# Patient Record
Sex: FEMALE | Race: BLACK OR AFRICAN AMERICAN | Employment: UNEMPLOYED | URBAN - METROPOLITAN AREA
[De-identification: names, ages, dates, MRNs, and addresses within clinical notes are randomized per-mention and may not be internally consistent; named-entity substitution may affect disease eponyms.]

---

## 2017-01-01 ENCOUNTER — DOCUMENTATION ONLY (OUTPATIENT)
Dept: PALLATIVE CARE | Age: 37
End: 2017-01-01

## 2017-01-01 ENCOUNTER — HOME CARE VISIT (OUTPATIENT)
Dept: HOSPICE | Facility: HOSPICE | Age: 37
End: 2017-01-01
Payer: MEDICARE

## 2017-01-01 ENCOUNTER — TELEPHONE (OUTPATIENT)
Dept: PALLATIVE CARE | Age: 37
End: 2017-01-01

## 2017-01-01 ENCOUNTER — ANESTHESIA EVENT (OUTPATIENT)
Dept: INTERVENTIONAL RADIOLOGY/VASCULAR | Age: 37
DRG: 542 | End: 2017-01-01
Payer: MEDICARE

## 2017-01-01 ENCOUNTER — NURSE NAVIGATOR (OUTPATIENT)
Dept: PALLATIVE CARE | Age: 37
End: 2017-01-01

## 2017-01-01 ENCOUNTER — ANESTHESIA (OUTPATIENT)
Dept: CT IMAGING | Age: 37
DRG: 940 | End: 2017-01-01
Payer: OTHER MISCELLANEOUS

## 2017-01-01 ENCOUNTER — APPOINTMENT (OUTPATIENT)
Dept: CT IMAGING | Age: 37
DRG: 542 | End: 2017-01-01
Attending: STUDENT IN AN ORGANIZED HEALTH CARE EDUCATION/TRAINING PROGRAM
Payer: MEDICARE

## 2017-01-01 ENCOUNTER — APPOINTMENT (OUTPATIENT)
Dept: GENERAL RADIOLOGY | Age: 37
DRG: 542 | End: 2017-01-01
Attending: ANESTHESIOLOGY
Payer: MEDICARE

## 2017-01-01 ENCOUNTER — ANESTHESIA (OUTPATIENT)
Dept: SURGERY | Age: 37
DRG: 542 | End: 2017-01-01
Payer: MEDICARE

## 2017-01-01 ENCOUNTER — ANESTHESIA EVENT (OUTPATIENT)
Dept: SURGERY | Age: 37
DRG: 542 | End: 2017-01-01
Payer: MEDICARE

## 2017-01-01 ENCOUNTER — HOSPITAL ENCOUNTER (OUTPATIENT)
Dept: CT IMAGING | Age: 37
Discharge: HOME | DRG: 940 | End: 2017-08-02
Attending: INTERNAL MEDICINE
Payer: OTHER MISCELLANEOUS

## 2017-01-01 ENCOUNTER — HOSPITAL ENCOUNTER (INPATIENT)
Age: 37
LOS: 16 days | End: 2017-08-27
Attending: INTERNAL MEDICINE | Admitting: INTERNAL MEDICINE

## 2017-01-01 ENCOUNTER — APPOINTMENT (OUTPATIENT)
Dept: GENERAL RADIOLOGY | Age: 37
DRG: 542 | End: 2017-01-01
Attending: STUDENT IN AN ORGANIZED HEALTH CARE EDUCATION/TRAINING PROGRAM
Payer: MEDICARE

## 2017-01-01 ENCOUNTER — DOCUMENTATION ONLY (OUTPATIENT)
Dept: OTHER | Age: 37
End: 2017-01-01

## 2017-01-01 ENCOUNTER — HOME CARE VISIT (OUTPATIENT)
Dept: SCHEDULING | Facility: HOME HEALTH | Age: 37
End: 2017-01-01
Payer: MEDICARE

## 2017-01-01 ENCOUNTER — HOSPICE ADMISSION (OUTPATIENT)
Dept: HOSPICE | Facility: HOSPICE | Age: 37
End: 2017-01-01
Payer: MEDICARE

## 2017-01-01 ENCOUNTER — HOSPITAL ENCOUNTER (INPATIENT)
Age: 37
LOS: 8 days | Discharge: HOSPICE | DRG: 542 | End: 2017-05-16
Attending: STUDENT IN AN ORGANIZED HEALTH CARE EDUCATION/TRAINING PROGRAM | Admitting: FAMILY MEDICINE
Payer: MEDICARE

## 2017-01-01 ENCOUNTER — HOSPITAL ENCOUNTER (INPATIENT)
Age: 37
LOS: 14 days | Discharge: HOSPICE | End: 2017-05-30
Attending: INTERNAL MEDICINE | Admitting: INTERNAL MEDICINE
Payer: MEDICARE

## 2017-01-01 ENCOUNTER — APPOINTMENT (OUTPATIENT)
Dept: INTERVENTIONAL RADIOLOGY/VASCULAR | Age: 37
DRG: 542 | End: 2017-01-01
Attending: INTERNAL MEDICINE
Payer: MEDICARE

## 2017-01-01 ENCOUNTER — ANESTHESIA EVENT (OUTPATIENT)
Dept: CT IMAGING | Age: 37
DRG: 940 | End: 2017-01-01
Payer: OTHER MISCELLANEOUS

## 2017-01-01 ENCOUNTER — HOME VISIT (OUTPATIENT)
Dept: PALLATIVE CARE | Age: 37
End: 2017-01-01

## 2017-01-01 ENCOUNTER — OFFICE VISIT (OUTPATIENT)
Dept: GERIATRIC MEDICINE | Age: 37
End: 2017-01-01

## 2017-01-01 ENCOUNTER — HOSPITAL ENCOUNTER (INPATIENT)
Age: 37
LOS: 11 days | Discharge: HOSPICE | DRG: 940 | End: 2017-08-11
Attending: INTERNAL MEDICINE | Admitting: INTERNAL MEDICINE
Payer: OTHER MISCELLANEOUS

## 2017-01-01 ENCOUNTER — APPOINTMENT (OUTPATIENT)
Dept: ULTRASOUND IMAGING | Age: 37
DRG: 542 | End: 2017-01-01
Attending: INTERNAL MEDICINE
Payer: MEDICARE

## 2017-01-01 ENCOUNTER — APPOINTMENT (OUTPATIENT)
Dept: GENERAL RADIOLOGY | Age: 37
DRG: 940 | End: 2017-01-01
Attending: INTERNAL MEDICINE
Payer: OTHER MISCELLANEOUS

## 2017-01-01 ENCOUNTER — APPOINTMENT (OUTPATIENT)
Dept: GENERAL RADIOLOGY | Age: 37
DRG: 542 | End: 2017-01-01
Attending: FAMILY MEDICINE
Payer: MEDICARE

## 2017-01-01 ENCOUNTER — ANESTHESIA (OUTPATIENT)
Dept: INTERVENTIONAL RADIOLOGY/VASCULAR | Age: 37
DRG: 542 | End: 2017-01-01
Payer: MEDICARE

## 2017-01-01 ENCOUNTER — DOCUMENTATION ONLY (OUTPATIENT)
Dept: FAMILY MEDICINE CLINIC | Age: 37
End: 2017-01-01

## 2017-01-01 ENCOUNTER — HOME HEALTH ADMISSION (OUTPATIENT)
Dept: HOME HEALTH SERVICES | Facility: HOME HEALTH | Age: 37
End: 2017-01-01
Payer: MEDICARE

## 2017-01-01 ENCOUNTER — HOSPITAL ENCOUNTER (INPATIENT)
Age: 37
LOS: 56 days | Discharge: HOSPICE | End: 2017-07-31
Attending: INTERNAL MEDICINE | Admitting: INTERNAL MEDICINE
Payer: MEDICARE

## 2017-01-01 VITALS
DIASTOLIC BLOOD PRESSURE: 68 MMHG | HEART RATE: 90 BPM | OXYGEN SATURATION: 98 % | RESPIRATION RATE: 16 BRPM | SYSTOLIC BLOOD PRESSURE: 120 MMHG | TEMPERATURE: 97.2 F

## 2017-01-01 VITALS — OXYGEN SATURATION: 97 % | HEART RATE: 92 BPM | SYSTOLIC BLOOD PRESSURE: 130 MMHG | DIASTOLIC BLOOD PRESSURE: 68 MMHG

## 2017-01-01 VITALS
TEMPERATURE: 97.4 F | RESPIRATION RATE: 16 BRPM | HEART RATE: 100 BPM | DIASTOLIC BLOOD PRESSURE: 64 MMHG | SYSTOLIC BLOOD PRESSURE: 110 MMHG | OXYGEN SATURATION: 96 %

## 2017-01-01 VITALS
RESPIRATION RATE: 16 BRPM | DIASTOLIC BLOOD PRESSURE: 60 MMHG | SYSTOLIC BLOOD PRESSURE: 105 MMHG | HEART RATE: 97 BPM | OXYGEN SATURATION: 98 %

## 2017-01-01 VITALS
TEMPERATURE: 97 F | SYSTOLIC BLOOD PRESSURE: 104 MMHG | OXYGEN SATURATION: 97 % | HEART RATE: 87 BPM | DIASTOLIC BLOOD PRESSURE: 78 MMHG | RESPIRATION RATE: 20 BRPM

## 2017-01-01 VITALS — DIASTOLIC BLOOD PRESSURE: 60 MMHG | TEMPERATURE: 97.3 F | OXYGEN SATURATION: 97 % | SYSTOLIC BLOOD PRESSURE: 110 MMHG

## 2017-01-01 VITALS
HEART RATE: 98 BPM | SYSTOLIC BLOOD PRESSURE: 102 MMHG | RESPIRATION RATE: 18 BRPM | OXYGEN SATURATION: 92 % | DIASTOLIC BLOOD PRESSURE: 60 MMHG

## 2017-01-01 VITALS
OXYGEN SATURATION: 98 % | HEART RATE: 100 BPM | SYSTOLIC BLOOD PRESSURE: 112 MMHG | WEIGHT: 267 LBS | BODY MASS INDEX: 43.09 KG/M2 | RESPIRATION RATE: 16 BRPM | DIASTOLIC BLOOD PRESSURE: 68 MMHG

## 2017-01-01 VITALS
HEART RATE: 95 BPM | SYSTOLIC BLOOD PRESSURE: 114 MMHG | DIASTOLIC BLOOD PRESSURE: 60 MMHG | RESPIRATION RATE: 18 BRPM | OXYGEN SATURATION: 97 %

## 2017-01-01 VITALS
TEMPERATURE: 97.8 F | HEART RATE: 113 BPM | RESPIRATION RATE: 18 BRPM | OXYGEN SATURATION: 94 % | DIASTOLIC BLOOD PRESSURE: 66 MMHG | SYSTOLIC BLOOD PRESSURE: 102 MMHG

## 2017-01-01 VITALS
RESPIRATION RATE: 16 BRPM | BODY MASS INDEX: 43.01 KG/M2 | SYSTOLIC BLOOD PRESSURE: 133 MMHG | HEART RATE: 100 BPM | TEMPERATURE: 98.5 F | HEIGHT: 66 IN | DIASTOLIC BLOOD PRESSURE: 76 MMHG | OXYGEN SATURATION: 100 % | WEIGHT: 267.64 LBS

## 2017-01-01 VITALS
RESPIRATION RATE: 20 BRPM | TEMPERATURE: 98.3 F | DIASTOLIC BLOOD PRESSURE: 60 MMHG | DIASTOLIC BLOOD PRESSURE: 72 MMHG | OXYGEN SATURATION: 97 % | SYSTOLIC BLOOD PRESSURE: 126 MMHG | SYSTOLIC BLOOD PRESSURE: 110 MMHG | HEIGHT: 67 IN | HEART RATE: 88 BPM | OXYGEN SATURATION: 97 % | TEMPERATURE: 98.4 F | HEART RATE: 107 BPM

## 2017-01-01 VITALS
HEART RATE: 121 BPM | RESPIRATION RATE: 10 BRPM | OXYGEN SATURATION: 99 % | TEMPERATURE: 98.6 F | SYSTOLIC BLOOD PRESSURE: 116 MMHG | DIASTOLIC BLOOD PRESSURE: 76 MMHG

## 2017-01-01 VITALS
DIASTOLIC BLOOD PRESSURE: 80 MMHG | RESPIRATION RATE: 18 BRPM | SYSTOLIC BLOOD PRESSURE: 120 MMHG | WEIGHT: 293 LBS | HEART RATE: 78 BPM | BODY MASS INDEX: 46.99 KG/M2

## 2017-01-01 VITALS
TEMPERATURE: 98.8 F | SYSTOLIC BLOOD PRESSURE: 127 MMHG | DIASTOLIC BLOOD PRESSURE: 66 MMHG | OXYGEN SATURATION: 100 % | HEART RATE: 123 BPM | RESPIRATION RATE: 18 BRPM

## 2017-01-01 VITALS — SYSTOLIC BLOOD PRESSURE: 112 MMHG | OXYGEN SATURATION: 95 % | DIASTOLIC BLOOD PRESSURE: 66 MMHG | HEART RATE: 92 BPM

## 2017-01-01 VITALS
SYSTOLIC BLOOD PRESSURE: 102 MMHG | RESPIRATION RATE: 16 BRPM | OXYGEN SATURATION: 96 % | TEMPERATURE: 97 F | DIASTOLIC BLOOD PRESSURE: 60 MMHG | HEART RATE: 98 BPM

## 2017-01-01 VITALS — HEART RATE: 135 BPM | SYSTOLIC BLOOD PRESSURE: 120 MMHG | OXYGEN SATURATION: 96 % | DIASTOLIC BLOOD PRESSURE: 70 MMHG

## 2017-01-01 VITALS
OXYGEN SATURATION: 97 % | SYSTOLIC BLOOD PRESSURE: 120 MMHG | DIASTOLIC BLOOD PRESSURE: 68 MMHG | TEMPERATURE: 97.2 F | HEART RATE: 96 BPM

## 2017-01-01 VITALS
OXYGEN SATURATION: 99 % | HEART RATE: 89 BPM | DIASTOLIC BLOOD PRESSURE: 72 MMHG | RESPIRATION RATE: 18 BRPM | SYSTOLIC BLOOD PRESSURE: 122 MMHG

## 2017-01-01 VITALS
HEART RATE: 102 BPM | RESPIRATION RATE: 20 BRPM | OXYGEN SATURATION: 98 % | SYSTOLIC BLOOD PRESSURE: 117 MMHG | DIASTOLIC BLOOD PRESSURE: 61 MMHG

## 2017-01-01 VITALS — HEART RATE: 95 BPM | SYSTOLIC BLOOD PRESSURE: 130 MMHG | OXYGEN SATURATION: 96 % | DIASTOLIC BLOOD PRESSURE: 88 MMHG

## 2017-01-01 VITALS
HEART RATE: 128 BPM | RESPIRATION RATE: 18 BRPM | TEMPERATURE: 98.2 F | OXYGEN SATURATION: 94 % | DIASTOLIC BLOOD PRESSURE: 40 MMHG | SYSTOLIC BLOOD PRESSURE: 75 MMHG

## 2017-01-01 VITALS
RESPIRATION RATE: 18 BRPM | DIASTOLIC BLOOD PRESSURE: 60 MMHG | HEART RATE: 98 BPM | OXYGEN SATURATION: 91 % | SYSTOLIC BLOOD PRESSURE: 100 MMHG

## 2017-01-01 VITALS
HEART RATE: 109 BPM | RESPIRATION RATE: 18 BRPM | DIASTOLIC BLOOD PRESSURE: 60 MMHG | OXYGEN SATURATION: 96 % | SYSTOLIC BLOOD PRESSURE: 92 MMHG

## 2017-01-01 VITALS — SYSTOLIC BLOOD PRESSURE: 110 MMHG | OXYGEN SATURATION: 97 % | DIASTOLIC BLOOD PRESSURE: 60 MMHG | HEART RATE: 87 BPM

## 2017-01-01 DIAGNOSIS — C79.51 BONE METASTASES (HCC): ICD-10-CM

## 2017-01-01 DIAGNOSIS — C54.1 ENDOMETRIAL CANCER (HCC): Primary | ICD-10-CM

## 2017-01-01 DIAGNOSIS — C54.1 ENDOMETRIAL CANCER (HCC): ICD-10-CM

## 2017-01-01 DIAGNOSIS — G89.3 CHRONIC NEOPLASM-RELATED PAIN: ICD-10-CM

## 2017-01-01 DIAGNOSIS — R60.1 GENERALIZED EDEMA: ICD-10-CM

## 2017-01-01 DIAGNOSIS — C79.51 BONE METASTASES (HCC): Primary | ICD-10-CM

## 2017-01-01 DIAGNOSIS — K59.03 CONSTIPATION DUE TO PAIN MEDICATION: ICD-10-CM

## 2017-01-01 DIAGNOSIS — R53.81 DEBILITY: ICD-10-CM

## 2017-01-01 DIAGNOSIS — M79.604 RIGHT LEG PAIN: Primary | ICD-10-CM

## 2017-01-01 DIAGNOSIS — R53.81 DEBILITY: Primary | ICD-10-CM

## 2017-01-01 DIAGNOSIS — E66.01 MORBID OBESITY DUE TO EXCESS CALORIES (HCC): ICD-10-CM

## 2017-01-01 DIAGNOSIS — R00.0 TACHYCARDIA: ICD-10-CM

## 2017-01-01 DIAGNOSIS — R79.89 ELEVATED LIVER FUNCTION TESTS: ICD-10-CM

## 2017-01-01 DIAGNOSIS — R06.02 SHORTNESS OF BREATH: ICD-10-CM

## 2017-01-01 DIAGNOSIS — G62.9 NEUROPATHY: ICD-10-CM

## 2017-01-01 DIAGNOSIS — M79.604 PAIN OF RIGHT LOWER EXTREMITY: ICD-10-CM

## 2017-01-01 DIAGNOSIS — Z71.89 ADVANCE CARE PLANNING: ICD-10-CM

## 2017-01-01 DIAGNOSIS — F41.9 ANXIETY: ICD-10-CM

## 2017-01-01 DIAGNOSIS — R60.0 LOCALIZED EDEMA: ICD-10-CM

## 2017-01-01 DIAGNOSIS — C54.1 ENDOMETRIAL CARCINOMA (HCC): ICD-10-CM

## 2017-01-01 DIAGNOSIS — F05 DELIRIUM DUE TO ANOTHER MEDICAL CONDITION: ICD-10-CM

## 2017-01-01 DIAGNOSIS — K11.7 INCREASED OROPHARYNGEAL SECRETIONS: ICD-10-CM

## 2017-01-01 DIAGNOSIS — R40.0 SOMNOLENCE: ICD-10-CM

## 2017-01-01 DIAGNOSIS — C54.1 ENDOMETRIAL CA (HCC): ICD-10-CM

## 2017-01-01 DIAGNOSIS — K21.00 GASTROESOPHAGEAL REFLUX DISEASE WITH ESOPHAGITIS: ICD-10-CM

## 2017-01-01 DIAGNOSIS — F32.89 OTHER DEPRESSION: ICD-10-CM

## 2017-01-01 DIAGNOSIS — R73.03 PRE-DIABETES: ICD-10-CM

## 2017-01-01 DIAGNOSIS — R53.1 WEAKNESS: ICD-10-CM

## 2017-01-01 DIAGNOSIS — R06.02 SHORTNESS OF BREATH: Primary | ICD-10-CM

## 2017-01-01 DIAGNOSIS — G47.01 INSOMNIA DUE TO MEDICAL CONDITION: ICD-10-CM

## 2017-01-01 DIAGNOSIS — E66.01 MORBID OBESITY, UNSPECIFIED OBESITY TYPE (HCC): ICD-10-CM

## 2017-01-01 DIAGNOSIS — E88.81 METABOLIC SYNDROME: ICD-10-CM

## 2017-01-01 DIAGNOSIS — B35.3 TINEA PEDIS OF RIGHT FOOT: ICD-10-CM

## 2017-01-01 DIAGNOSIS — R10.2 VAGINAL PAIN: ICD-10-CM

## 2017-01-01 DIAGNOSIS — R11.0 NAUSEA WITHOUT VOMITING: ICD-10-CM

## 2017-01-01 DIAGNOSIS — B37.9 CANDIDA INFECTION: ICD-10-CM

## 2017-01-01 DIAGNOSIS — C79.9 METASTATIC CANCER (HCC): ICD-10-CM

## 2017-01-01 DIAGNOSIS — E55.9 VITAMIN D DEFICIENCY: ICD-10-CM

## 2017-01-01 LAB
ABO + RH BLD: NORMAL
ABO + RH BLD: NORMAL
ALBUMIN SERPL BCP-MCNC: 2 G/DL (ref 3.5–5)
ALBUMIN SERPL BCP-MCNC: 2.9 G/DL (ref 3.5–5)
ALBUMIN SERPL BCP-MCNC: 3 G/DL (ref 3.5–5)
ALBUMIN/GLOB SERPL: 0.5 {RATIO} (ref 1.1–2.2)
ALBUMIN/GLOB SERPL: 0.7 {RATIO} (ref 1.1–2.2)
ALBUMIN/GLOB SERPL: 0.7 {RATIO} (ref 1.1–2.2)
ALP SERPL-CCNC: 104 U/L (ref 45–117)
ALP SERPL-CCNC: 93 U/L (ref 45–117)
ALP SERPL-CCNC: 99 U/L (ref 45–117)
ALT SERPL-CCNC: 11 U/L (ref 12–78)
ALT SERPL-CCNC: 24 U/L (ref 12–78)
ALT SERPL-CCNC: 9 U/L (ref 12–78)
ANION GAP BLD CALC-SCNC: 7 MMOL/L (ref 5–15)
ANION GAP BLD CALC-SCNC: 7 MMOL/L (ref 5–15)
ANION GAP BLD CALC-SCNC: 8 MMOL/L (ref 5–15)
ANION GAP BLD CALC-SCNC: 8 MMOL/L (ref 5–15)
APTT PPP: 35.5 SEC (ref 22.1–32.5)
ARTERIAL PATENCY WRIST A: YES
AST SERPL W P-5'-P-CCNC: 10 U/L (ref 15–37)
AST SERPL W P-5'-P-CCNC: 33 U/L (ref 15–37)
AST SERPL W P-5'-P-CCNC: 9 U/L (ref 15–37)
ATRIAL RATE: 140 BPM
BACTERIA SPEC CULT: NORMAL
BASE EXCESS BLD CALC-SCNC: 0 MMOL/L
BASOPHILS # BLD AUTO: 0 K/UL (ref 0–0.1)
BASOPHILS # BLD AUTO: 0 K/UL (ref 0–0.1)
BASOPHILS # BLD: 0 % (ref 0–1)
BASOPHILS # BLD: 0 % (ref 0–1)
BDY SITE: ABNORMAL
BILIRUB DIRECT SERPL-MCNC: 5.9 MG/DL (ref 0–0.2)
BILIRUB SERPL-MCNC: 0.4 MG/DL (ref 0.2–1)
BILIRUB SERPL-MCNC: 0.6 MG/DL (ref 0.2–1)
BILIRUB SERPL-MCNC: 6.9 MG/DL (ref 0.2–1)
BLD PROD TYP BPU: NORMAL
BLOOD GROUP ANTIBODIES SERPL: NORMAL
BLOOD GROUP ANTIBODIES SERPL: NORMAL
BPU ID: NORMAL
BUN SERPL-MCNC: 14 MG/DL (ref 6–20)
BUN SERPL-MCNC: 16 MG/DL (ref 6–20)
BUN SERPL-MCNC: 19 MG/DL (ref 6–20)
BUN SERPL-MCNC: 23 MG/DL (ref 6–20)
BUN/CREAT SERPL: 20 (ref 12–20)
BUN/CREAT SERPL: 20 (ref 12–20)
BUN/CREAT SERPL: 24 (ref 12–20)
BUN/CREAT SERPL: 28 (ref 12–20)
CALCIUM SERPL-MCNC: 8.3 MG/DL (ref 8.5–10.1)
CALCIUM SERPL-MCNC: 8.3 MG/DL (ref 8.5–10.1)
CALCIUM SERPL-MCNC: 8.6 MG/DL (ref 8.5–10.1)
CALCIUM SERPL-MCNC: 8.6 MG/DL (ref 8.5–10.1)
CALCULATED P AXIS, ECG09: 67 DEGREES
CALCULATED R AXIS, ECG10: 52 DEGREES
CALCULATED T AXIS, ECG11: 28 DEGREES
CHLORIDE SERPL-SCNC: 104 MMOL/L (ref 97–108)
CHLORIDE SERPL-SCNC: 105 MMOL/L (ref 97–108)
CHLORIDE SERPL-SCNC: 106 MMOL/L (ref 97–108)
CHLORIDE SERPL-SCNC: 110 MMOL/L (ref 97–108)
CK MB CFR SERPL CALC: NORMAL % (ref 0–2.5)
CK MB SERPL-MCNC: <1 NG/ML (ref 5–25)
CK SERPL-CCNC: 60 U/L (ref 26–192)
CO2 SERPL-SCNC: 24 MMOL/L (ref 21–32)
CO2 SERPL-SCNC: 24 MMOL/L (ref 21–32)
CO2 SERPL-SCNC: 26 MMOL/L (ref 21–32)
CO2 SERPL-SCNC: 26 MMOL/L (ref 21–32)
CREAT SERPL-MCNC: 0.68 MG/DL (ref 0.55–1.02)
CREAT SERPL-MCNC: 0.69 MG/DL (ref 0.55–1.02)
CREAT SERPL-MCNC: 0.83 MG/DL (ref 0.55–1.02)
CREAT SERPL-MCNC: 0.95 MG/DL (ref 0.55–1.02)
CROSSMATCH RESULT,%XM: NORMAL
D DIMER PPP FEU-MCNC: 6.41 MG/L FEU (ref 0–0.65)
DIAGNOSIS, 93000: NORMAL
DIFFERENTIAL METHOD BLD: ABNORMAL
EOSINOPHIL # BLD: 0 K/UL (ref 0–0.4)
EOSINOPHIL # BLD: 0.2 K/UL (ref 0–0.4)
EOSINOPHIL NFR BLD: 0 % (ref 0–7)
EOSINOPHIL NFR BLD: 2 % (ref 0–7)
ERYTHROCYTE [DISTWIDTH] IN BLOOD BY AUTOMATED COUNT: 17.8 % (ref 11.5–14.5)
ERYTHROCYTE [DISTWIDTH] IN BLOOD BY AUTOMATED COUNT: 18.2 % (ref 11.5–14.5)
ERYTHROCYTE [DISTWIDTH] IN BLOOD BY AUTOMATED COUNT: 18.3 % (ref 11.5–14.5)
ERYTHROCYTE [DISTWIDTH] IN BLOOD BY AUTOMATED COUNT: 20.4 % (ref 11.5–14.5)
FOLATE SERPL-MCNC: 18.4 NG/ML (ref 5–21)
GAS FLOW.O2 O2 DELIVERY SYS: ABNORMAL L/MIN
GAS FLOW.O2 SETTING OXYMISER: 2 L/M
GLOBULIN SER CALC-MCNC: 3.7 G/DL (ref 2–4)
GLOBULIN SER CALC-MCNC: 3.9 G/DL (ref 2–4)
GLOBULIN SER CALC-MCNC: 4.4 G/DL (ref 2–4)
GLUCOSE BLD STRIP.AUTO-MCNC: 100 MG/DL (ref 65–100)
GLUCOSE BLD STRIP.AUTO-MCNC: 102 MG/DL (ref 65–100)
GLUCOSE BLD STRIP.AUTO-MCNC: 102 MG/DL (ref 65–100)
GLUCOSE BLD STRIP.AUTO-MCNC: 107 MG/DL (ref 65–100)
GLUCOSE BLD STRIP.AUTO-MCNC: 108 MG/DL (ref 65–100)
GLUCOSE BLD STRIP.AUTO-MCNC: 117 MG/DL (ref 65–100)
GLUCOSE BLD STRIP.AUTO-MCNC: 118 MG/DL (ref 65–100)
GLUCOSE BLD STRIP.AUTO-MCNC: 123 MG/DL (ref 65–100)
GLUCOSE BLD STRIP.AUTO-MCNC: 124 MG/DL (ref 65–100)
GLUCOSE BLD STRIP.AUTO-MCNC: 130 MG/DL (ref 65–100)
GLUCOSE BLD STRIP.AUTO-MCNC: 134 MG/DL (ref 65–100)
GLUCOSE BLD STRIP.AUTO-MCNC: 136 MG/DL (ref 65–100)
GLUCOSE BLD STRIP.AUTO-MCNC: 137 MG/DL (ref 65–100)
GLUCOSE BLD STRIP.AUTO-MCNC: 138 MG/DL (ref 65–100)
GLUCOSE BLD STRIP.AUTO-MCNC: 152 MG/DL (ref 65–100)
GLUCOSE BLD STRIP.AUTO-MCNC: 154 MG/DL (ref 65–100)
GLUCOSE BLD STRIP.AUTO-MCNC: 159 MG/DL (ref 65–100)
GLUCOSE BLD STRIP.AUTO-MCNC: 168 MG/DL (ref 65–100)
GLUCOSE BLD STRIP.AUTO-MCNC: 172 MG/DL (ref 65–100)
GLUCOSE BLD STRIP.AUTO-MCNC: 185 MG/DL (ref 65–100)
GLUCOSE BLD STRIP.AUTO-MCNC: 190 MG/DL (ref 65–100)
GLUCOSE BLD STRIP.AUTO-MCNC: 195 MG/DL (ref 65–100)
GLUCOSE BLD STRIP.AUTO-MCNC: 200 MG/DL (ref 65–100)
GLUCOSE BLD STRIP.AUTO-MCNC: 54 MG/DL (ref 65–100)
GLUCOSE BLD STRIP.AUTO-MCNC: 70 MG/DL (ref 65–100)
GLUCOSE BLD STRIP.AUTO-MCNC: 82 MG/DL (ref 65–100)
GLUCOSE BLD STRIP.AUTO-MCNC: 83 MG/DL (ref 65–100)
GLUCOSE BLD STRIP.AUTO-MCNC: 91 MG/DL (ref 65–100)
GLUCOSE BLD STRIP.AUTO-MCNC: 92 MG/DL (ref 65–100)
GLUCOSE BLD STRIP.AUTO-MCNC: 95 MG/DL (ref 65–100)
GLUCOSE SERPL-MCNC: 114 MG/DL (ref 65–100)
GLUCOSE SERPL-MCNC: 161 MG/DL (ref 65–100)
GLUCOSE SERPL-MCNC: 176 MG/DL (ref 65–100)
GLUCOSE SERPL-MCNC: 82 MG/DL (ref 65–100)
HCO3 BLD-SCNC: 22.1 MMOL/L (ref 22–26)
HCT VFR BLD AUTO: 18.4 % (ref 35–47)
HCT VFR BLD AUTO: 23.9 % (ref 35–47)
HCT VFR BLD AUTO: 24.6 % (ref 35–47)
HCT VFR BLD AUTO: 27 % (ref 35–47)
HGB BLD-MCNC: 5 G/DL (ref 11.5–16)
HGB BLD-MCNC: 6.9 G/DL (ref 11.5–16)
HGB BLD-MCNC: 7.3 G/DL (ref 11.5–16)
HGB BLD-MCNC: 7.3 G/DL (ref 11.5–16)
HGB BLD-MCNC: 7.7 G/DL (ref 11.5–16)
INR BLD: 1.1 (ref 0.9–1.2)
INR PPP: 1.1 (ref 0.9–1.1)
IRON SATN MFR SERPL: 15 % (ref 20–50)
IRON SERPL-MCNC: 30 UG/DL (ref 35–150)
LACTATE SERPL-SCNC: 0.7 MMOL/L (ref 0.4–2)
LYMPHOCYTES # BLD AUTO: 8 % (ref 12–49)
LYMPHOCYTES # BLD AUTO: 8 % (ref 12–49)
LYMPHOCYTES # BLD: 1 K/UL (ref 0.8–3.5)
LYMPHOCYTES # BLD: 1.3 K/UL (ref 0.8–3.5)
MCH RBC QN AUTO: 20 PG (ref 26–34)
MCH RBC QN AUTO: 21.8 PG (ref 26–34)
MCH RBC QN AUTO: 22.5 PG (ref 26–34)
MCH RBC QN AUTO: 23.2 PG (ref 26–34)
MCHC RBC AUTO-ENTMCNC: 27.2 G/DL (ref 30–36.5)
MCHC RBC AUTO-ENTMCNC: 28.5 G/DL (ref 30–36.5)
MCHC RBC AUTO-ENTMCNC: 28.9 G/DL (ref 30–36.5)
MCHC RBC AUTO-ENTMCNC: 29.7 G/DL (ref 30–36.5)
MCV RBC AUTO: 73.6 FL (ref 80–99)
MCV RBC AUTO: 76.5 FL (ref 80–99)
MCV RBC AUTO: 77.9 FL (ref 80–99)
MCV RBC AUTO: 78.3 FL (ref 80–99)
MONOCYTES # BLD: 0.4 K/UL (ref 0–1)
MONOCYTES # BLD: 0.8 K/UL (ref 0–1)
MONOCYTES NFR BLD AUTO: 3 % (ref 5–13)
MONOCYTES NFR BLD AUTO: 5 % (ref 5–13)
NEUTS SEG # BLD: 10.7 K/UL (ref 1.8–8)
NEUTS SEG # BLD: 13.2 K/UL (ref 1.8–8)
NEUTS SEG NFR BLD AUTO: 87 % (ref 32–75)
NEUTS SEG NFR BLD AUTO: 87 % (ref 32–75)
NRBC # BLD: 0.06 K/UL (ref 0–0.01)
NRBC BLD-RTO: 0.5 PER 100 WBC
P-R INTERVAL, ECG05: 144 MS
PCO2 BLD: 22.7 MMHG (ref 35–45)
PH BLD: 7.59 [PH] (ref 7.35–7.45)
PLATELET # BLD AUTO: 307 K/UL (ref 150–400)
PLATELET # BLD AUTO: 331 K/UL (ref 150–400)
PLATELET # BLD AUTO: 340 K/UL (ref 150–400)
PLATELET # BLD AUTO: 365 K/UL (ref 150–400)
PO2 BLD: 175 MMHG (ref 80–100)
POTASSIUM SERPL-SCNC: 3.8 MMOL/L (ref 3.5–5.1)
POTASSIUM SERPL-SCNC: 4.4 MMOL/L (ref 3.5–5.1)
POTASSIUM SERPL-SCNC: 4.4 MMOL/L (ref 3.5–5.1)
POTASSIUM SERPL-SCNC: 4.5 MMOL/L (ref 3.5–5.1)
PROT SERPL-MCNC: 5.7 G/DL (ref 6.4–8.2)
PROT SERPL-MCNC: 6.8 G/DL (ref 6.4–8.2)
PROT SERPL-MCNC: 7.4 G/DL (ref 6.4–8.2)
PROTHROMBIN TIME: 11.4 SEC (ref 9–11.1)
Q-T INTERVAL, ECG07: 264 MS
QRS DURATION, ECG06: 64 MS
QTC CALCULATION (BEZET), ECG08: 403 MS
RBC # BLD AUTO: 2.5 M/UL (ref 3.8–5.2)
RBC # BLD AUTO: 3.07 M/UL (ref 3.8–5.2)
RBC # BLD AUTO: 3.14 M/UL (ref 3.8–5.2)
RBC # BLD AUTO: 3.53 M/UL (ref 3.8–5.2)
RBC MORPH BLD: ABNORMAL
SAO2 % BLD: 100 % (ref 92–97)
SERVICE CMNT-IMP: ABNORMAL
SERVICE CMNT-IMP: NORMAL
SODIUM SERPL-SCNC: 137 MMOL/L (ref 136–145)
SODIUM SERPL-SCNC: 138 MMOL/L (ref 136–145)
SODIUM SERPL-SCNC: 139 MMOL/L (ref 136–145)
SODIUM SERPL-SCNC: 141 MMOL/L (ref 136–145)
SPECIMEN EXP DATE BLD: NORMAL
SPECIMEN EXP DATE BLD: NORMAL
SPECIMEN TYPE: ABNORMAL
STATUS OF UNIT,%ST: NORMAL
THERAPEUTIC RANGE,PTTT: ABNORMAL SECS (ref 58–77)
TIBC SERPL-MCNC: 194 UG/DL (ref 250–450)
TOTAL RESP. RATE, ITRR: 16
TROPONIN I SERPL-MCNC: 0.06 NG/ML
UNIT DIVISION, %UDIV: 0
VENTRICULAR RATE, ECG03: 140 BPM
VIT B12 SERPL-MCNC: 1207 PG/ML (ref 211–911)
WBC # BLD AUTO: 12.3 K/UL (ref 3.6–11)
WBC # BLD AUTO: 12.7 K/UL (ref 3.6–11)
WBC # BLD AUTO: 13.3 K/UL (ref 3.6–11)
WBC # BLD AUTO: 15.2 K/UL (ref 3.6–11)
WBC NRBC COR # BLD: ABNORMAL 10*3/UL

## 2017-01-01 PROCEDURE — 74011250637 HC RX REV CODE- 250/637: Performed by: INTERNAL MEDICINE

## 2017-01-01 PROCEDURE — 77010033678 HC OXYGEN DAILY

## 2017-01-01 PROCEDURE — 3336590001 HSPC ROOM AND BOARD

## 2017-01-01 PROCEDURE — 74011250637 HC RX REV CODE- 250/637: Performed by: NURSE PRACTITIONER

## 2017-01-01 PROCEDURE — 74177 CT ABD & PELVIS W/CONTRAST: CPT

## 2017-01-01 PROCEDURE — G0155 HHCP-SVS OF CSW,EA 15 MIN: HCPCS

## 2017-01-01 PROCEDURE — 99233 SBSQ HOSP IP/OBS HIGH 50: CPT | Performed by: INTERNAL MEDICINE

## 2017-01-01 PROCEDURE — 99357 PR PROLONGED SVC I/P REQ UNIT/FLOOR TIME EA 30 MIN: CPT | Performed by: INTERNAL MEDICINE

## 2017-01-01 PROCEDURE — T4541 LARGE DISPOSABLE UNDERPAD: HCPCS

## 2017-01-01 PROCEDURE — G0156 HHCP-SVS OF AIDE,EA 15 MIN: HCPCS

## 2017-01-01 PROCEDURE — 36415 COLL VENOUS BLD VENIPUNCTURE: CPT | Performed by: INTERNAL MEDICINE

## 2017-01-01 PROCEDURE — 3331090002 HH PPS REVENUE DEBIT

## 2017-01-01 PROCEDURE — 74011000250 HC RX REV CODE- 250: Performed by: NURSE PRACTITIONER

## 2017-01-01 PROCEDURE — 74011250636 HC RX REV CODE- 250/636: Performed by: NURSE PRACTITIONER

## 2017-01-01 PROCEDURE — 74011250636 HC RX REV CODE- 250/636: Performed by: FAMILY MEDICINE

## 2017-01-01 PROCEDURE — 74011250636 HC RX REV CODE- 250/636: Performed by: INTERNAL MEDICINE

## 2017-01-01 PROCEDURE — 80053 COMPREHEN METABOLIC PANEL: CPT | Performed by: HOSPITALIST

## 2017-01-01 PROCEDURE — HOSPICE MEDICATION HC HH HOSPICE MEDICATION

## 2017-01-01 PROCEDURE — 3331090001 HH PPS REVENUE CREDIT

## 2017-01-01 PROCEDURE — 0651 HSPC ROUTINE HOME CARE

## 2017-01-01 PROCEDURE — 74011000250 HC RX REV CODE- 250: Performed by: INTERNAL MEDICINE

## 2017-01-01 PROCEDURE — 82746 ASSAY OF FOLIC ACID SERUM: CPT | Performed by: FAMILY MEDICINE

## 2017-01-01 PROCEDURE — G0299 HHS/HOSPICE OF RN EA 15 MIN: HCPCS

## 2017-01-01 PROCEDURE — 74011250637 HC RX REV CODE- 250/637: Performed by: FAMILY MEDICINE

## 2017-01-01 PROCEDURE — 77030026438 HC STYL ET INTUB CARD -A: Performed by: ANESTHESIOLOGY

## 2017-01-01 PROCEDURE — 51798 US URINE CAPACITY MEASURE: CPT

## 2017-01-01 PROCEDURE — 65270000015 HC RM PRIVATE ONCOLOGY

## 2017-01-01 PROCEDURE — 36430 TRANSFUSION BLD/BLD COMPNT: CPT

## 2017-01-01 PROCEDURE — 74011636637 HC RX REV CODE- 636/637: Performed by: HOSPITALIST

## 2017-01-01 PROCEDURE — 74011250636 HC RX REV CODE- 250/636: Performed by: HOSPITALIST

## 2017-01-01 PROCEDURE — 65660000000 HC RM CCU STEPDOWN

## 2017-01-01 PROCEDURE — 73551 X-RAY EXAM OF FEMUR 1: CPT

## 2017-01-01 PROCEDURE — 99285 EMERGENCY DEPT VISIT HI MDM: CPT

## 2017-01-01 PROCEDURE — P9016 RBC LEUKOCYTES REDUCED: HCPCS | Performed by: INTERNAL MEDICINE

## 2017-01-01 PROCEDURE — P9016 RBC LEUKOCYTES REDUCED: HCPCS | Performed by: HOSPITALIST

## 2017-01-01 PROCEDURE — 93971 EXTREMITY STUDY: CPT

## 2017-01-01 PROCEDURE — 76450000000

## 2017-01-01 PROCEDURE — 74011250637 HC RX REV CODE- 250/637: Performed by: PHYSICAL MEDICINE & REHABILITATION

## 2017-01-01 PROCEDURE — 0656 HSPC GENERAL INPATIENT

## 2017-01-01 PROCEDURE — A4927 NON-STERILE GLOVES: HCPCS

## 2017-01-01 PROCEDURE — 99232 SBSQ HOSP IP/OBS MODERATE 35: CPT | Performed by: INTERNAL MEDICINE

## 2017-01-01 PROCEDURE — 99233 SBSQ HOSP IP/OBS HIGH 50: CPT | Performed by: FAMILY MEDICINE

## 2017-01-01 PROCEDURE — 77030032490 HC SLV COMPR SCD KNE COVD -B

## 2017-01-01 PROCEDURE — 02HV33Z INSERTION OF INFUSION DEVICE INTO SUPERIOR VENA CAVA, PERCUTANEOUS APPROACH: ICD-10-PCS | Performed by: INTERNAL MEDICINE

## 2017-01-01 PROCEDURE — 77030011256 HC DRSG MEPILEX <16IN NO BORD MOLN -A

## 2017-01-01 PROCEDURE — 76210000063 HC OR PH I REC FIRST 0.5 HR

## 2017-01-01 PROCEDURE — A6250 SKIN SEAL PROTECT MOISTURIZR: HCPCS

## 2017-01-01 PROCEDURE — 77030019908 HC STETH ESOPH SIMS -A: Performed by: ANESTHESIOLOGY

## 2017-01-01 PROCEDURE — 74011000250 HC RX REV CODE- 250

## 2017-01-01 PROCEDURE — 20982 ABLATE BONE TUMOR(S) PERQ: CPT

## 2017-01-01 PROCEDURE — 76882 US LMTD JT/FCL EVL NVASC XTR: CPT

## 2017-01-01 PROCEDURE — 80053 COMPREHEN METABOLIC PANEL: CPT | Performed by: STUDENT IN AN ORGANIZED HEALTH CARE EDUCATION/TRAINING PROGRAM

## 2017-01-01 PROCEDURE — 85025 COMPLETE CBC W/AUTO DIFF WBC: CPT | Performed by: INTERNAL MEDICINE

## 2017-01-01 PROCEDURE — G0157 HHC PT ASSISTANT EA 15: HCPCS

## 2017-01-01 PROCEDURE — 74011250636 HC RX REV CODE- 250/636: Performed by: STUDENT IN AN ORGANIZED HEALTH CARE EDUCATION/TRAINING PROGRAM

## 2017-01-01 PROCEDURE — 77030034850

## 2017-01-01 PROCEDURE — 3331090003 HH PPS REVENUE ADJ

## 2017-01-01 PROCEDURE — A4314 CATH W/DRAINAGE 2-WAY LATEX: HCPCS

## 2017-01-01 PROCEDURE — 77030008684 HC TU ET CUF COVD -B: Performed by: ANESTHESIOLOGY

## 2017-01-01 PROCEDURE — 77030020847 HC STATLOK BARD -A

## 2017-01-01 PROCEDURE — 82248 BILIRUBIN DIRECT: CPT | Performed by: INTERNAL MEDICINE

## 2017-01-01 PROCEDURE — 65270000029 HC RM PRIVATE

## 2017-01-01 PROCEDURE — 76060000033 HC ANESTHESIA 1 TO 1.5 HR: Performed by: ANESTHESIOLOGY

## 2017-01-01 PROCEDURE — 82962 GLUCOSE BLOOD TEST: CPT

## 2017-01-01 PROCEDURE — 80048 BASIC METABOLIC PNL TOTAL CA: CPT | Performed by: INTERNAL MEDICINE

## 2017-01-01 PROCEDURE — 3336500001 HSPC ELECTION

## 2017-01-01 PROCEDURE — 73701 CT LOWER EXTREMITY W/DYE: CPT

## 2017-01-01 PROCEDURE — G0151 HHCP-SERV OF PT,EA 15 MIN: HCPCS

## 2017-01-01 PROCEDURE — 80053 COMPREHEN METABOLIC PANEL: CPT | Performed by: INTERNAL MEDICINE

## 2017-01-01 PROCEDURE — 74011636320 HC RX REV CODE- 636/320: Performed by: HOSPITALIST

## 2017-01-01 PROCEDURE — 84484 ASSAY OF TROPONIN QUANT: CPT | Performed by: HOSPITALIST

## 2017-01-01 PROCEDURE — 76210000016 HC OR PH I REC 1 TO 1.5 HR

## 2017-01-01 PROCEDURE — HHS10554 SHAMPOO/BODY WASH 8 OZ ALOE VESTA

## 2017-01-01 PROCEDURE — 99231 SBSQ HOSP IP/OBS SF/LOW 25: CPT | Performed by: INTERNAL MEDICINE

## 2017-01-01 PROCEDURE — 85025 COMPLETE CBC W/AUTO DIFF WBC: CPT | Performed by: HOSPITALIST

## 2017-01-01 PROCEDURE — 65610000006 HC RM INTENSIVE CARE

## 2017-01-01 PROCEDURE — 77030008771 HC TU NG SALEM SUMP -A: Performed by: ANESTHESIOLOGY

## 2017-01-01 PROCEDURE — 82607 VITAMIN B-12: CPT | Performed by: FAMILY MEDICINE

## 2017-01-01 PROCEDURE — 71275 CT ANGIOGRAPHY CHEST: CPT

## 2017-01-01 PROCEDURE — C1751 CATH, INF, PER/CENT/MIDLINE: HCPCS

## 2017-01-01 PROCEDURE — A4223 INFUSION SUPPLIES W/O PUMP: HCPCS

## 2017-01-01 PROCEDURE — 76210000006 HC OR PH I REC 0.5 TO 1 HR: Performed by: ANESTHESIOLOGY

## 2017-01-01 PROCEDURE — 86900 BLOOD TYPING SEROLOGIC ABO: CPT | Performed by: INTERNAL MEDICINE

## 2017-01-01 PROCEDURE — 76060000037 HC ANESTHESIA 3 TO 3.5 HR

## 2017-01-01 PROCEDURE — 30233N1 TRANSFUSION OF NONAUTOLOGOUS RED BLOOD CELLS INTO PERIPHERAL VEIN, PERCUTANEOUS APPROACH: ICD-10-PCS | Performed by: FAMILY MEDICINE

## 2017-01-01 PROCEDURE — 77030018719 HC DRSG PTCH ANTIMIC J&J -A

## 2017-01-01 PROCEDURE — 36569 INSJ PICC 5 YR+ W/O IMAGING: CPT

## 2017-01-01 PROCEDURE — 83540 ASSAY OF IRON: CPT | Performed by: FAMILY MEDICINE

## 2017-01-01 PROCEDURE — 93041 RHYTHM ECG TRACING: CPT

## 2017-01-01 PROCEDURE — A9270 NON-COVERED ITEM OR SERVICE: HCPCS

## 2017-01-01 PROCEDURE — 74011250636 HC RX REV CODE- 250/636: Performed by: PHYSICAL MEDICINE & REHABILITATION

## 2017-01-01 PROCEDURE — 36593 DECLOT VASCULAR DEVICE: CPT

## 2017-01-01 PROCEDURE — 36592 COLLECT BLOOD FROM PICC: CPT

## 2017-01-01 PROCEDURE — 74011250636 HC RX REV CODE- 250/636

## 2017-01-01 PROCEDURE — A4221 SUPP NON-INSULIN INF CATH/WK: HCPCS

## 2017-01-01 PROCEDURE — 85027 COMPLETE CBC AUTOMATED: CPT | Performed by: STUDENT IN AN ORGANIZED HEALTH CARE EDUCATION/TRAINING PROGRAM

## 2017-01-01 PROCEDURE — 83605 ASSAY OF LACTIC ACID: CPT | Performed by: HOSPITALIST

## 2017-01-01 PROCEDURE — 74011000258 HC RX REV CODE- 258: Performed by: INTERNAL MEDICINE

## 2017-01-01 PROCEDURE — 71010 XR CHEST PORT: CPT

## 2017-01-01 PROCEDURE — 86923 COMPATIBILITY TEST ELECTRIC: CPT | Performed by: INTERNAL MEDICINE

## 2017-01-01 PROCEDURE — 400013 HH SOC

## 2017-01-01 PROCEDURE — 02HV33Z INSERTION OF INFUSION DEVICE INTO SUPERIOR VENA CAVA, PERCUTANEOUS APPROACH: ICD-10-PCS | Performed by: ANESTHESIOLOGY

## 2017-01-01 PROCEDURE — A4245 ALCOHOL WIPES PER BOX: HCPCS

## 2017-01-01 PROCEDURE — 85610 PROTHROMBIN TIME: CPT | Performed by: HOSPITALIST

## 2017-01-01 PROCEDURE — 85027 COMPLETE CBC AUTOMATED: CPT | Performed by: INTERNAL MEDICINE

## 2017-01-01 PROCEDURE — 85379 FIBRIN DEGRADATION QUANT: CPT | Performed by: INTERNAL MEDICINE

## 2017-01-01 PROCEDURE — 76060000035 HC ANESTHESIA 2 TO 2.5 HR

## 2017-01-01 PROCEDURE — 82550 ASSAY OF CK (CPK): CPT | Performed by: HOSPITALIST

## 2017-01-01 PROCEDURE — 85610 PROTHROMBIN TIME: CPT

## 2017-01-01 PROCEDURE — 74011250637 HC RX REV CODE- 250/637: Performed by: HOSPITALIST

## 2017-01-01 PROCEDURE — 96375 TX/PRO/DX INJ NEW DRUG ADDON: CPT

## 2017-01-01 PROCEDURE — 36600 WITHDRAWAL OF ARTERIAL BLOOD: CPT

## 2017-01-01 PROCEDURE — 74011000250 HC RX REV CODE- 250: Performed by: RADIOLOGY

## 2017-01-01 PROCEDURE — 82803 BLOOD GASES ANY COMBINATION: CPT

## 2017-01-01 PROCEDURE — 86920 COMPATIBILITY TEST SPIN: CPT | Performed by: HOSPITALIST

## 2017-01-01 PROCEDURE — 74011000258 HC RX REV CODE- 258: Performed by: HOSPITALIST

## 2017-01-01 PROCEDURE — 36415 COLL VENOUS BLD VENIPUNCTURE: CPT | Performed by: STUDENT IN AN ORGANIZED HEALTH CARE EDUCATION/TRAINING PROGRAM

## 2017-01-01 PROCEDURE — 73590 X-RAY EXAM OF LOWER LEG: CPT

## 2017-01-01 PROCEDURE — 85730 THROMBOPLASTIN TIME PARTIAL: CPT | Performed by: HOSPITALIST

## 2017-01-01 PROCEDURE — 86900 BLOOD TYPING SEROLOGIC ABO: CPT | Performed by: HOSPITALIST

## 2017-01-01 PROCEDURE — 74011000250 HC RX REV CODE- 250: Performed by: ANESTHESIOLOGY

## 2017-01-01 PROCEDURE — 85018 HEMOGLOBIN: CPT | Performed by: INTERNAL MEDICINE

## 2017-01-01 PROCEDURE — 96374 THER/PROPH/DIAG INJ IV PUSH: CPT

## 2017-01-01 PROCEDURE — 74011000258 HC RX REV CODE- 258: Performed by: NURSE PRACTITIONER

## 2017-01-01 PROCEDURE — 87040 BLOOD CULTURE FOR BACTERIA: CPT | Performed by: HOSPITALIST

## 2017-01-01 PROCEDURE — 0Q543ZZ DESTRUCTION OF RIGHT ACETABULUM, PERCUTANEOUS APPROACH: ICD-10-PCS | Performed by: RADIOLOGY

## 2017-01-01 PROCEDURE — 74011250637 HC RX REV CODE- 250/637: Performed by: STUDENT IN AN ORGANIZED HEALTH CARE EDUCATION/TRAINING PROGRAM

## 2017-01-01 PROCEDURE — 74011000250 HC RX REV CODE- 250: Performed by: FAMILY MEDICINE

## 2017-01-01 RX ORDER — FACIAL-BODY WIPES
10 EACH TOPICAL DAILY PRN
Status: DISCONTINUED | OUTPATIENT
Start: 2017-01-01 | End: 2017-01-01 | Stop reason: HOSPADM

## 2017-01-01 RX ORDER — LORAZEPAM 2 MG/ML
1 INJECTION INTRAMUSCULAR
Status: DISCONTINUED | OUTPATIENT
Start: 2017-01-01 | End: 2017-01-01

## 2017-01-01 RX ORDER — PHENAZOPYRIDINE HYDROCHLORIDE 100 MG/1
100 TABLET, FILM COATED ORAL
Status: COMPLETED | OUTPATIENT
Start: 2017-01-01 | End: 2017-01-01

## 2017-01-01 RX ORDER — HYDROMORPHONE HCL IN 0.9% NACL 15 MG/30ML
PATIENT CONTROLLED ANALGESIA VIAL INTRAVENOUS CONTINUOUS
Status: DISCONTINUED | OUTPATIENT
Start: 2017-01-01 | End: 2017-01-01

## 2017-01-01 RX ORDER — DULOXETIN HYDROCHLORIDE 20 MG/1
40 CAPSULE, DELAYED RELEASE ORAL DAILY
COMMUNITY

## 2017-01-01 RX ORDER — ACETAMINOPHEN 325 MG/1
650 TABLET ORAL
Status: DISCONTINUED | OUTPATIENT
Start: 2017-01-01 | End: 2017-08-28 | Stop reason: HOSPADM

## 2017-01-01 RX ORDER — KETAMINE HYDROCHLORIDE 10 MG/ML
INJECTION, SOLUTION INTRAMUSCULAR; INTRAVENOUS AS NEEDED
Status: DISCONTINUED | OUTPATIENT
Start: 2017-01-01 | End: 2017-01-01 | Stop reason: HOSPADM

## 2017-01-01 RX ORDER — DULOXETIN HYDROCHLORIDE 30 MG/1
30 CAPSULE, DELAYED RELEASE ORAL DAILY
Status: DISCONTINUED | OUTPATIENT
Start: 2017-01-01 | End: 2017-01-01

## 2017-01-01 RX ORDER — LIDOCAINE HYDROCHLORIDE 10 MG/ML
0.1 INJECTION, SOLUTION EPIDURAL; INFILTRATION; INTRACAUDAL; PERINEURAL AS NEEDED
Status: CANCELLED | OUTPATIENT
Start: 2017-01-01

## 2017-01-01 RX ORDER — METRONIDAZOLE 250 MG/1
1000 TABLET ORAL AS NEEDED
Status: DISCONTINUED | OUTPATIENT
Start: 2017-01-01 | End: 2017-08-28 | Stop reason: HOSPADM

## 2017-01-01 RX ORDER — METHADONE HYDROCHLORIDE 5 MG/1
10 TABLET ORAL EVERY 8 HOURS
Status: DISCONTINUED | OUTPATIENT
Start: 2017-01-01 | End: 2017-01-01 | Stop reason: HOSPADM

## 2017-01-01 RX ORDER — SODIUM CHLORIDE 0.9 % (FLUSH) 0.9 %
5-10 SYRINGE (ML) INJECTION AS NEEDED
Status: DISCONTINUED | OUTPATIENT
Start: 2017-01-01 | End: 2017-01-01 | Stop reason: HOSPADM

## 2017-01-01 RX ORDER — LORAZEPAM 0.5 MG/1
0.5 TABLET ORAL
Status: DISCONTINUED | OUTPATIENT
Start: 2017-01-01 | End: 2017-01-01

## 2017-01-01 RX ORDER — LIDOCAINE HYDROCHLORIDE 20 MG/ML
INJECTION, SOLUTION EPIDURAL; INFILTRATION; INTRACAUDAL; PERINEURAL AS NEEDED
Status: DISCONTINUED | OUTPATIENT
Start: 2017-01-01 | End: 2017-01-01 | Stop reason: HOSPADM

## 2017-01-01 RX ORDER — METOPROLOL TARTRATE 25 MG/1
12.5 TABLET, FILM COATED ORAL 2 TIMES DAILY
Status: DISCONTINUED | OUTPATIENT
Start: 2017-01-01 | End: 2017-01-01

## 2017-01-01 RX ORDER — HYDROMORPHONE HYDROCHLORIDE 2 MG/ML
4 INJECTION, SOLUTION INTRAMUSCULAR; INTRAVENOUS; SUBCUTANEOUS
Status: DISCONTINUED | OUTPATIENT
Start: 2017-01-01 | End: 2017-01-01 | Stop reason: RX

## 2017-01-01 RX ORDER — SUCCINYLCHOLINE CHLORIDE 20 MG/ML
INJECTION INTRAMUSCULAR; INTRAVENOUS AS NEEDED
Status: DISCONTINUED | OUTPATIENT
Start: 2017-01-01 | End: 2017-01-01 | Stop reason: HOSPADM

## 2017-01-01 RX ORDER — ACETAMINOPHEN 325 MG/1
650 TABLET ORAL
Status: DISCONTINUED | OUTPATIENT
Start: 2017-01-01 | End: 2017-01-01 | Stop reason: HOSPADM

## 2017-01-01 RX ORDER — HALOPERIDOL 2 MG/1
2 TABLET ORAL
Status: DISCONTINUED | OUTPATIENT
Start: 2017-01-01 | End: 2017-01-01

## 2017-01-01 RX ORDER — DULOXETIN HYDROCHLORIDE 20 MG/1
40 CAPSULE, DELAYED RELEASE ORAL DAILY
COMMUNITY
End: 2017-01-01 | Stop reason: SDUPTHER

## 2017-01-01 RX ORDER — DIPHENHYDRAMINE HYDROCHLORIDE 50 MG/ML
12.5 INJECTION, SOLUTION INTRAMUSCULAR; INTRAVENOUS AS NEEDED
Status: DISCONTINUED | OUTPATIENT
Start: 2017-01-01 | End: 2017-01-01 | Stop reason: HOSPADM

## 2017-01-01 RX ORDER — GLYCOPYRROLATE 0.2 MG/ML
0.2 INJECTION INTRAMUSCULAR; INTRAVENOUS
Status: DISCONTINUED | OUTPATIENT
Start: 2017-01-01 | End: 2017-08-28 | Stop reason: HOSPADM

## 2017-01-01 RX ORDER — MORPHINE SULFATE 10 MG/ML
15 INJECTION, SOLUTION INTRAMUSCULAR; INTRAVENOUS
Status: DISCONTINUED | OUTPATIENT
Start: 2017-01-01 | End: 2017-01-01

## 2017-01-01 RX ORDER — SODIUM CHLORIDE 0.9 % (FLUSH) 0.9 %
10-40 SYRINGE (ML) INJECTION EVERY 8 HOURS
Status: DISCONTINUED | OUTPATIENT
Start: 2017-01-01 | End: 2017-01-01

## 2017-01-01 RX ORDER — POLYETHYLENE GLYCOL 3350 17 G/17G
17 POWDER, FOR SOLUTION ORAL DAILY
Status: DISCONTINUED | OUTPATIENT
Start: 2017-01-01 | End: 2017-01-01

## 2017-01-01 RX ORDER — POLYETHYLENE GLYCOL 3350 17 G/17G
17 POWDER, FOR SOLUTION ORAL DAILY
Status: DISCONTINUED | OUTPATIENT
Start: 2017-01-01 | End: 2017-01-01 | Stop reason: HOSPADM

## 2017-01-01 RX ORDER — FENTANYL CITRATE 50 UG/ML
25 INJECTION, SOLUTION INTRAMUSCULAR; INTRAVENOUS
Status: DISCONTINUED | OUTPATIENT
Start: 2017-01-01 | End: 2017-01-01 | Stop reason: HOSPADM

## 2017-01-01 RX ORDER — FENTANYL CITRATE 50 UG/ML
INJECTION, SOLUTION INTRAMUSCULAR; INTRAVENOUS
Status: DISPENSED
Start: 2017-01-01 | End: 2017-01-01

## 2017-01-01 RX ORDER — SODIUM CHLORIDE 0.9 % (FLUSH) 0.9 %
10 SYRINGE (ML) INJECTION AS NEEDED
Status: DISCONTINUED | OUTPATIENT
Start: 2017-01-01 | End: 2017-01-01 | Stop reason: HOSPADM

## 2017-01-01 RX ORDER — HYDROMORPHONE HCL IN 0.9% NACL 15 MG/30ML
PATIENT CONTROLLED ANALGESIA VIAL INTRAVENOUS CONTINUOUS
Status: DISCONTINUED | OUTPATIENT
Start: 2017-01-01 | End: 2017-01-01 | Stop reason: SDUPTHER

## 2017-01-01 RX ORDER — SODIUM CHLORIDE, SODIUM LACTATE, POTASSIUM CHLORIDE, CALCIUM CHLORIDE 600; 310; 30; 20 MG/100ML; MG/100ML; MG/100ML; MG/100ML
INJECTION, SOLUTION INTRAVENOUS
Status: DISCONTINUED | OUTPATIENT
Start: 2017-01-01 | End: 2017-01-01 | Stop reason: HOSPADM

## 2017-01-01 RX ORDER — OXYCODONE HYDROCHLORIDE 5 MG/1
60 TABLET ORAL
Status: DISCONTINUED | OUTPATIENT
Start: 2017-01-01 | End: 2017-01-01 | Stop reason: HOSPADM

## 2017-01-01 RX ORDER — MIDAZOLAM HYDROCHLORIDE 1 MG/ML
1 INJECTION, SOLUTION INTRAMUSCULAR; INTRAVENOUS AS NEEDED
Status: DISCONTINUED | OUTPATIENT
Start: 2017-01-01 | End: 2017-01-01 | Stop reason: HOSPADM

## 2017-01-01 RX ORDER — DIPHENHYDRAMINE HCL 25 MG
25 CAPSULE ORAL
Status: DISCONTINUED | OUTPATIENT
Start: 2017-01-01 | End: 2017-01-01 | Stop reason: HOSPADM

## 2017-01-01 RX ORDER — MORPHINE SULFATE 20 MG/ML
40 SOLUTION ORAL
Status: DISCONTINUED | OUTPATIENT
Start: 2017-01-01 | End: 2017-01-01

## 2017-01-01 RX ORDER — ONDANSETRON 2 MG/ML
INJECTION INTRAMUSCULAR; INTRAVENOUS AS NEEDED
Status: DISCONTINUED | OUTPATIENT
Start: 2017-01-01 | End: 2017-01-01 | Stop reason: HOSPADM

## 2017-01-01 RX ORDER — SULINDAC 200 MG/1
200 TABLET ORAL 2 TIMES DAILY WITH MEALS
Status: DISCONTINUED | OUTPATIENT
Start: 2017-01-01 | End: 2017-01-01

## 2017-01-01 RX ORDER — AMOXICILLIN 250 MG
2 CAPSULE ORAL 2 TIMES DAILY
Status: DISCONTINUED | OUTPATIENT
Start: 2017-01-01 | End: 2017-01-01

## 2017-01-01 RX ORDER — HYDROMORPHONE HCL IN 0.9% NACL 15 MG/30ML
PATIENT CONTROLLED ANALGESIA VIAL INTRAVENOUS CONTINUOUS
Status: DISCONTINUED | OUTPATIENT
Start: 2017-01-01 | End: 2017-01-01 | Stop reason: HOSPADM

## 2017-01-01 RX ORDER — MAGNESIUM SULFATE 100 %
4 CRYSTALS MISCELLANEOUS AS NEEDED
Status: DISCONTINUED | OUTPATIENT
Start: 2017-01-01 | End: 2017-01-01 | Stop reason: HOSPADM

## 2017-01-01 RX ORDER — DEXTROSE MONOHYDRATE AND SODIUM CHLORIDE 5; .45 G/100ML; G/100ML
125 INJECTION, SOLUTION INTRAVENOUS CONTINUOUS
Status: DISCONTINUED | OUTPATIENT
Start: 2017-01-01 | End: 2017-01-01

## 2017-01-01 RX ORDER — HYDROMORPHONE HYDROCHLORIDE 1 MG/ML
3 INJECTION, SOLUTION INTRAMUSCULAR; INTRAVENOUS; SUBCUTANEOUS
Status: DISCONTINUED | OUTPATIENT
Start: 2017-01-01 | End: 2017-01-01

## 2017-01-01 RX ORDER — SODIUM CHLORIDE 9 MG/ML
250 INJECTION, SOLUTION INTRAVENOUS AS NEEDED
Status: DISCONTINUED | OUTPATIENT
Start: 2017-01-01 | End: 2017-01-01 | Stop reason: HOSPADM

## 2017-01-01 RX ORDER — ACETAMINOPHEN 500 MG
1000 TABLET ORAL 3 TIMES DAILY
Status: DISCONTINUED | OUTPATIENT
Start: 2017-01-01 | End: 2017-01-01 | Stop reason: HOSPADM

## 2017-01-01 RX ORDER — SODIUM CHLORIDE, SODIUM LACTATE, POTASSIUM CHLORIDE, CALCIUM CHLORIDE 600; 310; 30; 20 MG/100ML; MG/100ML; MG/100ML; MG/100ML
1000 INJECTION, SOLUTION INTRAVENOUS CONTINUOUS
Status: DISCONTINUED | OUTPATIENT
Start: 2017-01-01 | End: 2017-01-01 | Stop reason: HOSPADM

## 2017-01-01 RX ORDER — HYDROMORPHONE HYDROCHLORIDE 2 MG/ML
2 INJECTION, SOLUTION INTRAMUSCULAR; INTRAVENOUS; SUBCUTANEOUS ONCE
Status: COMPLETED | OUTPATIENT
Start: 2017-01-01 | End: 2017-01-01

## 2017-01-01 RX ORDER — BISACODYL 5 MG
5 TABLET, DELAYED RELEASE (ENTERIC COATED) ORAL DAILY PRN
Status: DISCONTINUED | OUTPATIENT
Start: 2017-01-01 | End: 2017-01-01 | Stop reason: HOSPADM

## 2017-01-01 RX ORDER — HEPARIN 100 UNIT/ML
300 SYRINGE INTRAVENOUS AS NEEDED
Status: DISCONTINUED | OUTPATIENT
Start: 2017-01-01 | End: 2017-01-01 | Stop reason: HOSPADM

## 2017-01-01 RX ORDER — FACIAL-BODY WIPES
10 EACH TOPICAL DAILY PRN
Status: DISCONTINUED | OUTPATIENT
Start: 2017-01-01 | End: 2017-08-28 | Stop reason: HOSPADM

## 2017-01-01 RX ORDER — DOCUSATE SODIUM 100 MG/1
100 CAPSULE, LIQUID FILLED ORAL DAILY
Status: DISCONTINUED | OUTPATIENT
Start: 2017-01-01 | End: 2017-01-01

## 2017-01-01 RX ORDER — DIPHENHYDRAMINE HYDROCHLORIDE 50 MG/ML
12.5 INJECTION, SOLUTION INTRAMUSCULAR; INTRAVENOUS AS NEEDED
Status: CANCELLED | OUTPATIENT
Start: 2017-01-01 | End: 2017-01-01

## 2017-01-01 RX ORDER — MORPHINE SULFATE 5 MG/ML
4 INJECTION, SOLUTION INTRAVENOUS CONTINUOUS
Qty: 1 VIAL | Refills: 0 | Status: SHIPPED | OUTPATIENT
Start: 2017-01-01

## 2017-01-01 RX ORDER — OXYCODONE HYDROCHLORIDE 5 MG/1
30 TABLET ORAL
Status: DISCONTINUED | OUTPATIENT
Start: 2017-01-01 | End: 2017-01-01

## 2017-01-01 RX ORDER — PHENOBARBITAL SODIUM 130 MG/ML
30 INJECTION INTRAMUSCULAR
Status: DISCONTINUED | OUTPATIENT
Start: 2017-01-01 | End: 2017-08-28 | Stop reason: HOSPADM

## 2017-01-01 RX ORDER — IPRATROPIUM BROMIDE AND ALBUTEROL SULFATE 2.5; .5 MG/3ML; MG/3ML
3 SOLUTION RESPIRATORY (INHALATION)
Status: DISCONTINUED | OUTPATIENT
Start: 2017-01-01 | End: 2017-01-01

## 2017-01-01 RX ORDER — SODIUM CHLORIDE 0.9 % (FLUSH) 0.9 %
10 SYRINGE (ML) INJECTION EVERY 24 HOURS
Status: DISCONTINUED | OUTPATIENT
Start: 2017-01-01 | End: 2017-01-01 | Stop reason: HOSPADM

## 2017-01-01 RX ORDER — OXYCODONE HYDROCHLORIDE 5 MG/1
60 TABLET ORAL
Status: DISCONTINUED | OUTPATIENT
Start: 2017-01-01 | End: 2017-01-01

## 2017-01-01 RX ORDER — ENOXAPARIN SODIUM 150 MG/ML
111 INJECTION SUBCUTANEOUS EVERY 12 HOURS
Status: DISCONTINUED | OUTPATIENT
Start: 2017-01-01 | End: 2017-01-01

## 2017-01-01 RX ORDER — GABAPENTIN 300 MG/1
300 CAPSULE ORAL 3 TIMES DAILY
Status: DISCONTINUED | OUTPATIENT
Start: 2017-01-01 | End: 2017-01-01

## 2017-01-01 RX ORDER — MORPHINE SULFATE 20 MG/ML
20 SOLUTION ORAL
Status: DISCONTINUED | OUTPATIENT
Start: 2017-01-01 | End: 2017-01-01

## 2017-01-01 RX ORDER — LORAZEPAM 2 MG/ML
2 INJECTION INTRAMUSCULAR
Status: DISCONTINUED | OUTPATIENT
Start: 2017-01-01 | End: 2017-08-28 | Stop reason: HOSPADM

## 2017-01-01 RX ORDER — DEXAMETHASONE SODIUM PHOSPHATE 4 MG/ML
4 INJECTION, SOLUTION INTRA-ARTICULAR; INTRALESIONAL; INTRAMUSCULAR; INTRAVENOUS; SOFT TISSUE EVERY 12 HOURS
Status: DISCONTINUED | OUTPATIENT
Start: 2017-01-01 | End: 2017-01-01

## 2017-01-01 RX ORDER — NYSTATIN 100000 [USP'U]/ML
500000 SUSPENSION ORAL 4 TIMES DAILY
Status: DISCONTINUED | OUTPATIENT
Start: 2017-01-01 | End: 2017-01-01

## 2017-01-01 RX ORDER — FENTANYL 100 UG/H
2 PATCH TRANSDERMAL
Qty: 22 PATCH | Refills: 0 | Status: SHIPPED | OUTPATIENT
Start: 2017-01-01 | End: 2017-01-01 | Stop reason: SDUPTHER

## 2017-01-01 RX ORDER — LORAZEPAM 0.5 MG/1
0.5 TABLET ORAL 2 TIMES DAILY
Status: DISCONTINUED | OUTPATIENT
Start: 2017-01-01 | End: 2017-01-01

## 2017-01-01 RX ORDER — BACITRACIN 500 UNIT/G
1 PACKET (EA) TOPICAL AS NEEDED
Status: DISCONTINUED | OUTPATIENT
Start: 2017-01-01 | End: 2017-01-01 | Stop reason: HOSPADM

## 2017-01-01 RX ORDER — ACETAMINOPHEN 325 MG/1
650 TABLET ORAL ONCE
Status: COMPLETED | OUTPATIENT
Start: 2017-01-01 | End: 2017-01-01

## 2017-01-01 RX ORDER — MORPHINE SULFATE 4 MG/ML
4 INJECTION, SOLUTION INTRAMUSCULAR; INTRAVENOUS
Status: DISCONTINUED | OUTPATIENT
Start: 2017-01-01 | End: 2017-01-01

## 2017-01-01 RX ORDER — HYDROMORPHONE HYDROCHLORIDE 2 MG/ML
4 INJECTION, SOLUTION INTRAMUSCULAR; INTRAVENOUS; SUBCUTANEOUS
Status: DISCONTINUED | OUTPATIENT
Start: 2017-01-01 | End: 2017-08-28 | Stop reason: HOSPADM

## 2017-01-01 RX ORDER — ONDANSETRON 2 MG/ML
4 INJECTION INTRAMUSCULAR; INTRAVENOUS
Status: COMPLETED | OUTPATIENT
Start: 2017-01-01 | End: 2017-01-01

## 2017-01-01 RX ORDER — NYSTATIN 100000 [USP'U]/G
POWDER TOPICAL
Qty: 1 BOTTLE | Refills: 1 | Status: SHIPPED | OUTPATIENT
Start: 2017-01-01

## 2017-01-01 RX ORDER — NYSTATIN 100000 [USP'U]/G
POWDER TOPICAL 3 TIMES DAILY
Status: DISCONTINUED | OUTPATIENT
Start: 2017-01-01 | End: 2017-01-01

## 2017-01-01 RX ORDER — LORAZEPAM 2 MG/ML
1 INJECTION INTRAMUSCULAR
Status: DISCONTINUED | OUTPATIENT
Start: 2017-01-01 | End: 2017-01-01 | Stop reason: HOSPADM

## 2017-01-01 RX ORDER — GLYCOPYRROLATE 0.2 MG/ML
0.2 INJECTION INTRAMUSCULAR; INTRAVENOUS
Status: DISCONTINUED | OUTPATIENT
Start: 2017-01-01 | End: 2017-01-01

## 2017-01-01 RX ORDER — LORAZEPAM 1 MG/1
1 TABLET ORAL
Status: DISCONTINUED | OUTPATIENT
Start: 2017-01-01 | End: 2017-01-01

## 2017-01-01 RX ORDER — LIDOCAINE HYDROCHLORIDE 20 MG/ML
20 INJECTION, SOLUTION INFILTRATION; PERINEURAL
Status: COMPLETED | OUTPATIENT
Start: 2017-01-01 | End: 2017-01-01

## 2017-01-01 RX ORDER — MIDAZOLAM HYDROCHLORIDE 1 MG/ML
1 INJECTION, SOLUTION INTRAMUSCULAR; INTRAVENOUS AS NEEDED
Status: CANCELLED | OUTPATIENT
Start: 2017-01-01

## 2017-01-01 RX ORDER — SODIUM CHLORIDE 0.9 % (FLUSH) 0.9 %
10-30 SYRINGE (ML) INJECTION AS NEEDED
Status: DISCONTINUED | OUTPATIENT
Start: 2017-01-01 | End: 2017-01-01 | Stop reason: HOSPADM

## 2017-01-01 RX ORDER — MORPHINE SULFATE 4 MG/ML
6 INJECTION, SOLUTION INTRAMUSCULAR; INTRAVENOUS
Status: DISCONTINUED | OUTPATIENT
Start: 2017-01-01 | End: 2017-01-01 | Stop reason: HOSPADM

## 2017-01-01 RX ORDER — HYDROMORPHONE HCL IN 0.9% NACL 15 MG/30ML
PATIENT CONTROLLED ANALGESIA VIAL INTRAVENOUS
Status: DISCONTINUED | OUTPATIENT
Start: 2017-01-01 | End: 2017-01-01

## 2017-01-01 RX ORDER — SODIUM CHLORIDE 0.9 % (FLUSH) 0.9 %
20 SYRINGE (ML) INJECTION EVERY 24 HOURS
Status: DISCONTINUED | OUTPATIENT
Start: 2017-01-01 | End: 2017-01-01

## 2017-01-01 RX ORDER — HYDROMORPHONE HYDROCHLORIDE 1 MG/ML
4 INJECTION, SOLUTION INTRAMUSCULAR; INTRAVENOUS; SUBCUTANEOUS
Status: DISCONTINUED | OUTPATIENT
Start: 2017-01-01 | End: 2017-01-01 | Stop reason: RX

## 2017-01-01 RX ORDER — METOPROLOL TARTRATE 25 MG/1
12.5 TABLET, FILM COATED ORAL 2 TIMES DAILY
Status: DISCONTINUED | OUTPATIENT
Start: 2017-01-01 | End: 2017-01-01 | Stop reason: HOSPADM

## 2017-01-01 RX ORDER — HYDROMORPHONE HYDROCHLORIDE 1 MG/ML
2 INJECTION, SOLUTION INTRAMUSCULAR; INTRAVENOUS; SUBCUTANEOUS
Status: DISCONTINUED | OUTPATIENT
Start: 2017-01-01 | End: 2017-01-01

## 2017-01-01 RX ORDER — LORAZEPAM 2 MG/ML
1 INJECTION INTRAMUSCULAR EVERY 6 HOURS
Status: DISCONTINUED | OUTPATIENT
Start: 2017-01-01 | End: 2017-01-01 | Stop reason: HOSPADM

## 2017-01-01 RX ORDER — SODIUM CHLORIDE 9 MG/ML
25 INJECTION, SOLUTION INTRAVENOUS CONTINUOUS
Status: DISCONTINUED | OUTPATIENT
Start: 2017-01-01 | End: 2017-01-01 | Stop reason: HOSPADM

## 2017-01-01 RX ORDER — CEPHALEXIN 250 MG/1
500 CAPSULE ORAL 3 TIMES DAILY
Status: DISCONTINUED | OUTPATIENT
Start: 2017-01-01 | End: 2017-01-01

## 2017-01-01 RX ORDER — KETOROLAC TROMETHAMINE 30 MG/ML
30 INJECTION, SOLUTION INTRAMUSCULAR; INTRAVENOUS
Status: DISPENSED | OUTPATIENT
Start: 2017-01-01 | End: 2017-01-01

## 2017-01-01 RX ORDER — GABAPENTIN 300 MG/1
300 CAPSULE ORAL 2 TIMES DAILY
Status: DISCONTINUED | OUTPATIENT
Start: 2017-01-01 | End: 2017-01-01 | Stop reason: HOSPADM

## 2017-01-01 RX ORDER — GABAPENTIN 300 MG/1
600 CAPSULE ORAL 2 TIMES DAILY
Status: DISCONTINUED | OUTPATIENT
Start: 2017-01-01 | End: 2017-01-01

## 2017-01-01 RX ORDER — DULOXETINE 40 MG/1
40 CAPSULE, DELAYED RELEASE ORAL DAILY
Qty: 30 CAP | Refills: 11 | Status: SHIPPED | OUTPATIENT
Start: 2017-01-01 | End: 2017-01-01

## 2017-01-01 RX ORDER — MORPHINE SULFATE 4 MG/ML
4 INJECTION, SOLUTION INTRAMUSCULAR; INTRAVENOUS
Status: DISCONTINUED | OUTPATIENT
Start: 2017-01-01 | End: 2017-01-01 | Stop reason: HOSPADM

## 2017-01-01 RX ORDER — NALOXONE HYDROCHLORIDE 0.4 MG/ML
INJECTION, SOLUTION INTRAMUSCULAR; INTRAVENOUS; SUBCUTANEOUS AS NEEDED
Status: DISCONTINUED | OUTPATIENT
Start: 2017-01-01 | End: 2017-01-01 | Stop reason: HOSPADM

## 2017-01-01 RX ORDER — HYDROMORPHONE HYDROCHLORIDE 2 MG/ML
2 INJECTION, SOLUTION INTRAMUSCULAR; INTRAVENOUS; SUBCUTANEOUS
Status: DISCONTINUED | OUTPATIENT
Start: 2017-01-01 | End: 2017-01-01

## 2017-01-01 RX ORDER — FENTANYL CITRATE 50 UG/ML
50 INJECTION, SOLUTION INTRAMUSCULAR; INTRAVENOUS AS NEEDED
Status: DISCONTINUED | OUTPATIENT
Start: 2017-01-01 | End: 2017-01-01 | Stop reason: HOSPADM

## 2017-01-01 RX ORDER — LORAZEPAM 1 MG/1
1 TABLET ORAL
Status: DISCONTINUED | OUTPATIENT
Start: 2017-01-01 | End: 2017-01-01 | Stop reason: HOSPADM

## 2017-01-01 RX ORDER — DEXAMETHASONE SODIUM PHOSPHATE 4 MG/ML
2 INJECTION, SOLUTION INTRA-ARTICULAR; INTRALESIONAL; INTRAMUSCULAR; INTRAVENOUS; SOFT TISSUE EVERY 12 HOURS
Status: COMPLETED | OUTPATIENT
Start: 2017-01-01 | End: 2017-01-01

## 2017-01-01 RX ORDER — ACETAMINOPHEN 325 MG/1
650 TABLET ORAL
Status: DISCONTINUED | OUTPATIENT
Start: 2017-01-01 | End: 2017-01-01

## 2017-01-01 RX ORDER — HALOPERIDOL 5 MG/ML
2 INJECTION INTRAMUSCULAR
Status: DISCONTINUED | OUTPATIENT
Start: 2017-01-01 | End: 2017-08-28 | Stop reason: HOSPADM

## 2017-01-01 RX ORDER — GABAPENTIN 300 MG/1
300 CAPSULE ORAL
Status: DISCONTINUED | OUTPATIENT
Start: 2017-01-01 | End: 2017-01-01

## 2017-01-01 RX ORDER — DULOXETIN HYDROCHLORIDE 60 MG/1
60 CAPSULE, DELAYED RELEASE ORAL DAILY
Status: DISCONTINUED | OUTPATIENT
Start: 2017-01-01 | End: 2017-01-01 | Stop reason: HOSPADM

## 2017-01-01 RX ORDER — FENTANYL 100 UG/H
2 PATCH TRANSDERMAL
Status: DISCONTINUED | OUTPATIENT
Start: 2017-01-01 | End: 2017-01-01

## 2017-01-01 RX ORDER — LORAZEPAM 2 MG/ML
1 INJECTION INTRAMUSCULAR
Status: DISCONTINUED | OUTPATIENT
Start: 2017-01-01 | End: 2017-01-01 | Stop reason: SDUPTHER

## 2017-01-01 RX ORDER — SODIUM CHLORIDE, SODIUM LACTATE, POTASSIUM CHLORIDE, CALCIUM CHLORIDE 600; 310; 30; 20 MG/100ML; MG/100ML; MG/100ML; MG/100ML
100 INJECTION, SOLUTION INTRAVENOUS CONTINUOUS
Status: DISCONTINUED | OUTPATIENT
Start: 2017-01-01 | End: 2017-01-01 | Stop reason: HOSPADM

## 2017-01-01 RX ORDER — FACIAL-BODY WIPES
10 EACH TOPICAL
Status: COMPLETED | OUTPATIENT
Start: 2017-01-01 | End: 2017-01-01

## 2017-01-01 RX ORDER — PROPOFOL 10 MG/ML
INJECTION, EMULSION INTRAVENOUS AS NEEDED
Status: DISCONTINUED | OUTPATIENT
Start: 2017-01-01 | End: 2017-01-01 | Stop reason: HOSPADM

## 2017-01-01 RX ORDER — FENTANYL 100 UG/H
2 PATCH TRANSDERMAL
Qty: 22 PATCH | Refills: 0 | Status: CANCELLED | OUTPATIENT
Start: 2017-01-01 | End: 2017-01-01

## 2017-01-01 RX ORDER — DOCUSATE SODIUM 100 MG/1
100 CAPSULE, LIQUID FILLED ORAL DAILY
Status: DISCONTINUED | OUTPATIENT
Start: 2017-01-01 | End: 2017-01-01 | Stop reason: HOSPADM

## 2017-01-01 RX ORDER — LISINOPRIL 10 MG/1
TABLET ORAL
Qty: 30 TAB | Refills: 6 | Status: SHIPPED | OUTPATIENT
Start: 2017-01-01 | End: 2017-01-01

## 2017-01-01 RX ORDER — LORAZEPAM 1 MG/1
1 TABLET ORAL 2 TIMES DAILY
Status: DISCONTINUED | OUTPATIENT
Start: 2017-01-01 | End: 2017-01-01

## 2017-01-01 RX ORDER — HYDROMORPHONE HYDROCHLORIDE 1 MG/ML
1 INJECTION, SOLUTION INTRAMUSCULAR; INTRAVENOUS; SUBCUTANEOUS
Status: DISCONTINUED | OUTPATIENT
Start: 2017-01-01 | End: 2017-01-01

## 2017-01-01 RX ORDER — HYDROMORPHONE HYDROCHLORIDE 1 MG/ML
.2-.5 INJECTION, SOLUTION INTRAMUSCULAR; INTRAVENOUS; SUBCUTANEOUS
Status: CANCELLED | OUTPATIENT
Start: 2017-01-01

## 2017-01-01 RX ORDER — ONDANSETRON 2 MG/ML
4 INJECTION INTRAMUSCULAR; INTRAVENOUS
Status: DISCONTINUED | OUTPATIENT
Start: 2017-01-01 | End: 2017-08-28 | Stop reason: HOSPADM

## 2017-01-01 RX ORDER — CHOLECALCIFEROL (VITAMIN D3) 125 MCG
CAPSULE ORAL DAILY
COMMUNITY

## 2017-01-01 RX ORDER — OXYCODONE HYDROCHLORIDE 5 MG/1
20 TABLET ORAL
Status: DISCONTINUED | OUTPATIENT
Start: 2017-01-01 | End: 2017-01-01

## 2017-01-01 RX ORDER — DEXTROMETHORPHAN HYDROBROMIDE, GUAIFENESIN 5; 100 MG/5ML; MG/5ML
650 LIQUID ORAL
COMMUNITY
Start: 2017-01-01

## 2017-01-01 RX ORDER — MORPHINE SULFATE 5 MG/ML
INJECTION, SOLUTION INTRAVENOUS CONTINUOUS
Status: DISCONTINUED | OUTPATIENT
Start: 2017-01-01 | End: 2017-01-01 | Stop reason: HOSPADM

## 2017-01-01 RX ORDER — SODIUM CHLORIDE 0.9 % (FLUSH) 0.9 %
5-10 SYRINGE (ML) INJECTION EVERY 8 HOURS
Status: DISCONTINUED | OUTPATIENT
Start: 2017-01-01 | End: 2017-01-01 | Stop reason: HOSPADM

## 2017-01-01 RX ORDER — FENTANYL 100 UG/H
2 PATCH TRANSDERMAL
Qty: 20 PATCH | Refills: 0 | Status: CANCELLED | OUTPATIENT
Start: 2017-01-01 | End: 2017-01-01

## 2017-01-01 RX ORDER — FENTANYL CITRATE 50 UG/ML
INJECTION, SOLUTION INTRAMUSCULAR; INTRAVENOUS AS NEEDED
Status: DISCONTINUED | OUTPATIENT
Start: 2017-01-01 | End: 2017-01-01 | Stop reason: HOSPADM

## 2017-01-01 RX ORDER — ACETAMINOPHEN 650 MG/1
650 SUPPOSITORY RECTAL
Status: DISCONTINUED | OUTPATIENT
Start: 2017-01-01 | End: 2017-08-28 | Stop reason: HOSPADM

## 2017-01-01 RX ORDER — FENTANYL CITRATE 50 UG/ML
25 INJECTION, SOLUTION INTRAMUSCULAR; INTRAVENOUS
Status: CANCELLED | OUTPATIENT
Start: 2017-01-01

## 2017-01-01 RX ORDER — METHADONE HYDROCHLORIDE 5 MG/1
7.5 TABLET ORAL EVERY 8 HOURS
Status: DISCONTINUED | OUTPATIENT
Start: 2017-01-01 | End: 2017-01-01

## 2017-01-01 RX ORDER — MORPHINE SULFATE 4 MG/ML
6 INJECTION, SOLUTION INTRAMUSCULAR; INTRAVENOUS ONCE
Status: COMPLETED | OUTPATIENT
Start: 2017-01-01 | End: 2017-01-01

## 2017-01-01 RX ORDER — IPRATROPIUM BROMIDE AND ALBUTEROL SULFATE 2.5; .5 MG/3ML; MG/3ML
3 SOLUTION RESPIRATORY (INHALATION)
Status: DISCONTINUED | OUTPATIENT
Start: 2017-01-01 | End: 2017-01-01 | Stop reason: HOSPADM

## 2017-01-01 RX ORDER — DIPHENHYDRAMINE HCL 25 MG
25 CAPSULE ORAL
Status: DISCONTINUED | OUTPATIENT
Start: 2017-01-01 | End: 2017-01-01

## 2017-01-01 RX ORDER — LIDOCAINE HYDROCHLORIDE 20 MG/ML
INJECTION, SOLUTION INFILTRATION; PERINEURAL
Status: DISPENSED
Start: 2017-01-01 | End: 2017-01-01

## 2017-01-01 RX ORDER — POLYETHYLENE GLYCOL 3350 17 G/17G
17 POWDER, FOR SOLUTION ORAL
Status: DISCONTINUED | OUTPATIENT
Start: 2017-01-01 | End: 2017-01-01 | Stop reason: HOSPADM

## 2017-01-01 RX ORDER — MORPHINE SULFATE 20 MG/ML
20 SOLUTION ORAL
Status: DISCONTINUED | OUTPATIENT
Start: 2017-01-01 | End: 2017-01-01 | Stop reason: HOSPADM

## 2017-01-01 RX ORDER — MORPHINE SULFATE 10 MG/ML
INJECTION, SOLUTION INTRAMUSCULAR; INTRAVENOUS
Status: DISPENSED
Start: 2017-01-01 | End: 2017-01-01

## 2017-01-01 RX ORDER — HALOPERIDOL 5 MG/ML
2 INJECTION INTRAMUSCULAR
Status: DISCONTINUED | OUTPATIENT
Start: 2017-01-01 | End: 2017-01-01

## 2017-01-01 RX ORDER — METOPROLOL SUCCINATE 50 MG/1
50 TABLET, EXTENDED RELEASE ORAL DAILY
Status: DISCONTINUED | OUTPATIENT
Start: 2017-01-01 | End: 2017-01-01 | Stop reason: HOSPADM

## 2017-01-01 RX ORDER — PEPPERMINT OIL
SPIRIT ORAL ONCE
Status: COMPLETED | OUTPATIENT
Start: 2017-01-01 | End: 2017-01-01

## 2017-01-01 RX ORDER — METOPROLOL TARTRATE 25 MG/1
50 TABLET, FILM COATED ORAL DAILY
Status: DISCONTINUED | OUTPATIENT
Start: 2017-01-01 | End: 2017-01-01

## 2017-01-01 RX ORDER — NYSTATIN 100000 [USP'U]/ML
500000 SUSPENSION ORAL
Status: DISCONTINUED | OUTPATIENT
Start: 2017-01-01 | End: 2017-01-01 | Stop reason: HOSPADM

## 2017-01-01 RX ORDER — DIPHENHYDRAMINE HCL 25 MG
50 CAPSULE ORAL
Status: DISCONTINUED | OUTPATIENT
Start: 2017-01-01 | End: 2017-01-01

## 2017-01-01 RX ORDER — MORPHINE SULFATE 20 MG/ML
30 SOLUTION ORAL
Status: DISCONTINUED | OUTPATIENT
Start: 2017-01-01 | End: 2017-01-01

## 2017-01-01 RX ORDER — LORAZEPAM 2 MG/ML
1 INJECTION INTRAMUSCULAR EVERY 4 HOURS
Status: DISCONTINUED | OUTPATIENT
Start: 2017-01-01 | End: 2017-01-01

## 2017-01-01 RX ORDER — FENTANYL CITRATE 50 UG/ML
50 INJECTION, SOLUTION INTRAMUSCULAR; INTRAVENOUS AS NEEDED
Status: CANCELLED | OUTPATIENT
Start: 2017-01-01

## 2017-01-01 RX ORDER — SODIUM CHLORIDE 0.9 % (FLUSH) 0.9 %
20 SYRINGE (ML) INJECTION AS NEEDED
Status: DISCONTINUED | OUTPATIENT
Start: 2017-01-01 | End: 2017-01-01 | Stop reason: HOSPADM

## 2017-01-01 RX ORDER — ACETAMINOPHEN 325 MG/1
650 TABLET ORAL 3 TIMES DAILY
Status: DISCONTINUED | OUTPATIENT
Start: 2017-01-01 | End: 2017-01-01

## 2017-01-01 RX ORDER — MORPHINE SULFATE 20 MG/ML
10 SOLUTION ORAL
Status: DISCONTINUED | OUTPATIENT
Start: 2017-01-01 | End: 2017-01-01

## 2017-01-01 RX ORDER — DEXAMETHASONE SODIUM PHOSPHATE 4 MG/ML
INJECTION, SOLUTION INTRA-ARTICULAR; INTRALESIONAL; INTRAMUSCULAR; INTRAVENOUS; SOFT TISSUE AS NEEDED
Status: DISCONTINUED | OUTPATIENT
Start: 2017-01-01 | End: 2017-01-01 | Stop reason: HOSPADM

## 2017-01-01 RX ORDER — HYDROMORPHONE HYDROCHLORIDE 2 MG/ML
4 INJECTION, SOLUTION INTRAMUSCULAR; INTRAVENOUS; SUBCUTANEOUS
Status: DISCONTINUED | OUTPATIENT
Start: 2017-01-01 | End: 2017-01-01

## 2017-01-01 RX ORDER — SODIUM CHLORIDE, SODIUM LACTATE, POTASSIUM CHLORIDE, CALCIUM CHLORIDE 600; 310; 30; 20 MG/100ML; MG/100ML; MG/100ML; MG/100ML
25 INJECTION, SOLUTION INTRAVENOUS CONTINUOUS
Status: CANCELLED | OUTPATIENT
Start: 2017-01-01

## 2017-01-01 RX ORDER — WATER FOR INJECTION,STERILE
VIAL (ML) INJECTION
Status: COMPLETED
Start: 2017-01-01 | End: 2017-01-01

## 2017-01-01 RX ORDER — GABAPENTIN 300 MG/1
300 CAPSULE ORAL
Status: DISCONTINUED | OUTPATIENT
Start: 2017-01-01 | End: 2017-01-01 | Stop reason: HOSPADM

## 2017-01-01 RX ORDER — MORPHINE SULFATE 5 MG/ML
INJECTION, SOLUTION INTRAVENOUS CONTINUOUS
Status: DISCONTINUED | OUTPATIENT
Start: 2017-01-01 | End: 2017-01-01

## 2017-01-01 RX ORDER — GABAPENTIN 300 MG/1
300 CAPSULE ORAL 2 TIMES DAILY
Qty: 60 CAP | Refills: 0 | Status: SHIPPED | OUTPATIENT
Start: 2017-01-01

## 2017-01-01 RX ORDER — OXYCODONE HYDROCHLORIDE 30 MG/1
60 TABLET ORAL
Qty: 60 TAB | Refills: 0 | Status: SHIPPED | OUTPATIENT
Start: 2017-01-01

## 2017-01-01 RX ORDER — SULINDAC 200 MG/1
200 TABLET ORAL 2 TIMES DAILY WITH MEALS
Status: DISCONTINUED | OUTPATIENT
Start: 2017-01-01 | End: 2017-01-01 | Stop reason: HOSPADM

## 2017-01-01 RX ORDER — HYDROMORPHONE HYDROCHLORIDE 2 MG/ML
1 INJECTION, SOLUTION INTRAMUSCULAR; INTRAVENOUS; SUBCUTANEOUS
Status: DISCONTINUED | OUTPATIENT
Start: 2017-01-01 | End: 2017-01-01

## 2017-01-01 RX ORDER — KETOROLAC TROMETHAMINE 30 MG/ML
15 INJECTION, SOLUTION INTRAMUSCULAR; INTRAVENOUS
Status: COMPLETED | OUTPATIENT
Start: 2017-01-01 | End: 2017-01-01

## 2017-01-01 RX ORDER — HYDROMORPHONE HYDROCHLORIDE 5 MG/5ML
8 SOLUTION ORAL
Status: DISCONTINUED | OUTPATIENT
Start: 2017-01-01 | End: 2017-01-01

## 2017-01-01 RX ORDER — LORAZEPAM 1 MG/1
1 TABLET ORAL 4 TIMES DAILY
Status: DISCONTINUED | OUTPATIENT
Start: 2017-01-01 | End: 2017-01-01

## 2017-01-01 RX ORDER — ALBUTEROL SULFATE 90 UG/1
1 AEROSOL, METERED RESPIRATORY (INHALATION)
Qty: 1 INHALER | Refills: 1 | OUTPATIENT
Start: 2017-01-01

## 2017-01-01 RX ORDER — METHADONE HYDROCHLORIDE 5 MG/1
5 TABLET ORAL EVERY 8 HOURS
Status: DISCONTINUED | OUTPATIENT
Start: 2017-01-01 | End: 2017-01-01

## 2017-01-01 RX ORDER — MORPHINE SULFATE 4 MG/ML
4 INJECTION, SOLUTION INTRAMUSCULAR; INTRAVENOUS
Status: DISCONTINUED | OUTPATIENT
Start: 2017-01-01 | End: 2017-01-01 | Stop reason: DRUGHIGH

## 2017-01-01 RX ORDER — MORPHINE SULFATE 10 MG/ML
2 INJECTION, SOLUTION INTRAMUSCULAR; INTRAVENOUS
Status: DISCONTINUED | OUTPATIENT
Start: 2017-01-01 | End: 2017-01-01 | Stop reason: HOSPADM

## 2017-01-01 RX ORDER — AMOXICILLIN 250 MG
2 CAPSULE ORAL 2 TIMES DAILY
Status: DISCONTINUED | OUTPATIENT
Start: 2017-01-01 | End: 2017-01-01 | Stop reason: HOSPADM

## 2017-01-01 RX ORDER — PHENYLEPHRINE HCL IN 0.9% NACL 0.4MG/10ML
SYRINGE (ML) INTRAVENOUS AS NEEDED
Status: DISCONTINUED | OUTPATIENT
Start: 2017-01-01 | End: 2017-01-01 | Stop reason: HOSPADM

## 2017-01-01 RX ORDER — SENNOSIDES 8.6 MG/1
2 TABLET ORAL 2 TIMES DAILY
Status: DISCONTINUED | OUTPATIENT
Start: 2017-01-01 | End: 2017-01-01 | Stop reason: HOSPADM

## 2017-01-01 RX ORDER — ATROPINE SULFATE 10 MG/ML
1 SOLUTION/ DROPS OPHTHALMIC
Status: DISCONTINUED | OUTPATIENT
Start: 2017-01-01 | End: 2017-08-28 | Stop reason: HOSPADM

## 2017-01-01 RX ORDER — HALOPERIDOL 5 MG/ML
2 INJECTION INTRAMUSCULAR
Status: DISCONTINUED | OUTPATIENT
Start: 2017-01-01 | End: 2017-01-01 | Stop reason: HOSPADM

## 2017-01-01 RX ORDER — HALOPERIDOL 5 MG/1
5 TABLET ORAL 3 TIMES DAILY
Status: DISCONTINUED | OUTPATIENT
Start: 2017-01-01 | End: 2017-01-01 | Stop reason: HOSPADM

## 2017-01-01 RX ORDER — ROCURONIUM BROMIDE 10 MG/ML
INJECTION, SOLUTION INTRAVENOUS AS NEEDED
Status: DISCONTINUED | OUTPATIENT
Start: 2017-01-01 | End: 2017-01-01 | Stop reason: HOSPADM

## 2017-01-01 RX ORDER — DULOXETIN HYDROCHLORIDE 20 MG/1
40 CAPSULE, DELAYED RELEASE ORAL DAILY
Qty: 60 CAP | Refills: 1 | Status: SHIPPED | OUTPATIENT
Start: 2017-01-01 | End: 2017-01-01 | Stop reason: SDUPTHER

## 2017-01-01 RX ORDER — MORPHINE SULFATE 20 MG/ML
30 SOLUTION ORAL
Status: DISCONTINUED | OUTPATIENT
Start: 2017-01-01 | End: 2017-01-01 | Stop reason: HOSPADM

## 2017-01-01 RX ORDER — HALOPERIDOL 2 MG/ML
5 SOLUTION ORAL 3 TIMES DAILY
Status: DISCONTINUED | OUTPATIENT
Start: 2017-01-01 | End: 2017-01-01

## 2017-01-01 RX ORDER — POLYETHYLENE GLYCOL 3350 17 G/17G
17 POWDER, FOR SOLUTION ORAL
Status: DISCONTINUED | OUTPATIENT
Start: 2017-01-01 | End: 2017-01-01

## 2017-01-01 RX ORDER — METOPROLOL SUCCINATE 25 MG/1
25 TABLET, EXTENDED RELEASE ORAL
Status: COMPLETED | OUTPATIENT
Start: 2017-01-01 | End: 2017-01-01

## 2017-01-01 RX ORDER — METOPROLOL TARTRATE 25 MG/1
25 TABLET, FILM COATED ORAL 2 TIMES DAILY
Status: DISCONTINUED | OUTPATIENT
Start: 2017-01-01 | End: 2017-01-01 | Stop reason: HOSPADM

## 2017-01-01 RX ORDER — SODIUM CHLORIDE, SODIUM LACTATE, POTASSIUM CHLORIDE, CALCIUM CHLORIDE 600; 310; 30; 20 MG/100ML; MG/100ML; MG/100ML; MG/100ML
25 INJECTION, SOLUTION INTRAVENOUS CONTINUOUS
Status: CANCELLED | OUTPATIENT
Start: 2017-01-01 | End: 2017-01-01

## 2017-01-01 RX ORDER — POLYETHYLENE GLYCOL 3350 17 G/17G
17 POWDER, FOR SOLUTION ORAL 2 TIMES DAILY
Status: DISCONTINUED | OUTPATIENT
Start: 2017-01-01 | End: 2017-01-01 | Stop reason: HOSPADM

## 2017-01-01 RX ORDER — AMOXICILLIN 250 MG
2 CAPSULE ORAL DAILY
Status: DISCONTINUED | OUTPATIENT
Start: 2017-01-01 | End: 2017-01-01

## 2017-01-01 RX ORDER — FENTANYL 100 UG/H
2 PATCH TRANSDERMAL
Qty: 22 PATCH | Refills: 0 | Status: SHIPPED | OUTPATIENT
Start: 2017-01-01 | End: 2017-01-01

## 2017-01-01 RX ORDER — DULOXETIN HYDROCHLORIDE 60 MG/1
60 CAPSULE, DELAYED RELEASE ORAL
Status: DISCONTINUED | OUTPATIENT
Start: 2017-01-01 | End: 2017-01-01 | Stop reason: HOSPADM

## 2017-01-01 RX ORDER — LIDOCAINE HYDROCHLORIDE 10 MG/ML
0.1 INJECTION, SOLUTION EPIDURAL; INFILTRATION; INTRACAUDAL; PERINEURAL AS NEEDED
Status: DISCONTINUED | OUTPATIENT
Start: 2017-01-01 | End: 2017-01-01 | Stop reason: HOSPADM

## 2017-01-01 RX ORDER — OXYCODONE HYDROCHLORIDE 10 MG/1
30 TABLET ORAL
COMMUNITY
End: 2017-01-01

## 2017-01-01 RX ORDER — DEXTROSE 50 % IN WATER (D50W) INTRAVENOUS SYRINGE
12.5-25 AS NEEDED
Status: DISCONTINUED | OUTPATIENT
Start: 2017-01-01 | End: 2017-01-01 | Stop reason: ALTCHOICE

## 2017-01-01 RX ORDER — CHLORPHENIRAMINE MALEATE 4 MG
1 TABLET ORAL 2 TIMES DAILY
Qty: 35.4 G | Refills: 0 | Status: SHIPPED | OUTPATIENT
Start: 2017-01-01

## 2017-01-01 RX ORDER — DEXTROSE MONOHYDRATE AND SODIUM CHLORIDE 5; .9 G/100ML; G/100ML
75 INJECTION, SOLUTION INTRAVENOUS CONTINUOUS
Status: DISCONTINUED | OUTPATIENT
Start: 2017-01-01 | End: 2017-01-01

## 2017-01-01 RX ORDER — HYDROMORPHONE HYDROCHLORIDE 2 MG/ML
1 INJECTION, SOLUTION INTRAMUSCULAR; INTRAVENOUS; SUBCUTANEOUS
Status: DISCONTINUED | OUTPATIENT
Start: 2017-01-01 | End: 2017-01-01 | Stop reason: SDUPTHER

## 2017-01-01 RX ORDER — DULOXETIN HYDROCHLORIDE 30 MG/1
30 CAPSULE, DELAYED RELEASE ORAL DAILY
Status: DISCONTINUED | OUTPATIENT
Start: 2017-01-01 | End: 2017-01-01 | Stop reason: HOSPADM

## 2017-01-01 RX ORDER — DIPHENHYDRAMINE HCL 25 MG
25 CAPSULE ORAL ONCE
Status: COMPLETED | OUTPATIENT
Start: 2017-01-01 | End: 2017-01-01

## 2017-01-01 RX ORDER — SODIUM CHLORIDE 0.9 % (FLUSH) 0.9 %
10 SYRINGE (ML) INJECTION EVERY 8 HOURS
Status: DISCONTINUED | OUTPATIENT
Start: 2017-01-01 | End: 2017-01-01 | Stop reason: HOSPADM

## 2017-01-01 RX ORDER — HYDROMORPHONE HYDROCHLORIDE 1 MG/ML
0.2 INJECTION, SOLUTION INTRAMUSCULAR; INTRAVENOUS; SUBCUTANEOUS
Status: DISCONTINUED | OUTPATIENT
Start: 2017-01-01 | End: 2017-01-01 | Stop reason: HOSPADM

## 2017-01-01 RX ORDER — LORAZEPAM 2 MG/ML
1 INJECTION INTRAMUSCULAR 4 TIMES DAILY
Status: DISCONTINUED | OUTPATIENT
Start: 2017-01-01 | End: 2017-01-01

## 2017-01-01 RX ORDER — METHADONE HYDROCHLORIDE 5 MG/1
7.5 TABLET ORAL EVERY 8 HOURS
Status: DISCONTINUED | OUTPATIENT
Start: 2017-01-01 | End: 2017-01-01 | Stop reason: HOSPADM

## 2017-01-01 RX ORDER — METOPROLOL TARTRATE 25 MG/1
25 TABLET, FILM COATED ORAL 2 TIMES DAILY
Status: DISCONTINUED | OUTPATIENT
Start: 2017-01-01 | End: 2017-01-01

## 2017-01-01 RX ORDER — MORPHINE SULFATE 20 MG/ML
50 SOLUTION ORAL
Status: DISCONTINUED | OUTPATIENT
Start: 2017-01-01 | End: 2017-01-01

## 2017-01-01 RX ORDER — HYDROMORPHONE HYDROCHLORIDE 2 MG/ML
1.5 INJECTION, SOLUTION INTRAMUSCULAR; INTRAVENOUS; SUBCUTANEOUS
Status: DISCONTINUED | OUTPATIENT
Start: 2017-01-01 | End: 2017-01-01

## 2017-01-01 RX ORDER — HYDROMORPHONE HYDROCHLORIDE 1 MG/ML
1 INJECTION, SOLUTION INTRAMUSCULAR; INTRAVENOUS; SUBCUTANEOUS
Status: COMPLETED | OUTPATIENT
Start: 2017-01-01 | End: 2017-01-01

## 2017-01-01 RX ORDER — MIDAZOLAM HYDROCHLORIDE 1 MG/ML
0.5 INJECTION, SOLUTION INTRAMUSCULAR; INTRAVENOUS
Status: DISCONTINUED | OUTPATIENT
Start: 2017-01-01 | End: 2017-01-01 | Stop reason: HOSPADM

## 2017-01-01 RX ORDER — ROPIVACAINE HYDROCHLORIDE 5 MG/ML
150 INJECTION, SOLUTION EPIDURAL; INFILTRATION; PERINEURAL AS NEEDED
Status: DISCONTINUED | OUTPATIENT
Start: 2017-01-01 | End: 2017-01-01 | Stop reason: HOSPADM

## 2017-01-01 RX ORDER — GABAPENTIN 300 MG/1
600 CAPSULE ORAL 4 TIMES DAILY
Status: DISCONTINUED | OUTPATIENT
Start: 2017-01-01 | End: 2017-01-01

## 2017-01-01 RX ORDER — SODIUM CHLORIDE 9 MG/ML
250 INJECTION, SOLUTION INTRAVENOUS AS NEEDED
Status: DISCONTINUED | OUTPATIENT
Start: 2017-01-01 | End: 2017-01-01 | Stop reason: ALTCHOICE

## 2017-01-01 RX ORDER — HALOPERIDOL 5 MG/1
5 TABLET ORAL 3 TIMES DAILY
Status: DISCONTINUED | OUTPATIENT
Start: 2017-01-01 | End: 2017-01-01

## 2017-01-01 RX ORDER — LORAZEPAM 1 MG/1
1 TABLET ORAL 3 TIMES DAILY
Status: DISCONTINUED | OUTPATIENT
Start: 2017-01-01 | End: 2017-01-01 | Stop reason: HOSPADM

## 2017-01-01 RX ORDER — ONDANSETRON 2 MG/ML
4 INJECTION INTRAMUSCULAR; INTRAVENOUS AS NEEDED
Status: DISCONTINUED | OUTPATIENT
Start: 2017-01-01 | End: 2017-01-01 | Stop reason: HOSPADM

## 2017-01-01 RX ORDER — LORAZEPAM 1 MG/1
1 TABLET ORAL
Qty: 30 TAB | Refills: 0 | Status: SHIPPED | OUTPATIENT
Start: 2017-01-01

## 2017-01-01 RX ORDER — METOPROLOL TARTRATE 25 MG/1
25 TABLET, FILM COATED ORAL 2 TIMES DAILY
Qty: 60 TAB | Refills: 0 | Status: SHIPPED | OUTPATIENT
Start: 2017-01-01

## 2017-01-01 RX ORDER — CEFAZOLIN SODIUM IN 0.9 % NACL 2 G/100 ML
PLASTIC BAG, INJECTION (ML) INTRAVENOUS AS NEEDED
Status: DISCONTINUED | OUTPATIENT
Start: 2017-01-01 | End: 2017-01-01 | Stop reason: HOSPADM

## 2017-01-01 RX ORDER — LORAZEPAM 1 MG/1
2 TABLET ORAL
Status: DISCONTINUED | OUTPATIENT
Start: 2017-01-01 | End: 2017-01-01

## 2017-01-01 RX ORDER — SCOLOPAMINE TRANSDERMAL SYSTEM 1 MG/1
1.5 PATCH, EXTENDED RELEASE TRANSDERMAL
Status: DISCONTINUED | OUTPATIENT
Start: 2017-01-01 | End: 2017-08-28 | Stop reason: HOSPADM

## 2017-01-01 RX ORDER — MORPHINE SULFATE 10 MG/ML
15 INJECTION, SOLUTION INTRAMUSCULAR; INTRAVENOUS
Status: DISCONTINUED | OUTPATIENT
Start: 2017-01-01 | End: 2017-01-01 | Stop reason: HOSPADM

## 2017-01-01 RX ORDER — OXYCODONE HYDROCHLORIDE 5 MG/1
5 TABLET ORAL AS NEEDED
Status: DISCONTINUED | OUTPATIENT
Start: 2017-01-01 | End: 2017-01-01 | Stop reason: HOSPADM

## 2017-01-01 RX ORDER — HYDROMORPHONE HYDROCHLORIDE 2 MG/ML
4 INJECTION, SOLUTION INTRAMUSCULAR; INTRAVENOUS; SUBCUTANEOUS
Status: DISCONTINUED | OUTPATIENT
Start: 2017-01-01 | End: 2017-01-01 | Stop reason: HOSPADM

## 2017-01-01 RX ORDER — HYDROMORPHONE HYDROCHLORIDE 1 MG/ML
3 INJECTION, SOLUTION INTRAMUSCULAR; INTRAVENOUS; SUBCUTANEOUS ONCE
Status: COMPLETED | OUTPATIENT
Start: 2017-01-01 | End: 2017-01-01

## 2017-01-01 RX ORDER — OXYCODONE HYDROCHLORIDE 5 MG/1
20 TABLET ORAL
Status: COMPLETED | OUTPATIENT
Start: 2017-01-01 | End: 2017-01-01

## 2017-01-01 RX ORDER — SODIUM CHLORIDE 0.9 % (FLUSH) 0.9 %
5-10 SYRINGE (ML) INJECTION AS NEEDED
Status: DISCONTINUED | OUTPATIENT
Start: 2017-01-01 | End: 2017-08-28 | Stop reason: HOSPADM

## 2017-01-01 RX ORDER — LORAZEPAM 1 MG/1
1 TABLET ORAL 3 TIMES DAILY
Status: DISCONTINUED | OUTPATIENT
Start: 2017-01-01 | End: 2017-01-01

## 2017-01-01 RX ORDER — MIDAZOLAM HYDROCHLORIDE 1 MG/ML
INJECTION, SOLUTION INTRAMUSCULAR; INTRAVENOUS AS NEEDED
Status: DISCONTINUED | OUTPATIENT
Start: 2017-01-01 | End: 2017-01-01 | Stop reason: HOSPADM

## 2017-01-01 RX ORDER — METHADONE HYDROCHLORIDE 10 MG/1
10 TABLET ORAL EVERY 8 HOURS
Status: DISCONTINUED | OUTPATIENT
Start: 2017-01-01 | End: 2017-01-01

## 2017-01-01 RX ORDER — HYDROMORPHONE HCL IN 0.9% NACL 15 MG/30ML
PATIENT CONTROLLED ANALGESIA VIAL INTRAVENOUS CONTINUOUS
Status: DISCONTINUED | OUTPATIENT
Start: 2017-01-01 | End: 2017-08-28 | Stop reason: HOSPADM

## 2017-01-01 RX ORDER — INSULIN LISPRO 100 [IU]/ML
INJECTION, SOLUTION INTRAVENOUS; SUBCUTANEOUS
Status: DISCONTINUED | OUTPATIENT
Start: 2017-01-01 | End: 2017-01-01 | Stop reason: HOSPADM

## 2017-01-01 RX ORDER — SODIUM CHLORIDE 9 MG/ML
INJECTION, SOLUTION INTRAVENOUS
Status: DISCONTINUED | OUTPATIENT
Start: 2017-01-01 | End: 2017-01-01 | Stop reason: HOSPADM

## 2017-01-01 RX ORDER — BISACODYL 5 MG
5 TABLET, DELAYED RELEASE (ENTERIC COATED) ORAL DAILY PRN
Status: DISCONTINUED | OUTPATIENT
Start: 2017-01-01 | End: 2017-01-01

## 2017-01-01 RX ORDER — SODIUM CHLORIDE 9 MG/ML
INJECTION INTRAMUSCULAR; INTRAVENOUS; SUBCUTANEOUS
Status: DISPENSED
Start: 2017-01-01 | End: 2017-01-01

## 2017-01-01 RX ORDER — CEFAZOLIN SODIUM IN 0.9 % NACL 2 G/100 ML
2 PLASTIC BAG, INJECTION (ML) INTRAVENOUS ONCE
Status: ACTIVE | OUTPATIENT
Start: 2017-01-01 | End: 2017-01-01

## 2017-01-01 RX ORDER — OXYCODONE HYDROCHLORIDE 5 MG/1
60 TABLET ORAL
Status: DISCONTINUED | OUTPATIENT
Start: 2017-01-01 | End: 2017-01-01 | Stop reason: SDUPTHER

## 2017-01-01 RX ORDER — SENNOSIDES 8.6 MG/1
2 TABLET ORAL DAILY
Status: DISCONTINUED | OUTPATIENT
Start: 2017-01-01 | End: 2017-01-01

## 2017-01-01 RX ORDER — HALOPERIDOL 2 MG/1
2 TABLET ORAL 2 TIMES DAILY
Status: DISCONTINUED | OUTPATIENT
Start: 2017-01-01 | End: 2017-01-01

## 2017-01-01 RX ORDER — OXYCODONE HYDROCHLORIDE 30 MG/1
TABLET ORAL
Qty: 300 TAB | Refills: 0 | Status: SHIPPED | OUTPATIENT
Start: 2017-01-01 | End: 2017-01-01 | Stop reason: SDUPTHER

## 2017-01-01 RX ORDER — DULOXETIN HYDROCHLORIDE 30 MG/1
30 CAPSULE, DELAYED RELEASE ORAL DAILY
Qty: 30 CAP | Refills: 1 | Status: SHIPPED | OUTPATIENT
Start: 2017-01-01

## 2017-01-01 RX ORDER — OXYCODONE HYDROCHLORIDE 30 MG/1
60 TABLET ORAL
Qty: 300 TAB | Refills: 0 | Status: SHIPPED | OUTPATIENT
Start: 2017-01-01 | End: 2017-01-01

## 2017-01-01 RX ORDER — METHADONE HYDROCHLORIDE 5 MG/1
10 TABLET ORAL EVERY 8 HOURS
Status: DISCONTINUED | OUTPATIENT
Start: 2017-01-01 | End: 2017-01-01

## 2017-01-01 RX ORDER — ONDANSETRON 2 MG/ML
4 INJECTION INTRAMUSCULAR; INTRAVENOUS AS NEEDED
Status: CANCELLED | OUTPATIENT
Start: 2017-01-01

## 2017-01-01 RX ORDER — SODIUM CHLORIDE 0.9 % (FLUSH) 0.9 %
10 SYRINGE (ML) INJECTION
Status: DISCONTINUED | OUTPATIENT
Start: 2017-01-01 | End: 2017-01-01 | Stop reason: SDUPTHER

## 2017-01-01 RX ORDER — LORAZEPAM 1 MG/1
2 TABLET ORAL
Status: DISCONTINUED | OUTPATIENT
Start: 2017-01-01 | End: 2017-01-01 | Stop reason: HOSPADM

## 2017-01-01 RX ORDER — HYDROMORPHONE HYDROCHLORIDE 1 MG/ML
1 INJECTION, SOLUTION INTRAMUSCULAR; INTRAVENOUS; SUBCUTANEOUS ONCE
Status: COMPLETED | OUTPATIENT
Start: 2017-01-01 | End: 2017-01-01

## 2017-01-01 RX ORDER — HYDROMORPHONE HYDROCHLORIDE 2 MG/ML
3 INJECTION, SOLUTION INTRAMUSCULAR; INTRAVENOUS; SUBCUTANEOUS
Status: DISCONTINUED | OUTPATIENT
Start: 2017-01-01 | End: 2017-01-01

## 2017-01-01 RX ORDER — DULOXETIN HYDROCHLORIDE 30 MG/1
60 CAPSULE, DELAYED RELEASE ORAL
Status: DISCONTINUED | OUTPATIENT
Start: 2017-01-01 | End: 2017-01-01

## 2017-01-01 RX ORDER — LORAZEPAM 1 MG/1
1 TABLET ORAL
Qty: 180 TAB | Refills: 0 | Status: SHIPPED | OUTPATIENT
Start: 2017-01-01 | End: 2017-01-01

## 2017-01-01 RX ORDER — KETOROLAC TROMETHAMINE 30 MG/ML
INJECTION, SOLUTION INTRAMUSCULAR; INTRAVENOUS
Status: DISPENSED
Start: 2017-01-01 | End: 2017-01-01

## 2017-01-01 RX ORDER — METOPROLOL SUCCINATE 25 MG/1
25 TABLET, EXTENDED RELEASE ORAL DAILY
Status: DISCONTINUED | OUTPATIENT
Start: 2017-01-01 | End: 2017-01-01

## 2017-01-01 RX ORDER — METOPROLOL SUCCINATE 25 MG/1
TABLET, EXTENDED RELEASE ORAL
Qty: 30 TAB | Refills: 11 | Status: SHIPPED | OUTPATIENT
Start: 2017-01-01 | End: 2017-01-01

## 2017-01-01 RX ORDER — SODIUM CHLORIDE 0.9 % (FLUSH) 0.9 %
10 SYRINGE (ML) INJECTION
Status: COMPLETED | OUTPATIENT
Start: 2017-01-01 | End: 2017-01-01

## 2017-01-01 RX ORDER — GABAPENTIN 300 MG/1
600 CAPSULE ORAL 3 TIMES DAILY
Status: DISCONTINUED | OUTPATIENT
Start: 2017-01-01 | End: 2017-01-01 | Stop reason: HOSPADM

## 2017-01-01 RX ADMIN — DIPHENHYDRAMINE HYDROCHLORIDE 25 MG: 25 CAPSULE ORAL at 12:08

## 2017-01-01 RX ADMIN — Medication: at 00:17

## 2017-01-01 RX ADMIN — METHADONE HYDROCHLORIDE 10 MG: 5 TABLET ORAL at 20:21

## 2017-01-01 RX ADMIN — STANDARDIZED SENNA CONCENTRATE AND DOCUSATE SODIUM 2 TABLET: 8.6; 5 TABLET, FILM COATED ORAL at 08:45

## 2017-01-01 RX ADMIN — HYDROMORPHONE HYDROCHLORIDE 4 MG: 2 INJECTION, SOLUTION INTRAMUSCULAR; INTRAVENOUS; SUBCUTANEOUS at 11:22

## 2017-01-01 RX ADMIN — METHADONE HYDROCHLORIDE 10 MG: 5 TABLET ORAL at 05:22

## 2017-01-01 RX ADMIN — METOPROLOL TARTRATE 25 MG: 25 TABLET ORAL at 22:05

## 2017-01-01 RX ADMIN — HYDROMORPHONE HYDROCHLORIDE 1.5 MG: 2 INJECTION, SOLUTION INTRAMUSCULAR; INTRAVENOUS; SUBCUTANEOUS at 17:21

## 2017-01-01 RX ADMIN — LIDOCAINE HYDROCHLORIDE 100 MG: 20 INJECTION, SOLUTION EPIDURAL; INFILTRATION; INTRACAUDAL; PERINEURAL at 09:14

## 2017-01-01 RX ADMIN — STANDARDIZED SENNA CONCENTRATE AND DOCUSATE SODIUM 2 TABLET: 8.6; 5 TABLET, FILM COATED ORAL at 08:52

## 2017-01-01 RX ADMIN — SULINDAC 200 MG: 200 TABLET ORAL at 09:52

## 2017-01-01 RX ADMIN — Medication 1 MG: at 14:16

## 2017-01-01 RX ADMIN — METOPROLOL SUCCINATE 50 MG: 50 TABLET, EXTENDED RELEASE ORAL at 09:43

## 2017-01-01 RX ADMIN — HYDROMORPHONE HYDROCHLORIDE 4 MG: 2 INJECTION, SOLUTION INTRAMUSCULAR; INTRAVENOUS; SUBCUTANEOUS at 20:38

## 2017-01-01 RX ADMIN — METHADONE HYDROCHLORIDE 10 MG: 5 TABLET ORAL at 16:58

## 2017-01-01 RX ADMIN — SULINDAC 200 MG: 200 TABLET ORAL at 16:20

## 2017-01-01 RX ADMIN — GABAPENTIN 600 MG: 300 CAPSULE ORAL at 22:03

## 2017-01-01 RX ADMIN — OXYCODONE HYDROCHLORIDE 20 MG: 5 TABLET ORAL at 00:47

## 2017-01-01 RX ADMIN — SULINDAC 200 MG: 200 TABLET ORAL at 09:10

## 2017-01-01 RX ADMIN — LORAZEPAM 1 MG: 1 TABLET ORAL at 20:47

## 2017-01-01 RX ADMIN — SULINDAC 200 MG: 200 TABLET ORAL at 07:49

## 2017-01-01 RX ADMIN — DULOXETINE HYDROCHLORIDE 30 MG: 30 CAPSULE, DELAYED RELEASE ORAL at 09:15

## 2017-01-01 RX ADMIN — METHADONE HYDROCHLORIDE 10 MG: 5 TABLET ORAL at 20:59

## 2017-01-01 RX ADMIN — GLYCOPYRROLATE 0.2 MG: 0.2 INJECTION, SOLUTION INTRAMUSCULAR; INTRAVENOUS at 15:44

## 2017-01-01 RX ADMIN — STANDARDIZED SENNA CONCENTRATE AND DOCUSATE SODIUM 2 TABLET: 8.6; 5 TABLET, FILM COATED ORAL at 08:29

## 2017-01-01 RX ADMIN — METHADONE HYDROCHLORIDE 10 MG: 5 TABLET ORAL at 06:00

## 2017-01-01 RX ADMIN — METHADONE HYDROCHLORIDE 10 MG: 5 TABLET ORAL at 06:25

## 2017-01-01 RX ADMIN — LORAZEPAM 1 MG: 1 TABLET ORAL at 09:16

## 2017-01-01 RX ADMIN — LORAZEPAM 1 MG: 1 TABLET ORAL at 13:02

## 2017-01-01 RX ADMIN — MORPHINE SULFATE 40 MG: 20 SOLUTION ORAL at 15:17

## 2017-01-01 RX ADMIN — SULINDAC 200 MG: 200 TABLET ORAL at 08:00

## 2017-01-01 RX ADMIN — LORAZEPAM 1 MG: 1 TABLET ORAL at 21:07

## 2017-01-01 RX ADMIN — METHADONE HYDROCHLORIDE 10 MG: 5 TABLET ORAL at 21:02

## 2017-01-01 RX ADMIN — KETOROLAC TROMETHAMINE 30 MG: 30 INJECTION, SOLUTION INTRAMUSCULAR at 19:47

## 2017-01-01 RX ADMIN — METHADONE HYDROCHLORIDE 7.5 MG: 5 TABLET ORAL at 06:01

## 2017-01-01 RX ADMIN — ACETAMINOPHEN 650 MG: 325 TABLET, FILM COATED ORAL at 00:07

## 2017-01-01 RX ADMIN — POLYETHYLENE GLYCOL 3350 17 G: 17 POWDER, FOR SOLUTION ORAL at 21:09

## 2017-01-01 RX ADMIN — MORPHINE SULFATE 4 MG: 4 INJECTION, SOLUTION INTRAMUSCULAR; INTRAVENOUS at 10:55

## 2017-01-01 RX ADMIN — DULOXETINE HYDROCHLORIDE 60 MG: 60 CAPSULE, DELAYED RELEASE ORAL at 21:00

## 2017-01-01 RX ADMIN — KETOROLAC TROMETHAMINE 30 MG: 30 INJECTION, SOLUTION INTRAMUSCULAR at 09:45

## 2017-01-01 RX ADMIN — POLYETHYLENE GLYCOL 3350 17 G: 17 POWDER, FOR SOLUTION ORAL at 09:07

## 2017-01-01 RX ADMIN — METOPROLOL TARTRATE 25 MG: 25 TABLET ORAL at 11:02

## 2017-01-01 RX ADMIN — MORPHINE SULFATE 15 MG: 10 INJECTION INTRAVENOUS at 05:58

## 2017-01-01 RX ADMIN — Medication 2 G: at 09:47

## 2017-01-01 RX ADMIN — LORAZEPAM 1 MG: 1 TABLET ORAL at 18:00

## 2017-01-01 RX ADMIN — Medication: at 16:41

## 2017-01-01 RX ADMIN — DOCUSATE SODIUM 100 MG: 100 CAPSULE, LIQUID FILLED ORAL at 09:42

## 2017-01-01 RX ADMIN — DULOXETINE HYDROCHLORIDE 60 MG: 60 CAPSULE, DELAYED RELEASE ORAL at 21:38

## 2017-01-01 RX ADMIN — Medication 1 MG: at 06:08

## 2017-01-01 RX ADMIN — MORPHINE SULFATE 4 MG: 4 INJECTION, SOLUTION INTRAMUSCULAR; INTRAVENOUS at 12:49

## 2017-01-01 RX ADMIN — SULINDAC 200 MG: 200 TABLET ORAL at 16:37

## 2017-01-01 RX ADMIN — METHADONE HYDROCHLORIDE 5 MG: 5 TABLET ORAL at 13:45

## 2017-01-01 RX ADMIN — SULINDAC 200 MG: 200 TABLET ORAL at 17:26

## 2017-01-01 RX ADMIN — METHADONE HYDROCHLORIDE 10 MG: 5 TABLET ORAL at 21:24

## 2017-01-01 RX ADMIN — OXYCODONE HYDROCHLORIDE 60 MG: 5 TABLET ORAL at 11:48

## 2017-01-01 RX ADMIN — METHADONE HYDROCHLORIDE 10 MG: 5 TABLET ORAL at 05:25

## 2017-01-01 RX ADMIN — KETOROLAC TROMETHAMINE 30 MG: 30 INJECTION, SOLUTION INTRAMUSCULAR at 06:52

## 2017-01-01 RX ADMIN — ONDANSETRON 4 MG: 2 INJECTION INTRAMUSCULAR; INTRAVENOUS at 10:32

## 2017-01-01 RX ADMIN — METHADONE HYDROCHLORIDE 10 MG: 10 TABLET ORAL at 04:09

## 2017-01-01 RX ADMIN — HYDROMORPHONE HYDROCHLORIDE 1 MG: 2 INJECTION, SOLUTION INTRAMUSCULAR; INTRAVENOUS; SUBCUTANEOUS at 09:18

## 2017-01-01 RX ADMIN — METHADONE HYDROCHLORIDE 10 MG: 5 TABLET ORAL at 21:36

## 2017-01-01 RX ADMIN — LORAZEPAM 1 MG: 1 TABLET ORAL at 14:16

## 2017-01-01 RX ADMIN — MORPHINE SULFATE 40 MG: 20 SOLUTION ORAL at 19:51

## 2017-01-01 RX ADMIN — KETOROLAC TROMETHAMINE 30 MG: 30 INJECTION, SOLUTION INTRAMUSCULAR at 04:49

## 2017-01-01 RX ADMIN — SODIUM CHLORIDE, SODIUM LACTATE, POTASSIUM CHLORIDE, CALCIUM CHLORIDE: 600; 310; 30; 20 INJECTION, SOLUTION INTRAVENOUS at 21:36

## 2017-01-01 RX ADMIN — SULINDAC 200 MG: 200 TABLET ORAL at 09:47

## 2017-01-01 RX ADMIN — METOPROLOL TARTRATE 25 MG: 25 TABLET ORAL at 10:01

## 2017-01-01 RX ADMIN — METHADONE HYDROCHLORIDE 10 MG: 5 TABLET ORAL at 14:53

## 2017-01-01 RX ADMIN — METHADONE HYDROCHLORIDE 10 MG: 5 TABLET ORAL at 21:22

## 2017-01-01 RX ADMIN — PHENOBARBITAL SODIUM 30 MG: 130 INJECTION INTRAMUSCULAR; INTRAVENOUS at 18:03

## 2017-01-01 RX ADMIN — SENNOSIDES 17.2 MG: 8.6 TABLET, FILM COATED ORAL at 09:07

## 2017-01-01 RX ADMIN — SULINDAC 200 MG: 200 TABLET ORAL at 18:28

## 2017-01-01 RX ADMIN — DOCUSATE SODIUM 100 MG: 100 CAPSULE, LIQUID FILLED ORAL at 10:01

## 2017-01-01 RX ADMIN — Medication 1 MG: at 18:16

## 2017-01-01 RX ADMIN — MORPHINE SULFATE 30 MG: 20 SOLUTION ORAL at 09:41

## 2017-01-01 RX ADMIN — METOPROLOL SUCCINATE 25 MG: 25 TABLET, FILM COATED, EXTENDED RELEASE ORAL at 11:00

## 2017-01-01 RX ADMIN — Medication 2 MG: at 08:24

## 2017-01-01 RX ADMIN — METOPROLOL TARTRATE 12.5 MG: 25 TABLET ORAL at 09:21

## 2017-01-01 RX ADMIN — Medication 1 MG: at 22:34

## 2017-01-01 RX ADMIN — GABAPENTIN 600 MG: 300 CAPSULE ORAL at 09:45

## 2017-01-01 RX ADMIN — ACETAMINOPHEN 650 MG: 325 TABLET, FILM COATED ORAL at 08:37

## 2017-01-01 RX ADMIN — HYDROMORPHONE HYDROCHLORIDE 4 MG: 2 INJECTION, SOLUTION INTRAMUSCULAR; INTRAVENOUS; SUBCUTANEOUS at 04:00

## 2017-01-01 RX ADMIN — LORAZEPAM 1 MG: 1 TABLET ORAL at 16:37

## 2017-01-01 RX ADMIN — Medication 1 MG: at 23:24

## 2017-01-01 RX ADMIN — METOPROLOL TARTRATE 25 MG: 25 TABLET ORAL at 10:05

## 2017-01-01 RX ADMIN — Medication: at 06:35

## 2017-01-01 RX ADMIN — METOPROLOL TARTRATE 12.5 MG: 25 TABLET ORAL at 22:08

## 2017-01-01 RX ADMIN — SULINDAC 200 MG: 200 TABLET ORAL at 17:00

## 2017-01-01 RX ADMIN — METHADONE HYDROCHLORIDE 10 MG: 5 TABLET ORAL at 04:53

## 2017-01-01 RX ADMIN — METHADONE HYDROCHLORIDE 10 MG: 5 TABLET ORAL at 05:00

## 2017-01-01 RX ADMIN — GLYCOPYRROLATE 0.2 MG: 0.2 INJECTION INTRAMUSCULAR; INTRAVENOUS at 12:10

## 2017-01-01 RX ADMIN — LORAZEPAM 0.5 MG: 0.5 TABLET ORAL at 13:13

## 2017-01-01 RX ADMIN — HYDROMORPHONE HYDROCHLORIDE 4 MG: 2 INJECTION, SOLUTION INTRAMUSCULAR; INTRAVENOUS; SUBCUTANEOUS at 13:54

## 2017-01-01 RX ADMIN — SULINDAC 200 MG: 200 TABLET ORAL at 08:36

## 2017-01-01 RX ADMIN — SULINDAC 200 MG: 200 TABLET ORAL at 16:48

## 2017-01-01 RX ADMIN — MORPHINE SULFATE 20 MG: 20 SOLUTION ORAL at 13:08

## 2017-01-01 RX ADMIN — GABAPENTIN 300 MG: 300 CAPSULE ORAL at 20:59

## 2017-01-01 RX ADMIN — DULOXETINE HYDROCHLORIDE 60 MG: 60 CAPSULE, DELAYED RELEASE ORAL at 22:00

## 2017-01-01 RX ADMIN — Medication: at 03:45

## 2017-01-01 RX ADMIN — LORAZEPAM 1 MG: 1 TABLET ORAL at 22:52

## 2017-01-01 RX ADMIN — METHADONE HYDROCHLORIDE 10 MG: 5 TABLET ORAL at 22:06

## 2017-01-01 RX ADMIN — KETOROLAC TROMETHAMINE 30 MG: 30 INJECTION, SOLUTION INTRAMUSCULAR at 16:37

## 2017-01-01 RX ADMIN — LORAZEPAM 2 MG: 1 TABLET ORAL at 15:16

## 2017-01-01 RX ADMIN — Medication 1 MG: at 22:49

## 2017-01-01 RX ADMIN — MORPHINE SULFATE 50 MG: 20 SOLUTION ORAL at 17:45

## 2017-01-01 RX ADMIN — LORAZEPAM 0.5 MG: 0.5 TABLET ORAL at 13:56

## 2017-01-01 RX ADMIN — KETOROLAC TROMETHAMINE 30 MG: 30 INJECTION, SOLUTION INTRAMUSCULAR at 01:22

## 2017-01-01 RX ADMIN — METOPROLOL TARTRATE 25 MG: 25 TABLET ORAL at 10:18

## 2017-01-01 RX ADMIN — LORAZEPAM 1 MG: 1 TABLET ORAL at 09:30

## 2017-01-01 RX ADMIN — HYDROMORPHONE HYDROCHLORIDE 4 MG: 2 INJECTION INTRAMUSCULAR; INTRAVENOUS; SUBCUTANEOUS at 17:28

## 2017-01-01 RX ADMIN — KETOROLAC TROMETHAMINE 30 MG: 30 INJECTION INTRAMUSCULAR; INTRAVENOUS at 02:09

## 2017-01-01 RX ADMIN — GABAPENTIN 300 MG: 300 CAPSULE ORAL at 17:00

## 2017-01-01 RX ADMIN — HYDROMORPHONE HYDROCHLORIDE 4 MG: 2 INJECTION, SOLUTION INTRAMUSCULAR; INTRAVENOUS; SUBCUTANEOUS at 23:40

## 2017-01-01 RX ADMIN — LORAZEPAM 0.5 MG: 0.5 TABLET ORAL at 17:13

## 2017-01-01 RX ADMIN — STANDARDIZED SENNA CONCENTRATE AND DOCUSATE SODIUM 2 TABLET: 8.6; 5 TABLET, FILM COATED ORAL at 09:04

## 2017-01-01 RX ADMIN — MORPHINE SULFATE 30 MG: 20 SOLUTION ORAL at 18:32

## 2017-01-01 RX ADMIN — METOPROLOL SUCCINATE 50 MG: 50 TABLET, EXTENDED RELEASE ORAL at 08:37

## 2017-01-01 RX ADMIN — METHADONE HYDROCHLORIDE 10 MG: 5 TABLET ORAL at 14:07

## 2017-01-01 RX ADMIN — MORPHINE SULFATE 4 MG: 4 INJECTION, SOLUTION INTRAMUSCULAR; INTRAVENOUS at 09:20

## 2017-01-01 RX ADMIN — POLYETHYLENE GLYCOL (3350) 17 G: 17 POWDER, FOR SOLUTION ORAL at 09:19

## 2017-01-01 RX ADMIN — LORAZEPAM 0.5 MG: 0.5 TABLET ORAL at 13:00

## 2017-01-01 RX ADMIN — Medication: at 07:36

## 2017-01-01 RX ADMIN — Medication 10 ML: at 06:43

## 2017-01-01 RX ADMIN — DOCUSATE SODIUM AND SENNOSIDES 2 TABLET: 8.6; 5 TABLET, FILM COATED ORAL at 18:42

## 2017-01-01 RX ADMIN — STANDARDIZED SENNA CONCENTRATE AND DOCUSATE SODIUM 2 TABLET: 8.6; 5 TABLET, FILM COATED ORAL at 17:55

## 2017-01-01 RX ADMIN — Medication: at 10:43

## 2017-01-01 RX ADMIN — HYDROMORPHONE HYDROCHLORIDE 4 MG: 2 INJECTION, SOLUTION INTRAMUSCULAR; INTRAVENOUS; SUBCUTANEOUS at 10:23

## 2017-01-01 RX ADMIN — Medication 300 UNITS: at 04:04

## 2017-01-01 RX ADMIN — MORPHINE SULFATE: 5 INJECTION, SOLUTION INTRAVENOUS at 17:00

## 2017-01-01 RX ADMIN — METHADONE HYDROCHLORIDE 10 MG: 10 TABLET ORAL at 04:37

## 2017-01-01 RX ADMIN — IPRATROPIUM BROMIDE AND ALBUTEROL SULFATE 3 ML: 2.5; .5 SOLUTION RESPIRATORY (INHALATION) at 20:16

## 2017-01-01 RX ADMIN — Medication 10 ML: at 09:02

## 2017-01-01 RX ADMIN — HYDROMORPHONE HYDROCHLORIDE 4 MG: 2 INJECTION, SOLUTION INTRAMUSCULAR; INTRAVENOUS; SUBCUTANEOUS at 09:02

## 2017-01-01 RX ADMIN — LORAZEPAM 0.5 MG: 0.5 TABLET ORAL at 16:49

## 2017-01-01 RX ADMIN — MORPHINE SULFATE 50 MG: 20 SOLUTION ORAL at 04:00

## 2017-01-01 RX ADMIN — METHADONE HYDROCHLORIDE 10 MG: 10 TABLET ORAL at 10:42

## 2017-01-01 RX ADMIN — HALOPERIDOL 5 MG: 5 TABLET ORAL at 21:28

## 2017-01-01 RX ADMIN — LORAZEPAM 0.5 MG: 0.5 TABLET ORAL at 09:15

## 2017-01-01 RX ADMIN — ACETAMINOPHEN 1000 MG: 500 TABLET, FILM COATED ORAL at 21:51

## 2017-01-01 RX ADMIN — HALOPERIDOL LACTATE 5 MG: 2 SOLUTION, CONCENTRATE ORAL at 12:25

## 2017-01-01 RX ADMIN — Medication 10 ML: at 09:15

## 2017-01-01 RX ADMIN — METHADONE HYDROCHLORIDE 10 MG: 5 TABLET ORAL at 22:04

## 2017-01-01 RX ADMIN — POLYETHYLENE GLYCOL (3350) 17 G: 17 POWDER, FOR SOLUTION ORAL at 09:00

## 2017-01-01 RX ADMIN — SULINDAC 200 MG: 200 TABLET ORAL at 17:07

## 2017-01-01 RX ADMIN — SULINDAC 200 MG: 200 TABLET ORAL at 17:04

## 2017-01-01 RX ADMIN — POLYETHYLENE GLYCOL 3350 17 G: 17 POWDER, FOR SOLUTION ORAL at 00:00

## 2017-01-01 RX ADMIN — METHADONE HYDROCHLORIDE 10 MG: 5 TABLET ORAL at 21:23

## 2017-01-01 RX ADMIN — KETOROLAC TROMETHAMINE 30 MG: 30 INJECTION, SOLUTION INTRAMUSCULAR at 04:46

## 2017-01-01 RX ADMIN — HYDROMORPHONE HYDROCHLORIDE 4 MG: 1 INJECTION, SOLUTION INTRAMUSCULAR; INTRAVENOUS; SUBCUTANEOUS at 19:31

## 2017-01-01 RX ADMIN — DOCUSATE SODIUM 100 MG: 100 CAPSULE, LIQUID FILLED ORAL at 11:02

## 2017-01-01 RX ADMIN — SULINDAC 200 MG: 200 TABLET ORAL at 18:42

## 2017-01-01 RX ADMIN — LORAZEPAM 1 MG: 1 TABLET ORAL at 17:48

## 2017-01-01 RX ADMIN — SULINDAC 200 MG: 200 TABLET ORAL at 11:37

## 2017-01-01 RX ADMIN — Medication: at 20:01

## 2017-01-01 RX ADMIN — GABAPENTIN 300 MG: 300 CAPSULE ORAL at 21:09

## 2017-01-01 RX ADMIN — POLYETHYLENE GLYCOL 3350 17 G: 17 POWDER, FOR SOLUTION ORAL at 11:08

## 2017-01-01 RX ADMIN — DEXTROSE MONOHYDRATE AND SODIUM CHLORIDE 125 ML/HR: 5; .45 INJECTION, SOLUTION INTRAVENOUS at 14:52

## 2017-01-01 RX ADMIN — HYDROMORPHONE HYDROCHLORIDE 4 MG: 2 INJECTION, SOLUTION INTRAMUSCULAR; INTRAVENOUS; SUBCUTANEOUS at 18:47

## 2017-01-01 RX ADMIN — HYDROMORPHONE HYDROCHLORIDE 3 MG: 2 INJECTION, SOLUTION INTRAMUSCULAR; INTRAVENOUS; SUBCUTANEOUS at 09:11

## 2017-01-01 RX ADMIN — MORPHINE SULFATE 20 MG: 20 SOLUTION ORAL at 15:19

## 2017-01-01 RX ADMIN — SULINDAC 200 MG: 200 TABLET ORAL at 09:09

## 2017-01-01 RX ADMIN — LORAZEPAM 1 MG: 1 TABLET ORAL at 13:55

## 2017-01-01 RX ADMIN — KETOROLAC TROMETHAMINE 30 MG: 30 INJECTION, SOLUTION INTRAMUSCULAR at 17:05

## 2017-01-01 RX ADMIN — KETOROLAC TROMETHAMINE 30 MG: 30 INJECTION, SOLUTION INTRAMUSCULAR at 00:34

## 2017-01-01 RX ADMIN — DULOXETINE HYDROCHLORIDE 60 MG: 60 CAPSULE, DELAYED RELEASE ORAL at 21:19

## 2017-01-01 RX ADMIN — GABAPENTIN 300 MG: 300 CAPSULE ORAL at 22:08

## 2017-01-01 RX ADMIN — Medication: at 05:47

## 2017-01-01 RX ADMIN — KETOROLAC TROMETHAMINE 30 MG: 30 INJECTION INTRAMUSCULAR; INTRAVENOUS at 05:06

## 2017-01-01 RX ADMIN — HYDROMORPHONE HYDROCHLORIDE 4 MG: 2 INJECTION, SOLUTION INTRAMUSCULAR; INTRAVENOUS; SUBCUTANEOUS at 05:31

## 2017-01-01 RX ADMIN — HYDROMORPHONE HYDROCHLORIDE 2 MG: 2 INJECTION, SOLUTION INTRAMUSCULAR; INTRAVENOUS; SUBCUTANEOUS at 14:50

## 2017-01-01 RX ADMIN — HYDROMORPHONE HYDROCHLORIDE 4 MG: 2 INJECTION, SOLUTION INTRAMUSCULAR; INTRAVENOUS; SUBCUTANEOUS at 08:03

## 2017-01-01 RX ADMIN — LORAZEPAM 1 MG: 1 TABLET ORAL at 11:42

## 2017-01-01 RX ADMIN — DULOXETINE HYDROCHLORIDE 60 MG: 60 CAPSULE, DELAYED RELEASE ORAL at 21:08

## 2017-01-01 RX ADMIN — HYDROMORPHONE HYDROCHLORIDE 4 MG: 2 INJECTION, SOLUTION INTRAMUSCULAR; INTRAVENOUS; SUBCUTANEOUS at 16:48

## 2017-01-01 RX ADMIN — HYDROMORPHONE HYDROCHLORIDE 3 MG: 2 INJECTION, SOLUTION INTRAMUSCULAR; INTRAVENOUS; SUBCUTANEOUS at 06:25

## 2017-01-01 RX ADMIN — KETOROLAC TROMETHAMINE 30 MG: 30 INJECTION, SOLUTION INTRAMUSCULAR at 09:00

## 2017-01-01 RX ADMIN — HYDROMORPHONE HYDROCHLORIDE 4 MG: 2 INJECTION, SOLUTION INTRAMUSCULAR; INTRAVENOUS; SUBCUTANEOUS at 12:58

## 2017-01-01 RX ADMIN — BISACODYL 10 MG: 10 SUPPOSITORY RECTAL at 10:27

## 2017-01-01 RX ADMIN — MORPHINE SULFATE 4 MG: 4 INJECTION, SOLUTION INTRAMUSCULAR; INTRAVENOUS at 14:00

## 2017-01-01 RX ADMIN — Medication: at 12:00

## 2017-01-01 RX ADMIN — METHADONE HYDROCHLORIDE 10 MG: 5 TABLET ORAL at 13:34

## 2017-01-01 RX ADMIN — GABAPENTIN 300 MG: 300 CAPSULE ORAL at 17:33

## 2017-01-01 RX ADMIN — Medication: at 13:04

## 2017-01-01 RX ADMIN — STANDARDIZED SENNA CONCENTRATE AND DOCUSATE SODIUM 2 TABLET: 8.6; 5 TABLET, FILM COATED ORAL at 16:20

## 2017-01-01 RX ADMIN — METHADONE HYDROCHLORIDE 10 MG: 5 TABLET ORAL at 14:33

## 2017-01-01 RX ADMIN — Medication 80 MCG: at 10:12

## 2017-01-01 RX ADMIN — LORAZEPAM 1 MG: 1 TABLET ORAL at 17:54

## 2017-01-01 RX ADMIN — SODIUM CHLORIDE 100 ML: 900 INJECTION, SOLUTION INTRAVENOUS at 21:45

## 2017-01-01 RX ADMIN — DULOXETINE HYDROCHLORIDE 30 MG: 30 CAPSULE, DELAYED RELEASE ORAL at 08:58

## 2017-01-01 RX ADMIN — PHENAZOPYRIDINE HYDROCHLORIDE 100 MG: 100 TABLET ORAL at 18:00

## 2017-01-01 RX ADMIN — MORPHINE SULFATE 4 MG: 4 INJECTION, SOLUTION INTRAMUSCULAR; INTRAVENOUS at 12:15

## 2017-01-01 RX ADMIN — DOCUSATE SODIUM 100 MG: 100 CAPSULE, LIQUID FILLED ORAL at 09:00

## 2017-01-01 RX ADMIN — Medication: at 04:43

## 2017-01-01 RX ADMIN — ACETAMINOPHEN 1000 MG: 500 TABLET, FILM COATED ORAL at 09:27

## 2017-01-01 RX ADMIN — Medication: at 18:00

## 2017-01-01 RX ADMIN — HYDROMORPHONE HYDROCHLORIDE 4 MG: 2 INJECTION INTRAMUSCULAR; INTRAVENOUS; SUBCUTANEOUS at 19:34

## 2017-01-01 RX ADMIN — METHADONE HYDROCHLORIDE 10 MG: 5 TABLET ORAL at 13:20

## 2017-01-01 RX ADMIN — MORPHINE SULFATE 30 MG: 20 SOLUTION ORAL at 16:37

## 2017-01-01 RX ADMIN — LORAZEPAM 1 MG: 2 INJECTION INTRAMUSCULAR; INTRAVENOUS at 13:02

## 2017-01-01 RX ADMIN — METHADONE HYDROCHLORIDE 10 MG: 5 TABLET ORAL at 21:45

## 2017-01-01 RX ADMIN — LORAZEPAM 1 MG: 1 TABLET ORAL at 06:48

## 2017-01-01 RX ADMIN — METOPROLOL TARTRATE 25 MG: 25 TABLET ORAL at 21:22

## 2017-01-01 RX ADMIN — METHADONE HYDROCHLORIDE 10 MG: 5 TABLET ORAL at 21:58

## 2017-01-01 RX ADMIN — HYDROMORPHONE HYDROCHLORIDE 2 MG: 2 INJECTION, SOLUTION INTRAMUSCULAR; INTRAVENOUS; SUBCUTANEOUS at 08:45

## 2017-01-01 RX ADMIN — METRONIDAZOLE 1000 MG: 250 TABLET ORAL at 04:00

## 2017-01-01 RX ADMIN — LORAZEPAM 1 MG: 1 TABLET ORAL at 21:54

## 2017-01-01 RX ADMIN — METHADONE HYDROCHLORIDE 10 MG: 5 TABLET ORAL at 21:21

## 2017-01-01 RX ADMIN — PROPOFOL 150 MG: 10 INJECTION, EMULSION INTRAVENOUS at 10:00

## 2017-01-01 RX ADMIN — LORAZEPAM 1 MG: 1 TABLET ORAL at 12:57

## 2017-01-01 RX ADMIN — POLYETHYLENE GLYCOL (3350) 17 G: 17 POWDER, FOR SOLUTION ORAL at 08:02

## 2017-01-01 RX ADMIN — SULINDAC 200 MG: 200 TABLET ORAL at 17:39

## 2017-01-01 RX ADMIN — Medication 10 ML: at 15:07

## 2017-01-01 RX ADMIN — METHADONE HYDROCHLORIDE 10 MG: 5 TABLET ORAL at 15:26

## 2017-01-01 RX ADMIN — OXYCODONE HYDROCHLORIDE 60 MG: 5 TABLET ORAL at 21:34

## 2017-01-01 RX ADMIN — HYDROMORPHONE HYDROCHLORIDE 4 MG: 2 INJECTION INTRAMUSCULAR; INTRAVENOUS; SUBCUTANEOUS at 10:51

## 2017-01-01 RX ADMIN — HYDROMORPHONE HYDROCHLORIDE 4 MG: 2 INJECTION, SOLUTION INTRAMUSCULAR; INTRAVENOUS; SUBCUTANEOUS at 22:39

## 2017-01-01 RX ADMIN — KETOROLAC TROMETHAMINE 30 MG: 30 INJECTION, SOLUTION INTRAMUSCULAR at 03:20

## 2017-01-01 RX ADMIN — NYSTATIN 500000 UNITS: 500000 SUSPENSION ORAL at 17:07

## 2017-01-01 RX ADMIN — LORAZEPAM 1 MG: 1 TABLET ORAL at 17:32

## 2017-01-01 RX ADMIN — Medication: at 15:00

## 2017-01-01 RX ADMIN — HYDROMORPHONE HYDROCHLORIDE 4 MG: 2 INJECTION, SOLUTION INTRAMUSCULAR; INTRAVENOUS; SUBCUTANEOUS at 20:12

## 2017-01-01 RX ADMIN — METHADONE HYDROCHLORIDE 10 MG: 5 TABLET ORAL at 16:06

## 2017-01-01 RX ADMIN — Medication 1 MG: at 21:49

## 2017-01-01 RX ADMIN — KETOROLAC TROMETHAMINE 30 MG: 30 INJECTION, SOLUTION INTRAMUSCULAR at 06:40

## 2017-01-01 RX ADMIN — METOPROLOL TARTRATE 25 MG: 25 TABLET ORAL at 21:32

## 2017-01-01 RX ADMIN — DULOXETINE HYDROCHLORIDE 60 MG: 30 CAPSULE, DELAYED RELEASE ORAL at 21:40

## 2017-01-01 RX ADMIN — DULOXETINE HYDROCHLORIDE 60 MG: 60 CAPSULE, DELAYED RELEASE ORAL at 21:23

## 2017-01-01 RX ADMIN — METOPROLOL TARTRATE 25 MG: 25 TABLET ORAL at 09:40

## 2017-01-01 RX ADMIN — METOPROLOL TARTRATE 25 MG: 25 TABLET ORAL at 09:17

## 2017-01-01 RX ADMIN — METOPROLOL TARTRATE 25 MG: 25 TABLET ORAL at 21:02

## 2017-01-01 RX ADMIN — ACETAMINOPHEN 650 MG: 325 TABLET, FILM COATED ORAL at 02:29

## 2017-01-01 RX ADMIN — MORPHINE SULFATE 50 MG: 20 SOLUTION ORAL at 01:18

## 2017-01-01 RX ADMIN — Medication 10 ML: at 22:48

## 2017-01-01 RX ADMIN — KETOROLAC TROMETHAMINE 30 MG: 30 INJECTION, SOLUTION INTRAMUSCULAR at 14:24

## 2017-01-01 RX ADMIN — GABAPENTIN 600 MG: 300 CAPSULE ORAL at 08:48

## 2017-01-01 RX ADMIN — KETAMINE HYDROCHLORIDE 20 MG: 10 INJECTION, SOLUTION INTRAMUSCULAR; INTRAVENOUS at 09:49

## 2017-01-01 RX ADMIN — STANDARDIZED SENNA CONCENTRATE AND DOCUSATE SODIUM 2 TABLET: 8.6; 5 TABLET, FILM COATED ORAL at 07:49

## 2017-01-01 RX ADMIN — METHADONE HYDROCHLORIDE 10 MG: 5 TABLET ORAL at 21:07

## 2017-01-01 RX ADMIN — SULINDAC 200 MG: 200 TABLET ORAL at 11:22

## 2017-01-01 RX ADMIN — LORAZEPAM 0.5 MG: 0.5 TABLET ORAL at 17:40

## 2017-01-01 RX ADMIN — SULINDAC 200 MG: 200 TABLET ORAL at 17:47

## 2017-01-01 RX ADMIN — DIPHENHYDRAMINE HYDROCHLORIDE 50 MG: 25 CAPSULE ORAL at 20:35

## 2017-01-01 RX ADMIN — Medication 80 MCG: at 10:08

## 2017-01-01 RX ADMIN — DOCUSATE SODIUM 100 MG: 100 CAPSULE, LIQUID FILLED ORAL at 08:41

## 2017-01-01 RX ADMIN — METHADONE HYDROCHLORIDE 7.5 MG: 5 TABLET ORAL at 13:10

## 2017-01-01 RX ADMIN — LORAZEPAM 1 MG: 2 INJECTION INTRAMUSCULAR; INTRAVENOUS at 20:07

## 2017-01-01 RX ADMIN — LORAZEPAM 0.5 MG: 0.5 TABLET ORAL at 17:41

## 2017-01-01 RX ADMIN — KETOROLAC TROMETHAMINE 30 MG: 30 INJECTION, SOLUTION INTRAMUSCULAR at 19:59

## 2017-01-01 RX ADMIN — GABAPENTIN 600 MG: 300 CAPSULE ORAL at 16:36

## 2017-01-01 RX ADMIN — SULINDAC 200 MG: 200 TABLET ORAL at 16:35

## 2017-01-01 RX ADMIN — POLYETHYLENE GLYCOL 3350 17 G: 17 POWDER, FOR SOLUTION ORAL at 09:02

## 2017-01-01 RX ADMIN — METHADONE HYDROCHLORIDE 10 MG: 5 TABLET ORAL at 14:00

## 2017-01-01 RX ADMIN — OXYCODONE HYDROCHLORIDE 60 MG: 5 TABLET ORAL at 15:45

## 2017-01-01 RX ADMIN — METOPROLOL TARTRATE 25 MG: 25 TABLET ORAL at 21:16

## 2017-01-01 RX ADMIN — STANDARDIZED SENNA CONCENTRATE AND DOCUSATE SODIUM 2 TABLET: 8.6; 5 TABLET, FILM COATED ORAL at 17:17

## 2017-01-01 RX ADMIN — HYDROMORPHONE HYDROCHLORIDE 3 MG: 2 INJECTION, SOLUTION INTRAMUSCULAR; INTRAVENOUS; SUBCUTANEOUS at 20:23

## 2017-01-01 RX ADMIN — METHADONE HYDROCHLORIDE 10 MG: 5 TABLET ORAL at 14:16

## 2017-01-01 RX ADMIN — SULINDAC 200 MG: 200 TABLET ORAL at 09:37

## 2017-01-01 RX ADMIN — Medication 1 MG: at 01:32

## 2017-01-01 RX ADMIN — HYDROMORPHONE HYDROCHLORIDE 3 MG: 2 INJECTION, SOLUTION INTRAMUSCULAR; INTRAVENOUS; SUBCUTANEOUS at 21:23

## 2017-01-01 RX ADMIN — Medication 10 ML: at 14:42

## 2017-01-01 RX ADMIN — HALOPERIDOL LACTATE 2 MG: 5 INJECTION, SOLUTION INTRAMUSCULAR at 04:22

## 2017-01-01 RX ADMIN — KETOROLAC TROMETHAMINE 30 MG: 30 INJECTION, SOLUTION INTRAMUSCULAR at 06:31

## 2017-01-01 RX ADMIN — METHADONE HYDROCHLORIDE 10 MG: 5 TABLET ORAL at 05:50

## 2017-01-01 RX ADMIN — METHADONE HYDROCHLORIDE 10 MG: 5 TABLET ORAL at 05:32

## 2017-01-01 RX ADMIN — METOPROLOL TARTRATE 25 MG: 25 TABLET ORAL at 21:19

## 2017-01-01 RX ADMIN — KETOROLAC TROMETHAMINE 30 MG: 30 INJECTION, SOLUTION INTRAMUSCULAR at 08:37

## 2017-01-01 RX ADMIN — METHADONE HYDROCHLORIDE 10 MG: 5 TABLET ORAL at 15:42

## 2017-01-01 RX ADMIN — Medication 1 MG: at 07:38

## 2017-01-01 RX ADMIN — Medication 1 MG: at 15:40

## 2017-01-01 RX ADMIN — Medication 1 MG: at 04:47

## 2017-01-01 RX ADMIN — KETOROLAC TROMETHAMINE 30 MG: 30 INJECTION INTRAMUSCULAR; INTRAVENOUS at 22:20

## 2017-01-01 RX ADMIN — MORPHINE SULFATE 20 MG: 20 SOLUTION ORAL at 09:07

## 2017-01-01 RX ADMIN — STANDARDIZED SENNA CONCENTRATE AND DOCUSATE SODIUM 2 TABLET: 8.6; 5 TABLET, FILM COATED ORAL at 09:12

## 2017-01-01 RX ADMIN — OXYCODONE HYDROCHLORIDE 60 MG: 5 TABLET ORAL at 05:47

## 2017-01-01 RX ADMIN — SODIUM CHLORIDE 1000 ML: 900 INJECTION, SOLUTION INTRAVENOUS at 23:00

## 2017-01-01 RX ADMIN — LORAZEPAM 1 MG: 1 TABLET ORAL at 09:01

## 2017-01-01 RX ADMIN — HYDROMORPHONE HYDROCHLORIDE 4 MG: 2 INJECTION, SOLUTION INTRAMUSCULAR; INTRAVENOUS; SUBCUTANEOUS at 13:11

## 2017-01-01 RX ADMIN — MORPHINE SULFATE 40 MG: 20 SOLUTION ORAL at 22:00

## 2017-01-01 RX ADMIN — HYDROMORPHONE HYDROCHLORIDE 2 MG: 2 INJECTION, SOLUTION INTRAMUSCULAR; INTRAVENOUS; SUBCUTANEOUS at 02:50

## 2017-01-01 RX ADMIN — LORAZEPAM 1 MG: 1 TABLET ORAL at 21:27

## 2017-01-01 RX ADMIN — LORAZEPAM 1 MG: 2 INJECTION INTRAMUSCULAR; INTRAVENOUS at 11:14

## 2017-01-01 RX ADMIN — HYDROMORPHONE HYDROCHLORIDE 1.5 MG: 2 INJECTION, SOLUTION INTRAMUSCULAR; INTRAVENOUS; SUBCUTANEOUS at 19:45

## 2017-01-01 RX ADMIN — METOPROLOL TARTRATE 25 MG: 25 TABLET ORAL at 09:00

## 2017-01-01 RX ADMIN — Medication: at 07:35

## 2017-01-01 RX ADMIN — METHADONE HYDROCHLORIDE 10 MG: 5 TABLET ORAL at 21:18

## 2017-01-01 RX ADMIN — HYDROMORPHONE HYDROCHLORIDE 2 MG: 2 INJECTION, SOLUTION INTRAMUSCULAR; INTRAVENOUS; SUBCUTANEOUS at 05:11

## 2017-01-01 RX ADMIN — METHADONE HYDROCHLORIDE 10 MG: 5 TABLET ORAL at 14:42

## 2017-01-01 RX ADMIN — MORPHINE SULFATE 30 MG: 20 SOLUTION ORAL at 04:56

## 2017-01-01 RX ADMIN — HYDROMORPHONE HYDROCHLORIDE 4 MG: 2 INJECTION, SOLUTION INTRAMUSCULAR; INTRAVENOUS; SUBCUTANEOUS at 04:34

## 2017-01-01 RX ADMIN — PROPOFOL 100 MG: 10 INJECTION, EMULSION INTRAVENOUS at 09:15

## 2017-01-01 RX ADMIN — DIPHENHYDRAMINE HYDROCHLORIDE 25 MG: 25 CAPSULE ORAL at 14:24

## 2017-01-01 RX ADMIN — DOCUSATE SODIUM 100 MG: 100 CAPSULE, LIQUID FILLED ORAL at 09:48

## 2017-01-01 RX ADMIN — LORAZEPAM 0.5 MG: 0.5 TABLET ORAL at 10:28

## 2017-01-01 RX ADMIN — Medication 10 ML: at 13:29

## 2017-01-01 RX ADMIN — MORPHINE SULFATE 50 MG: 20 SOLUTION ORAL at 13:09

## 2017-01-01 RX ADMIN — HYDROMORPHONE HYDROCHLORIDE 8 MG: 1 SOLUTION ORAL at 16:18

## 2017-01-01 RX ADMIN — Medication 10 ML: at 06:23

## 2017-01-01 RX ADMIN — POLYETHYLENE GLYCOL 3350 17 G: 17 POWDER, FOR SOLUTION ORAL at 09:52

## 2017-01-01 RX ADMIN — ACETAMINOPHEN 650 MG: 325 TABLET, FILM COATED ORAL at 19:23

## 2017-01-01 RX ADMIN — MORPHINE SULFATE 40 MG: 20 SOLUTION ORAL at 01:00

## 2017-01-01 RX ADMIN — Medication 1 MG: at 13:25

## 2017-01-01 RX ADMIN — MORPHINE SULFATE 4 MG: 4 INJECTION, SOLUTION INTRAMUSCULAR; INTRAVENOUS at 14:34

## 2017-01-01 RX ADMIN — LORAZEPAM 2 MG: 1 TABLET ORAL at 21:45

## 2017-01-01 RX ADMIN — Medication 10 ML: at 21:48

## 2017-01-01 RX ADMIN — KETOROLAC TROMETHAMINE 30 MG: 30 INJECTION, SOLUTION INTRAMUSCULAR at 05:33

## 2017-01-01 RX ADMIN — HALOPERIDOL LACTATE 2 MG: 5 INJECTION, SOLUTION INTRAMUSCULAR at 13:26

## 2017-01-01 RX ADMIN — KETOROLAC TROMETHAMINE 30 MG: 30 INJECTION, SOLUTION INTRAMUSCULAR at 17:47

## 2017-01-01 RX ADMIN — LORAZEPAM 1 MG: 1 TABLET ORAL at 22:00

## 2017-01-01 RX ADMIN — ACETAMINOPHEN 1000 MG: 500 TABLET, FILM COATED ORAL at 22:02

## 2017-01-01 RX ADMIN — DULOXETINE HYDROCHLORIDE 60 MG: 60 CAPSULE, DELAYED RELEASE ORAL at 21:34

## 2017-01-01 RX ADMIN — Medication 1 MG: at 16:02

## 2017-01-01 RX ADMIN — POLYETHYLENE GLYCOL (3350) 17 G: 17 POWDER, FOR SOLUTION ORAL at 09:29

## 2017-01-01 RX ADMIN — METHADONE HYDROCHLORIDE 10 MG: 5 TABLET ORAL at 21:06

## 2017-01-01 RX ADMIN — METHADONE HYDROCHLORIDE 5 MG: 5 TABLET ORAL at 05:19

## 2017-01-01 RX ADMIN — Medication 1 MG: at 07:49

## 2017-01-01 RX ADMIN — Medication: at 02:50

## 2017-01-01 RX ADMIN — GABAPENTIN 600 MG: 300 CAPSULE ORAL at 09:33

## 2017-01-01 RX ADMIN — LORAZEPAM 1 MG: 1 TABLET ORAL at 16:20

## 2017-01-01 RX ADMIN — SULINDAC 200 MG: 200 TABLET ORAL at 08:14

## 2017-01-01 RX ADMIN — Medication 80 MCG: at 09:15

## 2017-01-01 RX ADMIN — METHADONE HYDROCHLORIDE 5 MG: 5 TABLET ORAL at 05:11

## 2017-01-01 RX ADMIN — POLYETHYLENE GLYCOL (3350) 17 G: 17 POWDER, FOR SOLUTION ORAL at 10:59

## 2017-01-01 RX ADMIN — DIPHENHYDRAMINE HYDROCHLORIDE 25 MG: 25 CAPSULE ORAL at 14:33

## 2017-01-01 RX ADMIN — METOPROLOL TARTRATE 12.5 MG: 25 TABLET ORAL at 21:10

## 2017-01-01 RX ADMIN — METOPROLOL TARTRATE 25 MG: 25 TABLET ORAL at 10:28

## 2017-01-01 RX ADMIN — GABAPENTIN 300 MG: 300 CAPSULE ORAL at 21:23

## 2017-01-01 RX ADMIN — GABAPENTIN 300 MG: 300 CAPSULE ORAL at 21:04

## 2017-01-01 RX ADMIN — METHADONE HYDROCHLORIDE 10 MG: 5 TABLET ORAL at 06:33

## 2017-01-01 RX ADMIN — Medication: at 09:12

## 2017-01-01 RX ADMIN — DIPHENHYDRAMINE HYDROCHLORIDE 25 MG: 25 CAPSULE ORAL at 09:49

## 2017-01-01 RX ADMIN — METHADONE HYDROCHLORIDE 10 MG: 5 TABLET ORAL at 14:39

## 2017-01-01 RX ADMIN — PHENAZOPYRIDINE HYDROCHLORIDE 100 MG: 100 TABLET ORAL at 18:13

## 2017-01-01 RX ADMIN — DIPHENHYDRAMINE HYDROCHLORIDE 25 MG: 25 CAPSULE ORAL at 05:46

## 2017-01-01 RX ADMIN — GABAPENTIN 600 MG: 300 CAPSULE ORAL at 22:06

## 2017-01-01 RX ADMIN — SULINDAC 200 MG: 200 TABLET ORAL at 17:57

## 2017-01-01 RX ADMIN — IPRATROPIUM BROMIDE AND ALBUTEROL SULFATE 3 ML: 2.5; .5 SOLUTION RESPIRATORY (INHALATION) at 17:09

## 2017-01-01 RX ADMIN — MORPHINE SULFATE 15 MG: 10 INJECTION INTRAVENOUS at 10:25

## 2017-01-01 RX ADMIN — METOPROLOL TARTRATE 25 MG: 25 TABLET ORAL at 22:09

## 2017-01-01 RX ADMIN — SULINDAC 200 MG: 200 TABLET ORAL at 17:18

## 2017-01-01 RX ADMIN — DULOXETINE HYDROCHLORIDE 60 MG: 60 CAPSULE, DELAYED RELEASE ORAL at 21:46

## 2017-01-01 RX ADMIN — GABAPENTIN 600 MG: 300 CAPSULE ORAL at 08:14

## 2017-01-01 RX ADMIN — Medication 10 ML: at 05:41

## 2017-01-01 RX ADMIN — ACETAMINOPHEN 650 MG: 325 TABLET, FILM COATED ORAL at 18:38

## 2017-01-01 RX ADMIN — HYDROMORPHONE HYDROCHLORIDE 3 MG: 2 INJECTION, SOLUTION INTRAMUSCULAR; INTRAVENOUS; SUBCUTANEOUS at 18:06

## 2017-01-01 RX ADMIN — MORPHINE SULFATE 4 MG: 4 INJECTION, SOLUTION INTRAMUSCULAR; INTRAVENOUS at 18:09

## 2017-01-01 RX ADMIN — DEXAMETHASONE SODIUM PHOSPHATE 2 MG: 4 INJECTION, SOLUTION INTRAMUSCULAR; INTRAVENOUS at 09:15

## 2017-01-01 RX ADMIN — HYDROMORPHONE HYDROCHLORIDE 4 MG: 2 INJECTION, SOLUTION INTRAMUSCULAR; INTRAVENOUS; SUBCUTANEOUS at 19:55

## 2017-01-01 RX ADMIN — MORPHINE SULFATE 40 MG: 20 SOLUTION ORAL at 04:05

## 2017-01-01 RX ADMIN — HALOPERIDOL 5 MG: 5 TABLET ORAL at 21:40

## 2017-01-01 RX ADMIN — Medication: at 21:50

## 2017-01-01 RX ADMIN — LORAZEPAM 0.5 MG: 0.5 TABLET ORAL at 18:12

## 2017-01-01 RX ADMIN — DULOXETINE HYDROCHLORIDE 60 MG: 60 CAPSULE, DELAYED RELEASE ORAL at 21:09

## 2017-01-01 RX ADMIN — MORPHINE SULFATE 20 MG: 20 SOLUTION ORAL at 21:40

## 2017-01-01 RX ADMIN — LORAZEPAM 0.5 MG: 0.5 TABLET ORAL at 09:07

## 2017-01-01 RX ADMIN — METHADONE HYDROCHLORIDE 10 MG: 5 TABLET ORAL at 05:37

## 2017-01-01 RX ADMIN — SULINDAC 200 MG: 200 TABLET ORAL at 17:49

## 2017-01-01 RX ADMIN — DULOXETINE HYDROCHLORIDE 30 MG: 30 CAPSULE, DELAYED RELEASE ORAL at 10:17

## 2017-01-01 RX ADMIN — SULINDAC 200 MG: 200 TABLET ORAL at 18:55

## 2017-01-01 RX ADMIN — METOPROLOL TARTRATE 25 MG: 25 TABLET ORAL at 21:07

## 2017-01-01 RX ADMIN — Medication 1 MG: at 13:10

## 2017-01-01 RX ADMIN — METOPROLOL TARTRATE 25 MG: 25 TABLET ORAL at 21:34

## 2017-01-01 RX ADMIN — HYDROMORPHONE HYDROCHLORIDE 4 MG: 2 INJECTION, SOLUTION INTRAMUSCULAR; INTRAVENOUS; SUBCUTANEOUS at 08:37

## 2017-01-01 RX ADMIN — NYSTATIN 500000 UNITS: 500000 SUSPENSION ORAL at 02:29

## 2017-01-01 RX ADMIN — HYDROMORPHONE HYDROCHLORIDE 4 MG: 2 INJECTION INTRAMUSCULAR; INTRAVENOUS; SUBCUTANEOUS at 20:56

## 2017-01-01 RX ADMIN — HYDROMORPHONE HYDROCHLORIDE 4 MG: 2 INJECTION, SOLUTION INTRAMUSCULAR; INTRAVENOUS; SUBCUTANEOUS at 09:39

## 2017-01-01 RX ADMIN — GABAPENTIN 600 MG: 300 CAPSULE ORAL at 21:34

## 2017-01-01 RX ADMIN — Medication 1 MG: at 12:03

## 2017-01-01 RX ADMIN — KETOROLAC TROMETHAMINE 30 MG: 30 INJECTION, SOLUTION INTRAMUSCULAR at 21:29

## 2017-01-01 RX ADMIN — Medication 1 MG: at 08:21

## 2017-01-01 RX ADMIN — NYSTATIN 500000 UNITS: 500000 SUSPENSION ORAL at 13:42

## 2017-01-01 RX ADMIN — KETOROLAC TROMETHAMINE 30 MG: 30 INJECTION, SOLUTION INTRAMUSCULAR at 15:48

## 2017-01-01 RX ADMIN — Medication 1 MG: at 20:40

## 2017-01-01 RX ADMIN — Medication 2 MG: at 10:10

## 2017-01-01 RX ADMIN — ACETAMINOPHEN 650 MG: 325 TABLET, FILM COATED ORAL at 23:33

## 2017-01-01 RX ADMIN — LORAZEPAM 1 MG: 1 TABLET ORAL at 21:21

## 2017-01-01 RX ADMIN — MORPHINE SULFATE 10 MG: 20 SOLUTION ORAL at 14:36

## 2017-01-01 RX ADMIN — LORAZEPAM 1 MG: 1 TABLET ORAL at 21:24

## 2017-01-01 RX ADMIN — METHADONE HYDROCHLORIDE 10 MG: 5 TABLET ORAL at 13:25

## 2017-01-01 RX ADMIN — LORAZEPAM 0.5 MG: 0.5 TABLET ORAL at 09:46

## 2017-01-01 RX ADMIN — Medication 10 ML: at 10:42

## 2017-01-01 RX ADMIN — HYDROMORPHONE HYDROCHLORIDE 4 MG: 2 INJECTION INTRAMUSCULAR; INTRAVENOUS; SUBCUTANEOUS at 16:18

## 2017-01-01 RX ADMIN — DOCUSATE SODIUM 100 MG: 100 CAPSULE, LIQUID FILLED ORAL at 09:39

## 2017-01-01 RX ADMIN — HALOPERIDOL LACTATE 2 MG: 5 INJECTION, SOLUTION INTRAMUSCULAR at 06:28

## 2017-01-01 RX ADMIN — METHADONE HYDROCHLORIDE 10 MG: 5 TABLET ORAL at 05:14

## 2017-01-01 RX ADMIN — Medication 10 ML: at 13:12

## 2017-01-01 RX ADMIN — ACETAMINOPHEN 650 MG: 325 TABLET, FILM COATED ORAL at 13:28

## 2017-01-01 RX ADMIN — BISACODYL 5 MG: 5 TABLET, COATED ORAL at 09:35

## 2017-01-01 RX ADMIN — MORPHINE SULFATE 4 MG: 4 INJECTION, SOLUTION INTRAMUSCULAR; INTRAVENOUS at 03:16

## 2017-01-01 RX ADMIN — MORPHINE SULFATE 4 MG: 4 INJECTION, SOLUTION INTRAMUSCULAR; INTRAVENOUS at 10:02

## 2017-01-01 RX ADMIN — METHADONE HYDROCHLORIDE 10 MG: 5 TABLET ORAL at 06:29

## 2017-01-01 RX ADMIN — SUCCINYLCHOLINE CHLORIDE 160 MG: 20 INJECTION INTRAMUSCULAR; INTRAVENOUS at 10:00

## 2017-01-01 RX ADMIN — HYDROMORPHONE HYDROCHLORIDE 1 MG: 2 INJECTION, SOLUTION INTRAMUSCULAR; INTRAVENOUS; SUBCUTANEOUS at 23:58

## 2017-01-01 RX ADMIN — Medication 80 MCG: at 10:11

## 2017-01-01 RX ADMIN — GABAPENTIN 300 MG: 300 CAPSULE ORAL at 22:23

## 2017-01-01 RX ADMIN — METHADONE HYDROCHLORIDE 10 MG: 5 TABLET ORAL at 05:57

## 2017-01-01 RX ADMIN — LORAZEPAM 1 MG: 1 TABLET ORAL at 21:05

## 2017-01-01 RX ADMIN — LORAZEPAM 1 MG: 1 TABLET ORAL at 21:57

## 2017-01-01 RX ADMIN — POLYETHYLENE GLYCOL 3350 17 G: 17 POWDER, FOR SOLUTION ORAL at 10:04

## 2017-01-01 RX ADMIN — LORAZEPAM 0.5 MG: 0.5 TABLET ORAL at 12:25

## 2017-01-01 RX ADMIN — METOPROLOL TARTRATE 25 MG: 25 TABLET ORAL at 09:36

## 2017-01-01 RX ADMIN — Medication: at 14:36

## 2017-01-01 RX ADMIN — HYDROMORPHONE HYDROCHLORIDE 4 MG: 2 INJECTION, SOLUTION INTRAMUSCULAR; INTRAVENOUS; SUBCUTANEOUS at 00:43

## 2017-01-01 RX ADMIN — POLYETHYLENE GLYCOL 3350 17 G: 17 POWDER, FOR SOLUTION ORAL at 08:00

## 2017-01-01 RX ADMIN — METOPROLOL SUCCINATE 50 MG: 50 TABLET, EXTENDED RELEASE ORAL at 09:08

## 2017-01-01 RX ADMIN — LORAZEPAM 1 MG: 1 TABLET ORAL at 22:06

## 2017-01-01 RX ADMIN — METHADONE HYDROCHLORIDE 10 MG: 5 TABLET ORAL at 15:08

## 2017-01-01 RX ADMIN — MORPHINE SULFATE 4 MG: 4 INJECTION, SOLUTION INTRAMUSCULAR; INTRAVENOUS at 05:10

## 2017-01-01 RX ADMIN — NYSTATIN 500000 UNITS: 500000 SUSPENSION ORAL at 22:07

## 2017-01-01 RX ADMIN — Medication 1 MG: at 12:51

## 2017-01-01 RX ADMIN — METOPROLOL TARTRATE 12.5 MG: 25 TABLET ORAL at 21:54

## 2017-01-01 RX ADMIN — LORAZEPAM 1 MG: 1 TABLET ORAL at 08:46

## 2017-01-01 RX ADMIN — SULINDAC 200 MG: 200 TABLET ORAL at 18:10

## 2017-01-01 RX ADMIN — KETOROLAC TROMETHAMINE 30 MG: 30 INJECTION, SOLUTION INTRAMUSCULAR at 18:47

## 2017-01-01 RX ADMIN — Medication 1 MG: at 01:38

## 2017-01-01 RX ADMIN — Medication 2 MG: at 14:57

## 2017-01-01 RX ADMIN — METHADONE HYDROCHLORIDE 10 MG: 5 TABLET ORAL at 05:17

## 2017-01-01 RX ADMIN — Medication 1 MG: at 10:28

## 2017-01-01 RX ADMIN — LORAZEPAM 1 MG: 1 TABLET ORAL at 14:37

## 2017-01-01 RX ADMIN — METOPROLOL TARTRATE 25 MG: 25 TABLET ORAL at 10:03

## 2017-01-01 RX ADMIN — GABAPENTIN 300 MG: 300 CAPSULE ORAL at 21:44

## 2017-01-01 RX ADMIN — GABAPENTIN 300 MG: 300 CAPSULE ORAL at 09:07

## 2017-01-01 RX ADMIN — LORAZEPAM 1 MG: 2 INJECTION INTRAMUSCULAR; INTRAVENOUS at 14:35

## 2017-01-01 RX ADMIN — METHADONE HYDROCHLORIDE 10 MG: 5 TABLET ORAL at 05:03

## 2017-01-01 RX ADMIN — LORAZEPAM 1 MG: 2 INJECTION INTRAMUSCULAR; INTRAVENOUS at 08:38

## 2017-01-01 RX ADMIN — KETOROLAC TROMETHAMINE 30 MG: 30 INJECTION INTRAMUSCULAR; INTRAVENOUS at 14:33

## 2017-01-01 RX ADMIN — HYDROMORPHONE HYDROCHLORIDE 4 MG: 2 INJECTION, SOLUTION INTRAMUSCULAR; INTRAVENOUS; SUBCUTANEOUS at 23:55

## 2017-01-01 RX ADMIN — MORPHINE SULFATE 50 MG: 20 SOLUTION ORAL at 16:00

## 2017-01-01 RX ADMIN — METHADONE HYDROCHLORIDE 5 MG: 5 TABLET ORAL at 21:58

## 2017-01-01 RX ADMIN — GABAPENTIN 600 MG: 300 CAPSULE ORAL at 08:29

## 2017-01-01 RX ADMIN — METHADONE HYDROCHLORIDE 10 MG: 5 TABLET ORAL at 13:41

## 2017-01-01 RX ADMIN — DIPHENHYDRAMINE HYDROCHLORIDE 25 MG: 25 CAPSULE ORAL at 19:35

## 2017-01-01 RX ADMIN — METHADONE HYDROCHLORIDE 10 MG: 5 TABLET ORAL at 22:08

## 2017-01-01 RX ADMIN — LORAZEPAM 1 MG: 1 TABLET ORAL at 02:09

## 2017-01-01 RX ADMIN — LORAZEPAM 1 MG: 1 TABLET ORAL at 21:08

## 2017-01-01 RX ADMIN — METOPROLOL TARTRATE 25 MG: 25 TABLET ORAL at 08:29

## 2017-01-01 RX ADMIN — DULOXETINE HYDROCHLORIDE 60 MG: 60 CAPSULE, DELAYED RELEASE ORAL at 21:06

## 2017-01-01 RX ADMIN — OXYCODONE HYDROCHLORIDE 60 MG: 5 TABLET ORAL at 09:09

## 2017-01-01 RX ADMIN — HYDROMORPHONE HYDROCHLORIDE 3 MG: 2 INJECTION, SOLUTION INTRAMUSCULAR; INTRAVENOUS; SUBCUTANEOUS at 00:49

## 2017-01-01 RX ADMIN — LORAZEPAM 0.5 MG: 0.5 TABLET ORAL at 15:30

## 2017-01-01 RX ADMIN — Medication 1 MG: at 01:00

## 2017-01-01 RX ADMIN — Medication 40 ML: at 01:51

## 2017-01-01 RX ADMIN — Medication 80 MCG: at 10:09

## 2017-01-01 RX ADMIN — LORAZEPAM 1 MG: 1 TABLET ORAL at 21:46

## 2017-01-01 RX ADMIN — METOPROLOL TARTRATE 12.5 MG: 25 TABLET ORAL at 08:20

## 2017-01-01 RX ADMIN — Medication 1 MG: at 07:53

## 2017-01-01 RX ADMIN — HYDROMORPHONE HYDROCHLORIDE 4 MG: 2 INJECTION, SOLUTION INTRAMUSCULAR; INTRAVENOUS; SUBCUTANEOUS at 19:58

## 2017-01-01 RX ADMIN — KETOROLAC TROMETHAMINE 30 MG: 30 INJECTION, SOLUTION INTRAMUSCULAR at 20:23

## 2017-01-01 RX ADMIN — MORPHINE SULFATE 30 MG: 20 SOLUTION ORAL at 15:43

## 2017-01-01 RX ADMIN — LORAZEPAM 1 MG: 1 TABLET ORAL at 22:02

## 2017-01-01 RX ADMIN — SULINDAC 200 MG: 200 TABLET ORAL at 09:05

## 2017-01-01 RX ADMIN — KETOROLAC TROMETHAMINE 30 MG: 30 INJECTION, SOLUTION INTRAMUSCULAR at 11:40

## 2017-01-01 RX ADMIN — GABAPENTIN 600 MG: 300 CAPSULE ORAL at 22:13

## 2017-01-01 RX ADMIN — GABAPENTIN 300 MG: 300 CAPSULE ORAL at 22:17

## 2017-01-01 RX ADMIN — HYDROMORPHONE HYDROCHLORIDE 4 MG: 2 INJECTION, SOLUTION INTRAMUSCULAR; INTRAVENOUS; SUBCUTANEOUS at 02:50

## 2017-01-01 RX ADMIN — HYDROMORPHONE HYDROCHLORIDE 4 MG: 2 INJECTION, SOLUTION INTRAMUSCULAR; INTRAVENOUS; SUBCUTANEOUS at 23:25

## 2017-01-01 RX ADMIN — SENNOSIDES 17.2 MG: 8.6 TABLET, FILM COATED ORAL at 17:44

## 2017-01-01 RX ADMIN — Medication 20 ML: at 21:14

## 2017-01-01 RX ADMIN — METOPROLOL TARTRATE 25 MG: 25 TABLET ORAL at 08:43

## 2017-01-01 RX ADMIN — LORAZEPAM 1 MG: 1 TABLET ORAL at 03:01

## 2017-01-01 RX ADMIN — DULOXETINE HYDROCHLORIDE 60 MG: 60 CAPSULE, DELAYED RELEASE ORAL at 21:57

## 2017-01-01 RX ADMIN — DULOXETINE HYDROCHLORIDE 60 MG: 60 CAPSULE, DELAYED RELEASE ORAL at 21:20

## 2017-01-01 RX ADMIN — Medication: at 04:33

## 2017-01-01 RX ADMIN — GABAPENTIN 300 MG: 300 CAPSULE ORAL at 18:13

## 2017-01-01 RX ADMIN — ACETAMINOPHEN 1000 MG: 500 TABLET, FILM COATED ORAL at 17:40

## 2017-01-01 RX ADMIN — Medication 1 MG: at 00:47

## 2017-01-01 RX ADMIN — METHADONE HYDROCHLORIDE 10 MG: 5 TABLET ORAL at 06:12

## 2017-01-01 RX ADMIN — SULINDAC 200 MG: 200 TABLET ORAL at 16:47

## 2017-01-01 RX ADMIN — LORAZEPAM 0.5 MG: 0.5 TABLET ORAL at 17:37

## 2017-01-01 RX ADMIN — LORAZEPAM 0.5 MG: 0.5 TABLET ORAL at 16:50

## 2017-01-01 RX ADMIN — PROPOFOL 100 MG: 10 INJECTION, EMULSION INTRAVENOUS at 21:52

## 2017-01-01 RX ADMIN — SULINDAC 200 MG: 200 TABLET ORAL at 11:08

## 2017-01-01 RX ADMIN — METHADONE HYDROCHLORIDE 5 MG: 5 TABLET ORAL at 06:00

## 2017-01-01 RX ADMIN — HYDROMORPHONE HYDROCHLORIDE 4 MG: 2 INJECTION, SOLUTION INTRAMUSCULAR; INTRAVENOUS; SUBCUTANEOUS at 17:47

## 2017-01-01 RX ADMIN — Medication 1 MG: at 20:11

## 2017-01-01 RX ADMIN — METHADONE HYDROCHLORIDE 10 MG: 5 TABLET ORAL at 15:12

## 2017-01-01 RX ADMIN — LORAZEPAM 1 MG: 1 TABLET ORAL at 08:13

## 2017-01-01 RX ADMIN — HYDROMORPHONE HYDROCHLORIDE 3 MG: 2 INJECTION, SOLUTION INTRAMUSCULAR; INTRAVENOUS; SUBCUTANEOUS at 12:25

## 2017-01-01 RX ADMIN — LORAZEPAM 0.5 MG: 0.5 TABLET ORAL at 14:13

## 2017-01-01 RX ADMIN — Medication 10 ML: at 18:39

## 2017-01-01 RX ADMIN — Medication 1 MG: at 12:00

## 2017-01-01 RX ADMIN — Medication 10 ML: at 05:12

## 2017-01-01 RX ADMIN — DOCUSATE SODIUM 100 MG: 100 CAPSULE, LIQUID FILLED ORAL at 09:15

## 2017-01-01 RX ADMIN — NYSTATIN 500000 UNITS: 500000 SUSPENSION ORAL at 13:00

## 2017-01-01 RX ADMIN — SULINDAC 200 MG: 200 TABLET ORAL at 09:14

## 2017-01-01 RX ADMIN — LORAZEPAM 0.5 MG: 0.5 TABLET ORAL at 18:30

## 2017-01-01 RX ADMIN — Medication 6 MG: at 10:07

## 2017-01-01 RX ADMIN — GABAPENTIN 300 MG: 300 CAPSULE ORAL at 09:15

## 2017-01-01 RX ADMIN — METOPROLOL TARTRATE 25 MG: 25 TABLET ORAL at 09:15

## 2017-01-01 RX ADMIN — METHADONE HYDROCHLORIDE 10 MG: 5 TABLET ORAL at 13:23

## 2017-01-01 RX ADMIN — HYDROMORPHONE HYDROCHLORIDE 4 MG: 1 INJECTION, SOLUTION INTRAMUSCULAR; INTRAVENOUS; SUBCUTANEOUS at 21:25

## 2017-01-01 RX ADMIN — LORAZEPAM 1 MG: 2 INJECTION INTRAMUSCULAR; INTRAVENOUS at 08:01

## 2017-01-01 RX ADMIN — Medication 1 MG: at 23:47

## 2017-01-01 RX ADMIN — SULINDAC 200 MG: 200 TABLET ORAL at 11:01

## 2017-01-01 RX ADMIN — DOCUSATE SODIUM 100 MG: 100 CAPSULE, LIQUID FILLED ORAL at 08:30

## 2017-01-01 RX ADMIN — Medication 10 ML: at 16:13

## 2017-01-01 RX ADMIN — METHADONE HYDROCHLORIDE 7.5 MG: 5 TABLET ORAL at 06:00

## 2017-01-01 RX ADMIN — LORAZEPAM 1 MG: 1 TABLET ORAL at 11:02

## 2017-01-01 RX ADMIN — SULINDAC 200 MG: 200 TABLET ORAL at 10:25

## 2017-01-01 RX ADMIN — Medication: at 01:39

## 2017-01-01 RX ADMIN — SODIUM CHLORIDE 125 ML/HR: 900 INJECTION, SOLUTION INTRAVENOUS at 20:15

## 2017-01-01 RX ADMIN — METOPROLOL TARTRATE 25 MG: 25 TABLET ORAL at 09:03

## 2017-01-01 RX ADMIN — METHADONE HYDROCHLORIDE 5 MG: 5 TABLET ORAL at 14:21

## 2017-01-01 RX ADMIN — Medication 10 ML: at 21:22

## 2017-01-01 RX ADMIN — DULOXETINE HYDROCHLORIDE 60 MG: 60 CAPSULE, DELAYED RELEASE ORAL at 20:20

## 2017-01-01 RX ADMIN — PROPOFOL 50 MG: 10 INJECTION, EMULSION INTRAVENOUS at 10:03

## 2017-01-01 RX ADMIN — KETOROLAC TROMETHAMINE 30 MG: 30 INJECTION, SOLUTION INTRAMUSCULAR at 06:07

## 2017-01-01 RX ADMIN — LORAZEPAM 1 MG: 2 INJECTION INTRAMUSCULAR; INTRAVENOUS at 04:56

## 2017-01-01 RX ADMIN — HYDROMORPHONE HYDROCHLORIDE 1 MG: 2 INJECTION, SOLUTION INTRAMUSCULAR; INTRAVENOUS; SUBCUTANEOUS at 05:58

## 2017-01-01 RX ADMIN — MORPHINE SULFATE 4 MG: 4 INJECTION, SOLUTION INTRAMUSCULAR; INTRAVENOUS at 22:00

## 2017-01-01 RX ADMIN — HYDROMORPHONE HYDROCHLORIDE 4 MG: 2 INJECTION, SOLUTION INTRAMUSCULAR; INTRAVENOUS; SUBCUTANEOUS at 08:39

## 2017-01-01 RX ADMIN — DIPHENHYDRAMINE HYDROCHLORIDE 50 MG: 25 CAPSULE ORAL at 01:40

## 2017-01-01 RX ADMIN — HYDROMORPHONE HYDROCHLORIDE 4 MG: 2 INJECTION, SOLUTION INTRAMUSCULAR; INTRAVENOUS; SUBCUTANEOUS at 01:00

## 2017-01-01 RX ADMIN — NYSTATIN 500000 UNITS: 500000 SUSPENSION ORAL at 08:42

## 2017-01-01 RX ADMIN — METOPROLOL TARTRATE 25 MG: 25 TABLET ORAL at 20:58

## 2017-01-01 RX ADMIN — MORPHINE SULFATE 30 MG: 20 SOLUTION ORAL at 20:54

## 2017-01-01 RX ADMIN — LORAZEPAM 0.5 MG: 0.5 TABLET ORAL at 09:10

## 2017-01-01 RX ADMIN — GABAPENTIN 300 MG: 300 CAPSULE ORAL at 22:09

## 2017-01-01 RX ADMIN — ACETAMINOPHEN 650 MG: 325 TABLET, FILM COATED ORAL at 04:03

## 2017-01-01 RX ADMIN — LORAZEPAM 1 MG: 1 TABLET ORAL at 14:08

## 2017-01-01 RX ADMIN — DULOXETINE HYDROCHLORIDE 60 MG: 60 CAPSULE, DELAYED RELEASE ORAL at 22:22

## 2017-01-01 RX ADMIN — INSULIN LISPRO 2 UNITS: 100 INJECTION, SOLUTION INTRAVENOUS; SUBCUTANEOUS at 07:48

## 2017-01-01 RX ADMIN — METOPROLOL SUCCINATE 25 MG: 25 TABLET, FILM COATED, EXTENDED RELEASE ORAL at 09:15

## 2017-01-01 RX ADMIN — Medication 10 ML: at 23:30

## 2017-01-01 RX ADMIN — GABAPENTIN 300 MG: 300 CAPSULE ORAL at 22:25

## 2017-01-01 RX ADMIN — LORAZEPAM 0.5 MG: 0.5 TABLET ORAL at 13:43

## 2017-01-01 RX ADMIN — ONDANSETRON 4 MG: 2 INJECTION INTRAMUSCULAR; INTRAVENOUS at 11:30

## 2017-01-01 RX ADMIN — Medication 2 MG: at 14:01

## 2017-01-01 RX ADMIN — KETOROLAC TROMETHAMINE 30 MG: 30 INJECTION, SOLUTION INTRAMUSCULAR at 12:51

## 2017-01-01 RX ADMIN — HYDROMORPHONE HYDROCHLORIDE 4 MG: 2 INJECTION, SOLUTION INTRAMUSCULAR; INTRAVENOUS; SUBCUTANEOUS at 01:02

## 2017-01-01 RX ADMIN — HYDROMORPHONE HYDROCHLORIDE 3 MG: 2 INJECTION, SOLUTION INTRAMUSCULAR; INTRAVENOUS; SUBCUTANEOUS at 12:59

## 2017-01-01 RX ADMIN — STANDARDIZED SENNA CONCENTRATE AND DOCUSATE SODIUM 2 TABLET: 8.6; 5 TABLET, FILM COATED ORAL at 17:26

## 2017-01-01 RX ADMIN — GABAPENTIN 300 MG: 300 CAPSULE ORAL at 21:40

## 2017-01-01 RX ADMIN — HYDROMORPHONE HYDROCHLORIDE 4 MG: 2 INJECTION, SOLUTION INTRAMUSCULAR; INTRAVENOUS; SUBCUTANEOUS at 13:51

## 2017-01-01 RX ADMIN — LORAZEPAM 1 MG: 1 TABLET ORAL at 13:42

## 2017-01-01 RX ADMIN — MENTHOL, METHYL SALICYLATE: 10; 15 CREAM TOPICAL at 22:00

## 2017-01-01 RX ADMIN — NYSTATIN 500000 UNITS: 500000 SUSPENSION ORAL at 08:30

## 2017-01-01 RX ADMIN — Medication 2 MG: at 03:45

## 2017-01-01 RX ADMIN — ACETAMINOPHEN 1000 MG: 500 TABLET, FILM COATED ORAL at 17:06

## 2017-01-01 RX ADMIN — LORAZEPAM 1 MG: 1 TABLET ORAL at 05:32

## 2017-01-01 RX ADMIN — KETOROLAC TROMETHAMINE 30 MG: 30 INJECTION, SOLUTION INTRAMUSCULAR at 06:37

## 2017-01-01 RX ADMIN — ACETAMINOPHEN 1000 MG: 500 TABLET, FILM COATED ORAL at 17:01

## 2017-01-01 RX ADMIN — METOPROLOL TARTRATE 25 MG: 25 TABLET ORAL at 11:47

## 2017-01-01 RX ADMIN — Medication 1 MG: at 07:00

## 2017-01-01 RX ADMIN — GABAPENTIN 600 MG: 300 CAPSULE ORAL at 21:08

## 2017-01-01 RX ADMIN — Medication 10 ML: at 22:36

## 2017-01-01 RX ADMIN — DOCUSATE SODIUM AND SENNOSIDES 2 TABLET: 8.6; 5 TABLET, FILM COATED ORAL at 09:59

## 2017-01-01 RX ADMIN — MORPHINE SULFATE 10 MG: 20 SOLUTION ORAL at 07:00

## 2017-01-01 RX ADMIN — Medication: at 02:14

## 2017-01-01 RX ADMIN — Medication: at 05:35

## 2017-01-01 RX ADMIN — HYDROMORPHONE HYDROCHLORIDE 4 MG: 2 INJECTION, SOLUTION INTRAMUSCULAR; INTRAVENOUS; SUBCUTANEOUS at 20:24

## 2017-01-01 RX ADMIN — GABAPENTIN 600 MG: 300 CAPSULE ORAL at 08:41

## 2017-01-01 RX ADMIN — MORPHINE SULFATE 30 MG: 20 SOLUTION ORAL at 18:14

## 2017-01-01 RX ADMIN — LORAZEPAM 0.5 MG: 0.5 TABLET ORAL at 08:54

## 2017-01-01 RX ADMIN — LORAZEPAM 1 MG: 2 INJECTION INTRAMUSCULAR; INTRAVENOUS at 16:29

## 2017-01-01 RX ADMIN — Medication: at 06:46

## 2017-01-01 RX ADMIN — HYDROMORPHONE HYDROCHLORIDE 1 MG: 2 INJECTION, SOLUTION INTRAMUSCULAR; INTRAVENOUS; SUBCUTANEOUS at 05:47

## 2017-01-01 RX ADMIN — DULOXETINE HYDROCHLORIDE 30 MG: 30 CAPSULE, DELAYED RELEASE ORAL at 12:34

## 2017-01-01 RX ADMIN — Medication 1 MG: at 22:37

## 2017-01-01 RX ADMIN — METHADONE HYDROCHLORIDE 10 MG: 5 TABLET ORAL at 05:18

## 2017-01-01 RX ADMIN — MORPHINE SULFATE 15 MG: 10 INJECTION INTRAVENOUS at 14:13

## 2017-01-01 RX ADMIN — IPRATROPIUM BROMIDE AND ALBUTEROL SULFATE 3 ML: 2.5; .5 SOLUTION RESPIRATORY (INHALATION) at 20:30

## 2017-01-01 RX ADMIN — METOPROLOL SUCCINATE 25 MG: 25 TABLET, FILM COATED, EXTENDED RELEASE ORAL at 08:30

## 2017-01-01 RX ADMIN — MORPHINE SULFATE 15 MG: 10 INJECTION INTRAVENOUS at 00:05

## 2017-01-01 RX ADMIN — METHADONE HYDROCHLORIDE 10 MG: 5 TABLET ORAL at 13:13

## 2017-01-01 RX ADMIN — MORPHINE SULFATE 4 MG: 4 INJECTION, SOLUTION INTRAMUSCULAR; INTRAVENOUS at 02:37

## 2017-01-01 RX ADMIN — SENNOSIDES 17.2 MG: 8.6 TABLET, FILM COATED ORAL at 09:27

## 2017-01-01 RX ADMIN — LORAZEPAM 1 MG: 1 TABLET ORAL at 23:48

## 2017-01-01 RX ADMIN — KETOROLAC TROMETHAMINE 30 MG: 30 INJECTION, SOLUTION INTRAMUSCULAR at 02:50

## 2017-01-01 RX ADMIN — Medication 10 ML: at 21:32

## 2017-01-01 RX ADMIN — Medication: at 01:35

## 2017-01-01 RX ADMIN — Medication 2 MG: at 10:50

## 2017-01-01 RX ADMIN — Medication 1 MG: at 00:53

## 2017-01-01 RX ADMIN — MORPHINE SULFATE 4 MG: 4 INJECTION, SOLUTION INTRAMUSCULAR; INTRAVENOUS at 14:55

## 2017-01-01 RX ADMIN — STANDARDIZED SENNA CONCENTRATE AND DOCUSATE SODIUM 2 TABLET: 8.6; 5 TABLET, FILM COATED ORAL at 09:35

## 2017-01-01 RX ADMIN — Medication 1 MG: at 19:44

## 2017-01-01 RX ADMIN — GABAPENTIN 300 MG: 300 CAPSULE ORAL at 12:33

## 2017-01-01 RX ADMIN — POLYETHYLENE GLYCOL 3350 17 G: 17 POWDER, FOR SOLUTION ORAL at 09:10

## 2017-01-01 RX ADMIN — MORPHINE SULFATE 10 MG: 20 SOLUTION ORAL at 01:00

## 2017-01-01 RX ADMIN — Medication: at 23:40

## 2017-01-01 RX ADMIN — LORAZEPAM 1 MG: 1 TABLET ORAL at 05:42

## 2017-01-01 RX ADMIN — HYDROMORPHONE HYDROCHLORIDE 3 MG: 2 INJECTION, SOLUTION INTRAMUSCULAR; INTRAVENOUS; SUBCUTANEOUS at 21:18

## 2017-01-01 RX ADMIN — KETOROLAC TROMETHAMINE 30 MG: 30 INJECTION, SOLUTION INTRAMUSCULAR at 04:21

## 2017-01-01 RX ADMIN — HYDROMORPHONE HYDROCHLORIDE 3 MG: 2 INJECTION, SOLUTION INTRAMUSCULAR; INTRAVENOUS; SUBCUTANEOUS at 00:34

## 2017-01-01 RX ADMIN — MORPHINE SULFATE 50 MG: 20 SOLUTION ORAL at 22:02

## 2017-01-01 RX ADMIN — HYDROMORPHONE HYDROCHLORIDE 1.5 MG: 2 INJECTION, SOLUTION INTRAMUSCULAR; INTRAVENOUS; SUBCUTANEOUS at 09:52

## 2017-01-01 RX ADMIN — DIPHENHYDRAMINE HYDROCHLORIDE 25 MG: 25 CAPSULE ORAL at 01:00

## 2017-01-01 RX ADMIN — DIPHENHYDRAMINE HYDROCHLORIDE 25 MG: 25 CAPSULE ORAL at 15:48

## 2017-01-01 RX ADMIN — HYDROMORPHONE HYDROCHLORIDE 4 MG: 2 INJECTION, SOLUTION INTRAMUSCULAR; INTRAVENOUS; SUBCUTANEOUS at 20:40

## 2017-01-01 RX ADMIN — METOPROLOL SUCCINATE 50 MG: 50 TABLET, EXTENDED RELEASE ORAL at 08:12

## 2017-01-01 RX ADMIN — SULINDAC 200 MG: 200 TABLET ORAL at 17:29

## 2017-01-01 RX ADMIN — GABAPENTIN 300 MG: 300 CAPSULE ORAL at 21:51

## 2017-01-01 RX ADMIN — Medication 10 ML: at 05:22

## 2017-01-01 RX ADMIN — Medication: at 03:20

## 2017-01-01 RX ADMIN — MORPHINE SULFATE 10 MG: 20 SOLUTION ORAL at 05:11

## 2017-01-01 RX ADMIN — METHADONE HYDROCHLORIDE 10 MG: 5 TABLET ORAL at 21:08

## 2017-01-01 RX ADMIN — MORPHINE SULFATE 50 MG: 20 SOLUTION ORAL at 19:59

## 2017-01-01 RX ADMIN — HYDROMORPHONE HYDROCHLORIDE 2 MG: 2 INJECTION, SOLUTION INTRAMUSCULAR; INTRAVENOUS; SUBCUTANEOUS at 16:41

## 2017-01-01 RX ADMIN — DULOXETINE HYDROCHLORIDE 60 MG: 30 CAPSULE, DELAYED RELEASE ORAL at 22:47

## 2017-01-01 RX ADMIN — Medication 10 ML: at 22:02

## 2017-01-01 RX ADMIN — SENNOSIDES 17.2 MG: 8.6 TABLET, FILM COATED ORAL at 08:58

## 2017-01-01 RX ADMIN — HYDROMORPHONE HYDROCHLORIDE 4 MG: 2 INJECTION, SOLUTION INTRAMUSCULAR; INTRAVENOUS; SUBCUTANEOUS at 18:08

## 2017-01-01 RX ADMIN — Medication 10 ML: at 05:08

## 2017-01-01 RX ADMIN — LORAZEPAM 1 MG: 1 TABLET ORAL at 19:58

## 2017-01-01 RX ADMIN — SULINDAC 200 MG: 200 TABLET ORAL at 08:29

## 2017-01-01 RX ADMIN — STANDARDIZED SENNA CONCENTRATE AND DOCUSATE SODIUM 2 TABLET: 8.6; 5 TABLET, FILM COATED ORAL at 17:29

## 2017-01-01 RX ADMIN — SULINDAC 200 MG: 200 TABLET ORAL at 17:45

## 2017-01-01 RX ADMIN — ACETAMINOPHEN 650 MG: 325 TABLET, FILM COATED ORAL at 02:43

## 2017-01-01 RX ADMIN — METHADONE HYDROCHLORIDE 10 MG: 5 TABLET ORAL at 13:19

## 2017-01-01 RX ADMIN — GABAPENTIN 600 MG: 300 CAPSULE ORAL at 09:32

## 2017-01-01 RX ADMIN — METHADONE HYDROCHLORIDE 10 MG: 5 TABLET ORAL at 22:23

## 2017-01-01 RX ADMIN — Medication 10 ML: at 15:59

## 2017-01-01 RX ADMIN — METHADONE HYDROCHLORIDE 10 MG: 5 TABLET ORAL at 13:10

## 2017-01-01 RX ADMIN — METHADONE HYDROCHLORIDE 10 MG: 5 TABLET ORAL at 21:33

## 2017-01-01 RX ADMIN — NYSTATIN 500000 UNITS: 500000 SUSPENSION ORAL at 08:40

## 2017-01-01 RX ADMIN — SULINDAC 200 MG: 200 TABLET ORAL at 18:13

## 2017-01-01 RX ADMIN — OXYCODONE HYDROCHLORIDE 60 MG: 5 TABLET ORAL at 18:32

## 2017-01-01 RX ADMIN — Medication 1 MG: at 13:43

## 2017-01-01 RX ADMIN — GABAPENTIN 600 MG: 300 CAPSULE ORAL at 21:57

## 2017-01-01 RX ADMIN — METHADONE HYDROCHLORIDE 10 MG: 5 TABLET ORAL at 05:15

## 2017-01-01 RX ADMIN — DULOXETINE HYDROCHLORIDE 60 MG: 60 CAPSULE, DELAYED RELEASE ORAL at 21:28

## 2017-01-01 RX ADMIN — LORAZEPAM 2 MG: 1 TABLET ORAL at 10:25

## 2017-01-01 RX ADMIN — METHADONE HYDROCHLORIDE 10 MG: 10 TABLET ORAL at 22:47

## 2017-01-01 RX ADMIN — METHADONE HYDROCHLORIDE 10 MG: 5 TABLET ORAL at 21:20

## 2017-01-01 RX ADMIN — SODIUM CHLORIDE: 9 INJECTION, SOLUTION INTRAVENOUS at 09:48

## 2017-01-01 RX ADMIN — Medication: at 19:00

## 2017-01-01 RX ADMIN — LORAZEPAM 0.5 MG: 0.5 TABLET ORAL at 17:45

## 2017-01-01 RX ADMIN — Medication 1 MG: at 01:09

## 2017-01-01 RX ADMIN — Medication: at 05:46

## 2017-01-01 RX ADMIN — METOPROLOL TARTRATE 12.5 MG: 25 TABLET ORAL at 21:04

## 2017-01-01 RX ADMIN — METOPROLOL TARTRATE 25 MG: 25 TABLET ORAL at 10:07

## 2017-01-01 RX ADMIN — DIPHENHYDRAMINE HYDROCHLORIDE 25 MG: 25 CAPSULE ORAL at 23:49

## 2017-01-01 RX ADMIN — Medication: at 15:45

## 2017-01-01 RX ADMIN — SULINDAC 200 MG: 200 TABLET ORAL at 09:42

## 2017-01-01 RX ADMIN — Medication 1 MG: at 04:17

## 2017-01-01 RX ADMIN — METOPROLOL TARTRATE 25 MG: 25 TABLET ORAL at 20:43

## 2017-01-01 RX ADMIN — KETOROLAC TROMETHAMINE 30 MG: 30 INJECTION, SOLUTION INTRAMUSCULAR at 00:13

## 2017-01-01 RX ADMIN — Medication 10 ML: at 21:00

## 2017-01-01 RX ADMIN — DULOXETINE HYDROCHLORIDE 60 MG: 60 CAPSULE, DELAYED RELEASE ORAL at 22:06

## 2017-01-01 RX ADMIN — KETOROLAC TROMETHAMINE 30 MG: 30 INJECTION, SOLUTION INTRAMUSCULAR at 01:23

## 2017-01-01 RX ADMIN — METHADONE HYDROCHLORIDE 10 MG: 5 TABLET ORAL at 05:31

## 2017-01-01 RX ADMIN — SULINDAC 200 MG: 200 TABLET ORAL at 08:45

## 2017-01-01 RX ADMIN — LORAZEPAM 1 MG: 1 TABLET ORAL at 02:43

## 2017-01-01 RX ADMIN — Medication 10 ML: at 14:46

## 2017-01-01 RX ADMIN — OXYCODONE HYDROCHLORIDE 60 MG: 5 TABLET ORAL at 16:48

## 2017-01-01 RX ADMIN — LORAZEPAM 0.5 MG: 0.5 TABLET ORAL at 17:49

## 2017-01-01 RX ADMIN — HYDROMORPHONE HYDROCHLORIDE 8 MG: 1 SOLUTION ORAL at 14:16

## 2017-01-01 RX ADMIN — GABAPENTIN 300 MG: 300 CAPSULE ORAL at 08:51

## 2017-01-01 RX ADMIN — LORAZEPAM 1 MG: 1 TABLET ORAL at 16:13

## 2017-01-01 RX ADMIN — METOPROLOL SUCCINATE 50 MG: 50 TABLET, EXTENDED RELEASE ORAL at 10:09

## 2017-01-01 RX ADMIN — GABAPENTIN 600 MG: 300 CAPSULE ORAL at 16:05

## 2017-01-01 RX ADMIN — Medication: at 13:43

## 2017-01-01 RX ADMIN — HYDROMORPHONE HYDROCHLORIDE 2 MG: 2 INJECTION, SOLUTION INTRAMUSCULAR; INTRAVENOUS; SUBCUTANEOUS at 00:21

## 2017-01-01 RX ADMIN — ATROPINE SULFATE 1 DROP: 10 SOLUTION/ DROPS OPHTHALMIC at 17:13

## 2017-01-01 RX ADMIN — LORAZEPAM 1 MG: 1 TABLET ORAL at 21:41

## 2017-01-01 RX ADMIN — STANDARDIZED SENNA CONCENTRATE AND DOCUSATE SODIUM 2 TABLET: 8.6; 5 TABLET, FILM COATED ORAL at 17:09

## 2017-01-01 RX ADMIN — OXYCODONE HYDROCHLORIDE 60 MG: 5 TABLET ORAL at 11:03

## 2017-01-01 RX ADMIN — Medication: at 23:49

## 2017-01-01 RX ADMIN — METHADONE HYDROCHLORIDE 10 MG: 5 TABLET ORAL at 21:09

## 2017-01-01 RX ADMIN — MORPHINE SULFATE 50 MG: 20 SOLUTION ORAL at 07:00

## 2017-01-01 RX ADMIN — METHADONE HYDROCHLORIDE 10 MG: 5 TABLET ORAL at 21:16

## 2017-01-01 RX ADMIN — METOPROLOL TARTRATE 12.5 MG: 25 TABLET ORAL at 08:04

## 2017-01-01 RX ADMIN — METOPROLOL TARTRATE 12.5 MG: 25 TABLET ORAL at 09:30

## 2017-01-01 RX ADMIN — LORAZEPAM 0.5 MG: 0.5 TABLET ORAL at 17:18

## 2017-01-01 RX ADMIN — LORAZEPAM 1 MG: 2 INJECTION INTRAMUSCULAR; INTRAVENOUS at 04:22

## 2017-01-01 RX ADMIN — POLYETHYLENE GLYCOL (3350) 17 G: 17 POWDER, FOR SOLUTION ORAL at 09:34

## 2017-01-01 RX ADMIN — OXYCODONE HYDROCHLORIDE 60 MG: 5 TABLET ORAL at 12:40

## 2017-01-01 RX ADMIN — METHADONE HYDROCHLORIDE 10 MG: 5 TABLET ORAL at 13:02

## 2017-01-01 RX ADMIN — METHADONE HYDROCHLORIDE 10 MG: 5 TABLET ORAL at 07:26

## 2017-01-01 RX ADMIN — METHADONE HYDROCHLORIDE 10 MG: 5 TABLET ORAL at 21:35

## 2017-01-01 RX ADMIN — LORAZEPAM 1 MG: 1 TABLET ORAL at 17:17

## 2017-01-01 RX ADMIN — HYDROMORPHONE HYDROCHLORIDE 2 MG: 2 INJECTION, SOLUTION INTRAMUSCULAR; INTRAVENOUS; SUBCUTANEOUS at 10:57

## 2017-01-01 RX ADMIN — GABAPENTIN 300 MG: 300 CAPSULE ORAL at 21:45

## 2017-01-01 RX ADMIN — Medication 1 MG: at 15:35

## 2017-01-01 RX ADMIN — LORAZEPAM 1 MG: 1 TABLET ORAL at 20:23

## 2017-01-01 RX ADMIN — SULINDAC 200 MG: 200 TABLET ORAL at 08:38

## 2017-01-01 RX ADMIN — KETOROLAC TROMETHAMINE 30 MG: 30 INJECTION, SOLUTION INTRAMUSCULAR at 21:44

## 2017-01-01 RX ADMIN — ACETAMINOPHEN 650 MG: 325 TABLET, FILM COATED ORAL at 13:55

## 2017-01-01 RX ADMIN — Medication: at 06:00

## 2017-01-01 RX ADMIN — SULINDAC 200 MG: 200 TABLET ORAL at 18:22

## 2017-01-01 RX ADMIN — Medication 2 MG: at 17:28

## 2017-01-01 RX ADMIN — HYDROMORPHONE HYDROCHLORIDE 4 MG: 2 INJECTION, SOLUTION INTRAMUSCULAR; INTRAVENOUS; SUBCUTANEOUS at 14:36

## 2017-01-01 RX ADMIN — HYDROMORPHONE HYDROCHLORIDE 3 MG: 2 INJECTION, SOLUTION INTRAMUSCULAR; INTRAVENOUS; SUBCUTANEOUS at 03:20

## 2017-01-01 RX ADMIN — METOPROLOL SUCCINATE 25 MG: 25 TABLET, FILM COATED, EXTENDED RELEASE ORAL at 10:50

## 2017-01-01 RX ADMIN — Medication: at 16:26

## 2017-01-01 RX ADMIN — METOPROLOL TARTRATE 25 MG: 25 TABLET ORAL at 21:18

## 2017-01-01 RX ADMIN — METHADONE HYDROCHLORIDE 10 MG: 5 TABLET ORAL at 13:11

## 2017-01-01 RX ADMIN — LORAZEPAM 1 MG: 1 TABLET ORAL at 10:10

## 2017-01-01 RX ADMIN — HYDROMORPHONE HYDROCHLORIDE 4 MG: 2 INJECTION, SOLUTION INTRAMUSCULAR; INTRAVENOUS; SUBCUTANEOUS at 07:43

## 2017-01-01 RX ADMIN — HYDROMORPHONE HYDROCHLORIDE 4 MG: 2 INJECTION INTRAMUSCULAR; INTRAVENOUS; SUBCUTANEOUS at 04:00

## 2017-01-01 RX ADMIN — HYDROMORPHONE HYDROCHLORIDE 4 MG: 2 INJECTION, SOLUTION INTRAMUSCULAR; INTRAVENOUS; SUBCUTANEOUS at 19:09

## 2017-01-01 RX ADMIN — HYDROMORPHONE HYDROCHLORIDE 4 MG: 2 INJECTION, SOLUTION INTRAMUSCULAR; INTRAVENOUS; SUBCUTANEOUS at 17:10

## 2017-01-01 RX ADMIN — DOCUSATE SODIUM 100 MG: 100 CAPSULE, LIQUID FILLED ORAL at 08:51

## 2017-01-01 RX ADMIN — DOCUSATE SODIUM 100 MG: 100 CAPSULE, LIQUID FILLED ORAL at 09:09

## 2017-01-01 RX ADMIN — Medication 1 MG: at 04:15

## 2017-01-01 RX ADMIN — STANDARDIZED SENNA CONCENTRATE AND DOCUSATE SODIUM 2 TABLET: 8.6; 5 TABLET, FILM COATED ORAL at 18:28

## 2017-01-01 RX ADMIN — METOPROLOL TARTRATE 25 MG: 25 TABLET ORAL at 11:12

## 2017-01-01 RX ADMIN — Medication 10 ML: at 15:12

## 2017-01-01 RX ADMIN — Medication: at 19:13

## 2017-01-01 RX ADMIN — MORPHINE SULFATE 4 MG: 4 INJECTION, SOLUTION INTRAMUSCULAR; INTRAVENOUS at 23:51

## 2017-01-01 RX ADMIN — DIPHENHYDRAMINE HYDROCHLORIDE 25 MG: 25 CAPSULE ORAL at 06:01

## 2017-01-01 RX ADMIN — Medication 2 MG: at 20:00

## 2017-01-01 RX ADMIN — METOPROLOL TARTRATE 25 MG: 25 TABLET ORAL at 21:08

## 2017-01-01 RX ADMIN — MORPHINE SULFATE 4 MG: 4 INJECTION, SOLUTION INTRAMUSCULAR; INTRAVENOUS at 10:37

## 2017-01-01 RX ADMIN — MORPHINE SULFATE 4 MG: 4 INJECTION, SOLUTION INTRAMUSCULAR; INTRAVENOUS at 09:50

## 2017-01-01 RX ADMIN — GABAPENTIN 300 MG: 300 CAPSULE ORAL at 21:21

## 2017-01-01 RX ADMIN — LORAZEPAM 1 MG: 2 INJECTION INTRAMUSCULAR; INTRAVENOUS at 17:59

## 2017-01-01 RX ADMIN — HYDROMORPHONE HYDROCHLORIDE 4 MG: 2 INJECTION, SOLUTION INTRAMUSCULAR; INTRAVENOUS; SUBCUTANEOUS at 03:27

## 2017-01-01 RX ADMIN — METHADONE HYDROCHLORIDE 10 MG: 5 TABLET ORAL at 14:26

## 2017-01-01 RX ADMIN — LORAZEPAM 0.5 MG: 0.5 TABLET ORAL at 17:26

## 2017-01-01 RX ADMIN — KETOROLAC TROMETHAMINE 30 MG: 30 INJECTION, SOLUTION INTRAMUSCULAR at 06:10

## 2017-01-01 RX ADMIN — STANDARDIZED SENNA CONCENTRATE AND DOCUSATE SODIUM 2 TABLET: 8.6; 5 TABLET, FILM COATED ORAL at 08:22

## 2017-01-01 RX ADMIN — Medication: at 02:31

## 2017-01-01 RX ADMIN — LORAZEPAM 1 MG: 1 TABLET ORAL at 21:40

## 2017-01-01 RX ADMIN — SULINDAC 200 MG: 200 TABLET ORAL at 08:41

## 2017-01-01 RX ADMIN — Medication 1 MG: at 10:08

## 2017-01-01 RX ADMIN — LORAZEPAM 0.5 MG: 0.5 TABLET ORAL at 08:29

## 2017-01-01 RX ADMIN — Medication 1 MG: at 21:17

## 2017-01-01 RX ADMIN — HYDROMORPHONE HYDROCHLORIDE 4 MG: 2 INJECTION, SOLUTION INTRAMUSCULAR; INTRAVENOUS; SUBCUTANEOUS at 19:43

## 2017-01-01 RX ADMIN — LORAZEPAM 1 MG: 1 TABLET ORAL at 06:23

## 2017-01-01 RX ADMIN — METHADONE HYDROCHLORIDE 10 MG: 5 TABLET ORAL at 05:47

## 2017-01-01 RX ADMIN — Medication 2 MG: at 18:16

## 2017-01-01 RX ADMIN — METOPROLOL TARTRATE 25 MG: 25 TABLET ORAL at 21:13

## 2017-01-01 RX ADMIN — KETOROLAC TROMETHAMINE 30 MG: 30 INJECTION, SOLUTION INTRAMUSCULAR at 15:30

## 2017-01-01 RX ADMIN — Medication 10 ML: at 22:12

## 2017-01-01 RX ADMIN — METHADONE HYDROCHLORIDE 10 MG: 10 TABLET ORAL at 19:38

## 2017-01-01 RX ADMIN — LORAZEPAM 1 MG: 2 INJECTION INTRAMUSCULAR; INTRAVENOUS at 09:30

## 2017-01-01 RX ADMIN — ACETAMINOPHEN 650 MG: 325 TABLET, FILM COATED ORAL at 05:57

## 2017-01-01 RX ADMIN — LORAZEPAM 0.5 MG: 0.5 TABLET ORAL at 09:03

## 2017-01-01 RX ADMIN — METHADONE HYDROCHLORIDE 10 MG: 5 TABLET ORAL at 13:42

## 2017-01-01 RX ADMIN — METHADONE HYDROCHLORIDE 10 MG: 5 TABLET ORAL at 21:44

## 2017-01-01 RX ADMIN — Medication 1 MG: at 06:55

## 2017-01-01 RX ADMIN — METHADONE HYDROCHLORIDE 10 MG: 5 TABLET ORAL at 20:39

## 2017-01-01 RX ADMIN — LORAZEPAM 0.5 MG: 0.5 TABLET ORAL at 11:02

## 2017-01-01 RX ADMIN — METHADONE HYDROCHLORIDE 10 MG: 10 TABLET ORAL at 11:02

## 2017-01-01 RX ADMIN — GABAPENTIN 600 MG: 300 CAPSULE ORAL at 18:00

## 2017-01-01 RX ADMIN — METHADONE HYDROCHLORIDE 10 MG: 5 TABLET ORAL at 14:13

## 2017-01-01 RX ADMIN — Medication 1 MG: at 04:09

## 2017-01-01 RX ADMIN — GABAPENTIN 300 MG: 300 CAPSULE ORAL at 21:06

## 2017-01-01 RX ADMIN — Medication 1 MG: at 13:18

## 2017-01-01 RX ADMIN — GABAPENTIN 300 MG: 300 CAPSULE ORAL at 22:18

## 2017-01-01 RX ADMIN — LORAZEPAM 0.5 MG: 0.5 TABLET ORAL at 19:04

## 2017-01-01 RX ADMIN — Medication: at 16:12

## 2017-01-01 RX ADMIN — SENNOSIDES 17.2 MG: 8.6 TABLET, FILM COATED ORAL at 18:13

## 2017-01-01 RX ADMIN — METHADONE HYDROCHLORIDE 10 MG: 5 TABLET ORAL at 22:42

## 2017-01-01 RX ADMIN — Medication 10 ML: at 06:00

## 2017-01-01 RX ADMIN — Medication 1 MG: at 04:00

## 2017-01-01 RX ADMIN — FENTANYL CITRATE 100 MCG: 50 INJECTION, SOLUTION INTRAMUSCULAR; INTRAVENOUS at 10:09

## 2017-01-01 RX ADMIN — MORPHINE SULFATE 30 MG: 20 SOLUTION ORAL at 14:09

## 2017-01-01 RX ADMIN — METOPROLOL TARTRATE 12.5 MG: 25 TABLET ORAL at 21:02

## 2017-01-01 RX ADMIN — METHADONE HYDROCHLORIDE 10 MG: 10 TABLET ORAL at 11:00

## 2017-01-01 RX ADMIN — MORPHINE SULFATE 4 MG: 4 INJECTION, SOLUTION INTRAMUSCULAR; INTRAVENOUS at 04:54

## 2017-01-01 RX ADMIN — LORAZEPAM 0.5 MG: 0.5 TABLET ORAL at 08:06

## 2017-01-01 RX ADMIN — Medication 1 MG: at 16:29

## 2017-01-01 RX ADMIN — LORAZEPAM 1 MG: 1 TABLET ORAL at 00:59

## 2017-01-01 RX ADMIN — HYDROMORPHONE HYDROCHLORIDE 4 MG: 2 INJECTION, SOLUTION INTRAMUSCULAR; INTRAVENOUS; SUBCUTANEOUS at 08:12

## 2017-01-01 RX ADMIN — MORPHINE SULFATE 30 MG: 20 SOLUTION ORAL at 12:10

## 2017-01-01 RX ADMIN — GABAPENTIN 600 MG: 300 CAPSULE ORAL at 15:43

## 2017-01-01 RX ADMIN — Medication: at 17:25

## 2017-01-01 RX ADMIN — Medication 10 ML: at 14:51

## 2017-01-01 RX ADMIN — ACETAMINOPHEN 650 MG: 325 TABLET, FILM COATED ORAL at 19:06

## 2017-01-01 RX ADMIN — HYDROMORPHONE HYDROCHLORIDE 4 MG: 2 INJECTION, SOLUTION INTRAMUSCULAR; INTRAVENOUS; SUBCUTANEOUS at 05:51

## 2017-01-01 RX ADMIN — MORPHINE SULFATE 4 MG: 4 INJECTION, SOLUTION INTRAMUSCULAR; INTRAVENOUS at 06:16

## 2017-01-01 RX ADMIN — HYDROMORPHONE HYDROCHLORIDE 3 MG: 2 INJECTION, SOLUTION INTRAMUSCULAR; INTRAVENOUS; SUBCUTANEOUS at 18:45

## 2017-01-01 RX ADMIN — HALOPERIDOL 5 MG: 5 TABLET ORAL at 10:10

## 2017-01-01 RX ADMIN — DOCUSATE SODIUM 100 MG: 100 CAPSULE, LIQUID FILLED ORAL at 10:28

## 2017-01-01 RX ADMIN — DOCUSATE SODIUM AND SENNOSIDES 2 TABLET: 8.6; 5 TABLET, FILM COATED ORAL at 09:20

## 2017-01-01 RX ADMIN — HYDROMORPHONE HYDROCHLORIDE 3 MG: 2 INJECTION, SOLUTION INTRAMUSCULAR; INTRAVENOUS; SUBCUTANEOUS at 22:26

## 2017-01-01 RX ADMIN — NYSTATIN 500000 UNITS: 500000 SUSPENSION ORAL at 17:57

## 2017-01-01 RX ADMIN — LORAZEPAM 1 MG: 2 INJECTION INTRAMUSCULAR; INTRAVENOUS at 12:28

## 2017-01-01 RX ADMIN — Medication: at 23:17

## 2017-01-01 RX ADMIN — SULINDAC 200 MG: 200 TABLET ORAL at 09:39

## 2017-01-01 RX ADMIN — LORAZEPAM 0.5 MG: 0.5 TABLET ORAL at 13:37

## 2017-01-01 RX ADMIN — GABAPENTIN 600 MG: 300 CAPSULE ORAL at 09:51

## 2017-01-01 RX ADMIN — MORPHINE SULFATE 4 MG: 4 INJECTION, SOLUTION INTRAMUSCULAR; INTRAVENOUS at 04:02

## 2017-01-01 RX ADMIN — HYDROMORPHONE HYDROCHLORIDE 4 MG: 2 INJECTION, SOLUTION INTRAMUSCULAR; INTRAVENOUS; SUBCUTANEOUS at 20:07

## 2017-01-01 RX ADMIN — MORPHINE SULFATE 40 MG: 20 SOLUTION ORAL at 06:29

## 2017-01-01 RX ADMIN — Medication 10 ML: at 16:16

## 2017-01-01 RX ADMIN — METHADONE HYDROCHLORIDE 10 MG: 5 TABLET ORAL at 22:00

## 2017-01-01 RX ADMIN — METOPROLOL TARTRATE 12.5 MG: 25 TABLET ORAL at 09:03

## 2017-01-01 RX ADMIN — LORAZEPAM 0.5 MG: 0.5 TABLET ORAL at 09:39

## 2017-01-01 RX ADMIN — METOPROLOL TARTRATE 25 MG: 25 TABLET ORAL at 17:50

## 2017-01-01 RX ADMIN — HALOPERIDOL 5 MG: 5 TABLET ORAL at 09:59

## 2017-01-01 RX ADMIN — MORPHINE SULFATE 30 MG: 20 SOLUTION ORAL at 17:06

## 2017-01-01 RX ADMIN — Medication 1 MG: at 06:37

## 2017-01-01 RX ADMIN — Medication 1 MG: at 11:45

## 2017-01-01 RX ADMIN — STANDARDIZED SENNA CONCENTRATE AND DOCUSATE SODIUM 2 TABLET: 8.6; 5 TABLET, FILM COATED ORAL at 08:58

## 2017-01-01 RX ADMIN — HALOPERIDOL 5 MG: 5 TABLET ORAL at 22:46

## 2017-01-01 RX ADMIN — KETOROLAC TROMETHAMINE 30 MG: 30 INJECTION, SOLUTION INTRAMUSCULAR at 20:38

## 2017-01-01 RX ADMIN — DIPHENHYDRAMINE HYDROCHLORIDE 50 MG: 25 CAPSULE ORAL at 08:52

## 2017-01-01 RX ADMIN — DIPHENHYDRAMINE HYDROCHLORIDE 25 MG: 25 CAPSULE ORAL at 03:01

## 2017-01-01 RX ADMIN — Medication: at 14:05

## 2017-01-01 RX ADMIN — METHADONE HYDROCHLORIDE 10 MG: 5 TABLET ORAL at 14:40

## 2017-01-01 RX ADMIN — DOCUSATE SODIUM 100 MG: 100 CAPSULE, LIQUID FILLED ORAL at 10:03

## 2017-01-01 RX ADMIN — LORAZEPAM 1 MG: 1 TABLET ORAL at 22:10

## 2017-01-01 RX ADMIN — HYDROMORPHONE HYDROCHLORIDE 1 MG: 2 INJECTION, SOLUTION INTRAMUSCULAR; INTRAVENOUS; SUBCUTANEOUS at 10:08

## 2017-01-01 RX ADMIN — HYDROMORPHONE HYDROCHLORIDE 4 MG: 2 INJECTION, SOLUTION INTRAMUSCULAR; INTRAVENOUS; SUBCUTANEOUS at 14:54

## 2017-01-01 RX ADMIN — GABAPENTIN 300 MG: 300 CAPSULE ORAL at 21:22

## 2017-01-01 RX ADMIN — METHADONE HYDROCHLORIDE 10 MG: 5 TABLET ORAL at 14:14

## 2017-01-01 RX ADMIN — Medication 1 MG: at 20:19

## 2017-01-01 RX ADMIN — LORAZEPAM 1 MG: 1 TABLET ORAL at 18:28

## 2017-01-01 RX ADMIN — STANDARDIZED SENNA CONCENTRATE AND DOCUSATE SODIUM 2 TABLET: 8.6; 5 TABLET, FILM COATED ORAL at 17:02

## 2017-01-01 RX ADMIN — Medication: at 15:26

## 2017-01-01 RX ADMIN — Medication: at 04:22

## 2017-01-01 RX ADMIN — STANDARDIZED SENNA CONCENTRATE AND DOCUSATE SODIUM 2 TABLET: 8.6; 5 TABLET, FILM COATED ORAL at 17:18

## 2017-01-01 RX ADMIN — Medication: at 12:21

## 2017-01-01 RX ADMIN — GABAPENTIN 600 MG: 300 CAPSULE ORAL at 22:52

## 2017-01-01 RX ADMIN — LORAZEPAM 1 MG: 1 TABLET ORAL at 07:30

## 2017-01-01 RX ADMIN — SULINDAC 200 MG: 200 TABLET ORAL at 17:40

## 2017-01-01 RX ADMIN — OXYCODONE HYDROCHLORIDE 60 MG: 5 TABLET ORAL at 06:27

## 2017-01-01 RX ADMIN — GABAPENTIN 300 MG: 300 CAPSULE ORAL at 21:24

## 2017-01-01 RX ADMIN — METHADONE HYDROCHLORIDE 5 MG: 5 TABLET ORAL at 22:00

## 2017-01-01 RX ADMIN — Medication 10 ML: at 21:07

## 2017-01-01 RX ADMIN — HYDROMORPHONE HYDROCHLORIDE 3 MG: 2 INJECTION, SOLUTION INTRAMUSCULAR; INTRAVENOUS; SUBCUTANEOUS at 02:16

## 2017-01-01 RX ADMIN — METHADONE HYDROCHLORIDE 10 MG: 5 TABLET ORAL at 04:47

## 2017-01-01 RX ADMIN — LORAZEPAM 1 MG: 1 TABLET ORAL at 16:15

## 2017-01-01 RX ADMIN — METOPROLOL TARTRATE 25 MG: 25 TABLET ORAL at 09:52

## 2017-01-01 RX ADMIN — SULINDAC 200 MG: 200 TABLET ORAL at 09:15

## 2017-01-01 RX ADMIN — DULOXETINE HYDROCHLORIDE 60 MG: 30 CAPSULE, DELAYED RELEASE ORAL at 21:51

## 2017-01-01 RX ADMIN — Medication: at 08:47

## 2017-01-01 RX ADMIN — METOPROLOL TARTRATE 25 MG: 25 TABLET ORAL at 08:14

## 2017-01-01 RX ADMIN — Medication 10 ML: at 17:55

## 2017-01-01 RX ADMIN — MORPHINE SULFATE 4 MG: 4 INJECTION, SOLUTION INTRAMUSCULAR; INTRAVENOUS at 19:19

## 2017-01-01 RX ADMIN — Medication: at 18:15

## 2017-01-01 RX ADMIN — Medication: at 18:18

## 2017-01-01 RX ADMIN — SULINDAC 200 MG: 200 TABLET ORAL at 16:10

## 2017-01-01 RX ADMIN — LORAZEPAM 1 MG: 1 TABLET ORAL at 06:30

## 2017-01-01 RX ADMIN — SULINDAC 200 MG: 200 TABLET ORAL at 10:04

## 2017-01-01 RX ADMIN — METHADONE HYDROCHLORIDE 5 MG: 5 TABLET ORAL at 21:46

## 2017-01-01 RX ADMIN — LORAZEPAM 1 MG: 2 INJECTION INTRAMUSCULAR; INTRAVENOUS at 07:44

## 2017-01-01 RX ADMIN — Medication 20 ML: at 21:41

## 2017-01-01 RX ADMIN — NALOXONE HYDROCHLORIDE 0.08 MG: 0.4 INJECTION, SOLUTION INTRAMUSCULAR; INTRAVENOUS; SUBCUTANEOUS at 11:57

## 2017-01-01 RX ADMIN — LORAZEPAM 1 MG: 1 TABLET ORAL at 17:10

## 2017-01-01 RX ADMIN — HYDROMORPHONE HYDROCHLORIDE 4 MG: 2 INJECTION, SOLUTION INTRAMUSCULAR; INTRAVENOUS; SUBCUTANEOUS at 18:54

## 2017-01-01 RX ADMIN — GABAPENTIN 600 MG: 300 CAPSULE ORAL at 22:00

## 2017-01-01 RX ADMIN — OXYCODONE HYDROCHLORIDE 60 MG: 5 TABLET ORAL at 09:28

## 2017-01-01 RX ADMIN — ACETAMINOPHEN 650 MG: 325 TABLET, FILM COATED ORAL at 22:26

## 2017-01-01 RX ADMIN — STANDARDIZED SENNA CONCENTRATE AND DOCUSATE SODIUM 2 TABLET: 8.6; 5 TABLET, FILM COATED ORAL at 09:03

## 2017-01-01 RX ADMIN — Medication: at 18:45

## 2017-01-01 RX ADMIN — PHENAZOPYRIDINE HYDROCHLORIDE 100 MG: 100 TABLET ORAL at 14:28

## 2017-01-01 RX ADMIN — MORPHINE SULFATE 50 MG: 20 SOLUTION ORAL at 10:00

## 2017-01-01 RX ADMIN — Medication: at 13:19

## 2017-01-01 RX ADMIN — MORPHINE SULFATE 15 MG: 10 INJECTION INTRAVENOUS at 15:48

## 2017-01-01 RX ADMIN — Medication 1 MG: at 09:50

## 2017-01-01 RX ADMIN — MORPHINE SULFATE 50 MG: 20 SOLUTION ORAL at 13:00

## 2017-01-01 RX ADMIN — Medication 1 MG: at 05:42

## 2017-01-01 RX ADMIN — LORAZEPAM 1 MG: 1 TABLET ORAL at 08:00

## 2017-01-01 RX ADMIN — LORAZEPAM 1 MG: 1 TABLET ORAL at 11:40

## 2017-01-01 RX ADMIN — HYDROMORPHONE HYDROCHLORIDE 1 MG: 2 INJECTION, SOLUTION INTRAMUSCULAR; INTRAVENOUS; SUBCUTANEOUS at 01:12

## 2017-01-01 RX ADMIN — MIDAZOLAM HYDROCHLORIDE 1 MG: 1 INJECTION, SOLUTION INTRAMUSCULAR; INTRAVENOUS at 09:49

## 2017-01-01 RX ADMIN — OXYCODONE HYDROCHLORIDE 60 MG: 5 TABLET ORAL at 19:25

## 2017-01-01 RX ADMIN — DOCUSATE SODIUM 100 MG: 100 CAPSULE, LIQUID FILLED ORAL at 08:14

## 2017-01-01 RX ADMIN — HYDROMORPHONE HYDROCHLORIDE 3 MG: 2 INJECTION, SOLUTION INTRAMUSCULAR; INTRAVENOUS; SUBCUTANEOUS at 19:51

## 2017-01-01 RX ADMIN — METHADONE HYDROCHLORIDE 10 MG: 5 TABLET ORAL at 15:19

## 2017-01-01 RX ADMIN — NYSTATIN 500000 UNITS: 500000 SUSPENSION ORAL at 09:32

## 2017-01-01 RX ADMIN — HYDROMORPHONE HYDROCHLORIDE 4 MG: 2 INJECTION, SOLUTION INTRAMUSCULAR; INTRAVENOUS; SUBCUTANEOUS at 21:28

## 2017-01-01 RX ADMIN — MORPHINE SULFATE 50 MG: 20 SOLUTION ORAL at 19:00

## 2017-01-01 RX ADMIN — MORPHINE SULFATE 15 MG: 10 INJECTION INTRAVENOUS at 19:25

## 2017-01-01 RX ADMIN — LORAZEPAM 1 MG: 1 TABLET ORAL at 20:40

## 2017-01-01 RX ADMIN — KETOROLAC TROMETHAMINE 30 MG: 30 INJECTION, SOLUTION INTRAMUSCULAR at 17:12

## 2017-01-01 RX ADMIN — KETOROLAC TROMETHAMINE 30 MG: 30 INJECTION, SOLUTION INTRAMUSCULAR at 09:02

## 2017-01-01 RX ADMIN — METOPROLOL TARTRATE 25 MG: 25 TABLET ORAL at 18:32

## 2017-01-01 RX ADMIN — MORPHINE SULFATE 30 MG: 20 SOLUTION ORAL at 07:00

## 2017-01-01 RX ADMIN — MORPHINE SULFATE 30 MG: 20 SOLUTION ORAL at 21:05

## 2017-01-01 RX ADMIN — LORAZEPAM 1 MG: 1 TABLET ORAL at 19:43

## 2017-01-01 RX ADMIN — LORAZEPAM 0.5 MG: 0.5 TABLET ORAL at 09:35

## 2017-01-01 RX ADMIN — METOPROLOL TARTRATE 25 MG: 25 TABLET ORAL at 21:46

## 2017-01-01 RX ADMIN — HYDROMORPHONE HYDROCHLORIDE 3 MG: 2 INJECTION, SOLUTION INTRAMUSCULAR; INTRAVENOUS; SUBCUTANEOUS at 09:39

## 2017-01-01 RX ADMIN — METOPROLOL TARTRATE 12.5 MG: 25 TABLET ORAL at 09:12

## 2017-01-01 RX ADMIN — Medication 6 MG: at 00:28

## 2017-01-01 RX ADMIN — STANDARDIZED SENNA CONCENTRATE AND DOCUSATE SODIUM 2 TABLET: 8.6; 5 TABLET, FILM COATED ORAL at 17:47

## 2017-01-01 RX ADMIN — METHADONE HYDROCHLORIDE 10 MG: 10 TABLET ORAL at 03:26

## 2017-01-01 RX ADMIN — HYDROMORPHONE HYDROCHLORIDE 4 MG: 2 INJECTION, SOLUTION INTRAMUSCULAR; INTRAVENOUS; SUBCUTANEOUS at 04:44

## 2017-01-01 RX ADMIN — SODIUM CHLORIDE 125 ML/HR: 900 INJECTION, SOLUTION INTRAVENOUS at 01:51

## 2017-01-01 RX ADMIN — HYDROMORPHONE HYDROCHLORIDE 4 MG: 2 INJECTION INTRAMUSCULAR; INTRAVENOUS; SUBCUTANEOUS at 21:16

## 2017-01-01 RX ADMIN — LORAZEPAM 1 MG: 1 TABLET ORAL at 02:49

## 2017-01-01 RX ADMIN — DULOXETINE HYDROCHLORIDE 60 MG: 60 CAPSULE, DELAYED RELEASE ORAL at 21:45

## 2017-01-01 RX ADMIN — KETOROLAC TROMETHAMINE 30 MG: 30 INJECTION, SOLUTION INTRAMUSCULAR at 05:26

## 2017-01-01 RX ADMIN — METHADONE HYDROCHLORIDE 10 MG: 5 TABLET ORAL at 21:40

## 2017-01-01 RX ADMIN — INSULIN LISPRO 3 UNITS: 100 INJECTION, SOLUTION INTRAVENOUS; SUBCUTANEOUS at 18:13

## 2017-01-01 RX ADMIN — NYSTATIN 500000 UNITS: 500000 SUSPENSION ORAL at 22:43

## 2017-01-01 RX ADMIN — POLYETHYLENE GLYCOL 3350 17 G: 17 POWDER, FOR SOLUTION ORAL at 12:34

## 2017-01-01 RX ADMIN — POLYETHYLENE GLYCOL (3350) 17 G: 17 POWDER, FOR SOLUTION ORAL at 09:45

## 2017-01-01 RX ADMIN — LORAZEPAM 1 MG: 2 INJECTION INTRAMUSCULAR; INTRAVENOUS at 17:07

## 2017-01-01 RX ADMIN — LORAZEPAM 1 MG: 1 TABLET ORAL at 02:40

## 2017-01-01 RX ADMIN — DIPHENHYDRAMINE HYDROCHLORIDE 25 MG: 25 CAPSULE ORAL at 10:32

## 2017-01-01 RX ADMIN — Medication 1 MG: at 22:19

## 2017-01-01 RX ADMIN — Medication: at 07:00

## 2017-01-01 RX ADMIN — METHADONE HYDROCHLORIDE 7.5 MG: 5 TABLET ORAL at 13:08

## 2017-01-01 RX ADMIN — ACETAMINOPHEN 650 MG: 325 TABLET, FILM COATED ORAL at 23:28

## 2017-01-01 RX ADMIN — METHADONE HYDROCHLORIDE 10 MG: 5 TABLET ORAL at 14:08

## 2017-01-01 RX ADMIN — HYDROMORPHONE HYDROCHLORIDE 3 MG: 2 INJECTION, SOLUTION INTRAMUSCULAR; INTRAVENOUS; SUBCUTANEOUS at 03:45

## 2017-01-01 RX ADMIN — LORAZEPAM 1 MG: 1 TABLET ORAL at 13:08

## 2017-01-01 RX ADMIN — LORAZEPAM 1 MG: 2 INJECTION INTRAMUSCULAR; INTRAVENOUS at 23:53

## 2017-01-01 RX ADMIN — Medication 1 MG: at 09:39

## 2017-01-01 RX ADMIN — GABAPENTIN 300 MG: 300 CAPSULE ORAL at 21:08

## 2017-01-01 RX ADMIN — Medication: at 20:00

## 2017-01-01 RX ADMIN — OXYCODONE HYDROCHLORIDE 60 MG: 5 TABLET ORAL at 15:15

## 2017-01-01 RX ADMIN — KETOROLAC TROMETHAMINE 30 MG: 30 INJECTION, SOLUTION INTRAMUSCULAR at 14:56

## 2017-01-01 RX ADMIN — METOPROLOL TARTRATE 12.5 MG: 25 TABLET ORAL at 08:45

## 2017-01-01 RX ADMIN — POLYETHYLENE GLYCOL 3350 17 G: 17 POWDER, FOR SOLUTION ORAL at 08:54

## 2017-01-01 RX ADMIN — SULINDAC 200 MG: 200 TABLET ORAL at 07:55

## 2017-01-01 RX ADMIN — HYDROMORPHONE HYDROCHLORIDE 4 MG: 2 INJECTION, SOLUTION INTRAMUSCULAR; INTRAVENOUS; SUBCUTANEOUS at 00:55

## 2017-01-01 RX ADMIN — HYDROMORPHONE HYDROCHLORIDE 1 MG: 1 INJECTION, SOLUTION INTRAMUSCULAR; INTRAVENOUS; SUBCUTANEOUS at 05:44

## 2017-01-01 RX ADMIN — LORAZEPAM 1 MG: 1 TABLET ORAL at 16:21

## 2017-01-01 RX ADMIN — SULINDAC 200 MG: 200 TABLET ORAL at 17:51

## 2017-01-01 RX ADMIN — GABAPENTIN 300 MG: 300 CAPSULE ORAL at 09:27

## 2017-01-01 RX ADMIN — LORAZEPAM 1 MG: 1 TABLET ORAL at 09:45

## 2017-01-01 RX ADMIN — HYDROMORPHONE HYDROCHLORIDE 3 MG: 2 INJECTION, SOLUTION INTRAMUSCULAR; INTRAVENOUS; SUBCUTANEOUS at 04:13

## 2017-01-01 RX ADMIN — LORAZEPAM 1 MG: 1 TABLET ORAL at 07:26

## 2017-01-01 RX ADMIN — POLYETHYLENE GLYCOL 3350 17 G: 17 POWDER, FOR SOLUTION ORAL at 09:06

## 2017-01-01 RX ADMIN — STANDARDIZED SENNA CONCENTRATE AND DOCUSATE SODIUM 2 TABLET: 8.6; 5 TABLET, FILM COATED ORAL at 16:15

## 2017-01-01 RX ADMIN — Medication: at 15:30

## 2017-01-01 RX ADMIN — GABAPENTIN 300 MG: 300 CAPSULE ORAL at 22:46

## 2017-01-01 RX ADMIN — ACETAMINOPHEN 650 MG: 325 TABLET, FILM COATED ORAL at 04:32

## 2017-01-01 RX ADMIN — Medication 10 ML: at 22:00

## 2017-01-01 RX ADMIN — MORPHINE SULFATE 4 MG: 4 INJECTION, SOLUTION INTRAMUSCULAR; INTRAVENOUS at 12:39

## 2017-01-01 RX ADMIN — METHADONE HYDROCHLORIDE 10 MG: 5 TABLET ORAL at 13:37

## 2017-01-01 RX ADMIN — HYDROMORPHONE HYDROCHLORIDE 1 MG: 2 INJECTION, SOLUTION INTRAMUSCULAR; INTRAVENOUS; SUBCUTANEOUS at 19:19

## 2017-01-01 RX ADMIN — SULINDAC 200 MG: 200 TABLET ORAL at 08:28

## 2017-01-01 RX ADMIN — ACETAMINOPHEN 650 MG: 325 TABLET, FILM COATED ORAL at 10:02

## 2017-01-01 RX ADMIN — GABAPENTIN 300 MG: 300 CAPSULE ORAL at 17:53

## 2017-01-01 RX ADMIN — MENTHOL, METHYL SALICYLATE: 10; 15 CREAM TOPICAL at 08:41

## 2017-01-01 RX ADMIN — Medication 10 ML: at 09:29

## 2017-01-01 RX ADMIN — Medication 1 MG: at 15:25

## 2017-01-01 RX ADMIN — Medication 10 ML: at 17:02

## 2017-01-01 RX ADMIN — Medication 6 MG: at 03:24

## 2017-01-01 RX ADMIN — METHADONE HYDROCHLORIDE 10 MG: 5 TABLET ORAL at 15:25

## 2017-01-01 RX ADMIN — Medication 1 MG: at 11:22

## 2017-01-01 RX ADMIN — HYDROMORPHONE HYDROCHLORIDE 4 MG: 2 INJECTION, SOLUTION INTRAMUSCULAR; INTRAVENOUS; SUBCUTANEOUS at 03:22

## 2017-01-01 RX ADMIN — ACETAMINOPHEN 650 MG: 325 TABLET, FILM COATED ORAL at 08:58

## 2017-01-01 RX ADMIN — HYDROMORPHONE HYDROCHLORIDE 4 MG: 2 INJECTION INTRAMUSCULAR; INTRAVENOUS; SUBCUTANEOUS at 18:16

## 2017-01-01 RX ADMIN — IPRATROPIUM BROMIDE AND ALBUTEROL SULFATE 3 ML: 2.5; .5 SOLUTION RESPIRATORY (INHALATION) at 04:57

## 2017-01-01 RX ADMIN — METHADONE HYDROCHLORIDE 7.5 MG: 5 TABLET ORAL at 14:30

## 2017-01-01 RX ADMIN — GABAPENTIN 300 MG: 300 CAPSULE ORAL at 21:01

## 2017-01-01 RX ADMIN — GABAPENTIN 300 MG: 300 CAPSULE ORAL at 21:33

## 2017-01-01 RX ADMIN — STANDARDIZED SENNA CONCENTRATE AND DOCUSATE SODIUM 2 TABLET: 8.6; 5 TABLET, FILM COATED ORAL at 18:00

## 2017-01-01 RX ADMIN — HYDROMORPHONE HYDROCHLORIDE 4 MG: 2 INJECTION, SOLUTION INTRAMUSCULAR; INTRAVENOUS; SUBCUTANEOUS at 00:40

## 2017-01-01 RX ADMIN — METHADONE HYDROCHLORIDE 10 MG: 5 TABLET ORAL at 21:04

## 2017-01-01 RX ADMIN — METHADONE HYDROCHLORIDE 10 MG: 5 TABLET ORAL at 14:50

## 2017-01-01 RX ADMIN — Medication 1 MG: at 01:45

## 2017-01-01 RX ADMIN — LORAZEPAM 1 MG: 2 INJECTION INTRAMUSCULAR; INTRAVENOUS at 00:36

## 2017-01-01 RX ADMIN — GABAPENTIN 300 MG: 300 CAPSULE ORAL at 22:05

## 2017-01-01 RX ADMIN — Medication 10 ML: at 14:18

## 2017-01-01 RX ADMIN — LORAZEPAM 1 MG: 2 INJECTION INTRAMUSCULAR; INTRAVENOUS at 18:50

## 2017-01-01 RX ADMIN — DIPHENHYDRAMINE HYDROCHLORIDE 25 MG: 25 CAPSULE ORAL at 10:15

## 2017-01-01 RX ADMIN — KETOROLAC TROMETHAMINE 30 MG: 30 INJECTION, SOLUTION INTRAMUSCULAR at 10:25

## 2017-01-01 RX ADMIN — MORPHINE SULFATE 30 MG: 20 SOLUTION ORAL at 03:33

## 2017-01-01 RX ADMIN — HYDROMORPHONE HYDROCHLORIDE 1.5 MG: 2 INJECTION, SOLUTION INTRAMUSCULAR; INTRAVENOUS; SUBCUTANEOUS at 05:50

## 2017-01-01 RX ADMIN — Medication 20 ML: at 21:48

## 2017-01-01 RX ADMIN — SENNOSIDES 17.2 MG: 8.6 TABLET, FILM COATED ORAL at 18:32

## 2017-01-01 RX ADMIN — METHADONE HYDROCHLORIDE 10 MG: 5 TABLET ORAL at 20:46

## 2017-01-01 RX ADMIN — MORPHINE SULFATE 40 MG: 20 SOLUTION ORAL at 10:10

## 2017-01-01 RX ADMIN — MORPHINE SULFATE 30 MG: 20 SOLUTION ORAL at 22:14

## 2017-01-01 RX ADMIN — METOPROLOL TARTRATE 25 MG: 25 TABLET ORAL at 09:34

## 2017-01-01 RX ADMIN — SENNOSIDES 17.2 MG: 8.6 TABLET, FILM COATED ORAL at 09:09

## 2017-01-01 RX ADMIN — MORPHINE SULFATE 15 MG: 10 INJECTION INTRAVENOUS at 04:35

## 2017-01-01 RX ADMIN — HYDROMORPHONE HYDROCHLORIDE 4 MG: 1 INJECTION, SOLUTION INTRAMUSCULAR; INTRAVENOUS; SUBCUTANEOUS at 06:02

## 2017-01-01 RX ADMIN — SULINDAC 200 MG: 200 TABLET ORAL at 17:41

## 2017-01-01 RX ADMIN — METOPROLOL TARTRATE 12.5 MG: 25 TABLET ORAL at 08:42

## 2017-01-01 RX ADMIN — GABAPENTIN 300 MG: 300 CAPSULE ORAL at 21:54

## 2017-01-01 RX ADMIN — GABAPENTIN 300 MG: 300 CAPSULE ORAL at 09:08

## 2017-01-01 RX ADMIN — Medication 2 MG: at 01:58

## 2017-01-01 RX ADMIN — METHADONE HYDROCHLORIDE 10 MG: 5 TABLET ORAL at 05:51

## 2017-01-01 RX ADMIN — LORAZEPAM 0.5 MG: 0.5 TABLET ORAL at 18:52

## 2017-01-01 RX ADMIN — ACETAMINOPHEN 1000 MG: 500 TABLET, FILM COATED ORAL at 23:11

## 2017-01-01 RX ADMIN — LORAZEPAM 0.5 MG: 0.5 TABLET ORAL at 09:16

## 2017-01-01 RX ADMIN — METHADONE HYDROCHLORIDE 10 MG: 5 TABLET ORAL at 21:05

## 2017-01-01 RX ADMIN — STANDARDIZED SENNA CONCENTRATE AND DOCUSATE SODIUM 2 TABLET: 8.6; 5 TABLET, FILM COATED ORAL at 08:12

## 2017-01-01 RX ADMIN — DULOXETINE HYDROCHLORIDE 30 MG: 30 CAPSULE, DELAYED RELEASE ORAL at 09:28

## 2017-01-01 RX ADMIN — MORPHINE SULFATE 40 MG: 20 SOLUTION ORAL at 10:00

## 2017-01-01 RX ADMIN — LORAZEPAM 0.5 MG: 0.5 TABLET ORAL at 08:40

## 2017-01-01 RX ADMIN — POLYETHYLENE GLYCOL 3350 17 G: 17 POWDER, FOR SOLUTION ORAL at 08:05

## 2017-01-01 RX ADMIN — LORAZEPAM 1 MG: 1 TABLET ORAL at 06:28

## 2017-01-01 RX ADMIN — LORAZEPAM 1 MG: 1 TABLET ORAL at 22:08

## 2017-01-01 RX ADMIN — DOCUSATE SODIUM AND SENNOSIDES 2 TABLET: 8.6; 5 TABLET, FILM COATED ORAL at 11:05

## 2017-01-01 RX ADMIN — LORAZEPAM 1 MG: 1 TABLET ORAL at 03:46

## 2017-01-01 RX ADMIN — MORPHINE SULFATE 30 MG: 20 SOLUTION ORAL at 09:29

## 2017-01-01 RX ADMIN — GABAPENTIN 600 MG: 300 CAPSULE ORAL at 08:30

## 2017-01-01 RX ADMIN — HYDROMORPHONE HYDROCHLORIDE 3 MG: 1 INJECTION, SOLUTION INTRAMUSCULAR; INTRAVENOUS; SUBCUTANEOUS at 05:15

## 2017-01-01 RX ADMIN — LORAZEPAM 0.5 MG: 0.5 TABLET ORAL at 18:55

## 2017-01-01 RX ADMIN — HYDROMORPHONE HYDROCHLORIDE 4 MG: 2 INJECTION, SOLUTION INTRAMUSCULAR; INTRAVENOUS; SUBCUTANEOUS at 06:04

## 2017-01-01 RX ADMIN — MORPHINE SULFATE 4 MG: 4 INJECTION, SOLUTION INTRAMUSCULAR; INTRAVENOUS at 17:02

## 2017-01-01 RX ADMIN — HYDROMORPHONE HYDROCHLORIDE 4 MG: 2 INJECTION, SOLUTION INTRAMUSCULAR; INTRAVENOUS; SUBCUTANEOUS at 16:00

## 2017-01-01 RX ADMIN — HYDROMORPHONE HYDROCHLORIDE 4 MG: 2 INJECTION INTRAMUSCULAR; INTRAVENOUS; SUBCUTANEOUS at 20:27

## 2017-01-01 RX ADMIN — HYDROMORPHONE HYDROCHLORIDE 4 MG: 2 INJECTION, SOLUTION INTRAMUSCULAR; INTRAVENOUS; SUBCUTANEOUS at 19:29

## 2017-01-01 RX ADMIN — METHADONE HYDROCHLORIDE 10 MG: 5 TABLET ORAL at 13:32

## 2017-01-01 RX ADMIN — METOPROLOL TARTRATE 12.5 MG: 25 TABLET ORAL at 21:00

## 2017-01-01 RX ADMIN — SULINDAC 200 MG: 200 TABLET ORAL at 18:16

## 2017-01-01 RX ADMIN — METOPROLOL TARTRATE 25 MG: 25 TABLET ORAL at 08:53

## 2017-01-01 RX ADMIN — HYDROMORPHONE HYDROCHLORIDE 4 MG: 2 INJECTION, SOLUTION INTRAMUSCULAR; INTRAVENOUS; SUBCUTANEOUS at 17:06

## 2017-01-01 RX ADMIN — GABAPENTIN 600 MG: 300 CAPSULE ORAL at 15:54

## 2017-01-01 RX ADMIN — HYDROMORPHONE HYDROCHLORIDE 1 MG: 2 INJECTION, SOLUTION INTRAMUSCULAR; INTRAVENOUS; SUBCUTANEOUS at 21:05

## 2017-01-01 RX ADMIN — HYDROMORPHONE HYDROCHLORIDE 4 MG: 2 INJECTION, SOLUTION INTRAMUSCULAR; INTRAVENOUS; SUBCUTANEOUS at 16:28

## 2017-01-01 RX ADMIN — DIPHENHYDRAMINE HYDROCHLORIDE 25 MG: 25 CAPSULE ORAL at 06:48

## 2017-01-01 RX ADMIN — DEXAMETHASONE SODIUM PHOSPHATE 4 MG: 4 INJECTION, SOLUTION INTRAMUSCULAR; INTRAVENOUS at 14:16

## 2017-01-01 RX ADMIN — POLYETHYLENE GLYCOL 3350 17 G: 17 POWDER, FOR SOLUTION ORAL at 08:44

## 2017-01-01 RX ADMIN — Medication 1 MG: at 08:38

## 2017-01-01 RX ADMIN — LORAZEPAM 1 MG: 2 INJECTION INTRAMUSCULAR; INTRAVENOUS at 16:12

## 2017-01-01 RX ADMIN — ACETAMINOPHEN 650 MG: 325 TABLET, FILM COATED ORAL at 10:03

## 2017-01-01 RX ADMIN — DOCUSATE SODIUM 100 MG: 100 CAPSULE, LIQUID FILLED ORAL at 09:33

## 2017-01-01 RX ADMIN — LORAZEPAM 1 MG: 1 TABLET ORAL at 00:40

## 2017-01-01 RX ADMIN — LORAZEPAM 1 MG: 1 TABLET ORAL at 22:28

## 2017-01-01 RX ADMIN — HYDROMORPHONE HYDROCHLORIDE 4 MG: 2 INJECTION, SOLUTION INTRAMUSCULAR; INTRAVENOUS; SUBCUTANEOUS at 04:21

## 2017-01-01 RX ADMIN — METHADONE HYDROCHLORIDE 5 MG: 5 TABLET ORAL at 14:32

## 2017-01-01 RX ADMIN — LORAZEPAM 1 MG: 2 INJECTION INTRAMUSCULAR; INTRAVENOUS at 10:09

## 2017-01-01 RX ADMIN — Medication 1 MG: at 16:46

## 2017-01-01 RX ADMIN — METHADONE HYDROCHLORIDE 5 MG: 5 TABLET ORAL at 13:22

## 2017-01-01 RX ADMIN — METHADONE HYDROCHLORIDE 10 MG: 5 TABLET ORAL at 05:48

## 2017-01-01 RX ADMIN — DULOXETINE HYDROCHLORIDE 60 MG: 60 CAPSULE, DELAYED RELEASE ORAL at 21:03

## 2017-01-01 RX ADMIN — MORPHINE SULFATE 50 MG: 20 SOLUTION ORAL at 22:00

## 2017-01-01 RX ADMIN — Medication: at 06:47

## 2017-01-01 RX ADMIN — LORAZEPAM 1 MG: 1 TABLET ORAL at 01:27

## 2017-01-01 RX ADMIN — LORAZEPAM 0.5 MG: 0.5 TABLET ORAL at 09:17

## 2017-01-01 RX ADMIN — Medication: at 10:52

## 2017-01-01 RX ADMIN — KETOROLAC TROMETHAMINE 30 MG: 30 INJECTION, SOLUTION INTRAMUSCULAR at 19:21

## 2017-01-01 RX ADMIN — Medication 1 MG: at 13:12

## 2017-01-01 RX ADMIN — LORAZEPAM 0.5 MG: 0.5 TABLET ORAL at 17:48

## 2017-01-01 RX ADMIN — MORPHINE SULFATE 4 MG: 4 INJECTION, SOLUTION INTRAMUSCULAR; INTRAVENOUS at 00:06

## 2017-01-01 RX ADMIN — LORAZEPAM 1 MG: 1 TABLET ORAL at 00:33

## 2017-01-01 RX ADMIN — MORPHINE SULFATE 20 MG: 20 SOLUTION ORAL at 18:00

## 2017-01-01 RX ADMIN — LORAZEPAM 0.5 MG: 0.5 TABLET ORAL at 12:48

## 2017-01-01 RX ADMIN — Medication 10 ML: at 15:43

## 2017-01-01 RX ADMIN — STANDARDIZED SENNA CONCENTRATE AND DOCUSATE SODIUM 2 TABLET: 8.6; 5 TABLET, FILM COATED ORAL at 18:21

## 2017-01-01 RX ADMIN — Medication: at 03:35

## 2017-01-01 RX ADMIN — METHADONE HYDROCHLORIDE 10 MG: 5 TABLET ORAL at 13:08

## 2017-01-01 RX ADMIN — MORPHINE SULFATE 10 MG: 20 SOLUTION ORAL at 21:35

## 2017-01-01 RX ADMIN — HYDROMORPHONE HYDROCHLORIDE 3 MG: 2 INJECTION, SOLUTION INTRAMUSCULAR; INTRAVENOUS; SUBCUTANEOUS at 01:57

## 2017-01-01 RX ADMIN — LORAZEPAM 1 MG: 2 INJECTION INTRAMUSCULAR; INTRAVENOUS at 10:42

## 2017-01-01 RX ADMIN — DULOXETINE HYDROCHLORIDE 30 MG: 30 CAPSULE, DELAYED RELEASE ORAL at 09:09

## 2017-01-01 RX ADMIN — MIDAZOLAM HYDROCHLORIDE 2 MG: 1 INJECTION, SOLUTION INTRAMUSCULAR; INTRAVENOUS at 09:56

## 2017-01-01 RX ADMIN — HYDROMORPHONE HYDROCHLORIDE 4 MG: 2 INJECTION, SOLUTION INTRAMUSCULAR; INTRAVENOUS; SUBCUTANEOUS at 22:25

## 2017-01-01 RX ADMIN — Medication: at 12:20

## 2017-01-01 RX ADMIN — LORAZEPAM 0.5 MG: 0.5 TABLET ORAL at 10:05

## 2017-01-01 RX ADMIN — OXYCODONE HYDROCHLORIDE 60 MG: 5 TABLET ORAL at 13:11

## 2017-01-01 RX ADMIN — HYDROMORPHONE HYDROCHLORIDE 1 MG: 2 INJECTION, SOLUTION INTRAMUSCULAR; INTRAVENOUS; SUBCUTANEOUS at 11:33

## 2017-01-01 RX ADMIN — LORAZEPAM 1 MG: 1 TABLET ORAL at 05:08

## 2017-01-01 RX ADMIN — LORAZEPAM 0.5 MG: 0.5 TABLET ORAL at 10:07

## 2017-01-01 RX ADMIN — KETOROLAC TROMETHAMINE 30 MG: 30 INJECTION, SOLUTION INTRAMUSCULAR at 23:40

## 2017-01-01 RX ADMIN — SULINDAC 200 MG: 200 TABLET ORAL at 16:44

## 2017-01-01 RX ADMIN — Medication: at 00:29

## 2017-01-01 RX ADMIN — SULINDAC 200 MG: 200 TABLET ORAL at 09:38

## 2017-01-01 RX ADMIN — LORAZEPAM 2 MG: 1 TABLET ORAL at 16:27

## 2017-01-01 RX ADMIN — Medication: at 03:57

## 2017-01-01 RX ADMIN — LORAZEPAM 0.5 MG: 0.5 TABLET ORAL at 18:13

## 2017-01-01 RX ADMIN — LORAZEPAM 0.5 MG: 0.5 TABLET ORAL at 13:19

## 2017-01-01 RX ADMIN — LORAZEPAM 0.5 MG: 0.5 TABLET ORAL at 09:06

## 2017-01-01 RX ADMIN — NYSTATIN 500000 UNITS: 500000 SUSPENSION ORAL at 18:00

## 2017-01-01 RX ADMIN — LORAZEPAM 1 MG: 1 TABLET ORAL at 08:41

## 2017-01-01 RX ADMIN — HYDROMORPHONE HYDROCHLORIDE 3 MG: 2 INJECTION, SOLUTION INTRAMUSCULAR; INTRAVENOUS; SUBCUTANEOUS at 01:43

## 2017-01-01 RX ADMIN — METOPROLOL TARTRATE 25 MG: 25 TABLET ORAL at 21:33

## 2017-01-01 RX ADMIN — SULINDAC 200 MG: 200 TABLET ORAL at 09:13

## 2017-01-01 RX ADMIN — METOPROLOL TARTRATE 12.5 MG: 25 TABLET ORAL at 21:08

## 2017-01-01 RX ADMIN — Medication 10 ML: at 00:47

## 2017-01-01 RX ADMIN — KETOROLAC TROMETHAMINE 30 MG: 30 INJECTION, SOLUTION INTRAMUSCULAR at 09:47

## 2017-01-01 RX ADMIN — KETOROLAC TROMETHAMINE 30 MG: 30 INJECTION INTRAMUSCULAR; INTRAVENOUS at 08:40

## 2017-01-01 RX ADMIN — GABAPENTIN 600 MG: 300 CAPSULE ORAL at 09:07

## 2017-01-01 RX ADMIN — HYDROMORPHONE HYDROCHLORIDE 4 MG: 2 INJECTION, SOLUTION INTRAMUSCULAR; INTRAVENOUS; SUBCUTANEOUS at 19:24

## 2017-01-01 RX ADMIN — ACETAMINOPHEN 650 MG: 325 TABLET, FILM COATED ORAL at 04:54

## 2017-01-01 RX ADMIN — DOCUSATE SODIUM 100 MG: 100 CAPSULE, LIQUID FILLED ORAL at 09:52

## 2017-01-01 RX ADMIN — LORAZEPAM 1 MG: 1 TABLET ORAL at 01:21

## 2017-01-01 RX ADMIN — Medication 10 ML: at 09:38

## 2017-01-01 RX ADMIN — METHADONE HYDROCHLORIDE 10 MG: 5 TABLET ORAL at 05:46

## 2017-01-01 RX ADMIN — GABAPENTIN 600 MG: 300 CAPSULE ORAL at 17:08

## 2017-01-01 RX ADMIN — STANDARDIZED SENNA CONCENTRATE AND DOCUSATE SODIUM 2 TABLET: 8.6; 5 TABLET, FILM COATED ORAL at 17:14

## 2017-01-01 RX ADMIN — DOCUSATE SODIUM 100 MG: 100 CAPSULE, LIQUID FILLED ORAL at 09:16

## 2017-01-01 RX ADMIN — MORPHINE SULFATE 20 MG: 20 SOLUTION ORAL at 12:29

## 2017-01-01 RX ADMIN — OXYCODONE HYDROCHLORIDE 60 MG: 5 TABLET ORAL at 21:20

## 2017-01-01 RX ADMIN — Medication 1 MG: at 19:31

## 2017-01-01 RX ADMIN — HYDROMORPHONE HYDROCHLORIDE 4 MG: 2 INJECTION, SOLUTION INTRAMUSCULAR; INTRAVENOUS; SUBCUTANEOUS at 14:24

## 2017-01-01 RX ADMIN — DEXAMETHASONE SODIUM PHOSPHATE 8 MG: 4 INJECTION, SOLUTION INTRA-ARTICULAR; INTRALESIONAL; INTRAMUSCULAR; INTRAVENOUS; SOFT TISSUE at 22:45

## 2017-01-01 RX ADMIN — HYDROMORPHONE HYDROCHLORIDE 4 MG: 2 INJECTION INTRAMUSCULAR; INTRAVENOUS; SUBCUTANEOUS at 10:05

## 2017-01-01 RX ADMIN — Medication 1 MG: at 19:54

## 2017-01-01 RX ADMIN — METHADONE HYDROCHLORIDE 7.5 MG: 5 TABLET ORAL at 14:34

## 2017-01-01 RX ADMIN — DULOXETINE HYDROCHLORIDE 60 MG: 60 CAPSULE, DELAYED RELEASE ORAL at 21:44

## 2017-01-01 RX ADMIN — SULINDAC 200 MG: 200 TABLET ORAL at 08:42

## 2017-01-01 RX ADMIN — LORAZEPAM 0.5 MG: 0.5 TABLET ORAL at 18:16

## 2017-01-01 RX ADMIN — MORPHINE SULFATE 30 MG: 20 SOLUTION ORAL at 05:56

## 2017-01-01 RX ADMIN — KETAMINE HYDROCHLORIDE 20 MG: 10 INJECTION, SOLUTION INTRAMUSCULAR; INTRAVENOUS at 09:56

## 2017-01-01 RX ADMIN — SENNOSIDES 17.2 MG: 8.6 TABLET, FILM COATED ORAL at 17:53

## 2017-01-01 RX ADMIN — KETOROLAC TROMETHAMINE 30 MG: 30 INJECTION, SOLUTION INTRAMUSCULAR at 16:47

## 2017-01-01 RX ADMIN — METOPROLOL TARTRATE 25 MG: 25 TABLET ORAL at 09:07

## 2017-01-01 RX ADMIN — LORAZEPAM 2 MG: 2 INJECTION INTRAMUSCULAR; INTRAVENOUS at 17:12

## 2017-01-01 RX ADMIN — Medication 30 ML: at 06:46

## 2017-01-01 RX ADMIN — Medication 1 MG: at 12:15

## 2017-01-01 RX ADMIN — HYDROMORPHONE HYDROCHLORIDE 4 MG: 2 INJECTION, SOLUTION INTRAMUSCULAR; INTRAVENOUS; SUBCUTANEOUS at 02:52

## 2017-01-01 RX ADMIN — HYDROMORPHONE HYDROCHLORIDE 1 MG: 2 INJECTION, SOLUTION INTRAMUSCULAR; INTRAVENOUS; SUBCUTANEOUS at 05:35

## 2017-01-01 RX ADMIN — SULINDAC 200 MG: 200 TABLET ORAL at 17:25

## 2017-01-01 RX ADMIN — Medication 1 MG: at 04:06

## 2017-01-01 RX ADMIN — MORPHINE SULFATE 4 MG: 4 INJECTION, SOLUTION INTRAMUSCULAR; INTRAVENOUS at 03:43

## 2017-01-01 RX ADMIN — Medication 2 MG: at 17:34

## 2017-01-01 RX ADMIN — HYDROMORPHONE HYDROCHLORIDE 3 MG: 2 INJECTION, SOLUTION INTRAMUSCULAR; INTRAVENOUS; SUBCUTANEOUS at 21:09

## 2017-01-01 RX ADMIN — DULOXETINE HYDROCHLORIDE 60 MG: 60 CAPSULE, DELAYED RELEASE ORAL at 20:42

## 2017-01-01 RX ADMIN — DOCUSATE SODIUM 100 MG: 100 CAPSULE, LIQUID FILLED ORAL at 09:32

## 2017-01-01 RX ADMIN — SULINDAC 200 MG: 200 TABLET ORAL at 17:32

## 2017-01-01 RX ADMIN — Medication: at 17:15

## 2017-01-01 RX ADMIN — DOCUSATE SODIUM AND SENNOSIDES 2 TABLET: 8.6; 5 TABLET, FILM COATED ORAL at 10:25

## 2017-01-01 RX ADMIN — LORAZEPAM 0.5 MG: 0.5 TABLET ORAL at 17:08

## 2017-01-01 RX ADMIN — METOPROLOL TARTRATE 25 MG: 25 TABLET ORAL at 21:21

## 2017-01-01 RX ADMIN — HYDROMORPHONE HYDROCHLORIDE 3 MG: 2 INJECTION, SOLUTION INTRAMUSCULAR; INTRAVENOUS; SUBCUTANEOUS at 14:48

## 2017-01-01 RX ADMIN — Medication 1 MG: at 08:02

## 2017-01-01 RX ADMIN — GABAPENTIN 300 MG: 300 CAPSULE ORAL at 21:19

## 2017-01-01 RX ADMIN — SUCCINYLCHOLINE CHLORIDE 160 MG: 20 INJECTION INTRAMUSCULAR; INTRAVENOUS at 21:49

## 2017-01-01 RX ADMIN — Medication: at 01:20

## 2017-01-01 RX ADMIN — LORAZEPAM 1 MG: 1 TABLET ORAL at 14:24

## 2017-01-01 RX ADMIN — HYDROMORPHONE HYDROCHLORIDE 1 MG: 2 INJECTION, SOLUTION INTRAMUSCULAR; INTRAVENOUS; SUBCUTANEOUS at 19:49

## 2017-01-01 RX ADMIN — METOPROLOL TARTRATE 12.5 MG: 25 TABLET ORAL at 21:39

## 2017-01-01 RX ADMIN — METHADONE HYDROCHLORIDE 10 MG: 5 TABLET ORAL at 05:55

## 2017-01-01 RX ADMIN — LORAZEPAM 1 MG: 2 INJECTION INTRAMUSCULAR; INTRAVENOUS at 22:00

## 2017-01-01 RX ADMIN — INSULIN LISPRO 2 UNITS: 100 INJECTION, SOLUTION INTRAVENOUS; SUBCUTANEOUS at 11:47

## 2017-01-01 RX ADMIN — LORAZEPAM 1 MG: 1 TABLET ORAL at 04:10

## 2017-01-01 RX ADMIN — Medication 1 MG: at 01:10

## 2017-01-01 RX ADMIN — Medication: at 17:55

## 2017-01-01 RX ADMIN — ROCURONIUM BROMIDE 10 MG: 10 INJECTION, SOLUTION INTRAVENOUS at 10:00

## 2017-01-01 RX ADMIN — KETOROLAC TROMETHAMINE 30 MG: 30 INJECTION, SOLUTION INTRAMUSCULAR at 21:59

## 2017-01-01 RX ADMIN — HYDROMORPHONE HYDROCHLORIDE 4 MG: 2 INJECTION, SOLUTION INTRAMUSCULAR; INTRAVENOUS; SUBCUTANEOUS at 06:09

## 2017-01-01 RX ADMIN — GABAPENTIN 600 MG: 300 CAPSULE ORAL at 17:09

## 2017-01-01 RX ADMIN — LORAZEPAM 1 MG: 1 TABLET ORAL at 15:55

## 2017-01-01 RX ADMIN — LORAZEPAM 1 MG: 2 INJECTION INTRAMUSCULAR; INTRAVENOUS at 13:55

## 2017-01-01 RX ADMIN — METHADONE HYDROCHLORIDE 10 MG: 10 TABLET ORAL at 21:40

## 2017-01-01 RX ADMIN — METHADONE HYDROCHLORIDE 5 MG: 5 TABLET ORAL at 22:08

## 2017-01-01 RX ADMIN — Medication 10 ML: at 08:07

## 2017-01-01 RX ADMIN — METHADONE HYDROCHLORIDE 10 MG: 5 TABLET ORAL at 14:34

## 2017-01-01 RX ADMIN — Medication 1 MG: at 23:50

## 2017-01-01 RX ADMIN — POLYETHYLENE GLYCOL 3350 17 G: 17 POWDER, FOR SOLUTION ORAL at 17:50

## 2017-01-01 RX ADMIN — HYDROMORPHONE HYDROCHLORIDE 4 MG: 2 INJECTION, SOLUTION INTRAMUSCULAR; INTRAVENOUS; SUBCUTANEOUS at 00:45

## 2017-01-01 RX ADMIN — Medication 1 MG: at 09:11

## 2017-01-01 RX ADMIN — GABAPENTIN 600 MG: 300 CAPSULE ORAL at 17:02

## 2017-01-01 RX ADMIN — LORAZEPAM 1 MG: 1 TABLET ORAL at 20:39

## 2017-01-01 RX ADMIN — METHADONE HYDROCHLORIDE 10 MG: 5 TABLET ORAL at 05:27

## 2017-01-01 RX ADMIN — METHADONE HYDROCHLORIDE 7.5 MG: 5 TABLET ORAL at 22:07

## 2017-01-01 RX ADMIN — METHADONE HYDROCHLORIDE 5 MG: 5 TABLET ORAL at 05:27

## 2017-01-01 RX ADMIN — DIPHENHYDRAMINE HYDROCHLORIDE 50 MG: 25 CAPSULE ORAL at 05:50

## 2017-01-01 RX ADMIN — Medication 2 MG: at 17:13

## 2017-01-01 RX ADMIN — METHADONE HYDROCHLORIDE 10 MG: 5 TABLET ORAL at 21:54

## 2017-01-01 RX ADMIN — METHADONE HYDROCHLORIDE 10 MG: 5 TABLET ORAL at 06:31

## 2017-01-01 RX ADMIN — Medication 1 MG: at 12:16

## 2017-01-01 RX ADMIN — HYDROMORPHONE HYDROCHLORIDE 1.5 MG: 2 INJECTION, SOLUTION INTRAMUSCULAR; INTRAVENOUS; SUBCUTANEOUS at 20:21

## 2017-01-01 RX ADMIN — MORPHINE SULFATE 15 MG: 10 INJECTION INTRAVENOUS at 06:52

## 2017-01-01 RX ADMIN — LORAZEPAM 1 MG: 1 TABLET ORAL at 08:58

## 2017-01-01 RX ADMIN — LIDOCAINE HYDROCHLORIDE 100 MG: 20 INJECTION, SOLUTION EPIDURAL; INFILTRATION; INTRACAUDAL; PERINEURAL at 21:49

## 2017-01-01 RX ADMIN — METHADONE HYDROCHLORIDE 10 MG: 5 TABLET ORAL at 22:09

## 2017-01-01 RX ADMIN — INSULIN LISPRO 2 UNITS: 100 INJECTION, SOLUTION INTRAVENOUS; SUBCUTANEOUS at 17:44

## 2017-01-01 RX ADMIN — MORPHINE SULFATE 40 MG: 20 SOLUTION ORAL at 14:11

## 2017-01-01 RX ADMIN — HYDROMORPHONE HYDROCHLORIDE 4 MG: 2 INJECTION, SOLUTION INTRAMUSCULAR; INTRAVENOUS; SUBCUTANEOUS at 04:48

## 2017-01-01 RX ADMIN — Medication: at 17:06

## 2017-01-01 RX ADMIN — LORAZEPAM 1 MG: 1 TABLET ORAL at 14:50

## 2017-01-01 RX ADMIN — HYDROMORPHONE HYDROCHLORIDE 4 MG: 2 INJECTION, SOLUTION INTRAMUSCULAR; INTRAVENOUS; SUBCUTANEOUS at 21:55

## 2017-01-01 RX ADMIN — DOCUSATE SODIUM AND SENNOSIDES 2 TABLET: 8.6; 5 TABLET, FILM COATED ORAL at 16:37

## 2017-01-01 RX ADMIN — HYDROMORPHONE HYDROCHLORIDE 1 MG: 2 INJECTION, SOLUTION INTRAMUSCULAR; INTRAVENOUS; SUBCUTANEOUS at 21:11

## 2017-01-01 RX ADMIN — Medication: at 00:30

## 2017-01-01 RX ADMIN — LORAZEPAM 1 MG: 2 INJECTION INTRAMUSCULAR; INTRAVENOUS at 21:26

## 2017-01-01 RX ADMIN — HALOPERIDOL 5 MG: 5 TABLET ORAL at 21:05

## 2017-01-01 RX ADMIN — POLYETHYLENE GLYCOL 3350 17 G: 17 POWDER, FOR SOLUTION ORAL at 17:44

## 2017-01-01 RX ADMIN — HALOPERIDOL 5 MG: 5 TABLET ORAL at 09:52

## 2017-01-01 RX ADMIN — IPRATROPIUM BROMIDE AND ALBUTEROL SULFATE 3 ML: 2.5; .5 SOLUTION RESPIRATORY (INHALATION) at 23:49

## 2017-01-01 RX ADMIN — SULINDAC 200 MG: 200 TABLET ORAL at 22:45

## 2017-01-01 RX ADMIN — HYDROMORPHONE HYDROCHLORIDE 3 MG: 1 INJECTION, SOLUTION INTRAMUSCULAR; INTRAVENOUS; SUBCUTANEOUS at 02:27

## 2017-01-01 RX ADMIN — IPRATROPIUM BROMIDE AND ALBUTEROL SULFATE 3 ML: 2.5; .5 SOLUTION RESPIRATORY (INHALATION) at 05:32

## 2017-01-01 RX ADMIN — HYDROMORPHONE HYDROCHLORIDE 3 MG: 2 INJECTION, SOLUTION INTRAMUSCULAR; INTRAVENOUS; SUBCUTANEOUS at 22:10

## 2017-01-01 RX ADMIN — Medication 10 ML: at 14:19

## 2017-01-01 RX ADMIN — MORPHINE SULFATE 10 MG: 20 SOLUTION ORAL at 18:42

## 2017-01-01 RX ADMIN — POLYETHYLENE GLYCOL 3350 17 G: 17 POWDER, FOR SOLUTION ORAL at 09:08

## 2017-01-01 RX ADMIN — KETOROLAC TROMETHAMINE 30 MG: 30 INJECTION, SOLUTION INTRAMUSCULAR at 22:25

## 2017-01-01 RX ADMIN — METOPROLOL SUCCINATE 50 MG: 50 TABLET, EXTENDED RELEASE ORAL at 08:29

## 2017-01-01 RX ADMIN — Medication: at 15:39

## 2017-01-01 RX ADMIN — LORAZEPAM 1 MG: 2 INJECTION INTRAMUSCULAR; INTRAVENOUS at 18:05

## 2017-01-01 RX ADMIN — HYDROMORPHONE HYDROCHLORIDE 4 MG: 2 INJECTION, SOLUTION INTRAMUSCULAR; INTRAVENOUS; SUBCUTANEOUS at 17:09

## 2017-01-01 RX ADMIN — HYDROMORPHONE HYDROCHLORIDE 4 MG: 2 INJECTION, SOLUTION INTRAMUSCULAR; INTRAVENOUS; SUBCUTANEOUS at 11:13

## 2017-01-01 RX ADMIN — GABAPENTIN 600 MG: 300 CAPSULE ORAL at 09:42

## 2017-01-01 RX ADMIN — LORAZEPAM 1 MG: 1 TABLET ORAL at 15:13

## 2017-01-01 RX ADMIN — METHADONE HYDROCHLORIDE 10 MG: 10 TABLET ORAL at 18:42

## 2017-01-01 RX ADMIN — METHADONE HYDROCHLORIDE 10 MG: 5 TABLET ORAL at 21:01

## 2017-01-01 RX ADMIN — LORAZEPAM 0.5 MG: 0.5 TABLET ORAL at 11:10

## 2017-01-01 RX ADMIN — OXYCODONE HYDROCHLORIDE 60 MG: 5 TABLET ORAL at 13:41

## 2017-01-01 RX ADMIN — KETOROLAC TROMETHAMINE 30 MG: 30 INJECTION, SOLUTION INTRAMUSCULAR at 04:36

## 2017-01-01 RX ADMIN — DOCUSATE SODIUM 100 MG: 100 CAPSULE, LIQUID FILLED ORAL at 09:45

## 2017-01-01 RX ADMIN — HALOPERIDOL LACTATE 2 MG: 5 INJECTION, SOLUTION INTRAMUSCULAR at 12:21

## 2017-01-01 RX ADMIN — HALOPERIDOL LACTATE 2 MG: 5 INJECTION, SOLUTION INTRAMUSCULAR at 08:02

## 2017-01-01 RX ADMIN — Medication 2 MG: at 11:34

## 2017-01-01 RX ADMIN — GABAPENTIN 300 MG: 300 CAPSULE ORAL at 21:02

## 2017-01-01 RX ADMIN — LORAZEPAM 1 MG: 1 TABLET ORAL at 05:09

## 2017-01-01 RX ADMIN — MORPHINE SULFATE 30 MG: 20 SOLUTION ORAL at 13:35

## 2017-01-01 RX ADMIN — METOPROLOL TARTRATE 25 MG: 25 TABLET ORAL at 21:38

## 2017-01-01 RX ADMIN — LORAZEPAM 1 MG: 2 INJECTION INTRAMUSCULAR; INTRAVENOUS at 19:31

## 2017-01-01 RX ADMIN — POLYETHYLENE GLYCOL 3350 17 G: 17 POWDER, FOR SOLUTION ORAL at 09:36

## 2017-01-01 RX ADMIN — MORPHINE SULFATE 50 MG: 20 SOLUTION ORAL at 01:00

## 2017-01-01 RX ADMIN — MORPHINE SULFATE 40 MG: 20 SOLUTION ORAL at 12:49

## 2017-01-01 RX ADMIN — MORPHINE SULFATE 50 MG: 20 SOLUTION ORAL at 00:59

## 2017-01-01 RX ADMIN — METHADONE HYDROCHLORIDE 10 MG: 5 TABLET ORAL at 05:38

## 2017-01-01 RX ADMIN — MORPHINE SULFATE 4 MG: 4 INJECTION, SOLUTION INTRAMUSCULAR; INTRAVENOUS at 15:17

## 2017-01-01 RX ADMIN — LORAZEPAM 0.5 MG: 0.5 TABLET ORAL at 09:52

## 2017-01-01 RX ADMIN — LORAZEPAM 1 MG: 1 TABLET ORAL at 21:59

## 2017-01-01 RX ADMIN — HYDROMORPHONE HYDROCHLORIDE 4 MG: 2 INJECTION, SOLUTION INTRAMUSCULAR; INTRAVENOUS; SUBCUTANEOUS at 12:00

## 2017-01-01 RX ADMIN — Medication: at 03:25

## 2017-01-01 RX ADMIN — LORAZEPAM 0.5 MG: 0.5 TABLET ORAL at 18:49

## 2017-01-01 RX ADMIN — METOPROLOL TARTRATE 25 MG: 25 TABLET ORAL at 09:58

## 2017-01-01 RX ADMIN — DULOXETINE HYDROCHLORIDE 60 MG: 60 CAPSULE, DELAYED RELEASE ORAL at 21:01

## 2017-01-01 RX ADMIN — LORAZEPAM 0.5 MG: 0.5 TABLET ORAL at 09:11

## 2017-01-01 RX ADMIN — METOPROLOL TARTRATE 12.5 MG: 25 TABLET ORAL at 09:53

## 2017-01-01 RX ADMIN — Medication 1 MG: at 04:25

## 2017-01-01 RX ADMIN — METHADONE HYDROCHLORIDE 10 MG: 5 TABLET ORAL at 21:10

## 2017-01-01 RX ADMIN — DOCUSATE SODIUM 100 MG: 100 CAPSULE, LIQUID FILLED ORAL at 11:08

## 2017-01-01 RX ADMIN — OXYCODONE HYDROCHLORIDE 60 MG: 5 TABLET ORAL at 18:33

## 2017-01-01 RX ADMIN — PHENAZOPYRIDINE HYDROCHLORIDE 100 MG: 100 TABLET ORAL at 13:00

## 2017-01-01 RX ADMIN — Medication 1 MG: at 03:44

## 2017-01-01 RX ADMIN — LORAZEPAM 1 MG: 1 TABLET ORAL at 17:14

## 2017-01-01 RX ADMIN — HYDROMORPHONE HYDROCHLORIDE 4 MG: 2 INJECTION, SOLUTION INTRAMUSCULAR; INTRAVENOUS; SUBCUTANEOUS at 04:50

## 2017-01-01 RX ADMIN — Medication: at 23:27

## 2017-01-01 RX ADMIN — LORAZEPAM 1 MG: 2 INJECTION INTRAMUSCULAR; INTRAVENOUS at 09:06

## 2017-01-01 RX ADMIN — LORAZEPAM 0.5 MG: 0.5 TABLET ORAL at 13:11

## 2017-01-01 RX ADMIN — MORPHINE SULFATE 30 MG: 20 SOLUTION ORAL at 21:44

## 2017-01-01 RX ADMIN — METHADONE HYDROCHLORIDE 10 MG: 5 TABLET ORAL at 05:06

## 2017-01-01 RX ADMIN — GLYCOPYRROLATE 0.2 MG: 0.2 INJECTION INTRAMUSCULAR; INTRAVENOUS at 03:45

## 2017-01-01 RX ADMIN — METOPROLOL TARTRATE 12.5 MG: 25 TABLET ORAL at 09:02

## 2017-01-01 RX ADMIN — METOPROLOL TARTRATE 25 MG: 25 TABLET ORAL at 08:36

## 2017-01-01 RX ADMIN — KETOROLAC TROMETHAMINE 30 MG: 30 INJECTION, SOLUTION INTRAMUSCULAR at 18:21

## 2017-01-01 RX ADMIN — HALOPERIDOL 5 MG: 5 TABLET ORAL at 17:18

## 2017-01-01 RX ADMIN — HYDROMORPHONE HYDROCHLORIDE 4 MG: 2 INJECTION, SOLUTION INTRAMUSCULAR; INTRAVENOUS; SUBCUTANEOUS at 21:02

## 2017-01-01 RX ADMIN — Medication 10 ML: at 06:45

## 2017-01-01 RX ADMIN — MORPHINE SULFATE 4 MG: 4 INJECTION, SOLUTION INTRAMUSCULAR; INTRAVENOUS at 07:35

## 2017-01-01 RX ADMIN — KETOROLAC TROMETHAMINE 30 MG: 30 INJECTION, SOLUTION INTRAMUSCULAR at 08:03

## 2017-01-01 RX ADMIN — Medication 10 ML: at 05:46

## 2017-01-01 RX ADMIN — HALOPERIDOL LACTATE 2 MG: 5 INJECTION, SOLUTION INTRAMUSCULAR at 17:07

## 2017-01-01 RX ADMIN — METOPROLOL SUCCINATE 25 MG: 25 TABLET, FILM COATED, EXTENDED RELEASE ORAL at 08:50

## 2017-01-01 RX ADMIN — SULINDAC 200 MG: 200 TABLET ORAL at 18:49

## 2017-01-01 RX ADMIN — SULINDAC 200 MG: 200 TABLET ORAL at 08:13

## 2017-01-01 RX ADMIN — ACETAMINOPHEN 650 MG: 325 TABLET, FILM COATED ORAL at 16:48

## 2017-01-01 RX ADMIN — HYDROMORPHONE HYDROCHLORIDE 1 MG: 1 INJECTION, SOLUTION INTRAMUSCULAR; INTRAVENOUS; SUBCUTANEOUS at 17:00

## 2017-01-01 RX ADMIN — METHADONE HYDROCHLORIDE 10 MG: 5 TABLET ORAL at 21:34

## 2017-01-01 RX ADMIN — LORAZEPAM 1 MG: 1 TABLET ORAL at 00:21

## 2017-01-01 RX ADMIN — Medication 10 ML: at 17:54

## 2017-01-01 RX ADMIN — METHADONE HYDROCHLORIDE 10 MG: 5 TABLET ORAL at 18:20

## 2017-01-01 RX ADMIN — LORAZEPAM 1 MG: 1 TABLET ORAL at 07:50

## 2017-01-01 RX ADMIN — LORAZEPAM 1 MG: 1 TABLET ORAL at 06:43

## 2017-01-01 RX ADMIN — Medication 10 ML: at 21:41

## 2017-01-01 RX ADMIN — DULOXETINE HYDROCHLORIDE 30 MG: 30 CAPSULE, DELAYED RELEASE ORAL at 09:08

## 2017-01-01 RX ADMIN — HYDROMORPHONE HYDROCHLORIDE 4 MG: 2 INJECTION, SOLUTION INTRAMUSCULAR; INTRAVENOUS; SUBCUTANEOUS at 12:24

## 2017-01-01 RX ADMIN — OXYCODONE HYDROCHLORIDE 60 MG: 5 TABLET ORAL at 06:08

## 2017-01-01 RX ADMIN — HYDROMORPHONE HYDROCHLORIDE 1 MG: 2 INJECTION, SOLUTION INTRAMUSCULAR; INTRAVENOUS; SUBCUTANEOUS at 19:53

## 2017-01-01 RX ADMIN — LORAZEPAM 1 MG: 1 TABLET ORAL at 17:18

## 2017-01-01 RX ADMIN — SULINDAC 200 MG: 200 TABLET ORAL at 09:33

## 2017-01-01 RX ADMIN — DOCUSATE SODIUM 100 MG: 100 CAPSULE, LIQUID FILLED ORAL at 09:02

## 2017-01-01 RX ADMIN — FENTANYL CITRATE 50 MCG: 50 INJECTION, SOLUTION INTRAMUSCULAR; INTRAVENOUS at 10:29

## 2017-01-01 RX ADMIN — HYDROMORPHONE HYDROCHLORIDE 4 MG: 2 INJECTION, SOLUTION INTRAMUSCULAR; INTRAVENOUS; SUBCUTANEOUS at 13:10

## 2017-01-01 RX ADMIN — SULINDAC 200 MG: 200 TABLET ORAL at 11:04

## 2017-01-01 RX ADMIN — Medication 10 ML: at 14:21

## 2017-01-01 RX ADMIN — Medication: at 14:45

## 2017-01-01 RX ADMIN — Medication 10 ML: at 23:29

## 2017-01-01 RX ADMIN — ACETAMINOPHEN 1000 MG: 500 TABLET, FILM COATED ORAL at 09:15

## 2017-01-01 RX ADMIN — LIDOCAINE HYDROCHLORIDE 200 MG: 20 INJECTION, SOLUTION INFILTRATION; PERINEURAL at 11:35

## 2017-01-01 RX ADMIN — LORAZEPAM 1 MG: 1 TABLET ORAL at 18:22

## 2017-01-01 RX ADMIN — POLYETHYLENE GLYCOL 3350 17 G: 17 POWDER, FOR SOLUTION ORAL at 09:09

## 2017-01-01 RX ADMIN — HYDROMORPHONE HYDROCHLORIDE 2 MG: 1 INJECTION, SOLUTION INTRAMUSCULAR; INTRAVENOUS; SUBCUTANEOUS at 03:34

## 2017-01-01 RX ADMIN — GABAPENTIN 300 MG: 300 CAPSULE ORAL at 21:13

## 2017-01-01 RX ADMIN — SULINDAC 200 MG: 200 TABLET ORAL at 09:44

## 2017-01-01 RX ADMIN — METHADONE HYDROCHLORIDE 10 MG: 5 TABLET ORAL at 05:40

## 2017-01-01 RX ADMIN — LORAZEPAM 0.5 MG: 0.5 TABLET ORAL at 18:02

## 2017-01-01 RX ADMIN — HYDROMORPHONE HYDROCHLORIDE 3 MG: 2 INJECTION, SOLUTION INTRAMUSCULAR; INTRAVENOUS; SUBCUTANEOUS at 21:22

## 2017-01-01 RX ADMIN — MORPHINE SULFATE 20 MG: 20 SOLUTION ORAL at 06:26

## 2017-01-01 RX ADMIN — SULINDAC 200 MG: 200 TABLET ORAL at 09:20

## 2017-01-01 RX ADMIN — MORPHINE SULFATE 4 MG: 4 INJECTION, SOLUTION INTRAMUSCULAR; INTRAVENOUS at 12:28

## 2017-01-01 RX ADMIN — POLYETHYLENE GLYCOL (3350) 17 G: 17 POWDER, FOR SOLUTION ORAL at 08:30

## 2017-01-01 RX ADMIN — METOPROLOL TARTRATE 25 MG: 25 TABLET ORAL at 09:06

## 2017-01-01 RX ADMIN — HYDROMORPHONE HYDROCHLORIDE 4 MG: 2 INJECTION, SOLUTION INTRAMUSCULAR; INTRAVENOUS; SUBCUTANEOUS at 12:14

## 2017-01-01 RX ADMIN — LORAZEPAM 1 MG: 1 TABLET ORAL at 17:08

## 2017-01-01 RX ADMIN — Medication 1 MG: at 04:02

## 2017-01-01 RX ADMIN — METHADONE HYDROCHLORIDE 5 MG: 5 TABLET ORAL at 06:14

## 2017-01-01 RX ADMIN — METOPROLOL TARTRATE 12.5 MG: 25 TABLET ORAL at 08:59

## 2017-01-01 RX ADMIN — METHADONE HYDROCHLORIDE 10 MG: 5 TABLET ORAL at 14:54

## 2017-01-01 RX ADMIN — METOPROLOL TARTRATE 25 MG: 25 TABLET ORAL at 21:36

## 2017-01-01 RX ADMIN — LORAZEPAM 0.5 MG: 0.5 TABLET ORAL at 10:04

## 2017-01-01 RX ADMIN — OXYCODONE HYDROCHLORIDE 30 MG: 5 TABLET ORAL at 12:33

## 2017-01-01 RX ADMIN — METOPROLOL TARTRATE 25 MG: 25 TABLET ORAL at 20:24

## 2017-01-01 RX ADMIN — LORAZEPAM 1 MG: 1 TABLET ORAL at 22:56

## 2017-01-01 RX ADMIN — GABAPENTIN 300 MG: 300 CAPSULE ORAL at 21:07

## 2017-01-01 RX ADMIN — METHADONE HYDROCHLORIDE 10 MG: 5 TABLET ORAL at 16:08

## 2017-01-01 RX ADMIN — MORPHINE SULFATE 30 MG: 20 SOLUTION ORAL at 00:51

## 2017-01-01 RX ADMIN — ACETAMINOPHEN 650 MG: 325 TABLET, FILM COATED ORAL at 05:41

## 2017-01-01 RX ADMIN — HALOPERIDOL LACTATE 2 MG: 5 INJECTION, SOLUTION INTRAMUSCULAR at 17:11

## 2017-01-01 RX ADMIN — GABAPENTIN 300 MG: 300 CAPSULE ORAL at 21:34

## 2017-01-01 RX ADMIN — METOPROLOL TARTRATE 25 MG: 25 TABLET ORAL at 08:30

## 2017-01-01 RX ADMIN — Medication 10 ML: at 21:52

## 2017-01-01 RX ADMIN — STANDARDIZED SENNA CONCENTRATE AND DOCUSATE SODIUM 2 TABLET: 8.6; 5 TABLET, FILM COATED ORAL at 16:13

## 2017-01-01 RX ADMIN — METHADONE HYDROCHLORIDE 10 MG: 5 TABLET ORAL at 22:26

## 2017-01-01 RX ADMIN — METHADONE HYDROCHLORIDE 10 MG: 5 TABLET ORAL at 05:44

## 2017-01-01 RX ADMIN — GABAPENTIN 300 MG: 300 CAPSULE ORAL at 21:37

## 2017-01-01 RX ADMIN — DULOXETINE HYDROCHLORIDE 30 MG: 30 CAPSULE, DELAYED RELEASE ORAL at 22:00

## 2017-01-01 RX ADMIN — MORPHINE SULFATE 4 MG: 4 INJECTION, SOLUTION INTRAMUSCULAR; INTRAVENOUS at 23:58

## 2017-01-01 RX ADMIN — LORAZEPAM 1 MG: 2 INJECTION INTRAMUSCULAR; INTRAVENOUS at 12:49

## 2017-01-01 RX ADMIN — Medication 1 MG: at 16:49

## 2017-01-01 RX ADMIN — Medication: at 13:08

## 2017-01-01 RX ADMIN — NYSTATIN 500000 UNITS: 500000 SUSPENSION ORAL at 13:18

## 2017-01-01 RX ADMIN — METHADONE HYDROCHLORIDE 10 MG: 5 TABLET ORAL at 15:03

## 2017-01-01 RX ADMIN — MORPHINE SULFATE 30 MG: 20 SOLUTION ORAL at 12:45

## 2017-01-01 RX ADMIN — HYDROMORPHONE HYDROCHLORIDE 4 MG: 2 INJECTION INTRAMUSCULAR; INTRAVENOUS; SUBCUTANEOUS at 08:24

## 2017-01-01 RX ADMIN — ACETAMINOPHEN 1000 MG: 500 TABLET, FILM COATED ORAL at 16:12

## 2017-01-01 RX ADMIN — METHADONE HYDROCHLORIDE 10 MG: 5 TABLET ORAL at 05:35

## 2017-01-01 RX ADMIN — KETOROLAC TROMETHAMINE 30 MG: 30 INJECTION, SOLUTION INTRAMUSCULAR at 14:15

## 2017-01-01 RX ADMIN — SULINDAC 200 MG: 200 TABLET ORAL at 10:28

## 2017-01-01 RX ADMIN — ACETAMINOPHEN 1000 MG: 500 TABLET, FILM COATED ORAL at 22:34

## 2017-01-01 RX ADMIN — SULINDAC 200 MG: 200 TABLET ORAL at 16:42

## 2017-01-01 RX ADMIN — KETOROLAC TROMETHAMINE 30 MG: 30 INJECTION, SOLUTION INTRAMUSCULAR at 09:13

## 2017-01-01 RX ADMIN — MORPHINE SULFATE 30 MG: 20 SOLUTION ORAL at 06:14

## 2017-01-01 RX ADMIN — Medication 20 ML: at 22:48

## 2017-01-01 RX ADMIN — LORAZEPAM 1 MG: 1 TABLET ORAL at 23:41

## 2017-01-01 RX ADMIN — METOPROLOL TARTRATE 12.5 MG: 25 TABLET ORAL at 07:50

## 2017-01-01 RX ADMIN — POLYETHYLENE GLYCOL 3350 17 G: 17 POWDER, FOR SOLUTION ORAL at 19:49

## 2017-01-01 RX ADMIN — METOPROLOL TARTRATE 25 MG: 25 TABLET ORAL at 21:23

## 2017-01-01 RX ADMIN — METOPROLOL TARTRATE 25 MG: 25 TABLET ORAL at 09:47

## 2017-01-01 RX ADMIN — SULINDAC 200 MG: 200 TABLET ORAL at 08:12

## 2017-01-01 RX ADMIN — SULINDAC 200 MG: 200 TABLET ORAL at 08:04

## 2017-01-01 RX ADMIN — MORPHINE SULFATE 30 MG: 20 SOLUTION ORAL at 03:01

## 2017-01-01 RX ADMIN — LORAZEPAM 0.5 MG: 0.5 TABLET ORAL at 14:08

## 2017-01-01 RX ADMIN — METHADONE HYDROCHLORIDE 10 MG: 5 TABLET ORAL at 05:42

## 2017-01-01 RX ADMIN — HYDROMORPHONE HYDROCHLORIDE 4 MG: 2 INJECTION, SOLUTION INTRAMUSCULAR; INTRAVENOUS; SUBCUTANEOUS at 14:51

## 2017-01-01 RX ADMIN — SULINDAC 200 MG: 200 TABLET ORAL at 09:06

## 2017-01-01 RX ADMIN — Medication 1 MG: at 11:11

## 2017-01-01 RX ADMIN — SULINDAC 200 MG: 200 TABLET ORAL at 10:10

## 2017-01-01 RX ADMIN — OXYCODONE HYDROCHLORIDE 60 MG: 5 TABLET ORAL at 12:18

## 2017-01-01 RX ADMIN — METHADONE HYDROCHLORIDE 10 MG: 5 TABLET ORAL at 06:04

## 2017-01-01 RX ADMIN — DIPHENHYDRAMINE HYDROCHLORIDE 25 MG: 25 CAPSULE ORAL at 13:29

## 2017-01-01 RX ADMIN — HALOPERIDOL LACTATE 2 MG: 5 INJECTION, SOLUTION INTRAMUSCULAR at 23:55

## 2017-01-01 RX ADMIN — GABAPENTIN 300 MG: 300 CAPSULE ORAL at 17:50

## 2017-01-01 RX ADMIN — ACETAMINOPHEN 650 MG: 325 TABLET, FILM COATED ORAL at 04:56

## 2017-01-01 RX ADMIN — SULINDAC 200 MG: 200 TABLET ORAL at 08:06

## 2017-01-01 RX ADMIN — LORAZEPAM 1 MG: 1 TABLET ORAL at 20:21

## 2017-01-01 RX ADMIN — MORPHINE SULFATE 40 MG: 20 SOLUTION ORAL at 07:00

## 2017-01-01 RX ADMIN — GABAPENTIN 300 MG: 300 CAPSULE ORAL at 21:36

## 2017-01-01 RX ADMIN — LORAZEPAM 1 MG: 1 TABLET ORAL at 21:58

## 2017-01-01 RX ADMIN — STANDARDIZED SENNA CONCENTRATE AND DOCUSATE SODIUM 2 TABLET: 8.6; 5 TABLET, FILM COATED ORAL at 09:45

## 2017-01-01 RX ADMIN — HYDROMORPHONE HYDROCHLORIDE 4 MG: 2 INJECTION, SOLUTION INTRAMUSCULAR; INTRAVENOUS; SUBCUTANEOUS at 00:13

## 2017-01-01 RX ADMIN — OXYCODONE HYDROCHLORIDE 60 MG: 5 TABLET ORAL at 21:16

## 2017-01-01 RX ADMIN — DOCUSATE SODIUM 100 MG: 100 CAPSULE, LIQUID FILLED ORAL at 08:06

## 2017-01-01 RX ADMIN — LORAZEPAM 1 MG: 2 INJECTION INTRAMUSCULAR; INTRAVENOUS at 06:02

## 2017-01-01 RX ADMIN — METHADONE HYDROCHLORIDE 10 MG: 5 TABLET ORAL at 06:16

## 2017-01-01 RX ADMIN — STANDARDIZED SENNA CONCENTRATE AND DOCUSATE SODIUM 2 TABLET: 8.6; 5 TABLET, FILM COATED ORAL at 11:21

## 2017-01-01 RX ADMIN — GLYCOPYRROLATE 0.2 MG: 0.2 INJECTION, SOLUTION INTRAMUSCULAR; INTRAVENOUS at 18:12

## 2017-01-01 RX ADMIN — MORPHINE SULFATE 30 MG: 20 SOLUTION ORAL at 05:32

## 2017-01-01 RX ADMIN — HYDROMORPHONE HYDROCHLORIDE 4 MG: 2 INJECTION, SOLUTION INTRAMUSCULAR; INTRAVENOUS; SUBCUTANEOUS at 03:59

## 2017-01-01 RX ADMIN — Medication 10 ML: at 22:35

## 2017-01-01 RX ADMIN — Medication 10 ML: at 18:56

## 2017-01-01 RX ADMIN — DIPHENHYDRAMINE HYDROCHLORIDE 25 MG: 25 CAPSULE ORAL at 23:28

## 2017-01-01 RX ADMIN — MORPHINE SULFATE 4 MG: 4 INJECTION, SOLUTION INTRAMUSCULAR; INTRAVENOUS at 16:09

## 2017-01-01 RX ADMIN — DOCUSATE SODIUM 100 MG: 100 CAPSULE, LIQUID FILLED ORAL at 08:43

## 2017-01-01 RX ADMIN — POLYETHYLENE GLYCOL 3350 17 G: 17 POWDER, FOR SOLUTION ORAL at 21:53

## 2017-01-01 RX ADMIN — GABAPENTIN 600 MG: 300 CAPSULE ORAL at 08:01

## 2017-01-01 RX ADMIN — HYDROMORPHONE HYDROCHLORIDE 3 MG: 2 INJECTION, SOLUTION INTRAMUSCULAR; INTRAVENOUS; SUBCUTANEOUS at 01:48

## 2017-01-01 RX ADMIN — GABAPENTIN 300 MG: 300 CAPSULE ORAL at 21:00

## 2017-01-01 RX ADMIN — METHADONE HYDROCHLORIDE 10 MG: 5 TABLET ORAL at 13:12

## 2017-01-01 RX ADMIN — METHADONE HYDROCHLORIDE 7.5 MG: 5 TABLET ORAL at 05:42

## 2017-01-01 RX ADMIN — PHENAZOPYRIDINE HYDROCHLORIDE 100 MG: 100 TABLET ORAL at 11:08

## 2017-01-01 RX ADMIN — Medication 1 MG: at 16:43

## 2017-01-01 RX ADMIN — Medication: at 13:45

## 2017-01-01 RX ADMIN — HYDROMORPHONE HYDROCHLORIDE 2 MG: 2 INJECTION, SOLUTION INTRAMUSCULAR; INTRAVENOUS; SUBCUTANEOUS at 12:44

## 2017-01-01 RX ADMIN — SODIUM CHLORIDE, SODIUM LACTATE, POTASSIUM CHLORIDE, CALCIUM CHLORIDE: 600; 310; 30; 20 INJECTION, SOLUTION INTRAVENOUS at 09:04

## 2017-01-01 RX ADMIN — INSULIN LISPRO 2 UNITS: 100 INJECTION, SOLUTION INTRAVENOUS; SUBCUTANEOUS at 17:49

## 2017-01-01 RX ADMIN — SULINDAC 200 MG: 200 TABLET ORAL at 10:27

## 2017-01-01 RX ADMIN — Medication: at 11:33

## 2017-01-01 RX ADMIN — KETOROLAC TROMETHAMINE 30 MG: 30 INJECTION, SOLUTION INTRAMUSCULAR at 08:05

## 2017-01-01 RX ADMIN — KETOROLAC TROMETHAMINE 30 MG: 30 INJECTION, SOLUTION INTRAMUSCULAR at 05:15

## 2017-01-01 RX ADMIN — DULOXETINE HYDROCHLORIDE 30 MG: 30 CAPSULE, DELAYED RELEASE ORAL at 08:51

## 2017-01-01 RX ADMIN — HYDROMORPHONE HYDROCHLORIDE 4 MG: 2 INJECTION, SOLUTION INTRAMUSCULAR; INTRAVENOUS; SUBCUTANEOUS at 09:43

## 2017-01-01 RX ADMIN — Medication: at 21:41

## 2017-01-01 RX ADMIN — ROCURONIUM BROMIDE 20 MG: 10 INJECTION, SOLUTION INTRAVENOUS at 09:15

## 2017-01-01 RX ADMIN — Medication: at 16:47

## 2017-01-01 RX ADMIN — HYDROMORPHONE HYDROCHLORIDE 4 MG: 2 INJECTION INTRAMUSCULAR; INTRAVENOUS; SUBCUTANEOUS at 05:16

## 2017-01-01 RX ADMIN — OXYCODONE HYDROCHLORIDE 60 MG: 5 TABLET ORAL at 18:07

## 2017-01-01 RX ADMIN — MORPHINE SULFATE 30 MG: 20 SOLUTION ORAL at 18:09

## 2017-01-01 RX ADMIN — Medication: at 21:06

## 2017-01-01 RX ADMIN — SULINDAC 200 MG: 200 TABLET ORAL at 17:08

## 2017-01-01 RX ADMIN — HYDROMORPHONE HYDROCHLORIDE 4 MG: 2 INJECTION INTRAMUSCULAR; INTRAVENOUS; SUBCUTANEOUS at 08:00

## 2017-01-01 RX ADMIN — Medication 2 MG: at 12:24

## 2017-01-01 RX ADMIN — PHENAZOPYRIDINE HYDROCHLORIDE 100 MG: 100 TABLET ORAL at 10:01

## 2017-01-01 RX ADMIN — Medication: at 05:17

## 2017-01-01 RX ADMIN — HYDROMORPHONE HYDROCHLORIDE 4 MG: 2 INJECTION, SOLUTION INTRAMUSCULAR; INTRAVENOUS; SUBCUTANEOUS at 12:21

## 2017-01-01 RX ADMIN — METOPROLOL TARTRATE 25 MG: 25 TABLET ORAL at 08:06

## 2017-01-01 RX ADMIN — STANDARDIZED SENNA CONCENTRATE AND DOCUSATE SODIUM 2 TABLET: 8.6; 5 TABLET, FILM COATED ORAL at 08:04

## 2017-01-01 RX ADMIN — PHENAZOPYRIDINE HYDROCHLORIDE 100 MG: 100 TABLET ORAL at 09:16

## 2017-01-01 RX ADMIN — HYDROMORPHONE HYDROCHLORIDE 4 MG: 1 INJECTION, SOLUTION INTRAMUSCULAR; INTRAVENOUS; SUBCUTANEOUS at 22:15

## 2017-01-01 RX ADMIN — HYDROMORPHONE HYDROCHLORIDE 3 MG: 2 INJECTION, SOLUTION INTRAMUSCULAR; INTRAVENOUS; SUBCUTANEOUS at 22:25

## 2017-01-01 RX ADMIN — Medication 1 MG: at 23:54

## 2017-01-01 RX ADMIN — DEXAMETHASONE SODIUM PHOSPHATE 2 MG: 4 INJECTION, SOLUTION INTRAMUSCULAR; INTRAVENOUS at 21:32

## 2017-01-01 RX ADMIN — KETOROLAC TROMETHAMINE 30 MG: 30 INJECTION, SOLUTION INTRAMUSCULAR at 09:39

## 2017-01-01 RX ADMIN — METHADONE HYDROCHLORIDE 5 MG: 5 TABLET ORAL at 14:09

## 2017-01-01 RX ADMIN — METHADONE HYDROCHLORIDE 10 MG: 5 TABLET ORAL at 06:11

## 2017-01-01 RX ADMIN — KETOROLAC TROMETHAMINE 30 MG: 30 INJECTION, SOLUTION INTRAMUSCULAR at 18:29

## 2017-01-01 RX ADMIN — DOCUSATE SODIUM 100 MG: 100 CAPSULE, LIQUID FILLED ORAL at 09:14

## 2017-01-01 RX ADMIN — LORAZEPAM 1 MG: 1 TABLET ORAL at 21:11

## 2017-01-01 RX ADMIN — Medication: at 21:57

## 2017-01-01 RX ADMIN — DIPHENHYDRAMINE HYDROCHLORIDE 25 MG: 25 CAPSULE ORAL at 06:04

## 2017-01-01 RX ADMIN — SULINDAC 200 MG: 200 TABLET ORAL at 17:13

## 2017-01-01 RX ADMIN — INSULIN LISPRO 2 UNITS: 100 INJECTION, SOLUTION INTRAVENOUS; SUBCUTANEOUS at 17:56

## 2017-01-01 RX ADMIN — GABAPENTIN 300 MG: 300 CAPSULE ORAL at 21:15

## 2017-01-01 RX ADMIN — KETOROLAC TROMETHAMINE 15 MG: 30 INJECTION, SOLUTION INTRAMUSCULAR at 12:16

## 2017-01-01 RX ADMIN — HYDROMORPHONE HYDROCHLORIDE 4 MG: 2 INJECTION, SOLUTION INTRAMUSCULAR; INTRAVENOUS; SUBCUTANEOUS at 00:20

## 2017-01-01 RX ADMIN — Medication 1 MG: at 04:01

## 2017-01-01 RX ADMIN — Medication 1 MG: at 21:55

## 2017-01-01 RX ADMIN — METHADONE HYDROCHLORIDE 10 MG: 5 TABLET ORAL at 05:59

## 2017-01-01 RX ADMIN — STANDARDIZED SENNA CONCENTRATE AND DOCUSATE SODIUM 2 TABLET: 8.6; 5 TABLET, FILM COATED ORAL at 15:13

## 2017-01-01 RX ADMIN — METHADONE HYDROCHLORIDE 10 MG: 5 TABLET ORAL at 06:43

## 2017-01-01 RX ADMIN — MORPHINE SULFATE 40 MG: 20 SOLUTION ORAL at 04:03

## 2017-01-01 RX ADMIN — METOPROLOL TARTRATE 25 MG: 25 TABLET ORAL at 08:58

## 2017-01-01 RX ADMIN — DOCUSATE SODIUM 100 MG: 100 CAPSULE, LIQUID FILLED ORAL at 08:29

## 2017-01-01 RX ADMIN — HYDROMORPHONE HYDROCHLORIDE 4 MG: 2 INJECTION INTRAMUSCULAR; INTRAVENOUS; SUBCUTANEOUS at 19:30

## 2017-01-01 RX ADMIN — METOPROLOL SUCCINATE 50 MG: 50 TABLET, EXTENDED RELEASE ORAL at 09:32

## 2017-01-01 RX ADMIN — POLYETHYLENE GLYCOL 3350 17 G: 17 POWDER, FOR SOLUTION ORAL at 21:01

## 2017-01-01 RX ADMIN — ACETAMINOPHEN 650 MG: 325 TABLET, FILM COATED ORAL at 10:37

## 2017-01-01 RX ADMIN — Medication 1 MG: at 16:10

## 2017-01-01 RX ADMIN — LORAZEPAM 0.5 MG: 0.5 TABLET ORAL at 08:45

## 2017-01-01 RX ADMIN — MORPHINE SULFATE 4 MG: 4 INJECTION, SOLUTION INTRAMUSCULAR; INTRAVENOUS at 05:05

## 2017-01-01 RX ADMIN — HYDROMORPHONE HYDROCHLORIDE 1 MG: 2 INJECTION, SOLUTION INTRAMUSCULAR; INTRAVENOUS; SUBCUTANEOUS at 12:56

## 2017-01-01 RX ADMIN — Medication 1 MG: at 04:22

## 2017-01-01 RX ADMIN — GABAPENTIN 300 MG: 300 CAPSULE ORAL at 08:58

## 2017-01-01 RX ADMIN — SULINDAC 200 MG: 200 TABLET ORAL at 17:50

## 2017-01-01 RX ADMIN — Medication: at 00:24

## 2017-01-01 RX ADMIN — METOPROLOL TARTRATE 25 MG: 25 TABLET ORAL at 09:09

## 2017-01-01 RX ADMIN — ACETAMINOPHEN 650 MG: 325 TABLET, FILM COATED ORAL at 09:46

## 2017-01-01 RX ADMIN — DIPHENHYDRAMINE HYDROCHLORIDE 50 MG: 25 CAPSULE ORAL at 08:10

## 2017-01-01 RX ADMIN — LORAZEPAM 1 MG: 2 INJECTION INTRAMUSCULAR; INTRAVENOUS at 21:52

## 2017-01-01 RX ADMIN — Medication 10 ML: at 21:10

## 2017-01-01 RX ADMIN — DIPHENHYDRAMINE HYDROCHLORIDE 50 MG: 25 CAPSULE ORAL at 02:42

## 2017-01-01 RX ADMIN — SULINDAC 200 MG: 200 TABLET ORAL at 17:14

## 2017-01-01 RX ADMIN — Medication 1 MG: at 08:24

## 2017-01-01 RX ADMIN — ACETAMINOPHEN 650 MG: 325 TABLET, FILM COATED ORAL at 06:28

## 2017-01-01 RX ADMIN — GABAPENTIN 300 MG: 300 CAPSULE ORAL at 21:27

## 2017-01-01 RX ADMIN — METOPROLOL TARTRATE 25 MG: 25 TABLET ORAL at 09:16

## 2017-01-01 RX ADMIN — DOCUSATE SODIUM 100 MG: 100 CAPSULE, LIQUID FILLED ORAL at 09:07

## 2017-01-01 RX ADMIN — Medication 20 ML: at 06:53

## 2017-01-01 RX ADMIN — STANDARDIZED SENNA CONCENTRATE AND DOCUSATE SODIUM 2 TABLET: 8.6; 5 TABLET, FILM COATED ORAL at 11:31

## 2017-01-01 RX ADMIN — IOPAMIDOL 100 ML: 755 INJECTION, SOLUTION INTRAVENOUS at 21:46

## 2017-01-01 RX ADMIN — LORAZEPAM 1 MG: 1 TABLET ORAL at 09:41

## 2017-01-01 RX ADMIN — GLYCOPYRROLATE 0.2 MG: 0.2 INJECTION INTRAMUSCULAR; INTRAVENOUS at 03:42

## 2017-01-01 RX ADMIN — GABAPENTIN 300 MG: 300 CAPSULE ORAL at 18:32

## 2017-01-01 RX ADMIN — Medication: at 04:20

## 2017-01-01 RX ADMIN — LORAZEPAM 1 MG: 1 TABLET ORAL at 21:20

## 2017-01-01 RX ADMIN — DIPHENHYDRAMINE HYDROCHLORIDE 25 MG: 25 CAPSULE ORAL at 12:33

## 2017-01-01 RX ADMIN — MORPHINE SULFATE 30 MG: 20 SOLUTION ORAL at 03:31

## 2017-01-01 RX ADMIN — KETOROLAC TROMETHAMINE 30 MG: 30 INJECTION, SOLUTION INTRAMUSCULAR at 09:54

## 2017-01-01 RX ADMIN — DOCUSATE SODIUM 100 MG: 100 CAPSULE, LIQUID FILLED ORAL at 09:36

## 2017-01-01 RX ADMIN — Medication: at 17:12

## 2017-01-01 RX ADMIN — DOCUSATE SODIUM 100 MG: 100 CAPSULE, LIQUID FILLED ORAL at 09:06

## 2017-01-01 RX ADMIN — Medication: at 00:11

## 2017-01-01 RX ADMIN — METHADONE HYDROCHLORIDE 5 MG: 5 TABLET ORAL at 14:30

## 2017-01-01 RX ADMIN — LORAZEPAM 1 MG: 1 TABLET ORAL at 22:18

## 2017-01-01 RX ADMIN — METHADONE HYDROCHLORIDE 10 MG: 5 TABLET ORAL at 16:41

## 2017-01-01 RX ADMIN — HYDROMORPHONE HYDROCHLORIDE 2 MG: 2 INJECTION, SOLUTION INTRAMUSCULAR; INTRAVENOUS; SUBCUTANEOUS at 05:08

## 2017-01-01 RX ADMIN — METHADONE HYDROCHLORIDE 10 MG: 5 TABLET ORAL at 05:20

## 2017-01-01 RX ADMIN — KETOROLAC TROMETHAMINE 30 MG: 30 INJECTION, SOLUTION INTRAMUSCULAR at 15:16

## 2017-01-01 RX ADMIN — GABAPENTIN 300 MG: 300 CAPSULE ORAL at 21:05

## 2017-01-01 RX ADMIN — OXYCODONE HYDROCHLORIDE 60 MG: 5 TABLET ORAL at 09:08

## 2017-01-01 RX ADMIN — METHADONE HYDROCHLORIDE 10 MG: 5 TABLET ORAL at 06:01

## 2017-01-01 RX ADMIN — MIDAZOLAM HYDROCHLORIDE 2 MG: 1 INJECTION, SOLUTION INTRAMUSCULAR; INTRAVENOUS at 09:48

## 2017-01-01 RX ADMIN — HYDROMORPHONE HYDROCHLORIDE 4 MG: 2 INJECTION, SOLUTION INTRAMUSCULAR; INTRAVENOUS; SUBCUTANEOUS at 18:20

## 2017-01-01 RX ADMIN — LORAZEPAM 1 MG: 1 TABLET ORAL at 06:35

## 2017-01-01 RX ADMIN — POLYETHYLENE GLYCOL (3350) 17 G: 17 POWDER, FOR SOLUTION ORAL at 08:39

## 2017-01-01 RX ADMIN — Medication 10 ML: at 18:01

## 2017-01-01 RX ADMIN — METOPROLOL TARTRATE 12.5 MG: 25 TABLET ORAL at 10:11

## 2017-01-01 RX ADMIN — KETOROLAC TROMETHAMINE 30 MG: 30 INJECTION, SOLUTION INTRAMUSCULAR at 17:39

## 2017-01-01 RX ADMIN — METHADONE HYDROCHLORIDE 10 MG: 5 TABLET ORAL at 21:37

## 2017-01-01 RX ADMIN — MORPHINE SULFATE 10 MG: 20 SOLUTION ORAL at 10:51

## 2017-01-01 RX ADMIN — HYDROMORPHONE HYDROCHLORIDE 4 MG: 2 INJECTION, SOLUTION INTRAMUSCULAR; INTRAVENOUS; SUBCUTANEOUS at 10:09

## 2017-01-01 RX ADMIN — KETOROLAC TROMETHAMINE 30 MG: 30 INJECTION, SOLUTION INTRAMUSCULAR at 19:52

## 2017-01-01 RX ADMIN — POLYETHYLENE GLYCOL 3350 17 G: 17 POWDER, FOR SOLUTION ORAL at 09:27

## 2017-01-01 RX ADMIN — METHADONE HYDROCHLORIDE 10 MG: 5 TABLET ORAL at 22:18

## 2017-01-01 RX ADMIN — LORAZEPAM 1 MG: 1 TABLET ORAL at 12:28

## 2017-01-01 RX ADMIN — Medication 1 MG: at 19:34

## 2017-01-01 RX ADMIN — Medication 1 MG: at 16:20

## 2017-01-01 RX ADMIN — GABAPENTIN 300 MG: 300 CAPSULE ORAL at 20:40

## 2017-01-01 RX ADMIN — Medication: at 22:50

## 2017-01-01 RX ADMIN — Medication: at 00:08

## 2017-01-01 RX ADMIN — METHADONE HYDROCHLORIDE 5 MG: 5 TABLET ORAL at 13:35

## 2017-01-01 RX ADMIN — Medication 1 MG: at 14:12

## 2017-01-01 RX ADMIN — Medication 2 MG: at 18:48

## 2017-01-01 RX ADMIN — KETOROLAC TROMETHAMINE 30 MG: 30 INJECTION, SOLUTION INTRAMUSCULAR at 04:48

## 2017-01-01 RX ADMIN — MORPHINE SULFATE 20 MG: 20 SOLUTION ORAL at 01:02

## 2017-01-01 RX ADMIN — DOCUSATE SODIUM 100 MG: 100 CAPSULE, LIQUID FILLED ORAL at 09:58

## 2017-01-01 RX ADMIN — LORAZEPAM 1 MG: 2 INJECTION INTRAMUSCULAR; INTRAVENOUS at 13:25

## 2017-01-01 RX ADMIN — LORAZEPAM 1 MG: 1 TABLET ORAL at 23:29

## 2017-01-01 RX ADMIN — LORAZEPAM 1 MG: 2 INJECTION INTRAMUSCULAR; INTRAVENOUS at 18:39

## 2017-01-01 RX ADMIN — STANDARDIZED SENNA CONCENTRATE AND DOCUSATE SODIUM 2 TABLET: 8.6; 5 TABLET, FILM COATED ORAL at 10:25

## 2017-01-01 RX ADMIN — STANDARDIZED SENNA CONCENTRATE AND DOCUSATE SODIUM 2 TABLET: 8.6; 5 TABLET, FILM COATED ORAL at 17:32

## 2017-01-01 RX ADMIN — OXYCODONE HYDROCHLORIDE 60 MG: 5 TABLET ORAL at 21:51

## 2017-01-01 RX ADMIN — HYDROMORPHONE HYDROCHLORIDE 4 MG: 2 INJECTION, SOLUTION INTRAMUSCULAR; INTRAVENOUS; SUBCUTANEOUS at 00:34

## 2017-01-01 RX ADMIN — LORAZEPAM 1 MG: 1 TABLET ORAL at 13:12

## 2017-01-01 RX ADMIN — GABAPENTIN 600 MG: 300 CAPSULE ORAL at 16:09

## 2017-01-01 RX ADMIN — LORAZEPAM 1 MG: 1 TABLET ORAL at 05:15

## 2017-01-01 RX ADMIN — METHADONE HYDROCHLORIDE 10 MG: 5 TABLET ORAL at 06:34

## 2017-01-01 RX ADMIN — HYDROMORPHONE HYDROCHLORIDE 4 MG: 2 INJECTION, SOLUTION INTRAMUSCULAR; INTRAVENOUS; SUBCUTANEOUS at 21:21

## 2017-01-01 RX ADMIN — SODIUM CHLORIDE 125 ML/HR: 900 INJECTION, SOLUTION INTRAVENOUS at 16:44

## 2017-01-01 RX ADMIN — HALOPERIDOL LACTATE 2 MG: 5 INJECTION, SOLUTION INTRAMUSCULAR at 17:34

## 2017-01-01 RX ADMIN — Medication 1 MG: at 12:42

## 2017-01-01 RX ADMIN — Medication: at 14:08

## 2017-01-01 RX ADMIN — Medication 1 MG: at 12:01

## 2017-01-01 RX ADMIN — DOCUSATE SODIUM 100 MG: 100 CAPSULE, LIQUID FILLED ORAL at 08:40

## 2017-01-01 RX ADMIN — Medication 2 MG: at 19:54

## 2017-01-01 RX ADMIN — MORPHINE SULFATE 40 MG: 20 SOLUTION ORAL at 19:00

## 2017-01-01 RX ADMIN — STANDARDIZED SENNA CONCENTRATE AND DOCUSATE SODIUM 2 TABLET: 8.6; 5 TABLET, FILM COATED ORAL at 09:20

## 2017-01-01 RX ADMIN — ONDANSETRON 4 MG: 2 INJECTION INTRAMUSCULAR; INTRAVENOUS at 22:45

## 2017-01-01 RX ADMIN — Medication 10 ML: at 21:51

## 2017-01-01 RX ADMIN — LORAZEPAM 0.5 MG: 0.5 TABLET ORAL at 18:00

## 2017-01-01 RX ADMIN — Medication 1 MG: at 16:39

## 2017-01-01 RX ADMIN — HYDROMORPHONE HYDROCHLORIDE 1.5 MG: 2 INJECTION, SOLUTION INTRAMUSCULAR; INTRAVENOUS; SUBCUTANEOUS at 04:10

## 2017-01-01 RX ADMIN — METHADONE HYDROCHLORIDE 5 MG: 5 TABLET ORAL at 22:52

## 2017-01-01 RX ADMIN — GABAPENTIN 300 MG: 300 CAPSULE ORAL at 17:03

## 2017-01-01 RX ADMIN — LORAZEPAM 0.5 MG: 0.5 TABLET ORAL at 08:00

## 2017-01-01 RX ADMIN — LORAZEPAM 1 MG: 1 TABLET ORAL at 22:23

## 2017-01-01 RX ADMIN — METOPROLOL TARTRATE 25 MG: 25 TABLET ORAL at 17:03

## 2017-01-01 RX ADMIN — LORAZEPAM 1 MG: 2 INJECTION INTRAMUSCULAR; INTRAVENOUS at 03:26

## 2017-01-01 RX ADMIN — Medication: at 13:14

## 2017-01-01 RX ADMIN — LORAZEPAM 1 MG: 1 TABLET ORAL at 21:02

## 2017-01-01 RX ADMIN — Medication 1 MG: at 16:44

## 2017-01-01 RX ADMIN — HYDROMORPHONE HYDROCHLORIDE 4 MG: 2 INJECTION, SOLUTION INTRAMUSCULAR; INTRAVENOUS; SUBCUTANEOUS at 18:33

## 2017-01-01 RX ADMIN — HALOPERIDOL 5 MG: 5 TABLET ORAL at 15:13

## 2017-01-01 RX ADMIN — ACETAMINOPHEN 650 MG: 325 TABLET, FILM COATED ORAL at 06:26

## 2017-01-01 RX ADMIN — HYDROMORPHONE HYDROCHLORIDE 1 MG: 2 INJECTION, SOLUTION INTRAMUSCULAR; INTRAVENOUS; SUBCUTANEOUS at 04:57

## 2017-01-01 RX ADMIN — LORAZEPAM 1 MG: 2 INJECTION INTRAMUSCULAR; INTRAVENOUS at 10:43

## 2017-01-01 RX ADMIN — Medication 20 ML: at 21:07

## 2017-01-01 RX ADMIN — Medication 1 MG: at 06:30

## 2017-01-01 RX ADMIN — MORPHINE SULFATE 4 MG: 4 INJECTION, SOLUTION INTRAMUSCULAR; INTRAVENOUS at 06:30

## 2017-01-01 RX ADMIN — STANDARDIZED SENNA CONCENTRATE AND DOCUSATE SODIUM 2 TABLET: 8.6; 5 TABLET, FILM COATED ORAL at 19:30

## 2017-01-01 RX ADMIN — HYDROMORPHONE HYDROCHLORIDE 4 MG: 2 INJECTION, SOLUTION INTRAMUSCULAR; INTRAVENOUS; SUBCUTANEOUS at 09:48

## 2017-01-01 RX ADMIN — HYDROMORPHONE HYDROCHLORIDE 3 MG: 1 INJECTION, SOLUTION INTRAMUSCULAR; INTRAVENOUS; SUBCUTANEOUS at 12:11

## 2017-01-01 RX ADMIN — MORPHINE SULFATE 50 MG: 20 SOLUTION ORAL at 19:21

## 2017-01-01 RX ADMIN — LORAZEPAM 0.5 MG: 0.5 TABLET ORAL at 10:03

## 2017-01-01 RX ADMIN — KETOROLAC TROMETHAMINE 30 MG: 30 INJECTION, SOLUTION INTRAMUSCULAR at 20:16

## 2017-01-01 RX ADMIN — Medication 10 ML: at 13:56

## 2017-01-01 RX ADMIN — STANDARDIZED SENNA CONCENTRATE AND DOCUSATE SODIUM 2 TABLET: 8.6; 5 TABLET, FILM COATED ORAL at 10:10

## 2017-01-01 RX ADMIN — LORAZEPAM 1 MG: 1 TABLET ORAL at 13:24

## 2017-01-01 RX ADMIN — ENOXAPARIN SODIUM 111 MG: 120 INJECTION SUBCUTANEOUS at 00:47

## 2017-01-01 RX ADMIN — HYDROMORPHONE HYDROCHLORIDE 3 MG: 2 INJECTION, SOLUTION INTRAMUSCULAR; INTRAVENOUS; SUBCUTANEOUS at 00:09

## 2017-01-01 RX ADMIN — Medication: at 14:35

## 2017-01-01 RX ADMIN — HYDROMORPHONE HYDROCHLORIDE 1 MG: 2 INJECTION, SOLUTION INTRAMUSCULAR; INTRAVENOUS; SUBCUTANEOUS at 00:55

## 2017-01-01 RX ADMIN — ACETAMINOPHEN 1000 MG: 500 TABLET, FILM COATED ORAL at 21:16

## 2017-01-01 RX ADMIN — METOPROLOL TARTRATE 25 MG: 25 TABLET ORAL at 20:36

## 2017-01-01 RX ADMIN — SULINDAC 200 MG: 200 TABLET ORAL at 09:01

## 2017-01-01 RX ADMIN — MORPHINE SULFATE 4 MG: 4 INJECTION, SOLUTION INTRAMUSCULAR; INTRAVENOUS at 08:37

## 2017-01-01 RX ADMIN — WATER 2 MG: 1 INJECTION INTRAMUSCULAR; INTRAVENOUS; SUBCUTANEOUS at 09:08

## 2017-01-01 RX ADMIN — MORPHINE SULFATE 30 MG: 20 SOLUTION ORAL at 18:13

## 2017-01-01 RX ADMIN — HYDROMORPHONE HYDROCHLORIDE 1 MG: 2 INJECTION, SOLUTION INTRAMUSCULAR; INTRAVENOUS; SUBCUTANEOUS at 22:40

## 2017-01-01 RX ADMIN — SULINDAC 200 MG: 200 TABLET ORAL at 17:48

## 2017-01-01 RX ADMIN — Medication 1 MG: at 21:53

## 2017-01-01 RX ADMIN — HALOPERIDOL 5 MG: 5 TABLET ORAL at 11:21

## 2017-01-01 RX ADMIN — HYDROMORPHONE HYDROCHLORIDE 4 MG: 2 INJECTION, SOLUTION INTRAMUSCULAR; INTRAVENOUS; SUBCUTANEOUS at 14:04

## 2017-01-01 RX ADMIN — MORPHINE SULFATE 30 MG: 20 SOLUTION ORAL at 00:08

## 2017-01-01 RX ADMIN — HYDROMORPHONE HYDROCHLORIDE 2 MG: 2 INJECTION, SOLUTION INTRAMUSCULAR; INTRAVENOUS; SUBCUTANEOUS at 22:06

## 2017-01-01 RX ADMIN — METHADONE HYDROCHLORIDE 10 MG: 10 TABLET ORAL at 10:25

## 2017-01-01 RX ADMIN — GABAPENTIN 300 MG: 300 CAPSULE ORAL at 22:00

## 2017-01-01 RX ADMIN — LORAZEPAM 0.5 MG: 0.5 TABLET ORAL at 13:20

## 2017-01-01 RX ADMIN — METOPROLOL TARTRATE 25 MG: 25 TABLET ORAL at 20:41

## 2017-01-01 RX ADMIN — HYDROMORPHONE HYDROCHLORIDE 4 MG: 2 INJECTION INTRAMUSCULAR; INTRAVENOUS; SUBCUTANEOUS at 15:26

## 2017-01-01 RX ADMIN — LORAZEPAM 1 MG: 2 INJECTION INTRAMUSCULAR; INTRAVENOUS at 18:56

## 2017-01-01 RX ADMIN — Medication 10 ML: at 13:40

## 2017-01-01 RX ADMIN — Medication: at 01:15

## 2017-01-01 RX ADMIN — LORAZEPAM 0.5 MG: 0.5 TABLET ORAL at 16:08

## 2017-01-01 RX ADMIN — KETOROLAC TROMETHAMINE 30 MG: 30 INJECTION, SOLUTION INTRAMUSCULAR at 15:00

## 2017-01-01 RX ADMIN — HYDROMORPHONE HYDROCHLORIDE 4 MG: 2 INJECTION, SOLUTION INTRAMUSCULAR; INTRAVENOUS; SUBCUTANEOUS at 12:48

## 2017-01-01 RX ADMIN — KETOROLAC TROMETHAMINE 30 MG: 30 INJECTION, SOLUTION INTRAMUSCULAR at 13:24

## 2017-01-01 RX ADMIN — KETOROLAC TROMETHAMINE 30 MG: 30 INJECTION, SOLUTION INTRAMUSCULAR at 14:16

## 2017-01-01 RX ADMIN — LORAZEPAM 1 MG: 1 TABLET ORAL at 02:47

## 2017-01-01 RX ADMIN — HALOPERIDOL LACTATE 2 MG: 5 INJECTION, SOLUTION INTRAMUSCULAR at 03:43

## 2017-01-01 RX ADMIN — Medication: at 19:40

## 2017-01-01 RX ADMIN — HYDROMORPHONE HYDROCHLORIDE 4 MG: 2 INJECTION, SOLUTION INTRAMUSCULAR; INTRAVENOUS; SUBCUTANEOUS at 16:47

## 2017-01-01 RX ADMIN — STANDARDIZED SENNA CONCENTRATE AND DOCUSATE SODIUM 2 TABLET: 8.6; 5 TABLET, FILM COATED ORAL at 09:39

## 2017-01-01 RX ADMIN — OXYCODONE HYDROCHLORIDE 60 MG: 5 TABLET ORAL at 06:00

## 2017-01-01 RX ADMIN — LORAZEPAM 1 MG: 1 TABLET ORAL at 08:22

## 2017-01-01 RX ADMIN — ACETAMINOPHEN 1000 MG: 500 TABLET, FILM COATED ORAL at 10:16

## 2017-01-01 RX ADMIN — Medication 2 MG: at 21:03

## 2017-01-01 RX ADMIN — METOPROLOL TARTRATE 25 MG: 25 TABLET ORAL at 09:10

## 2017-01-01 RX ADMIN — Medication: at 22:45

## 2017-01-01 RX ADMIN — POLYETHYLENE GLYCOL (3350) 17 G: 17 POWDER, FOR SOLUTION ORAL at 09:55

## 2017-01-01 RX ADMIN — Medication: at 03:27

## 2017-01-01 RX ADMIN — SULINDAC 200 MG: 200 TABLET ORAL at 10:05

## 2017-01-01 RX ADMIN — METOPROLOL TARTRATE 25 MG: 25 TABLET ORAL at 09:46

## 2017-01-01 RX ADMIN — MORPHINE SULFATE 20 MG: 20 SOLUTION ORAL at 03:42

## 2017-01-01 RX ADMIN — STANDARDIZED SENNA CONCENTRATE AND DOCUSATE SODIUM 2 TABLET: 8.6; 5 TABLET, FILM COATED ORAL at 06:35

## 2017-01-01 RX ADMIN — CEPHALEXIN 500 MG: 250 CAPSULE ORAL at 09:34

## 2017-01-01 RX ADMIN — METOPROLOL SUCCINATE 50 MG: 50 TABLET, EXTENDED RELEASE ORAL at 09:33

## 2017-01-01 RX ADMIN — Medication: at 07:14

## 2017-01-01 RX ADMIN — ACETAMINOPHEN 650 MG: 325 TABLET, FILM COATED ORAL at 09:57

## 2017-01-01 RX ADMIN — MENTHOL, METHYL SALICYLATE: 10; 15 CREAM TOPICAL at 16:45

## 2017-01-01 RX ADMIN — MORPHINE SULFATE 30 MG: 20 SOLUTION ORAL at 00:01

## 2017-01-01 RX ADMIN — SULINDAC 200 MG: 200 TABLET ORAL at 10:07

## 2017-01-01 RX ADMIN — CEPHALEXIN 500 MG: 250 CAPSULE ORAL at 22:00

## 2017-01-01 RX ADMIN — Medication: at 12:16

## 2017-01-01 RX ADMIN — GABAPENTIN 300 MG: 300 CAPSULE ORAL at 20:47

## 2017-01-01 RX ADMIN — STANDARDIZED SENNA CONCENTRATE AND DOCUSATE SODIUM 2 TABLET: 8.6; 5 TABLET, FILM COATED ORAL at 09:11

## 2017-01-01 RX ADMIN — HYDROMORPHONE HYDROCHLORIDE 4 MG: 2 INJECTION, SOLUTION INTRAMUSCULAR; INTRAVENOUS; SUBCUTANEOUS at 18:56

## 2017-01-01 RX ADMIN — HYDROMORPHONE HYDROCHLORIDE 4 MG: 2 INJECTION, SOLUTION INTRAMUSCULAR; INTRAVENOUS; SUBCUTANEOUS at 19:54

## 2017-01-01 RX ADMIN — MORPHINE SULFATE 4 MG: 4 INJECTION, SOLUTION INTRAMUSCULAR; INTRAVENOUS at 03:13

## 2017-01-01 RX ADMIN — METHADONE HYDROCHLORIDE 10 MG: 5 TABLET ORAL at 15:16

## 2017-01-01 RX ADMIN — GABAPENTIN 300 MG: 300 CAPSULE ORAL at 21:11

## 2017-01-01 RX ADMIN — LORAZEPAM 1 MG: 1 TABLET ORAL at 09:48

## 2017-01-01 RX ADMIN — HYDROMORPHONE HYDROCHLORIDE 4 MG: 2 INJECTION, SOLUTION INTRAMUSCULAR; INTRAVENOUS; SUBCUTANEOUS at 16:02

## 2017-01-01 RX ADMIN — MORPHINE SULFATE 50 MG: 20 SOLUTION ORAL at 22:12

## 2017-01-01 RX ADMIN — HYDROMORPHONE HYDROCHLORIDE 1 MG: 2 INJECTION, SOLUTION INTRAMUSCULAR; INTRAVENOUS; SUBCUTANEOUS at 22:29

## 2017-01-01 RX ADMIN — LORAZEPAM 1 MG: 1 TABLET ORAL at 18:29

## 2017-01-01 RX ADMIN — ACETAMINOPHEN 650 MG: 325 TABLET, FILM COATED ORAL at 00:05

## 2017-01-01 RX ADMIN — METHADONE HYDROCHLORIDE 10 MG: 5 TABLET ORAL at 15:30

## 2017-01-01 RX ADMIN — Medication: at 08:15

## 2017-01-01 RX ADMIN — METOPROLOL TARTRATE 25 MG: 25 TABLET ORAL at 09:28

## 2017-01-01 RX ADMIN — Medication 10 ML: at 13:55

## 2017-01-01 RX ADMIN — LORAZEPAM 1 MG: 1 TABLET ORAL at 16:32

## 2017-01-01 RX ADMIN — Medication 1 MG: at 00:43

## 2017-01-01 RX ADMIN — DOCUSATE SODIUM AND SENNOSIDES 2 TABLET: 8.6; 5 TABLET, FILM COATED ORAL at 22:47

## 2017-01-01 RX ADMIN — LORAZEPAM 1 MG: 1 TABLET ORAL at 13:41

## 2017-01-01 RX ADMIN — DEXTROSE MONOHYDRATE 250 ML: 10 INJECTION, SOLUTION INTRAVENOUS at 13:03

## 2017-01-01 RX ADMIN — DOCUSATE SODIUM 100 MG: 100 CAPSULE, LIQUID FILLED ORAL at 08:13

## 2017-01-01 RX ADMIN — METHADONE HYDROCHLORIDE 10 MG: 5 TABLET ORAL at 16:20

## 2017-01-01 RX ADMIN — ACETAMINOPHEN 1000 MG: 500 TABLET, FILM COATED ORAL at 06:44

## 2017-01-01 RX ADMIN — METHADONE HYDROCHLORIDE 7.5 MG: 5 TABLET ORAL at 22:02

## 2017-01-01 RX ADMIN — METHADONE HYDROCHLORIDE 10 MG: 5 TABLET ORAL at 13:43

## 2017-01-01 RX ADMIN — POLYETHYLENE GLYCOL 3350 17 G: 17 POWDER, FOR SOLUTION ORAL at 13:41

## 2017-01-01 RX ADMIN — KETOROLAC TROMETHAMINE 30 MG: 30 INJECTION, SOLUTION INTRAMUSCULAR at 18:08

## 2017-01-01 RX ADMIN — SENNOSIDES 17.2 MG: 8.6 TABLET, FILM COATED ORAL at 17:50

## 2017-01-01 RX ADMIN — WATER: 1 INJECTION INTRAMUSCULAR; INTRAVENOUS; SUBCUTANEOUS at 14:00

## 2017-01-01 RX ADMIN — HYDROMORPHONE HYDROCHLORIDE 4 MG: 2 INJECTION, SOLUTION INTRAMUSCULAR; INTRAVENOUS; SUBCUTANEOUS at 16:20

## 2017-01-01 RX ADMIN — Medication: at 00:15

## 2017-01-01 RX ADMIN — HYDROMORPHONE HYDROCHLORIDE 4 MG: 2 INJECTION, SOLUTION INTRAMUSCULAR; INTRAVENOUS; SUBCUTANEOUS at 10:00

## 2017-01-01 RX ADMIN — HYDROMORPHONE HYDROCHLORIDE 4 MG: 2 INJECTION, SOLUTION INTRAMUSCULAR; INTRAVENOUS; SUBCUTANEOUS at 07:39

## 2017-01-01 RX ADMIN — LORAZEPAM 1 MG: 1 TABLET ORAL at 05:00

## 2017-01-01 RX ADMIN — HYDROMORPHONE HYDROCHLORIDE 4 MG: 2 INJECTION, SOLUTION INTRAMUSCULAR; INTRAVENOUS; SUBCUTANEOUS at 17:39

## 2017-01-01 RX ADMIN — METOPROLOL TARTRATE 12.5 MG: 25 TABLET ORAL at 21:28

## 2017-01-01 RX ADMIN — METOPROLOL TARTRATE 25 MG: 25 TABLET ORAL at 21:44

## 2017-01-01 RX ADMIN — KETOROLAC TROMETHAMINE 30 MG: 30 INJECTION, SOLUTION INTRAMUSCULAR at 06:48

## 2017-01-01 RX ADMIN — MORPHINE SULFATE 4 MG: 4 INJECTION, SOLUTION INTRAMUSCULAR; INTRAVENOUS at 15:34

## 2017-01-01 RX ADMIN — METHADONE HYDROCHLORIDE 10 MG: 5 TABLET ORAL at 21:11

## 2017-01-01 RX ADMIN — SENNOSIDES 17.2 MG: 8.6 TABLET, FILM COATED ORAL at 10:17

## 2017-01-01 RX ADMIN — DOCUSATE SODIUM 100 MG: 100 CAPSULE, LIQUID FILLED ORAL at 08:48

## 2017-01-01 RX ADMIN — METHADONE HYDROCHLORIDE 5 MG: 5 TABLET ORAL at 14:40

## 2017-01-01 RX ADMIN — STANDARDIZED SENNA CONCENTRATE AND DOCUSATE SODIUM 2 TABLET: 8.6; 5 TABLET, FILM COATED ORAL at 08:41

## 2017-01-01 RX ADMIN — LORAZEPAM 2 MG: 2 INJECTION INTRAMUSCULAR; INTRAVENOUS at 18:16

## 2017-01-01 RX ADMIN — Medication 2 MG: at 00:35

## 2017-01-01 RX ADMIN — OXYCODONE HYDROCHLORIDE 60 MG: 5 TABLET ORAL at 06:44

## 2017-01-01 RX ADMIN — DOCUSATE SODIUM 100 MG: 100 CAPSULE, LIQUID FILLED ORAL at 09:50

## 2017-01-01 RX ADMIN — ACETAMINOPHEN 1000 MG: 500 TABLET, FILM COATED ORAL at 15:45

## 2017-01-01 RX ADMIN — LORAZEPAM 1 MG: 1 TABLET ORAL at 20:01

## 2017-01-01 RX ADMIN — HYDROMORPHONE HYDROCHLORIDE 4 MG: 2 INJECTION, SOLUTION INTRAMUSCULAR; INTRAVENOUS; SUBCUTANEOUS at 12:52

## 2017-01-01 RX ADMIN — ACETAMINOPHEN 650 MG: 325 TABLET, FILM COATED ORAL at 08:51

## 2017-01-01 RX ADMIN — GABAPENTIN 300 MG: 300 CAPSULE ORAL at 21:18

## 2017-01-01 RX ADMIN — HYDROMORPHONE HYDROCHLORIDE 3 MG: 2 INJECTION, SOLUTION INTRAMUSCULAR; INTRAVENOUS; SUBCUTANEOUS at 00:59

## 2017-01-01 RX ADMIN — Medication: at 23:08

## 2017-01-01 RX ADMIN — METOPROLOL TARTRATE 50 MG: 25 TABLET ORAL at 09:33

## 2017-01-01 RX ADMIN — Medication: at 19:10

## 2017-01-01 RX ADMIN — HALOPERIDOL LACTATE 2 MG: 5 INJECTION, SOLUTION INTRAMUSCULAR at 20:40

## 2017-01-01 RX ADMIN — KETOROLAC TROMETHAMINE 30 MG: 30 INJECTION, SOLUTION INTRAMUSCULAR at 12:24

## 2017-01-01 RX ADMIN — GABAPENTIN 600 MG: 300 CAPSULE ORAL at 21:52

## 2017-01-01 RX ADMIN — HYDROMORPHONE HYDROCHLORIDE 4 MG: 2 INJECTION, SOLUTION INTRAMUSCULAR; INTRAVENOUS; SUBCUTANEOUS at 02:38

## 2017-01-01 RX ADMIN — LORAZEPAM 1 MG: 1 TABLET ORAL at 13:25

## 2017-01-01 RX ADMIN — LORAZEPAM 1 MG: 2 INJECTION INTRAMUSCULAR; INTRAVENOUS at 10:01

## 2017-01-01 RX ADMIN — FENTANYL CITRATE 100 MCG: 50 INJECTION, SOLUTION INTRAMUSCULAR; INTRAVENOUS at 10:17

## 2017-01-01 RX ADMIN — SULINDAC 200 MG: 200 TABLET ORAL at 09:03

## 2017-01-01 RX ADMIN — METOPROLOL TARTRATE 12.5 MG: 25 TABLET ORAL at 21:24

## 2017-01-01 RX ADMIN — Medication 40 ML: at 15:45

## 2017-01-01 RX ADMIN — MENTHOL, METHYL SALICYLATE: 10; 15 CREAM TOPICAL at 09:00

## 2017-01-01 RX ADMIN — DULOXETINE HYDROCHLORIDE 60 MG: 60 CAPSULE, DELAYED RELEASE ORAL at 21:10

## 2017-01-01 RX ADMIN — HYDROMORPHONE HYDROCHLORIDE 1 MG: 2 INJECTION, SOLUTION INTRAMUSCULAR; INTRAVENOUS; SUBCUTANEOUS at 03:19

## 2017-01-01 RX ADMIN — LORAZEPAM 1 MG: 2 INJECTION INTRAMUSCULAR; INTRAVENOUS at 10:30

## 2017-01-01 RX ADMIN — HYDROMORPHONE HYDROCHLORIDE 2 MG: 1 INJECTION, SOLUTION INTRAMUSCULAR; INTRAVENOUS; SUBCUTANEOUS at 15:29

## 2017-01-01 RX ADMIN — Medication 40 ML: at 11:18

## 2017-01-01 RX ADMIN — HYDROMORPHONE HYDROCHLORIDE 4 MG: 2 INJECTION, SOLUTION INTRAMUSCULAR; INTRAVENOUS; SUBCUTANEOUS at 16:55

## 2017-01-01 RX ADMIN — HYDROMORPHONE HYDROCHLORIDE 4 MG: 2 INJECTION, SOLUTION INTRAMUSCULAR; INTRAVENOUS; SUBCUTANEOUS at 17:34

## 2017-01-01 RX ADMIN — Medication 1 MG: at 04:52

## 2017-01-01 RX ADMIN — POLYETHYLENE GLYCOL 3350 17 G: 17 POWDER, FOR SOLUTION ORAL at 06:29

## 2017-01-01 RX ADMIN — METOPROLOL TARTRATE 12.5 MG: 25 TABLET ORAL at 10:26

## 2017-01-01 RX ADMIN — GABAPENTIN 300 MG: 300 CAPSULE ORAL at 21:20

## 2017-01-01 RX ADMIN — Medication 40 ML: at 06:18

## 2017-01-01 RX ADMIN — HYDROMORPHONE HYDROCHLORIDE 3 MG: 2 INJECTION, SOLUTION INTRAMUSCULAR; INTRAVENOUS; SUBCUTANEOUS at 06:11

## 2017-01-01 RX ADMIN — Medication: at 23:54

## 2017-01-01 RX ADMIN — METHADONE HYDROCHLORIDE 5 MG: 5 TABLET ORAL at 21:52

## 2017-01-01 RX ADMIN — METHADONE HYDROCHLORIDE 5 MG: 5 TABLET ORAL at 21:35

## 2017-01-01 RX ADMIN — ONDANSETRON 4 MG: 2 INJECTION INTRAMUSCULAR; INTRAVENOUS at 17:00

## 2017-01-01 RX ADMIN — HYDROMORPHONE HYDROCHLORIDE 4 MG: 2 INJECTION INTRAMUSCULAR; INTRAVENOUS; SUBCUTANEOUS at 06:28

## 2017-01-01 RX ADMIN — HYDROMORPHONE HYDROCHLORIDE 3 MG: 2 INJECTION, SOLUTION INTRAMUSCULAR; INTRAVENOUS; SUBCUTANEOUS at 15:45

## 2017-01-01 RX ADMIN — METHADONE HYDROCHLORIDE 5 MG: 5 TABLET ORAL at 04:58

## 2017-01-01 RX ADMIN — DULOXETINE HYDROCHLORIDE 30 MG: 30 CAPSULE, DELAYED RELEASE ORAL at 21:34

## 2017-01-01 RX ADMIN — HYDROMORPHONE HYDROCHLORIDE 4 MG: 2 INJECTION, SOLUTION INTRAMUSCULAR; INTRAVENOUS; SUBCUTANEOUS at 21:51

## 2017-01-01 RX ADMIN — POLYETHYLENE GLYCOL 3350 17 G: 17 POWDER, FOR SOLUTION ORAL at 09:15

## 2017-01-01 RX ADMIN — MORPHINE SULFATE 4 MG: 4 INJECTION, SOLUTION INTRAMUSCULAR; INTRAVENOUS at 06:21

## 2017-01-01 RX ADMIN — Medication 10 ML: at 23:28

## 2017-01-01 RX ADMIN — METHADONE HYDROCHLORIDE 10 MG: 5 TABLET ORAL at 14:27

## 2017-01-01 RX ADMIN — DOCUSATE SODIUM 100 MG: 100 CAPSULE, LIQUID FILLED ORAL at 08:00

## 2017-01-01 RX ADMIN — LORAZEPAM 1 MG: 1 TABLET ORAL at 22:46

## 2017-01-01 RX ADMIN — HYDROMORPHONE HYDROCHLORIDE 4 MG: 2 INJECTION INTRAMUSCULAR; INTRAVENOUS; SUBCUTANEOUS at 04:16

## 2017-01-01 RX ADMIN — STANDARDIZED SENNA CONCENTRATE AND DOCUSATE SODIUM 2 TABLET: 8.6; 5 TABLET, FILM COATED ORAL at 09:46

## 2017-01-01 RX ADMIN — LORAZEPAM 1 MG: 1 TABLET ORAL at 05:48

## 2017-01-01 RX ADMIN — LORAZEPAM 0.5 MG: 0.5 TABLET ORAL at 17:47

## 2017-01-01 RX ADMIN — Medication 6 MG: at 16:26

## 2017-01-01 RX ADMIN — KETOROLAC TROMETHAMINE 30 MG: 30 INJECTION, SOLUTION INTRAMUSCULAR at 19:30

## 2017-01-01 RX ADMIN — LORAZEPAM 1 MG: 1 TABLET ORAL at 13:17

## 2017-01-01 RX ADMIN — LORAZEPAM 1 MG: 1 TABLET ORAL at 02:08

## 2017-01-01 RX ADMIN — LORAZEPAM 1 MG: 2 INJECTION INTRAMUSCULAR; INTRAVENOUS at 04:59

## 2017-01-01 RX ADMIN — MORPHINE SULFATE 4 MG: 4 INJECTION, SOLUTION INTRAMUSCULAR; INTRAVENOUS at 17:32

## 2017-01-01 RX ADMIN — KETOROLAC TROMETHAMINE 30 MG: 30 INJECTION, SOLUTION INTRAMUSCULAR at 20:53

## 2017-01-01 RX ADMIN — HALOPERIDOL 5 MG: 5 TABLET ORAL at 17:44

## 2017-01-01 RX ADMIN — HYDROMORPHONE HYDROCHLORIDE 1 MG: 2 INJECTION, SOLUTION INTRAMUSCULAR; INTRAVENOUS; SUBCUTANEOUS at 18:21

## 2017-01-01 RX ADMIN — HYDROMORPHONE HYDROCHLORIDE 2 MG: 1 INJECTION, SOLUTION INTRAMUSCULAR; INTRAVENOUS; SUBCUTANEOUS at 00:08

## 2017-01-01 RX ADMIN — SODIUM CHLORIDE 125 ML/HR: 900 INJECTION, SOLUTION INTRAVENOUS at 16:00

## 2017-01-01 RX ADMIN — HYDROMORPHONE HYDROCHLORIDE 2 MG: 2 INJECTION, SOLUTION INTRAMUSCULAR; INTRAVENOUS; SUBCUTANEOUS at 09:03

## 2017-01-01 RX ADMIN — Medication 10 ML: at 11:07

## 2017-01-01 RX ADMIN — PROPOFOL 200 MG: 10 INJECTION, EMULSION INTRAVENOUS at 21:49

## 2017-01-01 RX ADMIN — METHADONE HYDROCHLORIDE 10 MG: 5 TABLET ORAL at 06:22

## 2017-01-01 RX ADMIN — LORAZEPAM 1 MG: 1 TABLET ORAL at 12:30

## 2017-01-01 RX ADMIN — HYDROMORPHONE HYDROCHLORIDE 1 MG: 2 INJECTION, SOLUTION INTRAMUSCULAR; INTRAVENOUS; SUBCUTANEOUS at 12:10

## 2017-01-01 RX ADMIN — ACETAMINOPHEN 1000 MG: 500 TABLET, FILM COATED ORAL at 09:08

## 2017-01-01 RX ADMIN — HALOPERIDOL 5 MG: 5 TABLET ORAL at 21:02

## 2017-01-01 RX ADMIN — HYDROMORPHONE HYDROCHLORIDE 1 MG: 2 INJECTION, SOLUTION INTRAMUSCULAR; INTRAVENOUS; SUBCUTANEOUS at 21:30

## 2017-01-01 RX ADMIN — METOPROLOL SUCCINATE 50 MG: 50 TABLET, EXTENDED RELEASE ORAL at 08:01

## 2017-01-01 RX ADMIN — MORPHINE SULFATE 4 MG: 4 INJECTION, SOLUTION INTRAMUSCULAR; INTRAVENOUS at 21:52

## 2017-01-01 RX ADMIN — MORPHINE SULFATE 4 MG: 4 INJECTION, SOLUTION INTRAMUSCULAR; INTRAVENOUS at 01:00

## 2017-01-01 RX ADMIN — SODIUM CHLORIDE 125 ML/HR: 900 INJECTION, SOLUTION INTRAVENOUS at 08:57

## 2017-01-01 RX ADMIN — DULOXETINE HYDROCHLORIDE 60 MG: 60 CAPSULE, DELAYED RELEASE ORAL at 21:21

## 2017-01-01 RX ADMIN — HALOPERIDOL LACTATE 2 MG: 5 INJECTION, SOLUTION INTRAMUSCULAR at 19:53

## 2017-01-01 RX ADMIN — Medication 10 ML: at 16:54

## 2017-01-01 RX ADMIN — LORAZEPAM 1 MG: 1 TABLET ORAL at 17:04

## 2017-01-01 RX ADMIN — DIPHENHYDRAMINE HYDROCHLORIDE 50 MG: 25 CAPSULE ORAL at 17:34

## 2017-01-01 RX ADMIN — OXYCODONE HYDROCHLORIDE 60 MG: 5 TABLET ORAL at 21:31

## 2017-01-01 RX ADMIN — HYDROMORPHONE HYDROCHLORIDE 4 MG: 2 INJECTION, SOLUTION INTRAMUSCULAR; INTRAVENOUS; SUBCUTANEOUS at 02:21

## 2017-01-01 RX ADMIN — DIPHENHYDRAMINE HYDROCHLORIDE 25 MG: 25 CAPSULE ORAL at 21:19

## 2017-01-01 RX ADMIN — METHADONE HYDROCHLORIDE 10 MG: 5 TABLET ORAL at 14:28

## 2017-01-01 RX ADMIN — HYDROMORPHONE HYDROCHLORIDE 4 MG: 2 INJECTION, SOLUTION INTRAMUSCULAR; INTRAVENOUS; SUBCUTANEOUS at 22:08

## 2017-01-01 RX ADMIN — LORAZEPAM 1 MG: 2 INJECTION INTRAMUSCULAR; INTRAVENOUS at 00:44

## 2017-01-01 RX ADMIN — HYDROMORPHONE HYDROCHLORIDE 4 MG: 2 INJECTION, SOLUTION INTRAMUSCULAR; INTRAVENOUS; SUBCUTANEOUS at 04:29

## 2017-01-01 RX ADMIN — HYDROMORPHONE HYDROCHLORIDE 2 MG: 2 INJECTION, SOLUTION INTRAMUSCULAR; INTRAVENOUS; SUBCUTANEOUS at 06:51

## 2017-01-01 RX ADMIN — LORAZEPAM 1 MG: 1 TABLET ORAL at 17:29

## 2017-01-01 RX ADMIN — SULINDAC 200 MG: 200 TABLET ORAL at 09:59

## 2017-01-01 RX ADMIN — METHADONE HYDROCHLORIDE 10 MG: 5 TABLET ORAL at 14:37

## 2017-01-01 RX ADMIN — HYDROMORPHONE HYDROCHLORIDE 1 MG: 2 INJECTION, SOLUTION INTRAMUSCULAR; INTRAVENOUS; SUBCUTANEOUS at 13:59

## 2017-01-01 RX ADMIN — LORAZEPAM 1 MG: 1 TABLET ORAL at 03:21

## 2017-01-01 RX ADMIN — HYDROMORPHONE HYDROCHLORIDE 4 MG: 2 INJECTION, SOLUTION INTRAMUSCULAR; INTRAVENOUS; SUBCUTANEOUS at 20:21

## 2017-01-01 RX ADMIN — ACETAMINOPHEN 650 MG: 325 TABLET, FILM COATED ORAL at 05:28

## 2017-01-01 RX ADMIN — LORAZEPAM 1 MG: 1 TABLET ORAL at 15:04

## 2017-01-01 RX ADMIN — METHADONE HYDROCHLORIDE 10 MG: 5 TABLET ORAL at 06:40

## 2017-01-01 RX ADMIN — HYDROMORPHONE HYDROCHLORIDE 4 MG: 2 INJECTION INTRAMUSCULAR; INTRAVENOUS; SUBCUTANEOUS at 08:36

## 2017-01-01 RX ADMIN — HYDROMORPHONE HYDROCHLORIDE 1.5 MG: 2 INJECTION, SOLUTION INTRAMUSCULAR; INTRAVENOUS; SUBCUTANEOUS at 21:00

## 2017-01-01 RX ADMIN — LORAZEPAM 1 MG: 1 TABLET ORAL at 21:10

## 2017-01-01 RX ADMIN — HYDROMORPHONE HYDROCHLORIDE 2 MG: 2 INJECTION, SOLUTION INTRAMUSCULAR; INTRAVENOUS; SUBCUTANEOUS at 14:48

## 2017-01-01 RX ADMIN — HYDROMORPHONE HYDROCHLORIDE 1 MG: 2 INJECTION, SOLUTION INTRAMUSCULAR; INTRAVENOUS; SUBCUTANEOUS at 10:12

## 2017-01-01 RX ADMIN — MORPHINE SULFATE 4 MG: 4 INJECTION, SOLUTION INTRAMUSCULAR; INTRAVENOUS at 05:19

## 2017-01-01 RX ADMIN — MORPHINE SULFATE 50 MG: 20 SOLUTION ORAL at 03:53

## 2017-01-01 RX ADMIN — POLYETHYLENE GLYCOL 3350 17 G: 17 POWDER, FOR SOLUTION ORAL at 09:00

## 2017-01-01 RX ADMIN — Medication 20 ML: at 00:20

## 2017-01-01 RX ADMIN — Medication 2 MG: at 00:20

## 2017-01-01 RX ADMIN — MORPHINE SULFATE 30 MG: 20 SOLUTION ORAL at 05:28

## 2017-01-01 RX ADMIN — MORPHINE SULFATE 50 MG: 20 SOLUTION ORAL at 09:31

## 2017-01-01 RX ADMIN — DIPHENHYDRAMINE HYDROCHLORIDE 50 MG: 25 CAPSULE ORAL at 19:45

## 2017-01-01 RX ADMIN — MORPHINE SULFATE 4 MG: 4 INJECTION, SOLUTION INTRAMUSCULAR; INTRAVENOUS at 01:10

## 2017-01-01 RX ADMIN — MORPHINE SULFATE 30 MG: 20 SOLUTION ORAL at 12:04

## 2017-01-01 RX ADMIN — POLYETHYLENE GLYCOL 3350 17 G: 17 POWDER, FOR SOLUTION ORAL at 08:49

## 2017-01-01 RX ADMIN — HYDROMORPHONE HYDROCHLORIDE 1 MG: 2 INJECTION, SOLUTION INTRAMUSCULAR; INTRAVENOUS; SUBCUTANEOUS at 09:12

## 2017-01-01 RX ADMIN — LORAZEPAM 1 MG: 1 TABLET ORAL at 20:46

## 2017-01-01 RX ADMIN — HYDROMORPHONE HYDROCHLORIDE 4 MG: 2 INJECTION INTRAMUSCULAR; INTRAVENOUS; SUBCUTANEOUS at 12:24

## 2017-01-01 RX ADMIN — KETOROLAC TROMETHAMINE 30 MG: 30 INJECTION, SOLUTION INTRAMUSCULAR at 20:44

## 2017-01-01 RX ADMIN — METOPROLOL TARTRATE 12.5 MG: 25 TABLET ORAL at 08:13

## 2017-01-01 RX ADMIN — HYDROMORPHONE HYDROCHLORIDE 4 MG: 2 INJECTION, SOLUTION INTRAMUSCULAR; INTRAVENOUS; SUBCUTANEOUS at 08:02

## 2017-01-01 RX ADMIN — DOCUSATE SODIUM 100 MG: 100 CAPSULE, LIQUID FILLED ORAL at 10:05

## 2017-01-01 RX ADMIN — Medication 10 ML: at 21:46

## 2017-01-01 RX ADMIN — Medication 10 ML: at 18:23

## 2017-01-01 RX ADMIN — METOPROLOL TARTRATE 12.5 MG: 25 TABLET ORAL at 21:03

## 2017-01-01 RX ADMIN — KETOROLAC TROMETHAMINE 30 MG: 30 INJECTION, SOLUTION INTRAMUSCULAR at 13:51

## 2017-01-01 RX ADMIN — METHADONE HYDROCHLORIDE 10 MG: 5 TABLET ORAL at 21:28

## 2017-01-01 RX ADMIN — GABAPENTIN 600 MG: 300 CAPSULE ORAL at 21:45

## 2017-01-01 RX ADMIN — HYDROMORPHONE HYDROCHLORIDE 4 MG: 2 INJECTION, SOLUTION INTRAMUSCULAR; INTRAVENOUS; SUBCUTANEOUS at 09:28

## 2017-01-01 RX ADMIN — GABAPENTIN 300 MG: 300 CAPSULE ORAL at 20:21

## 2017-01-01 RX ADMIN — METOPROLOL TARTRATE 25 MG: 25 TABLET ORAL at 21:11

## 2017-01-01 RX ADMIN — GABAPENTIN 300 MG: 300 CAPSULE ORAL at 10:17

## 2017-01-01 RX ADMIN — MORPHINE SULFATE 4 MG: 4 INJECTION, SOLUTION INTRAMUSCULAR; INTRAVENOUS at 00:46

## 2017-01-01 RX ADMIN — HALOPERIDOL LACTATE 2 MG: 5 INJECTION, SOLUTION INTRAMUSCULAR at 13:30

## 2017-01-01 RX ADMIN — DULOXETINE HYDROCHLORIDE 60 MG: 30 CAPSULE, DELAYED RELEASE ORAL at 21:13

## 2017-01-01 RX ADMIN — HYDROMORPHONE HYDROCHLORIDE 1 MG: 2 INJECTION, SOLUTION INTRAMUSCULAR; INTRAVENOUS; SUBCUTANEOUS at 23:05

## 2017-01-01 RX ADMIN — METOPROLOL TARTRATE 25 MG: 25 TABLET ORAL at 21:06

## 2017-01-01 RX ADMIN — Medication: at 06:26

## 2017-01-01 RX ADMIN — METOPROLOL TARTRATE 25 MG: 25 TABLET ORAL at 21:09

## 2017-01-01 RX ADMIN — SULINDAC 200 MG: 200 TABLET ORAL at 17:02

## 2017-01-01 RX ADMIN — POLYETHYLENE GLYCOL (3350) 17 G: 17 POWDER, FOR SOLUTION ORAL at 09:49

## 2017-01-01 RX ADMIN — GABAPENTIN 300 MG: 300 CAPSULE ORAL at 21:39

## 2017-01-01 RX ADMIN — Medication: at 10:09

## 2017-01-01 RX ADMIN — LORAZEPAM 1 MG: 1 TABLET ORAL at 14:35

## 2017-01-01 RX ADMIN — HYDROMORPHONE HYDROCHLORIDE 1 MG: 2 INJECTION, SOLUTION INTRAMUSCULAR; INTRAVENOUS; SUBCUTANEOUS at 14:40

## 2017-01-01 RX ADMIN — DEXTROSE MONOHYDRATE AND SODIUM CHLORIDE 75 ML/HR: 5; .9 INJECTION, SOLUTION INTRAVENOUS at 12:00

## 2017-01-01 RX ADMIN — METOPROLOL TARTRATE 25 MG: 25 TABLET ORAL at 08:48

## 2017-01-01 RX ADMIN — Medication 10 ML: at 01:51

## 2017-01-01 RX ADMIN — DULOXETINE HYDROCHLORIDE 60 MG: 60 CAPSULE, DELAYED RELEASE ORAL at 21:43

## 2017-01-01 RX ADMIN — STANDARDIZED SENNA CONCENTRATE AND DOCUSATE SODIUM 2 TABLET: 8.6; 5 TABLET, FILM COATED ORAL at 08:19

## 2017-01-01 RX ADMIN — POLYETHYLENE GLYCOL 3350 17 G: 17 POWDER, FOR SOLUTION ORAL at 18:33

## 2017-01-01 RX ADMIN — Medication: at 05:57

## 2017-01-01 RX ADMIN — MORPHINE SULFATE 30 MG: 20 SOLUTION ORAL at 08:14

## 2017-01-01 RX ADMIN — Medication: at 10:22

## 2017-01-01 RX ADMIN — KETOROLAC TROMETHAMINE 30 MG: 30 INJECTION, SOLUTION INTRAMUSCULAR at 11:44

## 2017-01-01 RX ADMIN — Medication: at 20:35

## 2017-01-01 RX ADMIN — HYDROMORPHONE HYDROCHLORIDE 4 MG: 2 INJECTION, SOLUTION INTRAMUSCULAR; INTRAVENOUS; SUBCUTANEOUS at 20:45

## 2017-01-01 RX ADMIN — LORAZEPAM 1 MG: 1 TABLET ORAL at 14:54

## 2017-01-01 RX ADMIN — SENNOSIDES 17.2 MG: 8.6 TABLET, FILM COATED ORAL at 09:15

## 2017-01-01 RX ADMIN — Medication: at 19:57

## 2017-01-01 RX ADMIN — HYDROMORPHONE HYDROCHLORIDE 2 MG: 2 INJECTION, SOLUTION INTRAMUSCULAR; INTRAVENOUS; SUBCUTANEOUS at 11:08

## 2017-01-01 RX ADMIN — LORAZEPAM 1 MG: 1 TABLET ORAL at 14:47

## 2017-01-01 RX ADMIN — LORAZEPAM 1 MG: 1 TABLET ORAL at 09:33

## 2017-01-01 RX ADMIN — MORPHINE SULFATE 4 MG: 4 INJECTION, SOLUTION INTRAMUSCULAR; INTRAVENOUS at 23:03

## 2017-01-01 RX ADMIN — DIPHENHYDRAMINE HYDROCHLORIDE 25 MG: 25 CAPSULE ORAL at 10:29

## 2017-01-01 RX ADMIN — SENNOSIDES 17.2 MG: 8.6 TABLET, FILM COATED ORAL at 17:40

## 2017-01-01 RX ADMIN — Medication 10 ML: at 18:08

## 2017-01-01 RX ADMIN — METHADONE HYDROCHLORIDE 10 MG: 5 TABLET ORAL at 22:16

## 2017-01-01 RX ADMIN — METHADONE HYDROCHLORIDE 10 MG: 5 TABLET ORAL at 05:26

## 2017-01-01 RX ADMIN — LORAZEPAM 1 MG: 1 TABLET ORAL at 04:50

## 2017-01-01 RX ADMIN — LORAZEPAM 1 MG: 2 INJECTION INTRAMUSCULAR; INTRAVENOUS at 22:45

## 2017-01-01 RX ADMIN — NYSTATIN 500000 UNITS: 500000 SUSPENSION ORAL at 22:14

## 2017-01-01 RX ADMIN — LORAZEPAM 1 MG: 2 INJECTION INTRAMUSCULAR; INTRAVENOUS at 18:23

## 2017-01-01 RX ADMIN — DEXAMETHASONE SODIUM PHOSPHATE 4 MG: 4 INJECTION, SOLUTION INTRAMUSCULAR; INTRAVENOUS at 09:02

## 2017-01-01 RX ADMIN — DEXAMETHASONE SODIUM PHOSPHATE 4 MG: 4 INJECTION, SOLUTION INTRAMUSCULAR; INTRAVENOUS at 20:14

## 2017-01-01 RX ADMIN — DEXAMETHASONE SODIUM PHOSPHATE 4 MG: 4 INJECTION, SOLUTION INTRA-ARTICULAR; INTRALESIONAL; INTRAMUSCULAR; INTRAVENOUS; SOFT TISSUE at 10:18

## 2017-01-01 RX ADMIN — Medication: at 11:39

## 2017-01-01 RX ADMIN — GABAPENTIN 600 MG: 300 CAPSULE ORAL at 08:39

## 2017-01-01 RX ADMIN — LORAZEPAM 1 MG: 2 INJECTION INTRAMUSCULAR; INTRAVENOUS at 14:41

## 2017-01-01 RX ADMIN — SULINDAC 200 MG: 200 TABLET ORAL at 16:14

## 2017-01-01 RX ADMIN — LORAZEPAM 1 MG: 1 TABLET ORAL at 10:14

## 2017-01-01 RX ADMIN — GABAPENTIN 600 MG: 300 CAPSULE ORAL at 22:42

## 2017-01-01 RX ADMIN — MORPHINE SULFATE 30 MG: 20 SOLUTION ORAL at 09:46

## 2017-01-01 RX ADMIN — HYDROMORPHONE HYDROCHLORIDE 4 MG: 2 INJECTION, SOLUTION INTRAMUSCULAR; INTRAVENOUS; SUBCUTANEOUS at 00:00

## 2017-01-01 RX ADMIN — Medication 1 MG: at 14:59

## 2017-01-01 RX ADMIN — MORPHINE SULFATE 40 MG: 20 SOLUTION ORAL at 22:03

## 2017-01-01 RX ADMIN — LORAZEPAM 0.5 MG: 0.5 TABLET ORAL at 10:02

## 2017-01-01 RX ADMIN — MORPHINE SULFATE 50 MG: 20 SOLUTION ORAL at 06:40

## 2017-01-01 RX ADMIN — HYDROMORPHONE HYDROCHLORIDE 4 MG: 2 INJECTION, SOLUTION INTRAMUSCULAR; INTRAVENOUS; SUBCUTANEOUS at 18:39

## 2017-01-01 RX ADMIN — LORAZEPAM 1 MG: 1 TABLET ORAL at 15:51

## 2017-01-01 RX ADMIN — OXYCODONE HYDROCHLORIDE 60 MG: 5 TABLET ORAL at 05:13

## 2017-01-01 RX ADMIN — HYDROMORPHONE HYDROCHLORIDE 3 MG: 2 INJECTION, SOLUTION INTRAMUSCULAR; INTRAVENOUS; SUBCUTANEOUS at 15:04

## 2017-01-01 RX ADMIN — LORAZEPAM 1 MG: 1 TABLET ORAL at 15:43

## 2017-01-01 RX ADMIN — METHADONE HYDROCHLORIDE 5 MG: 5 TABLET ORAL at 05:56

## 2017-01-01 RX ADMIN — METOPROLOL TARTRATE 12.5 MG: 25 TABLET ORAL at 21:07

## 2017-01-01 RX ADMIN — SULINDAC 200 MG: 200 TABLET ORAL at 08:53

## 2017-01-01 RX ADMIN — MORPHINE SULFATE 4 MG: 4 INJECTION, SOLUTION INTRAMUSCULAR; INTRAVENOUS at 20:49

## 2017-01-01 RX ADMIN — ACETAMINOPHEN 650 MG: 325 TABLET, FILM COATED ORAL at 00:20

## 2017-01-01 RX ADMIN — LORAZEPAM 1 MG: 1 TABLET ORAL at 11:43

## 2017-01-01 RX ADMIN — GABAPENTIN 600 MG: 300 CAPSULE ORAL at 17:54

## 2017-01-01 RX ADMIN — LORAZEPAM 1 MG: 1 TABLET ORAL at 00:18

## 2017-01-01 RX ADMIN — LORAZEPAM 1 MG: 2 INJECTION INTRAMUSCULAR; INTRAVENOUS at 22:59

## 2017-01-01 RX ADMIN — METHADONE HYDROCHLORIDE 5 MG: 5 TABLET ORAL at 22:04

## 2017-01-01 RX ADMIN — LORAZEPAM 1 MG: 1 TABLET ORAL at 22:42

## 2017-01-01 RX ADMIN — ACETAMINOPHEN 1000 MG: 500 TABLET, FILM COATED ORAL at 21:32

## 2017-01-01 RX ADMIN — POLYETHYLENE GLYCOL 3350 17 G: 17 POWDER, FOR SOLUTION ORAL at 10:15

## 2017-01-01 RX ADMIN — GABAPENTIN 600 MG: 300 CAPSULE ORAL at 22:08

## 2017-01-01 RX ADMIN — SULINDAC 200 MG: 200 TABLET ORAL at 10:02

## 2017-01-01 RX ADMIN — HYDROMORPHONE HYDROCHLORIDE 4 MG: 2 INJECTION, SOLUTION INTRAMUSCULAR; INTRAVENOUS; SUBCUTANEOUS at 16:10

## 2017-01-01 RX ADMIN — Medication 10 ML: at 21:16

## 2017-01-01 RX ADMIN — STANDARDIZED SENNA CONCENTRATE AND DOCUSATE SODIUM 2 TABLET: 8.6; 5 TABLET, FILM COATED ORAL at 09:55

## 2017-01-01 RX ADMIN — KETOROLAC TROMETHAMINE 30 MG: 30 INJECTION, SOLUTION INTRAMUSCULAR at 06:55

## 2017-01-01 RX ADMIN — LORAZEPAM 1 MG: 1 TABLET ORAL at 21:45

## 2017-01-01 RX ADMIN — METOPROLOL TARTRATE 25 MG: 25 TABLET ORAL at 18:04

## 2017-01-01 RX ADMIN — Medication 2 MG: at 20:59

## 2017-01-01 RX ADMIN — HYDROMORPHONE HYDROCHLORIDE 4 MG: 2 INJECTION, SOLUTION INTRAMUSCULAR; INTRAVENOUS; SUBCUTANEOUS at 06:31

## 2017-01-01 RX ADMIN — HYDROMORPHONE HYDROCHLORIDE 3 MG: 2 INJECTION, SOLUTION INTRAMUSCULAR; INTRAVENOUS; SUBCUTANEOUS at 22:36

## 2017-01-01 RX ADMIN — LORAZEPAM 0.5 MG: 0.5 TABLET ORAL at 09:42

## 2017-01-01 RX ADMIN — Medication 2 MG: at 20:30

## 2017-01-01 RX ADMIN — HYDROMORPHONE HYDROCHLORIDE 3 MG: 2 INJECTION, SOLUTION INTRAMUSCULAR; INTRAVENOUS; SUBCUTANEOUS at 02:10

## 2017-01-01 RX ADMIN — Medication 2 MG: at 20:28

## 2017-01-01 RX ADMIN — SULINDAC 200 MG: 200 TABLET ORAL at 08:22

## 2017-01-01 RX ADMIN — ACETAMINOPHEN 650 MG: 325 TABLET, FILM COATED ORAL at 18:14

## 2017-01-01 RX ADMIN — Medication 1 MG: at 03:57

## 2017-01-01 RX ADMIN — LORAZEPAM 0.5 MG: 0.5 TABLET ORAL at 08:14

## 2017-01-01 RX ADMIN — DULOXETINE HYDROCHLORIDE 60 MG: 60 CAPSULE, DELAYED RELEASE ORAL at 21:16

## 2017-01-01 RX ADMIN — Medication: at 09:45

## 2017-01-01 RX ADMIN — HYDROMORPHONE HYDROCHLORIDE 4 MG: 2 INJECTION, SOLUTION INTRAMUSCULAR; INTRAVENOUS; SUBCUTANEOUS at 23:26

## 2017-01-01 RX ADMIN — LORAZEPAM 0.5 MG: 0.5 TABLET ORAL at 09:48

## 2017-01-01 RX ADMIN — Medication 2 MG: at 08:37

## 2017-01-01 RX ADMIN — LORAZEPAM 1 MG: 1 TABLET ORAL at 08:04

## 2017-01-01 RX ADMIN — GABAPENTIN 300 MG: 300 CAPSULE ORAL at 17:41

## 2017-01-03 NOTE — PROGRESS NOTES
[facility]  Palliative Medicine Office  Nursing Note  [phone]  Fax [phone]    Patient Name: Geri Aparicio  YOB: 1980      1/3/2017    Verified  and address with patient as identifiers. Telephone call received from patient stating that she had to cancel her CT appointment again due to leg weakness and pain. Patient requesting to have home PT to strengthen her legs. Patient does not believe she can make her outpatient PT appointments due to severe leg weakness. Notified patient this RN would discuss with Dr. Maria Del Carmen Brooks. Received order from Dr. Maria Del Carmen Brooks for home PT services. Call placed to [address] and spoke with intake nurse Krystal to notify of new order. Called patient to notify her that she should hear from [address] [place] [address] within 48 hours. Patient verbalized understanding. Patient also reports she needs a refill of subsys. Called [facility] and spoke with representative Estelle Avalos. She reports she will contact patient today to send refill of subsys since script already at [facility] for refill. Dinesh Chaparro, RN Nurse Navigator  Palliative Medicine    No future appointments.

## 2017-01-06 NOTE — TELEPHONE ENCOUNTER
Returned call to patient. Mrs. Felicia Duncan state she received a call from [address] stating they have spoken with Nurse Max Miller and that she need to complete and Opt-in form to get her Subsys prescription sent out to her. Patient calling to see how to get the form. Advised patient that will forward message to Max Miller to return her call. Patient verbalized understanding.

## 2017-01-06 NOTE — TELEPHONE ENCOUNTER
Patient calling to speak to nurse about a form she need to fill out.  Requesting to speak to Gabi Clark states she knows about the form

## 2017-01-06 NOTE — PROGRESS NOTES
[facility]  Palliative Medicine Office  Nursing Note  [phone]  Fax [phone]    Patient Name: Nan Ruvalcaba  YOB: [birth year]  Placed a call to Sebastien [facility]ikh [facility] to notify her that PM has not receive optin forms for patients Subsys- left VM. Called subsys representative Salome- left VM. Received a call back from [address] [place] [address] with Subsys. Received email with optin form. Dr. Bossman Mayer signed for. Plan to make a home visit Monday afternoon to assist patient with filling out optin form and [employer] form since patient reports she has not received either form by mail.      Johana Jo, RN Nurse Navigator  Palliative Medicine      Future Appointments  Date Time Provider Department Center   1/10/2017 To Be Determined Sourav [place] [address] [place] [address] [address]   1/12/2017 To Be Determined Sourav [place] 301 [facility] [address]   1/17/2017 To Be Determined Sourav [place] 301 [facility] [address]   1/19/2017 To Be Determined Sourav [place] 301 [facility] [address]   1/24/2017 To Be Determined Sourav [place] 301 [facility] [address]   1/26/2017 To Be Determined Sourav [place] 301 [facility] [address]   1/31/2017 To Be Determined Sourav [place] 301 [facility] [address]   2/2/2017 To Be Determined Keven Chu PT [facility]

## 2017-01-09 NOTE — TELEPHONE ENCOUNTER
Spoke with subsys representative Heraclio Heard and she requested to refax Patient Authorization and Referral Form.

## 2017-01-09 NOTE — PROGRESS NOTES
[facility]  Palliative Medicine Office  Nursing Note  [phone]  Fax [phone]    Patient Name: Magdalena Pro  YOB: [birth year]  Home visit made to have patient sign optin Patient Authorization & Referral Form for Subsys renewal and [address] [employer] Patient Assistance form since patient reports she never received these forms by mail. Both forms signed and dated by patient. Faxed optin form to [phone] and [address] to [phone]. Patient reports that Home Health PT is starting today. Patient hopeful that this will allow her to regain her strength so that she can have lab work performed and CT performed. Medication count:   Fentanyl: 20  Subsys: 14  Oxycodone: 81  Ativan: full bottle   Patient will need refill of oxycodone before next home visit. Full assessment deferred to next visit since this visit was to just obtain signatures for needed forms. Encouraged patient to call PM at [phone] with any needs.      Heike Prince, RN Nurse Navigator  Palliative Medicine      Future Appointments  Date Time Provider Department Center   1/10/2017 To Be Determined Harper Fortunato [address] [place] [employer] [address]   1/12/2017 To Be Determined Harper Fortunato 301 [facility] [employer] [address]   1/17/2017 To Be Determined Harper Fortunato 301 [facility] [employer] [address]   1/19/2017 To Be Determined Harper Fortunato 301 [facility] [address]   1/24/2017 To Be Determined Harper [place] 301 [facility] [address]   1/26/2017 To Be Determined Harper Fortunato [address] [place] [address] [address]   1/31/2017 To Be Determined Harper Fortunato 301 [facility] [address] [employer] [address]   2/2/2017 To Be Determined Timur Carroll, AIME [facility]

## 2017-01-12 NOTE — PROGRESS NOTES
Palliative Medicine Office  Nursing Note  [phone])  Fax [phone]    Patient Name: Herber Hernandez  YOB: [birth year]  Received an email for Subsys representative Elgin Lemus stating that this RN needed to send an email to [email or web address] and dictate that PM wants [place] to do prior authorization.  Email sent to above address with request.     Gabriela Munoz, RN Nurse Navigator  Palliative Medicine    Future Appointments  Date Time Provider Neva Babcock   1/17/2017 To Be Determined Christain Stephanie [facility]   1/19/2017 To Be Determined Christain Stephanie [address] [place] [address] [address]   1/24/2017 To Be Determined Christain Stephanie [address] [facility] [address]   1/26/2017 To Be Determined Christain Stephanie 301 [facility] [address]   1/31/2017 To Be Determined Christain Stephanie [address] [place] [address] [address]   2/2/2017 To Be [facility]

## 2017-01-17 NOTE — PROGRESS NOTES
Palliative Medicine Office  Nursing Note  [phone]  Fax [phone]    Patient Name: Karuna Edwards  YOB: 1980    Verified  and address with patient  as identifiers. Telephone call returned to patient. She reports she has received a VM from a pharmacy that states \"please call concerning your medication delivery. \" She reports this message is from an automated system and the VM has not left a return call back number or the name of the pharmacy. Notified patient that most likely the call is coming from [address] [place] [address] regarding her subsys delivery confirmation. Gave patient [address] [place] [address] number [phone] to call and follow up. Encouraged patient to call PM with any needs. Scheduled home visit with patient for next Friday afternoon at 2:00 pm  Received a fax from [facility] [employer] requesting additional information to approve subsys. Answered questions and faxed back to [facility] [employer] Scanned in media.      Ivory Red, RN Nurse Navigator  Palliative Medicine      Future Appointments  Date Time Provider Neva Babcock   2017 11:00 AM Gaviota Rousseau [place] [address] [place] [address] Medical [facility]   2017 To Be Determined Jessica Chatham [address]   2017 To Be Determined Jessica Chatham [address]   2017 To Be Determined Jessica Chatham [address] [place] [address] [facility]   2017 To Be Determined Jeffrey Carter PT [address]

## 2017-01-18 NOTE — TELEPHONE ENCOUNTER
Received call to Palliative Medicine office from Sarah Reagan 1//17/17 at 4pm requesting call back regarding Subsys. This nurse called [employer] back-- representative said that the Subsys was approved 1/16/17 and no further action from Palliative Medicine is needed at this time.

## 2017-01-19 NOTE — PROGRESS NOTES
Palliative Medicine Office  Nursing Note  [phone]  Fax [phone]    Patient Name: Camille Hunt  YOB: 1980  Verified  and address with patient as identifiers. Telephone call received from patient. She reports she received a call from [address] [place] [address] stating that her subsys was not approved and the cost would be $2,000. Notified patient that PM received a fax from [facility] yesterday stating Subsys 400mcg spray approved until 17. Call placed to subsys rep Maynor [facility] for call back. Faxed approval letter to [address] [place] [address] at [phone]. Fax confirmation received. Call placed to [address] [place] [address] and spoke with representative Abe Bach. She reports that patients insurance did approve but she will have a co-pay of $2,000. [address] [place] [address] no longer participating in the [employer] assistance program which assisted patients with funding for Subsys. She reports the funding \"has all run out. \"   Call received from [address] [place] [address] that confirms the above. This RN stated her frustration with [employer] and their marketing of Subsys as a free drug regardless of the financial situation. Sascha Beard reports that her company, [address] [place] [address], has now given a non-profit a \"significant donation\" to assist these patients with financial costs. She reports that all the patient has to do is call [facility] at Phone [phone] Fax [phone] and give them the required financial information. Notified Sascha Beard this RN's concerns over this program since it sounds similar to the financial assistance program the patient originally signed up for to receive subsys. Sascha Beard could neither confirm nor deny if this program would even cover the cost of Subsys. Call placed to patient- left  for return call.      Rod Araujo, RN Nurse Navigator  Palliative Medicine    Future Appointments  Date Time Provider Neva Babcock   2017 1:00 PM Soniya Alicea [place] [address] [place] [address] Medical [facility]   1/24/2017 To Be Determined [place] [address] [facility]   1/26/2017 To Be Determined [facility]   1/27/2017 2:00 PM PCS NURSE NAVIGATOR 1 [address] [facility]   1/31/2017 To Be Determined [place] [address] [facility]   2/2/2017 To Be Determined Eugenio Noguera PT [place] [address] Medical [facility]

## 2017-01-20 NOTE — PROGRESS NOTES
Palliative Medicine Office  Nursing Note  [phone]  Fax [phone]    Patient Name: An Braga  YOB: 1980    Verified  and address with patient as identifiers. Telephone call placed to patient to notify her of issues with obtaining Subsys. Patient not willing to apply to the [address] [place] [address] for possible assistance. Patient reports that she is not dependent on Subsys at this time and is afraid that the medication will run out again even if she applies and is approved. Spoke with Dr. Jaquan Templeton and awaiting an answer.      Shin Butts, RN Nurse Navigator  Palliative Medicine      Future Appointments  Date Time Provider Neva Babcock   2017 1:00 PM [address]   2017 To Be Determined Shanda [address]   2017 To Be Determined Shanda Coleman [address]   2017 2:00 PM PCS NURSE NAVIGATOR 1 [address] [facility]   2017 To Be Determined Shanda [place] [address] Medical [facility]   2017 To Be Determined AIME Sanchez

## 2017-01-27 PROBLEM — Z71.89 ADVANCE CARE PLANNING: Status: ACTIVE | Noted: 2017-01-01

## 2017-01-27 NOTE — PATIENT INSTRUCTIONS
Ad Montes,    It was good to see you today in your home. Today we talked about the followin. Pain  -This is a little better; at least tolerable when you are not moving  -The Fentanyl patches are working for you - 2 of the 100mcg/hr per day  -You are continuing to take the Oxycodone 30mg, about 6 per day  -I am sorry we could not get the Subsys approved for you but you do feel your foot in better since stopping it; this is strange and we will report this reaction    2. Fatigue  -We have not been able to get your blood but I would still like to try this  -[facility] is going to try on the next visit  -We also have to get a urine if possible as we need to do this for anyone we prescribe strong pain medications for    3. Status of your right leg  -We last did xrays about 18 months ago  -You are still thinking about the imaging I recommended  -There are also some procedures that may help the pain and allow you to be more functional  -This is what we would use the results of imaging to determine  -We will support you whatever you decide to do    4. Depression  -This is much better  -You are taking Cymbalta 40mg; please continue this as it seems to be helping  -You have been more social recently with your friends  -You are still not getting out much but your goal is to get back home  -I talked to your mom about the support for her of a  and I made this referral    5.  Function and supprt  -We talked about a hospital bed and I realize this isn't what you want at this time  -Consider though if it reduces swelling by keeping your leg up and allows you to maneuver better especially if a bar could be add for using your upper body to move around  -[facility] is going to reach out to your physical therapist  -I talked to your mom about changing sheets when you are up with the therapist  -Also the sheets should be changed every 2-3 days because you are in bed a lot and the dry skin and oils on your skin can affect your skin negatively  -You have a yeast rash and we are calling in powder to the pharmacy to use for this    I will see you again in less than 12 weeks. Our Nurse Navigator Army Kramer will see you also before her job transition. Advance Care Planning 1/27/2017   Patient's Healthcare Decision Maker is: Legal Next of Andrea Velarde   Primary Decision Maker Name Dylan Perez   Primary Decision Maker Phone Number [phone]   Primary Decision Maker Relationship to Patient Parent   Confirm Advance Directive Yes, on file     If there are any concerns before that time, such as medication questions, worsening symptoms or a need to see a physician for an urgent or emergent situation; please call [phone] (COPE). A physician is also on call after our normal business hours of 8am to 5pm.     In order to serve you better, please allow up to 48 hours for prescription refills to be processed. Certain medications may require more paperwork or a written prescription that you may need to  from the office. We appreciate you letting us know of any refill requests as soon as possible. We also would like you to sign up for [employer] as well.     Collin Perez MD and the Palliative Medicine Team

## 2017-01-27 NOTE — PROGRESS NOTES
Palliative Medicine Office Visit  Palliative Medicine Nurse Check In  [phone]    Patient Name: Cecile Valadez  YOB: 1980      Date of Office Visit: 1/27/17    Patient states: \"My foot is no longer numb since stopping Subsys\"    From Check In Sheet (scanned in Media):  Has a medical provider talked with you about cardiopulmonary resuscitation (CPR)? [x] Yes   [] No   [] Unable to obtain    Nurse reminder to complete or update ACP FlowSheet:  Advance Care Planning 1/27/2017   Patient's Healthcare Decision Maker is: Legal Next of Andrea 69   Primary Decision Maker Name Neha Bush   Primary Decision Maker Phone Number [phone]   Primary Decision Maker Relationship to Patient Parent   Confirm Advance Directive Yes, on file             Is there anything that we should know about you as a person in order to provide you the best care possible? n/a    Have you been to the ER, urgent care clinic since your last visit? [] Yes   [x] No   [] Unable to obtain    Have you been hospitalized since your last visit? [] Yes   [x] No   [] Unable to obtain    Have you seen or consulted any other health care providers outside of the [address] [place] [address] since your last visit?    [] Yes   [x] No   [] Unable to obtain    Functional status (describe):   50      Last BM: 1/26/17     accessed (date): 1/27/17    Bottle review (for opioid pain medication):  Medication 1:  Subsys  Date filled: 10/18/16  Directions: 1 spray every 6 hours  # filled: 180  # left: 6  # pills taking per day: 0-1  Last dose taken: last Friday    Medication 2: Fentanyl 100mcg  Date filled: 1/4/`7  Directions: 2 patches every 72 hours  # filled: 20  # left: 8  # pills taking per day:2 every 72 hours  Last dose taken: yesterday    Medication 3:  Oxycodone 30mg   Date filled: 1/6/17  Directions: 2 tablets every 4 hours prn pain  # filled: 300  # left: 180  # pills taking per day: 6  Last dose taken: this morning          Encouraged patient to get out of bed at least daily and sit in the recliner chair in the living room. Call placed to patients PT Ritika Conklin- awaiting a call back. Refilled patients Cymbalta, ativan, Fentanyl and oxycodone per Dr. Rolly Hare. Scripts ready for  at [facility] [place] office. Noted redness to patients inner thighs. Per Dr. Rolly Hare, ordered patient nystatin powder to be applied 4x daily as needed.

## 2017-01-27 NOTE — PROGRESS NOTES
Palliative Medicine Outpatient Services  [place]: [phone])    Patient Name: Juana Anton  YOB: 1980    Date of Current Visit:  01/27/17  Location of Current Visit:    [] [facility] Office  [] [facility] Office   [x] Home  [] Other:       Date of Initial Visit: 2/2/16   Requesting Physician:  Dr. Aliya Funez  Primary Care Physician: Krista Guzman MD      Summary:   Juana Anton is a 39y.o. year old with a past history of metastatic endometrial cancer, who was referred to Palliative Medicine by Dr. Sharren Kayser for pain management. 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 She is not otherwise on disease directed therapy. The patients social history includes progressive functional decline, now mostly bed bound except with PT - needs assistive devices to stand/walk. She lives with her mother who has 3 steps to her house but otherwise is on 1 floor. Her goal is to get back to her own home where she was living with boyfriend/neice. Palliative Medicine is addressing the following current patient/family concerns: progressive functional decline and uncontrolled pain due to metastatic endometrial cancer from bone. Palliative Diagnoses:       ICD-10-CM ICD-9-CM    1. Endometrial cancer (HCC) C54.1 182.0 [id number] 9+OXYCODONE+CRT-UNBUND      DISCONTINUED: DULoxetine (CYMBALTA) 20 mg capsule   2. Bone metastases (HCC) C79.51 198.5 acetaminophen (TYLENOL ARTHRITIS PAIN) 650 mg CR tablet      fentaNYL (DURAGESIC) 100 mcg/hr PATCH      oxyCODONE IR (OXY-IR) 30 mg immediate release tablet      DISCONTINUED: DULoxetine (CYMBALTA) 20 mg capsule   3. Other depression F32.89  LORazepam (ATIVAN) 1 mg tablet      DULoxetine (CYMBALTA) 20 mg capsule   4. Debility R53.81 799.3    5.  Candida infection B37.9 112.9 nystatin (MYCOSTATIN) powder      Plan:   Patient Instructions     Ad Forman,    It was good to see you today in your home. Today we talked about the followin. Pain  -This is a little better; at least tolerable when you are not moving  -The Fentanyl patches are working for you - 2 of the 100mcg/hr per day  -You are continuing to take the Oxycodone 30mg, about 6 per day  -I am sorry we could not get the Subsys approved for you but you do feel your foot in better since stopping it; this is strange and we will report this reaction    2. Fatigue  -We have not been able to get your blood but I would still like to try this  -Carmen Christensen is going to try on the next visit  -We also have to get a urine if possible as we need to do this for anyone we prescribe strong pain medications for    3. Status of your right leg  -We last did xrays about 18 months ago  -You are still thinking about the imaging I recommended  -There are also some procedures that may help the pain and allow you to be more functional  -This is what we would use the results of imaging to determine  -We will support you whatever you decide to do    4. Depression  -This is much better  -You are taking Cymbalta 40mg; please continue this as it seems to be helping  -You have been more social recently with your friends  -You are still not getting out much but your goal is to get back home  -I talked to your mom about the support for her of a  and I made this referral    5.  Function and supprt  -We talked about a hospital bed and I realize this isn't what you want at this time  -Consider though if it reduces swelling by keeping your leg up and allows you to maneuver better especially if a bar could be add for using your upper body to move around  -Carmen Christensen is going to reach out to your physical therapist  -I talked to your mom about changing sheets when you are up with the therapist  -Also the sheets should be changed every 2-3 days because you are in bed a lot and the dry skin and oils on your skin can affect your skin negatively  -You have a yeast rash and we are calling in powder to the pharmacy to use for this    I will see you again in less than 12 weeks. Our Nurse Navigator Monteiro Papa will see you also before her job transition. Advance Care Planning 1/27/2017   Patient's Healthcare Decision Maker is: Legal Next of Andrea Velarde   Primary Decision Maker Name Homar Caballero   Primary Decision Maker Phone Number [phone]   Primary Decision Maker Relationship to Patient Parent   Confirm Advance Directive Yes, on file     If there are any concerns before that time, such as medication questions, worsening symptoms or a need to see a physician for an urgent or emergent situation; please call [phone] (COPE). A physician is also on call after our normal business hours of 8am to 5pm.     In order to serve you better, please allow up to 48 hours for prescription refills to be processed. Certain medications may require more paperwork or a written prescription that you may need to  from the office. We appreciate you letting us know of any refill requests as soon as possible. We also would like you to sign up for [employer] as well. Fang Quezada MD and the Palliative Medicine Team      Other Considerations:  1. Volunteer services for Hospice, can we use anything to support her? 2. Room redo (there is a [employer] can we use this?)  3. Labs  CBC, comp metabolic, TSH, magnesium (has been difficult to get labs)  5. Repeat imaging - she is thinking about this    I talked to her mom today 1/27/17 about changing sheets when therapist is there. Because she is in bed so much they should be changed every 48-72 hours. Advance Care Planning:     Resuscitation Status: Full  dDNR?   [] Yes   [] No    [x] Not Applicable    Advance Care Planning 1/27/2017   Patient's Healthcare Decision Maker is: Legal Next of Andrea Velarde   Primary Decision Maker Name Homar Caballero   Primary Decision Maker Phone Number [phone]   Primary Decision Maker Relationship to Patient Parent   Confirm Advance Directive Yes, on file      Counseling and Coordination:   1. RN to reach out to Physical Therapy to discuss optimizing patient function/transfer  2. Spiritual referral to Critical access hospital     Spiritual Assessment:  What do I need to know about you as a person in order to provide you the best care possible? I believe in God and that he will heal me    Nursing documentation from date of visit reviewed? [x] Yes   [] No    [] N/A  Initial Consult Note sent to PCP? [x] Yes   [] No    [] No PCP listed     Prescriptions Given:     Medications Ordered Today   Medications    LORazepam (ATIVAN) 1 mg tablet     Sig: Take 1 Tab by mouth every four (4) hours as needed for Anxiety. Max Daily Amount: 6 mg. Dispense:  180 Tab     Refill:  0    fentaNYL (DURAGESIC) 100 mcg/hr PATCH     Si Patches by TransDERmal route every seventy-two (72) hours for 30 days. Max Daily Amount: 2 Patches. Dispense:  22 Patch     Refill:  0    oxyCODONE IR (OXY-IR) 30 mg immediate release tablet     Sig: Take 2 Tabs by mouth every four (4) hours as needed for Pain for up to 30 days. Max Daily Amount: 360 mg. Take 2 tabs every 4 hours as needed for pain. Limit to no more than 10 tabs daily     Dispense:  300 Tab     Refill:  0    DULoxetine (CYMBALTA) 20 mg capsule     Sig: Take 2 Caps by mouth daily. Dispense:  60 Cap     Refill:  1    nystatin (MYCOSTATIN) powder     Sig: Apply  to affected area four (4) times daily as needed.  Yeast     Dispense:  1 Bottle     Refill:  1      Follow Up:     Future Appointments  Date Time Provider Department Center   2017 To Be Determined Terry Cavanaugh,  [address]   2017 To Be Determined Sandra Cook, [address] [place] [address]      Physicians Involved in Care:   Patient Care Team:  Roberto Johns MD as PCP - General (Palliative Medicine)  Lelo Braun RN as Nurse Navigator (Oncology)  Godwin Clement MD (Gynecologic Oncology)  Dedrick Madrigal Fernando Amaya MD as Physician (Palliative Medicine)         Persons present for visit:  Patient, mother    Chief Complaint: I have been working with physical therapy    History of Present Illness:  Last month has been a bit worse with pain but better with function. PT now coming to home - working with Zoraida Augustin from [place] [id number]. Since stopping the Subsys her right foot is no longer feeling like mush. This was an odd side effect and although it helped her pain, it affected her foot and thus her function. Her pain otherwise is tolerable at this time with the Fentanyl patches 100mcg/hr x 2 and the Oxycodone 30mg - about 6 / 24 hours. She also takes Tylenol sometimes as needed (arthritis strength at 650mg) and ibuprofen 20mg x 2 tabs post physical therapy. She believes she has a rash - possibly yeast on thighs and also has very dry skin. She is not getting up much out of bed though feels less depressed and has been social with more friends stopping by. Discussed a hospital bed but that makes her feel like she is sick and she doesn't want it; agreed to try to get up for 30 minutes once per day. States had some chips/dip and the salt load made her leg swell. Anticipatory grief assessment: Family/loved ones showing signs of grief (depression, feeling greater than usual concern for dying person, imagining what loved one's death will be like, getting ready emotionally) during pts illness:    [x] Yes   [] No        FUNCTIONAL ASSESSMENT    Palliative Performance Scale (PPS):  PPS: 50    ECOG:  ECOG Status : Limited self-care    Review of Systems: The following systems were reviewed: constitutional, ears/nose/mouth/throat, respiratory, gastrointestinal, musculoskeletal, neurologic, psychiatric, endocrine. Positive findings noted below.   Modified ESAS Completed by: patient   Fatigue: 4 Drowsiness: 4   Depression: 4 Pain: 2   Anxiety: 4 Nausea: 0   Anorexia: 0 Dyspnea: 0   Constipation: No Best Well-Bein   Other Problem (Comment): 0      Past Medical History   Diagnosis Date    Cancer ([facility]      uterine    CVI (common variable immunodeficiency) ([facility]     Diabetes ([facility]     Elevated liver function tests 10/21/2010    Endometrial cancer ([facility] 7/7/2010    Iron deficiency anemia     Menometrorrhagia 10/21/2010    Microcytic anemia 10/21/2010    Morbid obesity ([facility]     Prediabetes 7/7/2010    Radiation      completed radiation mid-march      Past Surgical History   Procedure Laterality Date    Hx gyn       hysterectomy    Pr cardiac surg procedure unlist       hx of tachycardia      Family History   Problem Relation Age of Onset    Stroke Maternal Grandfather     Cancer Paternal Grandmother      breast    Diabetes Paternal Grandmother     Hypertension Mother     Hypertension Father       Social History   Substance Use Topics    Smoking status: Never Smoker    Smokeless tobacco: Never Used    Alcohol use Yes     Current Outpatient Prescriptions   Medication Sig    acetaminophen (TYLENOL ARTHRITIS PAIN) 650 mg CR tablet Take 650 mg by mouth every six (6) hours as needed for Pain.  LORazepam (ATIVAN) 1 mg tablet Take 1 Tab by mouth every four (4) hours as needed for Anxiety. Max Daily Amount: 6 mg.  fentaNYL (DURAGESIC) 100 mcg/hr PATCH 2 Patches by TransDERmal route every seventy-two (72) hours for 30 days. Max Daily Amount: 2 Patches.  oxyCODONE IR (OXY-IR) 30 mg immediate release tablet Take 2 Tabs by mouth every four (4) hours as needed for Pain for up to 30 days. Max Daily Amount: 360 mg. Take 2 tabs every 4 hours as needed for pain. Limit to no more than 10 tabs daily    DULoxetine (CYMBALTA) 20 mg capsule Take 2 Caps by mouth daily.  nystatin (MYCOSTATIN) powder Apply  to affected area four (4) times daily as needed. Yeast    oxyCODONE IR (OXY-IR) 30 mg immediate release tablet Take 2 tabs every 4 hours as needed for pain.  Limit to no more than 10 tabs daily    fentaNYL (SUBSYS) 400 mcg/spray spry 400 mcg by SubLINGual route every six (6) hours as needed. Max Daily Amount: 1,600 mcg.  docusate sodium (COLACE) 100 mg capsule Take 100 mg by mouth two (2) times a day.  ibuprofen (ADVIL) 200 mg tablet Take 200 mg by mouth every six (6) hours as needed for Pain.  gabapentin (NEURONTIN) 300 mg capsule TAKE 2 CAPSULES BY MOUTH TWICE A DAY    sennosides (SENNA) 8.6 mg cap Take 2 Tabs by mouth two (2) times a day.  lisinopril (PRINIVIL, ZESTRIL) 10 mg tablet TAKE 1 TABLET BY MOUTH DAILY    metFORMIN (GLUCOPHAGE) 500 mg tablet TAKE 1 TAB BY MOUTH TWO (2) TIMES DAILY (WITH MEALS).  metoprolol succinate (TOPROL-XL) 25 mg XL tablet Take 1 Tab by mouth daily. Indications: HYPERTENSION    polyethylene glycol (MIRALAX) 17 gram packet Take 17 g by mouth daily. No current facility-administered medications for this visit. Allergies   Allergen Reactions    Pcn [Penicillins] Nausea and Vomiting     \"but could take amoxil\"    Shellfish Containing Products Unknown (comments)    Tetanus And Diphtheria Toxoids, Adsorbed, Adult Other (comments)     Developed local swelling, axillary pain, and transient fever            Physical Exam:  Can't weigh due to hard to stand and no working scale in home    Wt Readings from Last 3 Encounters:   02/02/16 280 lb (127 kg)   07/16/15 284 lb (128.8 kg)   06/18/15 284 lb (128.8 kg)     Blood pressure 122/72, pulse 89, resp. rate 18, SpO2 99 %.   Last bowel movement:     Constitutional: alert oriented pleasant AA female; lying in bed  Eyes: pupils equal, anicteric  ENMT: no nasal discharge, moist mucous membranes  Cardiovascular:   Respiratory: breathing not labored, symmetric  Gastrointestinal: soft non-tender, non-distended  Musculoskeletal: right leg swelling significantly greater than left leg; firm but no massive LAD  Skin: warm, dry - see RN note re yeast; also has xerosis throughout/flaky skin  Neurologic: following commands, moving all extremities Psychiatric: full affect, no hallucinations  Other: appears to have lost weight overall - cannot weight at this time    Most Recent Imaging in 2015:  IMPRESSION: Hypermetabolic lesion in the dome of the liver possibly related to a capsular metastasis. Hypermetabolic retroperitoneal lymph node  unchanged in size. Minimally hypermetabolic right external iliac artery and  inguinal lymph nodes. The lytic lesion involving the acetabulum is only  minimally hypermetabolic and is unchanged in size compared to the prior exam.    Lab Data Reviewed:  Lab Results   Component Value Date/Time    WBC 7.7 05/17/2015 11:07 AM    HGB 11.3 05/17/2015 11:07 AM    PLATELET 802 76/70/4250 11:07 AM     Lab Results   Component Value Date/Time    Sodium 139 05/17/2015 11:07 AM    Potassium 3.7 05/17/2015 11:07 AM    Chloride 102 05/17/2015 11:07 AM    CO2 29 05/17/2015 11:07 AM    BUN 9 05/17/2015 11:07 AM    Creatinine 0.53 05/17/2015 11:07 AM    Calcium 8.8 05/17/2015 11:07 AM    Magnesium 1.6 11/20/2014 03:30 PM      Lab Results   Component Value Date/Time    AST 14 05/17/2015 11:07 AM    Alk.  phosphatase 166 05/17/2015 11:07 AM    Protein, total 7.5 05/17/2015 11:07 AM    Albumin 3.7 05/17/2015 11:07 AM    Globulin 3.8 05/17/2015 11:07 AM     Lab Results   Component Value Date/Time    INR 1.2 06/06/2010 03:12 AM    Prothrombin time 12.6 06/06/2010 03:12 AM    aPTT 26.2 08/22/2009 11:35 AM      Lab Results   Component Value Date/Time    Iron 53 04/16/2012 09:50 AM    TIBC 270 04/16/2012 09:50 AM    Iron % saturation 20 04/16/2012 09:50 AM    Ferritin 10 07/24/2009 08:50 AM       Controlled Substances Safety Assessment (if on controlled substances):     Reviewed opioid safety handout:  [x] Yes   [] No  24 hour opioid dose >150mg morphine equivalent/day:  [x] Yes   [] No  Benzodiazepines:  [x] Yes   [] No  Sleep apnea:  [] Yes   [x] No  Prescription monitoring program or similar reviewed date of visit:   [x] Yes   [] No  Urine Toxicology Testing within last 6 months:  [] Yes   [x] No (will collect next visit)  History of or new aberrant medication taking behaviors:  [] Yes   [x] No  Risk of prescription drug misuse/overdose event:  [x] Low   [] Moderate    [] High          Total time: min  Counseling / coordination time: min  > 50% counseling / coordination?:

## 2017-01-27 NOTE — PROGRESS NOTES
Palliative Medicine Office  Nursing Note  [phone])  Fax [phone]    Patient Name: Sarah Kaufman  YOB: 1980    Verified  and address with PT Che Trujillo  as identifiers. Telephone call placed to Che Trujillo with PT of [address]. Mariajosebrooklyn Ronnie reports that she has not been able to get patient to ambulate with walker or crutches during therapy due to pain patient experiences when weight is placed on hip. Che Trujillo works with patient while lying in a supine position and while sitting on the side of the bed. Che Trujillo reports that patient has been able to participate more in therapy since stopping Subsys. She also reports that patient has been able to tolerate sitting on the side of the bed for slightly longer. Only exercises that Che Trujillo is able to perform with patient while standing is shifting her weight while using the walker for balance. Patient is unable to lift her left leg. At this time, Che Trujillo is not recommending any additional DME. Notified Che Trujillo that Dr. Alan Ventura encouraged patient to take Tylenol 650mg prior to physical therapy and then use 2 tablets of Aleve 200mg after therapy to assist with pain relief. Che Trujillo reports that therapy is due to end next week unless patient shows progression with ability to perform exercises. Also notified Che Trujillo that this RN encouraged use of wedge pillow on today's visit to assist with position changes while lying in bed. [facility] to check status of patient assistance program for Fentanyl. Spoke with representative Anna. She reports that the application for financial assistance is in the \"benefit verification process\". Anna recommended this RN call back on Tuesday to check status. Notified Nando Ryan that patient only has 4 doses left of Fentanyl 100 mcg which she uses for pain control and may need to be admitted to hospital if medication not approved over next week. Anna verified understanding.        Dae Holguin, RN Nurse Navigator  Palliative Medicine    Future Appointments  Date Time Provider Neva Babcock   1/31/2017 To Be Determined Elle Peña [address]   2/2/2017 To Be Determined Michell Doyle PT [address]

## 2017-01-30 NOTE — PROGRESS NOTES
Palliative Medicine Office  Nursing Note  [phone]  Fax [phone]    Patient Name: Robson Riley  YOB: 1980    Verified  and address with patient as identifiers. Telephone call received from patient needing clarification on Dr. Ashu Blackwood orders of medication use prior to PT. Notified patient that Dr. Makenna Wise encouraged her to take 1 tablet of tylenol arthritis 650mg prior to PT and take 2 tablets of Advil after PT. Patient verbalized understanding. Patient also requesting refill of Senna to pharmacy. Refilled medication via escript per Dr. Makenna Wise.       Yolanda Arguello, RN Nurse Navigator  Palliative Medicine    Future Appointments  Date Time Provider Department Center   2017 To Be Determined [facility] [address] [place] [address]   2017 To Be Determined Dorie Fonseca, AIME [facility]

## 2017-02-02 NOTE — PROGRESS NOTES
This was an initial visit with Lulu Fitch and her mother following a referral from Dr. Merlin Hebert for spiritual care support. Both shared freely around their geovanny journey, their beliefs, and their reliance upon God. Both shared their belief that Mr Marcheta Angelucci father, was healed approximately 10 years ago because of the work of the medical team underscored by answered prayers. Lulu Fitch is still very much exploring her geovanny and knowledge of God. Her geovanny is a source of hope and comfort at this time. She is Taoism but has found strength and support from a friend who is Jehovah   Witness and is spending some time studying with them. Rickard Landau and her mother, indentified the other as a major part of their support system. They live in the same house together with Mr Angelique Quigley and Jyoti's niece, Eda Sorto. I had the opportunity to meet Mr Angelique Quigley. I have not met Blair. Jyoti verbalized two concerns:   1. Her father's current state of health, which she feels sometimes causes him to not be treated with the respect and dignity he deserves. Both her and her mother noted the importance of respect and dignity as basic values for them. 2. Her niece. Lulu Fitch has partial custody and care of Blair due to the death of her sister-in law seven years ago. Jyoti feels Eda Sorto has suffered in her short life and now wish to protect her. As a result, she has not shared any of the details of her illness with her Blair. Jyoti's goal and hope for the immediate future is to get Blair through high school. Eda Sorto is currently in the ninth grade. Pastoral care plan: Follow-up visit with Lulu Fitch and her mother to provide emotional and spiritual support to help them use their spirituality to cope with the illness and grief issues confronting this family. A large portion of this visit was with Lulu Fitch alone as her mother stepped out when a visitor arrived at the house.  However Lulu Fitch said to her mother at the conclusion of our visit, may be I could talk more  with both of them next time. Thanks for this referral and the opportunity to  to this family.

## 2017-02-07 NOTE — PROGRESS NOTES
This was a follow-up visit to continue spiritual care support to Karli. Her mother was present for part of our visit. Karli continue to explore Buddhism and spiritual concerns. She continue to express a desire to learn more about God and the bible. However, the answers she is receiving from her Jehovah Witness supporters are raising more questions for her. Explored with her and her mother possible alternatives. We explored TV as a possible source as long as she can find individuals whose theology is in line with hers. She touched on the \"why\" question in terms of her illness. We explored this theme and the question of who is God to and for her. Affirmed her stated belief that her illness is not punishment. Affirmed her and her mother's geovanny and encouraged Jyoti to continue to seek God for herself. We have agreed to a follow-up visit next week. The pastoral care plan continues as stated last week.

## 2017-02-10 NOTE — LETTER
2/10/2017 12:13 PM 
 
Ms. Prasad Winn [facility] [phone] [id number] To whom it may concern: Ms. Thierry Hinojosa is a 39year old with metastatic endometrial cancer to her pelvis and right leg, including bone. As a result, she has significantly decreased function making it painful for her to transfer and walk any significant distance. She is putting forth great effort to maintain her function by participating in therapy but she can only continue this if we are able to keep her pain under control. It is medically necessary for patient to remain on Duragesic 100 mcg/hr 2 patches every 72 hours.  
  
We have reviewed other options in the past with her pain management that are not possible or ideal for this patient. Please consider approving the medication Duragesic for Ms. Randall so that she may continue to live the highest quality of life that she deserves. Sincerely, 
 
 
Dr. Caroline Treviño RN

## 2017-02-10 NOTE — PROGRESS NOTES
Palliative Medicine Office  Nursing Note  [phone]  Fax [phone]    Patient Name: Carmela Hicks  YOB: 1980      2/10/2017  Call placed to [email or web address] and spoke with Norberto. She reports that a denial fax was sent to PM on 17. Notified representative PM did not receive fax. She reports that a letter of medical necessity needs to be sent to appeals for Duragesic. Formatted letter and signed by Dr. Philly Calhoun. Sent to [phone]. Verified  and address with [employer] as identifiers. Call placed back to [email or web address] and spoke with representative Juaquin and asked her to please expedite the letter to appeals as patient will be out of her patches on Monday. Representative was able to approve the medication through 2018. Representative instructed this RN to have patient take her retail card to the pharmacy along with prescription.      Call placed to patient and left VM to notify of Duragesic 100 mcg/hr 2 patch approval.         Marcelo Harris, RN Nurse Navigator  Palliative Medicine    Future Appointments  Date Time Provider Neva Babcock   2017 9:30 AM PCS NURSE NAVIGATOR [address]

## 2017-02-13 NOTE — TELEPHONE ENCOUNTER
Called patient and notified her that the patches have been approved. Patient reports she is unable to access her VM and did not receive the message on Friday.

## 2017-02-15 NOTE — PROGRESS NOTES
Palliative Medicine Office  Nursing Note  [phone]  Fax [phone]    Patient Name: Sky Guajardo  YOB: 1980      2/15/2017  Telephone call received from patient. She requests this RN attempt to obtain lab work again. Will plan to attempt venipuncture on tomorrows visit. Patient also requesting Fentanyl patch refill since next home visit is unknown. Obtained refill script from Dr. Andrey Drummond.      Miguel Ángel Murphy, RN Nurse Navigator  Palliative Medicine    Future Appointments  Date Time Provider Neva Babcock   2/16/2017 9:30 AM PCS NURSE NAVIGATOR [address]

## 2017-02-16 NOTE — PROGRESS NOTES
Palliative Medicine Home Visit  [place]: [phone]  [facility]: [phone]    Patient Name: Cecile Valadez  YOB: 1980    Date of Current Visit: 2/16/17    Date of Initial Visit: 2/2/16   Requesting Physician: Dr. Kim Sutherland  Primary Care Physician: Danita Chisholm MD   Summary:   Cecile Valadez is a 39y.o. year old with a past history of metastatic endometrial cancer, who was referred to Palliative Medicine by Dr. Janelle Sultana for pain management. 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 She is not otherwise on disease directed therapy. The patients social history includes progressive functional decline, now using a wheelchair. She lives with her mother who has 3 steps to her house but otherwise is on 1 floor.  [address] [place] [address] is addressing the following current patient/family concerns: progressive functional decline and uncontrolled pain due to metastatic endometrial cancer from bone. Current Issues:   1. Excess gas  2.    UDS needed on next visit  3. Lab work and CT  4. Morbid obesity       Action Plan:   1. Patients last bowel movement on 2/14/17 with active bowel sounds throughout. Patient reports passing excessive gas. Encouraged OTC gas-x to help with symptoms. 2.   Left urine cup and lab slip for UDS for patient to give sample on next home nursing visit. Patient unable to urinate during visit. 3.   Patient requested this RN attempt a venipuncture on right arm to obtain ordered blood work per Dr. Melissa Mejia. This RN attempted x 1 with no blood return. Plan is for patient to go to [facility] next  weekend when she goes to have her scheduled CT performed. 4.   Patient no longer able to fit in her bedside commode with a width of 21\". Per patient, she weighs between 300-308 lbs.  Per Dr. Melissa Mejia, ordered bariatric [facility] from [address]: Resuscitation Status:   dDNR? [] Yes   [x] No    [] Not Applicable    Primary Decision Maker Info:dsf  Primary Decision Maker Name: Haseeb Chavez  Primary Decision Maker Phone Number: [phone]  Primary Decision Maker Relationship to Patient: Parent    Secondary Decision Maker Info: Advance Directive Info:  Confirm Advance Directive: Yes, on file              FUNCTIONAL ASSESSMENT    Palliative Performance Scale (PPS):  PPS: 40    Review of Systems: The following systems were reviewed: constitutional, ears/nose/mouth/throat, respiratory, gastrointestinal, musculoskeletal, neurologic, psychiatric, endocrine. Positive findings noted below. Modified ESAS Completed by: patient   Fatigue: 4 Drowsiness: 0   Depression: 2 Pain: 3   Anxiety: 2 Nausea: 0   Anorexia: 0 Dyspnea: 0   Constipation: No Best Well-Being: 3   Other Problem (Comment): 0      Current Outpatient Prescriptions   Medication Sig    fentaNYL (DURAGESIC) 100 mcg/hr PATCH 2 Patches by TransDERmal route every seventy-two (72) hours for 30 days. Max Daily Amount: 2 Patches.  sennosides (SENNA) 8.6 mg cap Take 2 Tabs by mouth two (2) times a day.  acetaminophen (TYLENOL ARTHRITIS PAIN) 650 mg CR tablet Take 650 mg by mouth every six (6) hours as needed for Pain.  LORazepam (ATIVAN) 1 mg tablet Take 1 Tab by mouth every four (4) hours as needed for Anxiety. Max Daily Amount: 6 mg.  oxyCODONE IR (OXY-IR) 30 mg immediate release tablet Take 2 Tabs by mouth every four (4) hours as needed for Pain for up to 30 days. Max Daily Amount: 360 mg. Take 2 tabs every 4 hours as needed for pain. Limit to no more than 10 tabs daily    DULoxetine (CYMBALTA) 20 mg capsule Take 2 Caps by mouth daily.  nystatin (MYCOSTATIN) powder Apply  to affected area four (4) times daily as needed. Yeast    oxyCODONE IR (OXY-IR) 30 mg immediate release tablet Take 2 tabs every 4 hours as needed for pain.  Limit to no more than 10 tabs daily    fentaNYL (SUBSYS) 400 mcg/spray spry 400 mcg by SubLINGual route every six (6) hours as needed. Max Daily Amount: 1,600 mcg. (Patient not taking: Reported on 2/1/2017)    docusate sodium (COLACE) 100 mg capsule Take 100 mg by mouth two (2) times a day.  ibuprofen (ADVIL) 200 mg tablet Take 200 mg by mouth every six (6) hours as needed for Pain.  gabapentin (NEURONTIN) 300 mg capsule TAKE 2 CAPSULES BY MOUTH TWICE A DAY    lisinopril (PRINIVIL, ZESTRIL) 10 mg tablet TAKE 1 TABLET BY MOUTH DAILY    metFORMIN (GLUCOPHAGE) 500 mg tablet TAKE 1 TAB BY MOUTH TWO (2) TIMES DAILY (WITH MEALS).  metoprolol succinate (TOPROL-XL) 25 mg XL tablet Take 1 Tab by mouth daily. Indications: HYPERTENSION    polyethylene glycol (MIRALAX) 17 gram packet Take 17 g by mouth daily. No current facility-administered medications for this visit. Allergies   Allergen Reactions    Pcn [Penicillins] Nausea and Vomiting     \"but could take amoxil\"    Shellfish Containing Products Unknown (comments)    Tetanus And Diphtheria Toxoids, Adsorbed, Adult Other (comments)     Developed local swelling, axillary pain, and transient fever                Physical Exam:    Wt Readings from Last 3 Encounters:   02/02/16 280 lb (127 kg)   07/16/15 284 lb (128.8 kg)   06/18/15 284 lb (128.8 kg)     Blood pressure 120/80, pulse 78, resp. rate 18.   Last bowel movement: 2/14/17    CARDIOVASCULAR  General Color: ____wdl________     _X_AP  _X__ Reg   ___ Irregular   _X__Pedal Pulses   ___Ascites   _X__Edema mild BL feet  Comments:     SENSORY IMPAIRED  ___Speech   ___Vision   ___Hearing  NEURO  ___Altered LOC Key for Scales (select one of the following)         _X__LOC 1 = alert, conversant or purposeful tracking with eyes, may or not be oriented x3           ___LOC 2 = lethargic, responds to verbal stimuli           ___LOC 3 = stupor us, responds to maximal stimulation (shake & shout) DO NOT US PAINFUL                               STIMULI ___LOC 4 = no response   ___Seizures X_______         ___Restless         ___ Memory Loss   ___Agitation         ___ Hallucinations   ___Insomnia  ___ Confusion  RESPIRATORY  _X__Clear   ___Crackles   ___ Diminished   __X_R L Bilateral   ___Labored   ___Wheezes   ___Cough   ___Rhonchi   ___Sputum color. ______________  ___Short of Breath with exertion   ___Apnea   ___Oxygen _______ L/M     Continuous/PRN NC/Mask   ___Using as directed  GASTROINTESTINAL  ___Nausea   ___Vomiting   ___Dysphasia   ___Diarrhea   _X__Abdomen soft   ___ Abdomen tender  ___ Abdomen non-tender   ___Incontinent   ___Constipation Last BM:_2/14/17_________    Color:________    Amt:_________    Formed: yes no   ___Checked for impaction   ___Ostomy    ___ Abdomen distended     Bowel Sounds:   ___Absent   ___Hyperactive   ___Hypoactive   ___Normal    NUTRITION  Diet:__regular________    Appetite:   _X__Good   ___Fair   ___Poor   ___Tube Feeding     GENITOURINARY  _X__Voiding without difficulty   ___ Incontinent   ___ Frequency   ___Dysuria   ___Distention   ___Retention   ___Oliguria   ___Hesitancy  ___Catheter Jama     size: _________     Date Change:  _____________     Urine Color:   _____________  MUSCULOSKELETAL  ___Balance/[place] Unsteady   _X__Weak     Strength:   ___Normal   __X_Limited   _X__Decreasing- Home PT no longer involved in patients care. Pt was discharged due to lack of progress. Activities:   __Up as tolerated  _x__Bedridden- Patient able to pivot from bed to c using walker or crutches.   ___Specify  SAFETY  ___24 hr. Caregiver   ___Side rails ?     ___Hospital bed   ___Reviewed Falls & Safety     Precautions   ___Oxygen Safety assessment   ___Oxygen Safety teaching   _X__Medications labeled appropriately   ___ Medication teaching   _X__Meds assessed for refills     OXYGEN SAFETY  Smoking materials present __Yes __No  ___Concentrator filter cleaned  ___Cannula/tubing changed  ___Tanks stored in rodríguez, lying down  ___O2 Teaching Provided This Visit  COPING  Patient   __X_Coping Well ___ Anger   ___Denial   ___Depressed   ___ Good Support    Caregiver   _X__Coping Well   ___ Anger   ___Denial   ___Depressed ___ Good Support    Fentanyl- 18 patches remaining and new script given on today's visit  Oxycodone- 78 tablets remaining and new script available in home  Lorazepam- 28 tablets remaining and new script available in home      Patient no longer able to fit in 21\" width BSC. Per Dr. Makenna Wise, placed order for bariatric bedside commode. Per patient, her current weight is between 300-308 lbs. Patient unable to tolerate standing a scale for accurate weight. Patient is home bound and unable to receive weight from hospital bed weighing system.    Faxed order, demographics and chart notes to [place] at [phone]

## 2017-02-27 NOTE — TELEPHONE ENCOUNTER
Returned pt's phone call re: request for Palliative Medicine home visit. No answer. Voice mailbox is full, so unable to leave message.

## 2017-02-28 NOTE — PROGRESS NOTES
This visit was part of providing ongoing spiritual support to Ms Paul Dickson. She wants to be more active but is not certain how to do this. She plans to discuss with Dr. Tanvi Khan on their next visit. She notes that she has a lot on her mind at this time. Offered supportive listening as she reflected.        Justo Mcdonald

## 2017-03-06 NOTE — PROGRESS NOTES
Palliative Medicine Outpatient Services  [place]: [phone] ([phone])    Patient Name: Carlito Agosto  YOB: 1980    Date of Current Visit:  03/06/17  Location of Current Visit:    [] [facility] Office  [] [facility] Office   [x] Home  [] Other:       Date of Initial Visit: 2/2/16   Requesting Physician:  Dr. Isabela Collado  Primary Care Physician: Lucie Hernandez MD      Summary:   Carlito Agosto is a 39y.o. year old with a past history of metastatic endometrial cancer, who was referred to Palliative Medicine by Dr. Geo Reyez for pain management. 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 She is not otherwise on disease directed therapy. The patients social history includes progressive functional decline, now mostly bed bound except with PT - needs assistive devices to stand/walk. She lives with her mother who has 3 steps to her house but otherwise is on 1 floor. Her goal is to get back to her own home where she was living with boyfriend/neice. Palliative Medicine is addressing the following current patient/family concerns: progressive functional decline and uncontrolled pain due to metastatic endometrial cancer from bone. Palliative Diagnoses:       ICD-10-CM ICD-9-CM    1. Debility R53.81 799.3    2. Bone metastases (HCC) C79.51 198.5 oxyCODONE IR (OXY-IR) 30 mg immediate release tablet      VITAMIN D, 25 HYDROXY   3. Endometrial cancer (HCC) C54.1 182.0 oxyCODONE IR (OXY-IR) 30 mg immediate release tablet      CT LOW EXT RT WO CONT      CT PELV W CONT      CBC W/O DIFF   4. Other depression F32.89  DULoxetine 40 mg cpDR      TSH 3RD GENERATION   5. Localized edema D65.0 423.6 METABOLIC PANEL, COMPREHENSIVE   6. Nausea without vomiting R11.0 787.02    7. Tinea pedis of right foot B35.3 110.4    8. Neuropathy G62.9 355.9 VITAMIN B12   9. Vitamin D deficiency E55.9 268.9 VITAMIN D, 25 HYDROXY   10.  Metabolic syndrome A21.00 277.7    11. Pre-diabetes R73.03 790.29 HEMOGLOBIN A1C WITH EAG      Plan:   Patient Instructions     Ad Mini Miguel,    It was good to see you today in your home. Today we talked about the followin. Pain  -This is limiting you from going out of your room and your house  -The Fentanyl patches are working for you - 2 of the 100mcg/hr per day  -You are continuing to take the Oxycodone 30mg, about 6-8 per day  -We talked about being more aggressive with your pain control so you can work with PT and get to your small goals  -We talked about how even small goals are so important; for example, the more times you sit on the side of the bed, stand up, transfer to the commode and even walk to your bedroom door; the stronger you are going to get  -At you request we are going to make a referral to [address] [place] [address] for physical therapy to see if they can come to the home to start therapy  -You are motivated to continue this    2. Monitoring blood and urine  -We need a urine for the medication we prescribe; you have a urine cup in the home  -We have been trying to get blood but its been difficult; we tried again today  -We talked about getting blood at [facility] but its very difficult for you to get out of the house right now  -We will check into transport by stretcher with a transportation company  -This can be coordinated with the imaging as well    3. Status of your right leg  -You set up appointments but couldn't go because even just getting into the wheelchair was so painful for you  -There is a balance with your pain medication so as not to make you sleepy/give you side effects and to help your pain    4. Depression  -This is much better  -You are taking Cymbalta 40mg; please continue this as it seems to be helping  -You have enjoyed visiting with the  each week; this is helping you alot    5.  Function and supprt  -We talked about a hospital bed and I realize this isn't what you want at this time and you also said the Physical Therapist didn't recommend this; we will connect with them on this so we can understand as well  -Remember to change the sheets every 2-3 days because you are in bed a lot and the dry skin and oils on your skin can affect your skin negatively    6. Constipation  -You can increase your senna to 3 tabs twice a day from 2 tabs twice a day  -Avoid over the counter fiber but you can supplement with juices; like combination of prune juice and apple juice    7. You have some fungus on your feet; especially the right one  -You can apply lotion Lotrisone twice daily       Other Considerations:  1. Volunteer services for Hospice, can we use anything to support her? 2. Room redo (there is a [employer] can we use this?)  3. Labs  CBC, comp metabolic, TSH, Hemoglobin A1C, magnesium (has been difficult to get labs)  5. Repeat imaging - she is thinking about this; tried but getting into wheelchair was too painful     Advance Care Planning:     Resuscitation Status: Full  dDNR? [] Yes   [] No    [x] Not Applicable    Advance Care Planning 2/16/2017   Patient's Healthcare Decision Maker is: Legal Next of Kin   Primary Decision Maker Name Lillie Stubbs   Primary Decision Maker Phone Number [phone]   Primary Decision Maker Relationship to Patient Parent   Confirm Advance Directive Yes, on file      Counseling and Coordination:     Spiritual Assessment:  What do I need to know about you as a person in order to provide you the best care possible? I believe in God and that he will heal me    Nursing documentation from date of visit reviewed? [x] Yes   [] No    [] N/A  Initial Consult Note sent to PCP? [x] Yes   [] No    [] No PCP listed     Prescriptions Given:     Medications Ordered Today   Medications    oxyCODONE IR (OXY-IR) 30 mg immediate release tablet     Sig: Take 2 Tabs by mouth every four (4) hours as needed for Pain for up to 30 days. Max Daily Amount: 360 mg.      Dispense: 300 Tab     Refill:  0    DULoxetine 40 mg cpDR     Sig: Take 40 mg by mouth daily for 30 days. Dispense:  30 Cap     Refill:  11    clotrimazole (LOTRIMIN) 1 % topical cream     Sig: Apply 1 Each to affected area two (2) times a day. Apply to feet affected     Dispense:  35.4 g     Refill:  0      Follow Up:   Planning on transport to imaging and labs    No future appointments. Physicians Involved in Care:   Patient Care Team:  Cornelio Richardson MD as PCP - General (Palliative Medicine)  Columba Persaud RN as Nurse Navigator (Oncology)  Britney Dimas MD (Gynecologic Oncology)  Cornelio Richardson MD as Physician (Palliative Medicine)         Persons present for visit:  Patient, mother    Chief Complaint: I want to get stronger    History of Present Illness: Has not been out of house in several months now and not out of room in several weeks. She has been feeling nauseated but found this to be related to her bowel movements. Discussed options to increase BM; not fiber supplement. She doesn't need scripts right now. Fentanyl and Oxycodone; Takes still about 7 oxycodone in 24 hours. Last fill: 2/22/17 300. She is able to use crutches more than walker but hasn't used either in a while. Candice Salomon is too hard for her to move her legs. She is able to move from bed to seat/commode by transferring. Can transport by stretcher. Feeling is coming back in her foot since taking the Subsys and stopping it. Discussed the Catch 22 of her situation. Anticipatory grief assessment: Family/loved ones showing signs of grief (depression, feeling greater than usual concern for dying person, imagining what loved one's death will be like, getting ready emotionally) during pts illness:    [x] Yes   [] No        FUNCTIONAL ASSESSMENT    Palliative Performance Scale (PPS):  PPS: 70    ECOG:  ECOG Status : Limited self-care    Review of Systems:   The following systems were reviewed: constitutional, ears/nose/mouth/throat, respiratory, gastrointestinal, musculoskeletal, neurologic, psychiatric, endocrine. Positive findings noted below. Modified ESAS Completed by: patient   Fatigue: 4 Drowsiness: 0   Depression: 2 Pain: 6   Anxiety: 4 Nausea: 4   Anorexia: 0 Dyspnea: 0   Constipation: Yes            Past Medical History:   Diagnosis Date    Cancer [facility]     uterine    CVI (common variable immunodeficiency) ([facility]     [facility]     Elevated liver function tests 10/21/2010    Endometrial cancer [facility] 7/7/2010    Iron deficiency anemia     Menometrorrhagia 10/21/2010    Microcytic anemia 10/21/2010    Morbid obesity [facility]     Prediabetes 7/7/2010    Radiation     completed radiation mid-march      Past Surgical History:   Procedure Laterality Date    CARDIAC SURG PROCEDURE UNLIST      hx of tachycardia    HX GYN      hysterectomy      Family History   Problem Relation Age of Onset    Stroke Maternal Grandfather     Cancer Paternal Grandmother      breast    Diabetes Paternal Grandmother     Hypertension Mother     Hypertension Father       Social History   Substance Use Topics    Smoking status: Never Smoker    Smokeless tobacco: Never Used    Alcohol use Yes     Current Outpatient Prescriptions   Medication Sig    oxyCODONE IR (OXY-IR) 30 mg immediate release tablet Take 2 Tabs by mouth every four (4) hours as needed for Pain for up to 30 days. Max Daily Amount: 360 mg.    DULoxetine 40 mg cpDR Take 40 mg by mouth daily for 30 days.  clotrimazole (LOTRIMIN) 1 % topical cream Apply 1 Each to affected area two (2) times a day. Apply to feet affected    fentaNYL (DURAGESIC) 100 mcg/hr PATCH 2 Patches by TransDERmal route every seventy-two (72) hours for 30 days. Max Daily Amount: 2 Patches.  sennosides (SENNA) 8.6 mg cap Take 2 Tabs by mouth two (2) times a day.  acetaminophen (TYLENOL ARTHRITIS PAIN) 650 mg CR tablet Take 650 mg by mouth every six (6) hours as needed for Pain.     LORazepam (ATIVAN) 1 mg tablet Take 1 Tab by mouth every four (4) hours as needed for Anxiety. Max Daily Amount: 6 mg.  nystatin (MYCOSTATIN) powder Apply  to affected area four (4) times daily as needed. Yeast    docusate sodium (COLACE) 100 mg capsule Take 100 mg by mouth two (2) times a day.  ibuprofen (ADVIL) 200 mg tablet Take 200 mg by mouth every six (6) hours as needed for Pain.  gabapentin (NEURONTIN) 300 mg capsule TAKE 2 CAPSULES BY MOUTH TWICE A DAY    lisinopril (PRINIVIL, ZESTRIL) 10 mg tablet TAKE 1 TABLET BY MOUTH DAILY    metFORMIN (GLUCOPHAGE) 500 mg tablet TAKE 1 TAB BY MOUTH TWO (2) TIMES DAILY (WITH MEALS).  metoprolol succinate (TOPROL-XL) 25 mg XL tablet Take 1 Tab by mouth daily. Indications: HYPERTENSION    polyethylene glycol (MIRALAX) 17 gram packet Take 17 g by mouth daily. No current facility-administered medications for this visit. Allergies   Allergen Reactions    Pcn [Penicillins] Nausea and Vomiting     \"but could take amoxil\"    Shellfish Containing Products Unknown (comments)    Tetanus And Diphtheria Toxoids, Adsorbed, Adult Other (comments)     Developed local swelling, axillary pain, and transient fever            Physical Exam:  Can't weigh due to hard to stand and no working scale in home    Wt Readings from Last 3 Encounters:   02/16/17 300 lb (136.1 kg)   02/02/16 280 lb (127 kg)   07/16/15 284 lb (128.8 kg)     Blood pressure 120/70, pulse (!) 135, SpO2 96 %.   Last bowel movement:     Constitutional: alert oriented pleasant AA female; lying in bed  Eyes: pupils equal, anicteric  ENMT: no nasal discharge, moist mucous membranes  Cardiovascular:   Respiratory: breathing not labored, symmetric  Gastrointestinal: soft non-tender, non-distended  Musculoskeletal: right leg swelling significantly greater than left leg; firm but no massive LAD  Skin: warm, dry - has xerosis throughout/flaky skin  Neurologic: following commands, moving all extremities   Psychiatric: full affect, no hallucinations  Other: appears to have lost weight overall - cannot weight at this time    Most Recent Imaging in 2015:  IMPRESSION: Hypermetabolic lesion in the dome of the liver possibly related to a capsular metastasis. Hypermetabolic retroperitoneal lymph node  unchanged in size. Minimally hypermetabolic right external iliac artery and  inguinal lymph nodes. The lytic lesion involving the acetabulum is only  minimally hypermetabolic and is unchanged in size compared to the prior exam.    Lab Data Reviewed:  Lab Results   Component Value Date/Time    WBC 7.7 05/17/2015 11:07 AM    HGB 11.3 05/17/2015 11:07 AM    PLATELET 440 99/72/8786 11:07 AM     Lab Results   Component Value Date/Time    Sodium 139 05/17/2015 11:07 AM    Potassium 3.7 05/17/2015 11:07 AM    Chloride 102 05/17/2015 11:07 AM    CO2 29 05/17/2015 11:07 AM    BUN 9 05/17/2015 11:07 AM    Creatinine 0.53 05/17/2015 11:07 AM    Calcium 8.8 05/17/2015 11:07 AM    Magnesium 1.6 11/20/2014 03:30 PM      Lab Results   Component Value Date/Time    AST (SGOT) 14 05/17/2015 11:07 AM    Alk.  phosphatase 166 05/17/2015 11:07 AM    Protein, total 7.5 05/17/2015 11:07 AM    Albumin 3.7 05/17/2015 11:07 AM    Globulin 3.8 05/17/2015 11:07 AM     Lab Results   Component Value Date/Time    INR 1.2 06/06/2010 03:12 AM    Prothrombin time 12.6 06/06/2010 03:12 AM    aPTT 26.2 08/22/2009 11:35 AM      Lab Results   Component Value Date/Time    Iron 53 04/16/2012 09:50 AM    TIBC 270 04/16/2012 09:50 AM    Iron % saturation 20 04/16/2012 09:50 AM    Ferritin 10 07/24/2009 08:50 AM       Controlled Substances Safety Assessment (if on controlled substances):     Reviewed opioid safety handout:  [x] Yes   [] No  24 hour opioid dose >150mg morphine equivalent/day:  [x] Yes   [] No  Benzodiazepines:  [x] Yes   [] No  Sleep apnea:  [] Yes   [x] No  Prescription monitoring program or similar reviewed date of visit:   [x] Yes   [] No  Urine Toxicology Testing within last 6 months:  [] Yes   [x] No (will collect as urine cup in home)  History of or new aberrant medication taking behaviors:  [] Yes   [x] No  Risk of prescription drug misuse/overdose event:  [x] Low   [] Moderate    [] High          Total time: 60min  Counseling / coordination time: 40min  > 50% counseling / coordination?: yes re strategies for improvement

## 2017-03-06 NOTE — PATIENT INSTRUCTIONS
Ad Montes,    It was good to see you today in your home. Today we talked about the followin. Pain  -This is limiting you from going out of your room and your house  -The Fentanyl patches are working for you - 2 of the 100mcg/hr per day  -You are continuing to take the Oxycodone 30mg, about 6-8 per day  -We talked about being more aggressive with your pain control so you can work with PT and get to your small goals  -We talked about how even small goals are so important; for example, the more times you sit on the side of the bed, stand up, transfer to the commode and even walk to your bedroom door; the stronger you are going to get  -At you request we are going to make a referral to [address] [place] [address] for physical therapy to see if they can come to the home to start therapy  -You are motivated to continue this    2. Monitoring blood and urine  -We need a urine for the medication we prescribe; you have a urine cup in the home  -We have been trying to get blood but its been difficult; we tried again today  -We talked about getting blood at [facility] but its very difficult for you to get out of the house right now  -We will check into transport by stretcher with a transportation company  -This can be coordinated with the imaging as well    3. Status of your right leg  -You set up appointments but couldn't go because even just getting into the wheelchair was so painful for you  -There is a balance with your pain medication so as not to make you sleepy/give you side effects and to help your pain    4. Depression  -This is much better  -You are taking Cymbalta 40mg; please continue this as it seems to be helping  -You have enjoyed visiting with the  each week; this is helping you alot    5.  Function and supprt  -We talked about a hospital bed and I realize this isn't what you want at this time and you also said the Physical Therapist didn't recommend this; we will connect with them on this so we can understand as well  -Remember to change the sheets every 2-3 days because you are in bed a lot and the dry skin and oils on your skin can affect your skin negatively    6. Constipation  -You can increase your senna to 3 tabs twice a day from 2 tabs twice a day  -Avoid over the counter fiber but you can supplement with juices; like combination of prune juice and apple juice    7. You have some fungus on your feet; especially the right one  -You can apply lotion Lotrisone twice daily     I will see you again in 12 weeks. Advance Care Planning 2/16/2017   Patient's Healthcare Decision Maker is: Legal Next of Kin   Primary Decision Maker Name Yonatan Delgadillo   Primary Decision Maker Phone Number [phone]   Primary Decision Maker Relationship to Patient Parent   Confirm Advance Directive Yes, on file     If there are any concerns before that time, such as medication questions, worsening symptoms or a need to see a physician for an urgent or emergent situation; please call [phone] ([phone]). A physician is also on call after our normal business hours of 8am to 5pm.     In order to serve you better, please allow up to 48 hours for prescription refills to be processed. Certain medications may require more paperwork or a written prescription that you may need to  from the office. We appreciate you letting us know of any refill requests as soon as possible. We also would like you to sign up for [employer] as well. Stacy Eagle MD and the Palliative Medicine Team           Athlete's Foot: Care Instructions  Your Care Instructions  Athlete's foot is an itchy rash on the foot caused by an infection with a fungus. You can get it by going barefoot in wet public areas, such as swimming pools or locker rooms. Many times there is no clear reason why you get athletes foot. You can easily treat athlete's foot by putting medicine on your feet for 1 to 6 weeks.  In some cases, a doctor may prescribe pills to kill the fungus. Follow-up care is a key part of your treatment and safety. Be sure to make and go to all appointments, and call your doctor if you are having problems. It's also a good idea to know your test results and keep a list of the medicines you take. How can you care for yourself at home? · Your doctor may suggest an over-the counter lotion or spray or may prescribe a medicine. Take your medicines exactly as prescribed. Call your doctor if you think you are having a problem with your medicine. · Keep your feet clean and dry. · When you get dressed, put your socks on before your underwear. This can prevent the fungus from spreading from your feet to your groin. To prevent athlete's foot  · Wear flip-flops or other shower sandals in public locker rooms and showers and by the pool. · Dry between your toes after swimming or bathing. · Wear leather shoes or sandals, which let air get to your feet. · Change your socks as needed so your feet stay as dry as possible. · Use antifungal powder on your feet. When should you call for help? Watch closely for changes in your health, and be sure to contact your doctor if:  · You do not get better as expected. Where can you learn more? Go to http://ashleigh-wesley.info/. Enter M498 in the search box to learn more about \"Athlete's Foot: Care Instructions. \"  Current as of: July 12, 2016  Content Version: 11.1  © 9678-3819 [employer]. Care instructions adapted under license by [employer] (which disclaims liability or warranty for this information). If you have questions about a medical condition or this instruction, always ask your healthcare professional. [place] [address] any warranty or liability for your use of this information.

## 2017-03-06 NOTE — TELEPHONE ENCOUNTER
Called patient to advise that Dr. Radha Jean was coming by today at 11:00am.  Pt to call me back to advise if this will be ok.

## 2017-03-08 NOTE — PROGRESS NOTES
Patient has insurance benefits for transportation  Request need at least 3 day notice for stretcher service  [address]   Called mom and let her know that [facility] can be transported for her CT scan. Mom will call us when CT is scheduled so we can arrange transport. While at the hospital Dr. Maricruz Epps would like blood drawn, she is a difficult stick and we have not been able to access vein for blood draw.

## 2017-03-14 NOTE — TELEPHONE ENCOUNTER
Talked with Dr Katy Batista and she would like the contrast.  Call placed to [facility] and she does not want the contrast states it makes her throw up. The CT is scheduled for March 24 at 10AM so transport will need to be arranged.

## 2017-03-14 NOTE — TELEPHONE ENCOUNTER
Luan with [facility] is calling to advise staff that patient will start with their home health tomorrow.

## 2017-03-14 NOTE — TELEPHONE ENCOUNTER
Patient calling to speak to home nurse regarding CT scans. She states that 2 have been ordered, 1 with contrast and 1 w/o. Needs to discuss. Also sent by e-mail.

## 2017-03-14 NOTE — PROGRESS NOTES
[facility]  Palliative Medicine Office  Nursing Note  [phone]  Fax [phone]    Patient Name: Magdalena Pro  YOB: 1980      3/14/2017    Verified  and address with mom Yvette Berumen as identifiers.    Telephone call placed to give mom information on arranging transport for the CT scan on 3/24 Maranda Kamara, RN Nurse home visit   Palliative Medicine    Total time spent counseling:   Future Appointments  Date Time Provider Neva Babcock   3/24/2017 10:30 AM [facility] CT 2 [facility]   3/24/2017 11:00 AM [facility] CT 1 [facility]

## 2017-03-16 NOTE — TELEPHONE ENCOUNTER
Patient wanted to let Nurse Carlotta Gaitan know Sarah Reagan advised her prior approval is needed for transportation

## 2017-03-16 NOTE — TELEPHONE ENCOUNTER
Called Allie to get authorization for transportation. Per [facility] we are not pt's PCP. She will need to contact her pcp to get authorization. Pt called and advised.

## 2017-03-16 NOTE — TELEPHONE ENCOUNTER
Returned call to [facility] therapist. She state pt will benefit from home health aide to assist family in pt's ADL's. Lisbet Nation to send over orders and we will get them signed.

## 2017-03-20 NOTE — TELEPHONE ENCOUNTER
Returned call to pt, she state she need clarification from [address] who set up her transportation for Friday. She called the number provided for [facility] and it was not correct. Advised pt will have Janis return call. Pt verbalized understanding.

## 2017-03-20 NOTE — TELEPHONE ENCOUNTER
Patient calling to get help in getting Dr Renay Barahona listed as her PCP on insurance card. Advised would try to call them today. She called on Friday but Allie could not locate Dr. Renay Barahona.   Advised would call her back after I spoke to insurance co.

## 2017-03-20 NOTE — TELEPHONE ENCOUNTER
Call to [facility] Task Messenger number [phone] to get approval for transportation to CAT scan on 3/24 got approval and called transportion [phone] all arranged for 3/24 will pick her up at 9AM and return her home 1 1/2 hours later.   Call to patient to let her know left message

## 2017-03-21 NOTE — TELEPHONE ENCOUNTER
Called Allie spoke to Bronson Castro. Exlained that patient was trying to  Change her pcp to Dr. Katy Batista. Bronson Castro states that patient has to call. Explained that patient had called but Allie could not locate Dr. Katy Batista. Bronson Castro states still patient has to call. Notified home nurse. See note below:    [facility] again today and they will not change PCP for me. Jyoti needs to be the one calling and requesting this change. I called Jyoti to advise and she states they wont do it for her. I advised her the next time you go to see her, that you can have her call and then see what the hang up is. (Note sent to Home Nurse by e-mail). Sorry I have tried  twice.

## 2017-03-24 NOTE — PROGRESS NOTES
Palliative Medicine Home Visit  [place]: [phone]  [facility]: [phone])    Patient Name: Tony López  YOB: 1980    Date of Current Visit: 3/23/17    Date of Initial Visit: 2/2/16   Requesting Physician: Dr. Jones [facility]  Primary Care Physician: Bisi Manzano MD   Summary:   Tony López is a 39y.o. year old with a past history of metastatic endometrial cancer, who was referred to Palliative Medicine by Dr. Niko John for pain management. 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 She is not otherwise on disease directed therapy. The patients social history includes progressive functional decline, now using a wheelchair. She lives with her mother who has 3 steps to her house but otherwise is on 1 floor.  [address] [place] [address] is addressing the following current patient/family concerns: progressive functional decline and uncontrolled pain due to metastatic endometrial cancer from bone. Current Issues:   1. Changing PCP to Dr. Eddie Pizarro:   Was on the phone with Insurance company to try and get PCP since she has not seen listed PCP in about 3 years. Once PCP is changed we can move on getting her CAT scan with stretcher transport. On the phone about 40 min Dr. Pete Jensen not listed as a PCP in network since Cely Jj will become a home based primary care patient Dr. Hamzah Buitrago used as PCP and Dr. Pete Jensen will work closely with him on her care. Cely Jj very appreciative of effort to help her manage insurance company               Advance Care Planning:     Resuscitation Status:   dDNR?   [] Yes   [x] No    [] Not Applicable    Primary Decision Maker Info:dsf      Advance Care Planning 2/16/2017   Patient's Healthcare Decision Maker is: Legal Next of Andrea 69   Primary Decision Maker Name Salima Roberson   Primary Decision Maker Phone Number [phone]   Primary Decision Maker Relationship to Patient Parent   Confirm Advance Directive Yes, on file             Secondary Decision Maker Info: Advance Directive Info:                 FUNCTIONAL ASSESSMENT    Palliative Performance Scale (PPS):   40    Review of Systems: The following systems were reviewed: constitutional, ears/nose/mouth/throat, respiratory, gastrointestinal, musculoskeletal, neurologic, psychiatric, endocrine. Positive findings noted below. Modified ESAS Completed by: patient   Fatigue: 3 Drowsiness: 0   Depression: 2 Pain: 3   Anxiety: 3 Nausea: 0   Anorexia: 0 Dyspnea: 0   Constipation: No Best Well-Being: 3   Other Problem (Comment): 0      Current Outpatient Prescriptions   Medication Sig    oxyCODONE IR (OXY-IR) 30 mg immediate release tablet Take 2 Tabs by mouth every four (4) hours as needed for Pain for up to 30 days. Max Daily Amount: 360 mg.    DULoxetine 40 mg cpDR Take 40 mg by mouth daily for 30 days.  clotrimazole (LOTRIMIN) 1 % topical cream Apply 1 Each to affected area two (2) times a day. Apply to feet affected    sennosides (SENNA) 8.6 mg cap Take 2 Tabs by mouth two (2) times a day.  acetaminophen (TYLENOL ARTHRITIS PAIN) 650 mg CR tablet Take 650 mg by mouth every six (6) hours as needed for Pain.  LORazepam (ATIVAN) 1 mg tablet Take 1 Tab by mouth every four (4) hours as needed for Anxiety. Max Daily Amount: 6 mg.  nystatin (MYCOSTATIN) powder Apply  to affected area four (4) times daily as needed. Yeast    docusate sodium (COLACE) 100 mg capsule Take 100 mg by mouth two (2) times a day.  ibuprofen (ADVIL) 200 mg tablet Take 200 mg by mouth every six (6) hours as needed for Pain.  gabapentin (NEURONTIN) 300 mg capsule TAKE 2 CAPSULES BY MOUTH TWICE A DAY    lisinopril (PRINIVIL, ZESTRIL) 10 mg tablet TAKE 1 TABLET BY MOUTH DAILY    metFORMIN (GLUCOPHAGE) 500 mg tablet TAKE 1 TAB BY MOUTH TWO (2) TIMES DAILY (WITH MEALS).     metoprolol succinate (TOPROL-XL) 25 mg XL tablet Take 1 Tab by mouth daily. Indications: HYPERTENSION    polyethylene glycol (MIRALAX) 17 gram packet Take 17 g by mouth daily. No current facility-administered medications for this visit. Allergies   Allergen Reactions    Pcn [Penicillins] Nausea and Vomiting     \"but could take amoxil\"    Shellfish Containing Products Unknown (comments)    Tetanus And Diphtheria Toxoids, Adsorbed, Adult Other (comments)     Developed local swelling, axillary pain, and transient fever                Physical Exam:    Wt Readings from Last 3 Encounters:   02/16/17 300 lb (136.1 kg)   02/02/16 280 lb (127 kg)   07/16/15 284 lb (128.8 kg)     Blood pressure 117/61, pulse (!) 102, resp. rate 20, SpO2 98 %.

## 2017-03-29 NOTE — PROGRESS NOTES
[facility]  Palliative Medicine Office  Nursing Note  [phone]  Fax [phone]    Patient Name: Gregg Rowland  YOB: 1980      3/29/2017    Telephone call placed to [facility] for assessment and blood draw. Call to Jyoti to let her know RN would be visiting to try and draw her blood. She is been struggling with a sinus headache taking over the counter medicine which does seem to help. She will call palliative back if symptoms worsen or do not resolve in 2 days. Bariatric BSC is on back order      Loena Clement RN Nurse home visit   Palliative Medicine    No future appointments.

## 2017-03-29 NOTE — PROGRESS NOTES
This visit is a part of pastoral care providing continuing support to Karli and her mother. She is encouraged that she is in therapy with [facility]. She is excited about the possibility of being able to stand. She is working to this end and both her and her mother are praying to this end.      [facility] [employer] [facility], [address] [place] [address]

## 2017-04-07 NOTE — TELEPHONE ENCOUNTER
Nurse [place] with [address] called patient for appointment today to include labs  patient declined visit wanted to reschedule for next week

## 2017-04-19 NOTE — TELEPHONE ENCOUNTER
Last visit - 3/23/17  No f/u scheduled currently- Home patient    Last filled 3/29/17   #20  Fentanyl 100 mcg 2 patches every 72 hours

## 2017-04-20 NOTE — TELEPHONE ENCOUNTER
Patient requesting to talk to nurse (asked for Olivia Montiel) didn't want to give me a reason for phone call

## 2017-04-20 NOTE — TELEPHONE ENCOUNTER
Nurse returned pt's phone call. She is requesting a refill prescription for her Fentanyl patches. According to , pt filled her last prescription on 3/29/17. Pt says she is calling to allow plenty of time for prescription to be picked up and to make sure pharmacy has the patches in stock. Dr. April Bryan is planning on making a home visit to patient on 4/25/17.

## 2017-04-21 NOTE — TELEPHONE ENCOUNTER
Call placed to patient to inform of rx ready for  , she informs she will have someone to pick them up from office before 5pm

## 2017-04-21 NOTE — TELEPHONE ENCOUNTER
Patient informs she needs refill on her Fentanyl patches today as she \"mails them out to mail order\" and her nurse accidentally destroyed two patches while trying to place them on her and they would not stick.  Order pending in encounter

## 2017-04-25 NOTE — PROGRESS NOTES
Palliative Medicine Outpatient Services  [place]: [phone] ([phone])    Patient Name: Alphonso Choudhary  YOB: 1980    Date of Current Visit:  17  Location of Current Visit:    [] [facility] Office  [] [facility] Office   [x] Home  [] Other:       Date of Initial Visit: 16   Requesting Physician:  Dr. Shobha Miranda  Primary Care Physician: Laura Smith MD      Summary:   Alphonso Choudhary is a 39y.o. year old with a past history of metastatic endometrial cancer, who was referred to Palliative Medicine by Dr. Bhavin Cotton for pain management. 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 She is not otherwise on disease directed therapy. The patients social history includes progressive functional decline, now mostly bed bound except with PT - needs assistive devices to stand/walk. She lives with her mother who has 3 steps to her house but otherwise is on 1 floor. Her goal is to get back to her own home where she was living with boyfriend/neice. Palliative Medicine is addressing the following current patient/family concerns: progressive functional decline and uncontrolled pain due to metastatic endometrial cancer from bone. Palliative Diagnoses:       ICD-10-CM ICD-9-CM    1. Right leg pain M79.604 729.5    2. Debility R53.81 799.3    3. Endometrial cancer (HCC) C54.1 182.0    4. Chronic neoplasm-related pain G89.3 338.3       Plan:     Patient Instructions     Ad Richardson,    It was good to see you today in your home. Your room looks great! I like the new bedspread and you moved your dressers. Today we talked about the followin.  Pain  -This is limiting you from going out of your room and your house  -The Fentanyl patches are working for you - 2 of the 100mcg/hr per day  -We talked about alternating changing the patches to reduce the bolus of medication on the first day  -You are going to start this tomorrow; you have patch 1 on now and will apply patch 2 tomorrow; then you will take patch 1 off on day 3 and patch 2 on day 3 for that patch  -You will need a calendar to do this  -You are continuing to take the Oxycodone 30mg, about 6-8 per day    2. Monitoring blood and  Getting the CT scan  -We are going to have Dr. Jennifer Osorio order the transport because he can be your PCP based on your insurance  -I don't want to wait too much longer on seeing the results of this  -This will help me help you better  -Lucie Arnold will help coordinate this  -We will check with Dr. Bj Teran about the contrast    3. Depression  -This is much better  -You are taking Cymbalta 40mg; please continue this as it seems to be helping  -You have enjoyed visiting with the  each week; this is helping you a lot  -You do have trouble with sleeping but you are napping during the day  -We talked about reducing your nap time by 15 min every few days by setting an alarm    5. Function and supprt  -You are doing better with this  -[facility] is doing your physical therapy at this time as well as occupational therapy and its going well for you  -We talked about taking Vitamin D3 2000 units per day because you aren't getting a lot of sunlight    6. Constipation  -You are adjusting the Senna between 3 and 5 per day based on your needs  -Sometimes you take the MiraLax and even sometimes Prune Juice  -We talked about oatmeal 1/2 cup; 1/2 cup applesauce; and 1/4 of prune juice daily to keep your bowels straight    7. You have some fungus on your feet; especially the right one  -You can apply lotion Lotrisone twice daily; we will re-prescribe this       Other Considerations:  1. Volunteer services for Hospice, can we use anything to support her? 2. Room redo (there is a [employer] can we use this?)  3. Labs  CBC, comp metabolic, TSH, Hemoglobin A1C, magnesium (has been difficult to get labs)  5.          Repeat imaging - she is thinking about this; tried but getting into wheelchair was too painful     Advance Care Planning:     Resuscitation Status: Full  dDNR? [] Yes   [] No    [x] Not Applicable    Advance Care Planning 2/16/2017   Patient's Healthcare Decision Maker is: Legal Next of Kin   Primary Decision Maker Name Brooke Caban   Primary Decision Maker Phone Number [phone]   Primary Decision Maker Relationship to Patient Parent   Confirm Advance Directive Yes, on file      Counseling and Coordination:     Spiritual Assessment:  What do I need to know about you as a person in order to provide you the best care possible? I believe in God and that he will heal me    Nursing documentation from date of visit reviewed? [x] Yes   [] No    [] N/A  Initial Consult Note sent to PCP? [x] Yes   [] No    [] No PCP listed     Prescriptions Given:     No orders of the defined types were placed in this encounter. Follow Up:   Planning on transport to imaging and labs    No future appointments. Physicians Involved in Care:   Patient Care Team:  Mike Desir MD as PCP - General (Palliative Medicine)  Denise Griffith RN as Nurse Navigator (Oncology)  Marie Navarrete MD (Gynecologic Oncology)  Mike Desir MD as Physician (Palliative Medicine)         Persons present for visit:  Patient, mother    Chief Complaint: I am doing well with physical therapy    History of Present Illness:     Doing well with PT  Has goals  Mother cleaned room and moved things around, new bed spread etc...   Continues with right leg swelling and pain; still difficult to get up  Feels like she can't move her right leg but when she was up her mom and PT said she did move it  Has been working with OT as well  Eating ok; not constipated; adjust medications to needs  Feels sleepy on the day she applies 2 patches  Still using about 8-10 oxycodone/day - needs next scripts  Visiting more friends and connecting by phone  Sometimes can't sleep; is napping a lot during the day    Anticipatory grief assessment: Family/loved ones showing signs of grief (depression, feeling greater than usual concern for dying person, imagining what loved one's death will be like, getting ready emotionally) during pts illness:    [] Yes   [x] No        FUNCTIONAL ASSESSMENT    Palliative Performance Scale (PPS):  PPS: 70    ECOG:  ECOG Status : Limited self-care    Review of Systems: The following systems were reviewed: constitutional, ears/nose/mouth/throat, respiratory, gastrointestinal, musculoskeletal, neurologic, psychiatric, endocrine. Positive findings noted below. Modified ESAS Completed by: patient   Fatigue: 0 Drowsiness: 0   Depression: 2 Pain: 6   Anxiety: 2 Nausea: 0   Anorexia: 0 Dyspnea: 0   Constipation: No Best Well-Bein          Past Medical History:   Diagnosis Date    Cancer [facility] [address]     uterine    CVI (common variable immunodeficiency) ([facility] [address]     Diabetes ([facility] [address]     Elevated liver function tests 10/21/2010    Endometrial cancer ([facility] [address] 2010    Iron deficiency anemia     Menometrorrhagia 10/21/2010    Microcytic anemia 10/21/2010    Morbid obesity ([address]     Prediabetes 2010    Radiation     completed radiation mid-march      Past Surgical History:   Procedure Laterality Date    CARDIAC SURG PROCEDURE UNLIST      hx of tachycardia    HX GYN      hysterectomy      Family History   Problem Relation Age of Onset    Stroke Maternal Grandfather     Cancer Paternal Grandmother      breast    Diabetes Paternal Grandmother     Hypertension Mother     Hypertension Father       Social History   Substance Use Topics    Smoking status: Never Smoker    Smokeless tobacco: Never Used    Alcohol use Yes     Current Outpatient Prescriptions   Medication Sig    cholecalciferol, vitamin D3, (VITAMIN D3) 2,000 unit tab Take  by mouth daily.  [START ON 2017] fentaNYL (DURAGESIC) 100 mcg/hr PATCH 2 Patches by TransDERmal route every seventy-two (72) hours for 30 days. Max Daily Amount: 2 Patches.  lisinopril (PRINIVIL, ZESTRIL) 10 mg tablet TAKE 1 TABLET BY MOUTH DAILY    metoprolol succinate (TOPROL-XL) 25 mg XL tablet TAKE 1 TABLET BY MOUTH DAILY    clotrimazole (LOTRIMIN) 1 % topical cream Apply 1 Each to affected area two (2) times a day. Apply to feet affected    sennosides (SENNA) 8.6 mg cap Take 2 Tabs by mouth two (2) times a day.  acetaminophen (TYLENOL ARTHRITIS PAIN) 650 mg CR tablet Take 650 mg by mouth every six (6) hours as needed for Pain.  LORazepam (ATIVAN) 1 mg tablet Take 1 Tab by mouth every four (4) hours as needed for Anxiety. Max Daily Amount: 6 mg.  nystatin (MYCOSTATIN) powder Apply  to affected area four (4) times daily as needed. Yeast    docusate sodium (COLACE) 100 mg capsule Take 100 mg by mouth two (2) times a day.  ibuprofen (ADVIL) 200 mg tablet Take 200 mg by mouth every six (6) hours as needed for Pain.  gabapentin (NEURONTIN) 300 mg capsule TAKE 2 CAPSULES BY MOUTH TWICE A DAY    metFORMIN (GLUCOPHAGE) 500 mg tablet TAKE 1 TAB BY MOUTH TWO (2) TIMES DAILY (WITH MEALS).  polyethylene glycol (MIRALAX) 17 gram packet Take 17 g by mouth daily. No current facility-administered medications for this visit. Allergies   Allergen Reactions    Pcn [Penicillins] Nausea and Vomiting     \"but could take amoxil\"    Shellfish Containing Products Unknown (comments)    Tetanus And Diphtheria Toxoids, Adsorbed, Adult Other (comments)     Developed local swelling, axillary pain, and transient fever            Physical Exam:  Can't weigh due to hard to stand and no working scale in home    Wt Readings from Last 3 Encounters:   02/16/17 300 lb (136.1 kg)   02/02/16 280 lb (127 kg)   07/16/15 284 lb (128.8 kg)     There were no vitals taken for this visit.   Last bowel movement:     Constitutional: alert oriented pleasant AA female; lying in bed  Eyes: pupils equal, anicteric  ENMT: no nasal discharge, moist mucous membranes  Cardiovascular:   Respiratory: breathing not labored, symmetric  Gastrointestinal: soft non-tender, non-distended  Musculoskeletal: right leg swelling significantly greater than left leg; firm but no massive LAD  Skin: warm, dry   Neurologic: following commands, moving all extremities   Psychiatric: full affect, no hallucinations  Other: appears to have lost weight overall - cannot weight at this time    Most Recent Imaging in 2015:  IMPRESSION: Hypermetabolic lesion in the dome of the liver possibly related to a capsular metastasis. Hypermetabolic retroperitoneal lymph node  unchanged in size. Minimally hypermetabolic right external iliac artery and  inguinal lymph nodes. The lytic lesion involving the acetabulum is only  minimally hypermetabolic and is unchanged in size compared to the prior exam.    Lab Data Reviewed:  Lab Results   Component Value Date/Time    WBC 7.7 05/17/2015 11:07 AM    HGB 11.3 05/17/2015 11:07 AM    PLATELET 371 88/27/2036 11:07 AM     Lab Results   Component Value Date/Time    Sodium 139 05/17/2015 11:07 AM    Potassium 3.7 05/17/2015 11:07 AM    Chloride 102 05/17/2015 11:07 AM    CO2 29 05/17/2015 11:07 AM    BUN 9 05/17/2015 11:07 AM    Creatinine 0.53 05/17/2015 11:07 AM    Calcium 8.8 05/17/2015 11:07 AM    Magnesium 1.6 11/20/2014 03:30 PM      Lab Results   Component Value Date/Time    AST (SGOT) 14 05/17/2015 11:07 AM    Alk.  phosphatase 166 05/17/2015 11:07 AM    Protein, total 7.5 05/17/2015 11:07 AM    Albumin 3.7 05/17/2015 11:07 AM    Globulin 3.8 05/17/2015 11:07 AM     Lab Results   Component Value Date/Time    INR 1.2 06/06/2010 03:12 AM    Prothrombin time 12.6 06/06/2010 03:12 AM    aPTT 26.2 08/22/2009 11:35 AM      Lab Results   Component Value Date/Time    Iron 53 04/16/2012 09:50 AM    TIBC 270 04/16/2012 09:50 AM    Iron % saturation 20 04/16/2012 09:50 AM    Ferritin 10 07/24/2009 08:50 AM       Controlled Substances Safety Assessment (if on controlled substances):     Reviewed opioid safety handout:  [x] Yes   [] No  24 hour opioid dose >150mg morphine equivalent/day:  [x] Yes   [] No  Benzodiazepines:  [x] Yes   [] No  Sleep apnea:  [] Yes   [x] No  Prescription monitoring program or similar reviewed date of visit:   [x] Yes   [] No  Urine Toxicology Testing within last 6 months:  [] Yes   [x] No (will collect as urine cup in home)  History of or new aberrant medication taking behaviors:  [] Yes   [x] No  Risk of prescription drug misuse/overdose event:  [x] Low   [] Moderate    [] High          Total time: 40min  Counseling / coordination time: 20min  > 50% counseling / coordination?: yes re strategies for improvement

## 2017-04-25 NOTE — MR AVS SNAPSHOT
Visit Information Date & Time Provider Department Dept. Phone Encounter #  
 4/25/2017  4:00 PM Fatimah Wilson MD [address] at [facility] [phone] [id number] Upcoming Health Maintenance Date Due DTaP/Tdap/Td series (1 - Tdap) 7/29/2001 PAP AKA CERVICAL CYTOLOGY 1/24/2016 Pneumococcal 19-64 Highest Risk (1 of 3 - PCV13) 4/27/2018* HEMOGLOBIN A1C Q6M 4/27/2018* EYE EXAM RETINAL OR DILATED Q1 4/27/2018* LIPID PANEL Q1 4/27/2018* INFLUENZA AGE 9 TO ADULT 4/27/2018* *Topic was postponed. The date shown is not the original due date. Allergies as of 4/25/2017  Review Complete On: 3/6/2017 By: Fatimah Wilson MD  
  
 Severity Noted Reaction Type Reactions Pcn [Penicillins]  11/14/2009    Nausea and Vomiting \"but could take amoxil\" Shellfish Containing Products  11/14/2009    Unknown (comments) Tetanus And Diphtheria Toxoids, Adsorbed, Adult  04/04/2011   Systemic Other (comments) Developed local swelling, axillary pain, and transient fever Current Immunizations  Reviewed on 4/30/2012 Name Date Influenza Vaccine 10/1/2013 Not reviewed this visit You Were Diagnosed With   
  
 Codes Comments [facility]ometrial [facility] ([facility]    -  Primary ICD-10-CM: C54.1 ICD-9-CM: 182. 0 Vitals OB Status Smoking Status Hysterectomy Never Smoker Preferred Pharmacy Pharmacy Name Phone [id number] [place] [phone] Your Updated Medication List  
  
   
This list is accurate as of: 4/25/17 11:59 PM.  Always use your most recent med list. ADVIL 200 mg tablet Generic drug:  ibuprofen Take 200 mg by mouth every six (6) hours as needed for Pain. clotrimazole 1 % topical cream  
Commonly known as:  Mervat Scottsen Apply 1 Each to affected area two (2) times a day. Apply to feet affected  
  
 docusate sodium 100 mg capsule Commonly known as:  Otelia Diones Take 100 mg by mouth two (2) times a day. fentaNYL 100 mcg/hr PATCH Commonly known as:  DURAGESIC  
2 Patches by TransDERmal route every seventy-two (72) hours for 30 days. Max Daily Amount: 2 Patches. Start taking on:  6/20/2017  
  
 gabapentin 300 mg capsule Commonly known as:  NEURONTIN  
TAKE 2 CAPSULES BY MOUTH TWICE A DAY  
  
 lisinopril 10 mg tablet Commonly known as:  PRINIVIL, ZESTRIL  
TAKE 1 TABLET BY MOUTH DAILY LORazepam 1 mg tablet Commonly known as:  ATIVAN Take 1 Tab by mouth every four (4) hours as needed for Anxiety. Max Daily Amount: 6 mg.  
  
 metFORMIN 500 mg tablet Commonly known as:  GLUCOPHAGE  
TAKE 1 TAB BY MOUTH TWO (2) TIMES DAILY (WITH MEALS). metoprolol succinate 25 mg XL tablet Commonly known as:  TOPROL-XL  
TAKE 1 TABLET BY MOUTH DAILY MIRALAX 17 gram packet Generic drug:  polyethylene glycol Take 17 g by mouth daily. nystatin powder Commonly known as:  MYCOSTATIN Apply  to affected area four (4) times daily as needed. Yeast  
  
 sennosides 8.6 mg Cap Commonly known as:  Senna Take 2 Tabs by mouth two (2) times a day. TYLENOL ARTHRITIS PAIN 650 mg CR tablet Generic drug:  acetaminophen Take 650 mg by mouth every six (6) hours as needed for Pain. VITAMIN D3 2,000 unit Tab Generic drug:  cholecalciferol (vitamin D3) Take  by mouth daily. Patient Instructions ERROR Patient Instructions History Introducing [facility]! Dear Renetta Kidd: Thank you for requesting a [employer] account. Our records indicate that you already have an active [employer] account. You can access your account anytime at [email or web address]. [email or web address] Did you know that you can access your hospital and ER discharge instructions at any time in [employer]? You can also review all of your test results from your hospital stay or ER visit. Additional Information If you have questions, please visit the Frequently Asked Questions section of the [employer] website at [email or web address] [email or web address]. [email or web address] Remember, [employer] is NOT to be used for urgent needs. For medical emergencies, dial 911. Now available from your iPhone and Android! Please provide this summary of care documentation to your next provider. Your primary care clinician is listed as Radha Kay. If you have any questions after today's visit, please call [phone].

## 2017-04-25 NOTE — MR AVS SNAPSHOT
Visit Information Date & Time Provider Department Dept. Phone Encounter #  
 4/25/2017  3:00 PM Keven Coleman Milagros [facility] [phone] [id number] Follow-up Instructions Return in about 6 weeks (around 6/6/2017). Upcoming Health Maintenance Date Due Pneumococcal 19-64 Highest Risk (1 of 3 - PCV13) 7/29/1999 DTaP/Tdap/Td series (1 - Tdap) 7/29/2001 EYE EXAM RETINAL OR DILATED Q1 6/24/2015 LIPID PANEL Q1 6/24/2015 HEMOGLOBIN A1C Q6M 9/12/2015 PAP AKA CERVICAL CYTOLOGY 1/24/2016 INFLUENZA AGE 9 TO ADULT 8/1/2016 Allergies as of 4/25/2017  Review Complete On: 3/6/2017 By: Catalina Hernandez MD  
  
 Severity Noted Reaction Type Reactions Pcn [Penicillins]  11/14/2009    Nausea and Vomiting \"but could take amoxil\" Shellfish Containing Products  11/14/2009    Unknown (comments) Tetanus And Diphtheria Toxoids, Adsorbed, Adult  04/04/2011   Systemic Other (comments) Developed local swelling, axillary pain, and transient fever Current Immunizations  Reviewed on 4/30/2012 Name Date Influenza Vaccine 10/1/2013 Not reviewed this visit You Were Diagnosed With   
  
 Codes Comments Right leg pain    -  Primary ICD-10-CM: M79.604 ICD-9-CM: 729.5 Debility     ICD-10-CM: R53.81 ICD-9-CM: 799.3 Endometrial [facility] ([facility]     ICD-10-CM: C54.1 ICD-9-CM: 182. 0 Chronic neoplasm-related pain     ICD-10-CM: G89.3 ICD-9-CM: 338. 3 Vitals OB Status Smoking Status Hysterectomy Never Smoker Preferred Pharmacy Pharmacy Name Phone [id number] [address] [phone] Your Updated Medication List  
  
   
This list is accurate as of: 4/25/17 11:59 PM.  Always use your most recent med list. ADVIL 200 mg tablet Generic drug:  ibuprofen Take 200 mg by mouth every six (6) hours as needed for Pain. clotrimazole 1 % topical cream  
Commonly known as:  Maxinechela Taveras Apply 1 Each to affected area two (2) times a day. Apply to feet affected  
  
 docusate sodium 100 mg capsule Commonly known as:  Zainab Hem Take 100 mg by mouth two (2) times a day. fentaNYL 100 mcg/hr PATCH Commonly known as:  DURAGESIC  
2 Patches by TransDERmal route every seventy-two (72) hours for 30 days. Max Daily Amount: 2 Patches. Start taking on:  2017  
  
 gabapentin 300 mg capsule Commonly known as:  NEURONTIN  
TAKE 2 CAPSULES BY MOUTH TWICE A DAY  
  
 lisinopril 10 mg tablet Commonly known as:  PRINIVIL, ZESTRIL  
TAKE 1 TABLET BY MOUTH DAILY LORazepam 1 mg tablet Commonly known as:  ATIVAN Take 1 Tab by mouth every four (4) hours as needed for Anxiety. Max Daily Amount: 6 mg.  
  
 metFORMIN 500 mg tablet Commonly known as:  GLUCOPHAGE  
TAKE 1 TAB BY MOUTH TWO (2) TIMES DAILY (WITH MEALS). metoprolol succinate 25 mg XL tablet Commonly known as:  TOPROL-XL  
TAKE 1 TABLET BY MOUTH DAILY MIRALAX 17 gram packet Generic drug:  polyethylene glycol Take 17 g by mouth daily. nystatin powder Commonly known as:  MYCOSTATIN Apply  to affected area four (4) times daily as needed. Yeast  
  
 sennosides 8.6 mg Cap Commonly known as:  Senna Take 2 Tabs by mouth two (2) times a day. TYLENOL ARTHRITIS PAIN 650 mg CR tablet Generic drug:  acetaminophen Take 650 mg by mouth every six (6) hours as needed for Pain. VITAMIN D3 2,000 unit Tab Generic drug:  cholecalciferol (vitamin D3) Take  by mouth daily. Follow-up Instructions Return in about 6 weeks (around 2017). Patient Instructions Ad Calvillo, It was good to see you today in your home. Your room looks great! I like the new bedspread and you moved your dressers. Today we talked about the followin. Pain -This is limiting you from going out of your room and your house 
-The Fentanyl patches are working for you - 2 of the 100mcg/hr per day 
-We talked about alternating changing the patches to reduce the bolus of medication on the first day 
-You are going to start this tomorrow; you have patch 1 on now and will apply patch 2 tomorrow; then you will take patch 1 off on day 3 and patch 2 on day 3 for that patch 
-You will need a calendar to do this 
-You are continuing to take the Oxycodone 30mg, about 6-8 per day 2. Monitoring blood and  Getting the CT scan 
-We are going to have Dr. Sierra Gupta order the transport because he can be your PCP based on your insurance 
-I don't want to wait too much longer on seeing the results of this 
-This will help me help you better 
-Maranda Valladares will help coordinate this 
-We will check with Dr. Guille Araujo about the contrast 
 
3. Depression 
-This is much better 
-You are taking Cymbalta 40mg; please continue this as it seems to be helping 
-You have enjoyed visiting with the  each week; this is helping you a lot 
-You do have trouble with sleeping but you are napping during the day 
-We talked about reducing your nap time by 15 min every few days by setting an alarm 5. Function and supprt 
-You are doing better with this 
-[facility] is doing your physical therapy at this time as well as occupational therapy and its going well for you 
-We talked about taking Vitamin D3 2000 units per day because you aren't getting a lot of sunlight 6. Constipation 
-You are adjusting the Senna between 3 and 5 per day based on your needs 
-Sometimes you take the MiraLax and even sometimes Prune Juice 
-We talked about oatmeal 1/2 cup; 1/2 cup applesauce; and 1/4 of prune juice daily to keep your bowels straight 7. You have some fungus on your feet; especially the right one 
-You can apply lotion Lotrisone twice daily; we will re-prescribe this I will see you again in 12 weeks. Advance Care Planning 2/16/2017 Patient's Healthcare Decision Maker is: Legal Next of Kin Primary Decision Maker Name Jeannette Leavitt Primary Decision Maker Phone Number [phone] Primary Decision Maker Relationship to Patient Parent Confirm Advance Directive Yes, on file If there are any concerns before that time, such as medication questions, worsening symptoms or a need to see a physician for an urgent or emergent situation; please call [phone] (COPE). A physician is also on call after our normal business hours of 8am to 5pm.  
 
In order to serve you better, please allow up to 48 hours for prescription refills to be processed. Certain medications may require more paperwork or a written prescription that you may need to  from the office. We appreciate you letting us know of any refill requests as soon as possible. We also would like you to sign up for [employer] as well. Javy Gonzales MD and the Palliative Medicine Team 
 
 
 
   
 Fentanyl (Absorbed through the skin) Fentanyl (FEN-ta-nil) Treats severe pain. Also treats pain after surgery. This medicine is a narcotic pain reliever. Brand Name(s): Duragesic, Ionsys, fentaNYL Transdermal System Novaplus There may be other brand names for this medicine. When This Medicine Should Not Be Used: This medicine is not right for everyone. Do not use it if you had an allergic reaction to fentanyl or cetylpyridinium chloride, or if you have severe breathing or lung problems or bowel blockage (including paralytic ileus). How to Use This Medicine:  
Device Assisted Patch, Patch · Your doctor will tell you how many patches to use, where to apply them, and how often to apply them. Do not use more patches or apply them more often than your doctor tells you to. An overdose can be dangerous. Follow directions carefully so you do not get too much medicine at one time. · This medicine should come with a Medication Guide. Ask your pharmacist for a copy if you do not have one. · Ionsys® device: You will be taught how to use this medicine in the hospital after surgery, but you will not use it at home. Do not leave the hospital with the device on your skin. · Duragesic® patch:  
[facility] AUTHORITY your hands with soap and water before and after applying a patch. ¨ Leave the patch in its sealed wrapper until you are ready to put it on. Tear the wrapper open carefully. NEVER CUT the wrapper or the patch with scissors. Do not use any patch that has been cut by accident. Do not use this medicine if the pouch seal is broken or if the patch is damaged in any way. If any medicine leaks out of the patch and gets directly on your skin, wash it off right away with water. ¨ Do not use soap, lotion, alcohol, or oil on your skin before you apply the patch. Wash the skin only with water and let it dry completely. Do not shave the skin where you will apply the patch. You may cut the hair with scissors. ¨ The patient instructions will show the body areas where you can wear the patch. When putting on each new patch, choose a different place within these areas. Do not put the new patch on the same place you wore the last one. Be sure to remove the old patch before applying a new one. ¨ Do not put the patch over burns, cuts, or irritated skin. ¨ Missed dose: If you forget to wear or change a patch, put one on as soon as you can. If it is almost time to put on your next patch, wait until then to apply a new patch and skip the one you missed. Do not apply extra patches to make up for a missed dose. ¨ Store the patches at room temperature in the original package, away from heat, moisture, and direct light. ¨ Ask your pharmacist about the best way to dispose of used or leftover patches. Fold the used patch in half with the sticky sides together.  Make sure children and pets cannot come in contact with a used or leftover patch. Wash your hands with soap and water after you handle a patch. Drugs and Foods to Avoid: Ask your doctor or pharmacist before using any other medicine, including over-the-counter medicines, vitamins, and herbal products. · Do not use this medicine if you are using or have used an MAO inhibitor within the past 14 days. · Some medicines can affect how fentanyl works. Tell your doctor if you are using the following: ¨ Amiodarone, aprepitant, carbamazepine, clarithromycin, diltiazem, erythromycin, fluconazole, itraconazole, ketoconazole, nefazodone, phenytoin, rifampin, ritonavir, troleandomycin, or verapamil ¨ Blood pressure medicine ¨ Diuretic (water pill) ¨ Medicine to treat depression ¨ Phenothiazine medicine · Do not drink alcohol while you are using this medicine. · Tell your doctor if you use anything else that makes you sleepy. Some examples are allergy medicine, narcotic pain medicine, and alcohol. Tell your doctor if you are also using buprenorphine, butorphanol, nalbuphine, pentazocine, a benzodiazepine, or a muscle relaxer. · Do not eat grapefruit or drink grapefruit juice while you are using this medicine. Warnings While Using This Medicine: · Tell your doctor if you are pregnant, or if you have kidney disease, liver disease, breathing or lung problems (such as COPD, apnea), slow heartbeat, stomach or bowel problems, pancreas or gallbladder problems, or a history of head injury, brain tumor, depression, seizures, or alcohol or drug abuse. · Do not breastfeed while using this medicine. · This medicine may cause the following problems: 
¨ High risk of overdose, which can lead to death ¨ Respiratory depression (serious breathing problem that can be life-threatening) ¨ Serotonin syndrome, when used with certain medicines · Do not let the patch get too hot.  Avoid direct sunlight, and do not use a heating pad, electric blanket, heated water bed, sauna, sun lamp, or hot tub. Call your doctor if you have a fever higher than 104 degrees F (40 degrees C). · Be careful about letting other people come in contact with your patch. If any medicine gets on another person, wash it off right away with water and call your doctor. · This medicine may make you dizzy, drowsy, or lightheaded. Do not drive or do anything else that could be dangerous until you know how this medicine affects you. Sit or lie down if you feel dizzy. Stand up carefully. · This medicine can be habit-forming. Do not use more than your prescribed dose. Call your doctor if you think your medicine is not working. · Do not stop using this medicine suddenly. Your doctor will need to slowly decrease your dose before you stop it completely. · This medicine may cause constipation, especially with long-term use. Ask your doctor if you should use a laxative to prevent and treat constipation. · This medicine could cause infertility. Talk with your doctor before using this medicine if you plan to have children. · Keep all medicine out of the reach of children. Never share your medicine with anyone. Possible Side Effects While Using This Medicine:  
Call your doctor right away if you notice any of these side effects: · Allergic reaction: Itching or hives, swelling in your face or hands, swelling or tingling in your mouth or throat, chest tightness, trouble breathing · Anxiety, restlessness, fast heartbeat, fever, sweating, muscle spasms, twitching, nausea, vomiting, diarrhea, seeing or hearing things that are not there · Blue lips, fingernails, or skin · Extreme dizziness or weakness, shallow breathing, slow or uneven heartbeat, sweating, cold or clammy skin, seizures · Severe confusion, lightheadedness, dizziness, or fainting · Severe constipation, stomach pain, or vomiting · Trouble breathing or slow breathing If you notice these less serious side effects, talk with your doctor:  
· Headache · Mild constipation, nausea, or vomiting · Redness, itching, or mild skin rash where the patch or device is placed If you notice other side effects that you think are caused by this medicine, tell your doctor. Call your doctor for medical advice about side effects. You may report side effects to FDA at 9-322-MNT-9830 © 2017 [facility] Information is for End User's use only and may not be sold, redistributed or otherwise used for commercial purposes. The above information is an  only. It is not intended as medical advice for individual conditions or treatments. Talk to your doctor, nurse or pharmacist before following any medical regimen to see if it is safe and effective for you. Introducing [facility]! Dear Butch Fernandez: Thank you for requesting a [employer] account. Our records indicate that you already have an active [employer] account. You can access your account anytime at [email or web address] Did you know that you can access your hospital and ER discharge instructions at any time in [employer]? You can also review all of your test results from your hospital stay or ER visit. Additional Information If you have questions, please visit the Frequently Asked Questions section of the [employer] website at [email or web address] Remember, [employer] is NOT to be used for urgent needs. For medical emergencies, dial 911. Now available from your iPhone and Android! Please provide this summary of care documentation to your next provider. Your primary care clinician is listed as Selene Richardson. If you have any questions after today's visit, please call [phone].

## 2017-04-25 NOTE — PATIENT INSTRUCTIONS
Ad Montes,    It was good to see you today in your home. Your room looks great! I like the new bedspread and you moved your dressers. Today we talked about the followin. Pain  -This is limiting you from going out of your room and your house  -The Fentanyl patches are working for you - 2 of the 100mcg/hr per day  -We talked about alternating changing the patches to reduce the bolus of medication on the first day  -You are going to start this tomorrow; you have patch 1 on now and will apply patch 2 tomorrow; then you will take patch 1 off on day 3 and patch 2 on day 3 for that patch  -You will need a calendar to do this  -You are continuing to take the Oxycodone 30mg, about 6-8 per day    2. Monitoring blood and  Getting the CT scan  -We are going to have Dr. Tomasa Tabares order the transport because he can be your PCP based on your insurance  -I don't want to wait too much longer on seeing the results of this  -This will help me help you better  -Davis Raya will help coordinate this  -We will check with Dr. Hoang Cotter about the contrast    3. Depression  -This is much better  -You are taking Cymbalta 40mg; please continue this as it seems to be helping  -You have enjoyed visiting with the  each week; this is helping you a lot  -You do have trouble with sleeping but you are napping during the day  -We talked about reducing your nap time by 15 min every few days by setting an alarm    5. Function and supprt  -You are doing better with this  -[facility] is doing your physical therapy at this time as well as occupational therapy and its going well for you  -We talked about taking Vitamin D3 2000 units per day because you aren't getting a lot of sunlight    6.  Constipation  -You are adjusting the Senna between 3 and 5 per day based on your needs  -Sometimes you take the MiraLax and even sometimes Prune Juice  -We talked about oatmeal 1/2 cup; 1/2 cup applesauce; and 1/4 of prune juice daily to keep your bowels straight    7. You have some fungus on your feet; especially the right one  -You can apply lotion Lotrisone twice daily; we will re-prescribe this     I will see you again in 12 weeks. Advance Care Planning 2/16/2017   Patient's Healthcare Decision Maker is: Legal Next of Kin   Primary Decision Maker Name Elma Dixon   Primary Decision Maker Phone Number [phone]   Primary Decision Maker Relationship to Patient Parent   Confirm Advance Directive Yes, on file     If there are any concerns before that time, such as medication questions, worsening symptoms or a need to see a physician for an urgent or emergent situation; please call [phone] (COPE). A physician is also on call after our normal business hours of 8am to 5pm.     In order to serve you better, please allow up to 48 hours for prescription refills to be processed. Certain medications may require more paperwork or a written prescription that you may need to  from the office. We appreciate you letting us know of any refill requests as soon as possible. We also would like you to sign up for [facility] as well. Cathi Ramsey MD and the Palliative Medicine Team             Fentanyl (Absorbed through the skin)   Fentanyl (FEN-ta-nil)  Treats severe pain. Also treats pain after surgery. This medicine is a narcotic pain reliever. Brand Name(s): Duragesic, Ionsys, fentaNYL Transdermal System Novaplus   There may be other brand names for this medicine. When This Medicine Should Not Be Used: This medicine is not right for everyone. Do not use it if you had an allergic reaction to fentanyl or cetylpyridinium chloride, or if you have severe breathing or lung problems or bowel blockage (including paralytic ileus). How to Use This Medicine:   Device Assisted Patch, Patch  · Your doctor will tell you how many patches to use, where to apply them, and how often to apply them.  Do not use more patches or apply them more often than your doctor tells you to. An overdose can be dangerous. Follow directions carefully so you do not get too much medicine at one time. · This medicine should come with a Medication Guide. Ask your pharmacist for a copy if you do not have one. · Ionsys® device: You will be taught how to use this medicine in the hospital after surgery, but you will not use it at home. Do not leave the hospital with the device on your skin. · Duragesic® patch:   [facility] [place] [facility] your hands with soap and water before and after applying a patch. ¨ Leave the patch in its sealed wrapper until you are ready to put it on. Tear the wrapper open carefully. NEVER CUT the wrapper or the patch with scissors. Do not use any patch that has been cut by accident. Do not use this medicine if the pouch seal is broken or if the patch is damaged in any way. If any medicine leaks out of the patch and gets directly on your skin, wash it off right away with water. ¨ Do not use soap, lotion, alcohol, or oil on your skin before you apply the patch. Wash the skin only with water and let it dry completely. Do not shave the skin where you will apply the patch. You may cut the hair with scissors. ¨ The patient instructions will show the body areas where you can wear the patch. When putting on each new patch, choose a different place within these areas. Do not put the new patch on the same place you wore the last one. Be sure to remove the old patch before applying a new one. ¨ Do not put the patch over burns, cuts, or irritated skin. ¨ Missed dose: If you forget to wear or change a patch, put one on as soon as you can. If it is almost time to put on your next patch, wait until then to apply a new patch and skip the one you missed. Do not apply extra patches to make up for a missed dose. ¨ Store the patches at room temperature in the original package, away from heat, moisture, and direct light.   ¨ Ask your pharmacist about the best way to dispose of used or leftover patches. Fold the used patch in half with the sticky sides together. Make sure children and pets cannot come in contact with a used or leftover patch. Wash your hands with soap and water after you handle a patch. Drugs and Foods to Avoid:   Ask your doctor or pharmacist before using any other medicine, including over-the-counter medicines, vitamins, and herbal products. · Do not use this medicine if you are using or have used an MAO inhibitor within the past 14 days. · Some medicines can affect how fentanyl works. Tell your doctor if you are using the following:   ¨ Amiodarone, aprepitant, carbamazepine, clarithromycin, diltiazem, erythromycin, fluconazole, itraconazole, ketoconazole, nefazodone, phenytoin, rifampin, ritonavir, troleandomycin, or verapamil  ¨ Blood pressure medicine  ¨ Diuretic (water pill)  ¨ Medicine to treat depression  ¨ Phenothiazine medicine  · Do not drink alcohol while you are using this medicine. · Tell your doctor if you use anything else that makes you sleepy. Some examples are allergy medicine, narcotic pain medicine, and alcohol. Tell your doctor if you are also using buprenorphine, butorphanol, nalbuphine, pentazocine, a benzodiazepine, or a muscle relaxer. · Do not eat grapefruit or drink grapefruit juice while you are using this medicine. Warnings While Using This Medicine:   · Tell your doctor if you are pregnant, or if you have kidney disease, liver disease, breathing or lung problems (such as COPD, apnea), slow heartbeat, stomach or bowel problems, pancreas or gallbladder problems, or a history of head injury, brain tumor, depression, seizures, or alcohol or drug abuse. · Do not breastfeed while using this medicine.   · This medicine may cause the following problems:  ¨ High risk of overdose, which can lead to death  ¨ Respiratory depression (serious breathing problem that can be life-threatening)  ¨ Serotonin syndrome, when used with certain medicines  · Do not let the patch get too hot. Avoid direct sunlight, and do not use a heating pad, electric blanket, heated water bed, sauna, sun lamp, or hot tub. Call your doctor if you have a fever higher than 104 degrees F (40 degrees C). · Be careful about letting other people come in contact with your patch. If any medicine gets on another person, wash it off right away with water and call your doctor. · This medicine may make you dizzy, drowsy, or lightheaded. Do not drive or do anything else that could be dangerous until you know how this medicine affects you. Sit or lie down if you feel dizzy. Stand up carefully. · This medicine can be habit-forming. Do not use more than your prescribed dose. Call your doctor if you think your medicine is not working. · Do not stop using this medicine suddenly. Your doctor will need to slowly decrease your dose before you stop it completely. · This medicine may cause constipation, especially with long-term use. Ask your doctor if you should use a laxative to prevent and treat constipation. · This medicine could cause infertility. Talk with your doctor before using this medicine if you plan to have children. · Keep all medicine out of the reach of children. Never share your medicine with anyone.   Possible Side Effects While Using This Medicine:   Call your doctor right away if you notice any of these side effects:  · Allergic reaction: Itching or hives, swelling in your face or hands, swelling or tingling in your mouth or throat, chest tightness, trouble breathing  · Anxiety, restlessness, fast heartbeat, fever, sweating, muscle spasms, twitching, nausea, vomiting, diarrhea, seeing or hearing things that are not there  · Blue lips, fingernails, or skin  · Extreme dizziness or weakness, shallow breathing, slow or uneven heartbeat, sweating, cold or clammy skin, seizures  · Severe confusion, lightheadedness, dizziness, or fainting  · Severe constipation, stomach pain, or vomiting  · Trouble breathing or slow breathing  If you notice these less serious side effects, talk with your doctor:   · Headache  · Mild constipation, nausea, or vomiting  · Redness, itching, or mild skin rash where the patch or device is placed  If you notice other side effects that you think are caused by this medicine, tell your doctor. Call your doctor for medical advice about side effects. You may report side effects to FDA at 9-252-FDA-3332  © 2017 [facility] Information is for End User's use only and may not be sold, redistributed or otherwise used for commercial purposes. The above information is an  only. It is not intended as medical advice for individual conditions or treatments. Talk to your doctor, nurse or pharmacist before following any medical regimen to see if it is safe and effective for you.

## 2017-04-27 NOTE — PROGRESS NOTES
Certification approval    Initial certification: 1/72/97    Certification period: 3/15/17 to 5/13/17    CCN: [id number]    Company: [employer]    Diagnosis code: B205    I have reviewed and agree with plan of care.

## 2017-04-27 NOTE — TELEPHONE ENCOUNTER
Bernadette with [address] is calling to advise that Patient is having elevated pain in her right upper thigh. Bernadette states she was not able to get patient to do much PT today.

## 2017-04-27 NOTE — TELEPHONE ENCOUNTER
Patient is calling to see if RX was called into pharmacy. States she does not know name of script but Dr. Melissa Remy was calling it in when she visited patient this week.

## 2017-05-03 NOTE — PROGRESS NOTES
[facility]  Palliative Medicine Office  Nursing Note  [phone]  Fax [phone]    Patient Name: Alexi Collins  YOB: 1980      5/3/2017    Verified  and address with  as identifiers with patient, Marion Martin is have some wheezing and SOB due to allergie and asthma symptoms she has used an inhaler in the past but does not have one currently  MD order albuterol inhaler       Telephone call placed to  [facility] pharm to place order    Kiesha Costa RN Nurse home visit   Palliative Medicine    Total time spent 15    No future appointments.

## 2017-05-05 NOTE — PROGRESS NOTES
[facility]  Palliative Medicine Office  Nursing Note  [phone]  Fax [phone]    Patient Name: Kelsi Yang  YOB: 1980    Verified  and address with  [place] as identifiers. She continues to be worried about her wheezing and SOB and feels the Zyrtec will not help and when should she be worried. I explained that allergies can cause wheezing and SOB and the Zyrtec will help the allergies. She continues to worry that something is really wrong with her and she may have fluid in or around her lungs. I told her if her symptoms worsened to call but she always has the option of going to the ER to be assessed, that seemed to make her feel better. I will call on Monday and see her if she does not feel better       Shashi Quiroga, RN Nurse home visit   Palliative Medicine    Total time spent counseling:     No future appointments.

## 2017-05-06 NOTE — TELEPHONE ENCOUNTER
Was paged as on call provider at [phone]. I called back at 1047. I could not get through; there was no dial tone. I called again at about [phone] and spoke directly w/ pt. Pt reported sx's as noted in my previous phone note. I confirmed the following:  -SOB when pt gets up to go to the BR  -no SOB at rest  -no fever, chills, dizziness, HA  -no pain at present time; pt reported having chest tightness w/ SOB but relieved w/ albuterol MDI  -pt reports that she has been regularly using albuterol MDI 2 puffs q4h prn.    -runny nose is nearly resolved  -no cough  -wheezing has improved    She asks if she can get a Z-cherie. I explained to her that it is not indicated at this time given the above. She does not want to go to the ER. She reports that she is bedridden. She said that Margot Cockayne had told her someone could visit if she was not better on Monday. Pt did have script picked up for Zyrtec, but she was told by pharmacy that 5 mg is the \"baby\" dose. She then got the 10 mg OTC. She took one dose yesterday in the night time. She called pharmacy and asked if she could take another dose earlier in the AM.  She was told yes and took a dose. She was told she may need to change to a different med like Claritin. I relayed that she could try switch to Claritin. This would mean d/c Zyrtec and starting Claritin tomorrow in the AM.      We discussed going to urgent care or the ED. I instructed pt that if her overall condition worsens, then she should consider going to be evaluated. Otherwise, she could wait until Monday so that one of our clinic staff can see her in person and evaluate. I instructed her on signs and symptoms that could be potentially concerning including:   Worsening SOB  Fever  Chills  Chest tightness or pain not relieved w/ using albuterol  Any other sx that is new and concerns her    She verbalized understanding.     Jes Goldberg MD  Palliative Care Team

## 2017-05-08 PROBLEM — J96.01 ACUTE RESPIRATORY FAILURE WITH HYPOXIA (HCC): Status: ACTIVE | Noted: 2017-01-01

## 2017-05-08 NOTE — PROCEDURES
[facility]  *** FINAL REPORT ***    Name: William Smalls  MRN: [id number]  : 1980  HIS Order #: [id number]  [address] [facility] Visit #: [id number]  Date: 08 May 2017    TYPE OF TEST: Peripheral Venous Testing    REASON FOR TEST  Pain in limb, Limb swelling    Right Leg:-  Deep venous thrombosis:           No  Superficial venous thrombosis:    No  Deep venous insufficiency:        Not examined  Superficial venous insufficiency: Not examined      INTERPRETATION/FINDINGS  PROCEDURE:  Color duplex ultrasound imaging of lower extremity veins. FINDINGS:       Right: The popliteal is patent and without evidence of thrombus;  the popliteal is fully compressible and there is no narrowing of the  flow channel on color Doppler imaging. IMPRESSION:  No evidence of right lower extremity vein thrombosis. ADDITIONAL COMMENTS  Technically limited  doppler due to limited mobility of patient. I have personally reviewed the data relevant to the interpretation of  this  study.     TECHNOLOGIST: BANDAR Burt, PATEL  Signed: 2017 05:50 PM    PHYSICIAN: Carolyn Barry MD  Signed: 2017 07:12 AM

## 2017-05-08 NOTE — CONSULTS
Palliative Medicine:    Consult received from ED. Spoke w/ Palliative RN Johana Delacruz who is currently in ED and has spoken w/ Dr Yolanda Givens and ED attending. At this time mult tests pending, our IP team w/ fu with pt tmrw but please reach out to on-call team if needed.

## 2017-05-08 NOTE — ED PROVIDER NOTES
HPI Comments: 39 y.o. female with past medical history significant for morbid obesity, CVI, anemia, endometrial cancer, elevated liver function tests, DM, radiation, and hysterectomy who presents via EMS with chief complaint of leg pain. Pt c/o R leg pain that onset 6 days ago after a physical therapy session, she awoke with severe pain with movement and swelling. Pt claims she's been unable to walk due to the pain, and woke up 5 days ago feeling like she'd strained something. Pt denies any fall or injury to her leg. There are no other acute medical concerns at this time. PCP: Lenny Brown MD    Note written by Ian Chan, as dictated by Renetta Saeed MD 4:42 PM        The history is provided by the patient. No  was used. Past Medical History:   Diagnosis Date    Cancer [facility])     uterine    CVI (common variable immunodeficiency) (HCC)     Diabetes ([facility] [address]     Elevated liver function tests 10/21/2010    Endometrial cancer ([facility] [address] 7/7/2010    Hypertension     Iron deficiency anemia     Menometrorrhagia 10/21/2010    Microcytic anemia 10/21/2010    Morbid obesity ([facility] [address]     Prediabetes 7/7/2010    Psychiatric disorder     depression    Radiation     completed radiation mid-march       Past Surgical History:   Procedure Laterality Date    CARDIAC SURG PROCEDURE UNLIST      hx of tachycardia    HX GYN      hysterectomy         Family History:   Problem Relation Age of Onset    Hypertension Mother     Hypertension Father     Stroke Maternal Grandfather     Cancer Paternal Grandmother      breast    Diabetes Paternal Grandmother        Social History     Social History    Marital status: SINGLE     Spouse name: N/A    Number of children: N/A    Years of education: N/A     Occupational History    Not on file.      Social History Main Topics    Smoking status: Never Smoker    Smokeless tobacco: Never Used    Alcohol use Yes    Drug use: No    Sexual activity: Yes     Partners: Male     Other Topics Concern    Not on file     Social History Narrative         ALLERGIES: Pcn [penicillins]; Shellfish containing products; and Tetanus and diphtheria toxoids, adsorbed, adult    Review of Systems   Cardiovascular: Positive for leg swelling (R leg). Musculoskeletal: Positive for arthralgias (R leg pain with movement. ). All other systems reviewed and are negative. Vitals:    05/08/17 1600   BP: 109/68   Pulse: (!) 132   Resp: 17   Temp: 98.1 °F (36.7 °C)   SpO2: 95%   Weight: 108.9 kg (240 lb)   Height: 5' 6\" (1.676 m)            Physical Exam   Constitutional: She is oriented to person, place, and time. Morbidly obese   HENT:   Head: Normocephalic and atraumatic. Eyes: Conjunctivae and EOM are normal. Pupils are equal, round, and reactive to light. Neck: Normal range of motion. Neck supple. Cardiovascular: Regular rhythm and normal heart sounds. No murmur heard. tachycardic   Pulmonary/Chest: Effort normal and breath sounds normal. No respiratory distress. Abdominal: Soft. Bowel sounds are normal. She exhibits no distension. There is no tenderness. There is no rebound. Morbidly obese   Musculoskeletal: Normal range of motion. She exhibits tenderness (diffuse tenderness to R thigh). She exhibits no edema. Neurological: She is alert and oriented to person, place, and time. No cranial nerve deficit. She exhibits normal muscle tone. Coordination normal.   Skin: Skin is warm and dry. No rash noted. Psychiatric: She has a normal mood and affect. Her behavior is normal.   Nursing note and vitals reviewed. Note written by Ian Joy, as dictated by Uriel Sibley MD 4:44 PM      [facility]  ED Course   Eval in the ED very difficult given pt's body habitus and her inability to lay flat. CTA delayed. Dopplers with limited views.  I have discussed case with Dr. Nicole Cortez at [id number], and she recommends admission for PCA in order to control pain so pt can tolerate CTA. Recommends admission to hospitalist's service. Procedures    CONSULT NOTE:  7:42 PM Jimena Perez MD spoke with Dr. Teressa Garrett, Consult for Hospitalist.  Discussed available diagnostic tests and clinical findings. He is in agreement with care plans as outlined. Dr. Teressa Garrett will admit.

## 2017-05-08 NOTE — IP AVS SNAPSHOT
[address] 
[phone] Patient: Janice Condon MRN: [id number] DNL:0/31/4715 You are allergic to the following Allergen Reactions Egg Nausea and Vomiting Raw egg and scrambled eggs. Egg products okay. Pcn (Penicillins) Nausea and Vomiting \"but could take amoxil\" Shellfish Containing Products Unknown (comments) Tetanus And Diphtheria Toxoids, Adsorbed, Adult Other (comments) Developed local swelling, axillary pain, and transient fever Tomato Unknown (comments) Recent Documentation Height Weight BMI OB Status Smoking Status 1.676 m 121.4 kg 43.2 kg/m2 Hysterectomy Never Smoker Emergency Contacts Name Discharge Info Relation Home Work Mobile [address]  Parent [1] [phone] [address] CAREGIVER [3] Sister [23] [phone] About your hospitalization You were admitted on: May 8, 2017 You last received care in the:  [place] [id number] You were discharged on:  May 16, 2017 Unit phone number:  [phone] Why you were hospitalized Your primary diagnosis was:  Acute Respiratory Failure With Hypoxia (Hcc) Providers Seen During Your Hospitalizations Provider Role Specialty Primary office phone Sandi Mallory MD Attending Provider Emergency Medicine [phone] Sofía Llanos MD Attending Provider [facility] [phone] Sandi Reed MD Attending Provider Internal Medicine [phone] Glory Lamar MD Attending Provider Internal Medicine [phone] Ely Curtis MD Attending Provider Internal Medicine [phone] Your Primary Care Physician (PCP) Primary Care Physician Office Phone Office Fax Carmela Wen [phone] Follow-up Information Follow up With Details Comments Contact Info [facility]   [address] [address] [phone] Mikey Panda MD   [address] [place] [address] PALLIATIVE MEDICINE [address] 
[phone] Current Discharge Medication List  
  
START taking these medications Dose & Instructions Dispensing Information Comments Morning Noon Evening Bedtime  
 metoprolol tartrate 25 mg tablet Commonly known as:  LOPRESSOR Your last dose was: Your next dose is:    
   
   
 Dose:  25 mg Take 1 Tab by mouth two (2) times a day. Quantity:  60 Tab Refills:  0  
     
   
   
   
  
 morphine (PF) 150 mg/30 mL Pcas Your last dose was: Your next dose is:    
   
   
 Dose:  4 mg/hr 4 mg/hr by IntraVENous route continuous. 4mg q 6min Max of 44 mg in one hour Quantity:  1 Vial  
Refills:  0 CONTINUE these medications which have CHANGED Dose & Instructions Dispensing Information Comments Morning Noon Evening Bedtime * DULoxetine 20 mg capsule Commonly known as:  CYMBALTA What changed:  Another medication with the same name was added. Make sure you understand how and when to take each. Your last dose was: Your next dose is:    
   
   
 Dose:  40 mg Take 40 mg by mouth daily. Indications: ANXIETY WITH DEPRESSION, NEUROPATHIC PAIN Refills:  0  
     
   
   
   
  
 * DULoxetine 30 mg capsule Commonly known as:  CYMBALTA What changed: You were already taking a medication with the same name, and this prescription was added. Make sure you understand how and when to take each. Your last dose was: Your next dose is:    
   
   
 Dose:  30 mg Take 1 Cap by mouth daily. Quantity:  30 Cap Refills:  1  
     
   
   
   
  
 gabapentin 300 mg capsule Commonly known as:  NEURONTIN What changed:   
- how much to take 
- how to take this - when to take this - additional instructions Your last dose was: Your next dose is:    
   
   
 Dose:  300 mg Take 1 Cap by mouth two (2) times a day. Quantity:  60 Cap Refills:  0 LORazepam 1 mg tablet Commonly known as:  ATIVAN What changed:  when to take this Your last dose was: Your next dose is:    
   
   
 Dose:  1 mg Take 1 Tab by mouth every six (6) hours as needed for Anxiety. Max Daily Amount: 4 mg. Quantity:  30 Tab Refills:  0  
     
   
   
   
  
 oxyCODONE IR 30 mg immediate release tablet Commonly known as:  OXY-IR What changed:   
- medication strength 
- how much to take - when to take this 
- reasons to take this Your last dose was: Your next dose is:    
   
   
 Dose:  60 mg Take 2 Tabs by mouth every three (3) hours (while awake). Max Daily Amount: 360 mg.  
 Quantity:  60 Tab Refills:  0  
     
   
   
   
  
 * Notice: This list has 2 medication(s) that are the same as other medications prescribed for you. Read the directions carefully, and ask your doctor or other care provider to review them with you. CONTINUE these medications which have NOT CHANGED Dose & Instructions Dispensing Information Comments Morning Noon Evening Bedtime  
 albuterol 90 mcg/actuation inhaler Commonly known as:  PROVENTIL HFA, VENTOLIN HFA, PROAIR HFA Your last dose was: Your next dose is:    
   
   
 Dose:  1 Puff Take 1 Puff by inhalation every six (6) hours as needed for Wheezing. Quantity:  1 Inhaler Refills:  1  
     
   
   
   
  
 clotrimazole 1 % topical cream  
Commonly known as:  [place] Peel Your last dose was: Your next dose is:    
   
   
 Dose:  1 Each Apply 1 Each to affected area two (2) times a day. Apply to feet affected Quantity:  35.4 g Refills:  0  
     
   
   
   
  
 docusate sodium 100 mg capsule Commonly known as:  Mayur Fitzpatrick Your last dose was: Your next dose is:    
   
   
 Dose:  100 mg Take 100 mg by mouth two (2) times a day. Refills:  0 MIRALAX 17 gram packet Generic drug:  polyethylene glycol Your last dose was: Your next dose is:    
   
   
 Dose:  17 g Take 17 g by mouth daily. Refills:  0  
     
   
   
   
  
 nystatin powder Commonly known as:  MYCOSTATIN Your last dose was: Your next dose is:    
   
   
 Apply  to affected area four (4) times daily as needed. Yeast  
 Quantity:  1 Bottle Refills:  1  
     
   
   
   
  
 sennosides 8.6 mg Cap Commonly known as:  Senna Your last dose was: Your next dose is:    
   
   
 Dose:  2 Tab Take 2 Tabs by mouth two (2) times a day. Quantity:  120 Cap Refills:  2 TYLENOL ARTHRITIS PAIN 650 mg CR tablet Generic drug:  acetaminophen Your last dose was: Your next dose is:    
   
   
 Dose:  650 mg Take 650 mg by mouth every six (6) hours as needed for Pain. Refills:  0  
     
   
   
   
  
 VITAMIN D3 2,000 unit Tab Generic drug:  cholecalciferol (vitamin D3) Your last dose was: Your next dose is: Take  by mouth daily. Refills:  0 STOP taking these medications ADVIL 200 mg tablet Generic drug:  ibuprofen  
   
  
 fentaNYL 100 mcg/hr PATCH Commonly known as:  DURAGESIC  
   
  
 lisinopril 10 mg tablet Commonly known as:  PRINIVIL, ZESTRIL  
   
  
 metFORMIN 500 mg tablet Commonly known as:  GLUCOPHAGE  
   
  
 metoprolol succinate 25 mg XL tablet Commonly known as:  TOPROL-XL Where to Get Your Medications Information on where to get these meds will be given to you by the nurse or doctor. ! Ask your nurse or doctor about these medications DULoxetine 30 mg capsule  
 gabapentin 300 mg capsule LORazepam 1 mg tablet  
 metoprolol tartrate 25 mg tablet morphine (PF) 150 mg/30 mL Pcas  
 oxyCODONE IR 30 mg immediate release tablet Discharge Instructions DISCHARGE SUMMARY from Nurse The following personal items are in your possession at time of discharge: 
 
Dental Appliances: Other (comment) Visual Aid: None PATIENT INSTRUCTIONS: 
 
 
F-face looks uneven A-arms unable to move or move unevenly S-speech slurred or non-existent T-time-call 911 as soon as signs and symptoms begin-DO NOT go Back to bed or wait to see if you get better-TIME IS BRAIN. Warning Signs of HEART ATTACK Call 911 if you have these symptoms: 
? Chest discomfort. Most heart attacks involve discomfort in the center of the chest that lasts more than a few minutes, or that goes away and comes back. It can feel like uncomfortable pressure, squeezing, fullness, or pain. ? Discomfort in other areas of the upper body. Symptoms can include pain or discomfort in one or both arms, the back, neck, jaw, or stomach. ? Shortness of breath with or without chest discomfort. ? Other signs may include breaking out in a cold sweat, nausea, or lightheadedness. Don't wait more than five minutes to call [address]! Fast action can save your life. Calling 911 is almost always the fastest way to get lifesaving treatment. Emergency Medical Services staff can begin treatment when they arrive  up to an hour sooner than if someone gets to the hospital by car. The discharge information has been reviewed with the patient. The patient verbalized understanding. Discharge medications reviewed with the patient and appropriate educational materials and side effects teaching were provided. Discharge Orders None Introducing [facility]! Dear Renetta Kidd: Thank you for requesting a [employer] account.   Our records indicate that you already have an active [employer] account. You can access your account anytime at [email or web address] Did you know that you can access your hospital and ER discharge instructions at any time in [employer]? You can also review all of your test results from your hospital stay or ER visit. Additional Information If you have questions, please visit the Frequently Asked Questions section of the [employer] website at [email or web address] Remember, [email or web address] is NOT to be used for urgent needs. For medical emergencies, dial 911. Now available from your iPhone and Android! General Information Please provide this summary of care documentation to your next provider. Patient Signature:  ____________________________________________________________ Date:  ____________________________________________________________  
  
[facility] Provider Signature:  ____________________________________________________________ Date:  ____________________________________________________________

## 2017-05-08 NOTE — ED TRIAGE NOTES
Pt palliative care nurse was at the pt's house because the right leg was more severe, SOB over the past couple of days and wanted her evaluated for a blood clot.

## 2017-05-09 NOTE — CONSULTS
PULMONARY/CRITICAL CARE/SLEEP MEDICINE    Initial Physician Consultation Note    Name: Naida Almonte   : 1980   MRN: [id number]   Date: 2017      Subjective:   Consult Note: 2017     This patient has been seen and evaluated as a new patient admitted to ICU. Medical records and data reviewed. Patient is a 39 y.o. female who presented to the hospital with complaints of right thigh pain. Patient has known metastatic endometrial ca and has complained of pain in this location for the past few days associated with new onset of swelling. She has been seen by Ortho and a hematoma is suspected supported by a drop in Hb as well. LE dopplers was limited by position but negative for popliteal DVT. Sinus tachycardia present on admission has improved with pain management and resumption of beta blocker. Patient is followed by Palliative medicine even as an outpatient. She is comfortable in the prone position and has not been able to change positions for any testing. She denies preceding fevers, chills or fall      Assessment:     Right tight swelling and pain- suspected hematoma  Known metastatic endometrial ca, no other treatment options are available  Uncontrolled pain , likely mets  Sinus tachy- pain, lack of BB  Despite risk factors for VTE, clinical suspicion is low and risks of high dose empiric lovenox outweigh benefits    Recommendations:   Stop lovenox  Ultrasound of thigh  Ortho following  Palliative care following  D dimer ordered, only a negative value will be helpful  Transfer out of ICU as tachycardia improves  We will be available to help again as needed  D/W Dr. Erin Garza, RN    Addendum 440 pm  Discussed with Dr. Nicole Cortez. Patient is not DNR. Noted plans for GA and imaging. Not sure what we will do with the information except determine progression/extent of disease to provide prognosis which Dr. Jhaveri  has outlined any way.  If PE present, she will be a poor candidate for anticoagulation      Active Problem List:     Problem List  Date Reviewed: 5/8/2017          Codes Class    * (Principal)Acute respiratory failure with hypoxia [facility]) ICD-10-CM: J96.01  ICD-9-CM: 518.81         Advance care planning ICD-10-CM: Z71.89  ICD-9-CM: V65.49     Overview Signed 1/27/2017 12:18 PM by Mikey Panda MD     See ACP notes and Advance Directive on file             Chronic neoplasm-related pain ICD-10-CM: G89.3  ICD-9-CM: 338. 3         Vaginal pain ICD-10-CM: R10.2  ICD-9-CM: 625.9         Tachycardia ICD-10-CM: R00.0  ICD-9-CM: 785.0         Bone metastases (HCC) ICD-10-CM: C79.51  ICD-9-CM: 198.5         DM (diabetes mellitus) ([facility] ICD-10-CM: E11.9  ICD-9-CM: 250.00         Conjunctivitis ICD-10-CM: H10.9  ICD-9-CM: 372.30         Insomnia ICD-10-CM: G47.00  ICD-9-CM: 780.52         Elevated liver function tests ICD-10-CM: R94.5  ICD-9-CM: 790.6         Endometrial cancer ([facility] ICD-10-CM: C54.1  ICD-9-CM: 182.0         Iron deficiency anemia ICD-10-CM: D50.9  ICD-9-CM: 280.9         Morbid obesity (HCC) ICD-10-CM: E66.01  ICD-9-CM: 278.01         Edema ICD-10-CM: R60.9  ICD-9-CM: 782.3         GERD (gastroesophageal reflux disease) ICD-10-CM: K21.9  ICD-9-CM: 530.81               Past Medical History:      has a past medical history of Cancer ([facility] CVI (common variable immunodeficiency) ([facility] Diabetes ([facility] Elevated liver function tests (10/21/2010); Endometrial cancer ([facility] (7/7/2010); Hypertension; Iron deficiency anemia; Menometrorrhagia (10/21/2010); Microcytic anemia (10/21/2010); Morbid obesity ([facility] Prediabetes (7/7/2010); Psychiatric disorder; and Radiation. Past Surgical History:      has a past surgical history that includes gyn and cardiac surg procedure unlist.    Home Medications:     Prior to Admission medications    Medication Sig Start Date End Date Taking? Authorizing Provider   DULoxetine (CYMBALTA) 20 mg capsule Take 40 mg by mouth daily.  Indications: ANXIETY WITH DEPRESSION, NEUROPATHIC PAIN   Yes Historical Provider   oxyCODONE IR (ROXICODONE) 10 mg tab immediate release tablet Take 30 mg by mouth every four (4) hours as needed for Pain. Indications: Pain   Yes Historical Provider   albuterol (PROVENTIL HFA, VENTOLIN HFA, PROAIR HFA) 90 mcg/actuation inhaler Take 1 Puff by inhalation every six (6) hours as needed for Wheezing. 5/3/17  Yes Rissa Berkowitz MD   cholecalciferol, vitamin D3, (VITAMIN D3) 2,000 unit tab Take  by mouth daily. Yes Historical Provider   fentaNYL (DURAGESIC) 100 mcg/hr PATCH 2 Patches by TransDERmal route every seventy-two (72) hours for 30 days. Max Daily Amount: 2 Patches. 6/20/17 7/20/17 Yes Rissa Berkowitz MD   lisinopril (PRINIVIL, ZESTRIL) 10 mg tablet TAKE 1 TABLET BY MOUTH DAILY 4/3/17  Yes Rissa Berkowitz MD   metoprolol succinate (TOPROL-XL) 25 mg XL tablet TAKE 1 TABLET BY MOUTH DAILY 4/3/17  Yes Rissa Berkowitz MD   clotrimazole (LOTRIMIN) 1 % topical cream Apply 1 Each to affected area two (2) times a day. Apply to feet affected 3/6/17  Yes Rissa Berkowitz MD   sennosides (SENNA) 8.6 mg cap Take 2 Tabs by mouth two (2) times a day. 1/30/17  Yes Rissa Berkowitz MD   acetaminophen (TYLENOL ARTHRITIS PAIN) 650 mg CR tablet Take 650 mg by mouth every six (6) hours as needed for Pain. Yes Historical Provider   LORazepam (ATIVAN) 1 mg tablet Take 1 Tab by mouth every four (4) hours as needed for Anxiety. Max Daily Amount: 6 mg. 1/27/17  Yes Rissa Berkowitz MD   nystatin (MYCOSTATIN) powder Apply  to affected area four (4) times daily as needed. Yeast 1/27/17  Yes Rissa Berkowitz MD   docusate sodium (COLACE) 100 mg capsule Take 100 mg by mouth two (2) times a day. Yes Historical Provider   ibuprofen (ADVIL) 200 mg tablet Take 200 mg by mouth every six (6) hours as needed for Pain.    Yes Historical Provider   gabapentin (NEURONTIN) 300 mg capsule TAKE 2 CAPSULES BY MOUTH TWICE A DAY 9/26/16  Yes Feliciano Vee MD   metFORMIN (GLUCOPHAGE) 500 mg tablet TAKE 1 TAB BY MOUTH TWO (2) TIMES DAILY (WITH MEALS). 16  Yes Alfred Carrera MD   polyethylene glycol [facility]) 17 gram packet Take 17 g by mouth daily. Yes Anna Christian MD       Allergies/Social/Family History: Allergies   Allergen Reactions    Pcn [Penicillins] Nausea and Vomiting     \"but could take amoxil\"    Shellfish Containing Products Unknown (comments)    Tetanus And Diphtheria Toxoids, Adsorbed, Adult Other (comments)     Developed local swelling, axillary pain, and transient fever    Tomato Unknown (comments)      Social History   Substance Use Topics    Smoking status: Never Smoker    Smokeless tobacco: Never Used    Alcohol use Yes      Family History   Problem Relation Age of Onset    Hypertension Mother     Hypertension Father     Stroke Maternal Grandfather     Cancer Paternal Grandmother      breast    Diabetes Paternal Grandmother         Review of Systems:     Review of systems not obtained due to patient factors. Objective:   Vital Signs:  Visit Vitals    /72    Pulse (!) 117    Temp 99 °F (37.2 °C)    Resp 15    Ht 5' 6\" (1.676 m)    Wt 119.7 kg (263 lb 14.3 oz)    SpO2 (!) 89%    BMI 42.59 kg/m2    O2 Flow Rate (L/min): 2 l/min O2 Device: Nasal cannula Temp (24hrs), Av.7 °F (37.1 °C), Min:98.1 °F (36.7 °C), Max:99.5 °F (37.5 °C)           Intake/Output:   No intake or output data in the 24 hours ending 17 1352    Physical Exam:   General:  Alert, cooperative, no distress, appears stated age. Head:  Normocephalic, without obvious abnormality, atraumatic. Eyes:  Conjunctivae/corneas clear. PERRL, EOMs intact. Neck: Supple, symmetrical, trachea midline, no adenopathy, thyroid: no enlargment/tenderness/nodules, no carotid bruit and no JVD. Lungs:   Clear to auscultation bilaterally posteriorly. Chest wall:  No tenderness or deformity. Heart:  Regular rate and rhythm, S1, S2 normal, no murmur, click, rub or gallop. Abdomen:   Soft, non-tender. Bowel sounds normal. No masses,  No organomegaly. Extremities: Right thigh swelling   Pulses: 2+ and symmetric all extremities. Skin: Skin color, texture, turgor normal. No rashes or lesions   Neurologic: Grossly nonfocal         LABS AND  DATA: Personally reviewed  Recent Labs      05/08/17   1627   WBC  13.3*   HGB  7.7*   HCT  27.0*   PLT  331     Recent Labs      05/08/17   1627   NA  138   K  3.8   CL  104   CO2  26   BUN  19   CREA  0.95   GLU  114*   CA  8.6     Recent Labs      05/08/17   1627   SGOT  9*   AP  99   TP  7.4   ALB  3.0*   GLOB  4.4*     Recent Labs      05/08/17   2307   INR  1.1      Recent Labs      05/08/17   2130   PHI  7.595*   PCO2I  22.7*   PO2I  175*     No results for input(s): CPK, CKMB, TROIQ, BNPP in the last 72 hours. MEDS: Reviewed    Chest X-Ray: personally reviewed and report checked    EKG/ Tele      Thank you for allowing me to participate in this patient's care.     MD Laurita Case MD, [facility]  [facility]

## 2017-05-09 NOTE — ANESTHESIA PREPROCEDURE EVALUATION
Anesthetic History   No history of anesthetic complications            Review of Systems / Medical History  Patient summary reviewed, nursing notes reviewed and pertinent labs reviewed    Pulmonary  Within defined limits                 Neuro/Psych         Psychiatric history     Cardiovascular    Hypertension                   GI/Hepatic/Renal  Within defined limits              Endo/Other    Diabetes    Morbid obesity, cancer and anemia     Other Findings            Physical Exam    Airway  Mallampati: II  TM Distance: > 6 cm  Neck ROM: normal range of motion   Mouth opening: Normal     Cardiovascular  Regular rate and rhythm,  S1 and S2 normal,  no murmur, click, rub, or gallop             Dental  No notable dental hx       Pulmonary  Breath sounds clear to auscultation               Abdominal  GI exam deferred       Other Findings            Anesthetic Plan    ASA: 3  Anesthesia type: general          Induction: Intravenous  Anesthetic plan and risks discussed with: Patient

## 2017-05-09 NOTE — PROGRESS NOTES
[facility] Levar Balderas NP    Patient Name: Candy Valdez  MRN: [id number]  AGE: 39 y.o.  : 1980    Date: 2017    Date of Admission: 2017  Date of Surgery: * No surgery found *  Unit: 7104/01    Admission Diagnosis:        ICD-10-CM ICD-9-CM    1. Shortness of breath R06.02 786.05    2. Metastatic cancer (HCC) C79.9 199.1    3. Chronic neoplasm-related pain G89.3 338.3    4. Pain of right lower extremity M79.604 729.5    5. Constipation due to pain medication K59.03 564.09      E947.9        Problem List:     Patient Active Problem List   Diagnosis Code    Endometrial cancer ([facility] C54.1    Iron deficiency anemia D50.9    Morbid obesity ([facility] E66.01    Edema R60.9    GERD (gastroesophageal reflux disease) K21.9    Elevated liver function tests R94.5    Insomnia G47.00    Conjunctivitis H10.9    DM (diabetes mellitus) ([facility] E11.9    Bone metastases (HCC) C79.51    Tachycardia R00.0    Vaginal pain R10.2    Chronic neoplasm-related pain G89.3    Advance care planning Z71.89    Acute respiratory failure with hypoxia (HCC) J96.01         Vitals:     Vitals:    17 0900 17 1000 17 1100 17 1200   BP: 124/85 111/76 111/74 100/72   Pulse: (!) 135 (!) 129 (!) 113 (!) 117   Resp: 16 14 12 15   Temp:       SpO2:  (!) 89%     Weight:       Height:            Body mass index is 42.59 kg/(m^2). I/O:     Date 17 - 17 0659 17 - 05/10/17 0659   Shift 6207-6873 7625-0832 24 Hour Total 5967-7830 4625-1764 24 Hour Total   I  N  T  A  K  E   Shift Total  (mL/kg)         O  U  T  P  U  T   Urine  (mL/kg/hr)            Urine Occurrence(s)  1 x 1 x       Shift Total  (mL/kg)         NET         Weight (kg) 108.9 119.7 119.7 119.7 119.7 119.7     Labs: Laboratory chart enteries reviewed.      Recent Results (from the past 24 hour(s))   METABOLIC PANEL, COMPREHENSIVE    Collection Time: 05/08/17  4:27 PM   Result Value Ref Range    Sodium 138 136 - 145 mmol/L    Potassium 3.8 3.5 - 5.1 mmol/L    Chloride 104 97 - 108 mmol/L    CO2 26 21 - 32 mmol/L    Anion gap 8 5 - 15 mmol/L    Glucose 114 (H) 65 - 100 mg/dL    BUN 19 6 - 20 MG/DL    Creatinine 0.95 0.55 - 1.02 MG/DL    BUN/Creatinine ratio 20 12 - 20      GFR est AA >60 >60 ml/min/1.73m2    GFR est non-AA >60 >60 ml/min/1.73m2    Calcium 8.6 8.5 - 10.1 MG/DL    Bilirubin, total 0.6 0.2 - 1.0 MG/DL    ALT (SGPT) 11 (L) 12 - 78 U/L    AST (SGOT) 9 (L) 15 - 37 U/L    Alk. phosphatase 99 45 - 117 U/L    Protein, total 7.4 6.4 - 8.2 g/dL    Albumin 3.0 (L) 3.5 - 5.0 g/dL    Globulin 4.4 (H) 2.0 - 4.0 g/dL    A-G Ratio 0.7 (L) 1.1 - 2.2     CBC W/O DIFF    Collection Time: 05/08/17  4:27 PM   Result Value Ref Range    WBC 13.3 (H) 3.6 - 11.0 K/uL    RBC 3.53 (L) 3.80 - 5.20 M/uL    HGB 7.7 (L) 11.5 - 16.0 g/dL    HCT 27.0 (L) 35.0 - 47.0 %    MCV 76.5 (L) 80.0 - 99.0 FL    MCH 21.8 (L) 26.0 - 34.0 PG    MCHC 28.5 (L) 30.0 - 36.5 g/dL    RDW 18.3 (H) 11.5 - 14.5 %    PLATELET 804 900 - 327 K/uL   IRON PROFILE    Collection Time: 05/08/17  4:27 PM   Result Value Ref Range    Iron 30 (L) 35 - 150 ug/dL    TIBC 194 (L) 250 - 450 ug/dL    Iron % saturation 15 (L) 20 - 50 %   VITAMIN B12    Collection Time: 05/08/17  4:27 PM   Result Value Ref Range    Vitamin B12 1207 (H) 211 - 911 pg/mL   FOLATE    Collection Time: 05/08/17  4:27 PM   Result Value Ref Range    Folate 18.4 5.0 - 21.0 ng/mL   POC G3 - PUL    Collection Time: 05/08/17  9:30 PM   Result Value Ref Range    pH (POC) 7.595 (HH) 7.35 - 7.45      pCO2 (POC) 22.7 (L) 35.0 - 45.0 MMHG    pO2 (POC) 175 (H) 80 - 100 MMHG    HCO3 (POC) 22.1 22 - 26 MMOL/L    sO2 (POC) 100 (H) 92 - 97 %    Base excess (POC) 0 mmol/L    Site RIGHT RADIAL      Device: NASAL CANNULA      Flow rate (POC) 2 L/M    Allens test (POC) YES      Specimen type (POC) ARTERIAL      Total resp.  rate 16     ECG RHYTHM ANALYSIS ADULT    Collection Time: 05/08/17 11:02 PM   Result Value Ref Range    Ventricular Rate 140 BPM    Atrial Rate 140 BPM    P-R Interval 144 ms    QRS Duration 64 ms    Q-T Interval 264 ms    QTC Calculation (Bezet) 403 ms    Calculated P Axis 67 degrees    Calculated R Axis 52 degrees    Calculated T Axis 28 degrees    Diagnosis       Sinus tachycardia  Nonspecific T wave abnormality  When compared with ECG of 20-NOV-2014 14:46,  Nonspecific T wave abnormality now evident in Anterolateral leads  Confirmed by Fabrizio Restrepo M.D., Nigel Doan ([id number]) on 5/9/2017 10:38:56 AM     POC INR    Collection Time: 05/08/17 11:07 PM   Result Value Ref Range    INR (POC) 1.1 <1.2     . Ms. Miquel Howell is a 39year old BF with a working diagnosis of progressive/refractory endometrial cancer. 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 The patient was last seen by Vale Saldivar 16 months ago at which time progressive/refractory disease was noted and a decision was reached to provide palliative/supportive care in that there was not a therapeutic intervention that offered any significant response rate or impact on survival. Ms. Miquel Howell has done well over the past year with the supportive care. The patient is admitted at this time/ICU with multiple medical problems, possibly related to her neoplastic disease. There is no therapeutic intervention that would have a significant chance of palliative response or survival benefit at this time. In line with the patient's decision in 1/2016, I would recommend continued Palliative/Hospice care.       Geri Holman MD    [facility]    Defined Sensitive Document    5/9/2017  1:57 PM

## 2017-05-09 NOTE — CONSULTS
ORTHO CONSULT NOTE    46yo female I was asked to see this patient urgently in the ER for right thigh swelling to rule-out compartment syndrome. She has documented endometrial CA with metastatic disease to the pelvis. This is noted on CT from 2014. She has had right leg pain and swelling for many months. She presented to [facility] with acute worsening of her symptoms. Patient seen in ER on 5/8/17 around 2230. Briefly, patient is morbidly obese with right thigh swollen but compartments soft. Pain was controlled. Strong pedal pulse. Motor/sensory function normal. No skin color change. No findings consistent with compartment syndrome. Suspect swelling and pain is from bleeding into the thigh. This is likely related to metastatic disease. Consider repeat CT of the pelvis to evaluate for progression of bony metastasis and rule out occult fracture.

## 2017-05-09 NOTE — CONSULTS
Full consult to follow. Just saw Ortho consult request this morning. Stat consult to r/o compartment syndrome. Evaluation of patient reveals extremely swollen right thigh, likely hematoma. Compartments soft and patient appears comfortable. No clinical evidence for compartment syndrome.

## 2017-05-09 NOTE — PROGRESS NOTES
This visit was in response to [facility]. Her mother send the request via text. .  I was also informed by the palliative care team that she was in the hospital. I was with her and her mother as the doctor informed her of various options of deciding how to best move forward. Offered pastoral support and prayers as she made her decision. Her and her mother are in agreement that she need the information that these test might yield. Pastoral Care will continue to be available as needed. We have agreed to a follow -up visit.      Lee Herndon., [facility], [address] [place] [address]

## 2017-05-09 NOTE — PROGRESS NOTES
Care Management: patient admitted on 5-8-17 with increased SOB, tachycardia, hypoxia and ?clot right thigh causing much pain with red, warmth and swelling. Palliative is following patient at home for pain management. Patient also has mets to bone and liver,obesity, endometrial cancer, DM, radiation and had a hysterectomy. Challenge has been documented as patient being reluctant  to having tests. Chandni Pierre is mom: [phone] and Debbi Interiano is sister: [phone]. Medications are obtained with ease from: [facility] /  [facility] in [place]. Mother and sister are support systems. Patient has a commode chair at home ( ?[address] [place]) but with thigh swelling it is getting too small. No oxygen in use at home. To follow with continued transition of care. Dopplers were done of lower extremities and were negative. ( not thigh). Care Management Interventions  PCP Verified by CM: Yes  Last Visit to PCP: 02/09/17  Palliative Care Consult (Criteria: CHF and RRAT>21): Yes  Mode of Transport at Discharge: Other (see comment) (auto with mother as of now.)  Transition of Care Consult (CM Consult): Other (home to mother's house)  MyChart Signup: Yes  Discharge Durable Medical Equipment: No (patient has a commode at home that is getting too small amd especially with right thigh swelling)  Physical Therapy Consult: No  Occupational Therapy Consult: No  Speech Therapy Consult: No  Current Support Network:  Other (lives with mother)  Confirm Follow Up Transport: Other (see comment) (as of to day auto with mother.)  Plan discussed with Pt/Family/Caregiver: Yes  Discharge Location  Discharge Placement: Home (home with palliative and this was set up prior)     Sebastien Bey RN BSN / Maykel Lindsay

## 2017-05-09 NOTE — PROGRESS NOTES
Ariela Wallace MD   Phone/text: [phone] (7am to 7pm)  After 7pm please call  for hospitalist on call    Hospitalist Progress Note     5/9/2017   PCP:  Dr. Wilfred Avelar MD  No chief complaint on file. Admission Summary:   A 66-year-old -American female with past medical history of endometriosis, uterine carcinoma, type 2 diabetes mellitus, elevated liver function tests, hypertension, iron-deficiency anemia, menometrorrhagia, anemia, morbid obesity, general debility, depression, presented to the emergency department from home with chief complaint of intractable right thigh pain. Reason For Visit:  Pain, SOB    Assessment/Plan   Acute respiratory failure with hypoxia (POA) - possible PE, but symptoms are improving  - Consult anesthesia for sedated CTA, patient cannot lie on back otherwise  - Cannot obtain CXR as patient cannot turn from side  - Hold lovenox  - Pulmonary consulted    Tachycardia (POA) - pain vs bleed vs less likely PE  - ECG with sinus tachycardia, non-specific T-wave abnormalities  - Monitor    Hypotension (POA) - These are likely not accurate readings based on the patient's positioning. This will be an ongoing issue if she is unable to lie onto her back    Leukocytosis (POA) - unclear cause, reactive to bleed? - Check UA  - Check CXR  - Hold off on empiric antibiotics    Anemia (chronic) - patient with substantial drop since 2015, monitor for bleeding  - Monitor HH  - Check FOBT  - Scan leg for possible hematoma  - IV fluids    Intractable right thigh pain (POA)  - Xray right femur 5/8 suboptimal but no fracture or acute abnormality seen  - Xray right tib/fib 5/8 with no acute abnormality  - Duplex right LE 5/8 without DVT but again, suboptimal  - Right soft tissue US 5/9 shows nonspecific soft tissue edema.  No defined hematoma identified  - Obtain CT leg to rule out metastatic disease that could be irradiated vs hematoma  - PCA for pain  - Palliative care consult    Type 2 diabetes mellitus (chronic)  - Humalog insulin correctional coverage, schedule Accu-Cheks. Endometrial uterine carcinoma (chronic) - I spoke with Dr. Christiane Hurst' nurse and there is no further treatment, even palliative, available  - Palliative care consult    Plan for Anesthsiology to give general anesthesia, roll prone, place central line and obtain CT scans. Last food at 3PM    See orders for other plans. VTE prophylaxis: SCDs  Discussed plan of care with Patient/Family and Nurse   Pre-admission lived at home  Discharge plan: to home  Estimated time to discharge: unclear     Subjective   Ms. Dustin Blanca continues to have severe pain her her right leg. She also complaints of shortness of breath. No cough. Reviewed interval history    Physical examination     Visit Vitals    /72    Pulse (!) 117    Temp 99 °F (37.2 °C)    Resp 15    Ht 5' 6\" (1.676 m)    Wt 119.7 kg (263 lb 14.3 oz)    SpO2 (!) 89%    BMI 42.59 kg/m2       Temp (24hrs), Av.7 °F (37.1 °C), Min:98.1 °F (36.7 °C), Max:99.5 °F (37.5 °C)    No intake or output data in the 24 hours ending 17 1343    Gen - NAD  ENT - MMM  Neck - supple, full ROM  CV - RRR, no MRG  Resp - lungs CTA b/l, mildly tachypnic  GI - unable to assess abdomen  Ext - b/l edema, right leg mildly tense but pulses 2+, good cap refill  Neuro - A&O, no focal deficits    Data Review     Telemetry x   ECG x   Xray    CT scan    MRI    Echocardiogram          I have reviewed the flow sheet and recent notes  New labs and data below personally reviewed.     Recent Labs      17   1627   WBC  13.3*   HGB  7.7*   HCT  27.0*   PLT  331     Recent Labs      17   2307  17   1627   NA   --   138   K   --   3.8   CL   --   104   CO2   --   26   GLU   --   114*   BUN   --   19   CREA   --   0.95   CA   --   8.6   ALB   --   3.0*   TBILI   --   0.6   SGOT   --   9*   ALT   --   11*   INR  1.1   --        Medications reviewed  Current Facility-Administered Medications   Medication Dose Route Frequency    HYDROmorphone (PF) 15 mg/30 ml (DILAUDID) PCA   IntraVENous CONTINUOUS    metoprolol tartrate (LOPRESSOR) tablet 25 mg  25 mg Oral BID    acetaminophen (TYLENOL) tablet 650 mg  650 mg Oral Q6H PRN    [START ON 5/11/2017] fentaNYL (DURAGESIC) 100 mcg/hr patch 2 Patch  2 Patch TransDERmal Q72H    acetaminophen (TYLENOL) tablet 650 mg  650 mg Oral TID    polyethylene glycol (MIRALAX) packet 17 g  17 g Oral DAILY    DULoxetine (CYMBALTA) capsule 30 mg  30 mg Oral DAILY    gabapentin (NEURONTIN) capsule 300 mg  300 mg Oral BID    senna (SENOKOT) tablet 17.2 mg  2 Tab Oral BID    LORazepam (ATIVAN) tablet 1 mg  1 mg Oral Q6H PRN    dexamethasone (DECADRON) 4 mg/mL injection 4 mg  4 mg IntraVENous Q12H    HYDROmorphone (PF) (DILAUDID) injection 2 mg  2 mg IntraVENous Q3H PRN    sodium chloride (NS) flush 5-10 mL  5-10 mL IntraVENous Q8H    sodium chloride (NS) flush 5-10 mL  5-10 mL IntraVENous PRN         Tracie Walters MD  Internal Medicine  5/9/2017

## 2017-05-09 NOTE — CONSULTS
Palliative Medicine Consult  Curtis: [phone])    Patient Name: Candy Valdez  YOB: 1980    Date of Initial Consult: 5/9/17  Reason for Consult: overwhelming sx/care decisions   Requesting Provider: Jay/ Patricia    Primary Care Physician: Marjorie Alvarado MD      SUMMARY:   Candy Valdez is a 39 y.o. with a past history of metastatic endometrial cancer (mets to liver and bone) who was admitted on 5/8/2017 from home after Palliative RN visited her and found her to have worsening RLE pain and swelling. Dopplers and XR RLE neg. Also w/ tachycardia and hypoxia, but could not get CT chest yest to r/o PE because she was unable to lie flat. Started on PCA. Palliative team has been following pt for over a year, now at home as she know is mostly bed bound except for PT. Dr Levar Siddiqui had referred her for pain. She is not on any disease directed therapy and does not actively follow w/ GynOnc. Care has been challenged by pt's inability to transfer easily and at times she is reluctant to have tests done. Current medical issues leading to Palliative Medicine involvement include: sx and care decisions. PALLIATIVE DIAGNOSES:   1. RLE pain  2. Generalized bone pain, neoplasm related  3. Shortness of breath   4. Debility due to generalized weakness and pain  5. Constipation due to opioids         PLAN:   1. Pain: RLE and generalized bone pain. Had XRT to R acetabulum over a year ago. Not on any disease directed therapies. At home on TWO 100mcg Fentanyl patches every 3 days and Oxycodone 30mg every 3-4h prn. Also takes Tylenol. Mult different meds have been tried in clinic w/ side effects or poor pain management. 2. Ortho has eval pt.   3. Did not get relief w/ Dilaudid 2mg IV x 1 and gave her a headache. 4. Trial of morphine 6mg IV x 1.   5. Will base PCA on these results, likely keep Fentanyl patches as basal for now. 6. Home Cymbalta and Gabapentin.   7. Metastatic endometrial CA: 8. GynOnc consult has been placed by primary team. Has not seen Dr Pita Mariee in some time and want to make sure no other tx are available to pt. 9. CT abd/pelvis pending. 10. SOB: Improved, sats and BP better. 11. Very important for us to get scans if possible. Pt has not lied on her back in 2 years she says. 12. If pain not improved w/ PCA, may need to talk to anesthesia about conscious sedation for CT. 13. Constipation: Miralax and Senna, may need to titrate. 14. Support:  Nanda to follow, is seen at home by [facility] [place] [facility] and very helpful to her. 15. Initial consult note routed to primary continuity provider  16. Communicated plan of care with: Palliative IDT; Mateo Aguilar RN; Dr Dania Falcon ; Dr Anisa Mahajan     Addendum: Pain better w/ Dilaudid, although nothing really helps deep bone pain. Consider bisphosphonate? No sedation, O2 and BP stable. Pt on 200mcg Fentanyl patch ~8mg/h IV morphine ~1.6mg IV Dilaudid/h ~38.4mg IV Dilaudid a day PLUS Oxycodone 240mg po daily ~80mg IV morphine/day ~16mg IV Dilaudid a day. This is a total of 54mg IV Dilauidid a day equivalent, and thus could in theory tolerate a demand dose of 5mg/h. However need keep in mind incomplete cross tolerance and vitals. Cont Fentanyl patches, then add Dilaudid PCA 0.2mg/h basal, 0.8mg every 6 min demand. Mom is aware of plan- discussed w/ her    Addendum 117pm: Pain not controlled w/ PCA, BP okay, no sedation. Adjusting it, Dexamethasone (no signs infxn, glucose stable) and RN bolus. Considering Toradol, will await to see if stool heme +. Will need PICC and sedation for CT. Discussed in detail w/ Dr Anisa Mahajan and Kiesha Costa RN.      GOALS OF CARE / TREATMENT PREFERENCES:   [====Goals of Care====]  GOALS OF CARE:  Patient / health care proxy stated goals: pain relief       TREATMENT PREFERENCES:   Code Status: Full Code    Advance Care Planning:  Advance Care Planning 2/16/2017   Patient's Healthcare Decision Maker is: Legal Next of Kin   Primary Decision Maker Name Vielka Holguin   Primary Decision Maker Phone Number [phone]   Primary Decision Maker Relationship to Patient Parent   Confirm Advance Directive Yes, on file       Other:    The palliative care team has discussed with patient / health care proxy about goals of care / treatment preferences for patient.  [====Goals of Care====]         HISTORY:     History obtained from: Pt, chart, staff    CHIEF COMPLAINT: I just can't get comfortable     HPI/SUBJECTIVE:    The patient is:   [x] Verbal and participatory  [] Non-participatory due to:     Pt w/ worsening RLE pain since last week when worked w/ PT. Always present, has been there for years. Has been hard to manage given medication side effects. Is mainly bed bound and lies on her stomach. SOB improved since last night, on NC O2. No N/V, F/C.      Clinical Pain Assessment (nonverbal scale for severity on nonverbal patients):   [++++ Clinical Pain Assessment++++]  [++++Pain Severity++++]: Pain: 9  [++++Pain Character++++]: aching and deep   [++++Pain Duration++++]: chronic, but worse over past week   [++++Pain Effect++++]: limits mobility, makes upset  [++++Pain Factors++++]: worse w/ movement   [++++Pain Frequency++++]: constant   [++++Pain Location++++]: RLE and general  [++++ Clinical Pain Assessment++++]     FUNCTIONAL ASSESSMENT:     Palliative Performance Scale (PPS):  PPS: 60       PSYCHOSOCIAL/SPIRITUAL SCREENING:     Advance Care Planning:  Advance Care Planning 2/16/2017   Patient's Healthcare Decision Maker is: Legal Next of Andrea 69   Primary Decision Maker Name Vielka Holguin   Primary Decision Maker Phone Number [phone]   Primary Decision Maker Relationship to Patient Parent   Confirm Advance Directive Yes, on file        Any spiritual / Jain concerns:  [] Yes /  [] No    Caregiver Burnout:  [] Yes /  [] No /  [x] No Caregiver Present      Anticipatory grief assessment:   [x] Normal  / [] Maladaptive       ESAS Anxiety: Anxiety: 3    ESAS Depression: Depression: 0        REVIEW OF SYSTEMS:     Positive and pertinent negative findings in ROS are noted above in HPI. The following systems were [x] reviewed / [] unable to be reviewed as noted in HPI  Other findings are noted below. Systems: constitutional, ears/nose/mouth/throat, respiratory, gastrointestinal, genitourinary, musculoskeletal, integumentary, neurologic, psychiatric, endocrine. Positive findings noted below. Modified ESAS Completed by: provider   Fatigue: 3 Drowsiness: 0   Depression: 0 Pain: 9   Anxiety: 3 Nausea: 0   Anorexia: 0 Dyspnea: 2     Constipation: Yes              PHYSICAL EXAM:     From RN flowsheet:  Wt Readings from Last 3 Encounters:   05/09/17 263 lb 14.3 oz (119.7 kg)   02/16/17 300 lb (136.1 kg)   02/02/16 280 lb (127 kg)     Blood pressure 124/85, pulse (!) 135, temperature 99 °F (37.2 °C), resp. rate 16, height 5' 6\" (1.676 m), weight 263 lb 14.3 oz (119.7 kg), SpO2 91 %.     Pain Scale 1: Numeric (0 - 10)  Pain Intensity 1: 10  Pain Onset 1: acute  Pain Location 1: Leg  Pain Orientation 1: Right  Pain Description 1: Aching  Pain Intervention(s) 1: Medication (see MAR)  Last bowel movement, if known:     Constitutional: awake, alert, NAD when lying still  Eyes: pupils equal, anicteric  ENMT: no nasal discharge, moist mucous membranes  Cardiovascular: regular rhythm (listen from side, cannot roll over onto back)  Respiratory: breathing not labored, symmetric  Gastrointestinal: soft, cannot assess due to lying on stomach   Musculoskeletal: no deformity, pain w/ movement of RLE  Skin: warm, dry  Neurologic: following commands, moving all extremities  Psychiatric: full affect, no hallucinations         HISTORY:     Principal Problem:    Acute respiratory failure with hypoxia (HCC) (5/8/2017)      Past Medical History:   Diagnosis Date    Cancer ([facility] [address]     uterine    CVI (common variable immunodeficiency) (HCC)     Diabetes (HCC)     Elevated liver function tests 10/21/2010    Endometrial cancer ([facility] [address] 7/7/2010    Hypertension     Iron deficiency anemia     Menometrorrhagia 10/21/2010    Microcytic anemia 10/21/2010    Morbid obesity ([facility] [address]     Prediabetes 7/7/2010    Psychiatric disorder     depression    Radiation     completed radiation mid-march      Past Surgical History:   Procedure Laterality Date    CARDIAC SURG PROCEDURE UNLIST      hx of tachycardia    HX GYN      hysterectomy      Family History   Problem Relation Age of Onset    Hypertension Mother     Hypertension Father     Stroke Maternal Grandfather     Cancer Paternal Grandmother      breast    Diabetes Paternal Grandmother       History reviewed, no pertinent family history.   Social History   Substance Use Topics    Smoking status: Never Smoker    Smokeless tobacco: Never Used    Alcohol use Yes     Allergies   Allergen Reactions    Pcn [Penicillins] Nausea and Vomiting     \"but could take amoxil\"    Shellfish Containing Products Unknown (comments)    Tetanus And Diphtheria Toxoids, Adsorbed, Adult Other (comments)     Developed local swelling, axillary pain, and transient fever      Current Facility-Administered Medications   Medication Dose Route Frequency    enoxaparin (LOVENOX) injection 111 mg  111 mg SubCUTAneous Q12H    HYDROmorphone (PF) 15 mg/30 ml (DILAUDID) PCA   IntraVENous CONTINUOUS    metoprolol tartrate (LOPRESSOR) tablet 25 mg  25 mg Oral BID    acetaminophen (TYLENOL) tablet 650 mg  650 mg Oral Q6H PRN    morphine injection 6 mg  6 mg IntraVENous ONCE    [START ON 5/11/2017] fentaNYL (DURAGESIC) 100 mcg/hr patch 2 Patch  2 Patch TransDERmal Q72H    acetaminophen (TYLENOL) tablet 650 mg  650 mg Oral TID    polyethylene glycol (MIRALAX) packet 17 g  17 g Oral DAILY    senna (SENOKOT) tablet 17.2 mg  2 Tab Oral DAILY    oxyCODONE IR (ROXICODONE) tablet 30 mg  30 mg Oral Q3H PRN    DULoxetine (CYMBALTA) capsule 30 mg  30 mg Oral DAILY    sodium chloride (NS) flush 5-10 mL  5-10 mL IntraVENous Q8H    sodium chloride (NS) flush 5-10 mL  5-10 mL IntraVENous PRN          LAB AND IMAGING FINDINGS:     Lab Results   Component Value Date/Time    WBC 13.3 05/08/2017 04:27 PM    HGB 7.7 05/08/2017 04:27 PM    PLATELET 905 12/53/7730 04:27 PM     Lab Results   Component Value Date/Time    Sodium 138 05/08/2017 04:27 PM    Potassium 3.8 05/08/2017 04:27 PM    Chloride 104 05/08/2017 04:27 PM    CO2 26 05/08/2017 04:27 PM    BUN 19 05/08/2017 04:27 PM    Creatinine 0.95 05/08/2017 04:27 PM    Calcium 8.6 05/08/2017 04:27 PM    Magnesium 1.6 11/20/2014 03:30 PM      Lab Results   Component Value Date/Time    AST (SGOT) 9 05/08/2017 04:27 PM    Alk. phosphatase 99 05/08/2017 04:27 PM    Protein, total 7.4 05/08/2017 04:27 PM    Albumin 3.0 05/08/2017 04:27 PM    Globulin 4.4 05/08/2017 04:27 PM     Lab Results   Component Value Date/Time    INR 1.2 06/06/2010 03:12 AM    INR (POC) 1.1 05/08/2017 11:07 PM    Prothrombin time 12.6 06/06/2010 03:12 AM    aPTT 26.2 08/22/2009 11:35 AM      Lab Results   Component Value Date/Time    Iron 30 05/08/2017 04:27 PM    TIBC 194 05/08/2017 04:27 PM    Iron % saturation 15 05/08/2017 04:27 PM    Ferritin 10 07/24/2009 08:50 AM      No results found for: PH, PCO2, PO2  No components found for: [place]   Lab Results   Component Value Date/Time    CK 85 07/15/2012 03:55 AM    CK - MB <0.5 07/15/2012 03:55 AM                Total time:   Counseling / coordination time, spent as noted above:   > 50% counseling / coordination?:     Prolonged service was provided for  []30 min   []75 min in face to face time in the presence of the patient, spent as noted above. Time Start:   Time End:   Note: this can only be billed with 85736 (initial) or 65477 (follow up). If multiple start / stop times, list each separately.

## 2017-05-09 NOTE — PROGRESS NOTES
[facility]  Palliative Medicine Office  Nursing Note  [phone]  Fax [phone]    Patient Name: Joyce Davis  YOB: [birth year]    Home visit made due to patient's worsening symptoms related to pain in right leg and SOB. When I arrived she did not look well and had not been out of bed in several days related to pain and SOB. She usually can get up to the [facility] but has not be able to do this. Her mother is very concerned. Dicussed with patient that it is difficult to know what is happening and that she really needs to go to the ER and be evaluated. Since she is feeling so bad she has agreed to this plan. Mom called 46, I waited with patient until rescue squad arrived and stayed to help support her since it is very painful for her to get out of bed and on to the stretcher. Went to ER and saw patient and talked with ER MD and informed him of the home situation and what has been happening related to her symptoms. Dr. Elizabeth Flanagan informed of situation and updated. Palliative consult received. Corinne Phelan, RN Nurse home visit   Palliative Medicine    Total time spent counseling:     No future appointments.

## 2017-05-09 NOTE — PROGRESS NOTES
0730: Bedside and Verbal shift change report given to Lacey Maher RN (oncoming nurse) by Blanquita Burton RN (offgoing nurse). Report included the following information SBAR, Kardex, Intake/Output, MAR, Recent Results and Cardiac Rhythm Sinus Tach. 0800:  Assessment complete. No new issues. Patient resting on stomach, cannot lay on back. No complaints of pain other than upper right thigh. Upper right thigh is warm to the touch and much larger in diameter than left leg. Tachycardic, however, normally takes metoprolol. Unable to lay flat for tests at this point. Unable to draw blood at this point. 0830: Danie Nael, Raymundo PA, at bedside and updated on patient condition. He does not believe this is compartment syndrome. Indicated he thought it was a hematoma. See his note. 12:  Spoke to Dr. Robert Haas regarding pain control issues. Awaiting new orders. We will be starting a PCA. 0930:  Dr. Natalia Rivas at bedside and updated on patient condition. We will try a dose of morphine prior to starting PCA. 1100:  Gynecologic oncology consult called per order. 1200:  Reassessment complete. No new issues. 1230: PICC team at bedside. Unable to place PICC as patient cannot lie supine. Dr. Robert Haas notified. 1430:  Dr. Robert Haas at bedside and updated on patient condition. Plan is for patient to have line placed via anesthesia with sedation and CT scans completed at that time. Consents signed and on the chart. Patient now NPO. Procedures will take place approximately 2100. Dr. Robert Haas has spoken to anesthesiologist directly to create plan. 1600:  Reassessment complete. No new issues. 1930:  Bedside and Verbal shift change report given to [place], RN (oncoming nurse) by Lacey Maher RN (offgoing nurse).  Report included the following information SBAR, Kardex, MAR, Accordion, Med Rec Status and Cardiac Rhythm Sinus Tach     Shift Summary:  Pain seems better controlled as the shift went on as patient has been sleeping since about 1600. Mom at bedside. VSS except tachycardic. Patient NPO since 1500 for scheduled procedures tonight. Consents on the chart. 1930:  Bedside and Verbal shift change report given to [place], RN (oncoming nurse) by Rubi Garza RN (offgoing nurse). Report included the following information SBAR, Kardex, Intake/Output, MAR, Recent Results and Cardiac Rhythm Sinus Tach.

## 2017-05-09 NOTE — H&P
[address] [place] [facility] [place] [address]   HISTORY AND PHYSICAL       Name:  Joy Sharpe   MR#:  [id number]   :  1980   Account #:  [de-identified]        Date of Adm:  2017       CHIEF COMPLAINT: Right thigh pain. HISTORY OF PRESENT ILLNESS: A 43-year-old -American   female with past medical history of endometriosis, uterine   carcinoma, type 2 diabetes mellitus, elevated liver function tests,   hypertension, iron-deficiency anemia, menometrorrhagia, anemia,   morbid obesity, general debility, depression, presented to the   emergency department from home with chief complaint of intractable   right thigh pain. Symptoms onset reportedly began approximately 1   week ago. Per patient, she notes that symptoms started after she had   a physical therapy session, woke up the next morning with severe pain   in her right thigh, aggravated with any movement. She notes she is   now nonambulatory, has been mostly bed bound over the past week   secondary to the same. Notes her pain is moderate; however, she   notes that pain is severe with any activity, movement. She notes that   as a result, she is not able to lie onto her back and mostly lies onto her   left side to relieve pressure off of her right thigh. Her thigh   is increasingly more swollen. She is now living with her mother, who is   her primary caregiver. She notably has been followed by a palliative   medicine specialist, Dr. Tricia Bernheim, who reported referred her to the   ER in order to have a PCA pain pump started. On arrival in the   emergency department, initial recorded vital signs were blood pressure   109/68, heart rate of 132, respiratory rate is 17, O2 saturation 92% on   room air.  However, the patient has been hypoxic per chart review with   O2 saturations decreased to 84%, tachycardic with heart rate   increased to 150, and blood pressures which have decreased, systolic   blood pressures in the 90s. Unfortunately, the patient is lying onto her   left side and has been unable to lie flat in order to have pertinent   studies as ordered per the ED. ED had ordered CTA of the chest with   IV contrast order, rule out a PE. Unfortunately, the patient notes she is   not able to lie flat. As a result, blood pressure readings have also   been questionable secondary to her positioning. The hospitalists are   now asked to admit the patient to the medical service for continued   evaluation and treatment. The patient at current, still complains of   severe right thigh pain. She does not complain of right leg pain. She   notes that she has some nausea, but she thinks it is \"I am hungry. \"   She is not complaining of abdominal pain, chest pain. She notes she   had some shortness of breath starting last week. She does not   complain of a cough, fever, chills, dizziness, lightheadedness, focal   weakness, numbness, paresthesias, slurred speech, facial droop,   headache, neck pain, back pain, rash. She does not report any recent   falls. PAST MEDICAL HISTORY:   1. Uterine/endometrial carcinoma status post radiation. 2. Type 2 diabetes mellitus. 3. Morbid obesity. 4. Hypertension. 5. Iron-deficiency anemia. 6. Menometrorrhagia. 7. Depression. 8. Common variable immunodeficiency (CVI). PAST SURGICAL HISTORY:   1. Hysterectomy. 2. Bilateral salpingo-oophorectomy. 3. Bilateral pelvic lymph node dissection - secondary to endometrial   carcinoma on 05/21/2010. MEDICATIONS:   1. Tylenol Arthritis 650 mg p.o. q.6h. p.r.n.   2. Albuterol 90 mcg per actuation, 1 puff q.6h. p.r.n.   3. Vitamin D3 2000 units p.o. daily. 4. Lotrimin 1% topical cream, apply 2 affected areas b.i.d.   5. Colace 100 mg p.o. b.i.d.   6. Fentanyl 100 mcg per hour patch, apply 2 patches transdermally   q.72h.   7. Gabapentin 300 mg, 2 capsules p.o. b.i.d.   8. Ibuprofen 200 mg q.6h. p.r.n.   9. Lisinopril 10 mg p.o. daily.    10. Ativan 1 mg, 1 tablet p.o. q.4h. p.r.n. 11. Metformin 500 mg, 1 tablet p.o. b.i.d.   12. Toprol-XL 25 mg p.o. daily. 13. Nystatin powder apply to affected areas q.i.d. p.r.n. 14. MiraLax 17 grams powder p.o. daily. 15. Senna 8.6 mg, 2 capsules p.o. b.i.d. ALLERGIES:   1. PENICILLIN. 2. TETANUS. 3. SHELLFISH. SOCIAL HISTORY: Negative for smoking, alcohol, illicit drugs. FAMILY HISTORY: Hypertension - mother, father. Stroke - maternal   grandfather. Breast cancer - paternal grandmother. Diabetes - paternal   grandmother. REVIEW OF SYSTEMS: 11 systems reviewed, pertinent positives as   HPI, otherwise negative. PHYSICAL EXAMINATION   VITAL SIGNS: Temperature is 98.1 degrees Fahrenheit, blood   pressure of 90/56, heart rate of 150, respiratory rate of 18, O2   saturations 78% initially. Reported weight 240 pounds (108.9   kg), recorded height of 5 feet 6 inches tall. GENERAL: Morbidly obese female, in no acute respiratory distress. PSYCHIATRIC: The patient is awake, alert and oriented x3. Anxious. NEUROLOGIC: GCS of 15. Moves extremities x4. Limited for range of   motion of the right lower extremity. Sensation is grossly intact without   slurred speech, facial droop. HEENT: Normocephalic, atraumatic. PERRLA is intact. Sclerae are   anicteric. Conjunctivae clear. Nares are patent. Oropharynx clear. Tongue is midline, nonedematous. NECK: Supple, without lymphadenopathy, JVD, carotid bruits,   thyromegaly. LYMPH: Negative for cervical, supraclavicular adenopathy. RESPIRATORY: Lungs clear to auscultation bilaterally. CARDIOVASCULAR: Heart is tachycardic, regular rhythm. No   murmurs, rubs or gallops. GI: Abdomen is soft, nontender, nondistended. Normoactive bowel   sounds. No rebound or guarding or rigidity. No auscultated abdominal   bruits. No pulsatile mass. BACK: No CVA tenderness. No step-off deformity.    MUSCULOSKELETAL: Tenderness to light palpation of the right thigh   which is markedly swollen, edematous, and tense in all compartments. No calf tenderness. The compartments are soft in the right   leg. Limited range of motion of the right lower extremity. Unable to   visualize the left lower extremity as the patient is lying in her left lateral   decubitus position and refusing to lie onto her back for further more   complete exam.    VASCULAR: 2+ radial, 1+ dorsalis pedis pulses, without cyanosis,   clubbing. Marked nonpitting edema of the right greater than left lower   extremity. SKIN: Warm and dry. LABORATORY DATA: I reviewed as follows, sodium of 138,   potassium 3.8, chloride 104, CO2 26, BUN of 19, creatinine 0.95,   glucose 114, anion gap of 8, calcium of 8.6, GFR greater than 60, total   bilirubin is 0.6, total protein 7.4, albumin 3.0, ALT of 11, AST of 9. Alkaline phosphatase 99. WBC of 13.3, hemoglobin 7.7, hematocrit   27.0, platelets are 143. DIAGNOSTIC DATA: CTA of the chest is not obtained. The patient   would not lie onto her back. Right lower extremity venous duplex, no   evidence of right lower extremity vein thrombosis although the study   was technically limited due to limited mobility of patient, right tibia/fibula   one view x-ray showed no acute abnormality. Right femur by one view   x-ray showed no fracture or acute abnormality with suboptimal images   due to the patient's size. IMPRESSION AND PLAN: A 42-year-old female with noted past   medical history, now admitted with acute hypoxic respiratory failure,   tachycardia, hypotension, intractable right thigh and right lower   extremity pain. 1. Acute respiratory failure with hypoxia. Place the patient on   supplemental oxygen therapy. Order ABG.  Due to the patient's refusal   to lie onto her back, we are unable to obtain a CTA of the chest,   unable to obtain a chest x-ray, unable to obtain additional   hemodynamically accurate reading for blood pressure as her arm as   been elevated in the left lateral decubitus position. Concern was for   possible pulmonary embolism; however, I am unable to rule out this   diagnosis without CTA or even a VQ lung scan, as the patient is   unable to lie onto her back. I spoke with the patient to see if she will be   able to be able to lie in the supine position long enough for study to be   performed. If not, then she may require empiric anticoagulant therapy,   possible Lovenox. However, I have other concerns as the patient is   anemic and uncertain of the source. We will need additional   information in order to further evaluate. In the interim, I will admit the   patient to the intensive care unit. Continue with supportive cares and   pulse oximetry. 2. Tachycardia. Order a stat 12-lead EKG. Again, however, positioning   will be affected if the patient is not able to lie onto her back. Will   continue with telemetry monitoring. Order stat CK-MB and troponin. 3. Hypotension. These are likely not accurate readings based on the   patient's positioning. This will be an ongoing issue if she is unable to   lie onto her back. Continue with supportive cares. Order stat IV fluid   bolus and IV fluid maintenance. 4. Leukocytosis. Repeat CBC. Order blood cultures    5. Anemia. Source unknown. She has a history of iron-deficiency   anemia. 6. Intractable right thigh pain. Ultrasound was suboptimal. Concern for   even possible compartment syndrome. Place a stat consult to   Orthopedics to further evaluate with intractable right thigh pain. X-rays   are unremarkable for fracture. The patient has been sent to the ER by   Palliative Care Medicine for a PCA pain pump; however, I cannot order   a PCA pain pump or narcotics at this time, as the patient is   hypotensive and her condition may deteriorate with administration of   narcotics. I have discussed this plan of care with the patient and the   patient's family at the bedside.  Continue with resuscitation that is noted   above. 7. Type 2 diabetes mellitus. Humalog insulin correctional coverage,   schedule Accu-Cheks. 8. Bilateral lower extremity edema. Continue plan of cares as noted. 9. Endometrial uterine carcinoma. Patient is followed by Palliative   Cares Medicine secondary to her overwhelming symptoms. Would   consult this service to see the patient tomorrow in the a.m. 10. Venous thromboembolism prophylaxis, again consider for Lovenox. BEL Marie MD MP / Master Byers   D:  05/08/2017   21:21   T:  05/08/2017   23:40   Job #:  [id number]

## 2017-05-09 NOTE — PROGRESS NOTES
Spiritual Care Assessment/Progress Notes    Fadi Vaz [id number]  [id number]    1980  39 y.o.  female    Patient Telephone Number: [phone] (home)   Orthodox Affiliation: [facility]   Language: English   Extended Emergency Contact Information  Primary Emergency Contact: Renetta Walker Phone: [phone]  Relation: Parent  Secondary Emergency Contact: [address] Phone: [phone]  Relation: Sister   Patient Active Problem List    Diagnosis Date Noted    Acute respiratory failure with hypoxia ([facility] 05/08/2017    Advance care planning 01/27/2017    Chronic neoplasm-related pain 12/21/2015    Vaginal pain 05/19/2015    Tachycardia 11/20/2014    Bone metastases ([facility] 03/10/2014    DM [facility]diabetes mellitus) [facility] 04/30/2012    Conjunctivitis 08/22/2011    Insomnia 02/11/2011    Elevated liver function tests 10/21/2010    Endometrial cancer ([facility] 07/07/2010    Iron deficiency anemia 07/07/2010    Morbid obesity ([facility] 07/07/2010    Edema 07/07/2010    GERD (gastroesophageal reflux disease) 07/07/2010        Date: 5/9/2017       Level of Orthodox/Spiritual Activity:  [x]         Involved in geovanny tradition/spiritual practice    []         Not involved in geovanny tradition/spiritual practice  []         Spiritually oriented    []         Claims no spiritual orientation    []         seeking spiritual identity  []         Feels alienated from Mosque practice/tradition  []         Feels angry about Mosque practice/tradition  [x]         Spirituality/Mosque tradition is a resource for coping at this time.   []         Not able to assess due to medical condition    Services Provided Today:  []         crisis intervention    []         reading Scriptures  [x]         spiritual assessment    [x]         prayer  [x]         empathic listening/emotional support  []         rites and rituals (cite in comments)  [] life review     []         Restorationism support  []         theological development   []         advocacy  []         ethical dialog     []         blessing  []         bereavement support    []         support to family  []         anticipatory grief support   []         help with AMD  []         spiritual guidance    []         meditation      Spiritual Care Needs  [x]         Emotional Support  [x]         Spiritual/Bahai Care  []         Loss/Adjustment  []         Advocacy/Referral                /Ethics  []         No needs expressed at               this time  []         Other: (note in               comments)  [address]  []         Follow up visits with               pt/family  []         Provide materials  []         Schedule sacraments  []         Contact Community               Clergy  []         Follow up as needed  [x]         Other: (note in               comments)      Comments: Visited with Ms. Behzad Bob after pt's case discussed with Palliative Dr Irving Johnson. Pt welcomed my visit and indicated that prayer is always welcome. She shared of her pain and requested prayer for pain relief as well as for patience. Offered prayer and remained silently at bedside as pt rested with eyes closed following prayer. Pt has received visits at home from [facility],  from [facility]. Call placed to [facility] for coordination of care. Chaplains will continue to follow for support and prayer during this hospitalization.     Nanda Ortiz, Palliative

## 2017-05-09 NOTE — ED NOTES
Dr. Prema Sibley notified of not being able to obtain additional blood work ordered and that pt. States she can not lay flat for PE study. Pt. States she has not laid on her back for 2 years. POC INR ordered.

## 2017-05-09 NOTE — ED NOTES
As of 2100 this RN and Paramedic at bedside to attempt IV access for additional blood tests. Pt's status is stable. Pt on monitor x 3. Tympanic O2 monitor in place. Pt on 2L NC. Family and pt updated on status and plan of care. Will continue to monitor closely.

## 2017-05-09 NOTE — ROUTINE PROCESS
TRANSFER - OUT REPORT:    Verbal report given to [facility], RN (name) on Estrellita Galindo  being transferred to [address] [place] [address](unit) for routine progression of care       Report consisted of patients Situation, Background, Assessment and   Recommendations(SBAR). Information from the following report(s) SBAR, Kardex and MAR was reviewed with the receiving nurse. Lines:   Peripheral IV 05/08/17 Left Antecubital (Active)   Site Assessment Clean, dry, & intact 5/8/2017  4:29 PM   Phlebitis Assessment 0 5/8/2017  4:29 PM   Infiltration Assessment 0 5/8/2017  4:29 PM   Dressing Status Clean, dry, & intact 5/8/2017  4:29 PM   Dressing Type Tape;Transparent 5/8/2017  4:29 PM   Hub Color/Line Status Pink;Capped;Flushed;Patent 5/8/2017  4:29 PM   Action Taken Blood drawn 5/8/2017  4:29 PM        Opportunity for questions and clarification was provided.       Patient transported with:   Monitor  O2 @ 2 liters   RN

## 2017-05-10 NOTE — PROGRESS NOTES
0730- Bedside report received from [place], using [employer]. Pt resting at this time-states she is most comfortable lying in the prone position. Pain is tolerable with Dilaudid PCA. 1115- 1 unit PRBC started    1320- Pt transferred to [facility] with RN and PCT.

## 2017-05-10 NOTE — PROGRESS NOTES
Palliative Medicine Consult  Curtis: [phone] ([phone])    Patient Name: Janice Condon  YOB: 1980    Date of Initial Consult: 5/9/17  Reason for Consult: overwhelming sx/care decisions   Requesting Provider: Jay/ Jamarcus Manzano   Primary Care Physician: Elizabeth Oliveira MD      SUMMARY:   Janice Condon is a 39 y.o. with a past history of metastatic endometrial cancer (mets to liver and bone) who was admitted on 5/8/2017 from home after Palliative RN visited her and found her to have worsening RLE pain and swelling. Dopplers and XR RLE neg. Also w/ tachycardia and hypoxia, but could not get CT chest yest to r/o PE because she was unable to lie flat. Started on PCA. Palliative team has been following pt for over a year, now at home as she know is mostly bed bound except for PT. Dr Garrison Johnson had referred her for pain. She is not on any disease directed therapy and does not actively follow w/ GynOnc. Care has been challenged by pt's inability to transfer easily and at times she is reluctant to have tests done. Current medical issues leading to Palliative Medicine involvement include: sx and care decisions. PALLIATIVE DIAGNOSES:   1. RLE pain and swelling  2. Generalized bone pain, neoplasm related  3. Shortness of breath   4. Debility due to generalized weakness and pain  5. Constipation due to opioids       PLAN:   1. Reviewed CT scans and notes from today: patient has growth of cancer and description of bones in pelvis and right leg causing swelling/severe pain and inability to turn/stand; her symptoms have progressed from the last week where previously she was able to sit up, transfer to commode, and walk with support of walker. In last week; increased pain and inability to sit up, transfer or walk.     2. Family discussion with patient, her mother and  present  -Discussed best case and worst case scenario  -Worse case is she is now bed-ridden/total care and unable to walk again due to tumor, fractures and pain  -If this is the case (unfortunately is likely), she will need significant care in the home that her mother cannot provide  -Discussed engaging care management and hospice care to discuss options for care; I recommended hospice but she would need nursing care in addition  -She does have Medicaid and nursing home may need to be considered  -Best case scenario is after PT meets with her tomorrow; if there is a way to stabilize the pelvis/femur externally allowing her to rehab to a point of being able to independently transfer and walk even if in a limited capacity; if they feel this is the case, would rehab be an option post hospitalization? (discussed with patient/mother that this is a low likelihood but we would get input to assess if possible)  Action  -PT consult - any type of stabilization to allow rolling on back, transfers, or possibly walking? Is rehab an option?  -Case management consult - if bedridden: 24 hr care at home options? ; nursing home options?  -Hospice consult - information session but admission to safe environment if stabilization and rehab not possible    3. Pain control  -Fentanyl patches 100mcg x 2  -Dilaudid PCA 0.2mg basal with 2mg every 6 min (tolerating but is sleepy today due to no sleep in several days)  -Recommend tomorrow, stopping basal rate and reducing PCA to 1mg every 6 min and adding back Oxycodone 30mg every 2 hours prn pain to assess home regimen  -Dexamethasone 4mg IV every 12 hours - will stop tomorrow, was for bone pain/inflammation  -Reduce fluids to 25cc/hr due to developing fluid overload (is also s/p transfusion today)    4.  Family will need much support during this difficult process    Communicated plan of care with: Palliative IDT     GOALS OF CARE / TREATMENT PREFERENCES:   [====Goals of Care====]  GOALS OF CARE:  Patient / health care proxy stated goals: pain relief and to walk again    TREATMENT PREFERENCES:   Code Status: Full Code    Advance Care Planning:  Advance Care Planning 5/10/2017   Patient's Healthcare Decision Maker is: Legal Next of Andrea Velarde   Primary Decision Maker Name Cierra Waldrop   Primary Decision Maker Phone Number -   Primary Decision Maker Relationship to Patient Parent   Confirm Advance Directive Yes, on file     Other:    The palliative care team has discussed with patient / health care proxy about goals of care / treatment preferences for patient.  [====Goals of Care====]     HISTORY:     History obtained from: Pt, chart, staff    CHIEF COMPLAINT: My pain is a 4    HPI/SUBJECTIVE:    The patient is:   [x] Verbal and participatory  [] Non-participatory due to:     Family meeting to discuss results of CT 5/10/17; see above in Plan  IMPRESSION:   1. 22 x 15 x 30 cm right hemipelvic mass has increased since 2015 and represents progression of metastatic disease. The mass involves the right sacrum, right iliac bone, right pubic rami, left pubic rami, right hip joint, and proximal  right femur. 2. Pathologic fracture of the subtrochanteric right femur is likely acute given the clinical history and imaging appearance. 3. Mass involves the right L5 and right sacral nerves.   4. Mass effect on the right femoral and external iliac veins likely causes venous stasis and may account for the right lower extremity soft tissue swelling.      Clinical Pain Assessment (nonverbal scale for severity on nonverbal patients):   [++++ Clinical Pain Assessment++++]  [++++Pain Severity++++]: Pain: 4  [++++Pain Character++++]: aching and deep   [++++Pain Duration++++]: chronic, but worse over past week   [++++Pain Effect++++]: limits mobility, makes upset  [++++Pain Factors++++]: worse w/ movement   [++++Pain Frequency++++]: constant   [++++Pain Location++++]: RLE and general  [++++ Clinical Pain Assessment++++]     FUNCTIONAL ASSESSMENT:     Palliative Performance Scale (PPS):  PPS: 60       PSYCHOSOCIAL/SPIRITUAL SCREENING:     Advance Care Planning:  Advance Care Planning 5/10/2017   Patient's Healthcare Decision Maker is: Legal Next of Andrea 69   Primary Decision Maker Name Author Iman   Primary Decision Maker Phone Number -   Primary Decision Maker Relationship to Patient Parent   Confirm Advance Directive Yes, on file     Any spiritual / Roman Catholic concerns: support of / strong belief in God  [x] Yes /  [] No    Caregiver Burnout: mom had difficulty caring for her/especially if total care  [x] Yes /  [] No /  [] No Caregiver Present      Anticipatory grief assessment:   [x] Normal  / [] Maladaptive       ESAS Anxiety: Anxiety: 2    ESAS Depression: Depression: 0      REVIEW OF SYSTEMS:     Positive and pertinent negative findings in ROS are noted above in HPI. The following systems were [x] reviewed / [] unable to be reviewed as noted in HPI  Other findings are noted below. Systems: constitutional, ears/nose/mouth/throat, respiratory, gastrointestinal, genitourinary, musculoskeletal, integumentary, neurologic, psychiatric, endocrine. Positive findings noted below. Modified ESAS Completed by: provider   Fatigue: 6 Drowsiness: 6   Depression: 0 Pain: 4   Anxiety: 2 Nausea: 0   Anorexia: 0 Dyspnea: 0   Best Well-Bein Constipation: No            PHYSICAL EXAM:     From RN flowsheet:  Wt Readings from Last 3 Encounters:   05/10/17 267 lb 10.2 oz (121.4 kg)   17 300 lb (136.1 kg)   16 280 lb (127 kg)     Blood pressure 115/79, pulse (!) 121, temperature 98.9 °F (37.2 °C), resp. rate 16, height 5' 6\" (1.676 m), weight 267 lb 10.2 oz (121.4 kg), SpO2 91 %.     Pain Scale 1: Numeric (0 - 10)  Pain Intensity 1: 7  Pain Onset 1: ONGOING  Pain Location 1: Leg  Pain Orientation 1: Right  Pain Description 1: Constant  Pain Intervention(s) 1: Encouraged PCA  Last bowel movement, if known:     Constitutional: awake, drowsy, NAD when lying still  Eyes: pupils equal, anicteric  ENMT: no nasal discharge, moist mucous membranes  Cardiovascular: regular rhythm (listen from side, cannot roll over onto back)  Respiratory: breathing not labored, symmetric  Gastrointestinal: soft, cannot assess due to lying on stomach   Musculoskeletal: no deformity, pain w/ movement of RLE  Skin: warm, dry  Neurologic: following commands, moving all extremities  Psychiatric: full affect, no hallucinations     HISTORY:     Principal Problem:    Acute respiratory failure with hypoxia (HCC) (5/8/2017)      Past Medical History:   Diagnosis Date    Cancer ([facility]     uterine    CVI (common variable immunodeficiency) ([facility]     Diabetes ([facility]     Elevated liver function tests 10/21/2010    Endometrial cancer ([facility] 7/7/2010    Hypertension     Iron deficiency anemia     Menometrorrhagia 10/21/2010    Microcytic anemia 10/21/2010    Morbid obesity ([facility]     Prediabetes 7/7/2010    Psychiatric disorder     depression    Radiation     completed radiation mid-march      Past Surgical History:   Procedure Laterality Date    CARDIAC SURG PROCEDURE UNLIST      hx of tachycardia    HX GYN      hysterectomy      Family History   Problem Relation Age of Onset    Hypertension Mother     Hypertension Father     Stroke Maternal Grandfather     Cancer Paternal Grandmother      breast    Diabetes Paternal Grandmother       History reviewed, no pertinent family history.   Social History   Substance Use Topics    Smoking status: Never Smoker    Smokeless tobacco: Never Used    Alcohol use Yes     Allergies   Allergen Reactions    Pcn [Penicillins] Nausea and Vomiting     \"but could take amoxil\"    Shellfish Containing Products Unknown (comments)    Tetanus And Diphtheria Toxoids, Adsorbed, Adult Other (comments)     Developed local swelling, axillary pain, and transient fever    Tomato Unknown (comments)      Current Facility-Administered Medications   Medication Dose Route Frequency    sodium chloride (NS) flush 20 mL  20 mL InterCATHeter PRN    sodium chloride (NS) flush 10 mL  10 mL InterCATHeter Q24H    sodium chloride (NS) flush 10 mL  10 mL InterCATHeter PRN    sodium chloride (NS) flush 10 mL  10 mL InterCATHeter Q8H    alteplase (CATHFLO) 1 mg in sterile water (preservative free) 1 mL injection  1 mg InterCATHeter PRN    bacitracin 500 unit/gram packet 1 Packet  1 Packet Topical PRN    0.9% sodium chloride infusion 250 mL  250 mL IntraVENous PRN    HYDROmorphone (PF) (DILAUDID) injection 3 mg  3 mg IntraVENous Q3H PRN    HYDROmorphone (PF) 15 mg/30 ml (DILAUDID) PCA   IntraVENous CONTINUOUS    metoprolol tartrate (LOPRESSOR) tablet 25 mg  25 mg Oral BID    acetaminophen (TYLENOL) tablet 650 mg  650 mg Oral Q6H PRN    [START ON 5/11/2017] fentaNYL (DURAGESIC) 100 mcg/hr patch 2 Patch  2 Patch TransDERmal Q72H    acetaminophen (TYLENOL) tablet 650 mg  650 mg Oral TID    polyethylene glycol (MIRALAX) packet 17 g  17 g Oral DAILY    DULoxetine (CYMBALTA) capsule 30 mg  30 mg Oral DAILY    gabapentin (NEURONTIN) capsule 300 mg  300 mg Oral BID    senna (SENOKOT) tablet 17.2 mg  2 Tab Oral BID    LORazepam (ATIVAN) tablet 1 mg  1 mg Oral Q6H PRN    dexamethasone (DECADRON) 4 mg/mL injection 4 mg  4 mg IntraVENous Q12H    0.9% sodium chloride infusion  25 mL/hr IntraVENous CONTINUOUS    insulin lispro (HUMALOG) injection   SubCUTAneous AC&HS    glucose chewable tablet 16 g  4 Tab Oral PRN    dextrose (D50W) injection syrg 12.5-25 g  12.5-25 g IntraVENous PRN    glucagon (GLUCAGEN) injection 1 mg  1 mg IntraMUSCular PRN    sodium chloride (NS) flush 5-10 mL  5-10 mL IntraVENous Q8H    sodium chloride (NS) flush 5-10 mL  5-10 mL IntraVENous PRN      LAB AND IMAGING FINDINGS:     Lab Results   Component Value Date/Time    WBC 12.3 05/10/2017 12:27 AM    HGB 6.9 05/10/2017 12:27 AM    PLATELET 579 22/04/6763 12:27 AM     Lab Results   Component Value Date/Time    Sodium 137 05/10/2017 12:27 AM    Potassium 4.4 05/10/2017 12:27 AM    Chloride 105 05/10/2017 12:27 AM    CO2 24 05/10/2017 12:27 AM    BUN 14 05/10/2017 12:27 AM    Creatinine 0.69 05/10/2017 12:27 AM    Calcium 8.3 05/10/2017 12:27 AM    Magnesium 1.6 11/20/2014 03:30 PM      Lab Results   Component Value Date/Time    AST (SGOT) 10 05/10/2017 12:27 AM    Alk. phosphatase 93 05/10/2017 12:27 AM    Protein, total 6.8 05/10/2017 12:27 AM    Albumin 2.9 05/10/2017 12:27 AM    Globulin 3.9 05/10/2017 12:27 AM     Lab Results   Component Value Date/Time    INR 1.1 05/10/2017 12:48 AM    Prothrombin time 11.4 05/10/2017 12:48 AM    aPTT 35.5 05/10/2017 12:48 AM      Lab Results   Component Value Date/Time    Iron 30 05/08/2017 04:27 PM    TIBC 194 05/08/2017 04:27 PM    Iron % saturation 15 05/08/2017 04:27 PM    Ferritin 10 07/24/2009 08:50 AM      No results found for: PH, PCO2, PO2  No components found for: [place]   Lab Results   Component Value Date/Time    CK 60 05/10/2017 12:48 AM    CK - MB <1.0 05/10/2017 12:48 AM            Total time: 70min total; 35 min + 35 prolonged service)  Counseling / coordination time, spent as noted above: yes  > 50% counseling / coordination?: yes re disease state and options    Prolonged service was provided for  [x]30 min   []75 min in face to face time in the presence of the patient, spent as noted above. Time Start: 530pm   Time End: 6:05pm  Note: this can only be billed with 51795 (initial) or 21 [phone] (follow up). If multiple start / stop times, list each separately.

## 2017-05-10 NOTE — PROGRESS NOTES
Follow-up to provide support to [facility] and her mother as she prepare to go for test. I will provide support to her mother until the end of the procedure. Her sister has just arrived.      Hui Mckeon., [facility], [address] [place] [address]

## 2017-05-10 NOTE — ROUTINE PROCESS
TRANSFER - OUT REPORT:    Verbal report given to Karmen Ravi RN(name) on [address]  being transferred to [facility](unit) for routine progression of care       Report consisted of patients Situation, Background, Assessment and   Recommendations(SBAR). Information from the following report(s) SBAR, Kardex, MAR, Recent Results and Cardiac Rhythm sinus tachy was reviewed with the receiving nurse. Lines:   Quad Lumen 05/09/17 Right Internal jugular (Active)   Central Line Being Utilized Yes 5/10/2017 12:00 PM   Criteria for Appropriate Use Limited/no vessel suitable for conventional peripheral access 5/10/2017 12:00 PM   Site Assessment Bleeding;Drainage (comment) 5/10/2017 12:00 PM   Infiltration Assessment 0 5/10/2017 12:00 PM   Affected Extremity/Extremities Color distal to insertion site pink (or appropriate for race); Pulses palpable;Range of motion performed 5/10/2017 12:00 PM   Date of Last Dressing Change 05/09/17 5/10/2017 12:00 PM   Dressing Status Intact; Old drainage 5/10/2017 12:00 PM   Dressing Type Disk with Chlorhexadine gluconate (CHG) 5/10/2017 12:00 PM   Proximal Hub Color/Line Status White 5/10/2017 12:00 PM   Positive Blood Return (Medial Site) Yes 5/10/2017 12:00 PM   Medial 1 Hub Color/Line Status Channing Leslee 5/10/2017 12:00 PM   Positive Blood Return (Lateral Site) Yes 5/10/2017 12:00 PM   Medial 2 Hub Color/Line Status Blue 5/10/2017 12:00 PM   Positive Blood Return (Site #3) Yes 5/10/2017 12:00 PM   Distal Hub Color/Line Status Brown 5/10/2017 12:00 PM   Positive Blood Return (Site #4) Yes 5/10/2017 12:00 PM   Alcohol Cap Used Yes 5/10/2017 12:00 PM        Opportunity for questions and clarification was provided.       Patient transported with:   Monitor

## 2017-05-10 NOTE — PROGRESS NOTES
Aamir Guzmán MD       Hospitalist Progress Note     5/10/2017   PCP:  Dr. Miah Moyer MD  Right thigh pain      Admission Summary:   A 59-year-old -American female with past medical history of endometriosis, uterine carcinoma, type 2 diabetes mellitus, elevated liver function tests, hypertension, iron-deficiency anemia, menometrorrhagia, anemia, morbid obesity, general debility, depression, presented to the emergency department from home with chief complaint of intractable right thigh pain. In the ER patient was noted hypoxic and was treated for acute respiratory failure possibly due to PE. CTA of chest,no PE. Patient rt thigh pain is caused by a pathologic fracture from her metastatic uterine cancer,no treatment. Palliative care consulted. ICU care d/mary today,transfer to floor. Reason For Visit:  Pain, SOB    Assessment/Plan   Acute respiratory failure with hypoxia (POA) - possible PE, but symptoms are improving  - Consult anesthesia for sedated CTA, patient cannot lie on back otherwise  - CTA chest on 5/9,negative for PE  - Lovenox d/c  -O2sat on RA 99% now. Intractable right thigh pain (POA)  Pathologic fracture of the subtrochanteric right femur,acute  - Xray right femur 5/8 suboptimal but no fracture or acute abnormality seen  - Xray right tib/fib 5/8 with no acute abnormality  - Duplex right LE 5/8 without DVT but again, suboptimal  - Right soft tissue US 5/9 shows nonspecific soft tissue edema.  No defined hematoma identified  - Obtain CT leg on 5/9:Pathologic fracture of the subtrochanteric right femur is likely acute given the clinical history and imaging appearance  - PCA for pain  - Palliative care consult    Tachycardia (POA) - due pain,anemia  - ECG with sinus tachycardia, non-specific T-wave abnormalities  - Monitor  -Pain control  -Pulmonary saw pt and assessed tachycardia due to pain    Hypotension (POA) - These are likely not accurate readings based on the patient's positioning. This will be an ongoing issue if she is unable to lie onto her back    Leukocytosis (POA) - unclear cause, reactive to bleed? - Check UA  - Check CXR  - Hold off on empiric antibiotics  -Blood cx negative    Anemia (chronic) - patient with substantial drop since , monitor for bleeding  - Monitor HH  - Check FOBT  - Transfused 1 units since Hgb<7.0(6.9 today)    Type 2 diabetes mellitus (chronic)  - Humalog insulin correctional coverage, schedule Accu-Cheks. Endometrial uterine carcinoma (chronic) with metastasis- I spoke with Dr. Ada Faulkner' nurse and there is no further treatment, even palliative, available  - Palliative care consult    See orders for other plans. VTE prophylaxis: SCDs  Discussed plan of care with Patient/Family and Nurse   Pre-admission lived at home  Discharge plan: tbd  Estimated time to discharge: unclear     Subjective   Ms. Behzad Rojas says that the rt hip pain seems better today. She says that she is not feeling good today as he was told that her cancer has advanced and that the rt thigh is due to pain caused by pathologic cancer. She is now talking to the  in her room.     Physical examination     Visit Vitals    /79 (BP 1 Location: Right arm, BP Patient Position: At rest;Prone)    Pulse (!) 121    Temp 98.9 °F (37.2 °C)    Resp 16    Ht 5' 6\" (1.676 m)    Wt 121.4 kg (267 lb 10.2 oz)    SpO2 91%    BMI 43.2 kg/m2       Temp (24hrs), Av.4 °F (36.9 °C), Min:97.7 °F (36.5 °C), Max:98.9 °F (37.2 °C)      Intake/Output Summary (Last 24 hours) at 05/10/17 1543  Last data filed at 05/10/17 1343   Gross per 24 hour   Intake             4155 ml   Output               75 ml   Net             4080 ml       Gen - NAD  ENT - MMM  Neck - supple, full ROM  CV - RRR, no MRG  Resp - lungs CTA b/l, mildly tachypnic  GI - unable to assess abdomen  Ext - b/l edema, right leg mildly tense but pulses 2+, good cap refill  Neuro - A&O, no focal deficits    Data Review Telemetry    ECG x   Xray    CT scan x   MRI    Echocardiogram          I have reviewed the flow sheet and recent notes  New labs and data below personally reviewed.     Recent Labs      05/10/17   0027  05/08/17   1627   WBC  12.3*  13.3*   HGB  6.9*  7.7*   HCT  23.9*  27.0*   PLT  340  331     Recent Labs      05/10/17   0048  05/10/17   0027  05/08/17   2307  05/08/17   1627   NA   --   137   --   138   K   --   4.4   --   3.8   CL   --   105   --   104   CO2   --   24   --   26   GLU   --   161*   --   114*   BUN   --   14   --   19   CREA   --   0.69   --   0.95   CA   --   8.3*   --   8.6   ALB   --   2.9*   --   3.0*   TBILI   --   0.4   --   0.6   SGOT   --   10*   --   9*   ALT   --   9*   --   11*   INR  1.1   --   1.1   --        Medications reviewed  Current Facility-Administered Medications   Medication Dose Route Frequency    sodium chloride (NS) flush 20 mL  20 mL InterCATHeter PRN    sodium chloride (NS) flush 10 mL  10 mL InterCATHeter Q24H    sodium chloride (NS) flush 10 mL  10 mL InterCATHeter PRN    sodium chloride (NS) flush 10 mL  10 mL InterCATHeter Q8H    alteplase (CATHFLO) 1 mg in sterile water (preservative free) 1 mL injection  1 mg InterCATHeter PRN    bacitracin 500 unit/gram packet 1 Packet  1 Packet Topical PRN    0.9% sodium chloride infusion 250 mL  250 mL IntraVENous PRN    HYDROmorphone (PF) 15 mg/30 ml (DILAUDID) PCA   IntraVENous CONTINUOUS    metoprolol tartrate (LOPRESSOR) tablet 25 mg  25 mg Oral BID    acetaminophen (TYLENOL) tablet 650 mg  650 mg Oral Q6H PRN    [START ON 5/11/2017] fentaNYL (DURAGESIC) 100 mcg/hr patch 2 Patch  2 Patch TransDERmal Q72H    acetaminophen (TYLENOL) tablet 650 mg  650 mg Oral TID    polyethylene glycol (MIRALAX) packet 17 g  17 g Oral DAILY    DULoxetine (CYMBALTA) capsule 30 mg  30 mg Oral DAILY    gabapentin (NEURONTIN) capsule 300 mg  300 mg Oral BID    senna (SENOKOT) tablet 17.2 mg  2 Tab Oral BID    LORazepam (ATIVAN) tablet 1 mg  1 mg Oral Q6H PRN    dexamethasone (DECADRON) 4 mg/mL injection 4 mg  4 mg IntraVENous Q12H    HYDROmorphone (PF) (DILAUDID) injection 2 mg  2 mg IntraVENous Q3H PRN    0.9% sodium chloride infusion  125 mL/hr IntraVENous CONTINUOUS    insulin lispro (HUMALOG) injection   SubCUTAneous AC&HS    glucose chewable tablet 16 g  4 Tab Oral PRN    dextrose (D50W) injection syrg 12.5-25 g  12.5-25 g IntraVENous PRN    glucagon (GLUCAGEN) injection 1 mg  1 mg IntraMUSCular PRN    sodium chloride (NS) flush 5-10 mL  5-10 mL IntraVENous Q8H    sodium chloride (NS) flush 5-10 mL  5-10 mL IntraVENous PRN         Santana Hinson MD  Internal Medicine  5/10/2017

## 2017-05-10 NOTE — ANESTHESIA PROCEDURE NOTES
Central Line Placement    Start time: 5/9/2017 10:30 PM  End time: 5/9/2017 10:40 PM  Performed by: Aliya Wilson  Authorized by: Aliya Wilson     Indications: vascular access  Preanesthetic Checklist: patient identified, risks and benefits discussed, anesthesia consent, site marked, patient being monitored and timeout performed      Pre-procedure: All elements of maximal sterile barrier technique followed?  Yes    Maximal barrier precautions followed, 2% Chlorhexidine for cutaneous antisepsis and Hand hygiene performed prior to catheter insertion            Procedure:   Prep:  Chlorhexidine  Location:  Internal jugular  Orientation:  Right  Patient position:  Trendelenburg  Catheter type:  Quad lumen  Catheter size:  8.5 Fr  Catheter length:  16 cm  Number of attempts:  1  Successful placement: Yes      Assessment:   Post-procedure:  Catheter secured and sterile dressing applied  Assessment:  Blood return through all ports  Insertion:  Uncomplicated  Patient tolerance:  Patient tolerated the procedure well with no immediate complications

## 2017-05-10 NOTE — PROGRESS NOTES
Ortho Progress Note      Patient: Dayanara Vance                   MRN: [id number]  Sex: female  YOB: 1980           Age: 39 y.o. Subjective:   Right hip and thigh pain with motion or weightbearing, comfortable prone in bed, CT of pelvis completed last night    Physical Exam:  Unchanged from previous exam on 5/8. ..morbidly obese with right thigh swollen but compartments soft. Pain was controlled. Strong pedal pulse. Motor/sensory function normal. No skin color change    Imaging Review:   CT LOW EXT RT W CONT 5/9/2017 10:23 PM  INDICATION: Right lower extremity pain and inability to walk for 5 days after a  physical therapy session. Endometrial carcinoma with osseous metastatic disease. COMPARISON: PET/CT on 2/16/2015. Femur views on 2/26/2015 and 5/8/2017. TECHNIQUE: Helical CT of the right lower extremity from the hip to the ankle  with coronal and sagittal reformats. Images reviewed in soft tissue and bone  windows. CT dose reduction was achieved through the use of a standardized  protocol tailored for this examination and automatic exposure control for dose  modulation. CONTRAST: Pre and post IV contrast 100 mL Isovue 370  FINDINGS: Mass: Multilobulated mass in the right hemipelvis measures  approximately 22 x 15 x 30 cm, increased since 2015. The mass involves the right  sacrum, right iliac bone, right pubic rami, right hip joint, and proximal right  femur. The mass also involves the left pubic rami and anterior left acetabulum. Mass extends into the right L5-S1 and right sacral neural foramina. There is  mass effect on all surrounding structures within the pelvis and outside the  pelvis. Right femoral and external iliac veins are compressed. Bones: Pathologic fracture of the proximal right femur is intertrochanteric. Supratrochanteric right femur has been destroyed by the mass. No other  pathologic fracture.  No other fracture in the right lower extremity. Joint fluid: None. Articulations: Right hip joint has been replaced by the large neoplastic mass. Left hip osteoarthritis is mild. Spine: No significant degenerative disc disease in the lower lumbar spine. Soft tissue swelling: There is edema in the medial subcutaneous adipose tissues  of the right thigh and right leg. No drainable fluid collection. No evidence of  DVT. IMPRESSION:   1. 22 x 15 x 30 cm right hemipelvic mass has increased since 2015 and represents  progression of metastatic disease. The mass involves the right sacrum, right  iliac bone, right pubic rami, left pubic rami, right hip joint, and proximal  right femur. 2. Pathologic fracture of the subtrochanteric right femur is likely acute given  the clinical history and imaging appearance. 3. Mass involves the right L5 and right sacral nerves. 4. Mass effect on the right femoral and external iliac veins likely causes  venous stasis and may account for the right lower extremity soft tissue  swelling. Plan:  Reviewed cased and above imagine studies with Dr. Ramila Easton. Unfortunately, we do not have anything to add to her current treatment plan. The large tumor has replaced the bony components of the right hemipelvis and proximal femur. Proximal femur fracture no amenable to orthopedic surgical intervention. I have discussed this at length with the patient. At the patient's request, I have also discussed this with Anna Fuchs., [facility], [address] [place] [address]. Recommend palliative care. Will see if asked.     [address], [place]  5/10/2017   12:35 PM      .

## 2017-05-10 NOTE — PERIOP NOTES
VS stable. Awake and alert. Resting comfortably. Patient denies nausea. Pain improved. Ready to transfer from PACU.

## 2017-05-10 NOTE — PROGRESS NOTES
Music Therapy Assessment    Sabatie Blind [id number]  [id number]    1980  39 y.o.  female    Patient Telephone Number: [phone] (home)   Episcopal Affiliation: Garth Sommers   Language: English   Extended Emergency Contact Information  Primary Emergency Contact: Renetta Walker Phone: [phone]  Relation: Parent  Secondary Emergency Contact: [address] Phone: [phone]  Relation: Sister   Patient Active Problem List    Diagnosis Date Noted    Acute respiratory failure with hypoxia ([facility] 05/08/2017    Advance care planning 01/27/2017    Chronic neoplasm-related pain 12/21/2015    Vaginal pain 05/19/2015    Tachycardia 11/20/2014    Bone metastases ([facility] 03/10/2014    DM (diabetes mellitus) ([facility] 04/30/2012    Conjunctivitis 08/22/2011    Insomnia 02/11/2011    Elevated liver function tests 10/21/2010    Endometrial cancer ([facility] 07/07/2010    Iron deficiency anemia 07/07/2010    Morbid obesity ([facility] 07/07/2010    Edema 07/07/2010    GERD (gastroesophageal reflux disease) 07/07/2010        Date: 5/10/2017       Mental Status:   [x] Alert [  ] Forgetful [  ]  Confused     Communication Status: [  ] Impaired Speech [  ] Nonverbal     Physical Status:   [  ] Hard of Hearing [  ] Oxygen in use [  ] Ambulatory   [  ] Ambulatory with assistance  [  ] Non-ambulatory -N/A    Music Preferences, Background: N/A: Please see Session Observations below. Clinical Problem addressed: N/A: Please see Session Observations below.     Goal(s) met in session:  Physical/Pain management (Scale of 1-10):    Pre-session rating ___________    Post-session rating __________   [  ] Increased relaxation   [  ] Regulated breathing patterns  [  ] Minimized physical distress   [  ] Decreased nausea discomfort     Emotional/Psychological:  Expression of feelings _____________ [  ] Decreased aggressive behavior   [  ] Decreased sadness   [  ] Increased range of coping skills     [  ] Improved mood    [  ] Decreased withdrawn behavior     Social:  [  ] Decreased feelings of isolation  [x] Positive social interaction   [  ] Improved strained family relationships    Spiritual:  [  ] Spiritual support    [  ] Expressed peace     Techniques Utilized (Check all that apply): N/A: Please see Session Observations below. [  ] Procedural support MT [  ] Music for relaxation [  ] Patient preferred music  [  ] Kaylynn analysis  [  ] Song choice  [  ] Music for validation  [  ] Music listening  [  ] Progressive relaxation [  ] Guided imagery  [  ] Danita Skeens  [  ] Dustin Lara   [  ] Kaci List writing  [  ] Saba Lean along   [  ] Jerrilyn Sever  [  ] Sensory stimulation  [  ] Active Listening  [  ] Music for spiritual support [  ] Making of CDs as gifts    Session Observations:  Referral from [address] [place] [address], [address] [place] [address]. The patient (pt) was observed to be drowsy lying on her stomach in bed. A female friend who appeared to be close to the pt in age was sitting at bedside. This music therapist (MT) greeted them and introduced self. The MT shared about music therapy and how it's used in the hospital, and offered this to the pt. The pt and her friend said they thought it was neat the hospital offered this. The pt expressed gratitude for the MT's visit, and declined a session for the moment, saying she felt drowsy and hoped to visit with her friend and then rest more. The MT expressed understanding and will follow as able.     Salima Johnson MT-BC (Music Therapist-Board Certified)  Spiritual Care Department  Referral-based service

## 2017-05-10 NOTE — PROGRESS NOTES
PULMONARY/CRITICAL CARE/SLEEP MEDICINE    Initial Physician Consultation Note    Name: Sena Moore   : 1980   MRN: [id number]   Date: 5/10/2017      Subjective:   5/10  Seen and examined. Underwent imaging with findings of progression of known right pelvic mass with now noted pathologic fracture of femur. I made the patient aware of findings this morning. Discussed with Dr. Toribio Montenegro- plans are a meeting this evening. Hb is 6.9 and one unit of blood has been ordered as she is tachycardic and dyspneic. She has no critical care needs at present and will be transferred out of ICU    Consult Note:     This patient has been seen and evaluated as a new patient admitted to ICU. Medical records and data reviewed. Patient is a 39 y.o. female who presented to the hospital with complaints of right thigh pain. Patient has known metastatic endometrial ca and has complained of pain in this location for the past few days associated with new onset of swelling. She has been seen by Ortho and a hematoma is suspected supported by a drop in Hb as well. LE dopplers was limited by position but negative for popliteal DVT. Sinus tachycardia present on admission has improved with pain management and resumption of beta blocker. Patient is followed by Palliative medicine even as an outpatient. She is comfortable in the prone position and has not been able to change positions for any testing.  She denies preceding fevers, chills or fall      Assessment:     Right tight swelling and pain- soft tissue swelling and fracture  Known metastatic endometrial ca, no other treatment options are available, further progression with pathological fracture of right femur  Uncontrolled pain  Sinus tachy- pain, lack of BB      Recommendations:   PRBC  Pain management per Palliative Medicine  Ortho following  On lopressor  Goals of care being discussed  Critical care needs have resolved  We will be available to help again as needed  D/W  Anisa Mahajan RN        Active Problem List:     Problem List  Date Reviewed: 5/8/2017          Codes Class    * (Principal)Acute respiratory failure with hypoxia [facility]) ICD-10-CM: J96.01  ICD-9-CM: 518.81         Advance care planning ICD-10-CM: Z71.89  ICD-9-CM: V65.49     Overview Signed 1/27/2017 12:18 PM by Julien Lynn MD     See ACP notes and Advance Directive on file             Chronic neoplasm-related pain ICD-10-CM: G89.3  ICD-9-CM: 338. 3         Vaginal pain ICD-10-CM: R10.2  ICD-9-CM: 625.9         Tachycardia ICD-10-CM: R00.0  ICD-9-CM: 785.0         Bone metastases (HCC) ICD-10-CM: C79.51  ICD-9-CM: 198.5         DM (diabetes mellitus) ([facility] ICD-10-CM: E11.9  ICD-9-CM: 250.00         Conjunctivitis ICD-10-CM: H10.9  ICD-9-CM: 372.30         Insomnia ICD-10-CM: G47.00  ICD-9-CM: 780.52         Elevated liver function tests ICD-10-CM: R94.5  ICD-9-CM: 790.6         Endometrial cancer ([facility] ICD-10-CM: C54.1  ICD-9-CM: 182.0         Iron deficiency anemia ICD-10-CM: D50.9  ICD-9-CM: 280.9         Morbid obesity (HCC) ICD-10-CM: E66.01  ICD-9-CM: 278.01         Edema ICD-10-CM: R60.9  ICD-9-CM: 782.3         GERD (gastroesophageal reflux disease) ICD-10-CM: K21.9  ICD-9-CM: 530.81               Past Medical History:      has a past medical history of Cancer ([facility] CVI (common variable immunodeficiency) ([facility] Diabetes ([facility] Elevated liver function tests (10/21/2010); Endometrial cancer ([facility] (7/7/2010); Hypertension; Iron deficiency anemia; Menometrorrhagia (10/21/2010); Microcytic anemia (10/21/2010); Morbid obesity ([facility] Prediabetes (7/7/2010); Psychiatric disorder; and Radiation. Past Surgical History:      has a past surgical history that includes gyn and cardiac surg procedure unlist.    Home Medications:     Prior to Admission medications    Medication Sig Start Date End Date Taking? Authorizing Provider   DULoxetine (CYMBALTA) 20 mg capsule Take 40 mg by mouth daily.  Indications: ANXIETY WITH DEPRESSION, NEUROPATHIC PAIN   Yes Historical Provider   oxyCODONE IR (ROXICODONE) 10 mg tab immediate release tablet Take 30 mg by mouth every four (4) hours as needed for Pain. Indications: Pain   Yes Historical Provider   albuterol (PROVENTIL HFA, VENTOLIN HFA, PROAIR HFA) 90 mcg/actuation inhaler Take 1 Puff by inhalation every six (6) hours as needed for Wheezing. 5/3/17  Yes Julien Lynn MD   cholecalciferol, vitamin D3, (VITAMIN D3) 2,000 unit tab Take  by mouth daily. Yes Historical Provider   fentaNYL (DURAGESIC) 100 mcg/hr PATCH 2 Patches by TransDERmal route every seventy-two (72) hours for 30 days. Max Daily Amount: 2 Patches. 6/20/17 7/20/17 Yes Julien Lynn MD   lisinopril (PRINIVIL, ZESTRIL) 10 mg tablet TAKE 1 TABLET BY MOUTH DAILY 4/3/17  Yes Julien Lynn MD   metoprolol succinate (TOPROL-XL) 25 mg XL tablet TAKE 1 TABLET BY MOUTH DAILY 4/3/17  Yes Julien Lynn MD   clotrimazole (LOTRIMIN) 1 % topical cream Apply 1 Each to affected area two (2) times a day. Apply to feet affected 3/6/17  Yes Julien Lynn MD   sennosides (SENNA) 8.6 mg cap Take 2 Tabs by mouth two (2) times a day. 1/30/17  Yes Julien Lynn MD   acetaminophen (TYLENOL ARTHRITIS PAIN) 650 mg CR tablet Take 650 mg by mouth every six (6) hours as needed for Pain. Yes Historical Provider   LORazepam (ATIVAN) 1 mg tablet Take 1 Tab by mouth every four (4) hours as needed for Anxiety. Max Daily Amount: 6 mg. 1/27/17  Yes Julien Lynn MD   nystatin (MYCOSTATIN) powder Apply  to affected area four (4) times daily as needed. Yeast 1/27/17  Yes Julien Lynn MD   docusate sodium (COLACE) 100 mg capsule Take 100 mg by mouth two (2) times a day. Yes Historical Provider   ibuprofen (ADVIL) 200 mg tablet Take 200 mg by mouth every six (6) hours as needed for Pain.    Yes Historical Provider   gabapentin (NEURONTIN) 300 mg capsule TAKE 2 CAPSULES BY MOUTH TWICE A DAY 9/26/16  Yes Alondra Hung MD   metFORMIN (GLUCOPHAGE) 500 mg tablet TAKE 1 TAB BY MOUTH TWO (2) TIMES DAILY (WITH MEALS). 16  Yes Ramírez Yuen MD   polyethylene glycol [facility]) 17 gram packet Take 17 g by mouth daily. Yes Anna Christian MD       Allergies/Social/Family History: Allergies   Allergen Reactions    Pcn [Penicillins] Nausea and Vomiting     \"but could take amoxil\"    Shellfish Containing Products Unknown (comments)    Tetanus And Diphtheria Toxoids, Adsorbed, Adult Other (comments)     Developed local swelling, axillary pain, and transient fever    Tomato Unknown (comments)      Social History   Substance Use Topics    Smoking status: Never Smoker    Smokeless tobacco: Never Used    Alcohol use Yes      Family History   Problem Relation Age of Onset    Hypertension Mother     Hypertension Father     Stroke Maternal Grandfather     Cancer Paternal Grandmother      breast    Diabetes Paternal Grandmother         Review of Systems:     Review of systems not obtained due to patient factors. Objective:   Vital Signs:  Visit Vitals    /83 (BP 1 Location: Right arm, BP Patient Position: At rest)    Pulse (!) 104    Temp 98.5 °F (36.9 °C)    Resp 11    Ht 5' 6\" (1.676 m)    Wt 121.4 kg (267 lb 10.2 oz)    SpO2 100%    BMI 43.2 kg/m2    O2 Flow Rate (L/min): 2 l/min O2 Device: Room air Temp (24hrs), Av.2 °F (36.8 °C), Min:97.7 °F (36.5 °C), Max:98.7 °F (37.1 °C)           Intake/Output:     Intake/Output Summary (Last 24 hours) at 05/10/17 1204  Last data filed at 05/10/17 1115   Gross per 24 hour   Intake             3425 ml   Output               75 ml   Net             3350 ml       Physical Exam:   General:  Alert, cooperative, no distress, appears stated age. Head:  Normocephalic, without obvious abnormality, atraumatic. Eyes:  Conjunctivae/corneas clear. PERRL, EOMs intact.    Neck: Supple, symmetrical, trachea midline, no adenopathy, thyroid: no enlargment/tenderness/nodules, no carotid bruit and no JVD.   Lungs:   Clear to auscultation bilaterally posteriorly. Chest wall:  No tenderness or deformity. Heart:  Regular rate and rhythm, S1, S2 normal, no murmur, click, rub or gallop. Abdomen:   Soft, non-tender. Bowel sounds normal. No masses,  No organomegaly. Extremities: Right thigh swelling   Pulses: 2+ and symmetric all extremities. Skin: Skin color, texture, turgor normal. No rashes or lesions   Neurologic: Grossly nonfocal         LABS AND  DATA: Personally reviewed  Recent Labs      05/10/17   0027  05/08/17   1627   WBC  12.3*  13.3*   HGB  6.9*  7.7*   HCT  23.9*  27.0*   PLT  340  331     Recent Labs      05/10/17   0027  05/08/17   1627   NA  137  138   K  4.4  3.8   CL  105  104   CO2  24  26   BUN  14  19   CREA  0.69  0.95   GLU  161*  114*   CA  8.3*  8.6     Recent Labs      05/10/17   0027  05/08/17   1627   SGOT  10*  9*   AP  93  99   TP  6.8  7.4   ALB  2.9*  3.0*   GLOB  3.9  4.4*     Recent Labs      05/10/17   0048  05/08/17   2307   INR  1.1  1.1   PTP  11.4*   --    APTT  35.5*   --       Recent Labs      05/08/17   2130   PHI  7.595*   PCO2I  22.7*   PO2I  175*     Recent Labs      05/10/17   0048   CPK  60   CKMB  <1.0   TROIQ  0.06*       MEDS: Reviewed    Chest X-Ray: personally reviewed and report checked    EKG/ Tele      Thank you for allowing me to participate in this patient's care.     MD Shun Allen MD, [facility]  [facility]

## 2017-05-10 NOTE — PERIOP NOTES
TRANSFER - OUT REPORT:    Verbal report given to [place], RN(name) on [address]  being transferred to ICU 4(unit) for routine progression of care       Report consisted of patients Situation, Background, Assessment and   Recommendations(SBAR). Information from the following report(s) SBAR, Procedure Summary, Intake/Output, MAR and Cardiac Rhythm ST. was reviewed with the receiving nurse. Opportunity for questions and clarification was provided. Is the patient on 02? YES       L/Min 2    Is the patient on a monitor? YES    Is the nurse transporting with the patient? YES    Surgical Waiting Area notified of patient's transfer from PACU? YES      The following personal items collected during your admission accompanied patient upon transfer:   Dental Appliance: Dental Appliances:  Other (comment)  Vision: Visual Aid: None  Hearing Aid:    Jewelry:    Clothing:    Other Valuables:    Valuables sent to safe:

## 2017-05-10 NOTE — PROGRESS NOTES
Primary Nurse Matilde Sands, HERLINDA and Bairon White RN performed a dual skin assessment on this patient No impairment noted  Monroe score is 16

## 2017-05-10 NOTE — PROGRESS NOTES
Music Therapy Assessment    Irena Alvares [id number]  [id number]    1980  39 y.o.  female    Patient Telephone Number: [phone] (home)   Zoroastrian Affiliation: Vick Mew   Language: English   Extended Emergency Contact Information  Primary Emergency Contact: Renetta Walker Phone: [phone]  Relation: Parent  Secondary Emergency Contact: [address] Phone: [phone]  Relation: Sister   Patient Active Problem List    Diagnosis Date Noted    Acute respiratory failure with hypoxia ([facility] 05/08/2017    Advance care planning 01/27/2017    Chronic neoplasm-related pain 12/21/2015    Vaginal pain 05/19/2015    Tachycardia 11/20/2014    Bone metastases ([facility] 03/10/2014    DM (diabetes mellitus) ([facility] 04/30/2012    Conjunctivitis 08/22/2011    Insomnia 02/11/2011    Elevated liver function tests 10/21/2010    [facility] 07/07/2010    Iron deficiency anemia 07/07/2010    Morbid obesity ([facility] 07/07/2010    Edema 07/07/2010    GERD (gastroesophageal reflux disease) 07/07/2010        Date: 5/10/2017       Mental Status:   [  ] Alert [  ] Augie Se [  ]  Confused  [x] Sleeping    Communication Status: [  ] Impaired Speech [  ] Nonverbal     Physical Status:   [  ] Hard of Hearing [  ] Oxygen in use [  ] Ambulatory   [  ] Ambulatory with assistance  [  ] Non-ambulatory -N/A    Music Preferences, Background: N/A: Please see Session Observations below. Clinical Problem addressed: Referred for alleviating pain, decreasing feelings of stress, promote self-expression. Goal(s) met in session: N/A: Please see Session Observations below.   Physical/Pain management (Scale of 1-10):    Pre-session rating ___________    Post-session rating __________   [  ] Increased relaxation   [  ] Regulated breathing patterns  [  ] Minimized physical distress   [  ] Decreased nausea discomfort     Emotional/Psychological:  Expression of feelings _____________ [  ] Decreased aggressive behavior   [  ] Decreased sadness   [  ] Increased range of coping skills     [  ] Improved mood    [  ] Decreased withdrawn behavior     Social:  [  ] Decreased feelings of isolation  [  ] Positive social interaction   [  ] Improved strained family relationships:     Spiritual:  [  ] Spiritual support    [  ] Expressed peace     Techniques Utilized (Check all that apply): N/A: Please see Session Observations below. [  ] Procedural support MT [  ] Music for relaxation [  ] Patient preferred music  [  ] Kaylynn analysis  [  ] Song choice  [  ] Music for validation  [  ] Music listening  [  ] Progressive relaxation [  ] Guided imagery  [  ] Nishant Callaway  [  ] Jones Mcgill   [  ] Maddie Varner writing  [  ] Madelin Gutierrez along   [  ] Emma Tee  [  ] Sensory stimulation  [  ] Active Listening  [  ] Music for spiritual support [  ] Making of CDs as gifts    Session Observations:  Referral from [address] [place] [address], [address] [place] [address]. The patient (pt) was observed to be sleeping soundly, lying on her stomach in bed. She didn't awaken to this music therapist (MT) knocking on her door and speaking the pt's name. The MT consulted with the pt's nurse [facility]. [facility] said the pt needed rest, and her medication was also contributing to how soundly she's sleeping. Will follow as able.     LEANN LopezBC (Music Therapist-Board Certified)  Spiritual Care Department  Referral-based service

## 2017-05-10 NOTE — ROUTINE PROCESS
Primary Nurse [place] Tyrpak-Hutchings, RN and Felicita Alcaraz RN performed a dual skin assessment on this patient No impairment noted  Monroe score is 15

## 2017-05-10 NOTE — ANESTHESIA POSTPROCEDURE EVALUATION
Post-Anesthesia Evaluation and Assessment    Patient: Asunciontha Board MRN: [id number]  SSN: [id number]    YOB: 1980  Age: 39 y.o. Sex: female       Cardiovascular Function/Vital Signs  Visit Vitals    /63    Pulse (!) 118    Temp 36.8 °C (98.3 °F)    Resp 15    Ht 5' 6\" (1.676 m)    Wt 119.7 kg (263 lb 14.3 oz)    SpO2 100%    BMI 42.59 kg/m2       Patient is status post general anesthesia for Procedure(s):  ANESTHESIA FOR CT. Nausea/Vomiting: None    Postoperative hydration reviewed and adequate. Pain:  Pain Scale 1: Numeric (0 - 10) (05/09/17 2310)  Pain Intensity 1: 5 (05/09/17 2310)   Managed    Neurological Status:   Neuro (WDL): Within Defined Limits (05/09/17 2310)  Neuro  Neurologic State: Alert (05/09/17 2000)  Orientation Level: Oriented X4 (05/09/17 2000)  Cognition: Follows commands (05/09/17 2000)  Speech: Clear (05/09/17 2000)  Assessment L Pupil: Brisk (05/09/17 2000)  Size L Pupil (mm): 3 (05/09/17 2000)  Assessment R Pupil: Brisk (05/09/17 2000)  Size R Pupil (mm): 3 (05/09/17 2000)  LUE Motor Response: Purposeful (05/09/17 2000)  LLE Motor Response: Purposeful (05/09/17 2000)  RUE Motor Response: Purposeful (05/09/17 2000)  RLE Motor Response: Purposeful (05/09/17 2000)   At baseline    Mental Status and Level of Consciousness: Arousable    Pulmonary Status:   O2 Device: Nasal cannula (05/09/17 2310)   Adequate oxygenation and airway patent    Complications related to anesthesia: None    Post-anesthesia assessment completed.  No concerns    Signed By: Tim Brambila MD     May 9, 2017

## 2017-05-10 NOTE — PROGRESS NOTES
2000. Care assumed, bedside shift hand-off completed. 2030.  here to see patient, plan to take pt to CT scan for anesthesia, intubation, and line placement ~ 2115. Family and friends at bedside. Mother, Vassie Brunner is point of contact. Family to wait in surgical waiting area.    2200. Pt transported to CT. Dr. Rue Kussmaul and ANGELITA Owen, Nurse Anesthetist met pt in CT scan for intubation. Pt intubated by Matthew Christopher / ANGELITA Herrera, NA, placed supine and moved safely to CT table for scan. Pt to be transported to PACU after scans completed. 0000. Pt received to ICU 4 from PACU via bed with NC 2 L, monitor, turned on L side. C/o pain,  not well controlled at present. Mother and friend here at bedside. Given additional dose dilaudid, encouraged to use PCA to get pain better controlled. Given tylenol (scheduled dose). 0100. Pain still 8/10. Dr.Booth ayala, discussed pt's current status and level of pain. Will increase pt's intermittent dose of Dilaudid from 1 mg to 2 mg and continue other settings as ordered. 0200. Pt resting with eyes closed. 0400. Gabriele Cola in place for patient to void, but pt voiding around device. Complete bed changed, pt moving very slowly and deliberately, but able to change sheets to allow pt to have clean, dry sheets beneath her. Pt now prone. Right thigh remains significantly larger than left, and very painful.     0600. Pt resting, eyes closed. Shift Summary:  Pt intubated and anesthetized for ct scans and line placement early in shift. Pain control is primary issue for pt, finally achieving a measure of comfort with increase of dilaudid pca dose to 2mg/ 6min, and 0.2 mg basal rate. Additional dilaudid 2 mg given as supplement x 2 doses.

## 2017-05-10 NOTE — PROGRESS NOTES
Bedside shift change report given to 81 Kaylie Santana (oncoming nurse) by Sharmin Denson (offgoing nurse). Report included the following information SBAR, Kardex and Recent Results.

## 2017-05-11 NOTE — PROGRESS NOTES
Physical Therapy  5.11.17    15:15-- F/u with patient again this PM. Patient lying prone in bed, stating she was very fatigued and that she her nurse was gone to retrieve her pain medication. Patient requesting to hold therapy until after pain medication has taken effect. Unable to f/u for third time this PM, therefore patient to be seen tomorrow morning. Patient spoke about her goals to d/c home and to continue with HHPT to progress her mobility as able. She asked PT \"Do you think I could do that? \" PT acknowledged that if patient is appropriately motivated and pain management is adequate, there are no contraindications to therapy at this time (note ortho has signed off, patient with fx but not appropriate for surgical intervention). Patient stated she has been working with her HHPT on transfers, standing, and attempting to take steps. Patient's RN present administering pain medications. Notified RN that PT would f/u tomorrow AM and attempt timing PT evaluation around her pain medication dosing. 13:30-- Chart reviewed. Note hospice meeting planned for tomorrow. F/u with patient this PM for PT evaluation. Visitors present and patient requesting PT f/u later this afternoon for PT evaluation. Will f/u as able. Thank you. Order received, chart reviewed. Consult placed by Palliative MD with inquiry about patient's rehab potential. After review of chart, (noted extensive bony mets to R pelvis with now acute pathological fracture- Ortho stated patient not appropriate for intervention), patient likely has very little to no rehab potential. Also noted, patient to meet with hospice for information session. This seems to be more appropriate at this time. Will f/u this afternoon to discuss these recommendations with patient, and potentially assist with evaluating her ability to transfer/perform bed mobility at this time.  This will assist in helping make recommendations for appropriate amount of assist for d/c home with continuation of [facility] services vs hospice. Thank you.     Brant Rios, PT, DPT

## 2017-05-11 NOTE — HOSPICE
[facility]  [phone]     Patient Name: Sena Moore  YOB: 1980  Age: 39 y.o. [facility] RN Note:  Hospice consult received, reviewing chart. Will follow up with Unit Nurse and Care Manager to discuss plan of care, patient status and discharge disposition within the hour. Thank you for the opportunity to be of service to this patient.     Pinky Rodriguez RN

## 2017-05-11 NOTE — PROGRESS NOTES
Music Therapy Assessment    Jennifer Mehta [id number]  [id number]    1980  39 y.o.  female    Patient Telephone Number: [phone] (home)   Hinduism Affiliation: [facility]   Language: English   Extended Emergency Contact Information  Primary Emergency Contact: Renetta Walker Phone: [phone]  Relation: Parent  Secondary Emergency Contact: [address] Phone: [phone]  Relation: Sister   Patient Active Problem List    Diagnosis Date Noted    Acute respiratory failure with hypoxia ([facility] 2017    Advance care planning 2017    Chronic neoplasm-related pain 2015    Vaginal pain 2015    Tachycardia 2014    Bone metastases ([facility] 03/10/2014    DM (diabetes mellitus) ([facility] 2012    Conjunctivitis 2011    Insomnia 2011    Elevated liver function tests 10/21/2010    Endometrial cancer ([facility] 2010    Iron deficiency anemia 2010    Morbid obesity ([facility] 2010    Edema 2010    GERD (gastroesophageal reflux disease) 2010        Date: 2017       Mental Status:   [x] Alert [  ] Forgetful [  ]  Confused     Communication Status: [  ] Impaired Speech [  ] Nonverbal     Physical Status:   [  ] Hard of Hearing [  ] Oxygen in use [  ] Ambulatory   [  ] Ambulatory with assistance  [  ] Non-ambulatory -N/A    Music Preferences, Background: Soft R&B, including Katherine Reilly and Wendie Schilder. Clinical Problem addressed: Alleviate pain, promote relaxation. Goal(s) met in session:  Physical/Pain management (Scale of 1-10):    Pre-session ratin  Post-session rating: N/A: Please see Session Observations below.    [x] Increased relaxation   [  ] Regulated breathing patterns  [x] Minimized physical distress   [  ] Decreased nausea discomfort     Emotional/Psychological:  Expression of feelings _____________ [  ] Decreased aggressive behavior   [  ] Decreased sadness   [  ] Increased range of coping skills     [  ] Improved mood    [  ] Decreased withdrawn behavior     Social:  [  ] Decreased feelings of isolation  [x] Positive social interaction   [  ] Improved strained family relationships    Spiritual:  [  ] Spiritual support    [  ] Expressed peace     Techniques Utilized (Check all that apply):   [  ] Procedural support MT [x] Music for relaxation [x] Patient preferred music  [  ] Kaylynn analysis  [  ] Song choice  [  ] Music for validation  [x] Entrainment  [  ] Progressive relaxation [  ] Guided imagery  [  ] Landen Gregorio  [  ] Luis Daniel Siddiqi   [  ] Randal Drummond writing  [  ] Jessica Price along   [  ] Gene Blanks  [  ] Sensory stimulation  [  ] Active Listening  [  ] Music for spiritual support [  ] Making of CDs as gifts    Session Observations:  F/u visit; The patient (pt) was observed lying in bed on her stomach and she reported pain of 4 on a scale of 1-10 (1 being no pain, 10 being the worst pain). She said she's not slept enough the past week, and asked if she could have music for relaxation. This music therapist (MT) asked if the pt was comfortable as she was lying in bed. The pt requested a pillow for her head and for her covers to be adjusted and the MT did so. The MT closed the door to the pt's room to minimize outside noise. Then the MT sat at bedside and invited the pt to close her eyes and relax to the music. The MT entrained (matched) the rhythm of the music to the pt's breathing rate based on observing the rhythm of the rising and falling of the pt's chest and back as she breathed. The MT sang and played music of the pt's preference at a soft dynamic (volume). The MT sang the [employer], then the [employer], the [employer] Just the [employer], and lastly the bubl.  The pt increased relaxation in response to the music, AEB closing her eyes, her breathing rate slowing to a more relaxed pace, her facial expression relaxing, and the pt falling asleep. The MT quietly exited the room so as not to disturb the pt. A staff member was about to knock on the pt's door to introduce herself as the MT was exiting and the MT asked if she could delay this to let the pt sleep. She expressed understanding and said she could return to do this later. Will follow as able.     Ilya Khan MT-BC (Music Therapist-Board Certified)  Spiritual Care Department  Referral-based service

## 2017-05-11 NOTE — HOSPICE
[facility]  [phone]    Patient Name: Yariel Ward  YOB: 1980  Age: 39 y.o. Hospice meeting scheduled with mother Asael Perez for 5/12/17 at 10:00 a.m. Pt's mother Asael Perez had to take her  to ER last night for chest pain and has a cardiology appt today at 3:30. Pt's father is disabled. Mother also has to take care of grandson before and after school. Thank you for the opportunity to be of service to this patient.     Florencia Paul RN

## 2017-05-11 NOTE — PROGRESS NOTES
Reviewed medical chart; met with the patient at the bedside. Note that the patient has endometrial cancer with metastasis to the liver and bone. Patient has been staying with her mother, father, and her 13year old niece of whom she has custody. The home is a one story home and her mother obtained a portable ramp. She has a wheelchair, walker, and bedside commode with fold down arms that is not sturdy per patient. The patient's father is disabled, so her mother serves as caregiver to everyone in the home. The patient states that she was receiving home health PT/OT from [facility] three times per week. The patient still has a townhouse of her own; she is still paying the rent and utilities on this home. 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 She receives disability from her employer which amounts to $1500 per month. She does not have Medicaid at this time. Discharge Plan:  Spoke with patient regarding the order for hospice that was placed. Explained that in her case, hospice is a service that would be offered in the home (as she does not have Medicaid). Patient has no preference of hospice agency and is open to an information session with [facility]. She wants her mother present for any meeting, however, Gatito Barajas - KACEY#[phone]. Referral sent to [facility] via Feng Grant. Care Management will continue to follow her disposition.    ADAM Hogan

## 2017-05-11 NOTE — PROGRESS NOTES
Palliative Medicine Consult  [place]: [phone] ([phone])    Patient Name: Adria Duggan  YOB: 1980    Date of Initial Consult: 5/9/17  Reason for Consult: overwhelming sx/care decisions   Requesting Provider: Jay/ Charlie Means   Primary Care Physician: Lenny Brown MD      SUMMARY:   Adria Duggan is a 39 y.o. with a past history of metastatic endometrial cancer (mets to liver and bone) who was admitted on 5/8/2017 from home after Palliative RN visited her and found her to have worsening RLE pain and swelling. Dopplers and XR RLE neg. Also w/ tachycardia and hypoxia, but could not get CT chest yest to r/o PE because she was unable to lie flat. Started on PCA. Palliative team has been following pt for over a year, now at home as she know is mostly bed bound except for PT. Dr Elli Saleh had referred her for pain. CT abd/pelvis  5/9 shows growth of tumor, [id number] w/ invasion/compression into bony structures on R pelvis/hip. Current medical issues leading to Palliative Medicine involvement include: sx and care decisions. PALLIATIVE DIAGNOSES:   1. RLE pain and swelling  2. Generalized bone pain, neoplasm related  3. Shortness of breath   4. Debility due to generalized weakness and pain  5. Constipation due to opioids       PLAN:   1. RLE Pain: Neoplasm related, growth of cancer has invaded and compressing bone and nerves in R pelvis. Her symptoms have progressed from the last week where previously she was able to sit up, transfer to commode, and walk with support of walker. In last week; increased pain and inability to sit up, transfer or walk.     -Cont Fentanyl patch 200mcg every 3 days  -Restart home Oxycodone 60mg every 3h (schedule while inpatient, hold for sedation or pt refusal)  -Today change Dilaudid PCA- stop basal, 1mg every 6 min demand   -Cont home Gabapentin and Cymbalta (may be able to titrate up Gabapentin)  -Scheduled Tylenol   -Spoke to IR and have placed c/s for evaluation for R TFESI of L5 area, based on compression of nerves on CT pelvis/RLE. Discussed this eval w/ pt who is open to the idea, she understands it may not be an option given the complexity of her situation. Of note, would need conscious sedation. -GynOnc never did formal consult, but per notes discussed w/ attending- no further treatments on their end. Appreciate Ortho eval, no interventions. 2. Overall care goals: Dr Mychal Desai had detailed discussion w/ pt and her mom yest about care goals- see her not for details.    -Hope to get pt as functional as possible and walking again, which is unlikely- but still asking PT/OT to eval, would bracing help? IP rehab?  -Hospice consult placed. Pt does NOT have Medicaid per care management, so need to come up w/ best way to support from physical standpoint.  -Pt getting a lot of support from her good friends, sister, and mother. Appreciate pastoral care. -Defer code status discussion to Dr Mychal Desai who has known pt for more than a year.      Communicated plan of care with: Palliative IDT; unit staff incl John Martinez RN; Radha Perez care management      GOALS OF CARE / TREATMENT PREFERENCES:   [====Goals of Care====]  GOALS OF CARE:  Patient / health care proxy stated goals: pain relief and to walk again    TREATMENT PREFERENCES:   Code Status: Full Code    Advance Care Planning:  Advance Care Planning 5/10/2017   Patient's Healthcare Decision Maker is: Legal Next of Andrea 69   Primary Decision Maker Name Dolores Apodaca   Primary Decision Maker Phone Number -   Primary Decision Maker Relationship to Patient Parent   Confirm Advance Directive Yes, on file     Other:    The palliative care team has discussed with patient / health care proxy about goals of care / treatment preferences for patient.  [====Goals of Care====]     HISTORY:         HPI/SUBJECTIVE:    The patient is:   [x] Verbal and participatory  [] Non-participatory due to:     Pt eating some now, last BM 4 days ago but passing gas. Pain at rest better, but still cannot really move. Trying to stay positive- one of her closest friends stayed the night, supporting her. Family meeting to discuss results of CT 5/10/17; see above in Plan  IMPRESSION:   1. 22 x 15 x 30 cm right hemipelvic mass has increased since 2015 and represents progression of metastatic disease. The mass involves the right sacrum, right iliac bone, right pubic rami, left pubic rami, right hip joint, and proximal  right femur. 2. Pathologic fracture of the subtrochanteric right femur is likely acute given the clinical history and imaging appearance. 3. Mass involves the right L5 and right sacral nerves.   4. Mass effect on the right femoral and external iliac veins likely causes venous stasis and may account for the right lower extremity soft tissue swelling.      Clinical Pain Assessment (nonverbal scale for severity on nonverbal patients):   [++++ Clinical Pain Assessment++++]  [++++Pain Severity++++]: Pain: 4  [++++Pain Character++++]: aching and deep   [++++Pain Duration++++]: chronic, but worse over past week   [++++Pain Effect++++]: limits mobility, makes upset  [++++Pain Factors++++]: worse w/ movement   [++++Pain Frequency++++]: constant   [++++Pain Location++++]: RLE and general  [++++ Clinical Pain Assessment++++]      Current meds for pain incl    Fentanyl patch 200mcg/h every 3 days  Dilaudid PCA 0.2mg/h basal, 2mg every 6 min demand- used 3.36mg in past 2h   Miralax daily  Senna 2 tabs bid   Tylenol 650mg tid  Cymbalta 30mg daily   Gabapentin 300mg bid    FUNCTIONAL ASSESSMENT:     Palliative Performance Scale (PPS):  PPS: 60       PSYCHOSOCIAL/SPIRITUAL SCREENING:     Advance Care Planning:  Advance Care Planning 5/10/2017   Patient's Healthcare Decision Maker is: Legal Next of Andrea 69   Primary Decision Maker Name Vielka Holguin   Primary Decision Maker Phone Number -   Primary Decision Maker Relationship to Patient Parent   Confirm Advance Directive Yes, on file     Any spiritual / Hindu concerns: support of / strong belief in God  [x] Yes /  [] No    Caregiver Burnout: mom had difficulty caring for her/especially if total care  [x] Yes /  [] No /  [] No Caregiver Present      Anticipatory grief assessment:   [x] Normal  / [] Maladaptive       ESAS Anxiety: Anxiety: 2    ESAS Depression: Depression: 0      REVIEW OF SYSTEMS:     Positive and pertinent negative findings in ROS are noted above in HPI. The following systems were [x] reviewed / [] unable to be reviewed as noted in HPI  Other findings are noted below. Systems: constitutional, ears/nose/mouth/throat, respiratory, gastrointestinal, genitourinary, musculoskeletal, integumentary, neurologic, psychiatric, endocrine. Positive findings noted below. Modified ESAS Completed by: provider   Fatigue: 6 Drowsiness: 6   Depression: 0 Pain: 4   Anxiety: 2 Nausea: 0   Anorexia: 0 Dyspnea: 0   Best Well-Bein Constipation: No            PHYSICAL EXAM:     From RN flowsheet:  Wt Readings from Last 3 Encounters:   05/10/17 267 lb 10.2 oz (121.4 kg)   17 300 lb (136.1 kg)   16 280 lb (127 kg)     Blood pressure 111/72, pulse (!) 124, temperature 98.2 °F (36.8 °C), resp. rate 18, height 5' 6\" (1.676 m), weight 267 lb 10.2 oz (121.4 kg), SpO2 94 %.     Pain Scale 1: Numeric (0 - 10)  Pain Intensity 1: 4  Pain Onset 1: ONGOING  Pain Location 1: Leg  Pain Orientation 1: Lower, Right  Pain Description 1: Aching  Pain Intervention(s) 1: Encouraged PCA  Last bowel movement, if known:      Constitutional: awake, no drowsiness, oriented fully , NAD when lying still  Eyes: pupils equal, anicteric  ENMT: no nasal discharge, moist mucous membranes  Cardiovascular: regular rhythm (listen from side, cannot roll over onto back)  Respiratory: breathing not labored, symmetric  Gastrointestinal: soft, cannot assess due to lying on stomach   Musculoskeletal: no deformity, pain w/ movement of RLE  Skin: warm, dry  Neurologic: following commands, moving all extremities  Psychiatric: full affect, no hallucinations     HISTORY:     Principal Problem:    Acute respiratory failure with hypoxia ([facility] (5/8/2017)      Past Medical History:   Diagnosis Date    [facility]     uterine    CVI (common variable immunodeficiency) ([facility]     Diabetes ([facility]     Elevated liver function tests 10/21/2010    Endometrial cancer ([facility] 7/7/2010    Hypertension     Iron deficiency anemia     Menometrorrhagia 10/21/2010    Microcytic anemia 10/21/2010    Morbid obesity ([facility]     Prediabetes 7/7/2010    Psychiatric disorder     depression    Radiation     completed radiation mid-march      Past Surgical History:   Procedure Laterality Date    CARDIAC SURG PROCEDURE UNLIST      hx of tachycardia    HX GYN      hysterectomy      Family History   Problem Relation Age of Onset    Hypertension Mother     Hypertension Father     Stroke Maternal Grandfather     Cancer Paternal Grandmother      breast    Diabetes Paternal Grandmother       History reviewed, no pertinent family history.   Social History   Substance Use Topics    Smoking status: Never Smoker    Smokeless tobacco: Never Used    Alcohol use Yes     Allergies   Allergen Reactions    Pcn [Penicillins] Nausea and Vomiting     \"but could take amoxil\"    Shellfish Containing Products Unknown (comments)    Tetanus And Diphtheria Toxoids, Adsorbed, Adult Other (comments)     Developed local swelling, axillary pain, and transient fever    Tomato Unknown (comments)      Current Facility-Administered Medications   Medication Dose Route Frequency    oxyCODONE IR (ROXICODONE) tablet 60 mg  60 mg Oral Q3H WA    sodium chloride (NS) flush 20 mL  20 mL InterCATHeter PRN    sodium chloride (NS) flush 10 mL  10 mL InterCATHeter Q24H    sodium chloride (NS) flush 10 mL  10 mL InterCATHeter PRN    sodium chloride (NS) flush 10 mL  10 mL InterCATHeter Q8H    alteplase (CATHFLO) 1 mg in sterile water (preservative free) 1 mL injection  1 mg InterCATHeter PRN    bacitracin 500 unit/gram packet 1 Packet  1 Packet Topical PRN    0.9% sodium chloride infusion 250 mL  250 mL IntraVENous PRN    HYDROmorphone (PF) (DILAUDID) injection 3 mg  3 mg IntraVENous Q3H PRN    acetaminophen (TYLENOL) tablet 1,000 mg  1,000 mg Oral TID    HYDROmorphone (PF) 15 mg/30 ml (DILAUDID) PCA   IntraVENous CONTINUOUS    metoprolol tartrate (LOPRESSOR) tablet 25 mg  25 mg Oral BID    acetaminophen (TYLENOL) tablet 650 mg  650 mg Oral Q6H PRN    fentaNYL (DURAGESIC) 100 mcg/hr patch 2 Patch  2 Patch TransDERmal Q72H    polyethylene glycol (MIRALAX) packet 17 g  17 g Oral DAILY    DULoxetine (CYMBALTA) capsule 30 mg  30 mg Oral DAILY    gabapentin (NEURONTIN) capsule 300 mg  300 mg Oral BID    senna (SENOKOT) tablet 17.2 mg  2 Tab Oral BID    LORazepam (ATIVAN) tablet 1 mg  1 mg Oral Q6H PRN    0.9% sodium chloride infusion  25 mL/hr IntraVENous CONTINUOUS    insulin lispro (HUMALOG) injection   SubCUTAneous AC&HS    glucose chewable tablet 16 g  4 Tab Oral PRN    dextrose (D50W) injection syrg 12.5-25 g  12.5-25 g IntraVENous PRN    glucagon (GLUCAGEN) injection 1 mg  1 mg IntraMUSCular PRN    sodium chloride (NS) flush 5-10 mL  5-10 mL IntraVENous Q8H    sodium chloride (NS) flush 5-10 mL  5-10 mL IntraVENous PRN      LAB AND IMAGING FINDINGS:     Lab Results   Component Value Date/Time    WBC 15.2 05/11/2017 03:34 AM    HGB 7.3 05/11/2017 03:34 AM    PLATELET 543 89/92/8463 03:34 AM     Lab Results   Component Value Date/Time    Sodium 141 05/11/2017 03:34 AM    Potassium 4.4 05/11/2017 03:34 AM    Chloride 110 05/11/2017 03:34 AM    CO2 24 05/11/2017 03:34 AM    BUN 16 05/11/2017 03:34 AM    Creatinine 0.68 05/11/2017 03:34 AM    Calcium 8.3 05/11/2017 03:34 AM    Magnesium 1.6 11/20/2014 03:30 PM      Lab Results   Component Value Date/Time    AST (SGOT) 10 05/10/2017 12:27 AM    Alk.  phosphatase 93 05/10/2017 12:27 AM    Protein, total 6.8 05/10/2017 12:27 AM    Albumin 2.9 05/10/2017 12:27 AM    Globulin 3.9 05/10/2017 12:27 AM     Lab Results   Component Value Date/Time    INR 1.1 05/10/2017 12:48 AM    Prothrombin time 11.4 05/10/2017 12:48 AM    aPTT 35.5 05/10/2017 12:48 AM      Lab Results   Component Value Date/Time    Iron 30 05/08/2017 04:27 PM    TIBC 194 05/08/2017 04:27 PM    Iron % saturation 15 05/08/2017 04:27 PM    Ferritin 10 07/24/2009 08:50 AM      No results found for: PH, PCO2, PO2  No components found for: [place]   Lab Results   Component Value Date/Time    CK 60 05/10/2017 12:48 AM    CK - MB <1.0 05/10/2017 12:48 AM            Total time:  Counseling / coordination time, spent as noted above:  > 50% counseling / coordination?:     Prolonged service was provided for  []30 min   []75 min in face to face time in the presence of the patient, spent as noted above. Time Start:  Time End:  Note: this can only be billed with 14320 (initial) or 63365 (follow up). If multiple start / stop times, list each separately.

## 2017-05-11 NOTE — PROGRESS NOTES
This was a follow-up visit to continue support to Karli. She is leaning heavily upon her geovanny. She is still hopeful to engage life as much as possible for as long as possible. Today she talked of hopes and dreams for her niece. She is having conversations with her niece to share her hopes for her. Her mother was not present at the time of our visit today. Karli did share that her father went to the ED last night but was discharged. If I do not see her mother tomorrow, I will reach out to her via phone.       Lo Weaver., [address], [address] [place] [address]

## 2017-05-11 NOTE — PROGRESS NOTES
Sarabjit Sapp MD       Hospitalist Progress Note     5/11/2017   PCP:  Dr. Julien Lynn MD  Right thigh pain      Admission Summary:   A 28-year-old -American female with past medical history of endometriosis, uterine carcinoma, type 2 diabetes mellitus, elevated liver function tests, hypertension, iron-deficiency anemia, menometrorrhagia, anemia, morbid obesity, general debility, depression, presented to the emergency department from home with chief complaint of intractable right thigh pain. In the ER patient was noted hypoxic and was treated for acute respiratory failure possibly due to PE. CTA of chest,no PE. Patient rt thigh pain is caused by a pathologic fracture from her metastatic uterine cancer,no treatment. Palliative care consulted. ICU care d/mary 5/10,transfer to floor. Reason For Visit:  Pain, SOB    Assessment/Plan   Acute respiratory failure with hypoxia (POA)   -Resoled. PE  Ruled out   - CTA chest on 5/9,negative for PE  - Lovenox d/c  -O2sat on RA 99% now. Intractable right thigh pain (POA)  Pathologic fracture of the subtrochanteric right femur,acute  - Xray right femur 5/8 suboptimal but no fracture or acute abnormality seen  - Xray right tib/fib 5/8 with no acute abnormality  - Duplex right LE 5/8 without DVT but again, suboptimal  - Right soft tissue US 5/9 shows nonspecific soft tissue edema. No defined hematoma identified  - Obtain CT leg on 5/9:Pathologic fracture of the subtrochanteric right femur is likely acute given the clinical history and imaging appearance  - PCA for pain  - Palliative care consult. Patient medications adjusted    Tachycardia (POA) - due pain,anemia  - ECG with sinus tachycardia, non-specific T-wave abnormalities  - Monitor  -Pain control  -Pulmonary saw pt and assessed tachycardia due to pain    Hypotension (POA)   -Resolved    Leukocytosis (POA) - unclear cause,possibly due to malignancy  - Check UA  - CXR no pneumonia  - Hold off on empiric antibiotics  -Blood cx negative    Anemia (chronic) - patient with substantial drop since , monitor for bleeding  - Monitor HH  - Received 1 unit prbc on 5/10 and Hgb today 7.3    Type 2 diabetes mellitus (chronic)  - Humalog insulin correctional coverage, schedule Accu-Cheks. Endometrial uterine carcinoma (chronic) with metastasis- I spoke with Dr. Levar Siddiqui' nurse and there is no further treatment, even palliative, available  - Palliative care consult.  -Hospice to meet with patient and mother tomorrow.  -Dr Rothman Poster who knows patient well will discuss code status. Case discussed with palliative care attending and nurse. See orders for other plans. VTE prophylaxis: SCDs  Discussed plan of care with Patient/Family and Nurse   Pre-admission lived at home  Discharge plan: tbd  Estimated time to discharge: unclear     Subjective   Ms. Analy Sanchez says that the rt hip pain seems better today but improved compared to previous days as her meds have been adjusted.     Physical examination     Visit Vitals    /72 (BP 1 Location: Right arm, BP Patient Position: Prone)    Pulse (!) 124    Temp 98.2 °F (36.8 °C)    Resp 18    Ht 5' 6\" (1.676 m)    Wt 121.4 kg (267 lb 10.2 oz)    SpO2 94%    BMI 43.2 kg/m2       Temp (24hrs), Av.4 °F (36.9 °C), Min:97.5 °F (36.4 °C), Max:98.9 °F (37.2 °C)      Intake/Output Summary (Last 24 hours) at 17 1141  Last data filed at 17 0300   Gross per 24 hour   Intake          1585.83 ml   Output              100 ml   Net          1485.83 ml       Gen - NAD,looks more optimistic today  ENT - MMM  Neck - supple, full ROM  CV - RRR, no MRG  Resp - lungs CTA b/l, mildly tachypnic  GI - unable to assess abdomen  Ext - b/l edema, right leg mildly tense but pulses 2+, good cap refill  Neuro - A&O, no focal deficits    Data Review     Telemetry    ECG    Xray    CT scan x   MRI    Echocardiogram          I have reviewed the flow sheet and recent notes  New labs and data below personally reviewed.     Recent Labs      05/11/17   0334  05/10/17   0027  05/08/17   1627   WBC  15.2*  12.3*  13.3*   HGB  7.3*  6.9*  7.7*   HCT  24.6*  23.9*  27.0*   PLT  365  340  331     Recent Labs      05/11/17   0334  05/10/17   0048  05/10/17   0027  05/08/17   2307  05/08/17   1627   NA  141   --   137   --   138   K  4.4   --   4.4   --   3.8   CL  110*   --   105   --   104   CO2  24   --   24   --   26   GLU  176*   --   161*   --   114*   BUN  16   --   14   --   19   CREA  0.68   --   0.69   --   0.95   CA  8.3*   --   8.3*   --   8.6   ALB   --    --   2.9*   --   3.0*   TBILI   --    --   0.4   --   0.6   SGOT   --    --   10*   --   9*   ALT   --    --   9*   --   11*   INR   --   1.1   --   1.1   --        Medications reviewed  Current Facility-Administered Medications   Medication Dose Route Frequency    oxyCODONE IR (ROXICODONE) tablet 60 mg  60 mg Oral Q3H WA    sodium chloride (NS) flush 20 mL  20 mL InterCATHeter PRN    sodium chloride (NS) flush 10 mL  10 mL InterCATHeter Q24H    sodium chloride (NS) flush 10 mL  10 mL InterCATHeter PRN    sodium chloride (NS) flush 10 mL  10 mL InterCATHeter Q8H    alteplase (CATHFLO) 1 mg in sterile water (preservative free) 1 mL injection  1 mg InterCATHeter PRN    bacitracin 500 unit/gram packet 1 Packet  1 Packet Topical PRN    0.9% sodium chloride infusion 250 mL  250 mL IntraVENous PRN    HYDROmorphone (PF) (DILAUDID) injection 3 mg  3 mg IntraVENous Q3H PRN    acetaminophen (TYLENOL) tablet 1,000 mg  1,000 mg Oral TID    HYDROmorphone (PF) 15 mg/30 ml (DILAUDID) PCA   IntraVENous CONTINUOUS    metoprolol tartrate (LOPRESSOR) tablet 25 mg  25 mg Oral BID    acetaminophen (TYLENOL) tablet 650 mg  650 mg Oral Q6H PRN    fentaNYL (DURAGESIC) 100 mcg/hr patch 2 Patch  2 Patch TransDERmal Q72H    polyethylene glycol (MIRALAX) packet 17 g  17 g Oral DAILY    DULoxetine (CYMBALTA) capsule 30 mg  30 mg Oral DAILY    gabapentin (NEURONTIN) capsule 300 mg  300 mg Oral BID    senna (SENOKOT) tablet 17.2 mg  2 Tab Oral BID    LORazepam (ATIVAN) tablet 1 mg  1 mg Oral Q6H PRN    0.9% sodium chloride infusion  25 mL/hr IntraVENous CONTINUOUS    insulin lispro (HUMALOG) injection   SubCUTAneous AC&HS    glucose chewable tablet 16 g  4 Tab Oral PRN    dextrose (D50W) injection syrg 12.5-25 g  12.5-25 g IntraVENous PRN    glucagon (GLUCAGEN) injection 1 mg  1 mg IntraMUSCular PRN    sodium chloride (NS) flush 5-10 mL  5-10 mL IntraVENous Q8H    sodium chloride (NS) flush 5-10 mL  5-10 mL IntraVENous PRN         Yudith Weems MD  Internal Medicine  5/11/2017

## 2017-05-11 NOTE — PROGRESS NOTES
Music Therapy Assessment    Sena Moore [id number]  [id number]    1980  39 y.o.  female    Patient Telephone Number: [phone] (home)   Yazidism Affiliation: Denise Lin   Language: English   Extended Emergency Contact Information  Primary Emergency Contact: Renetta Walker Phone: [phone]  Relation: Parent  Secondary Emergency Contact: [address] Phone: [phone]  Relation: Sister   Patient Active Problem List    Diagnosis Date Noted    Acute respiratory failure with hypoxia ([facility] 2017    Advance care planning 2017    Chronic neoplasm-related pain 2015    Vaginal pain 2015    Tachycardia 2014    Bone metastases ([facility] 03/10/2014    DM (diabetes mellitus) ([facility] 2012    Conjunctivitis 2011    Insomnia 2011    Elevated liver function tests 10/21/2010    Endometrial cancer ([facility] 2010    Iron deficiency anemia 2010    Morbid obesity ([facility] 2010    Edema 2010    GERD (gastroesophageal reflux disease) 2010        Date: 2017       Mental Status:   [x] Alert [  ] Forgetful [  ]  Confused     Communication Status: [  ] Impaired Speech [  ] Nonverbal     Physical Status:   [  ] Hard of Hearing [  ] Oxygen in use [  ] Ambulatory   [  ] Ambulatory with assistance  [  ] Non-ambulatory -N/A    Music Preferences, Background: Soft R&B, including Katherine Reilly and Merle Nunes. Clinical Problem addressed: Alleviate pain, promote relaxation, promote self-expression and healthy coping. Goal(s) met in session:  Physical/Pain management (Scale of 1-10):    Pre-session ratin  Post-session rating: N/A: Please see Session Observations below.    [  ] Increased relaxation   [  ] Regulated breathing patterns  [  ] Minimized physical distress   [  ] Decreased nausea discomfort     Emotional/Psychological:  Expression of feelings _____________ [  ] Decreased aggressive behavior   [  ] Decreased sadness   [  ] Increased range of coping skills     [  ] Improved mood    [  ] Decreased withdrawn behavior     Social:  [  ] Decreased feelings of isolation  [x] Positive social interaction   [  ] Improved strained family relationships:     Spiritual:  [  ] Spiritual support    [  ] Expressed peace     Techniques Utilized (Check all that apply):   [  ] Procedural support MT [  ] Music for relaxation [  ] Patient preferred music  [  ] Kaylynn analysis  [  ] Song choice  [  ] Music for validation  [  ] Music listening  [  ] Progressive relaxation [  ] Guided imagery  [  ] Elias Bhatia  [  ] TIMP   [  ] Donavon Metcalfe writing  [  ] Kaibeto Dub along   [  ] Helena Ludin  [  ] Sensory stimulation  [x] Active Listening  [  ] Music for spiritual support [  ] Making of CDs as gifts    Session Observations:  F/u visit; The patient (pt) was observed to be lying comfortably in bed. She rated her pain at a 4 on a scale of 1-10 (1 being no pain, 10 being the worst pain). This music therapist (MT) asked the pt open-ended, rapport-building questions. The pt engaged in self-expression in response to this, as evidenced by (AEB) sharing she'd been hospitalized once before and about the differences between that time versus her current time in the hospital. The pt shared about her family and friends, especially those friends who've been a support to her during this hospitalization. 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 She also shared her music preferences. The MT provided active listening and intended to let the conversation lead into music, but it was often paused for the pt to receive care and have vitals taken. A Palliative nurse entered with whom the pt had a warm relationship, and the MT could sense the pt wanted to visit with her. The MT offered to follow up later in the day and the pt expressed gratitude for this. Will follow as able.     Bebeto Olivia SANDI Vazquez (Music Therapist-Board Certified)  Spiritual Care Department  Referral-based service

## 2017-05-12 NOTE — PROGRESS NOTES
Patient says she is unable to turn on her back despite max teaching, re-assurance and assist by PT and three RNs. Pending orders for PICC line placement for patient's transfer to home hospice. Patient stated the last time she had to have a PICC line placed and repositioned to back, she was sedated. Called and inquired with Angio holding regarding if patient can be sedated for PICC line placement. Angio will communicate with Anesthesia and decide on time for patient to go down for procedure under sedation. Patient is NPO starting 13:05.

## 2017-05-12 NOTE — PROGRESS NOTES
Bedside shift change report given to HERLINDA Beckford (oncoming nurse) by Saba Zambrano RN (offgoing nurse). Report included the following information SBAR, Kardex, Procedure Summary, Intake/Output, MAR and Recent Results.

## 2017-05-12 NOTE — PROGRESS NOTES
Follow up visit with Ms. Dustin aHrrisbetties. Pt is being well supported by Corky Factor, and I have collaborated with Rosana regarding pt's care during this hospitalization. Pt shared with me during our initial visit that she appreciates visits for prayer. As I visited pt this morning, pt shared of her deep geovanny and her belief in God's provision. Offered listening presence and spiritual support. Jyoti appeared in good spirits, but indicated that she is still coping with pain. She spoke of the support of Rosana and how meaningful that this has been to her and her family. As visit came to a close, pt requested prayer, which was offered. Assured her of ongoing  availability as needed/desired. Music therapist provided music therapy session yesterday.     Nanda Ruffin, Palliative

## 2017-05-12 NOTE — PROGRESS NOTES
Osvaldo Chan MD       Hospitalist Progress Note     5/12/2017   PCP:  Dr. Hannah Tomas MD  Right thigh pain      Admission Summary:   A 43-year-old -American female with past medical history of endometriosis, uterine carcinoma, type 2 diabetes mellitus, elevated liver function tests, hypertension, iron-deficiency anemia, menometrorrhagia, anemia, morbid obesity, general debility, depression, presented to the emergency department from home with chief complaint of intractable right thigh pain. In the ER patient was noted hypoxic and was treated for acute respiratory failure possibly due to PE. CTA of chest,no PE. Patient rt thigh pain is caused by a pathologic fracture from her metastatic uterine cancer,no treatment. Palliative care consulted. ICU care d/mary 5/10,transfer to floor. Hospice transfer tomorrow    Reason For Visit:  Pain, SOB    Assessment/Plan   Acute respiratory failure with hypoxia (POA)   -Resoled. PE  Ruled out   - CTA chest on 5/9,negative for PE  - Lovenox d/c  -O2sat on RA 99% now. Intractable right thigh pain (POA)  Pathologic fracture of the subtrochanteric right femur,acute  - Xray right femur 5/8 suboptimal but no fracture or acute abnormality seen  - Xray right tib/fib 5/8 with no acute abnormality  - Duplex right LE 5/8 without DVT but again, suboptimal  - Right soft tissue US 5/9 shows nonspecific soft tissue edema. No defined hematoma identified  - Obtain CT leg on 5/9:Pathologic fracture of the subtrochanteric right femur is likely acute given the clinical history and imaging appearance  - PCA for pain  - Palliative care consult. Patient medications adjusted    Tachycardia (POA) - due pain,anemia  - ECG with sinus tachycardia, non-specific T-wave abnormalities  - Monitor  -Pain control  -Pulmonary saw pt and assessed tachycardia due to pain    Hypotension (POA)   -Resolved    Leukocytosis (POA) - unclear cause,possibly due to malignancy  - Check UA  - CXR no pneumonia  - Hold off on empiric antibiotics  -Blood cx negative    Anemia (chronic) - patient with substantial drop since , monitor for bleeding  - Monitor HH  - Received 1 unit prbc on 5/10 and Hgb today 7.3    Type 2 diabetes mellitus (chronic)  - Humalog insulin correctional coverage, schedule Accu-Cheks. Endometrial uterine carcinoma (chronic) with metastasis- I spoke with Dr. Martha Marte' nurse and there is no further treatment, even palliative, available  - Palliative care consult.  -Hospice to meet with patient and mother tomorrow.  -Dr Mary Miranda who knows patient has discussed further plan with patient and parent. The conclusion is transfer to hospice tomorrow. Case discussed with palliative care attending and nurse. See orders for other plans. VTE prophylaxis: SCDs  Discussed plan of care with Patient/Family and Nurse   Pre-admission lived at home  Discharge plan: tbd  Estimated time to discharge: unclear     Subjective   Ms. Dale Fiore says that the rt hip pain seems better today but improved compared to previous days as her meds have been adjusted.     Physical examination     Visit Vitals    /78 (BP 1 Location: Right arm, BP Patient Position: At rest)    Pulse 99    Temp 98.1 °F (36.7 °C)    Resp 16    Ht 5' 6\" (1.676 m)    Wt 121.4 kg (267 lb 10.2 oz)    SpO2 100%    BMI 43.2 kg/m2       Temp (24hrs), Av °F (36.7 °C), Min:97.2 °F (36.2 °C), Max:98.5 °F (36.9 °C)      Intake/Output Summary (Last 24 hours) at 17 1648  Last data filed at 17 1804   Gross per 24 hour   Intake              120 ml   Output                0 ml   Net              120 ml       Gen - NAD,looks more optimistic today  ENT - MMM  Neck - supple, full ROM  CV - RRR, no MRG  Resp - lungs CTA b/l, mildly tachypnic  GI - unable to assess abdomen  Ext - b/l edema, right leg mildly tense but pulses 2+, good cap refill  Neuro - A&O, no focal deficits    Data Review     Telemetry    ECG    Xray    CT scan x   MRI Echocardiogram          I have reviewed the flow sheet and recent notes  New labs and data below personally reviewed.     Recent Labs      05/11/17 0334  05/10/17   0027   WBC  15.2*  12.3*   HGB  7.3*  6.9*   HCT  24.6*  23.9*   PLT  365  340     Recent Labs      05/11/17   0334  05/10/17   0048  05/10/17   0027   NA  141   --   137   K  4.4   --   4.4   CL  110*   --   105   CO2  24   --   24   GLU  176*   --   161*   BUN  16   --   14   CREA  0.68   --   0.69   CA  8.3*   --   8.3*   ALB   --    --   2.9*   TBILI   --    --   0.4   SGOT   --    --   10*   ALT   --    --   9*   INR   --   1.1   --        Medications reviewed  Current Facility-Administered Medications   Medication Dose Route Frequency    oxyCODONE IR (ROXICODONE) tablet 60 mg  60 mg Oral Q3H WA    morphine (PF)  mg/30 ml   IntraVENous CONTINUOUS    morphine injection 6 mg  6 mg IntraVENous Q2H PRN    sodium chloride (NS) flush 20 mL  20 mL InterCATHeter PRN    sodium chloride (NS) flush 10 mL  10 mL InterCATHeter Q24H    sodium chloride (NS) flush 10 mL  10 mL InterCATHeter PRN    sodium chloride (NS) flush 10 mL  10 mL InterCATHeter Q8H    alteplase (CATHFLO) 1 mg in sterile water (preservative free) 1 mL injection  1 mg InterCATHeter PRN    bacitracin 500 unit/gram packet 1 Packet  1 Packet Topical PRN    0.9% sodium chloride infusion 250 mL  250 mL IntraVENous PRN    acetaminophen (TYLENOL) tablet 1,000 mg  1,000 mg Oral TID    metoprolol tartrate (LOPRESSOR) tablet 25 mg  25 mg Oral BID    acetaminophen (TYLENOL) tablet 650 mg  650 mg Oral Q6H PRN    polyethylene glycol (MIRALAX) packet 17 g  17 g Oral DAILY    DULoxetine (CYMBALTA) capsule 30 mg  30 mg Oral DAILY    gabapentin (NEURONTIN) capsule 300 mg  300 mg Oral BID    senna (SENOKOT) tablet 17.2 mg  2 Tab Oral BID    LORazepam (ATIVAN) tablet 1 mg  1 mg Oral Q6H PRN    0.9% sodium chloride infusion  25 mL/hr IntraVENous CONTINUOUS    insulin lispro (HUMALOG) injection   SubCUTAneous AC&HS    glucose chewable tablet 16 g  4 Tab Oral PRN    dextrose (D50W) injection syrg 12.5-25 g  12.5-25 g IntraVENous PRN    glucagon (GLUCAGEN) injection 1 mg  1 mg IntraMUSCular PRN    sodium chloride (NS) flush 5-10 mL  5-10 mL IntraVENous Q8H    sodium chloride (NS) flush 5-10 mL  5-10 mL IntraVENous PRN         Lida Shrestha MD  Internal Medicine  5/12/2017

## 2017-05-12 NOTE — PROGRESS NOTES
Physical Therapy Note:    12:45 pm: PT on floor with another patient when received request from RN to return to Ms. Randall's room at earliest convenience per Dr. Gema Fox to assist team with technique/education in best manner to turn patient from prone to supine so that PICC line could be placed today. PT returned to patient's room with nursing (3) and Dr. Gema Fox as well. Dr. Gema Fox explained need to try and attempt to get onto her back for the procedure as well as to see what she can tolerate and gauge rehab potential.  Patient agreeable but cautious and wanted reassurance should would not have to do anything if it was too painful. PICC line team consulted to be ready if team was successful in getting her into supine. However, PICC team indicated that they had attempted this yesterday and learned that patient had not been able to successfully lie on her back for over one year and the central line placement had required sedation to be successful. Nurse manager advised team would consult Dr. Gema Fox again regarding this. At this time, PT will be available to consult and assist with this goal of turning patient and can be contacted when the team feels it is an appropriate and optimal time for the patient and/or any procedure. PT requested Nurse  PT department at that time and PT will assist as appropriate. 10:00 am: Patient received prone in bed upon PT arrival, mother present. PT discussed patient's prior conversation with PT yesterday regarding desire to participate and see what movement she could tolerate today. Patient appeared confused and indicated she believed she would be waiting to work on PT until she was feeling better. Hospice nurse entered room at this time and joined discussion and education that seeing what patient was capable of doing in regards to bed mobility and transfers/sitting would aid in being able to determine her discharge disposition.   Patient appeared to understand this and was encouraged to request the nurse call PT when her pain was best controlled so therapy could be initiated at the most optimal time.       Tona Brizuela MS, PT

## 2017-05-12 NOTE — HOSPICE
[facility]  [phone]    Patient Name: Alexi Collins  YOB: 1980  Age: 39 y.o. [facility] RN Note:  Hospice consult noted. Chart reviewed. Plan of care discussed with patients nurse & care manager. In to meet with pt's mother Jeana Stein. Discussed Hospice philosophy, general plan of care, levels of care, services and on call procedures. Family information packet provided & reviewed with her. She says she is the only family member to know the extent of pt's illness. She cannot take pt home as she is caregiver for her . They were hoping pt could get PT to strengthen her but she is in severe pelvic pain and is unable to turn on her back for over a year. Pt has Dilaudid PCA bolus only. Spoke to Dr. Anisa Mahajan about disposition. She is asking for another PT evaluation after a bolus of 3mg Dilaudid. Pt has already refused PT today for severe pain. Thank you for the opportunity to be of service to this patient.     Tawny العراقي RN

## 2017-05-12 NOTE — PROGRESS NOTES
Bedside shift change report given to HERLINDA Bess (oncoming nurse) by Jac Randhawa (offgoing nurse). Report included the following information SBAR, Kardex, Intake/Output and MAR.

## 2017-05-12 NOTE — PROGRESS NOTES
Palliative Medicine Consult  [place]: [phone] ([phone])    Patient Name: Fadi Vaz  YOB: 1980    Date of Initial Consult: 5/9/17  Reason for Consult: overwhelming sx/care decisions   Requesting Provider: Jay/ Pau Wright   Primary Care Physician: Vernell Prince MD      SUMMARY:   Fadi Vaz is a 39 y.o. with a past history of metastatic endometrial cancer (mets to liver and bone) who was admitted on 5/8/2017 from home after Palliative RN visited her and found her to have worsening RLE pain and swelling. By doppler and imaging she does not have DVT/PE. CT of pelvis and right leg show enlarging tumor involving right sacrum, right iliac bone, bilateral rami and proximal right femur (which appears acute). L5 and right sacral nerves are involved with tumor and pressure of tumor creates compression of right femoral and external iliac veins causing stasis. Current focus is pain control, mobility and safe disposition as mother (only caregiver) cannot care for patient at home without full support. PALLIATIVE DIAGNOSES:   1. RLE pain and swelling  2. Generalized bone pain, neoplasm related  3. Shortness of breath   4. Debility due to generalized weakness and pain  5. Constipation due to opioids   6. Anemia - s/p 1 unit PRBCs      PLAN:   Patient has met with PT and with Hospice and she will be choosing Hospice support which she would like to call \"[facility]\" instead of Hospice. Her pain is not controlled at this time thus we will admit to Hospice, likely tomorrow after PICC placement with anesthesia so we have access to control pain with most appropriate regimen (may be combination of PCA and oral medications).     1. Access  -In order to manage patient will need IV access long term  -She cannot lie on her back for 1 hour to get PICC placed  -Working with IR to place PICC under anesthesia  -She will be NPO after midnight Sunday and have PICC placed Monday morning; then can be transferred to [facility]    2. Right leg pain:  Currently:  -Dilaudid PCA at 1mg every 6 min; used 24mg in last 24 hours  -Fentanyl patch 200mcg every 3 days  -Oxycodone 60mg every 3h (schedule while inpatient, hold for sedation or pt refusal)  -Tylenol 1000mg tid  -Gabapentin 300mg bid and Cymbalta 30mg daily  -Received 72 hours of Dexamethasone for inflammatory pain    Recommendations:  -Stop Fentanyl Patches  -Change Dilaudid to Morphine 4mg/hr with 4mg PCA dose every 6 min  -Morphine 6mg IV every 2 hours prn pain not controlled with PCA  -Continue Oxycodone 60mg every 3 hours while awake with plans to adjust Morphine PCA to eliminate secondary PO med  -Continue Gabapentin and Cymbalta (also uses Cymbalta for depression)  -If at [facility], could start/titrate oral methadone possibly and wean Morphine PCA as tolerated    3. Hospice ([facility]) care  -Transition care to [facility] for pain medication titration, possibly 5/13/17  -She will need intense planning/SW support for home transfer because mother will need caregiver support, given bed bound state   -Reach out to [place] for avenues of support for care in home as finances are a constraint  -I will address resuscitation prior to hospice transfer, as there have been many in room making discussion not timely    4. GI/  -She does not want a davila at this time and had urine leak around the [place] system. Continues to need to be kept dry for healthy skin with creative positioning and padding. She typically only has BMs about every 5-7 days.      5. Resuscitation  -Discussed Resuscitation and patient agrees with DNR order to support natural death; order entered    Communicated plan of care with: Palliative IDT; PICC team; Physical Therapy; others     GOALS OF CARE / TREATMENT PREFERENCES:   [====Goals of Care====]  GOALS OF CARE:  Patient / health care proxy stated goals: pain relief     TREATMENT PREFERENCES:   Code Status: Full Code    Advance Care Planning:  Advance Care Planning 5/10/2017   Patient's Healthcare Decision Maker is: Legal Next of Andrea 69   Primary Decision Maker Name Elisa Hightower   Primary Decision Maker Phone Number -   Primary Decision Maker Relationship to Patient Parent   Confirm Advance Directive Yes, on file     Other:  The palliative care team has discussed with patient / health care proxy about goals of care / treatment preferences for patient.  [====Goals of Care====]     HISTORY:     HPI/SUBJECTIVE:    The patient is:   [x] Verbal and participatory  [] Non-participatory due to:     Clinical Pain Assessment (nonverbal scale for severity on nonverbal patients):   [++++ Clinical Pain Assessment++++]  [++++Pain Severity++++]: Pain: 6  [++++Pain Character++++]: aching and deep   [++++Pain Duration++++]: acute on chronic  [++++Pain Effect++++]: cannot move due to pain with movement  [++++Pain Factors++++]: worse w/ movement   [++++Pain Frequency++++]: constant   [++++Pain Location++++]: RLE and general right leg  [++++ Clinical Pain Assessment++++]     FUNCTIONAL ASSESSMENT:     Palliative Performance Scale (PPS):  PPS: 60     PSYCHOSOCIAL/SPIRITUAL SCREENING:     Advance Care Planning:  Advance Care Planning 5/10/2017   Patient's Healthcare Decision Maker is: Legal Next of Andrea 69   Primary Decision Maker Name Elisa Hightower   Primary Decision Maker Phone Number -   Primary Decision Maker Relationship to Patient Parent   Confirm Advance Directive Yes, on file     Any spiritual / Christianity concerns: support of / strong belief in God  [x] Yes /  [] No    Caregiver Burnout: mom had difficulty caring for her/especially if total care  [x] Yes /  [] No /  [] No Caregiver Present      Anticipatory grief assessment:   [x] Normal  / [] Maladaptive       ESAS Anxiety: Anxiety: 4    ESAS Depression: Depression: 4      REVIEW OF SYSTEMS:     Positive and pertinent negative findings in ROS are noted above in HPI.   The following systems were [x] reviewed / [] unable to be reviewed as noted in HPI  Other findings are noted below. Systems: constitutional, ears/nose/mouth/throat, respiratory, gastrointestinal, genitourinary, musculoskeletal, integumentary, neurologic, psychiatric, endocrine. Positive findings noted below. Modified ESAS Completed by: provider   Fatigue: 4 Drowsiness: 2   Depression: 4 Pain: 6   Anxiety: 4 Nausea: 0   Anorexia: 0 Dyspnea: 0   Best Well-Bein Constipation: Yes            PHYSICAL EXAM:     From RN flowsheet:  Wt Readings from Last 3 Encounters:   05/10/17 267 lb 10.2 oz (121.4 kg)   17 300 lb (136.1 kg)   16 280 lb (127 kg)     Blood pressure 122/78, pulse 88, temperature 98.5 °F (36.9 °C), resp. rate 16, height 5' 6\" (1.676 m), weight 267 lb 10.2 oz (121.4 kg), SpO2 99 %.     Pain Scale 1: Numeric (0 - 10)  Pain Intensity 1: 8  Pain Onset 1: ONGOING  Pain Location 1: Generalized, Leg  Pain Orientation 1: Lower, Right  Pain Description 1: Aching  Pain Intervention(s) 1: Medication (see MAR)  Last bowel movement, if known:      Constitutional: awake, no drowsiness, oriented fully , NAD when lying still  Eyes: pupils equal, anicteric  ENMT: no nasal discharge, moist mucous membranes  Cardiovascular: regular rhythm (listen from side, cannot roll over onto back)  Respiratory: breathing not labored, symmetric  Gastrointestinal: soft, cannot assess due to lying on stomach   Musculoskeletal: no deformity, pain w/ movement of RLE  Skin: warm, dry  Neurologic: following commands, moving all extremities  Psychiatric: full affect, no hallucinations     HISTORY:     Principal Problem:    Acute respiratory failure with hypoxia (HCC) (2017)      Past Medical History:   Diagnosis Date    Cancer ([facility]     uterine    CVI (common variable immunodeficiency) ([facility]     Diabetes ([facility]     Elevated liver function tests 10/21/2010    Endometrial cancer ([facility] [facility] 2010    Hypertension     Iron deficiency anemia     Menometrorrhagia 10/21/2010    Microcytic anemia 10/21/2010    Morbid obesity ([address]     Prediabetes 7/7/2010    Psychiatric disorder     depression    Radiation     completed radiation mid-march      Past Surgical History:   Procedure Laterality Date    CARDIAC SURG PROCEDURE UNLIST      hx of tachycardia    HX GYN      hysterectomy      Family History   Problem Relation Age of Onset    Hypertension Mother     Hypertension Father     Stroke Maternal Grandfather     Cancer Paternal Grandmother      breast    Diabetes Paternal Grandmother       History reviewed, no pertinent family history. Social History   Substance Use Topics    Smoking status: Never Smoker    Smokeless tobacco: Never Used    Alcohol use Yes     Allergies   Allergen Reactions    Egg Nausea and Vomiting     Raw egg and scrambled eggs. Egg products okay.      Pcn [Penicillins] Nausea and Vomiting     \"but could take amoxil\"    Shellfish Containing Products Unknown (comments)    Tetanus And Diphtheria Toxoids, Adsorbed, Adult Other (comments)     Developed local swelling, axillary pain, and transient fever    Tomato Unknown (comments)      Current Facility-Administered Medications   Medication Dose Route Frequency    oxyCODONE IR (ROXICODONE) tablet 60 mg  60 mg Oral Q3H WA    sodium chloride (NS) flush 20 mL  20 mL InterCATHeter PRN    sodium chloride (NS) flush 10 mL  10 mL InterCATHeter Q24H    sodium chloride (NS) flush 10 mL  10 mL InterCATHeter PRN    sodium chloride (NS) flush 10 mL  10 mL InterCATHeter Q8H    alteplase (CATHFLO) 1 mg in sterile water (preservative free) 1 mL injection  1 mg InterCATHeter PRN    bacitracin 500 unit/gram packet 1 Packet  1 Packet Topical PRN    0.9% sodium chloride infusion 250 mL  250 mL IntraVENous PRN    HYDROmorphone (PF) (DILAUDID) injection 3 mg  3 mg IntraVENous Q3H PRN    acetaminophen (TYLENOL) tablet 1,000 mg  1,000 mg Oral TID    HYDROmorphone (PF) 15 mg/30 ml (DILAUDID) PCA   IntraVENous CONTINUOUS    metoprolol tartrate (LOPRESSOR) tablet 25 mg  25 mg Oral BID    acetaminophen (TYLENOL) tablet 650 mg  650 mg Oral Q6H PRN    fentaNYL (DURAGESIC) 100 mcg/hr patch 2 Patch  2 Patch TransDERmal Q72H    polyethylene glycol (MIRALAX) packet 17 g  17 g Oral DAILY    DULoxetine (CYMBALTA) capsule 30 mg  30 mg Oral DAILY    gabapentin (NEURONTIN) capsule 300 mg  300 mg Oral BID    senna (SENOKOT) tablet 17.2 mg  2 Tab Oral BID    LORazepam (ATIVAN) tablet 1 mg  1 mg Oral Q6H PRN    0.9% sodium chloride infusion  25 mL/hr IntraVENous CONTINUOUS    insulin lispro (HUMALOG) injection   SubCUTAneous AC&HS    glucose chewable tablet 16 g  4 Tab Oral PRN    dextrose (D50W) injection syrg 12.5-25 g  12.5-25 g IntraVENous PRN    glucagon (GLUCAGEN) injection 1 mg  1 mg IntraMUSCular PRN    sodium chloride (NS) flush 5-10 mL  5-10 mL IntraVENous Q8H    sodium chloride (NS) flush 5-10 mL  5-10 mL IntraVENous PRN      LAB AND IMAGING FINDINGS:     Lab Results   Component Value Date/Time    WBC 15.2 05/11/2017 03:34 AM    HGB 7.3 05/11/2017 03:34 AM    PLATELET 001 04/18/2450 03:34 AM     Lab Results   Component Value Date/Time    Sodium 141 05/11/2017 03:34 AM    Potassium 4.4 05/11/2017 03:34 AM    Chloride 110 05/11/2017 03:34 AM    CO2 24 05/11/2017 03:34 AM    BUN 16 05/11/2017 03:34 AM    Creatinine 0.68 05/11/2017 03:34 AM    Calcium 8.3 05/11/2017 03:34 AM    Magnesium 1.6 11/20/2014 03:30 PM      Lab Results   Component Value Date/Time    AST (SGOT) 10 05/10/2017 12:27 AM    Alk.  phosphatase 93 05/10/2017 12:27 AM    Protein, total 6.8 05/10/2017 12:27 AM    Albumin 2.9 05/10/2017 12:27 AM    Globulin 3.9 05/10/2017 12:27 AM     Lab Results   Component Value Date/Time    INR 1.1 05/10/2017 12:48 AM    Prothrombin time 11.4 05/10/2017 12:48 AM    aPTT 35.5 05/10/2017 12:48 AM      Lab Results   Component Value Date/Time    Iron 30 05/08/2017 04:27 PM    TIBC 194 05/08/2017 04:27 PM    Iron % saturation 15 05/08/2017 04:27 PM    Ferritin 10 07/24/2009 08:50 AM      No results found for: PH, PCO2, PO2  No components found for: [place]   Lab Results   Component Value Date/Time    CK 60 05/10/2017 12:48 AM    CK - MB <1.0 05/10/2017 12:48 AM      CT Pelvis/Leg 5/9/17  IMPRESSION:   1. 22 x 15 x 30 cm right hemipelvic mass has increased since 2015 and represents progression of metastatic disease. The mass involves the right sacrum, right iliac bone, right pubic rami, left pubic rami, right hip joint, and proximal right femur. 2. Pathologic fracture of the subtrochanteric right femur is likely acute given the clinical history and imaging appearance. 3. Mass involves the right L5 and right sacral nerves. 4. Mass effect on the right femoral and external iliac veins likely causes venous stasis and may account for the right lower extremity soft tissue swelling. Total time:  Counseling / coordination time, spent as noted above:  > 50% counseling / coordination?:     Prolonged service was provided for  []30 min   []75 min in face to face time in the presence of the patient, spent as noted above. Time Start:  Time End:  Note: this can only be billed with 47044 (initial) or 87842 (follow up). If multiple start / stop times, list each separately.

## 2017-05-12 NOTE — PROGRESS NOTES
Hospice met with the patient this morning. Per hospice nurse, patient will not be admitted to hospice at home at this time. Dr. Jamel Boas will continue to assist with the care plan. Care Management will continue to follow her disposition.    ADAM Boss

## 2017-05-13 NOTE — CONSULTS
Palliative Medicine Consult  [place]is: [phone] ([phone])    Patient Name: Monique Hutchins  YOB: 1980    Date of Initial Consult: 5/9/17  Reason for Consult: overwhelming sx/care decisions   Requesting Provider: Malena Coles Monday   Primary Care Physician: Akiko Mcmahan MD      SUMMARY:   Monique Hutchins is a 39 y.o.  with a past history of metastatic endometrial cancer (mets to liver and bone) who was admitted on 5/8/2017 from home after Palliative RN visited her and found her to have worsening RLE pain and swelling. By doppler and imaging she does not have DVT/PE. CT of pelvis and right leg show enlarging tumor involving right sacrum, right iliac bone, bilateral rami and proximal right femur (which appears acute). L5 and right sacral nerves are involved with tumor and pressure of tumor creates compression of right femoral and external iliac veins causing stasis. Current focus is pain control, mobility and safe disposition as mother (only caregiver) cannot care for patient at home without full support. PALLIATIVE DIAGNOSES:   1. RLE pain and swelling  2. Generalized bone pain, neoplasm related  3. Shortness of breath   4. Debility due to generalized weakness and pain  5. Constipation due to opioids   6. Anemia - s/p 1 unit PRBCs      PLAN:   Patient has met with PT and with Hospice and she will be choosing Hospice support which she would like to call \"[facility]\" instead of Hospice. Her pain is not controlled at this time thus we will admit to Hospice, likely tomorrow after PICC placement with anesthesia so we have access to control pain with most appropriate regimen (may be combination of PCA and oral medications).     1. Continue morphine PCA 4-mg/hour with 0.6- mg every 6 minutes  2. Continue oxycodone 60-mg every 3 hours while awake  3. Continue morphine 6-mg IV every 2 hours as needed   4. Continue acetaminophen 1000-mg every 8 hours.   5. Continue lorazepam 1-mg po every 6 hours as needed  6. Continue duloxetine and gabapentin as ordered  7. Increase Miralax to twice daily  8. Discussed plan with attending physician, bedside RN       GOALS OF CARE / TREATMENT PREFERENCES:   [====Goals of Care====]  GOALS OF CARE:  Patient / health care proxy stated goals: pain relief       TREATMENT PREFERENCES:   Code Status: DNR    Advance Care Planning:  Advance Care Planning 5/10/2017   Patient's Healthcare Decision Maker is: Legal Next of Andrea Velarde   Primary Decision Maker Name Debbi Cabrera   Primary Decision Maker Phone Number -   Primary Decision Maker Relationship to Patient Parent   Confirm Advance Directive Yes, on file       Other:    The palliative care team has discussed with patient / health care proxy about goals of care / treatment preferences for patient.  [====Goals of Care====]         HISTORY:     History obtained from: Pt, chart, staff    CHIEF COMPLAINT: I just can't get comfortable     HPI/SUBJECTIVE:    The patient is:   [x] Verbal and participatory  [] Non-participatory due to: She slept well last night. She still has bone pain in her hip. She's been snacking today. She's had no nausea. She's constipated. She feels a little anxious about going to the hospice house. She's looking forward to a \"girls night out\" this evening when her best friends and her sister are visiting to play cards and to watch movies.     Morphine 6-mg IV every 2 hours as needed (none in past 24 hours)  Oxycodone 60-mg every 3 hours while awake (4 doses in past 24 hours)  Lorazepam 1-mg po every 6 hours as needed (none in past 24 hours)    Clinical Pain Assessment (nonverbal scale for severity on nonverbal patients):   [++++ Clinical Pain Assessment++++]  [++++Pain Severity++++]: Pain: 4  [++++Pain Character++++]: aching and deep   [++++Pain Duration++++]: chronic, but worse over past week   [++++Pain Effect++++]: limits mobility, makes upset  [++++Pain Factors++++]: worse w/ movement   [++++Pain Frequency++++]: constant   [++++Pain Location++++]: RLE and general  [++++ Clinical Pain Assessment++++]     FUNCTIONAL ASSESSMENT:     Palliative Performance Scale (PPS):  PPS: 60       PSYCHOSOCIAL/SPIRITUAL SCREENING:     Advance Care Planning:  Advance Care Planning 5/10/2017   Patient's Healthcare Decision Maker is: Legal Next of Andrea 69   Primary Decision Maker Name Eduardo Del Castillo   Primary Decision Maker Phone Number -   Primary Decision Maker Relationship to Patient Parent   Confirm Advance Directive Yes, on file        Any spiritual / Christian concerns:  [] Yes /  [] No    Caregiver Burnout:  [] Yes /  [] No /  [x] No Caregiver Present      Anticipatory grief assessment:   [x] Normal  / [] Maladaptive       ESAS Anxiety: Anxiety: 2    ESAS Depression: Depression: 4        REVIEW OF SYSTEMS:     Positive and pertinent negative findings in ROS are noted above in HPI. The following systems were [x] reviewed / [] unable to be reviewed as noted in HPI  Other findings are noted below. Systems: constitutional, ears/nose/mouth/throat, respiratory, gastrointestinal, genitourinary, musculoskeletal, integumentary, neurologic, psychiatric, endocrine. Positive findings noted below. Modified ESAS Completed by: provider   Fatigue: 4 Drowsiness: 1   Depression: 4 Pain: 4   Anxiety: 2 Nausea: 0   Anorexia: 2 Dyspnea: 0   Best Well-Bein Constipation: Yes              PHYSICAL EXAM:     From RN flowsheet:  Wt Readings from Last 3 Encounters:   05/10/17 267 lb 10.2 oz (121.4 kg)   17 300 lb (136.1 kg)   16 280 lb (127 kg)     Blood pressure 124/79, pulse 99, temperature 97.8 °F (36.6 °C), resp. rate 16, height 5' 6\" (1.676 m), weight 267 lb 10.2 oz (121.4 kg), SpO2 100 %.     Pain Scale 1: Numeric (0 - 10)  Pain Intensity 1: 3  Pain Onset 1: before admit  Pain Location 1: Hip, Leg  Pain Orientation 1: Right, Upper  Pain Description 1: Aching  Pain Intervention(s) 1: Medication (see MAR)  Last bowel movement, if known: Constitutional: awake, alert, NAD when lying still  Eyes: pupils equal, anicteric  ENMT: no nasal discharge, moist mucous membranes  Cardiovascular: regular rhythm (listen from side, cannot roll over onto back)  Respiratory: breathing not labored, symmetric  Gastrointestinal: soft, cannot assess due to lying on stomach   Musculoskeletal: no deformity, pain w/ movement of RLE  Skin: warm, dry  Neurologic: following commands, moving all extremities  Psychiatric: full affect, no hallucinations         HISTORY:     Principal Problem:    Acute respiratory failure with hypoxia (HCC) (5/8/2017)      Past Medical History:   Diagnosis Date    Cancer ([facility] [address]     uterine    CVI (common variable immunodeficiency) (HCC)     Diabetes ([facility] [address]     Elevated liver function tests 10/21/2010    Endometrial cancer ([facility] [address] 7/7/2010    Hypertension     Iron deficiency anemia     Menometrorrhagia 10/21/2010    Microcytic anemia 10/21/2010    Morbid obesity ([facility] [address]     Prediabetes 7/7/2010    Psychiatric disorder     depression    Radiation     completed radiation mid-march      Past Surgical History:   Procedure Laterality Date    CARDIAC SURG PROCEDURE UNLIST      hx of tachycardia    HX GYN      hysterectomy      Family History   Problem Relation Age of Onset    Hypertension Mother     Hypertension Father     Stroke Maternal Grandfather     Cancer Paternal Grandmother      breast    Diabetes Paternal Grandmother       History reviewed, no pertinent family history. Social History   Substance Use Topics    Smoking status: Never Smoker    Smokeless tobacco: Never Used    Alcohol use Yes     Allergies   Allergen Reactions    Egg Nausea and Vomiting     Raw egg and scrambled eggs. Egg products okay.      Pcn [Penicillins] Nausea and Vomiting     \"but could take amoxil\"    Shellfish Containing Products Unknown (comments)    Tetanus And Diphtheria Toxoids, Adsorbed, Adult Other (comments)     Developed local swelling, axillary pain, and transient fever    Tomato Unknown (comments)      Current Facility-Administered Medications   Medication Dose Route Frequency    oxyCODONE IR (ROXICODONE) tablet 60 mg  60 mg Oral Q3H WA    morphine (PF)  mg/30 ml   IntraVENous CONTINUOUS    morphine injection 6 mg  6 mg IntraVENous Q2H PRN    sodium chloride (NS) flush 20 mL  20 mL InterCATHeter PRN    sodium chloride (NS) flush 10 mL  10 mL InterCATHeter Q24H    sodium chloride (NS) flush 10 mL  10 mL InterCATHeter PRN    sodium chloride (NS) flush 10 mL  10 mL InterCATHeter Q8H    alteplase (CATHFLO) 1 mg in sterile water (preservative free) 1 mL injection  1 mg InterCATHeter PRN    bacitracin 500 unit/gram packet 1 Packet  1 Packet Topical PRN    0.9% sodium chloride infusion 250 mL  250 mL IntraVENous PRN    acetaminophen (TYLENOL) tablet 1,000 mg  1,000 mg Oral TID    metoprolol tartrate (LOPRESSOR) tablet 25 mg  25 mg Oral BID    acetaminophen (TYLENOL) tablet 650 mg  650 mg Oral Q6H PRN    polyethylene glycol (MIRALAX) packet 17 g  17 g Oral DAILY    DULoxetine (CYMBALTA) capsule 30 mg  30 mg Oral DAILY    gabapentin (NEURONTIN) capsule 300 mg  300 mg Oral BID    senna (SENOKOT) tablet 17.2 mg  2 Tab Oral BID    LORazepam (ATIVAN) tablet 1 mg  1 mg Oral Q6H PRN    0.9% sodium chloride infusion  25 mL/hr IntraVENous CONTINUOUS    insulin lispro (HUMALOG) injection   SubCUTAneous AC&HS    glucose chewable tablet 16 g  4 Tab Oral PRN    dextrose (D50W) injection syrg 12.5-25 g  12.5-25 g IntraVENous PRN    glucagon (GLUCAGEN) injection 1 mg  1 mg IntraMUSCular PRN    sodium chloride (NS) flush 5-10 mL  5-10 mL IntraVENous Q8H    sodium chloride (NS) flush 5-10 mL  5-10 mL IntraVENous PRN          LAB AND IMAGING FINDINGS:     Lab Results   Component Value Date/Time    WBC 15.2 05/11/2017 03:34 AM    HGB 7.3 05/11/2017 03:34 AM    PLATELET 729 21/18/5976 03:34 AM     Lab Results   Component Value Date/Time Sodium 141 05/11/2017 03:34 AM    Potassium 4.4 05/11/2017 03:34 AM    Chloride 110 05/11/2017 03:34 AM    CO2 24 05/11/2017 03:34 AM    BUN 16 05/11/2017 03:34 AM    Creatinine 0.68 05/11/2017 03:34 AM    Calcium 8.3 05/11/2017 03:34 AM    Magnesium 1.6 11/20/2014 03:30 PM      Lab Results   Component Value Date/Time    AST (SGOT) 10 05/10/2017 12:27 AM    Alk. phosphatase 93 05/10/2017 12:27 AM    Protein, total 6.8 05/10/2017 12:27 AM    Albumin 2.9 05/10/2017 12:27 AM    Globulin 3.9 05/10/2017 12:27 AM     Lab Results   Component Value Date/Time    INR 1.1 05/10/2017 12:48 AM    Prothrombin time 11.4 05/10/2017 12:48 AM    aPTT 35.5 05/10/2017 12:48 AM      Lab Results   Component Value Date/Time    Iron 30 05/08/2017 04:27 PM    TIBC 194 05/08/2017 04:27 PM    Iron % saturation 15 05/08/2017 04:27 PM    Ferritin 10 07/24/2009 08:50 AM      No results found for: PH, PCO2, PO2  No components found for: [place]   Lab Results   Component Value Date/Time    CK 60 05/10/2017 12:48 AM    CK - MB <1.0 05/10/2017 12:48 AM                Total time:   Counseling / coordination time, spent as noted above:   > 50% counseling / coordination?:     Prolonged service was provided for  []30 min   []75 min in face to face time in the presence of the patient, spent as noted above. Time Start:   Time End:   Note: this can only be billed with 70984 (initial) or 83382 (follow up). If multiple start / stop times, list each separately.

## 2017-05-13 NOTE — PROGRESS NOTES
Lida Shrestha MD       Hospitalist Progress Note     5/13/2017   PCP:  Dr. Radha Kay MD  Right thigh pain      Admission Summary:   A 72-year-old -American female with past medical history of endometriosis, uterine carcinoma, type 2 diabetes mellitus, elevated liver function tests, hypertension, iron-deficiency anemia, menometrorrhagia, anemia, morbid obesity, general debility, depression, presented to the emergency department from home with chief complaint of intractable right thigh pain. In the ER patient was noted hypoxic and was treated for acute respiratory failure possibly due to PE. CTA of chest,no PE. Patient rt thigh pain is caused by a pathologic fracture from her metastatic uterine cancer,no treatment. Palliative care consulted. ICU care d/mary 5/10,transfer to floor. Patient has advanced cancer with metastasis no treatment available at this stage. Plan is transfer to hospice after picc line is placed on 5/15/2017,in OR under anesthesia since patient can not change position due to severe pain. Reason For Visit:  Pain, SOB    Assessment/Plan   Acute respiratory failure with hypoxia (POA)   -Resoled. PE  Ruled out   - CTA chest on 5/9,negative for PE  - Lovenox d/c  -O2sat on RA 99% now. Intractable right thigh pain (POA) due   Pathologic fracture of the subtrochanteric right femur,acute  - Xray right femur 5/8 suboptimal but no fracture or acute abnormality seen  - Xray right tib/fib 5/8 with no acute abnormality  - Duplex right LE 5/8 without DVT but again, suboptimal  - Right soft tissue US 5/9 shows nonspecific soft tissue edema. No defined hematoma identified  - Obtain CT leg on 5/9:Pathologic fracture of the subtrochanteric right femur is likely acute given the clinical history and imaging appearance  - PCA for pain  - Palliative care consult. Patient medications adjusted. Pain controlled today    Tachycardia (POA) - due pain,anemia  - ECG with sinus tachycardia, non-specific T-wave abnormalities  - Monitor  -Pain control  -Pulmonary saw pt and assessed tachycardia due to pain    Hypotension (POA)   -Resolved    Leukocytosis (POA) - unclear cause,possibly due to malignancy  - Check UA  - CXR no pneumonia  - Hold off on empiric antibiotics  -Blood cx negative    Anemia (chronic) - patient with substantial drop since , monitor for bleeding  - Monitor HH  - Received 1 unit prbc on 5/10 and Hgb today 7.3    Type 2 diabetes mellitus (chronic)  - Humalog insulin correctional coverage, schedule Accu-Cheks. Endometrial uterine carcinoma (chronic) with metastasis- I spoke with Dr. Sharad Kamara' nurse and there is no further treatment, even palliative, available  - Palliative care consult.  -Hospice to meet with patient and mother tomorrow.  -Dr Estrellita Zabala who knows patient has discussed further plan with patient and parent. The conclusion is transfer to hospice on 5/15 after picc ilne placement. Case discussed with palliative care nurse,patient and patient's mother. See orders for other plans. VTE prophylaxis: SCDs  Discussed plan of care with Patient/Family and Nurse   Pre-admission lived at home  Discharge plan: hospice on 5/15  Estimated time to discharge: 5/15     Subjective   Ms. Rhonda Fowler says less pain today,her mother is with her in the room.     Physical examination     Visit Vitals    /79    Pulse 99    Temp 97.8 °F (36.6 °C)    Resp 16    Ht 5' 6\" (1.676 m)    Wt 121.4 kg (267 lb 10.2 oz)    SpO2 100%    BMI 43.2 kg/m2       Temp (24hrs), Av °F (36.7 °C), Min:97.2 °F (36.2 °C), Max:98.5 °F (36.9 °C)      Intake/Output Summary (Last 24 hours) at 17 1503  Last data filed at 17 0511   Gross per 24 hour   Intake           1882.5 ml   Output                0 ml   Net           1882.5 ml       Gen - NAD,looks more optimistic today  ENT - MMM  Neck - supple, full ROM  CV - RRR, no MRG  Resp - lungs CTA b/l  GI - unable to assess abdomen  Ext - b/l edema, right leg mildly tense but pulses 2+, good cap refill  Neuro - A&O, no focal deficits    Data Review     Telemetry    ECG    Xray    CT scan    MRI    Echocardiogram          I have reviewed the flow sheet and recent notes  New labs and data below personally reviewed.     Recent Labs      05/11/17 0334   WBC  15.2*   HGB  7.3*   HCT  24.6*   PLT  365     Recent Labs      05/11/17 0334   NA  141   K  4.4   CL  110*   CO2  24   GLU  176*   BUN  16   CREA  0.68   CA  8.3*       Medications reviewed  Current Facility-Administered Medications   Medication Dose Route Frequency    oxyCODONE IR (ROXICODONE) tablet 60 mg  60 mg Oral Q3H WA    morphine (PF)  mg/30 ml   IntraVENous CONTINUOUS    morphine injection 6 mg  6 mg IntraVENous Q2H PRN    sodium chloride (NS) flush 20 mL  20 mL InterCATHeter PRN    sodium chloride (NS) flush 10 mL  10 mL InterCATHeter Q24H    sodium chloride (NS) flush 10 mL  10 mL InterCATHeter PRN    sodium chloride (NS) flush 10 mL  10 mL InterCATHeter Q8H    alteplase (CATHFLO) 1 mg in sterile water (preservative free) 1 mL injection  1 mg InterCATHeter PRN    bacitracin 500 unit/gram packet 1 Packet  1 Packet Topical PRN    0.9% sodium chloride infusion 250 mL  250 mL IntraVENous PRN    acetaminophen (TYLENOL) tablet 1,000 mg  1,000 mg Oral TID    metoprolol tartrate (LOPRESSOR) tablet 25 mg  25 mg Oral BID    polyethylene glycol (MIRALAX) packet 17 g  17 g Oral DAILY    DULoxetine (CYMBALTA) capsule 30 mg  30 mg Oral DAILY    gabapentin (NEURONTIN) capsule 300 mg  300 mg Oral BID    senna (SENOKOT) tablet 17.2 mg  2 Tab Oral BID    LORazepam (ATIVAN) tablet 1 mg  1 mg Oral Q6H PRN    0.9% sodium chloride infusion  25 mL/hr IntraVENous CONTINUOUS    insulin lispro (HUMALOG) injection   SubCUTAneous AC&HS    glucose chewable tablet 16 g  4 Tab Oral PRN    dextrose (D50W) injection syrg 12.5-25 g  12.5-25 g IntraVENous PRN    glucagon (GLUCAGEN) injection 1 mg  1 mg IntraMUSCular PRN    sodium chloride (NS) flush 5-10 mL  5-10 mL IntraVENous Q8H    sodium chloride (NS) flush 5-10 mL  5-10 mL IntraVENous PRN         Lima Talamantes MD  Internal Medicine  5/13/2017

## 2017-05-14 NOTE — PROGRESS NOTES
Monica Lin MD       Hospitalist Progress Note     5/14/2017   PCP:  Dr. Vernell Prince MD  Right thigh pain      Admission Summary:   A 40-year-old -American female with past medical history of endometriosis, uterine carcinoma, type 2 diabetes mellitus, elevated liver function tests, hypertension, iron-deficiency anemia, menometrorrhagia, anemia, morbid obesity, general debility, depression, presented to the emergency department from home with chief complaint of intractable right thigh pain. In the ER patient was noted hypoxic and was treated for acute respiratory failure possibly due to PE. CTA of chest,no PE. Patient rt thigh pain is caused by a pathologic fracture from her metastatic uterine cancer,no treatment. Palliative care consulted. ICU care d/mary 5/10,transfer to floor. Patient has advanced cancer with metastasis no treatment available at this stage. Plan is for transfer to hospice after picc line is placed on 5/15/2017,in OR under anesthesia since patient can not change position due to severe pain. Reason For Visit:  Pain, SOB    Assessment/Plan   Acute respiratory failure with hypoxia (POA)   -Resoled. PE  Ruled out   - CTA chest on 5/9,negative for PE  - Lovenox d/c  -O2sat on RA 99% now. Intractable right thigh pain (POA) due   Pathologic fracture of the subtrochanteric right femur,acute  - Xray right femur 5/8 suboptimal but no fracture or acute abnormality seen  - Xray right tib/fib 5/8 with no acute abnormality  - Duplex right LE 5/8 without DVT but again, suboptimal  - Right soft tissue US 5/9 shows nonspecific soft tissue edema. No defined hematoma identified  - Obtain CT leg on 5/9:Pathologic fracture of the subtrochanteric right femur is likely acute given the clinical history and imaging appearance  - PCA for pain  - Palliative care consult. Patient medications adjusted. Pain controlled today    Tachycardia (POA) - due pain,anemia  - ECG with sinus tachycardia, non-specific T-wave abnormalities  - Monitor  -Pain control  -Pulmonary saw pt and assessed tachycardia due to pain    Hypotension (POA)   -Resolved    Leukocytosis (POA) - unclear cause,possibly due to malignancy  - Check UA  - CXR no pneumonia  - Hold off on empiric antibiotics  -Blood cx negative    Anemia (chronic) - patient with substantial drop since , monitor for bleeding  - Monitor HH  - Received 1 unit prbc on 5/10 and repeated Hgb 7.3    Type 2 diabetes mellitus (chronic)  - Humalog insulin correctional coverage, schedule Accu-Cheks. Endometrial uterine carcinoma (chronic) with metastasis- I spoke with Dr. Krissy Vieira' nurse and there is no further treatment, even palliative, available  - Palliative care consult.  -Hospice to meet with patient and mother tomorrow.  -Dr Mukul Vang who knows patient has discussed further plan with patient and parent. The conclusion is transfer to hospice on 5/15 after picc ilne placement. Case discussed with palliative care nurse,patient and patient's mother. See orders for other plans. VTE prophylaxis: SCDs  Discussed plan of care with Patient/Family and Nurse   Pre-admission lived at home  Discharge plan: hospice on 5/15  Estimated time to discharge: 5/15     Subjective   Ms. Anderson Wills has no complaint today. Mother sitting at bedside.     Physical examination     Visit Vitals    /84 (BP 1 Location: Right arm, BP Patient Position: At rest)    Pulse 100    Temp 98.4 °F (36.9 °C)    Resp 16    Ht 5' 6\" (1.676 m)    Wt 121.4 kg (267 lb 10.2 oz)    SpO2 98%    BMI 43.2 kg/m2       Temp (24hrs), Av.4 °F (36.9 °C), Min:98.2 °F (36.8 °C), Max:98.7 °F (37.1 °C)      Intake/Output Summary (Last 24 hours) at 17 1405  Last data filed at 17 0600   Gross per 24 hour   Intake          1358.33 ml   Output                0 ml   Net          1358.33 ml       Gen - NAD,looks more optimistic today  ENT - MMM  Neck - supple, full ROM  CV - RRR, no MRG  Resp - lungs CTA b/l  GI - unable to assess abdomen  Ext - b/l edema, right leg mildly tense but pulses 2+, good cap refill  Neuro - A&O, no focal deficits    Data Review     Telemetry    ECG    Xray    CT scan    MRI    Echocardiogram          I have reviewed the flow sheet and recent notes  New labs and data below personally reviewed. No results for input(s): WBC, HGB, HCT, PLT, HGBEXT, HCTEXT, PLTEXT, HGBEXT, HCTEXT, PLTEXT in the last 72 hours. No results for input(s): NA, K, CL, CO2, GLU, BUN, CREA, CA, MG, PHOS, ALB, TBIL, TBILI, SGOT, ALT, INR in the last 72 hours.     No lab exists for component: INREXT, INREXT    Medications reviewed  Current Facility-Administered Medications   Medication Dose Route Frequency    polyethylene glycol (MIRALAX) packet 17 g  17 g Oral BID    oxyCODONE IR (ROXICODONE) tablet 60 mg  60 mg Oral Q3H WA    morphine (PF)  mg/30 ml   IntraVENous CONTINUOUS    morphine injection 6 mg  6 mg IntraVENous Q2H PRN    sodium chloride (NS) flush 20 mL  20 mL InterCATHeter PRN    sodium chloride (NS) flush 10 mL  10 mL InterCATHeter Q24H    sodium chloride (NS) flush 10 mL  10 mL InterCATHeter PRN    sodium chloride (NS) flush 10 mL  10 mL InterCATHeter Q8H    alteplase (CATHFLO) 1 mg in sterile water (preservative free) 1 mL injection  1 mg InterCATHeter PRN    bacitracin 500 unit/gram packet 1 Packet  1 Packet Topical PRN    0.9% sodium chloride infusion 250 mL  250 mL IntraVENous PRN    acetaminophen (TYLENOL) tablet 1,000 mg  1,000 mg Oral TID    metoprolol tartrate (LOPRESSOR) tablet 25 mg  25 mg Oral BID    DULoxetine (CYMBALTA) capsule 30 mg  30 mg Oral DAILY    gabapentin (NEURONTIN) capsule 300 mg  300 mg Oral BID    senna (SENOKOT) tablet 17.2 mg  2 Tab Oral BID    LORazepam (ATIVAN) tablet 1 mg  1 mg Oral Q6H PRN    0.9% sodium chloride infusion  25 mL/hr IntraVENous CONTINUOUS    insulin lispro (HUMALOG) injection   SubCUTAneous AC&HS    glucose chewable tablet 16 g  4 Tab Oral PRN    dextrose (D50W) injection syrg 12.5-25 g  12.5-25 g IntraVENous PRN    glucagon (GLUCAGEN) injection 1 mg  1 mg IntraMUSCular PRN    sodium chloride (NS) flush 5-10 mL  5-10 mL IntraVENous Q8H    sodium chloride (NS) flush 5-10 mL  5-10 mL IntraVENous PRN         Brionna Colon MD  Internal Medicine  5/14/2017

## 2017-05-14 NOTE — PROGRESS NOTES
I reviewed events of past 24 hours and called and spoke to [place], [facility] RN. Ms. Felecia Hicks had a comfortable night and reports her pain is manageable as long as she doesn't move. Plan is for PICC placement tomorrow. She is NPO after midnight tonight. Ms. Felecia Hicks agreed to davila catheter placement for procedure tomorrow.

## 2017-05-15 NOTE — PROGRESS NOTES
Bedside shift change report given to Urmila Cesar. (oncoming nurse) by Joey Dumont (offgoing nurse). Report included the following information SBAR.

## 2017-05-15 NOTE — PROGRESS NOTES
Chance Pizano MD       Hospitalist Progress Note     5/15/2017   PCP:  Dr. Alphonso Brown MD  Right thigh pain      Admission Summary:   A 29-year-old -American female with past medical history of endometriosis, uterine carcinoma, type 2 diabetes mellitus, elevated liver function tests, hypertension, iron-deficiency anemia, menometrorrhagia, anemia, morbid obesity, general debility, depression, presented to the emergency department from home with chief complaint of intractable right thigh pain. In the ER patient was noted hypoxic and was treated for acute respiratory failure possibly due to PE. CTA of chest,no PE. Patient rt thigh pain is caused by a pathologic fracture from her metastatic uterine cancer,no treatment. Palliative care consulted. ICU care d/mary 5/10,transfer to floor. Patient has advanced cancer with metastasis no treatment available at this stage. Plan is for transfer to hospice after picc line is placed on 5/15/2017,in OR under anesthesia since patient can not change position due to severe pain. Reason For Visit:  Pain, SOB    The patient comfortably resting on bed, complains of no pain, nausea or vomiting    Assessment/Plan   Acute respiratory failure with hypoxia (POA)   -Resoled. PE  Ruled out   - CTA chest on 5/9,negative for PE  - Lovenox d/c  -O2sat on RA 99% now. Intractable right thigh pain (POA) due   Pathologic fracture of the subtrochanteric right femur,acute  - Xray right femur 5/8 suboptimal but no fracture or acute abnormality seen  - Xray right tib/fib 5/8 with no acute abnormality  - Duplex right LE 5/8 without DVT but again, suboptimal  - Right soft tissue US 5/9 shows nonspecific soft tissue edema. No defined hematoma identified  - Obtain CT leg on 5/9:Pathologic fracture of the subtrochanteric right femur is likely acute given the clinical history and imaging appearance  - PCA for pain  - Palliative care consult. Patient medications adjusted. Pain controlled today    Tachycardia (POA) - due pain,anemia  - ECG with sinus tachycardia, non-specific T-wave abnormalities  - Monitor  -Pain control  -Pulmonary saw pt and assessed tachycardia due to pain    Hypotension (POA)   -Resolved    Leukocytosis (POA) - unclear cause,possibly due to malignancy  - Check UA  - CXR no pneumonia  - Hold off on empiric antibiotics  -Blood cx negative    Anemia (chronic) - patient with substantial drop since , monitor for bleeding  - Monitor HH  - Received 1 unit prbc on 5/10 and repeated Hgb 7.3    Type 2 diabetes mellitus (chronic)  - Humalog insulin correctional coverage, schedule Accu-Cheks. Endometrial uterine carcinoma (chronic) with metastasis- I spoke with Dr. Garrison Johnson' nurse and there is no further treatment, even palliative, available  - Palliative care consult.  -Hospice to meet with patient and mother tomorrow.  -Dr Estee Davis who knows patient has discussed further plan with patient and parent. The conclusion is transfer to hospice after picc ilne placement. Case discussed with palliative care nurse,patient and patient's mother. Plan: Discharge to hospice house ?today vs tomorrow    See orders for other plans. VTE prophylaxis: SCDs  Discussed plan of care with Patient/Family and Nurse   Pre-admission lived at home  Discharge plan: hospice  Estimated time to discharge: today or tomorrow     Subjective   Ms. Ana Angela has no complaint today. Mother sitting at bedside.     Physical examination     Visit Vitals    /84 (BP 1 Location: Right arm, BP Patient Position: At rest)    Pulse (!) 104    Temp 98.8 °F (37.1 °C)    Resp 18    Ht 5' 6\" (1.676 m)    Wt 121.4 kg (267 lb 10.2 oz)    SpO2 95%    BMI 43.2 kg/m2       Temp (24hrs), Av.5 °F (36.9 °C), Min:98.2 °F (36.8 °C), Max:98.8 °F (37.1 °C)      Intake/Output Summary (Last 24 hours) at 05/15/17 0865  Last data filed at 17 2644   Gross per 24 hour   Intake              360 ml   Output                0 ml Net              360 ml       Gen - NAD,looks more optimistic today  ENT - MMM  Neck - supple, full ROM  CV - RRR, no MRG  Resp - lungs CTA b/l  GI - unable to assess abdomen  Ext - b/l edema, right leg mildly tense but pulses 2+, good cap refill  Neuro - A&O, no focal deficits    Data Review     Telemetry    ECG    Xray    CT scan    MRI    Echocardiogram          I have reviewed the flow sheet and recent notes  New labs and data below personally reviewed. No results for input(s): WBC, HGB, HCT, PLT, HGBEXT, HCTEXT, PLTEXT, HGBEXT, HCTEXT, PLTEXT in the last 72 hours. No results for input(s): NA, K, CL, CO2, GLU, BUN, CREA, CA, MG, PHOS, ALB, TBIL, TBILI, SGOT, ALT, INR in the last 72 hours.     No lab exists for component: INREXT, INREXT    Medications reviewed  Current Facility-Administered Medications   Medication Dose Route Frequency    polyethylene glycol (MIRALAX) packet 17 g  17 g Oral BID    oxyCODONE IR (ROXICODONE) tablet 60 mg  60 mg Oral Q3H WA    morphine (PF)  mg/30 ml   IntraVENous CONTINUOUS    morphine injection 6 mg  6 mg IntraVENous Q2H PRN    sodium chloride (NS) flush 20 mL  20 mL InterCATHeter PRN    sodium chloride (NS) flush 10 mL  10 mL InterCATHeter Q24H    sodium chloride (NS) flush 10 mL  10 mL InterCATHeter PRN    sodium chloride (NS) flush 10 mL  10 mL InterCATHeter Q8H    alteplase (CATHFLO) 1 mg in sterile water (preservative free) 1 mL injection  1 mg InterCATHeter PRN    bacitracin 500 unit/gram packet 1 Packet  1 Packet Topical PRN    0.9% sodium chloride infusion 250 mL  250 mL IntraVENous PRN    acetaminophen (TYLENOL) tablet 1,000 mg  1,000 mg Oral TID    metoprolol tartrate (LOPRESSOR) tablet 25 mg  25 mg Oral BID    DULoxetine (CYMBALTA) capsule 30 mg  30 mg Oral DAILY    gabapentin (NEURONTIN) capsule 300 mg  300 mg Oral BID    senna (SENOKOT) tablet 17.2 mg  2 Tab Oral BID    LORazepam (ATIVAN) tablet 1 mg  1 mg Oral Q6H PRN    0.9% sodium chloride infusion  25 mL/hr IntraVENous CONTINUOUS    insulin lispro (HUMALOG) injection   SubCUTAneous AC&HS    glucose chewable tablet 16 g  4 Tab Oral PRN    dextrose (D50W) injection syrg 12.5-25 g  12.5-25 g IntraVENous PRN    glucagon (GLUCAGEN) injection 1 mg  1 mg IntraMUSCular PRN    sodium chloride (NS) flush 5-10 mL  5-10 mL IntraVENous Q8H    sodium chloride (NS) flush 5-10 mL  5-10 mL IntraVENous PRN         Jp Quiles MD  Internal Medicine  5/15/2017

## 2017-05-15 NOTE — HOSPICE
[facility]  [phone]    Patient Name: Kelsi Yang  YOB: 1980  Age: 39 y.o. [facility] MSW Note:  Hospice consult noted. Chart reviewed. This MSW and Laurita Donnelly Rn met with patient, her mother Cornelio Dee and  Ms. Faustino Underwood to sign consents for transfer to [facility]. Patient will be admitted to [facility] 5/26/2017 upon arrival to [facility]. Consents were signed, DDNR completed, ABN for completed and [employer] form was given to patients mother to complete. Patient will be admitted to [facility] for pain/symptom management. The goal is for patient to return home once symptoms are managed. Consents were faxed to [facility]. Family will return foundation forms to [facility]. Transportation to be arranged for 5/16/2017. Thank you for the opportunity to be of service to this patient.       [address] [place] [address]  [phone]

## 2017-05-15 NOTE — ANESTHESIA POSTPROCEDURE EVALUATION
Post-Anesthesia Evaluation and Assessment    Patient: Abiola Bates MRN: [id number]  SSN: [id number]    YOB: 1980  Age: 39 y.o. Sex: female       Cardiovascular Function/Vital Signs  Visit Vitals    /79 (BP 1 Location: Right arm)    Pulse (!) 116    Temp 37.1 °C (98.7 °F)    Resp 15    Ht 5' 6\" (1.676 m)    Wt 121.4 kg (267 lb 10.2 oz)    SpO2 93%    BMI 43.2 kg/m2       Patient is status post general anesthesia for * No procedures listed *. Nausea/Vomiting: None    Postoperative hydration reviewed and adequate. Pain:  Pain Scale 1: Numeric (0 - 10) (05/15/17 1215)  Pain Intensity 1: 5 (05/15/17 1215)   Managed    Neurological Status:   Neuro (WDL): Exceptions to WDL (05/15/17 1148)  Neuro  Neurologic State: Anesthetized; Eyes open to voice (05/15/17 1148)  Orientation Level: Oriented X4 (05/13/17 9848)  Cognition: Appropriate for age attention/concentration; Appropriate decision making; Appropriate safety awareness; Follows commands (05/13/17 1277)  Speech: Appropriate for age;Clear (05/14/17 1600)  Assessment L Pupil: Brisk (05/10/17 1343)  Size L Pupil (mm): 3 (05/10/17 0800)  Assessment R Pupil: Brisk (05/10/17 1343)  Size R Pupil (mm): 3 (05/10/17 0800)  LUE Motor Response: Purposeful (05/13/17 0918)  LLE Motor Response: Weak (05/14/17 0007)  RUE Motor Response: Purposeful (05/13/17 6640)  RLE Motor Response: Weak;Other(comment) (VERY painful with movement') (05/14/17 0007)   At baseline    Mental Status and Level of Consciousness: Arousable    Pulmonary Status:   O2 Device: Room air (05/15/17 1149)   Adequate oxygenation and airway patent    Complications related to anesthesia: None    Post-anesthesia assessment completed.  No concerns    Signed By: Kim Batista MD     May 15, 2017

## 2017-05-15 NOTE — PROGRESS NOTES
This is a follow-up visit to continue pastoral care support to Karli and her mother. I spoke with her mother over the week-end. She has shared with Jyoti's consent the nature of Jyoti's  illness with Jyoti's siblings and with clergy members from the family chu. The , Rev. Pita Mariee and an associate  visited with Karli this weekend, according to her mother. We are currently waiting for Jyoti to come back to her room. Jyoti's mother continues to be very supportive with a great deal of assistance from her other children. Pastoral care will continue to follow.      Noble Lyle., [facility], [facility]

## 2017-05-15 NOTE — HOSPICE
[facility]  [phone]    Patient Name: Layo Haji  YOB: 1980  Age: 39 y.o. [facility] RN Note:  Hospice consult noted. Chart reviewed. Plan of care discussed with patients nurse & care manager. In to meet with pt, mother Lauren Henry and her . They are agreeing to Hospice are at the [facility] but are requesting pt be moved tomorrow as she had her procedure today of PICC line insertion. Pt states she is tired. Dr. Peter James contacted and agreed to be Attending MD. Michelle Reyna and ABN forms signed by pt's mother. Will arrange transport tomorrow late morning. Thank you for the opportunity to be of service to this patient.     Radha Drummond RN

## 2017-05-15 NOTE — CONSULTS
Palliative Medicine Consult  [place]: [phone])    Patient Name: Celina Orona  YOB: 1980    Date of Initial Consult: 5/9/17  Reason for Consult: overwhelming sx/care decisions   Requesting Provider: Jay/ Shirley Painter   Primary Care Physician: Sonia Fiore MD      SUMMARY:   Celina Orona is a 39 y.o.  with a past history of metastatic endometrial cancer (mets to liver and bone) who was admitted on 5/8/2017 from home after Palliative RN visited her and found her to have worsening RLE pain and swelling. By doppler and imaging she does not have DVT/PE. CT of pelvis and right leg show enlarging tumor involving right sacrum, right iliac bone, bilateral rami and proximal right femur (which appears acute). L5 and right sacral nerves are involved with tumor and pressure of tumor creates compression of right femoral and external iliac veins causing stasis. Current focus is pain control, mobility and safe disposition as mother (only caregiver) cannot care for patient at home without full support. PALLIATIVE DIAGNOSES:   1. RLE pain and swelling  2. Generalized bone pain, neoplasm related  3. Shortness of breath   4. Debility due to generalized weakness and pain  5. Constipation due to opioids   6. Anemia - s/p 1 unit PRBCs      PLAN:   Patient has met with PT and with Hospice and she will be choosing Hospice support which she would like to call \"[facility]\" instead of Hospice.      1. Continue morphine PCA 4-mg/hour with 0.6- mg every 6 minutes  2. Continue oxycodone 60-mg every 3 hours while awake  3. Continue morphine 6-mg IV every 2 hours as needed   4. Continue acetaminophen 1000-mg every 8 hours. 5. Continue lorazepam 1-mg po every 6 hours as needed  6. Continue duloxetine and gabapentin as ordered  7. Increase Miralax to twice daily  8. I spoke with Marquez Duffy, , about discharge to [place] [phone].  [facility] or [facility]. 9.  knows to coordinate closely with patient's mother on date and time of transfer as she wishes to ride over with her daughter and needs time to arrange care for her ill   5. Discussed plan with Palliative IDT, bedside RN,        GOALS OF CARE / TREATMENT PREFERENCES:   [====Goals of Care====]  GOALS OF CARE:  Patient / health care proxy stated goals: pain relief       TREATMENT PREFERENCES:   Code Status: DNR    Advance Care Planning:  Advance Care Planning 5/10/2017   Patient's Healthcare Decision Maker is: Legal Next of Andrea 69   Primary Decision Maker Name Filiberto Isaac   Primary Decision Maker Phone Number -   Primary Decision Maker Relationship to Patient Parent   Confirm Advance Directive Yes, on file       Other:    The palliative care team has discussed with patient / health care proxy about goals of care / treatment preferences for patient.  [====Goals of Care====]         HISTORY:     History obtained from: patient, chart    CHIEF COMPLAINT: I'm doing OK    HPI/SUBJECTIVE:    The patient is:   [x] Verbal and participatory  [] Non-participatory due to:     She's feeling OK after PICC placement this morning. Feeling anxious about going to [facility].     Morphine 6-mg IV every 2 hours as needed (none in past 24 hours)  Oxycodone 60-mg every 3 hours while awake (5 doses in past 24 hours)  Lorazepam 1-mg po every 6 hours as needed (none in past 24 hours)    Clinical Pain Assessment (nonverbal scale for severity on nonverbal patients):   [++++ Clinical Pain Assessment++++]  [++++Pain Severity++++]: Pain: 4  [++++Pain Character++++]: aching and deep   [++++Pain Duration++++]: chronic, but worse over past week   [++++Pain Effect++++]: limits mobility, makes upset  [++++Pain Factors++++]: worse w/ movement   [++++Pain Frequency++++]: constant   [++++Pain Location++++]: RLE and general  [++++ Clinical Pain Assessment++++]     FUNCTIONAL ASSESSMENT:     Palliative Performance Scale (PPS):  PPS: 60       PSYCHOSOCIAL/SPIRITUAL SCREENING:     Advance Care Planning:  Advance Care Planning 5/10/2017   Patient's Healthcare Decision Maker is: Legal Next of Andrea Velarde   Primary Decision Maker Name Teddy Crowley   Primary Decision Maker Phone Number -   Primary Decision Maker Relationship to Patient Parent   Confirm Advance Directive Yes, on file        Any spiritual / Congregation concerns:  [] Yes /  [] No    Caregiver Burnout:  [] Yes /  [] No /  [x] No Caregiver Present      Anticipatory grief assessment:   [x] Normal  / [] Maladaptive       ESAS Anxiety: Anxiety: 2    ESAS Depression: Depression: 4        REVIEW OF SYSTEMS:     Positive and pertinent negative findings in ROS are noted above in HPI. The following systems were [x] reviewed / [] unable to be reviewed as noted in HPI  Other findings are noted below. Systems: constitutional, ears/nose/mouth/throat, respiratory, gastrointestinal, genitourinary, musculoskeletal, integumentary, neurologic, psychiatric, endocrine. Positive findings noted below. Modified ESAS Completed by: provider   Fatigue: 4 Drowsiness: 1   Depression: 4 Pain: 4   Anxiety: 2 Nausea: 0   Anorexia: 2 Dyspnea: 0   Best Well-Bein Constipation: Yes              PHYSICAL EXAM:     From RN flowsheet:  Wt Readings from Last 3 Encounters:   05/10/17 267 lb 10.2 oz (121.4 kg)   17 300 lb (136.1 kg)   16 280 lb (127 kg)     Blood pressure 136/79, pulse (!) 116, temperature 98.7 °F (37.1 °C), resp. rate 15, height 5' 6\" (1.676 m), weight 267 lb 10.2 oz (121.4 kg), SpO2 93 %.     Pain Scale 1: Numeric (0 - 10)  Pain Intensity 1: 5  Pain Onset 1: pta  Pain Location 1: Hip  Pain Orientation 1: Right  Pain Description 1: Constant  Pain Intervention(s) 1: Medication (see MAR)  Last bowel movement, if known:     Constitutional: lying slightly propped up on left side, no acute distress  Eyes: pupils equal, anicteric  ENMT: no nasal discharge, moist mucous membranes  Cardiovascular: regular rhythm (listen from side, cannot roll over onto back)  Respiratory: breathing not labored, symmetric  Gastrointestinal: soft, cannot assess due to lying on stomach   Musculoskeletal: no deformity, pain w/ movement of RLE  Skin: warm, dry  Neurologic: following commands, moving all extremities  Psychiatric: full affect, no hallucinations         HISTORY:     Principal Problem:    Acute respiratory failure with hypoxia (HCC) (5/8/2017)      Past Medical History:   Diagnosis Date    Cancer ([facility]     uterine    CVI (common variable immunodeficiency) (HCC)     Diabetes ([facility]     Elevated liver function tests 10/21/2010    Endometrial cancer ([facility] 7/7/2010    Hypertension     Iron deficiency anemia     Menometrorrhagia 10/21/2010    Microcytic anemia 10/21/2010    Morbid obesity ([facility]     Prediabetes 7/7/2010    Psychiatric disorder     depression    Radiation     completed radiation mid-march      Past Surgical History:   Procedure Laterality Date    CARDIAC SURG PROCEDURE UNLIST      hx of tachycardia    HX GYN      hysterectomy      Family History   Problem Relation Age of Onset    Hypertension Mother     Hypertension Father     Stroke Maternal Grandfather     Cancer Paternal Grandmother      breast    Diabetes Paternal Grandmother       History reviewed, no pertinent family history. Social History   Substance Use Topics    Smoking status: Never Smoker    Smokeless tobacco: Never Used    Alcohol use Yes     Allergies   Allergen Reactions    Egg Nausea and Vomiting     Raw egg and scrambled eggs. Egg products okay.      Pcn [Penicillins] Nausea and Vomiting     \"but could take amoxil\"    Shellfish Containing Products Unknown (comments)    Tetanus And Diphtheria Toxoids, Adsorbed, Adult Other (comments)     Developed local swelling, axillary pain, and transient fever    Tomato Unknown (comments)      Current Facility-Administered Medications   Medication Dose Route Frequency    lidocaine (XYLOCAINE) 20 mg/mL (2 %) injection        fentaNYL citrate (PF) 50 mcg/mL injection        morphine 10 mg/ml injection        morphine injection 4 mg  4 mg IntraVENous Q5MIN PRN    polyethylene glycol (MIRALAX) packet 17 g  17 g Oral BID    oxyCODONE IR (ROXICODONE) tablet 60 mg  60 mg Oral Q3H WA    morphine (PF)  mg/30 ml   IntraVENous CONTINUOUS    morphine injection 6 mg  6 mg IntraVENous Q2H PRN    sodium chloride (NS) flush 20 mL  20 mL InterCATHeter PRN    sodium chloride (NS) flush 10 mL  10 mL InterCATHeter Q24H    sodium chloride (NS) flush 10 mL  10 mL InterCATHeter PRN    sodium chloride (NS) flush 10 mL  10 mL InterCATHeter Q8H    alteplase (CATHFLO) 1 mg in sterile water (preservative free) 1 mL injection  1 mg InterCATHeter PRN    bacitracin 500 unit/gram packet 1 Packet  1 Packet Topical PRN    0.9% sodium chloride infusion 250 mL  250 mL IntraVENous PRN    acetaminophen (TYLENOL) tablet 1,000 mg  1,000 mg Oral TID    metoprolol tartrate (LOPRESSOR) tablet 25 mg  25 mg Oral BID    DULoxetine (CYMBALTA) capsule 30 mg  30 mg Oral DAILY    gabapentin (NEURONTIN) capsule 300 mg  300 mg Oral BID    senna (SENOKOT) tablet 17.2 mg  2 Tab Oral BID    LORazepam (ATIVAN) tablet 1 mg  1 mg Oral Q6H PRN    0.9% sodium chloride infusion  25 mL/hr IntraVENous CONTINUOUS    insulin lispro (HUMALOG) injection   SubCUTAneous AC&HS    glucose chewable tablet 16 g  4 Tab Oral PRN    dextrose (D50W) injection syrg 12.5-25 g  12.5-25 g IntraVENous PRN    glucagon (GLUCAGEN) injection 1 mg  1 mg IntraMUSCular PRN    sodium chloride (NS) flush 5-10 mL  5-10 mL IntraVENous Q8H    sodium chloride (NS) flush 5-10 mL  5-10 mL IntraVENous PRN          LAB AND IMAGING FINDINGS:     Lab Results   Component Value Date/Time    WBC 15.2 05/11/2017 03:34 AM    HGB 7.3 05/11/2017 03:34 AM    PLATELET 251 12/08/6534 03:34 AM     Lab Results   Component Value Date/Time Sodium 141 05/11/2017 03:34 AM    Potassium 4.4 05/11/2017 03:34 AM    Chloride 110 05/11/2017 03:34 AM    CO2 24 05/11/2017 03:34 AM    BUN 16 05/11/2017 03:34 AM    Creatinine 0.68 05/11/2017 03:34 AM    Calcium 8.3 05/11/2017 03:34 AM    Magnesium 1.6 11/20/2014 03:30 PM      Lab Results   Component Value Date/Time    AST (SGOT) 10 05/10/2017 12:27 AM    Alk. phosphatase 93 05/10/2017 12:27 AM    Protein, total 6.8 05/10/2017 12:27 AM    Albumin 2.9 05/10/2017 12:27 AM    Globulin 3.9 05/10/2017 12:27 AM     Lab Results   Component Value Date/Time    INR 1.1 05/10/2017 12:48 AM    Prothrombin time 11.4 05/10/2017 12:48 AM    aPTT 35.5 05/10/2017 12:48 AM      Lab Results   Component Value Date/Time    Iron 30 05/08/2017 04:27 PM    TIBC 194 05/08/2017 04:27 PM    Iron % saturation 15 05/08/2017 04:27 PM    Ferritin 10 07/24/2009 08:50 AM      No results found for: PH, PCO2, PO2  No components found for: [place]   Lab Results   Component Value Date/Time    CK 60 05/10/2017 12:48 AM    CK - MB <1.0 05/10/2017 12:48 AM                Total time:   Counseling / coordination time, spent as noted above:   > 50% counseling / coordination?:     Prolonged service was provided for  []30 min   []75 min in face to face time in the presence of the patient, spent as noted above. Time Start:   Time End:   Note: this can only be billed with 15391 (initial) or 93335 (follow up). If multiple start / stop times, list each separately.

## 2017-05-15 NOTE — PERIOP NOTES
TRANSFER - OUT REPORT:    Verbal report given to Zhengkatimaryan on [employer]  being transferred to room 500 for routine progression of care       Report consisted of patients Situation, Background, Assessment and   Recommendations(SBAR). Time Pre op antibiotic given:  n/a  Anesthesia Stop time:  3237  Jama Present on Transfer to floor: YES  Order for Jama on Chart:  YES    Information from the following report(s) SBAR and MAR was reviewed with the receiving nurse. Opportunity for questions and clarification was provided. Is the patient on 02? NO       L/Min n/a       Other     Is the patient on a monitor? NO    Is the nurse transporting with the patient? NO    Surgical Waiting Area notified of patient's transfer from PACU? N/A    Lines:   PICC Double Lumen 00/83/51 Right;Cephalic (Active)   Central Line Being Utilized No 5/15/2017 11:50 AM   Site Assessment Clean, dry, & intact 5/15/2017 11:50 AM   Phlebitis Assessment 0 5/15/2017 11:50 AM   Infiltration Assessment 0 5/15/2017 11:50 AM   Dressing Status Clean, dry, & intact 5/15/2017 11:50 AM       Quad Lumen 05/09/17 Right Internal jugular (Active)   Central Line Being Utilized Yes 5/15/2017 12:01 AM   Criteria for Appropriate Use Limited/no vessel suitable for conventional peripheral access 5/15/2017 12:01 AM   Site Assessment Clean, dry, & intact 5/15/2017 11:50 AM   Infiltration Assessment 0 5/15/2017 11:50 AM   Affected Extremity/Extremities Color distal to insertion site pink (or appropriate for race) 5/15/2017 11:50 AM   Date of Last Dressing Change 05/09/17 5/14/2017 10:31 PM   Dressing Status Clean, dry, & intact 5/15/2017 11:50 AM   Dressing Type Disk with Chlorhexadine gluconate (CHG); Transparent 5/15/2017 11:50 AM   Action Taken Open ports on tubing capped 5/13/2017  1:00 AM   Proximal Hub Color/Line Status Flushed;Patent 5/14/2017 10:31 PM   Positive Blood Return (Medial Site) Yes 5/14/2017 10:31 PM   Medial 1 Hub Color/Line Status Flushed;Patent 5/14/2017 10:31 PM   Positive Blood Return (Lateral Site) Yes 5/14/2017 10:31 PM   Medial 2 Hub Color/Line Status Flushed;Patent 5/14/2017 10:31 PM   Positive Blood Return (Site #3) Yes 5/14/2017 10:31 PM   Distal Hub Color/Line Status Flushed;Patent 5/14/2017 10:31 PM   Positive Blood Return (Site #4) Yes 5/14/2017 10:31 PM   Alcohol Cap Used Yes 5/14/2017 10:31 PM        The following personal items collected during your admission accompanied patient upon transfer:   Dental Appliance: Dental Appliances:  Other (comment)  Vision: Visual Aid: None  Hearing Aid:    Jewelry:    Clothing:    Other Valuables:    Valuables sent to safe:

## 2017-05-15 NOTE — ANESTHESIA PREPROCEDURE EVALUATION
Anesthetic History   No history of anesthetic complications            Review of Systems / Medical History  Patient summary reviewed, nursing notes reviewed and pertinent labs reviewed    Pulmonary  Within defined limits                 Neuro/Psych   Within defined limits           Cardiovascular    Hypertension                   GI/Hepatic/Renal     GERD           Endo/Other    Diabetes    Morbid obesity     Other Findings              Physical Exam    Airway  Mallampati: II  TM Distance: > 6 cm  Neck ROM: normal range of motion   Mouth opening: Normal     Cardiovascular  Regular rate and rhythm,  S1 and S2 normal,  no murmur, click, rub, or gallop             Dental  No notable dental hx       Pulmonary  Breath sounds clear to auscultation               Abdominal  GI exam deferred       Other Findings            Anesthetic Plan    ASA: 4  Anesthesia type: general          Induction: Intravenous  Anesthetic plan and risks discussed with: Patient

## 2017-05-15 NOTE — PROGRESS NOTES
CM spoke with Dr Trice Sheffield and plan was for pt to transfer to [facility] today. CM called Hospice Office to have Alicia Arthur paged to discuss discharge plan. CM will continue to follow as necessary. Zelalem Brown RN CRM    [id number] [facility] for pt to come to the hospice house. Leela Rolle plans to meet with pt and pt's mother to discuss hospice house with them. Leela Rolle has been waiting for pt to be done with procedure. Leela Rolle states she will set up transportation for pt to Hospice house.   Zelalem Brown RN CRM

## 2017-05-15 NOTE — PROGRESS NOTES
Physical Therapy  5.15.17    10:30-- F/u with CM who states patient is to d/c home with hospice after PICC placement. Will complete orders. Thank you. Chart reviewed. Patient currently in IR for PICC placement under anesthesia. From chart review, plan is to admit patient to hospice after PICC placement for preparation to d/c home. F/u later as able to confirm with RN that PT is no longer indicated for this patient? Thank you.      Amol Vargas, PT, DPT

## 2017-05-15 NOTE — DIABETES MGMT
DTC Progress Note    Recommendations/ Comments: Pt's chart reviewed due to hypoglycemia yesterday. Noted pt's po intake is poor. If appropriate, please consider changing correction scale to high sensitivity. Chart reviewed on Jyoti S Prakash. Patient is a 39 y.o. female with known diabetes on Metformin 500 mg BID at home. A1c:   Lab Results   Component Value Date/Time    Hemoglobin A1c 11.9 04/16/2012 09:50 AM    Hemoglobin A1c 6.4 02/13/2009 07:50 AM       Recent Glucose Results: Lab Results   Component Value Date/Time    GLUCPOC 82 05/15/2017 06:19 AM    GLUCPOC 108 (H) 05/14/2017 09:13 PM    GLUCPOC 152 (H) 05/14/2017 05:04 PM        Lab Results   Component Value Date/Time    Creatinine 0.68 05/11/2017 03:34 AM       Active Orders   Diet    DIET REGULAR        PO intake: Patient Vitals for the past 72 hrs:   % Diet Eaten   05/14/17 0856 20 %   05/13/17 1625 0 %   05/13/17 0918 0 %       Current hospital DM medication: correction scale Humalog, normal sensitivity. Will continue to follow as needed.     Thank you  Katie Douglass RD, CDE

## 2017-05-15 NOTE — PROCEDURES
PROCEDURE:PICC placement. INDICATION:IV access. ANESTHESIA:local.  COMPLICATION:NONE. SPECIMENS REMOVED:none. BLOOD LOSS:NONE. /ASSISTANT:FRANCHESKA Kolb RECOMMENDATIONS:may use. CONSENT OBTAINED:YES.  NOTES:none.

## 2017-05-16 NOTE — HOSPICE
[facility]  [phone]    Inpatient Nursing PRE Admission   Patient Name: Dayanara Vance  YOB: 1980  Age: 39 y.o. Date of PLANNED Hospice Admission: 17   Hospice Attending: Dr. Ramesh Mooney  Primary Care Physician: Pedro Pablo Arevalo MD     Home Hospice Zip Code:  Expected  (if patient transferred to [facility]): unknown    ADVANCE CARE PLANNING    Code Status: DNR  Durable DNR: [x]  Yes  []  No  Advance Care Planning 5/10/2017   Patient's Healthcare Decision Maker is: Legal Next of Andrea 69   Primary Decision Maker Name Omayra Maxwell   Primary Decision Maker Phone Number -   Primary Decision Maker Relationship to Patient Parent   Confirm Advance Directive Yes, on file       Baptism: Zoroastrian   Home:  [facility]   ER Visits/ Hospitalizations in past year: 1  Hospice Diagnosis:Endometrial carcinoma with mets to bone, liver  Onset Date of Hospice Diagnosis:   Summary of Disease Progression Leading to Hospice Diagnosis:   Pt is a 71-year-old AA female with past medical history of endometrial carcinoma with mets to spine/pelvis, type 2 diabetes mellitus, elevated liver function tests,   hypertension, iron-deficiency anemia, morbid obesity, debility, depression, came to the ED 17 with severe right thigh pain. Pt states symptoms started after she had a physical therapy session, woke up the next morning with severe pain in her right thigh. Now bedbound with moderate to severe pain and swelling. She lives with her mother, who is primary CG. In the ED she developed some hypoxia, shortness of breath. Her primary care doctor, Dr. Martell Hurtado, ordered a PICC line be inserted for pain management.      Co-Morbidities:   Patient Active Problem List   Diagnosis Code    Endometrial cancer ([facility] [address] C54.1    Iron deficiency anemia D50.9    Morbid obesity ([address] E66.01    Edema R60.9    GERD (gastroesophageal reflux disease) K21.9    Elevated liver function tests R94.5    Insomnia G47.00    Conjunctivitis H10.9    DM (diabetes mellitus) (HCC) E11.9    Bone metastases (HCC) C79.51    Tachycardia R00.0    Vaginal pain R10.2    Chronic neoplasm-related pain G89.3    Advance care planning Z71.89    Acute respiratory failure with hypoxia (HCC) J96.01     Diagnoses RELATED to the terminal prognosis: Endometrial CA mets to bone  Other Diagnoses: Chronic pain, debility, respiratory failure, morbid obesity, DM    Rationale for a prognosis of life expectancy of 6 months or less if the disease follows its normal course (Disease Specific History):     Janice Condon is a 39 y.o. who was admitted to [facility]. The patient's principle diagnosis of endometrial cancer with mets to bone, liver has resulted in severe pain, debility. Functionally, the patient's Palliative Performance Scale has declined over a period of several months and is estimated at 40%. Objective information that support this patients limited prognosis includes:     CT Abd/Pelvis 5/9/17:  IMPRESSION:  1. Interval significant enlargement of the mass centered in the right pelvis  with destruction of the bones of the right pelvis, sacrum, and proximal right  femur, with extension to involve the left acetabulum, inferior pubic ramus and  superior pubic ramus. 2. Associated pathologic fracture of the right proximal femur. 3. Evaluation of the mass subjacent to the right hemidiaphragm is limited as it  was not as well imaged on prior examinations. 4. Interval enlargement of left pelvic lymph node. PET Scan 2/16/2015  IMPRESSION:  1. There is a large mass in the right jf-pelvis involving the right   iliacus muscle, destroying the right iliac bone medially, right ischium,   right superior pubic ramus and slightly extending to the right femoral head.    The periphery of the mass demonstrates increased metabolic activity while   the central portion demonstrates absent metabolic activity and is suspicious   for metastatic disease. 2. Adenopathy left common iliac is stable in size and demonstrates increased   metabolic activity  3. Lesion in the liver just beneath the dome of the right hemidiaphragm has   increased in size and demonstrates increased metabolic activity of  6.  4. There is question of a focus of increased activity in the right side of   the neck may be related to the upper pole right thyroid gland. This could be   further assessed with ultrasound. 5. There is question of a tiny focus in the midthoracic spine and in the   right third rib but these are equivocal since no corresponding abnormalities   identified on the CT scan. Kai Hence    The patient/family chose comfort measures with the support of Hospice. Patient meets for GIP LOC as evidenced by severe pain, immobility. Prognosis estimated based on 05/15/17 clinical assessment is:   [] Few to Many Hours  [] Hours to Days   [] Few to Many Days   [] Days to Weeks   [] Few to Many Weeks   [x] Weeks to Months   [] Few to Many Months    CLINICAL INFORMATION   Allergies: Allergies   Allergen Reactions    Egg Nausea and Vomiting     Raw egg and scrambled eggs. Egg products okay.  Pcn [Penicillins] Nausea and Vomiting     \"but could take amoxil\"    Shellfish Containing Products Unknown (comments)    Tetanus And Diphtheria Toxoids, Adsorbed, Adult Other (comments)     Developed local swelling, axillary pain, and transient fever    Tomato Unknown (comments)       Wt Readings from Last 3 Encounters:   05/10/17 121.4 kg (267 lb 10.2 oz)   02/16/17 136.1 kg (300 lb)   02/02/16 127 kg (280 lb)     Ht Readings from Last 3 Encounters:   05/08/17 5' 6\" (1.676 m)   01/04/17 5' 7\" (1.702 m)   04/28/16 5' 6\" (1.676 m)     Body mass index is 43.2 kg/(m^2).     Visit Vitals    /75    Pulse 95    Temp 98.3 °F (36.8 °C)    Resp 16    Ht 5' 6\" (1.676 m)    Wt 121.4 kg (267 lb 10.2 oz)    SpO2 95%    BMI 43.2 kg/m2       LAB VALUES     Ref. Range 5/8/2017 16:27 5/10/2017 00:27 5/11/2017 03:34   WBC Latest Ref Range: 3.6 - 11.0 K/uL 13.3 (H) 12.3 (H) 15.2 (H)   RBC Latest Ref Range: 3.80 - 5.20 M/uL 3.53 (L) 3.07 (L) 3.14 (L)   HGB Latest Ref Range: 11.5 - 16.0 g/dL 7.7 (L) 6.9 (L) 7.3 (L)   HCT Latest Ref Range: 35.0 - 47.0 % 27.0 (L) 23.9 (L) 24.6 (L)   MCV Latest Ref Range: 80.0 - 99.0 FL 76.5 (L) 77.9 (L) 78.3 (L)   MCH Latest Ref Range: 26.0 - 34.0 PG 21.8 (L) 22.5 (L) 23.2 (L)   MCHC Latest Ref Range: 30.0 - 36.5 g/dL 28.5 (L) 28.9 (L) 29.7 (L)   RDW Latest Ref Range: 11.5 - 14.5 % 18.3 (H) 18.2 (H) 17.8 (H)   PLATELET Latest Ref Range: 150 - 400 K/uL 331 340 365   NEUTROPHILS Latest Ref Range: 32 - 75 %  87 (H) 87 (H)   LYMPHOCYTES Latest Ref Range: 12 - 49 %  8 (L) 8 (L)   MONOCYTES Latest Ref Range: 5 - 13 %  3 (L) 5   EOSINOPHILS Latest Ref Range: 0 - 7 %  2 0   BASOPHILS Latest Ref Range: 0 - 1 %  0 0   DF Latest Units:    SMEAR SCANNED    ABS. NEUTROPHILS Latest Ref Range: 1.8 - 8.0 K/UL  10.7 (H) 13.2 (H)   ABS. LYMPHOCYTES Latest Ref Range: 0.8 - 3.5 K/UL  1.0 1.3   ABS. MONOCYTES Latest Ref Range: 0.0 - 1.0 K/UL  0.4 0.8   ABS. EOSINOPHILS Latest Ref Range: 0.0 - 0.4 K/UL  0.2 0.0   ABS. BASOPHILS Latest Ref Range: 0.0 - 0.1 K/UL  0.0 0.0     Results for Jennifer Gunn (MRN [id number]) as of 5/15/2017 22:37   Ref.  Range 5/11/2017 03:34   Sodium Latest Ref Range: 136 - 145 mmol/L 141   Potassium Latest Ref Range: 3.5 - 5.1 mmol/L 4.4   Chloride Latest Ref Range: 97 - 108 mmol/L 110 (H)   CO2 Latest Ref Range: 21 - 32 mmol/L 24   Anion gap Latest Ref Range: 5 - 15 mmol/L 7   Glucose Latest Ref Range: 65 - 100 mg/dL 176 (H)   BUN Latest Ref Range: 6 - 20 MG/DL 16   Creatinine Latest Ref Range: 0.55 - 1.02 MG/DL 0.68   BUN/Creatinine ratio Latest Ref Range: 12 - 20   24 (H)   Calcium Latest Ref Range: 8.5 - 10.1 MG/DL 8.3 (L)   GFR est non-AA Latest Ref Range: >60 ml/min/1.73m2 >60 GFR est AA Latest Ref Range: >60 ml/min/1.73m2 >60       Lab Results   Component Value Date/Time    Protein, total 6.8 05/10/2017 12:27 AM    Albumin 2.9 05/10/2017 12:27 AM       Currently this patient has:  [x] Supplemental O2 [] Peripheral IV  [x] PICC    [x] R-IJ   [x] Jama Catheter [] NG Tube   [] PEG Tube [] Ostomy    [] AICD: Has ICD been deactivated?   [] Yes [] No:______    Progression to DEPENDENCE WITH ADLs (include time frame): several months  x Dependent for bathing: personal hygiene and grooming   X Dependent for dressing: dressing and undressing   X Dependent for transferring: movement and mobility   X Dependent for toileting: continence-related tasks including control and hygiene   - Dependent for eating: preparing food and feeding        Current Facility-Administered Medications:     dextrose 10 % infusion 125-250 mL, 125-250 mL, IntraVENous, PRN, Chyrel Osler [place], MD    LORazepam (ATIVAN) injection 1 mg, 1 mg, IntraVENous, Q1H PRN, Fran Navarro MD, 1 mg at 05/16/17 1042    morphine injection 4 mg, 4 mg, IntraVENous, Q5MIN PRN, Ju Blankenship MD, 4 mg at 05/15/17 1215    polyethylene glycol (MIRALAX) packet 17 g, 17 g, Oral, BID, Kobe Woodall MD, 17 g at 05/16/17 0907    oxyCODONE IR (ROXICODONE) tablet 60 mg, 60 mg, Oral, Q3H WA, Jonas Sanz MD, 60 mg at 05/16/17 0908    morphine (PF)  mg/30 ml, , IntraVENous, CONTINUOUS, Lenny Brown MD    morphine injection 6 mg, 6 mg, IntraVENous, Q2H PRN, Lenny Brown MD, 6 mg at 05/16/17 0324    sodium chloride (NS) flush 20 mL, 20 mL, InterCATHeter, PRN, Venessa Casarez MD, 40 mL at 05/14/17 1545    sodium chloride (NS) flush 10 mL, 10 mL, InterCATHeter, Q24H, Venessa Casarez MD, 10 mL at 05/16/17 1042    sodium chloride (NS) flush 10 mL, 10 mL, InterCATHeter, PRN, Venessa Casarez MD    sodium chloride (NS) flush 10 mL, 10 mL, InterCATHeter, Q8H, Venessa Casarez MD, 10 mL at 05/16/17 0645    alteplase (CATHFLO) 1 mg in sterile water (preservative free) 1 mL injection, 1 mg, InterCATHeter, PRN, Lida Shrestha MD    bacitracin 500 unit/gram packet 1 Packet, 1 Packet, Topical, PRN, Lida Shrestha MD    0.9% sodium chloride infusion 250 mL, 250 mL, IntraVENous, PRN, Chani Rodriguez MD    acetaminophen (TYLENOL) tablet 1,000 mg, 1,000 mg, Oral, TID, Radha Kay MD, 1,000 mg at 05/16/17 0644    metoprolol tartrate (LOPRESSOR) tablet 25 mg, 25 mg, Oral, BID, Chani Rodriguez MD, 25 mg at 05/16/17 0900    DULoxetine (CYMBALTA) capsule 30 mg, 30 mg, Oral, DAILY, Sae Kiser MD, 30 mg at 05/16/17 0908    gabapentin (NEURONTIN) capsule 300 mg, 300 mg, Oral, BID, Sae Kiser MD, 300 mg at 05/16/17 0907    senna (SENOKOT) tablet 17.2 mg, 2 Tab, Oral, BID, Sae Kiser MD, 17.2 mg at 05/16/17 0907    LORazepam (ATIVAN) tablet 1 mg, 1 mg, Oral, Q6H PRN, Sae Kiser MD, 1 mg at 05/14/17 0548    0.9% sodium chloride infusion, 25 mL/hr, IntraVENous, CONTINUOUS, Radha Kay MD, Stopped at 05/15/17 1000    insulin lispro (HUMALOG) injection, , SubCUTAneous, AC&HS, Lucy Del Castillo MD, Stopped at 05/15/17 2200    glucose chewable tablet 16 g, 4 Tab, Oral, PRN, Lucy Del Castillo MD    glucagon (GLUCAGEN) injection 1 mg, 1 mg, IntraMUSCular, PRN, Lucy Del Castillo MD    sodium chloride (NS) flush 5-10 mL, 5-10 mL, IntraVENous, Q8H, Trae Phelan MD, 10 mL at 05/16/17 0645    sodium chloride (NS) flush 5-10 mL, 5-10 mL, IntraVENous, PRN, Trae Phelan MD, 40 mL at 05/10/17 0151    ASSESSMENT & PLAN     1. Symptom Issues Identified:  Severe pain with movement, must lie prone, anorexia, fatigue     2. Spiritual Issues Identified: Spiritual belief gives comfort    3. Psych/ Social/ Emotional Issues Identified:  Lives with mother Chandni Pierre. Her father is disabled. Pre-bereavement helpful.     4.  Care Coordination:   Transfer to: [facility]    Report given to:  Dr. Aggarwal , charge nurse     Transportation by: [facility]   Scheduled for 1:00pm    Medications Needs: PCA opioid    DME: Hospital bed, oxygen PrN    Supplies: chux

## 2017-05-16 NOTE — PROGRESS NOTES
Kel Martinez MD       Hospitalist Progress Note     5/16/2017   PCP:  Dr. Jojo Delgadillo MD  Right thigh pain      Admission Summary:   A 40-year-old -American female with past medical history of endometriosis, uterine carcinoma, type 2 diabetes mellitus, elevated liver function tests, hypertension, iron-deficiency anemia, menometrorrhagia, anemia, morbid obesity, general debility, depression, presented to the emergency department from home with chief complaint of intractable right thigh pain. In the ER patient was noted hypoxic and was treated for acute respiratory failure possibly due to PE. CTA of chest,no PE. Patient rt thigh pain is caused by a pathologic fracture from her metastatic uterine cancer,no treatment. Palliative care consulted. ICU care d/mary 5/10,transfer to floor. Patient has advanced cancer with metastasis no treatment available at this stage. Plan is for transfer to hospice after picc line is placed on 5/15/2017,in OR under anesthesia since patient can not change position due to severe pain. Reason For Visit:  Pain, SOB    The patient comfortably resting on bed, complains of no pain, nausea or vomiting    Assessment/Plan   Acute respiratory failure with hypoxia (POA)   -Resoled. PE  Ruled out   - CTA chest on 5/9,negative for PE  - Lovenox d/c  -O2sat on RA 99% now. Intractable right thigh pain (POA) due   Pathologic fracture of the subtrochanteric right femur,acute  - Xray right femur 5/8 suboptimal but no fracture or acute abnormality seen  - Xray right tib/fib 5/8 with no acute abnormality  - Duplex right LE 5/8 without DVT but again, suboptimal  - Right soft tissue US 5/9 shows nonspecific soft tissue edema. No defined hematoma identified  - Obtain CT leg on 5/9:Pathologic fracture of the subtrochanteric right femur is likely acute given the clinical history and imaging appearance  - PCA for pain  - Palliative care consult. Patient medications adjusted. Pain controlled today    Tachycardia (POA) - due pain,anemia  - ECG with sinus tachycardia, non-specific T-wave abnormalities  - Monitor  -Pain control  -Pulmonary saw pt and assessed tachycardia due to pain    Hypotension (POA)   -Resolved    Leukocytosis (POA) - unclear cause,possibly due to malignancy  - Check UA  - CXR no pneumonia  - Hold off on empiric antibiotics  -Blood cx negative    Anemia (chronic) - patient with substantial drop since , monitor for bleeding  - Monitor HH  - Received 1 unit prbc on 5/10 and repeated Hgb 7.3    Type 2 diabetes mellitus (chronic)  - Humalog insulin correctional coverage, schedule Accu-Cheks. Endometrial uterine carcinoma (chronic) with metastasis- I spoke with Dr. Elli Saleh' nurse and there is no further treatment, even palliative, available  - Palliative care consult.  -Dr Afshin Archer who knows patient has discussed further plan with patient and parent. Plan: Discharge to hospice house today    See orders for other plans. VTE prophylaxis: SCDs  Discussed plan of care with Patient/Family and Nurse   Pre-admission lived at home  Discharge plan: hospice  Estimated time to discharge: today     Subjective   Ms. Isi Mejia has no complaint today. Mother sitting at bedside.     Physical examination     Visit Vitals    /76 (BP 1 Location: Right arm, BP Patient Position: Prone)    Pulse (!) 106    Temp 98.7 °F (37.1 °C)    Resp 16    Ht 5' 6\" (1.676 m)    Wt 121.4 kg (267 lb 10.2 oz)    SpO2 94%    BMI 43.2 kg/m2       Temp (24hrs), Av.5 °F (36.9 °C), Min:98.1 °F (36.7 °C), Max:98.9 °F (37.2 °C)      Intake/Output Summary (Last 24 hours) at 17 0808  Last data filed at 17 0646   Gross per 24 hour   Intake              600 ml   Output             1800 ml   Net            -1200 ml       Gen - NAD,looks more optimistic today  ENT - MMM  Neck - supple, full ROM  CV - RRR, no MRG  Resp - lungs CTA b/l  GI - unable to assess abdomen  Ext - b/l edema, right leg mildly tense but pulses 2+, good cap refill  Neuro - A&O, no focal deficits    Data Review     Telemetry    ECG    Xray    CT scan    MRI    Echocardiogram          I have reviewed the flow sheet and recent notes  New labs and data below personally reviewed. No results for input(s): WBC, HGB, HCT, PLT, HGBEXT, HCTEXT, PLTEXT, HGBEXT, HCTEXT, PLTEXT in the last 72 hours. No results for input(s): NA, K, CL, CO2, GLU, BUN, CREA, CA, MG, PHOS, ALB, TBIL, TBILI, SGOT, ALT, INR in the last 72 hours.     No lab exists for component: INREXT, INREXT    Medications reviewed  Current Facility-Administered Medications   Medication Dose Route Frequency    morphine injection 4 mg  4 mg IntraVENous Q5MIN PRN    polyethylene glycol (MIRALAX) packet 17 g  17 g Oral BID    oxyCODONE IR (ROXICODONE) tablet 60 mg  60 mg Oral Q3H WA    morphine (PF)  mg/30 ml   IntraVENous CONTINUOUS    morphine injection 6 mg  6 mg IntraVENous Q2H PRN    sodium chloride (NS) flush 20 mL  20 mL InterCATHeter PRN    sodium chloride (NS) flush 10 mL  10 mL InterCATHeter Q24H    sodium chloride (NS) flush 10 mL  10 mL InterCATHeter PRN    sodium chloride (NS) flush 10 mL  10 mL InterCATHeter Q8H    alteplase (CATHFLO) 1 mg in sterile water (preservative free) 1 mL injection  1 mg InterCATHeter PRN    bacitracin 500 unit/gram packet 1 Packet  1 Packet Topical PRN    0.9% sodium chloride infusion 250 mL  250 mL IntraVENous PRN    acetaminophen (TYLENOL) tablet 1,000 mg  1,000 mg Oral TID    metoprolol tartrate (LOPRESSOR) tablet 25 mg  25 mg Oral BID    DULoxetine (CYMBALTA) capsule 30 mg  30 mg Oral DAILY    gabapentin (NEURONTIN) capsule 300 mg  300 mg Oral BID    senna (SENOKOT) tablet 17.2 mg  2 Tab Oral BID    LORazepam (ATIVAN) tablet 1 mg  1 mg Oral Q6H PRN    0.9% sodium chloride infusion  25 mL/hr IntraVENous CONTINUOUS    insulin lispro (HUMALOG) injection   SubCUTAneous AC&HS    glucose chewable tablet 16 g  4 Tab Oral PRN    dextrose (D50W) injection syrg 12.5-25 g  12.5-25 g IntraVENous PRN    glucagon (GLUCAGEN) injection 1 mg  1 mg IntraMUSCular PRN    sodium chloride (NS) flush 5-10 mL  5-10 mL IntraVENous Q8H    sodium chloride (NS) flush 5-10 mL  5-10 mL IntraVENous PRN         Chayo Del Castillo MD  Internal Medicine  5/16/2017

## 2017-05-16 NOTE — PROGRESS NOTES
Bedside shift change report given to HERLINDA Payan (oncoming nurse) by Henrique Bishop RN (offgoing nurse). Report included the following information SBAR, Kardex, Intake/Output, MAR and Recent Results.

## 2017-05-16 NOTE — CONSULTS
Urology Consult    Patient: Irena Alvares MRN: [id number]  SSN: [id number]    YOB: 1980  Age: 39 y.o. Sex: female          Date of Consultation:  May 16, 2017  Requesting Physician: Chance Pizano MD  Reason for Consultation: Inability to place davila           Assessment/Plan:  Difficult davila. With nursing assist. 16F davila placed per urethra with efflux of clear, phil urine. May remove davila when not otherwise clinically indicated. History of Present Illness:  Patient is a 39 y.o. female admitted 5/8/2017 to the hospital for Acute respiratory failure with hypoxia ([facility] [address]  UNKNOWN. She is a 61-year-old -American female with past medical history of endometriosis, uterine carcinoma, type 2 diabetes mellitus, elevated liver function tests, hypertension, iron-deficiency anemia, menometrorrhagia, anemia, morbid obesity, general debility, depression, presented to the emergency department from home with chief complaint of intractable right thigh pain. IR called urology due to inability to place a davila    Past Medical History: Allergies   Allergen Reactions    Egg Nausea and Vomiting     Raw egg and scrambled eggs. Egg products okay.  Pcn [Penicillins] Nausea and Vomiting     \"but could take amoxil\"    Shellfish Containing Products Unknown (comments)    Tetanus And Diphtheria Toxoids, Adsorbed, Adult Other (comments)     Developed local swelling, axillary pain, and transient fever    Tomato Unknown (comments)      Prior to Admission medications    Medication Sig Start Date End Date Taking? Authorizing Provider   oxyCODONE IR (OXY-IR) 30 mg immediate release tablet Take 2 Tabs by mouth every three (3) hours (while awake). Max Daily Amount: 360 mg. 5/16/17  Yes Chance Pizano MD   metoprolol tartrate (LOPRESSOR) 25 mg tablet Take 1 Tab by mouth two (2) times a day.  5/16/17  Yes Chance Pizano MD   LORazepam (ATIVAN) 1 mg tablet Take 1 Tab by mouth every six (6) hours as needed for Anxiety. Max Daily Amount: 4 mg. 5/16/17  Yes Meaghan Garcia MD   gabapentin (NEURONTIN) 300 mg capsule Take 1 Cap by mouth two (2) times a day. 5/16/17  Yes Meaghan Garcia MD   DULoxetine (CYMBALTA) 30 mg capsule Take 1 Cap by mouth daily. 5/16/17  Yes Meaghan Garcia MD   MORPHINE SULFATE/PF (MORPHINE, PF,) 150 mg/30 mL PCAS 4 mg/hr by IntraVENous route continuous. 4mg q 6min Max of 44 mg in one hour 5/16/17  Yes Meaghan Garcia MD   DULoxetine (CYMBALTA) 20 mg capsule Take 40 mg by mouth daily. Indications: ANXIETY WITH DEPRESSION, NEUROPATHIC PAIN   Yes Historical Provider   oxyCODONE IR (ROXICODONE) 10 mg tab immediate release tablet Take 30 mg by mouth every four (4) hours as needed for Pain. Indications: Pain   Yes Historical Provider   albuterol (PROVENTIL HFA, VENTOLIN HFA, PROAIR HFA) 90 mcg/actuation inhaler Take 1 Puff by inhalation every six (6) hours as needed for Wheezing. 5/3/17  Yes Elaine Garduno MD   cholecalciferol, vitamin D3, (VITAMIN D3) 2,000 unit tab Take  by mouth daily. Yes Historical Provider   fentaNYL (DURAGESIC) 100 mcg/hr PATCH 2 Patches by TransDERmal route every seventy-two (72) hours for 30 days. Max Daily Amount: 2 Patches. 6/20/17 7/20/17 Yes Elaine Garduno MD   lisinopril (PRINIVIL, ZESTRIL) 10 mg tablet TAKE 1 TABLET BY MOUTH DAILY 4/3/17  Yes Elaine Garduno MD   metoprolol succinate (TOPROL-XL) 25 mg XL tablet TAKE 1 TABLET BY MOUTH DAILY 4/3/17  Yes Elaine Garduno MD   clotrimazole (LOTRIMIN) 1 % topical cream Apply 1 Each to affected area two (2) times a day. Apply to feet affected 3/6/17  Yes Elaine Garduno MD   sennosides (SENNA) 8.6 mg cap Take 2 Tabs by mouth two (2) times a day. 1/30/17  Yes Elaine Garduno MD   acetaminophen (TYLENOL ARTHRITIS PAIN) 650 mg CR tablet Take 650 mg by mouth every six (6) hours as needed for Pain. Yes Historical Provider   LORazepam (ATIVAN) 1 mg tablet Take 1 Tab by mouth every four (4) hours as needed for Anxiety.  Max Daily Amount: 6 mg. 17  Yes Laura Smith MD   nystatin (MYCOSTATIN) powder Apply  to affected area four (4) times daily as needed. Yeast 17  Yes Laura Smith MD   docusate sodium (COLACE) 100 mg capsule Take 100 mg by mouth two (2) times a day. Yes Historical Provider   ibuprofen (ADVIL) 200 mg tablet Take 200 mg by mouth every six (6) hours as needed for Pain. Yes Historical Provider   gabapentin (NEURONTIN) 300 mg capsule TAKE 2 CAPSULES BY MOUTH TWICE A DAY 16  Yes Erin Granados MD   metFORMIN (GLUCOPHAGE) 500 mg tablet TAKE 1 TAB BY MOUTH TWO (2) TIMES DAILY (WITH MEALS). 16  Yes El Duque MD   polyethylene glycol [facility]) 17 gram packet Take 17 g by mouth daily. Yes Anna Christian MD      PMHx:  has a past medical history of Cancer ([facility] [address] CVI (common variable immunodeficiency) ([facility] [address] Diabetes ([facility] [address] Elevated liver function tests (10/21/2010); Endometrial cancer ([address] (2010); Hypertension; Iron deficiency anemia; Menometrorrhagia (10/21/2010); Microcytic anemia (10/21/2010); Morbid obesity ([facility] [address] Prediabetes (2010); Psychiatric disorder; and Radiation. PSurgHx:  has a past surgical history that includes gyn and cardiac surg procedure unlist.  PSocHx:  reports that she has never smoked. She has never used smokeless tobacco. She reports that she drinks alcohol. She reports that she does not use illicit drugs. ROS:  Admission ROS by Ryan Lozoya MD from 2017 were reviewed with the patient and changes (other than per HPI) include: none. Physical Exam:    Vitals:   Temp (24hrs), Av.6 °F (37 °C), Min:98.3 °F (36.8 °C), Max:98.7 °F (37.1 °C)   Blood pressure 133/76, pulse 100, temperature 98.5 °F (36.9 °C), resp. rate 16, height 5' 6\" (1.676 m), weight 121.4 kg (267 lb 10.2 oz), SpO2 100 %.   I&O's:     GENERAL: WD, obese female, under anesthesia  SKIN:  No clubbing, cyanosis, or edema  ABDOMEN: Soft, obese  FLANKS: No masses  BLADDER: Not palpable    Lab Results   Component Value Date/Time    WBC 15.2 05/11/2017 03:34 AM    HCT 24.6 05/11/2017 03:34 AM    PLATELET 574 44/41/7864 03:34 AM    Sodium 141 05/11/2017 03:34 AM    Potassium 4.4 05/11/2017 03:34 AM    Chloride 110 05/11/2017 03:34 AM    CO2 24 05/11/2017 03:34 AM    BUN 16 05/11/2017 03:34 AM    Creatinine 0.68 05/11/2017 03:34 AM    Glucose 176 05/11/2017 03:34 AM    Calcium 8.3 05/11/2017 03:34 AM    Magnesium 1.6 11/20/2014 03:30 PM    INR 1.1 05/10/2017 12:48 AM       UA:   Lab Results   Component Value Date/Time    Color DARK YELLOW 05/17/2015 11:45 AM    Appearance CLOUDY 05/17/2015 11:45 AM    Specific gravity >1.030 05/17/2015 11:45 AM    pH (UA) 6.0 05/17/2015 11:45 AM    Protein 30 05/17/2015 11:45 AM    Glucose NEGATIVE  05/17/2015 11:45 AM    Ketone TRACE 05/17/2015 11:45 AM    Bilirubin NEGATIVE  06/06/2010 03:00 AM    Urobilinogen 1.0 05/17/2015 11:45 AM    Nitrites NEGATIVE  05/17/2015 11:45 AM    Leukocyte Esterase SMALL 05/17/2015 11:45 AM    Epithelial cells MANY 05/17/2015 11:45 AM    Bacteria 2+ 05/17/2015 11:45 AM    WBC 5-10 05/17/2015 11:45 AM    RBC 0-5 05/17/2015 11:45 AM       Cultures:     Xrays:     Signed By: Rocael Rosales MD  - May 16, 2017

## 2017-05-16 NOTE — PROGRESS NOTES
Reviewed medical chart; patient will travel via [facility] at [address] [facility] today to the [facility]. Transportation envelope is on the hard chart. Nurse will need to add the [facility], Kardex, discharge instructions, and DNR paperwork. Nurse will also need to call report to the [facility] at (K#[phone], [facility] [address] [facility]). Spoke with the [facility] liaison this morning to confirm that the patient's mother can ride with her in the ambulance. No other discharge needs at this time.    ADAM Danielle

## 2017-05-16 NOTE — PROGRESS NOTES
Bedside and Verbal shift change report given to Vitaly Meyer RN (oncoming nurse) by Ulises Pace RN (offgoing nurse). Report included the following information SBAR, Kardex, ED Summary, Procedure Summary, Intake/Output, MAR and Recent Results.

## 2017-05-16 NOTE — PROGRESS NOTES
NUTRITION    RD PLAN OF CARE:   Chart reviewed. Pt is admitted with Acute respiratory failure with hypoxia ([address] Current Diet: regular   Pt is currently being cared for by Hospice services. Aggressive nutrition intervention is not indicated at this time. Will follow and assist as needed.     Dee Eaton RD

## 2017-05-16 NOTE — HOSPICE
[facility]  [phone]    Patient Name: Abiola Bates  YOB: 1980  Age: 39 y.o. [address] [facility] RN Note:      Pt to be transferred to [facility]. Transport set up for 1:00pm with [facility] transport. Thank you for the opportunity to be of service to this patient.     Nida Leyva RN

## 2017-05-16 NOTE — HOSPICE
[facility]  [phone]    Patient Name: Jennifer Mehta  YOB: 1980  Age: 39 y.o. [facility] RN Note:     Pt H&P and facesheet faxed to [facility]. Rx for Morphine PCA will be faxed later. Spoke to Dr. Jamila Davila and he ordered Ativan IV PRN for pt comfort prior to transport. Thank you for the opportunity to be of service to this patient.     Chema Felix RN

## 2017-05-16 NOTE — H&P
[facility]  [phone]    Patient Name: Kelsi Yang  YOB: 1980    Date of Provider Hospice Visit: 05/16/17    Level of Care:   [x] General Inpatient (GIP)    [] Routine   [] Respite    Location of Care:  [] [facility] [] [facility] [] [facility] [] [facility] [x] [facility]  [] Home [] Other:      Date of Original Hospice Admission: 5-16=17  Hospice Attending: Dr. Ezio Rizvi Diagnosis:  Endometrial cancer with mets to liver and bone  Diagnoses RELATED to the terminal prognosis: enlarged tumor of right sacrum, right iliac bone  Other Diagnoses: GERD, DM, Insomnia     HOSPICE NARRATIVE COMPOSED BY PHYSICIAN   Rationale for a prognosis of life expectancy of 6 months or less if the disease follows its normal course:  Kelsi Yang is a 39 y.o. with a past history of metastatic endometrial cancer (mets to liver and bone) who was admitted on 5/8/2017 from home after Palliative RN visited her and found her to have worsening RLE pain and swelling. By doppler and imaging she does not have DVT/PE. CT of pelvis and right leg show enlarging tumor involving right sacrum, right iliac bone, bilateral rami and proximal right femur (which appears acute). L5 and right sacral nerves are involved with tumor and pressure of tumor creates compression of right femoral and external iliac veins causing stasis. Current focus is pain control, mobility and safe disposition as mother (only caregiver) cannot care for patient at home without full support. The patient/family chose comfort measures with the support of Hospice. HOSPICE DIAGNOSES   Active Symptoms:  1. Severe pain and swelling  2. Generalized bone pain, neoplasm related  3. Shortness of breath  4. Debility with generalized weakness  5. Constipation   6. Anxiety/depression     PLAN   1.  Admit to GIP level of care due to the changing requirements and continuous monitoring of patients pain and symptom medications which have been minimally controlled, will need PCA pump continuous and PCA dosing when CADD prism pump arrives  2. Morphine 4mg IV Q 1hr as needed for pain and shortness of breath, CADD pump setting of 4mg/hr basal and 4mg IV q6 mt LO with morphine  3. Continue lying prone for comfort  4. Miralax prn  5. Ativan for anxiety and sleep, cymbalta for depression and anxiety  6. Needs intensive planning for discharge to home as mother needs support       and SW to support family needs  Disposition: home when symptoms are managed    Prognosis estimated based on 05/16/17 clinical assessment is:   [] Few to Many Hours  [] Few to Many Days    [] Few to Many Weeks    [x] Few to Many Months    Communicated plan of care with: Hospice Case Manager; Hospice IDT; Care Team     GOALS OF CARE     Resuscitation Status: DNR  Durable DNR: [x] Yes [] No    Advance Care Planning 5/10/2017   Patient's Healthcare Decision Maker is: Legal Next of Andrea 69   Primary Decision Maker Name Cody Neither   Primary Decision Maker Phone Number -   Primary Decision Maker Relationship to Patient Parent   Confirm Advance Directive Yes, on file        HISTORY     History obtained from: chart, family, nursing    CHIEF COMPLAINT: pain in her sacral area rated 9/10  The patient is:   [x] Verbal  [] Nonverbal  [] Unresponsive    HPI/SUBJECTIVE: 39year old female with hx of endometrial cancer with metastatic disease with severe pain of the sacral area and generalized bone pain       REVIEW OF SYSTEMS     The following systems were: [x] reviewed  [] unable to be reviewed    Positive ROS include:  Constitutional:   Ears/nose/mouth/throat:  Respiratory:  Gastrointestinal:  Musculoskeletal:lying prone, cannot tolerate lying on her back due to sacral tumor, states her pain is real bad at this time 8-9/10.    Neurologic:  Psychiatric:anxiety  Endocrine:            FUNCTIONAL ASSESSMENT     Palliative Performance Scale (PPS): 30% PSYCHOSOCIAL/SPIRITUAL ASSESSMENT     Active Problems:    * No active hospital problems. *    Past Medical History:   Diagnosis Date    Cancer [facility])     uterine    CVI (common variable immunodeficiency) (HCC)     Diabetes ([facility]     Elevated liver function tests 10/21/2010    Endometrial cancer ([facility] 7/7/2010    Hypertension     Iron deficiency anemia     Menometrorrhagia 10/21/2010    Microcytic anemia 10/21/2010    Morbid obesity ([facility]     Prediabetes 7/7/2010    Psychiatric disorder     depression    Radiation     completed radiation mid-march      Past Surgical History:   Procedure Laterality Date    CARDIAC SURG PROCEDURE UNLIST      hx of tachycardia    HX GYN      hysterectomy      Social History   Substance Use Topics    Smoking status: Never Smoker    Smokeless tobacco: Never Used    Alcohol use Yes     Family History   Problem Relation Age of Onset    Hypertension Mother     Hypertension Father     Stroke Maternal Grandfather     Cancer Paternal Grandmother      breast    Diabetes Paternal Grandmother       Allergies   Allergen Reactions    Egg Nausea and Vomiting     Raw egg and scrambled eggs. Egg products okay.      Pcn [Penicillins] Nausea and Vomiting     \"but could take amoxil\"    Shellfish Containing Products Unknown (comments)    Tetanus And Diphtheria Toxoids, Adsorbed, Adult Other (comments)     Developed local swelling, axillary pain, and transient fever    Tomato Unknown (comments)      Current Facility-Administered Medications   Medication Dose Route Frequency    morphine injection 4 mg  4 mg IntraVENous Q1H PRN    LORazepam (ATIVAN) injection 1 mg  1 mg IntraVENous Q4H PRN    gabapentin (NEURONTIN) capsule 300 mg  300 mg Oral TID    [START ON 5/17/2017] DULoxetine (CYMBALTA) capsule 30 mg  30 mg Oral DAILY    [START ON 5/17/2017] polyethylene glycol (MIRALAX) packet 17 g  17 g Oral DAILY    [START ON 5/17/2017] docusate sodium (COLACE) capsule 100 mg  100 mg Oral DAILY    acetaminophen (TYLENOL) tablet 650 mg  650 mg Oral Q4H PRN    [START ON 5/17/2017] metoprolol succinate (TOPROL-XL) XL tablet 25 mg  25 mg Oral DAILY    Morphine 10mg/mL PCA 500mL   IntraVENous CONTINUOUS        PHYSICAL EXAM     Wt Readings from Last 3 Encounters:   05/10/17 121.4 kg (267 lb 10.2 oz)   02/16/17 136.1 kg (300 lb)   02/02/16 127 kg (280 lb)       Visit Vitals    /70 (BP 1 Location: Right arm, BP Patient Position: At rest;Prone)    Pulse (!) 107    Temp 98.5 °F (36.9 °C)    Resp 14    SpO2 95%       Supplemental O2  [x] Yes  as needed [] NO  Last bowel movement:     Currently this patient has:  [x] Peripheral IV [] PICC  [] PORT [] ICD    [x] Jama Catheter [] NG Tube   [] PEG Tube    [] Rectal Tube [] Drain  [] Other:     Constitutional: pt is awake and laert, lying prone complains of pain in her sacral area 8/10  Eyes: cl  ENMT: cl  Cardiovascular: HRRR  Respiratory: cl  Gastrointestinal: NE  Musculoskeletal: weakness, lying prone  Skin: warm, dry  Neurologic: ne  Psychiatric: calm  Other:    Pertinent Lab and or Imaging Tests:  Lab Results   Component Value Date/Time    Sodium 141 05/11/2017 03:34 AM    Potassium 4.4 05/11/2017 03:34 AM    Chloride 110 05/11/2017 03:34 AM    CO2 24 05/11/2017 03:34 AM    Anion gap 7 05/11/2017 03:34 AM    Glucose 176 05/11/2017 03:34 AM    BUN 16 05/11/2017 03:34 AM    Creatinine 0.68 05/11/2017 03:34 AM    BUN/Creatinine ratio 24 05/11/2017 03:34 AM    GFR est AA >60 05/11/2017 03:34 AM    GFR est non-AA >60 05/11/2017 03:34 AM    Calcium 8.3 05/11/2017 03:34 AM     Lab Results   Component Value Date/Time    Protein, total 6.8 05/10/2017 12:27 AM    Albumin 2.9 05/10/2017 12:27 AM           Total time: 90  Counseling / coordination time: 45  > 50% counseling / coordination?: el Ford MD MD Addendum    Pt seen & case discussed with Ms. Lane Eldridge. Agree with current care plan as outlined in her note.    Recommend following changes:  Continue morphine PCA and extra nurse given IV morphine 4mg q1h prn severe pain. Assess use of morphine and response, consider switch to methadone and titrate up the dose for comfort while weaning off morphine PCA. Will continue to follow for comfort and adjust medications/care plan accordingly.

## 2017-05-16 NOTE — PROGRESS NOTES
1410  Pt arrived at the [facility]. Pt was alert and oriented. Pt reported pain was 7/10 in her right hip. Pt was transferred to the bed using a transfer board. Pt remains on her left side or her stomach. Lungs clear. No cough noted.  + bowel sounds. Last reported BM this Am. Jama is draining phil urine. Pt has +2-3 edema in her LE. Pt has a PICC in her right UA. Dressing is clear and intact. Pt's Mother arrived with Ms Renae Oseguera. 1455  Pt medicated with Morphine IV   1530  Pt sleeping. No complaints. 36  Pt's Mother continues to be at her bedside. Rn educated on   1757  Morphine PCA started. CADD pump infusing Morphine at 4mg/hr with 4 mg every 6 mins as needed. Infusing in to right PICC in upper arm. Pt appreciative of nursing care. 1830  Pt sleeping. No facial grimacing at this time. 1900  Report given. NAME OF PATIENT:  Jyoti Randall    LEVEL OF CARE:  GIP    REASON FOR GIP:   Pain, despite numerous changes in medications and Medication adjustment that must be monitored 24/7    *PATIENT REMAINS ELIGIBLE FOR [facility] LEVEL OF CARE AS EVIDENCED BY: (MUST BE ADDRESSED OF PATIENT GIP)      O2 SAFETY:  RA      FALL INTERVENTIONS PROVIDED:   Implemented/recommended assistive devices and encouraged their use and Implemented/recommended environmental changes (remove hazards, lower bed, improve lighting, etc.)    INTERDISPLINARY COMMUNICATION/COLLABORATION:  Physician, MSW, [place] and RN, CNA    NEW MEDICATION INITIATION DOCUMENTATION:  PCA Morphine 4mg/hr with 4mg q 6 mins.       Reason medication is being initiated:  Pain    MD / Provider name consulted re: change in status / initiation of new medication:  So Aaron NP    New Symptom(s):  Pain    New Order(s):  See above    Name of the person notified of the changes:  Pt's Mother    Name of person being taught: Pt's Mother    Instructions given:  yes    Side Effects taught:  yes    Response to teaching:  yes      COMFORTABLE DYING MEASURE:  Is Patient/family satisfied with symptom level?  yes    DISCHARGE PLAN:  Pt will remain at the [facility] until her pain is controlled and then she will return home to her Mother's care.

## 2017-05-16 NOTE — PROGRESS NOTES
Occupational Therapy Screening:  Services are not indicated at this time. [facility] based on results obtained during the nursing admission assessment. The patients chart was reviewed and the patient is not appropriate for a skilled therapy evaluation at this time. Please consult occupational therapy if any therapy needs arise. Thank you.     Pierce Sal

## 2017-05-16 NOTE — DISCHARGE INSTRUCTIONS
DISCHARGE SUMMARY from Nurse    The following personal items are in your possession at time of discharge:    Dental Appliances: Other (comment)  Visual Aid: None                            PATIENT INSTRUCTIONS:    After general anesthesia or intravenous sedation, for 24 hours or while taking prescription Narcotics:  · Limit your activities  · Do not drive and operate hazardous machinery  · Do not make important personal or business decisions  · Do  not drink alcoholic beverages  · If you have not urinated within 8 hours after discharge, please contact your surgeon on call. Report the following to your surgeon:  · Excessive pain, swelling, redness or odor of or around the surgical area  · Temperature over 100.5  · Nausea and vomiting lasting longer than 4 hours or if unable to take medications  · Any signs of decreased circulation or nerve impairment to extremity: change in color, persistent  numbness, tingling, coldness or increase pain  · Any questions              *  Please give a list of your current medications to your Primary Care Provider. *  Please update this list whenever your medications are discontinued, doses are      changed, or new medications (including over-the-counter products) are added. *  Please carry medication information at all times in case of emergency situations. These are general instructions for a healthy lifestyle:    No smoking/ No tobacco products/ Avoid exposure to second hand smoke    Surgeon General's Warning:  Quitting smoking now greatly reduces serious risk to your health.     Obesity, smoking, and sedentary lifestyle greatly increases your risk for illness    A healthy diet, regular physical exercise & weight monitoring are important for maintaining a healthy lifestyle    You may be retaining fluid if you have a history of heart failure or if you experience any of the following symptoms:  Weight gain of 3 pounds or more overnight or 5 pounds in a week, increased swelling in our hands or feet or shortness of breath while lying flat in bed. Please call your doctor as soon as you notice any of these symptoms; do not wait until your next office visit. Recognize signs and symptoms of STROKE:    F-face looks uneven    A-arms unable to move or move unevenly    S-speech slurred or non-existent    T-time-call 911 as soon as signs and symptoms begin-DO NOT go       Back to bed or wait to see if you get better-TIME IS BRAIN. Warning Signs of HEART ATTACK     Call 911 if you have these symptoms:   Chest discomfort. Most heart attacks involve discomfort in the center of the chest that lasts more than a few minutes, or that goes away and comes back. It can feel like uncomfortable pressure, squeezing, fullness, or pain.  Discomfort in other areas of the upper body. Symptoms can include pain or discomfort in one or both arms, the back, neck, jaw, or stomach.  Shortness of breath with or without chest discomfort.  Other signs may include breaking out in a cold sweat, nausea, or lightheadedness. Don't wait more than five minutes to call 911 - MINUTES MATTER! Fast action can save your life. Calling 911 is almost always the fastest way to get lifesaving treatment. Emergency Medical Services staff can begin treatment when they arrive -- up to an hour sooner than if someone gets to the hospital by car. The discharge information has been reviewed with the patient. The patient verbalized understanding. Discharge medications reviewed with the patient and appropriate educational materials and side effects teaching were provided.

## 2017-05-17 NOTE — PROGRESS NOTES
Placed tc to patient's mother Monik Lee to schedule a time to meet at the [facility]. Mother relayed belief she would be able to meet either tomorrow or Friday - she will call msw.

## 2017-05-17 NOTE — PROGRESS NOTES
[address] Initial Visit and Spiritual Assessment     I visited the room of this pt who is at  the [facility] on GIP status. When I visited her, she was in bed, awake and alert, not agitated, not restless, and appeared well medicated and comfortable. There was no family present. I  introduced myself, discussed my role as  within the care team and offered on-going pastoral care and spiritual support. The pt thanked me for visiting and my offer of support. I learned that she and her family attend [facility] [address] on [address] [place] [address]. She did appear to use geovanny as a coping mechanism  And said she loved to [place] and hear the Word of the Lord . I suggested I read a Psalms and a passage from [employer]. She would recite passages she knew by heart with me as I read. She enjoyed the visit and asked me to come back often and I said I would.   This morning, I provided a ministry of presence, active listening to the pt, read Psalms and Scripture, offered words of comfort, assurance, spiritual encouragement, as well as offered a prayer. I also left my contact information in her room and indicated she could reach me if needed or desired. GOALS:  Continue to visit this pt and her family while she is at the [facility] and I am in the facility, to provide pastoral care and spiritual support to assist both the pt and them with coping with this difficult medical situation and decline. Continue to build trust and familiarity with the family to encourage a discussion of their spiritual thoughts and concerns at a deeper and more personal level if they so choose. Validate the emotions and normalize the anticipatory grief of the family regarding this declining situation. Offer written spiritual material to the family as needed or requested and provide a listening presence when needed.    PLAN:   Visit this pt and her family, while she is @ [facility] and I am in this facility, or PRN as requested. Coordinate with Care Team on POC.  VISIT FREQUENCY:   1 wk.2 starting 5/17 plus 4 prn 14 days.    [place], [address]   [phone]

## 2017-05-17 NOTE — PROGRESS NOTES
0700  Report received. 0730  Pt lying in bed, awake. Pt reports pain is a 5/10. Rn encouraged pt to hit her PCA. Pt hit the button and got relief. Lungs clear. No shortness of breath noted.  + bowel sounds. Last reported Bm was yest.  Jama is draining hpil urine. Skin is intact. Pt has a Fent 100 mcg. Patch on her right side. Right leg with +2-3 edema and tender to touch. Left leg with slight edema. CADD pump infusing Morphine 4mg/hr with 4mg q 6 mins. Pt reported relief at the end of assessment. 0900  Pt resting. No complaints at this time. 1100  Pt complained of pain not relieved by her PCA pump. Pt medicated with Morphine 4mg IV.    1145  Pt talking on the phone. No complaints at this time. 1215  Pt reports pain is a 6/10. Pt continues to utilize her pca bolus without relief. Pt medicated with Morphine 4 mg and Lorazepam.  Rn explained to Ms Arnie Condon that she was going to get a bath and needed additional meds. 1300  Pts Family left to go home. Pt sleeping. 1400  Pt medicated with Morphine 4 mg IV prior to bathing and turning and repositioning. 1430  Rn bathed pt's back and bottom. Pt requested Methadone to be taken prior to turning and repositioning. 1500  Pt sleeping. 1700  No complaint of pain at this time. Pt talking on the phone. [facility] visiting with Ms Arnie Condon. No complaints at this time. 1811  Pt complained of pain 5/10. Pt medicated with Morphine IV. Friends at the bedside. 1830  Pt resting. No complaints of pain. 1900  Report given. NAME OF PATIENT:  Hilda Randall    LEVEL OF CARE:  [facility]    REASON FOR GIP:   Pain, despite numerous changes in medications, Medication adjustment that must be monitored 24/7 and Stabilizing treatment that cannot take place at home    *PATIENT REMAINS ELIGIBLE FOR GIP LEVEL OF CARE AS EVIDENCED BY: (MUST BE ADDRESSED OF PATIENT GIP)  Pt has a Continuous  PCA infusing Morphine.   She continues to utilize her bolus dose. Pt asked for additional IV meds for pain not controlled by PCA. O2 SAFETY:  RA    FALL INTERVENTIONS PROVIDED:   Implemented/recommended use of fall risk identification flag to all team members    INTERDISPLINARY COMMUNICATION/COLLABORATION:  Physician, MSW, [place] and RN, CNA    NEW MEDICATION INITIATION DOCUMENTATION    COMFORTABLE DYING MEASURE:  Is Patient/family satisfied with symptom level?  no    DISCHARGE PLAN:  Pt will remain at the [facility] until her pain is manageable and then she will return home to her [facility] care and continue to be followed by Home Hospice.

## 2017-05-17 NOTE — PROGRESS NOTES
[address] [facility] Help to Those in Need  [phone]    Patient Name: Cecil Whiting  YOB: 1980     Date of Provider Hospice Visit: 5-17-17     Level of Care: [x] General Inpatient (GIP)  [] Routine   [] Respite     Location of Care:  [] [facility] [] [facility] [] [facility] [] [facility] [x] [facility]ospice [facility]  [] Home [] Other:      Date of Original Hospice Admission: 5-16=17  Hospice Attending: Dr. Cyndi Esteban Diagnosis:  Endometrial cancer with mets to liver and bone  Diagnoses RELATED to the terminal prognosis: enlarged tumor of right sacrum, right iliac bone  Other Diagnoses: GERD, DM, Insomnia     HOSPICE NARRATIVE COMPOSED BY PHYSICIAN   Rationale for a prognosis of life expectancy of 6 months or less if the disease follows its normal course:  Cecil Whiting is a 39 y.o. with a past history of metastatic endometrial cancer (mets to liver and bone) who was admitted on 5/8/2017 from home after Palliative RN visited her and found her to have worsening RLE pain and swelling. By doppler and imaging she does not have DVT/PE. CT of pelvis and right leg show enlarging tumor involving right sacrum, right iliac bone, bilateral rami and proximal right femur (which appears acute). L5 and right sacral nerves are involved with tumor and pressure of tumor creates compression of right femoral and external iliac veins causing stasis. Current focus is pain control, mobility and safe disposition as mother (only caregiver) cannot care for patient at home without full support. The patient/family chose comfort measures with the support of Hospice.     HOSPICE DIAGNOSES   Active Symptoms:  1. Severe pain and swelling  2. Generalized bone pain, neoplasm related  3. Shortness of breath  4. Debility with generalized weakness  5. Constipation   6. Anxiety/depression     PLAN   1.  Continue GIP level of care due to the changing requirements and continuous monitoring of patients pain and symptom medications which have been minimally controlled, continue PCA pump continuous and PCA dosing via CADD prism pump arrives  2. Morphine 4mg IV Q 1hr as needed for pain and shortness of breath, changed CADD pump setting of 2.5 mg/hr basal and 2mg IV q6 mt LO with morphine, added oral methadone 5mg TID as pain is not controlled with PCA  3. Continue lying prone for comfort  4. Miralax prn  5. Ativan for anxiety and sleep, cymbalta for depression and anxiety  6. Needs intensive planning for discharge to home as mother needs support  7. Pt is having a great deal of fear with changing her position for any reason, even for baths, continue lorazepam and premedicate for turning and repositioning         and SW to support family needs  Disposition: home when symptoms are managed     Prognosis estimated based on 05/16/17 clinical assessment is:   [] Few to Many Hours  [] Few to Many Days   [] Few to Many Weeks   [x] Few to Many Months     Communicated plan of care with: Hospice Case Manager;  Hospice IDT; Care Team        GOALS OF CARE     Resuscitation Status: DNR  Durable DNR: [x] Yes [] No    Advance Care Planning 5/10/2017   Patient's Healthcare Decision Maker is: Legal Next of Andrea 69   Primary Decision Maker Name Teddy Crowley   Primary Decision Maker Phone Number -   Primary Decision Maker Relationship to Patient Parent   Confirm Advance Directive Yes, on file        HISTORY      History obtained from: chart, family, nursing     CHIEF COMPLAINT: pain in her sacral area rated 9/10  The patient is:   [x] Verbal  [] Nonverbal  [] Unresponsive     HPI/SUBJECTIVE: 39year old female with hx of endometrial cancer with metastatic disease with severe pain of the sacral area and generalized bone pain         REVIEW OF SYSTEMS   Positive ROS include:  Constitutional:   Ears/nose/mouth/throat:  Respiratory:  Gastrointestinal:  Musculoskeletal:lying prone, cannot tolerate lying on her back due to sacral tumor, states her pain is  5-6/10. Neurologic:  Psychiatric:anxiety especially with movement  Endocrine:           FUNCTIONAL ASSESSMENT     Palliative Performance Scale (PPS): 30%     PSYCHOSOCIAL/SPIRITUAL ASSESSMENT     Active Problems:    * No active hospital problems. *    Past Medical History:   Diagnosis Date    Cancer [facility])     uterine    CVI (common variable immunodeficiency) (HCC)     Diabetes ([facility]     Elevated liver function tests 10/21/2010    Endometrial cancer ([facility] 7/7/2010    Hypertension     Iron deficiency anemia     Menometrorrhagia 10/21/2010    Microcytic anemia 10/21/2010    Morbid obesity ([facility]     Prediabetes 7/7/2010    Psychiatric disorder     depression    Radiation     completed radiation mid-march      Past Surgical History:   Procedure Laterality Date    CARDIAC SURG PROCEDURE UNLIST      hx of tachycardia    HX GYN      hysterectomy      Social History   Substance Use Topics    Smoking status: Never Smoker    Smokeless tobacco: Never Used    Alcohol use Yes     Family History   Problem Relation Age of Onset    Hypertension Mother     Hypertension Father     Stroke Maternal Grandfather     Cancer Paternal Grandmother      breast    Diabetes Paternal Grandmother       Allergies   Allergen Reactions    Egg Nausea and Vomiting     Raw egg and scrambled eggs. Egg products okay.      Pcn [Penicillins] Nausea and Vomiting     \"but could take amoxil\"    Shellfish Containing Products Unknown (comments)    Tetanus And Diphtheria Toxoids, Adsorbed, Adult Other (comments)     Developed local swelling, axillary pain, and transient fever    Tomato Unknown (comments)      Current Facility-Administered Medications   Medication Dose Route Frequency    methadone (DOLOPHINE) tablet 5 mg  5 mg Oral TID    morphine injection 4 mg  4 mg IntraVENous Q1H PRN    LORazepam (ATIVAN) injection 1 mg  1 mg IntraVENous Q4H PRN    gabapentin (NEURONTIN) capsule 300 mg  300 mg Oral TID    DULoxetine (CYMBALTA) capsule 30 mg  30 mg Oral DAILY    polyethylene glycol (MIRALAX) packet 17 g  17 g Oral DAILY    docusate sodium (COLACE) capsule 100 mg  100 mg Oral DAILY    acetaminophen (TYLENOL) tablet 650 mg  650 mg Oral Q4H PRN    metoprolol succinate (TOPROL-XL) XL tablet 25 mg  25 mg Oral DAILY    Morphine 10mg/mL PCA 500mL   IntraVENous CONTINUOUS        PHYSICAL EXAM     Wt Readings from Last 3 Encounters:   05/10/17 121.4 kg (267 lb 10.2 oz)   02/16/17 136.1 kg (300 lb)   02/02/16 127 kg (280 lb)       Visit Vitals    /84 (BP 1 Location: Right arm, BP Patient Position: At rest)    Pulse (!) 129    Temp 97.8 °F (36.6 °C)    Resp 18    SpO2 91%       Supplemental O2  [] Yes  [x] NO  Last bowel movement:     Currently this patient has:  [] Peripheral IV [x] PICC  [] PORT [] ICD    [x] Jama Catheter [] NG Tube   [] PEG Tube    [] Rectal Tube [] Drain  [] Other:     Constitutional: pt is awake and alertt, lying prone complains of pain in her sacral area 8/10  Eyes: cl  ENMT: cl  Cardiovascular: HRRR  Respiratory: cl  Gastrointestinal: NE  Musculoskeletal: weakness, lying prone  Skin: warm, dry  Neurologic: ne  Psychiatric: calm  Other:       Pertinent Lab and or Imaging Tests:  Lab Results   Component Value Date/Time    Sodium 141 05/11/2017 03:34 AM    Potassium 4.4 05/11/2017 03:34 AM    Chloride 110 05/11/2017 03:34 AM    CO2 24 05/11/2017 03:34 AM    Anion gap 7 05/11/2017 03:34 AM    Glucose 176 05/11/2017 03:34 AM    BUN 16 05/11/2017 03:34 AM    Creatinine 0.68 05/11/2017 03:34 AM    BUN/Creatinine ratio 24 05/11/2017 03:34 AM    GFR est AA >60 05/11/2017 03:34 AM    GFR est non-AA >60 05/11/2017 03:34 AM    Calcium 8.3 05/11/2017 03:34 AM     Lab Results   Component Value Date/Time    Protein, total 6.8 05/10/2017 12:27 AM    Albumin 2.9 05/10/2017 12:27 AM           Total time: [facility], NP      Supporting documentation for GIP need for pain control:  [x] Frequent evaluation by a doctor, nurse practitioner, nurse   [x] Frequent medication adjustment    [x] IVs that cannot be administered at home   [x] Aggressive pain management   [] Complicated technical delivery of medications              Supporting documentation for GIP need for symptom control:  []  Sudden decline necessitating intensive nursing intervention  []  Uncontrolled / intractable nausea or vomiting   []  Pathological fractures  []  Advanced open wounds requiring frequent skilled care  [] Unmanageable respiratory distress  [] New or worsening delirium   [] Delirium with behavior issues  [] Imminent death  with skilled nursing needs documented above        MD Addendum    Pt seen & examined, case discussed with Ms. Al Lorena. Agree with current care plan as outlined in her note. Recommend following changes:  Pt considering oral medications for pain and wants to possibly get off PCA. Will start weaning off PCA gradually; reduce basal rate to 2.5mg/hr and PCA dose to 2mg q6mt LO. Start methadone 5mg tid po: adjust dose q5 days based on pain control response    Keep nurse given morphine 4mg IV q1h prn severe pain. Will continue to follow for comfort and adjust medications/care plan accordingly.

## 2017-05-17 NOTE — PROGRESS NOTES
Verbal shift change report given to Gus Mcneill (oncoming nurse) by Chelsie Mcintosh (offgoing nurse). Report included the following information SBAR and Kardex. NAME OF PATIENT:  Abiola Bates    LEVEL OF CARE:  [facility]    REASON FOR GIP:   Pain, despite numerous changes in medications, Medication adjustment that must be monitored 24/7 and Stabilizing treatment that cannot take place at home. *PATIENT REMAINS ELIGIBLE FOR GIP LEVEL OF CARE AS EVIDENCED BY: (MUST BE ADDRESSED OF PATIENT GIP)  Patient has severe pain in her sacral area and bone pain in general, which makes it difficult if not impossible to change position while in bed or ambulate. Along with her Morphine which she is receiving continuously via CADD pump at 4 mg per hour, she needs additional Morphine administered via PCA. Tonight, she received 139.50 mg of Morphine IV (Cadd pump and prn) from 1800 until 0615, still rates her pain as a \"5\", and states that she hurts too much to move or turn in the bed. REASON FOR RESPITE:  n/a    O2 SAFETY:  No oxygen in use at this time. FALL INTERVENTIONS PROVIDED:   Implemented/recommended use of non-skid footwear, Implemented/recommended use of fall risk identification flag to all team members, Implemented/recommended assistive devices and encouraged their use, Implemented/recommended resources for alarm system (personal alarm, bed alarm, call bell, etc.) , Implemented/recommended environmental changes (remove hazards, lower bed, improve lighting, etc.) and Implemented/recommended increased supervision/assistance    INTERDISPLINARY COMMUNICATION/COLLABORATION:  Physician, MSW, [place] and RN, CNA    NEW MEDICATION INITIATION DOCUMENTATION:  n/a    Reason medication is being initiated:  n/a    MD / Provider name consulted re: change in status / initiation of new medication:  n/a    New Symptom(s):  No new symptoms at this time. New Order(s):  No new orders at this time.     Name of the person notified of the changes:  n/a    Name of person being taught:  n/a    Instructions given:  n/a    Side Effects taught:  n/a    Response to teaching:  n/a    COMFORTABLE DYING MEASURE:  Is Patient/family satisfied with symptom level?  yes    DISCHARGE PLAN:  Once pain can be managed and controlled at home, patient will likely return home with her mother, where she will be cared for by her mother. Morphine via CADD pump continuously for pain control at home. Night shift summary for 1900 until 0700.  1900 - Received report. 5 - Patient sleeping. Patient's sister is at bedside. Patient is receiving Morphine via CADD pump at 4 mg per hour, PCA doses 4 mg every 6 minutes as needed. 2000 - Alert and oriented. Lungs are clear. 95% on room air. Dressing right neck and right upper arm PICC line dressing are dry and intact. Patient did not eat supper, but is drinking small amounts of ginger ale and water. 2135 - Patient reports pain evel #4 right side of abdomen/ right hip. . Discussion with patient to make sure she knows how to use and when to push PCA button. 2200 and 65 - Called Dr. Daniela Thompson to tell her that the patient stated that she also took Roxicet prior to coming to [facility], but there is no documentation of it on her chart. Dr. Alana Kang declined to write the order and stated that she would discuss it further with the patient in the morning. 2300 - Patient reports pain level #7. PRN  Morphine 4 mg IV given. Patient's sister left for the night. 0413 - Patient called for additional pain medication for the same right hip pain. PRN Morphine 4 mg IV given. 0045 - Complaining of pain right abdomen/ right hip area. PRN Morphine 4 mg IV given. 0230, 0315, and 0400 - Asleep. Breathing is even and unlabored. 0145 - Awake and complaining of right hip pain #5. PRN Morphine 4 mg IV and PRN Ativan 1 mg IV given for pain and restlessness/ inability to sleep. Taking and tolerating po ice chips and ginger ale. Continues to lie prone and refuses to be turned or moved in the bed.   0616 - PRN Morphine 4 mg IV given for right hip pain level #5. Patient is still refusing to turn or move in bed because she says the pain is too bad.

## 2017-05-18 NOTE — PROGRESS NOTES
Problem: Comfort Deficit  Goal: Reduce/control pain  Patient will report that pain has been reduced or controlled through verbal and nonverbal means and that measures to promote comfort are effective. Outcome: Not Progressing Towards Goal  Pain is not well controlled. Patient is unable to be moved, lays prone. Has used 223 mg morphine in 12 hr shift, 62 attempts on PCA with 42 received. Patient received three 4mg doses prn on day shift which brings total to 235 mg morphine. Patient was started on methadone.

## 2017-05-18 NOTE — PROGRESS NOTES
0700 Report received from [place], [facility]. Pt is GIP level of care for pain management. 0840 Pt ate 75% breakfast. Rates pain as a 3 at rest.   1000 Pt states she feels achy in her back and right leg. Pain not releived with PCA dose of MS after 3 attempts. Morphine 4mg given IVP for pain. Pt is only able to lie on her abdomen. C/O severe pain when on her sides or back. 1030 Pt appears to be asleep. No furtner c/o pain. 1200 Pt awake. States pain is a 3 of 10 at rest. She is eating lunch. At 25% of meal.  1400   at the bedside visiting. 1630 Pt is  Complaining of pain in back and right leg. Morphine 4mg IVP given for pain management. 1700 Pt voices relief of pain and rates at 3 of 10.  1830 Request prn IV dose Morphine. Explained that scheduled SL dose due now, encouraged to take and if no relief in 30 mins to request prn dose. Explained the scheduled would give more even relief for pain and hope to decrease need for PRN. 1920 States she did not have any relief from oral meds . Morphine 4 mg IVP given for pain of 7 of 10.     NAME OF PATIENT:  Jyoti Randall    LEVEL OF CARE: GIP  REASON FOR GIP:   Pain, despite numerous changes in medications, Medication adjustment that must be monitored 24/7 and Stabilizing treatment that cannot take place at home    *PATIENT REMAINS ELIGIBLE FOR [facility] LEVEL OF CARE AS EVIDENCED BY: (MUST BE ADDRESSED OF PATIENT GIP) Frequent assessment and med changes required to manage symptoms      REASON FOR RESPITE:  na    O2 SAFETY:  na    FALL INTERVENTIONS PROVIDED:   Implemented/recommended use of fall risk identification flag to all team members, Implemented/recommended resources for alarm system (personal alarm, bed alarm, call bell, etc.) , Implemented/recommended environmental changes (remove hazards, lower bed, improve lighting, etc.) and Implemented/recommended increased supervision/assistance    INTERDISPLINARY COMMUNICATION/COLLABORATION:  Physician, Zachariah MEDINA and RN, CNA    NEW MEDICATION INITIATION DOCUMENTATION:  Documentation completed in Clinical Note in [facility]   Medication changes made by MD to manage uncontrolled pain. Reason medication is being initiated:  Uncontrolled pain,frequent  breakthrough pain    MD / Provider name consulted re: change in status / initiation of new medication:  Dr Albert Jasmine Symptom(s): uncontrolled and breakthrough pain    New Order(s):  Gabapentin increased to 300mg bid, Roxanol 10mg scheduled q3hrs    Name of the person notified of the changes:  Pt     Name of person being taught:  pt    Instructions given:  Purpose of med changes and the effects of meds    Side Effects taught:   Increased drowsiness  Response to teaching:   Voices understanding      COMFORTABLE DYING MEASURE:  Is Patient/family satisfied with symptom level?   Yes    DISCHARGE PLAN:  Plans to discharge to home with family when symptoms are managed

## 2017-05-18 NOTE — PROGRESS NOTES
Heather Orlando 139 report from [facility], patient continues to be GIP for symptom management of pain which continues to be unmanaged, patient used PCA button 62 attempts recd 42 doses of 4mg morphine, today 12 hr total is 223 mg morphine, patient states her pain is 4/10 now but is unable to move from her abdomen due to the pain  2000 Assessed, incomplete assessment due to patient lying prone and unable to turn over due to pain. Lung sounds diminished in bases, abd sound laterally wnl, extremities warm and perfusing. Patient on room air. AMARJIT PICC line infusing Morphine PCA CADD pump 4mg per hr with 4mg PCA dose every 6 min, dressing clean dry intact. Pump cleared. Patient refuses to be turned due to pain. 2200 Patient states pain is 5/10 but if moved 10/10, is using her PCA but sometimes forgets to push it, scheduled meds at this time, patient states that she is supposed to get Gabapentin 300 (2 tabs) BID not 300 BID, told her I would check with MD in am.  2351 Patient requested prn dose of morphine for breakthrough pain, given, patient is talking on telephone and still refuses to be turned at this time  0230 Requested ice for drink denies pain using PCA at this time  0430 resting quietly  0630 Patient requested a \"booster\" morphine injection pain 9/10 prn dose given. Pt asked if dtr could spend night age 13 stated yes. I discussed with patient in our endeavors to control her pain at some point she may need a large dose of medication that will make her somewhat semiconscious and if she had people to see and say things to or decisions to make that she needs to make them now.   PCA pump cleared pt has used 24 doses of 4mg morphine and 34 attempts total 24 hr dose of morphine is 379 mg  0700 Report to oncoming shift      NAME OF PATIENT: Jyoti [place]     LEVEL OF CARE: GIP     REASON FOR GIP:   Pain, despite numerous changes in medications, Medication adjustment that must be monitored 24/7 and Stabilizing treatment that cannot take place at home     *PATIENT REMAINS ELIGIBLE FOR GIP LEVEL OF CARE AS EVIDENCED BY: (MUST BE ADDRESSED OF PATIENT GIP) Pt has a Continuous PCA infusing Morphine. She continues to utilize her bolus dose. Pt asked for additional IV meds for pain not controlled by PCA.      O2 SAFETY: RA     FALL INTERVENTIONS PROVIDED:   Implemented/recommended use of fall risk identification flag to all team members     INTERDISPLINARY COMMUNICATION/COLLABORATION:  Physician, MSW, [place] and RN, CNA     NEW MEDICATION INITIATION DOCUMENTATION     COMFORTABLE DYING MEASURE:  Is Patient/family satisfied with symptom level?  no     DISCHARGE PLAN: Pt will remain at the [facility] until her pain is manageable and then she will return home to her [facility] care and continue to be followed by Home Hospice.

## 2017-05-18 NOTE — PROGRESS NOTES
[address] [facility] Help to Those in Need  [phone]    Patient Name: Ana Yip  YOB: 1980     Date of Provider Hospice Visit: 5-17-17     Level of Care: [x] General Inpatient (GIP)  [] Routine   [] Respite     Location of Care:  [] [facility] [] [facility] [] [facility] [] [facility] [x] Hospice House [facility]  [] Home [] Other:      Date of Original Hospice Admission: 5-16=17  Hospice Attending: Dr. Maggy Lindsey Diagnosis:  Endometrial cancer with mets to liver and bone  Diagnoses RELATED to the terminal prognosis: enlarged tumor of right sacrum, right iliac bone  Other Diagnoses: GERD, DM, Insomnia     HOSPICE NARRATIVE COMPOSED BY PHYSICIAN   Rationale for a prognosis of life expectancy of 6 months or less if the disease follows its normal course:  Ana Yip is a 39 y.o. with a past history of metastatic endometrial cancer (mets to liver and bone) who was admitted on 5/8/2017 from home after Palliative RN visited her and found her to have worsening RLE pain and swelling. By doppler and imaging she does not have DVT/PE. CT of pelvis and right leg show enlarging tumor involving right sacrum, right iliac bone, bilateral rami and proximal right femur (which appears acute). L5 and right sacral nerves are involved with tumor and pressure of tumor creates compression of right femoral and external iliac veins causing stasis. Current focus is pain control, mobility and safe disposition as mother (only caregiver) cannot care for patient at home without full support. The patient/family chose comfort measures with the support of Hospice.     HOSPICE DIAGNOSES   Active Symptoms:  1. Severe pain and swelling  2. Generalized bone pain, neoplasm related  3. Shortness of breath  4. Debility with generalized weakness  5. Constipation   6. Anxiety/depression     PLAN   1.  Continue GIP level of care due to the changing requirements and continuous monitoring of patients pain and symptom medications which have been minimally controlled, continue PCA pump continuous and PCA dosing via CADD prism pump arrives  2. Morphine 4mg IV Q 1hr as needed for pain and shortness of breath, and CADD pump setting of 2.5 mg/hr basal and 2mg IV q6 mt LO with morphine, pt also on oral methadone 5mg TID as pain is not controlled with PCA  3. Pt is requiring prn morphine IV regularly and goal is for her to be at home with a stable oral pain regimen so will consider starting Roxanol 10mg po q3h scheduled and keep morphine IV prn. Assess response and adjust accordingly. 4. Miralax prn  5. Ativan for anxiety and sleep, cymbalta scheduled at bedtime for depression and anxiety, gabapentin 600mg bid to help with neuropathic component of pain and sleep. 6. Needs intensive planning for discharge to home as mother needs support  7.  and SW to support family needs  8. Disposition: home when symptoms are managed     Prognosis estimated based on 05/16/17 clinical assessment is:   [] Few to Many Hours  [] Few to Many Days   [] Few to Many Weeks   [x] Few to Many Months     Communicated plan of care with: Hospice Case Manager; Hospice IDT; Care Team        GOALS OF CARE     Resuscitation Status: DNR  Durable DNR: [x] Yes [] No    Advance Care Planning 5/10/2017   Patient's Healthcare Decision Maker is: Legal Next of Andrea 69   Primary Decision Maker Name Hiram Perez   Primary Decision Maker Phone Number -   Primary Decision Maker Relationship to Patient Parent   Confirm Advance Directive Yes, on file        HISTORY      History obtained from: chart, family, nursing     CHIEF COMPLAINT: pain in her sacral area rated 9/10  The patient is:   [x] Verbal  [] Nonverbal  [] Unresponsive     HPI/SUBJECTIVE: 39year old female with hx of endometrial cancer with metastatic disease with severe pain of the sacral area and generalized bone pain.    Pt started on methadone and and CADD pump morphine settings were adjusted that seems to be helping. Pt not using the pump that much. Pt has been asking for nurse given morphine IV push that is helping with pain. Pt asking questions about her blood work and if she needs blood transfusion; explained that we generally do not need to do labs or transfuse unless needed for symptoms control (extreme fatigue, shortness of breath etc) and she understood and agrees with it. Pt also wants her gabapentin and cymbalta to be given at bedtime as these meds help her relax and sleep better. REVIEW OF SYSTEMS   Positive ROS include:  Constitutional:   Ears/nose/mouth/throat:  Respiratory:  Gastrointestinal:  Musculoskeletal:lying prone, cannot tolerate lying on her back due to sacral tumor, states her pain is less today. Neurologic:  Psychiatric:anxiety especially with movement  Endocrine:           FUNCTIONAL ASSESSMENT     Palliative Performance Scale (PPS): 30%     PSYCHOSOCIAL/SPIRITUAL ASSESSMENT     Active Problems:    * No active hospital problems.  *    Past Medical History:   Diagnosis Date    Cancer [facility])     uterine    CVI (common variable immunodeficiency) (HCC)     [facility] [address]     Elevated liver function tests 10/21/2010    Endometrial cancer ([facility] [address] 7/7/2010    Hypertension     Iron deficiency anemia     Menometrorrhagia 10/21/2010    Microcytic anemia 10/21/2010    Morbid obesity ([facility] [address]     Prediabetes 7/7/2010    Psychiatric disorder     depression    Radiation     completed radiation mid-march      Past Surgical History:   Procedure Laterality Date    CARDIAC SURG PROCEDURE UNLIST      hx of tachycardia    HX GYN      hysterectomy      Social History   Substance Use Topics    Smoking status: Never Smoker    Smokeless tobacco: Never Used    Alcohol use Yes     Family History   Problem Relation Age of Onset    Hypertension Mother     Hypertension Father     Stroke Maternal Grandfather     Cancer Paternal Grandmother      breast    Diabetes Paternal Grandmother Allergies   Allergen Reactions    Egg Nausea and Vomiting     Raw egg and scrambled eggs. Egg products okay.      Pcn [Penicillins] Nausea and Vomiting     \"but could take amoxil\"    Shellfish Containing Products Unknown (comments)    Tetanus And Diphtheria Toxoids, Adsorbed, Adult Other (comments)     Developed local swelling, axillary pain, and transient fever    Tomato Unknown (comments)      Current Facility-Administered Medications   Medication Dose Route Frequency    DULoxetine (CYMBALTA) capsule 30 mg  30 mg Oral DAILY    gabapentin (NEURONTIN) capsule 600 mg  600 mg Oral BID    morphine (ROXANOL) 100 mg/5 mL (20 mg/mL) concentrated solution 10 mg  10 mg Oral Q3H    methadone (DOLOPHINE) tablet 5 mg  5 mg Oral Q8H    morphine injection 4 mg  4 mg IntraVENous Q1H PRN    LORazepam (ATIVAN) injection 1 mg  1 mg IntraVENous Q4H PRN    polyethylene glycol (MIRALAX) packet 17 g  17 g Oral DAILY    docusate sodium (COLACE) capsule 100 mg  100 mg Oral DAILY    acetaminophen (TYLENOL) tablet 650 mg  650 mg Oral Q4H PRN    metoprolol succinate (TOPROL-XL) XL tablet 25 mg  25 mg Oral DAILY    Morphine 10mg/mL PCA 500mL   IntraVENous CONTINUOUS        PHYSICAL EXAM     Wt Readings from Last 3 Encounters:   05/10/17 121.4 kg (267 lb 10.2 oz)   02/16/17 136.1 kg (300 lb)   02/02/16 127 kg (280 lb)       Visit Vitals    /62 (BP 1 Location: Right arm, BP Patient Position: At rest)    Pulse (!) 111    Temp 98.6 °F (37 °C)    Resp 17    SpO2 98%       Supplemental O2  [] Yes  [x] NO  Last bowel movement:     Currently this patient has:  [] Peripheral IV [x] PICC  [] PORT [] ICD    [x] Jama Catheter [] NG Tube   [] PEG Tube    [] Rectal Tube [] Drain  [] Other:     Constitutional: pt is awake, lying prone, pain much better controlled  Eyes: cl  ENMT: cl  Cardiovascular: HRRR  Respiratory: cl  Gastrointestinal: NE  Musculoskeletal: weakness, lying prone  Skin: warm, dry  Neurologic: ne  Psychiatric: calm  Other:       Pertinent Lab and or Imaging Tests:  Lab Results   Component Value Date/Time    Sodium 141 05/11/2017 03:34 AM    Potassium 4.4 05/11/2017 03:34 AM    Chloride 110 05/11/2017 03:34 AM    CO2 24 05/11/2017 03:34 AM    Anion gap 7 05/11/2017 03:34 AM    Glucose 176 05/11/2017 03:34 AM    BUN 16 05/11/2017 03:34 AM    Creatinine 0.68 05/11/2017 03:34 AM    BUN/Creatinine ratio 24 05/11/2017 03:34 AM    GFR est AA >60 05/11/2017 03:34 AM    GFR est non-AA >60 05/11/2017 03:34 AM    Calcium 8.3 05/11/2017 03:34 AM     Lab Results   Component Value Date/Time    Protein, total 6.8 05/10/2017 12:27 AM    Albumin 2.9 05/10/2017 12:27 AM           Total time: 35mts     Maxwell López MD      Supporting documentation for GIP need for pain control:  [x] Frequent evaluation by a doctor, nurse practitioner, nurse   [x] Frequent medication adjustment    [x] IVs that cannot be administered at home   [x] Aggressive pain management   [] Complicated technical delivery of medications              Supporting documentation for GIP need for symptom control:  []  Sudden decline necessitating intensive nursing intervention  []  Uncontrolled / intractable nausea or vomiting   []  Pathological fractures  []  Advanced open wounds requiring frequent skilled care  [] Unmanageable respiratory distress  [] New or worsening delirium   [] Delirium with behavior issues  [] Imminent death  with skilled nursing needs documented above

## 2017-05-18 NOTE — PROGRESS NOTES
Patient is a 39year old single female who was admitted to the [facility] as GIP Level of Care on 5-16-17. She was a transfer from [facility]. Patient dx with Endometrial/Uterine cancer with mets to liver and pelvis. She was awake and talking on the phone upon msw arrival to room. Dr. Lis Casillas present. She discussed pain management issues with patient. Patient reported she was generally comfortable since medication changes yesterday. Dr. Lis Casillas has her on Methadone. Patient has been living with her parents in their home - she also has a 13year old niece that she is raising. Patient reported she had her own apartment but it had stairs so she had to move in with her parents. . She has generally been bed bound except to go to bathroom. Patient relayed hope that she might be able to get her own place again when feeling better. She is looking at the [facility] as being a short stay until her pain is better managed. Msw explained home hospice services following discharge - she was accepting    Msw spoke to patient's mother Matthew and made plans to meet during her next visit. Msw to provide emotional support to patient and family as they grievef loss of independence.

## 2017-05-19 NOTE — PROGRESS NOTES
0700 Report received from Cathie Garner [facility]. Pt is GIP for pain management. 0830 Pt given scheduled Roxanol but c/o increased back and right leg pain. Morphine 4mg given IVP for increased pain. 0945 Pt called nurse to room. She c/o anxiety, SOB and feels like heart is beating fast.  Heart rate 220, O2 sats 93 %. /68. O2 applied at 2 l/m. Lorazepam 1mg given IVP. Assisted to help the pt roll back to her left side. 1000 Phone call to Dr Myrna Mcdowell to notify of above. Metoprolol XL 25mg additional dose to be given. Dr Myrna Mcdowell will be in to assess. 1050 Heart rate remains 221 and irregular. Metoprolol XL 25mg given. Po. Pt states she does not feel the palpitations as she did prior and the Lorazepam has relaxed her. 1200 Heartrate 130. Dr Myrna Mcdowell in to assess the pt.  1345 Pt c/o pain uncontrolled by CADD or oral. Requesting IVP Morphine at 4L/M.  1530 Pt c/o pain at 7 0f 10. Morphine 4mg IVP  Given. 1600 Pt voices relief of severe pain. Rate pain. Complete bed bath given. Pt was able tilt slightly to her left side. 1800 Visitor at the bedside. Pt is now sleeping without c/o. PCA CADD pump rate changed to 2mg basal and 4mg bolus prn. Pt is aware of changes and voices understanding. NAME OF PATIENT:  Scott Randall    LEVEL OF CARE:  GIP  REASON FOR GIP:   Pain, despite numerous changes in medications, Medication adjustment that must be monitored 24/7 and Stabilizing treatment that cannot take place at home    *PATIENT REMAINS ELIGIBLE FOR GIP LEVEL OF CARE AS EVIDENCED BY: (MUST BE ADDRESSED OF PATIENT GIP) frequent assessment and med changes required for pain management.       REASON FOR RESPITE:  na    O2 SAFETY:  na    FALL INTERVENTIONS PROVIDED:   Implemented/recommended resources for alarm system (personal alarm, bed alarm, call bell, etc.)  and Implemented/recommended environmental changes (remove hazards, lower bed, improve lighting, etc.)    INTERDISPLINARY COMMUNICATION/COLLABORATION:  Physician, MSW, Leta and RN, CNA    NEW MEDICATION INITIATION DOCUMENTATION:  Documentation completed in Clinical Note in [address] [facility]    Reason medication is being initiated:  PCA pump rate changed, Lorazepam for anxiety,     MD / Provider name consulted re: change in status / initiation of new medication:  Dr Lex Wilde Symptom(s):   Increased anxiety and pain    New Order(s): Med changes PCA, Lorazepam    Name of the person notified of the changes:  Pt    Name of person being taught: Pt    Instructions given:  Effects and purpose of meds     Side Effects taught:  na    Response to teaching: na      COMFORTABLE DYING MEASURE:  Is Patient/family satisfied with symptom level?  Yes    DISCHARGE PLAN: discharge to home when symptoms are managed

## 2017-05-19 NOTE — PROGRESS NOTES
This visit occurred on 5/18/17. It was a follow-up visit to provide support to aKrli as she is settling into the hospice house. She notes that this is giving her a lot to think and pray about. She noted upon my arrival that she  asked her mother if I would be following them over there or if I was dropping her now that she is in the [facility]. I assured her that I am continuing to follow but that Jose Peoples, the hospice chaplain might be stopping by to visit with her. She has had visits from family and friends. She and her mother were encouraged to hear that the plan is for her to be there for vince. 2 weeks and then to transfer home better able to engage in her daily care. Assured her of continued prayers and availability. I informed her that I would try to see her on Monday. Please page as needed or desired.      Ehsan Gamble., [address] [facility], [address] [place] [address]

## 2017-05-19 NOTE — PROGRESS NOTES
[address] [facility] Help to Those in Need  [phone]    Patient Name: Celina Orona  YOB: 1980     Date of Provider Hospice Visit: 5-19-17       Level of Care: [x] General Inpatient (GIP)  [] Routine   [] Respite     Location of Care:  [] [address] [place] [address] [] [facility] [] [facility] [] [facility] [x] [facility]  [] Home [] Other:      Date of Original Hospice Admission: 5-16=17  Hospice Attending: Dr. Baldwin Hodgkins Diagnosis:  Endometrial cancer with mets to liver and bone  Diagnoses RELATED to the terminal prognosis: enlarged tumor of right sacrum, right iliac bone  Other Diagnoses: GERD, DM, Insomnia     HOSPICE NARRATIVE COMPOSED BY PHYSICIAN   Rationale for a prognosis of life expectancy of 6 months or less if the disease follows its normal course:  Celina Orona is a 39 y.o. with a past history of metastatic endometrial cancer (mets to liver and bone) who was admitted on 5/8/2017 from home after Palliative RN visited her and found her to have worsening RLE pain and swelling. By doppler and imaging she does not have DVT/PE. CT of pelvis and right leg show enlarging tumor involving right sacrum, right iliac bone, bilateral rami and proximal right femur (which appears acute). L5 and right sacral nerves are involved with tumor and pressure of tumor creates compression of right femoral and external iliac veins causing stasis. Current focus is pain control, mobility and safe disposition as mother (only caregiver) cannot care for patient at home without full support. The patient/family chose comfort measures with the support of Hospice.     HOSPICE DIAGNOSES   Active Symptoms:  1. Severe pain and swelling  2. Generalized bone pain, neoplasm related  3. Shortness of breath  4. Debility with generalized weakness  5. Constipation   6. Anxiety/depression  7. Palpitations; tachycardia     PLAN   1.  Continue GIP level of care due to the changing requirements and continuous monitoring of patients pain and symptom medications which have been minimally controlled, continue PCA pump continuous and PCA dosing via CADD prism pump arrives  2. Continue Morphine 4mg IV Q 1hr as needed for pain and shortness of breath, continue CADD pump setting of 2 mg/hr basal and 4mg IV q6 mt LO with morphine, pt also on oral methadone 5mg TID as pain is not controlled with PCA  3. Increase Roxanol to 20mg po q3h scheduled and keep morphine IV prn. Assess response and adjust accordingly. 4. Miralax prn  5. Ativan scheduled 1mg IV four times daily for anxiety and sleep, cymbalta scheduled at bedtime for depression and anxiety, gabapentin 600mg bid to help with neuropathic component of pain and sleep. 6. Increase metoprolol XL to 50mg daily to help with tachycardia and watch for BP drop as well. 7. Needs intensive planning for discharge to home as mother needs support  8.  and SW to support family needs  9. Disposition: home when symptoms are managed     Prognosis estimated based on 05/16/17 clinical assessment is:   [] Few to Many Hours  [] Few to Many Days   [] Few to Many Weeks   [x] Few to Many Months     Communicated plan of care with: Hospice Case Manager; Hospice IDT; Care Team        GOALS OF CARE     Resuscitation Status: DNR  Durable DNR: [x] Yes [] No    Advance Care Planning 5/10/2017   Patient's Healthcare Decision Maker is: Legal Next of Andrea 69   Primary Decision Maker Name Shonda Bello   Primary Decision Maker Phone Number -   Primary Decision Maker Relationship to Patient Parent   Confirm Advance Directive Yes, on file        HISTORY      History obtained from: chart, family, nursing     CHIEF COMPLAINT: pain much better; 4/10  The patient is:   [x] Verbal  [] Nonverbal  [] Unresponsive     HPI/SUBJECTIVE: pt states if she stays still and does not move then her pain is tolerable and well maintained at around 4-5/10 but she is afraid of increased pain when she moves.    Pt also with palpitations today and heart racing, anxiety. HR upto 220 and with additional metoprolol 25mg XL came down to 120bpm.   Pt requiring frequent doses of morphine po and injections. REVIEW OF SYSTEMS   Positive ROS include:  Constitutional:   Ears/nose/mouth/throat:  Respiratory:  Gastrointestinal:  Musculoskeletal:lying prone, cannot tolerate lying on her back due to sacral tumor, states her pain is less today. Neurologic:  Psychiatric:anxiety especially with movement  Endocrine:           FUNCTIONAL ASSESSMENT     Palliative Performance Scale (PPS): 30%     PSYCHOSOCIAL/SPIRITUAL ASSESSMENT     Active Problems:    * No active hospital problems. *    Past Medical History:   Diagnosis Date    Cancer [facility])     uterine    CVI (common variable immunodeficiency) (HCC)     [facility] [address]     Elevated liver function tests 10/21/2010    Endometrial cancer ([facility] [address] 7/7/2010    Hypertension     Iron deficiency anemia     Menometrorrhagia 10/21/2010    Microcytic anemia 10/21/2010    Morbid obesity ([facility] [address]     Prediabetes 7/7/2010    Psychiatric disorder     depression    Radiation     completed radiation mid-march      Past Surgical History:   Procedure Laterality Date    CARDIAC SURG PROCEDURE UNLIST      hx of tachycardia    HX GYN      hysterectomy      Social History   Substance Use Topics    Smoking status: Never Smoker    Smokeless tobacco: Never Used    Alcohol use Yes     Family History   Problem Relation Age of Onset    Hypertension Mother     Hypertension Father     Stroke Maternal Grandfather     Cancer Paternal Grandmother      breast    Diabetes Paternal Grandmother       Allergies   Allergen Reactions    Egg Nausea and Vomiting     Raw egg and scrambled eggs. Egg products okay.      Pcn [Penicillins] Nausea and Vomiting     \"but could take amoxil\"    Shellfish Containing Products Unknown (comments)    Tetanus And Diphtheria Toxoids, Adsorbed, Adult Other (comments)     Developed local swelling, axillary pain, and transient fever    Tomato Unknown (comments)      Current Facility-Administered Medications   Medication Dose Route Frequency    morphine (ROXANOL) 100 mg/5 mL (20 mg/mL) concentrated solution 20 mg  20 mg Oral Q3H    LORazepam (ATIVAN) injection 1 mg  1 mg IntraVENous QID    DULoxetine (CYMBALTA) capsule 30 mg  30 mg Oral DAILY    gabapentin (NEURONTIN) capsule 600 mg  600 mg Oral BID    methadone (DOLOPHINE) tablet 5 mg  5 mg Oral Q8H    morphine injection 4 mg  4 mg IntraVENous Q1H PRN    LORazepam (ATIVAN) injection 1 mg  1 mg IntraVENous Q4H PRN    polyethylene glycol (MIRALAX) packet 17 g  17 g Oral DAILY    docusate sodium (COLACE) capsule 100 mg  100 mg Oral DAILY    acetaminophen (TYLENOL) tablet 650 mg  650 mg Oral Q4H PRN    metoprolol succinate (TOPROL-XL) XL tablet 25 mg  25 mg Oral DAILY    Morphine 10mg/mL PCA 500mL   IntraVENous CONTINUOUS        PHYSICAL EXAM     Wt Readings from Last 3 Encounters:   05/10/17 121.4 kg (267 lb 10.2 oz)   02/16/17 136.1 kg (300 lb)   02/02/16 127 kg (280 lb)       Visit Vitals    /67 (BP 1 Location: Right arm, BP Patient Position: At rest)    Pulse (!) 117    Temp 99 °F (37.2 °C)    Resp 17    SpO2 90%       Supplemental O2  [] Yes  [x] NO  Last bowel movement:     Currently this patient has:  [] Peripheral IV [x] PICC  [] PORT [] ICD    [x] Jama Catheter [] NG Tube   [] PEG Tube    [] Rectal Tube [] Drain  [] Other:     Constitutional: pt is awake, lying prone, pain much better controlled but pt having palpitations  Eyes: cl  ENMT: cl  Cardiovascular: tachycardia, anxious  Respiratory: cl  Gastrointestinal: NE  Musculoskeletal: weakness, lying prone  Skin: warm, dry  Neurologic: ne  Psychiatric: anxious  Other:       Pertinent Lab and or Imaging Tests:  Lab Results   Component Value Date/Time    Sodium 141 05/11/2017 03:34 AM    Potassium 4.4 05/11/2017 03:34 AM    Chloride 110 05/11/2017 03:34 AM    CO2 24 05/11/2017 03:34 AM    Anion gap 7 05/11/2017 03:34 AM    Glucose 176 05/11/2017 03:34 AM    BUN 16 05/11/2017 03:34 AM    Creatinine 0.68 05/11/2017 03:34 AM    BUN/Creatinine ratio 24 05/11/2017 03:34 AM    GFR est AA >60 05/11/2017 03:34 AM    GFR est non-AA >60 05/11/2017 03:34 AM    Calcium 8.3 05/11/2017 03:34 AM     Lab Results   Component Value Date/Time    Protein, total 6.8 05/10/2017 12:27 AM    Albumin 2.9 05/10/2017 12:27 AM           Total time: 35mts     Brittny Hinojosa MD      Supporting documentation for GIP need for pain control:  [x] Frequent evaluation by a doctor, nurse practitioner, nurse   [x] Frequent medication adjustment    [x] IVs that cannot be administered at home   [x] Aggressive pain management   [] Complicated technical delivery of medications              Supporting documentation for GIP need for symptom control:  []  Sudden decline necessitating intensive nursing intervention  []  Uncontrolled / intractable nausea or vomiting   []  Pathological fractures  []  Advanced open wounds requiring frequent skilled care  [] Unmanageable respiratory distress  [] New or worsening delirium   [] Delirium with behavior issues  [] Imminent death  with skilled nursing needs documented above

## 2017-05-19 NOTE — PROGRESS NOTES
Problem: Pain  Goal: Verbalize satisfaction of level of comfort and symptom control  Outcome: Not Progressing Towards Goal  Patient states pain is not getting better but worse. Patient used PCA Morphine 4mg 31 times in past 12 hrs. Pain is unmanaged, medication changes were made today.  At 0100 patient requested and received, 4mg Morphine IVP, used pca another 4mg morphine, scheduled roxanol 10 mg, and ativan 1mg, patient tolerated all this well and is eating her chicken nuggets

## 2017-05-19 NOTE — PROGRESS NOTES
1900 Recd report from [facility], patient continues with GIP level of care due to intractable pain, medication changes were made today  2000 Assessed, alert oriented, lung sounds diminished, difficult to assess abd patient stays prone due to sacral tumor and pain, moves upper extremities, talks on cell phone, makes needs known, limited movement of lower extremities vital signs stable  2134 Scheduled meds given, given fresh ice and water  0000 Patient request more chux lay around her in case she has BM while prone  0100 Patient request PRN morphine with PRN ativan and scheduled Roxanol wanted chicken nuggets heated up, patient is using PCA 4mg morphine also at this time, pain level 6/10  0200 pain reassessed pt is sleeping  0500 Patient request IVP morphine 4mg for pain 8/10, also gave methadone and roxanol scheduled doses. 0600 Morphine CADD pump cleared patient 18 PCA doses of 4mg of morphine patient has used 126 mg morphine this 12 hr shift significant decrease from yesterday. 0700 Report to oncoming shift      NAME OF PATIENT: Jyoti Randall      LEVEL OF CARE: GIP      REASON FOR GIP:   Pain, despite numerous changes in medications, Medication adjustment that must be monitored 24/7 and Stabilizing treatment that cannot take place at home      *PATIENT REMAINS ELIGIBLE FOR GIP LEVEL OF CARE AS EVIDENCED BY: (MUST BE ADDRESSED OF PATIENT GIP) Pt has a Continuous PCA infusing Morphine. She continues to utilize her bolus dose. Pt asked for additional IV meds for pain not controlled by PCA.       O2 SAFETY: RA      FALL INTERVENTIONS PROVIDED:   Implemented/recommended use of fall risk identification flag to all team members      INTERDISPLINARY COMMUNICATION/COLLABORATION:  Physician, MSW, [place] and RN, CNA      NEW MEDICATION INITIATION DOCUMENTATION      COMFORTABLE DYING MEASURE:  Is Patient/family satisfied with symptom level?  no      DISCHARGE PLAN: Pt will remain at the [facility] until her pain is manageable and then she will return home to her Mothers care and continue to be followed by Home Hospice.

## 2017-05-20 NOTE — PROGRESS NOTES
Verbal shift change report given to Chase Sharif RN by Cheri Younger RN. Report included the following information SBAR, Kardex, Intake/Output and MAR. NAME OF PATIENT:  Jennifer Mehta    LEVEL OF CARE:  GIP    REASON FOR GIP:   Pain, despite numerous changes in medications, Medication adjustment that must be monitored 24/7 and Stabilizing treatment that cannot take place at home    PATIENT REMAINS ELIGIBLE FOR GIP LEVEL OF CARE AS EVIDENCED BY:   Continue GIP level of care due to the changing requirements and continuous monitoring of patients pain and symptom medications which have been minimally controlled. REASON FOR RESPITE:  Patient is not in Respite care at this time. O2 SAFETY:  Patient on room air. FALL INTERVENTIONS PROVIDED:   Implemented/recommended use of non-skid footwear, Implemented/recommended use of fall risk identification flag to all team members, Implemented/recommended assistive devices and encouraged their use, Implemented/recommended resources for alarm system (personal alarm, bed alarm, call bell, etc.) , Implemented/recommended environmental changes (remove hazards, lower bed, improve lighting, etc.) and Implemented/recommended increased supervision/assistance    INTERDISPLINARY COMMUNICATION/COLLABORATION:  Physician, MSW, [place] and RN, CNA    NEW MEDICATION INITIATION DOCUMENTATION:  No new medications nor interventions begun on this night shift. Reason medication is being initiated:  N/A    MD / Provider name consulted re: change in status / initiation of new medication:  N/A    New Symptom(s):  N/A    New Order(s):  N/A    Name of the person notified of the changes:  N/A    Name of person being taught:  N/A    Instructions given:  N/A    Side Effects taught:  N/A    Response to teaching:  N/A    COMFORTABLE DYING MEASURE:  Continue to monitor for pain, dyspnea, agitation, and restlessness and intervene accordingly.      Is Patient/family satisfied with symptom level?  yes    DISCHARGE PLAN:  Patient to be discharged home with mother, Phoenix Chua, as caregiver under continued care of [facility] once symptoms can be managed at home.

## 2017-05-20 NOTE — PROGRESS NOTES
[facility]  [phone]    Patient Name: Mal Mena  YOB: 1980     Date of Provider Hospice Visit: 5-20-17       Level of Care: [x] General Inpatient (GIP)  [] Routine   [] Respite     Location of Care:  [] [facility] [] [facility] [] [facility] [] [address] [x] [facility]ospice [facility]  [] Home [] Other:      Date of Original Hospice Admission: 5-16=17  Hospice Attending: Dr. Shakira Smith Diagnosis:  Endometrial cancer with mets to liver and bone  Diagnoses RELATED to the terminal prognosis: enlarged tumor of right sacrum, right iliac bone  Other Diagnoses: GERD, DM, Insomnia     HOSPICE NARRATIVE COMPOSED BY PHYSICIAN   Rationale for a prognosis of life expectancy of 6 months or less if the disease follows its normal course:  Mal Mena is a 39 y.o. with a past history of metastatic endometrial cancer (mets to liver and bone) who was admitted on 5/8/2017 from home after Palliative RN visited her and found her to have worsening RLE pain and swelling. By doppler and imaging she does not have DVT/PE. CT of pelvis and right leg show enlarging tumor involving right sacrum, right iliac bone, bilateral rami and proximal right femur (which appears acute). L5 and right sacral nerves are involved with tumor and pressure of tumor creates compression of right femoral and external iliac veins causing stasis. Current focus is pain control, mobility and safe disposition as mother (only caregiver) cannot care for patient at home without full support. The patient/family chose comfort measures with the support of Hospice.     HOSPICE DIAGNOSES   Active Symptoms:  1. Severe pain: better  2. Generalized bone pain, neoplasm related: persists but overall better  3. Shortness of breath  4. Debility with generalized weakness  5. Constipation   6. Anxiety/depression: still persists  7. Palpitations; tachycardia: less, at baseline     PLAN   1.  Continue GIP level of care due to the changing requirements and continuous monitoring of patients pain and symptom medications which have been minimally controlled, continue PCA pump continuous and PCA dosing via CADD prism pump arrives  2. Continue Morphine 4mg IV Q 1hr as needed for pain and shortness of breath, continue CADD pump setting of 2 mg/hr basal but d/c PCA dose of 4mg IV q6 mt LO with morphine since pt not using PCA dose that much. Continue on  oral methadone 5mg TID for now but consider increasing to 7.5mg tid tomorrow. 3. Increase Roxanol to 40mg po q3h scheduled and keep morphine IV prn. Assess response and adjust accordingly. 4. Miralax prn  5. Ativan scheduled 1mg IV four times daily for anxiety and sleep, cymbalta scheduled at bedtime for depression and anxiety, gabapentin 600mg bid to help with neuropathic component of pain and sleep. 6. Increased metoprolol XL to 50mg daily to help with tachycardia and watch for BP drop as well. 7. Needs intensive planning for discharge to home as mother needs support  8.  and SW to support family needs  9. Disposition: home when symptoms are managed     Prognosis estimated based on 05/16/17 clinical assessment is:   [] Few to Many Hours  [] Few to Many Days   [] Few to Many Weeks   [x] Few to Many Months     Communicated plan of care with: Hospice Case Manager; Hospice IDT; Care Team        GOALS OF CARE     Resuscitation Status: DNR  Durable DNR: [x] Yes [] No    Advance Care Planning 5/10/2017   Patient's Healthcare Decision Maker is: Legal Next of Andrea 69   Primary Decision Maker Name Hiram Perez   Primary Decision Maker Phone Number -   Primary Decision Maker Relationship to Patient Parent   Confirm Advance Directive Yes, on file        HISTORY      History obtained from: chart, family, nursing     CHIEF COMPLAINT: pain much better  The patient is:   [x] Verbal  [] Nonverbal  [] Unresponsive     HPI/SUBJECTIVE: pt resting comfortably, sleeping, said pain is less.    Got 1 dose of prn ativan  Got several doses of prn IV morphine  Pt still not using PCA that much. Forgets to push PCA. Pt had low grade fever as well. REVIEW OF SYSTEMS   Positive ROS include:  Constitutional:   Ears/nose/mouth/throat:  Respiratory:  Gastrointestinal:  Musculoskeletal:lying prone, cannot tolerate lying on her back due to sacral tumor, states her pain is less today. Neurologic:  Psychiatric:anxiety especially with movement  Endocrine:           FUNCTIONAL ASSESSMENT     Palliative Performance Scale (PPS): 30%     PSYCHOSOCIAL/SPIRITUAL ASSESSMENT     Active Problems:    * No active hospital problems. *    Past Medical History:   Diagnosis Date    Cancer [facility])     uterine    CVI (common variable immunodeficiency) (HCC)     Diabetes ([facility]     Elevated liver function tests 10/21/2010    Endometrial cancer ([facility] 7/7/2010    Hypertension     Iron deficiency anemia     Menometrorrhagia 10/21/2010    Microcytic anemia 10/21/2010    Morbid obesity ([facility]     Prediabetes 7/7/2010    Psychiatric disorder     depression    Radiation     completed radiation mid-march      Past Surgical History:   Procedure Laterality Date    CARDIAC SURG PROCEDURE UNLIST      hx of tachycardia    HX GYN      hysterectomy      Social History   Substance Use Topics    Smoking status: Never Smoker    Smokeless tobacco: Never Used    Alcohol use Yes     Family History   Problem Relation Age of Onset    Hypertension Mother     Hypertension Father     Stroke Maternal Grandfather     Cancer Paternal Grandmother      breast    Diabetes Paternal Grandmother       Allergies   Allergen Reactions    Egg Nausea and Vomiting     Raw egg and scrambled eggs. Egg products okay.      Pcn [Penicillins] Nausea and Vomiting     \"but could take amoxil\"    Shellfish Containing Products Unknown (comments)    Tetanus And Diphtheria Toxoids, Adsorbed, Adult Other (comments)     Developed local swelling, axillary pain, and transient fever    Tomato Unknown (comments)      Current Facility-Administered Medications   Medication Dose Route Frequency    metoprolol succinate (TOPROL-XL) XL tablet 50 mg  50 mg Oral DAILY    morphine (ROXANOL) 100 mg/5 mL (20 mg/mL) concentrated solution 40 mg  40 mg Oral Q3H    LORazepam (ATIVAN) injection 1 mg  1 mg IntraVENous QID    DULoxetine (CYMBALTA) capsule 30 mg  30 mg Oral DAILY    gabapentin (NEURONTIN) capsule 600 mg  600 mg Oral BID    methadone (DOLOPHINE) tablet 5 mg  5 mg Oral Q8H    morphine injection 4 mg  4 mg IntraVENous Q1H PRN    LORazepam (ATIVAN) injection 1 mg  1 mg IntraVENous Q4H PRN    polyethylene glycol (MIRALAX) packet 17 g  17 g Oral DAILY    docusate sodium (COLACE) capsule 100 mg  100 mg Oral DAILY    acetaminophen (TYLENOL) tablet 650 mg  650 mg Oral Q4H PRN    Morphine 10mg/mL PCA 500mL   IntraVENous CONTINUOUS        PHYSICAL EXAM     Wt Readings from Last 3 Encounters:   05/10/17 121.4 kg (267 lb 10.2 oz)   02/16/17 136.1 kg (300 lb)   02/02/16 127 kg (280 lb)       Visit Vitals    /74 (BP 1 Location: Right arm, BP Patient Position: At rest)    Pulse (!) 112    Temp 99.5 °F (37.5 °C)    Resp 16    SpO2 97%       Supplemental O2  [] Yes  [x] NO  Last bowel movement:     Currently this patient has:  [] Peripheral IV [x] PICC  [] PORT [] ICD    [x] Jama Catheter [] NG Tube   [] PEG Tube    [] Rectal Tube [] Drain  [] Other:     Constitutional: pt is lethargic and sleeping, wakes up when called, pain much better controlled but took several doses of prn morphine IV  Eyes: cl  ENMT: cl  Cardiovascular: tachycardia  Respiratory: cl  Gastrointestinal: NE  Musculoskeletal: weakness, lying prone  Skin: warm, dry  Neurologic: ne  Psychiatric: anxious  Other:       Pertinent Lab and or Imaging Tests:  Lab Results   Component Value Date/Time    Sodium 141 05/11/2017 03:34 AM    Potassium 4.4 05/11/2017 03:34 AM    Chloride 110 05/11/2017 03:34 AM    CO2 24 05/11/2017 03:34 AM    Anion gap 7 05/11/2017 03:34 AM    Glucose 176 05/11/2017 03:34 AM    BUN 16 05/11/2017 03:34 AM    Creatinine 0.68 05/11/2017 03:34 AM    BUN/Creatinine ratio 24 05/11/2017 03:34 AM    GFR est AA >60 05/11/2017 03:34 AM    GFR est non-AA >60 05/11/2017 03:34 AM    Calcium 8.3 05/11/2017 03:34 AM     Lab Results   Component Value Date/Time    Protein, total 6.8 05/10/2017 12:27 AM    Albumin 2.9 05/10/2017 12:27 AM           Total time: 35mts     Christopher Herron MD      Supporting documentation for GIP need for pain control:  [x] Frequent evaluation by a doctor, nurse practitioner, nurse   [x] Frequent medication adjustment    [x] IVs that cannot be administered at home   [x] Aggressive pain management   [] Complicated technical delivery of medications              Supporting documentation for GIP need for symptom control:  []  Sudden decline necessitating intensive nursing intervention  []  Uncontrolled / intractable nausea or vomiting   []  Pathological fractures  []  Advanced open wounds requiring frequent skilled care  [] Unmanageable respiratory distress  [] New or worsening delirium   [] Delirium with behavior issues  [] Imminent death  with skilled nursing needs documented above

## 2017-05-20 NOTE — PROGRESS NOTES
0700 Patient's cadd pump settings checked with Kiley Norris RN.  0900 Patient reporting pain of 4/10. Patient given her scheduled medications. Patient has a low grade temperature, but it is too soon for a PRN dose of tylenol. 1018 Patient asleep. 1025 Pharmacy called to refill metoprolol. 1040 Patient calling out reporting pain of 4/10 and requesting pain medication, PRN dose of morphine administered. Patient educated on the importance of pushing her PCA button, patient verbalized understanding but admitted that she is forgetful. 1100 Patient asleep. 1226 Patient calling out reporting severe pain in her right leg, but on assessment patient reporting that the pain is a 3/10, PRN dose of morphine administered along with scheduled medications. Patient sat up for lunch. 1230 Scheduled medication administered. Patient's mother at the bedside. Reinforced the importance of pushing the PCA button. 1430 Scheduled dose of methadone administered. 1520 Patient given her scheduled medications. Patient's mother at the bedside. 1720 Patient in bed consuming her dinner. [id number] Patient given her scheduled dose of ativan. Patient asleep and refused her morphine. Patient educated on medication changes, but will need reinforcement. *Patient's pump cleared. Res volume remaining 385.8, amount given 123.5, and settings changed so that patient is no longer receiving a PCA dose. NAME OF PATIENT:  Khalida Randall    LEVEL OF CARE:  GIP    REASON FOR GIP:   Pain, despite numerous changes in medications    *PATIENT REMAINS ELIGIBLE FOR GIP LEVEL OF CARE AS EVIDENCED BY: Frequent adjustments in medications that cannot take place in the home.        REASON FOR RESPITE:  NA    O2 SAFETY:  NA    FALL INTERVENTIONS PROVIDED:   Implemented/recommended resources for alarm system (personal alarm, bed alarm, call bell, etc.)  and Implemented/recommended environmental changes (remove hazards, lower bed, improve lighting, etc.)    INTERDISPLINARY COMMUNICATION/COLLABORATION:  Physician, MSW, [place] and RN, CNA    NEW MEDICATION INITIATION DOCUMENTATION:  Documentation completed in Clinical Note in [facility]    Reason medication is being initiated:  Pain management    MD / Provider name consulted re: change in status / initiation of new medication:  Dr. Fernando Cardenas Symptom(s):  Pain management     New Order(s):  Roxanol increased to 40 mg PO. Name of the person notified of the changes:  Patient    Name of person being taught:  Patient    Instructions given:  Better symptom control     Side Effects taught:  NA, patient drowsy and unable to be effectively taught. Patient will need reinforcement. Response to teaching:  Patient will need reinforcement. COMFORTABLE DYING MEASURE:  Is Patient/family satisfied with symptom level? NA    DISCHARGE PLAN:  Patient to discharge home to the care of her family once GIP symptoms managed.

## 2017-05-21 NOTE — PROGRESS NOTES
Verbal shift change report given to Gordy Kat RN by Loretta Palmer RN. Report included the following information SBAR, Kardex, Intake/Output and MAR.       NAME OF PATIENT: Jyoti Randall      LEVEL OF CARE: GIP      REASON FOR GIP:   Pain, despite numerous changes in medications, Medication adjustment that must be monitored 24/7 and Stabilizing treatment that cannot take place at home    PATIENT REMAINS ELIGIBLE FOR GIP LEVEL OF CARE AS EVIDENCED BY:   Continue GIP level of care due to the changing requirements and continuous monitoring of patients pain and symptom medications which have been minimally controlled.      REASON FOR RESPITE: Patient is not in Respite care at this time.     O2 SAFETY: Patient on room air.       FALL INTERVENTIONS PROVIDED:   Implemented/recommended use of non-skid footwear, Implemented/recommended use of fall risk identification flag to all team members, Implemented/recommended assistive devices and encouraged their use, Implemented/recommended resources for alarm system (personal alarm, bed alarm, call bell, etc.) , Implemented/recommended environmental changes (remove hazards, lower bed, improve lighting, etc.) and Implemented/recommended increased supervision/assistance      INTERDISPLINARY COMMUNICATION/COLLABORATION:  Physician, MSW, [place] and RN, CNA      NEW MEDICATION INITIATION DOCUMENTATION: No new medications nor interventions begun on this night shift.       Reason medication is being initiated: N/A      MD / Provider name consulted re: change in status / initiation of new medication: N/A      New Symptom(s): N/A      New Order(s): N/A      Name of the person notified of the changes: N/A      Name of person being taught: N/A      Instructions given: N/A      Side Effects taught: N/A      Response to teaching: N/A      COMFORTABLE DYING MEASURE: Continue to monitor for pain, dyspnea, agitation, and restlessness and intervene accordingly.       Is Patient/family satisfied with symptom level? yes      DISCHARGE PLAN: Patient to be discharged home with mother, Ella Rojas, as caregiver under continued care of [facility] once symptoms can be managed at home.

## 2017-05-21 NOTE — PROGRESS NOTES
Verbal shift change report given to Luis Villar, RN by Delaney Shetty RN.  Report included the following information SBAR, Kardex, Intake/Output and MAR.      NAME OF PATIENT: Jyoti Randall     LEVEL OF CARE: GIP     REASON FOR GIP:   Pain, despite numerous changes in medications, Medication adjustment that must be monitored 24/7 and Stabilizing treatment that cannot take place at home    PATIENT REMAINS ELIGIBLE FOR GIP LEVEL OF CARE AS EVIDENCED BY:   Continue GIP level of care due to the changing requirements and continuous monitoring of patients pain and symptom medications which have been minimally controlled.     REASON FOR RESPITE: Patient is not in Respite care at this time.     O2 SAFETY: Patient on room air.      FALL INTERVENTIONS PROVIDED:   Implemented/recommended use of non-skid footwear, Implemented/recommended use of fall risk identification flag to all team members, Implemented/recommended assistive devices and encouraged their use, Implemented/recommended resources for alarm system (personal alarm, bed alarm, call bell, etc.) , Implemented/recommended environmental changes (remove hazards, lower bed, improve lighting, etc.) and Implemented/recommended increased supervision/assistance     INTERDISPLINARY COMMUNICATION/COLLABORATION:  Physician, MSW, [place] and RN, CNA     NEW MEDICATION INITIATION DOCUMENTATION: No new medications nor interventions begun on this night shift.      Reason medication is being initiated: N/A     MD / Provider name consulted re: change in status / initiation of new medication: N/A     New Symptom(s): N/A     New Order(s): N/A     Name of the person notified of the changes: N/A     Name of person being taught: N/A     Instructions given: N/A     Side Effects taught: N/A     Response to teaching: N/A     COMFORTABLE DYING MEASURE: Continue to monitor for pain, dyspnea, agitation, and restlessness and intervene accordingly.      Is Patient/family satisfied with symptom level? yes     DISCHARGE PLAN: Patient to be discharged home with mother, Pete Thompson, as caregiver under continued care of [facility] once symptoms can be managed at home.

## 2017-05-21 NOTE — PROGRESS NOTES
0700 Report received from [facility], [facility]. Dr. Félix Dowd contacted in regards to the rough night that the patient had. Dr. Félix Dowd made aware that the patient was requiring PRN medications every hour. [id number] Patient calling out reporting pain that is generalized, but mainly located in her right lower extremity. Patient found lying prone in bed. PRN dose of morphine administered. Discussed patient's pain and patient agreed that pain was less controlled last night compared to previous days/nights. Patient aware that Dr. Félix Dowd would be making rounds and would adjust according to the patient's needs, understanding verbalized. Patient requesting to received her medications early due to wanting to take a nap. Patient tolerated her morning medications well. Patient instructed to call if pain does not lessen. Call bell in reach. 2524 Patient asleep. 0920 Patient reporting right leg pain of 4/10, and requesting medication. PRN dose morphine given. [address] to Dr. Félix Dowd in regards to symptom management for the patient. Dr. Félix Dowd will round today and discuss options with patient. 1020 Patient calling out requesting adjustments in her blankets. Patient reports that her pain is lowering. 1110 Patient asleep. 1249 Patient calling out reporting pain. Patient states that we have gotten behind on her pain medicine and that she now feels achy. This nurse explained to the patient that her scheduled medications are not due until 1 PM and that PRN medications are only administered upon request, understanding verbalized. Patient given a PRN dose of morphine along with her scheduled medications. [id number] Patient called out reporting that she is aching all over. Pt reminded that she was recently medicated and could only receive her scheduled methadone. Pt then reported that she was restless and just wanted to have some ativan. PRN dose of ativan administered.  Patient requesting cymbalta, patient made aware that she receives this medication in the evening. Patient wants cymbalta dosage increased, ativan scheduled at night so that she is able to sleep, and bengay. A list made for the patient so that she will remember to discuss all of these medications with Dr. Alessia Thornton. Pt also made aware that this nurse would address her concerns with Dr. Alessia Thornton. Patient questioned the use of gabapentin. Patient educated on the indications and side effects of gabapentin, understanding verbalized. 1520 Patient given a PRN dose of morphine due to \"achiness\" along with her scheduled medication. Patient's mother at the bedside. 1630 Spoke to Dr. Alessia Thornton, new orders received. 1700 Pharmacy called in regards to new order for bengay, order to be delivered tonight. 1730 Patient calling out reporting pain. PRN dose of morphine administered along with scheduled dose of ativan. Patient's PCA pump settings changed per order. Basal rate changed to 0.00, PCA dose changed to 4 mg every 10 minutes. Patient's PCA pump cleared. Patient has received 45 mg since 1900 yesterday and has a res volume of 381. 3. PCA battery changed. 1900 Patient calling out reporting pain, scheduled dose of morphine administered.             NAME OF PATIENT:  Jyoti Randall    LEVEL OF CARE:  GIP    REASON FOR GIP:   Pain, despite numerous changes in medications    *PATIENT REMAINS ELIGIBLE FOR GIP LEVEL OF CARE AS EVIDENCED BY: (MUST BE ADDRESSED OF PATIENT GIP)      REASON FOR RESPITE:  NA    O2 SAFETY:  NA    FALL INTERVENTIONS PROVIDED:   Implemented/recommended use of fall risk identification flag to all team members, Implemented/recommended resources for alarm system (personal alarm, bed alarm, call bell, etc.)  and Implemented/recommended environmental changes (remove hazards, lower bed, improve lighting, etc.)    INTERDISPLINARY COMMUNICATION/COLLABORATION:  Physician, MSW, [place] and RN, CNA    NEW MEDICATION INITIATION DOCUMENTATION:  Documentation completed in Clinical Note in [facility]    Reason medication is being initiated:  Pain    MD / Provider name consulted re: change in status / initiation of new medication:  Dr. Ember Ocampo Symptom(s):  Pain    New Order(s):  Methadone increased to 7.5 mg,    Name of the person notified of the changes:  Patient    Name of person being taught:  Patient    Instructions given:  Better symptom control     Side Effects taught:  NA    Response to teaching:  NA      COMFORTABLE DYING MEASURE:  Is Patient/family satisfied with symptom level? NA    DISCHARGE PLAN:  Patient to discharge home to the care of her family once symptoms are managed.

## 2017-05-21 NOTE — PROGRESS NOTES
[address] [place] [address] [facility]  [phone]    Patient Name: Cecil Whiting  YOB: 1980     Date of Provider Hospice Visit: 5-21-17       Level of Care: [x] General Inpatient (GIP)  [] Routine   [] Respite     Location of Care:  [] [facility] [] [facility] [] [facility] [] [facility] [x] [facility]  [] Home [] Other:      Date of Original Hospice Admission: 5-16-17  Hospice Attending: Dr. Cyndi Esteban Diagnosis:  Endometrial cancer with mets to liver and bone  Diagnoses RELATED to the terminal prognosis: enlarged tumor of right sacrum, right iliac bone  Other Diagnoses: GERD, DM, Insomnia     HOSPICE NARRATIVE COMPOSED BY PHYSICIAN   Rationale for a prognosis of life expectancy of 6 months or less if the disease follows its normal course:  Cecil Whiting is a 39 y.o. with a past history of metastatic endometrial cancer (mets to liver and bone) who was admitted on 5/8/2017 from home after Palliative RN visited her and found her to have worsening RLE pain and swelling. By doppler and imaging she does not have DVT/PE. CT of pelvis and right leg show enlarging tumor involving right sacrum, right iliac bone, bilateral rami and proximal right femur (which appears acute). L5 and right sacral nerves are involved with tumor and pressure of tumor creates compression of right femoral and external iliac veins causing stasis. Current focus is pain control, mobility and safe disposition as mother (only caregiver) cannot care for patient at home without full support. The patient/family chose comfort measures with the support of Hospice.     HOSPICE DIAGNOSES   Active Symptoms:  1. Severe pain: still persists  2. Generalized bone pain, neoplasm related: persists  3. Shortness of breath  4. Debility with generalized weakness  5. Constipation   6. Anxiety/depression: still persists  7. Palpitations; tachycardia: less, at baseline  8. Insomnia     PLAN   1.  Continue GIP level of care due to the changing requirements and continuous monitoring of patients pain and symptom medications which have been minimally controlled, continue PCA pump continuous and PCA dosing via CADD prism pump arrives  2. Continue Morphine 4mg IV every 1hr as needed for pain and shortness of breath, discontinue CADD pump setting of basal rate and change PCA dose of 4mg IV every 10 mt Lock out with morphine. Increase oral methadone to 7.5mg three times daily: assess response. 3. Continue Roxanol 40mg po every 3h scheduled and keep morphine IV prn. Assess response and adjust accordingly. 4. Miralax prn, added Bengay to apply to back/other painful  areas three times daily  5. Change Ativan scheduled 1mg four times daily for anxiety and sleep: times adjusted; 5am, 1pm, 4pm and 10pm. Increase cymbalta to 60mg scheduled at bedtime for depression and anxiety, increase gabapentin 600mg three times daily to help with neuropathic component of pain and sleep. 6. Continue metoprolol XL to 50mg daily to help with tachycardia and watch for BP drop as well. 7. Needs intensive planning for discharge to home as mother needs support  8.  and SW to support family needs  9. Disposition: home when symptoms are managed: discussed in length with patient the rationale for maximizing use of oral medications and avoiding dependency on nurse given IV pain medicine and ativan especially since she wants to go home soon. Pt says she will use PCA button for additional pain relief when needed.      Prognosis estimated based on 05/16/17 clinical assessment is:   [] Few to Many Hours  [] Few to Many Days   [] Few to Many Weeks   [x] Few to Many Months     Communicated plan of care with: Hospice Case Manager;  Hospice IDT; Care Team        GOALS OF CARE     Resuscitation Status: DNR  Durable DNR: [x] Yes [] No    Advance Care Planning 5/10/2017   Patient's Healthcare Decision Maker is: Legal Next of Andrea Velarde   Primary Decision Maker Name [facility] Prakash   Primary Decision Maker Phone Number -   Primary Decision Maker Relationship to Patient Parent   Confirm Advance Directive Yes, on file        HISTORY      History obtained from: chart, family, nursing     CHIEF COMPLAINT: Pain, could not sleep  The patient is:   [x] Verbal  [] Nonverbal  [] Unresponsive     HPI/SUBJECTIVE: pt resting comfortably at this time but had rough night as she stayed most night anxious, unable to sleep, restless and having pain, needed 6 doses prn morphine yesterday and another 5 doses today. Pt asking about dilaudid or another stronger pain medicine. Wants to be able to go home soon as well. REVIEW OF SYSTEMS   Positive ROS include:  Constitutional:   Ears/nose/mouth/throat:  Respiratory:  Gastrointestinal:  Musculoskeletal:lying prone, cannot tolerate lying on her back due to sacral tumor, states her pain is less today. Neurologic:  Psychiatric:anxiety especially with movement, inability to sleep  Endocrine:           FUNCTIONAL ASSESSMENT     Palliative Performance Scale (PPS): 30%     PSYCHOSOCIAL/SPIRITUAL ASSESSMENT     Active Problems:    * No active hospital problems.  *    Past Medical History:   Diagnosis Date    Cancer [facility])     uterine    CVI (common variable immunodeficiency) ([address]     Diabetes [facility]     Elevated liver function tests 10/21/2010    Endometrial cancer ([facility] 7/7/2010    Hypertension     Iron deficiency anemia     Menometrorrhagia 10/21/2010    Microcytic anemia 10/21/2010    Morbid obesity ([facility]     Prediabetes 7/7/2010    Psychiatric disorder     depression    Radiation     completed radiation mid-march      Past Surgical History:   Procedure Laterality Date    CARDIAC SURG PROCEDURE UNLIST      hx of tachycardia    HX GYN      hysterectomy      Social History   Substance Use Topics    Smoking status: Never Smoker    Smokeless tobacco: Never Used    Alcohol use Yes     Family History   Problem Relation Age of Onset    Hypertension Mother     Hypertension Father     Stroke Maternal Grandfather     Cancer Paternal Grandmother      breast    Diabetes Paternal Grandmother       Allergies   Allergen Reactions    Egg Nausea and Vomiting     Raw egg and scrambled eggs. Egg products okay.      Pcn [Penicillins] Nausea and Vomiting     \"but could take amoxil\"    Shellfish Containing Products Unknown (comments)    Tetanus And Diphtheria Toxoids, Adsorbed, Adult Other (comments)     Developed local swelling, axillary pain, and transient fever    Tomato Unknown (comments)      Current Facility-Administered Medications   Medication Dose Route Frequency    DULoxetine (CYMBALTA) capsule 60 mg  60 mg Oral DAILY    gabapentin (NEURONTIN) capsule 600 mg  600 mg Oral TID    methadone (DOLOPHINE) tablet 7.5 mg  7.5 mg Oral Q8H    LORazepam (ATIVAN) tablet 1 mg  1 mg Oral QID    methyl salicylate-menthol (BENGAY) 15-10 % cream   Topical TID    metoprolol succinate (TOPROL-XL) XL tablet 50 mg  50 mg Oral DAILY    morphine (ROXANOL) 100 mg/5 mL (20 mg/mL) concentrated solution 40 mg  40 mg Oral Q3H    morphine injection 4 mg  4 mg IntraVENous Q1H PRN    LORazepam (ATIVAN) injection 1 mg  1 mg IntraVENous Q4H PRN    polyethylene glycol (MIRALAX) packet 17 g  17 g Oral DAILY    docusate sodium (COLACE) capsule 100 mg  100 mg Oral DAILY    acetaminophen (TYLENOL) tablet 650 mg  650 mg Oral Q4H PRN    Morphine 10mg/mL PCA 500mL   IntraVENous CONTINUOUS        PHYSICAL EXAM     Wt Readings from Last 3 Encounters:   05/10/17 121.4 kg (267 lb 10.2 oz)   02/16/17 136.1 kg (300 lb)   02/02/16 127 kg (280 lb)       Visit Vitals    /62 (BP 1 Location: Left arm, BP Patient Position: At rest;Prone)    Pulse (!) 122    Temp 99.4 °F (37.4 °C)    Resp 16    SpO2 96%       Supplemental O2  [] Yes  [x] NO  Last bowel movement:     Currently this patient has:  [] Peripheral IV [x] PICC  [] PORT [] ICD    [x] Jama Catheter [] NG Tube   [] PEG Tube    [] Rectal Tube [] Drain  [] Other:     Constitutional: pt is awake and anxious , wants to sleep but unable to do so and is in pain  Eyes: cl  ENMT: cl  Cardiovascular: tachycardia  Respiratory: cl  Gastrointestinal: NE  Musculoskeletal: weakness, lying prone  Skin: warm, dry  Neurologic: ne  Psychiatric: anxious, in pain  Other:       Pertinent Lab and or Imaging Tests:  Lab Results   Component Value Date/Time    Sodium 141 05/11/2017 03:34 AM    Potassium 4.4 05/11/2017 03:34 AM    Chloride 110 05/11/2017 03:34 AM    CO2 24 05/11/2017 03:34 AM    Anion gap 7 05/11/2017 03:34 AM    Glucose 176 05/11/2017 03:34 AM    BUN 16 05/11/2017 03:34 AM    Creatinine 0.68 05/11/2017 03:34 AM    BUN/Creatinine ratio 24 05/11/2017 03:34 AM    GFR est AA >60 05/11/2017 03:34 AM    GFR est non-AA >60 05/11/2017 03:34 AM    Calcium 8.3 05/11/2017 03:34 AM     Lab Results   Component Value Date/Time    Protein, total 6.8 05/10/2017 12:27 AM    Albumin 2.9 05/10/2017 12:27 AM           Total time: 35mts     Jimi Guajardo MD      Supporting documentation for GIP need for pain control:  [x] Frequent evaluation by a doctor, nurse practitioner, nurse   [x] Frequent medication adjustment    [x] IVs that cannot be administered at home   [x] Aggressive pain management   [] Complicated technical delivery of medications              Supporting documentation for GIP need for symptom control:  []  Sudden decline necessitating intensive nursing intervention  []  Uncontrolled / intractable nausea or vomiting   []  Pathological fractures  []  Advanced open wounds requiring frequent skilled care  [] Unmanageable respiratory distress  [] New or worsening delirium   [] Delirium with behavior issues  [] Imminent death  with skilled nursing needs documented above

## 2017-05-22 NOTE — PROGRESS NOTES
[facility]  [phone]    Patient Name: Candy Valdez  YOB: 1980     Date of Provider Hospice Visit: 5-22-17       Level of Care: [x] General Inpatient (GIP)  [] Routine   [] Respite     Location of Care:  [] [facility] [] [address] [] [address] [facility] [] [facility] [x] Hospice [facility]  [] Home [] Other:      Date of Original Hospice Admission: 5-16-17  Hospice Attending: Dr. Patricia Hernandes Diagnosis:  Endometrial cancer with mets to liver and bone  Diagnoses RELATED to the terminal prognosis: enlarged tumor of right sacrum, right iliac bone  Other Diagnoses: GERD, DM, Insomnia     HOSPICE NARRATIVE COMPOSED BY PHYSICIAN   Rationale for a prognosis of life expectancy of 6 months or less if the disease follows its normal course:  Candy Valdez is a 39 y.o. with a past history of metastatic endometrial cancer (mets to liver and bone) who was admitted on 5/8/2017 from home after Palliative RN visited her and found her to have worsening RLE pain and swelling. By doppler and imaging she does not have DVT/PE. CT of pelvis and right leg show enlarging tumor involving right sacrum, right iliac bone, bilateral rami and proximal right femur (which appears acute). L5 and right sacral nerves are involved with tumor and pressure of tumor creates compression of right femoral and external iliac veins causing stasis. Current focus is pain control, mobility and safe disposition as mother (only caregiver) cannot care for patient at home without full support. The patient/family chose comfort measures with the support of Hospice.     HOSPICE DIAGNOSES   Active Symptoms:  1. Severe pain: still persists and she is rating it 7-8/10 most of the time  2. Generalized bone pain, neoplasm related: persists  3. Shortness of breath  4. Debility with generalized weakness  5. Constipation   6. Anxiety/depression: still persists  7. Palpitations; tachycardia: less, at baseline  8. Insomnia     PLAN   1. Continue GIP level of care due to the changing requirements and continuous monitoring of patients pain and symptom medications which have been minimally controlled. 2. Continue Morphine 4mg IV every 1hr as needed for pain and shortness of breath, discontinue CADD pump setting of basal rate and change PCA dose of 4mg IV every 10 mt Lock out with morphine, (she has had 40 attempts and 26 deliveries of medication past 24 hrs, and additionally 6 does today of PCA dosing. . continue oral methadone to 7.5mg three times daily: assess response. May increase by Wednesday 5-24-17  3 Roxanol increased to 50mg po every 3h scheduled and keep morphine IV 4mg prn. Assess response and adjust accordingly. Pt had required 120mg of oral morphine equivalent in the IV form over 24 hrs as a prn doing  4. Miralax prn, added Bengay to apply to back/other painful  areas three times daily  5. Continue Ativan scheduled 1mg four times daily for anxiety and sleep: times adjusted; 5am, 1pm, 4pm and 10pm. Increase cymbalta to 60mg scheduled at bedtime for depression and anxiety, increase gabapentin 600mg three times daily to help with neuropathic component of pain and sleep. 6. Continue metoprolol XL to 50mg daily to help with tachycardia and watch for BP drop as well. 7. Needs intensive planning for discharge to home as mother needs support  8.  and SW to support family needs  9. Disposition: home when symptoms are managed: will continue to discuss with patient the rationale for maximizing use of oral medications and avoiding dependency on nurse given IV pain medicine and ativan especially since she wants to go home soon.  Pt says she will use PCA button for additional pain relief when needed.      Prognosis estimated based on 05/16/17 clinical assessment is:   [] Few to Many Hours  [] Few to Many Days   [] Few to Many Weeks   [x] Few to Many Months     Communicated plan of care with: Hospice Case Manager; Hospice IDT; Care Team        GOALS OF CARE     Resuscitation Status: DNR  Durable DNR: [x] Yes [] No    Advance Care Planning 5/10/2017   Patient's Healthcare Decision Maker is: Legal Next of Andrea 69   Primary Decision Maker Name Pa Garnica   Primary Decision Maker Phone Number -   Primary Decision Maker Relationship to Patient Parent   Confirm Advance Directive Yes, on file        HISTORY      History obtained from: chart, family, nursing     CHIEF COMPLAINT: Pain, could not sleep  The patient is:   [x] Verbal  [] Nonverbal  [] Unresponsive     HPI/SUBJECTIVE: pt resting comfortably at this time but had rough night again and had not slept, she was anxious, unable to sleep, restless and having pain. REVIEW OF SYSTEMS   Positive ROS include:  Constitutional:   Ears/nose/mouth/throat:  Respiratory:  Gastrointestinal:  Musculoskeletal:lying prone, cannot tolerate lying on her back due to sacral tumor, states her pain is 7-8/10. Neurologic:  Psychiatric:anxiety especially with movement, inability to sleep  Endocrine:           FUNCTIONAL ASSESSMENT     Palliative Performance Scale (PPS): 30%     PSYCHOSOCIAL/SPIRITUAL ASSESSMENT     Active Problems:    * No active hospital problems.  *    Past Medical History:   Diagnosis Date    Cancer [facility])     uterine    [facility]     Diabetes [facility]     Elevated liver function tests 10/21/2010    Endometrial cancer ([facility] 7/7/2010    Hypertension     Iron deficiency anemia     Menometrorrhagia 10/21/2010    Microcytic anemia 10/21/2010    Morbid obesity [facility]     Prediabetes 7/7/2010    Psychiatric disorder     depression    Radiation     completed radiation mid-march      Past Surgical History:   Procedure Laterality Date    CARDIAC SURG PROCEDURE UNLIST      hx of tachycardia    HX GYN      hysterectomy      Social History   Substance Use Topics    Smoking status: Never Smoker    Smokeless tobacco: Never Used    Alcohol use Yes     Family History   Problem Relation Age of Onset    Hypertension Mother     Hypertension Father     Stroke Maternal Grandfather     Cancer Paternal Grandmother      breast    Diabetes Paternal Grandmother       Allergies   Allergen Reactions    Egg Nausea and Vomiting     Raw egg and scrambled eggs. Egg products okay.      Pcn [Penicillins] Nausea and Vomiting     \"but could take amoxil\"    Shellfish Containing Products Unknown (comments)    Tetanus And Diphtheria Toxoids, Adsorbed, Adult Other (comments)     Developed local swelling, axillary pain, and transient fever    Tomato Unknown (comments)      Current Facility-Administered Medications   Medication Dose Route Frequency    DULoxetine (CYMBALTA) capsule 60 mg  60 mg Oral DAILY    gabapentin (NEURONTIN) capsule 600 mg  600 mg Oral TID    methadone (DOLOPHINE) tablet 7.5 mg  7.5 mg Oral Q8H    LORazepam (ATIVAN) tablet 1 mg  1 mg Oral QID    methyl salicylate-menthol (BENGAY) 15-10 % cream   Topical TID    metoprolol succinate (TOPROL-XL) XL tablet 50 mg  50 mg Oral DAILY    morphine (ROXANOL) 100 mg/5 mL (20 mg/mL) concentrated solution 40 mg  40 mg Oral Q3H    morphine injection 4 mg  4 mg IntraVENous Q1H PRN    LORazepam (ATIVAN) injection 1 mg  1 mg IntraVENous Q4H PRN    polyethylene glycol (MIRALAX) packet 17 g  17 g Oral DAILY    docusate sodium (COLACE) capsule 100 mg  100 mg Oral DAILY    acetaminophen (TYLENOL) tablet 650 mg  650 mg Oral Q4H PRN    Morphine 10mg/mL PCA 500mL   IntraVENous CONTINUOUS        PHYSICAL EXAM     Wt Readings from Last 3 Encounters:   05/10/17 121.4 kg (267 lb 10.2 oz)   02/16/17 136.1 kg (300 lb)   02/02/16 127 kg (280 lb)       Visit Vitals    /89 (BP 1 Location: Right arm, BP Patient Position: At rest;Other (comment))    Pulse (!) 135    Temp 100 °F (37.8 °C)    Resp 20    SpO2 (!) 89%       Supplemental O2  [] Yes  [x] NO  Last bowel movement:     Currently this patient has:  [] Peripheral IV [x] PICC  [] PORT [] ICD    [x] Jama Catheter [] NG Tube   [] PEG Tube    [] Rectal Tube [] Drain  [] Other:     Constitutional: pt is awake and anxious , wants to sleep she states she was up all night with pain and anxiety  Eyes: cl  ENMT: cl  Cardiovascular: tachycardia  Respiratory: cl  Gastrointestinal: NE  Musculoskeletal: weakness, lying prone  Skin: warm, dry  Neurologic: ne  Psychiatric: anxious, in pain  Other:       Pertinent Lab and or Imaging Tests:  Lab Results   Component Value Date/Time    Sodium 141 05/11/2017 03:34 AM    Potassium 4.4 05/11/2017 03:34 AM    Chloride 110 05/11/2017 03:34 AM    CO2 24 05/11/2017 03:34 AM    Anion gap 7 05/11/2017 03:34 AM    Glucose 176 05/11/2017 03:34 AM    BUN 16 05/11/2017 03:34 AM    Creatinine 0.68 05/11/2017 03:34 AM    BUN/Creatinine ratio 24 05/11/2017 03:34 AM    GFR est AA >60 05/11/2017 03:34 AM    GFR est non-AA >60 05/11/2017 03:34 AM    Calcium 8.3 05/11/2017 03:34 AM     Lab Results   Component Value Date/Time    Protein, total 6.8 05/10/2017 12:27 AM    Albumin 2.9 05/10/2017 12:27 AM           Total time: 35mts     Cally Freire NP      Supporting documentation for GIP need for pain control:  [x] Frequent evaluation by a doctor, nurse practitioner, nurse   [x] Frequent medication adjustment    [x] IVs that cannot be administered at home   [x] Aggressive pain management   [] Complicated technical delivery of medications              Supporting documentation for GIP need for symptom control:  []  Sudden decline necessitating intensive nursing intervention  []  Uncontrolled / intractable nausea or vomiting   []  Pathological fractures  []  Advanced open wounds requiring frequent skilled care  [] Unmanageable respiratory distress  [] New or worsening delirium   [] Delirium with behavior issues  [] Imminent death  with skilled nursing needs documented above      Karley Quiroz NP

## 2017-05-22 NOTE — PROGRESS NOTES
0700 Report received from [facility]. Pt is GIP for pain management. 0900 Pt request to hold breakfast at this time. 1000 Pt c/o increased pain and inability to rest. Tares pain an 8 of 10  Morphine 4mg and Lorazepam 1mg  Given IV prn for pain and anxiety. [address], NP in to assess. 1200 Pt is awake, eating lunch. Rates pain a 4 of 7 if not moving. Scheduled meds required to manage pain. 1400 Scheduled meds given for management of pain. Pt rates pain 4 of 10 at rest but states increases with movement. She has 4 attempts and 3 injects of CADD. She has been sleeping. Encouraged to use PCA at this time. 1600 Schedule medication required to manage pain. 1800  Pt has family at the bedside. MS 4mg IVP for c/o pain 7 of 10. Pillow to right abdomen to tilt to left side. Tolerated fairly.         NAME OF PATIENT:  Jyoti Randall    LEVEL OF CARE:  [facility]    REASON FOR GIP:   Pain, despite numerous changes in medications, Medication adjustment that must be monitored 24/7 and Stabilizing treatment that cannot take place at home    *PATIENT REMAINS ELIGIBLE FOR [facility] LEVEL OF CARE AS EVIDENCED BY: (MUST BE ADDRESSED OF PATIENT GIP) Frequent assessment and med changes required to manage symptoms      REASON FOR RESPITE:  na    O2 SAFETY:  na    FALL INTERVENTIONS PROVIDED:   Implemented/recommended use of fall risk identification flag to all team members, Implemented/recommended resources for alarm system (personal alarm, bed alarm, call bell, etc.) , Implemented/recommended environmental changes (remove hazards, lower bed, improve lighting, etc.) and Implemented/recommended increased supervision/assistance    INTERDISPLINARY COMMUNICATION/COLLABORATION:  Physician, MSW, Dania and RN, CNA    NEW MEDICATION INITIATION DOCUMENTATION:  Documentation completed in Clinical Note in [facility]    Reason medication is being initiated:  Roxanol increased to 50mg orally    MD / Provider name consulted re: change in status / initiation of new medication:   Fuad    New Symptom(s):  imcreased pain    New Order(s):  Roxanol increased    Name of the person notified of the changes:  Pt    Name of person being taught:  pt    Instructions given:  Increased drowsiness     Side Effects taught:  sedation    Response to teaching:  Voices understanding      COMFORTABLE DYING MEASURE:  Is Patient/family satisfied with symptom level?  Yes    DISCHARGE PLAN:  Discharge home with family when pain is managed

## 2017-05-22 NOTE — PROGRESS NOTES
[facility] Follow-up Visit      I visited the room of this pt who is at  the [facility] on GIP status. When I visited her, she was in bed,semi- awake and semi-alert, not agitated, not restless, and appeared well medicated and comfortable. There was no family present. I re-introduced myself and she said she remembered me from my last visit  The pt was open to my visit and my offer of support. As I had previously  [place],  she and her family attend [address] [facility]. She continues to  appear to use geovanny as a coping mechanism. I suggested she rest while  I read a Psalms and a passage from "[employer]". She would recite passages she knew by heart with me as I read. She enjoyed the visit and asked me to come back often and I said I would.   This afternoon, I provided a ministry of presence, active listening to the pt, read Psalms and Scripture, offered words of comfort, assurance, spiritual encouragement, as well as offered a prayer. I also left my contact information in her room and indicated she could reach me if needed or desired. GOALS:  Continue to visit this pt and her family while she is at the [facility] and I am in the facility, to provide pastoral care and spiritual support to assist both the pt and them with coping with this difficult medical situation and decline. Continue to build trust and familiarity with the family to encourage a discussion of their spiritual thoughts and concerns at a deeper and more personal level if they so choose. Validate the emotions and normalize the anticipatory grief of the family regarding this declining situation. Offer written spiritual material to the family as needed or requested and provide a listening presence when needed. PLAN:   Visit this pt and her family, while she is @ [facility] and I am in this facility, or PRN as requested. Coordinate with Care Team on POC.     VISIT FREQUENCY:   1 wk.2 starting 5/17 plus 4 prn 14 days.    Blayne Kilpatrick, [address]   [phone]

## 2017-05-23 NOTE — [facility] IDG NURSE NOTES
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 with a past history of metastatic endometrial cancer (mets to liver and bone) who was admitted on 5/8/2017 from home after Palliative RN visited her and found her to have worsening RLE pain and swelling. By doppler and imaging she does not have DVT/PE. CT of pelvis and right leg show enlarging tumor involving right sacrum, right iliac bone, bilateral rami and proximal right femur (which appears acute). L5 and right sacral nerves are involved with tumor and pressure of tumor creates compression of right femoral and external iliac veins causing stasis. Current focus is pain control, mobility and safe disposition as mother (only caregiver) cannot care for patient at home without full support. The patient/family chose comfort measures with the support of Hospice. SYMPTOMS   Pain  SIGNS/SYMPTOMS:  Patient unable to bear pain of growing tumor involving right sacrum, ilium, spine, femur. Patient only able to tolerate prone position. LAB VALUES (when available)    [place] 30  Progression to DEPENDENCE WITH ADLs (include time frame) Patient is bedridden and complete care. Refuses turning due to pain. Patient feeds herself and is alert and oriented, but completely immobile. PRESSURE ULCERS:  None  DISEASE SPECIFIC: Growing bone and spinal metastases resulting in pain that is difficult to manage  FALL RISK: low  PROBLEM:   Pain 4/10 with medication, pain 10/10 with movement. GOAL:   Pain management  INTERVENTIONS(INDIVIDUALIZED):  Patient to receive bath today. Last bath took 2 hours to complete due to slow movement and body habitus.   Nursing Visit Frequency 24 hr @ [facility] Visit Frequency 24 hr @ [facility]    COPING    GRIEF    ADVANCED DIRECTIVES        BEREAVEMENT    RESOURCES NEEDED    SW Visit Frequency    Bereavement   NO CHANGE    Volunteer    NO VOLUNTEER        SPIRITUAL ISSUES    GRIEF    COPING    [address] RESOURCES     Visit Frequency    Clinician Signatures    Nurse______________________________    SW________________________________    Chaplain____________________________    Volunteer Coordinator__________________    Bereavement_________________________           Signed by: Bella Richmond, RN

## 2017-05-23 NOTE — PROGRESS NOTES
[facility]  [phone]    Patient Name: Haleigh Nunez  YOB: 1980     Date of Provider Hospice Visit: 5-22-17       Level of Care: [x] General Inpatient (GIP)  [] Routine   [] Respite     Location of Care:  [] [facility] [] [facility] [] [facility] [] [facility] [x] Hospice House [facility]  [] Home [] Other:      Date of Original Hospice Admission: 5-16-17  Hospice Attending: Dr. Tanisha Plascencia Diagnosis:  Endometrial cancer with mets to liver and bone  Diagnoses RELATED to the terminal prognosis: enlarged tumor of right sacrum, right iliac bone  Other Diagnoses: GERD, DM, Insomnia     HOSPICE NARRATIVE COMPOSED BY PHYSICIAN   Rationale for a prognosis of life expectancy of 6 months or less if the disease follows its normal course:  Haleigh Nunez is a 39 y.o. with a past history of metastatic endometrial cancer (mets to liver and bone) who was admitted on 5/8/2017 from home after Palliative RN visited her and found her to have worsening RLE pain and swelling. By doppler and imaging she does not have DVT/PE. CT of pelvis and right leg show enlarging tumor involving right sacrum, right iliac bone, bilateral rami and proximal right femur (which appears acute). L5 and right sacral nerves are involved with tumor and pressure of tumor creates compression of right femoral and external iliac veins causing stasis. Current focus is pain control, mobility and safe disposition as mother (only caregiver) cannot care for patient at home without full support. The patient/family chose comfort measures with the support of Hospice.     HOSPICE DIAGNOSES   Active Symptoms:  1. Severe pain: still persists and she is rating it 7-8/10 most of the time  2. Generalized bone pain, neoplasm related: persists  3. Shortness of breath  4. Debility with generalized weakness  5. Constipation   6. Anxiety/depression: still persists  7. Palpitations; tachycardia: less, at baseline  8. Insomnia     PLAN   1. Continue GIP level of care due to the changing requirements and continuous monitoring of patients pain and symptom medications which have been minimally controlled. 2. Continue Morphine 4mg IV every 1hr as needed for pain and shortness of breath, discontinue CADD pump setting of basal rate and change PCA dose of 4mg IV every 10 mt Lock out with morphine, (she has had 40 attempts and 26 deliveries of medication past 24 hrs, and additionally 6 does today of PCA dosing. . continue oral methadone to 7.5mg three times daily: assess response. May increase by Wednesday 5-24-17  3 continue Roxanol increased to 50mg po every 3h scheduled and keep morphine IV 4mg prn. Assess response and adjust accordingly. Pt had required 120mg of oral morphine equivalent in the IV form over 24 hrs as a prn doing  4. Miralax prn, added Bengay to apply to back/other painful  areas three times daily  5. Continue Ativan scheduled 1mg four times daily for anxiety and sleep: times adjusted; 5am, 1pm, 4pm and 10pm. Increase cymbalta to 60mg scheduled at bedtime for depression and anxiety, increase gabapentin 600mg three times daily to help with neuropathic component of pain and sleep. 6. Continue metoprolol XL to 50mg daily to help with tachycardia and watch for BP drop as well. Discussed with patient along with her  regarding her knowledge of her disease process and the invasion of the tumor in the bone, she understands her poor prognosis for good recovery  She is aware the goals is still to control her pain better, she would like to know if a brace is possible so that she can move and pivot to a sitting position in a chair  Will discuss this with orthopedics  7. Needs intensive planning for discharge to home as mother needs support  8.  and SW to support family needs  9.  Disposition: home when symptoms are managed: will continue to discuss with patient the rationale for maximizing use of oral medications and avoiding dependency on nurse given IV pain medicine and ativan especially since she wants to go home soon. Pt says she will use PCA button for additional pain relief when needed. Will dc PCA as soon as possible     Prognosis estimated based on 05/16/17 clinical assessment is:   [] Few to Many Hours  [] Few to Many Days   [] Few to Many Weeks   [x] Few to Many Months     Communicated plan of care with: Hospice Case Manager; Hospice IDT; Care Team        GOALS OF CARE     Resuscitation Status: DNR  Durable DNR: [x] Yes [] No    Advance Care Planning 5/10/2017   Patient's Healthcare Decision Maker is: Legal Next of Andrea 69   Primary Decision Maker Name Abhijit Greenberg   Primary Decision Maker Phone Number -   Primary Decision Maker Relationship to Patient Parent   Confirm Advance Directive Yes, on file        HISTORY      History obtained from: chart, family, nursing     CHIEF COMPLAINT: Pain, could not sleep  The patient is:   [x] Verbal  [] Nonverbal  [] Unresponsive     HPI/SUBJECTIVE: pt resting comfortably at this time but had rough night again and had not slept, she was anxious, unable to sleep, restless and having pain. REVIEW OF SYSTEMS   Positive ROS include:  Constitutional:   Ears/nose/mouth/throat:  Respiratory:  Gastrointestinal:  Musculoskeletal:lying prone, cannot tolerate lying on her back due to sacral tumor, states her pain is 7-8/10. Neurologic:  Psychiatric:anxiety especially with movement, inability to sleep  Endocrine:           FUNCTIONAL ASSESSMENT     Palliative Performance Scale (PPS): 30%     PSYCHOSOCIAL/SPIRITUAL ASSESSMENT     Active Problems:    * No active hospital problems.  *    Past Medical History:   Diagnosis Date    Cancer [facility])     uterine    CVI (common variable immunodeficiency) (HCC)     [facility] [address]     Elevated liver function tests 10/21/2010    Endometrial cancer ([facility] [address] 7/7/2010    Hypertension     Iron deficiency anemia     Menometrorrhagia 10/21/2010    Microcytic anemia 10/21/2010    Morbid obesity ([address]     Prediabetes 7/7/2010    Psychiatric disorder     depression    Radiation     completed radiation mid-march      Past Surgical History:   Procedure Laterality Date    CARDIAC SURG PROCEDURE UNLIST      hx of tachycardia    HX GYN      hysterectomy      Social History   Substance Use Topics    Smoking status: Never Smoker    Smokeless tobacco: Never Used    Alcohol use Yes     Family History   Problem Relation Age of Onset    Hypertension Mother     Hypertension Father     Stroke Maternal Grandfather     Cancer Paternal Grandmother      breast    Diabetes Paternal Grandmother       Allergies   Allergen Reactions    Egg Nausea and Vomiting     Raw egg and scrambled eggs. Egg products okay.      Pcn [Penicillins] Nausea and Vomiting     \"but could take amoxil\"    Shellfish Containing Products Unknown (comments)    Tetanus And Diphtheria Toxoids, Adsorbed, Adult Other (comments)     Developed local swelling, axillary pain, and transient fever    Tomato Unknown (comments)      Current Facility-Administered Medications   Medication Dose Route Frequency    morphine (ROXANOL) 100 mg/5 mL (20 mg/mL) concentrated solution 50 mg  50 mg Oral Q3H    DULoxetine (CYMBALTA) capsule 60 mg  60 mg Oral DAILY    gabapentin (NEURONTIN) capsule 600 mg  600 mg Oral TID    methadone (DOLOPHINE) tablet 7.5 mg  7.5 mg Oral Q8H    LORazepam (ATIVAN) tablet 1 mg  1 mg Oral QID    methyl salicylate-menthol (BENGAY) 15-10 % cream   Topical TID    metoprolol succinate (TOPROL-XL) XL tablet 50 mg  50 mg Oral DAILY    morphine injection 4 mg  4 mg IntraVENous Q1H PRN    LORazepam (ATIVAN) injection 1 mg  1 mg IntraVENous Q4H PRN    polyethylene glycol (MIRALAX) packet 17 g  17 g Oral DAILY    docusate sodium (COLACE) capsule 100 mg  100 mg Oral DAILY    acetaminophen (TYLENOL) tablet 650 mg  650 mg Oral Q4H PRN    Morphine 10mg/mL PCA 500mL IntraVENous CONTINUOUS        PHYSICAL EXAM     Wt Readings from Last 3 Encounters:   05/10/17 121.4 kg (267 lb 10.2 oz)   02/16/17 136.1 kg (300 lb)   02/02/16 127 kg (280 lb)       Visit Vitals    /80 (BP 1 Location: Left arm, BP Patient Position: At rest)    Pulse (!) 51    Temp 98.5 °F (36.9 °C)    Resp 20    SpO2 92%       Supplemental O2  [] Yes  [x] NO  Last bowel movement:     Currently this patient has:  [] Peripheral IV [x] PICC  [] PORT [] ICD    [x] Jama Catheter [] NG Tube   [] PEG Tube    [] Rectal Tube [] Drain  [] Other:     Constitutional: pt is awake and anxious , wants to sleep she states she was up all night with pain and anxiety  Eyes: cl  ENMT: cl  Cardiovascular: tachycardia  Respiratory: cl  Gastrointestinal: NE  Musculoskeletal: weakness, lying prone  Skin: warm, dry  Neurologic: ne  Psychiatric: anxious, in pain  Other:       Pertinent Lab and or Imaging Tests:  Lab Results   Component Value Date/Time    Sodium 141 05/11/2017 03:34 AM    Potassium 4.4 05/11/2017 03:34 AM    Chloride 110 05/11/2017 03:34 AM    CO2 24 05/11/2017 03:34 AM    Anion gap 7 05/11/2017 03:34 AM    Glucose 176 05/11/2017 03:34 AM    BUN 16 05/11/2017 03:34 AM    Creatinine 0.68 05/11/2017 03:34 AM    BUN/Creatinine ratio 24 05/11/2017 03:34 AM    GFR est AA >60 05/11/2017 03:34 AM    GFR est non-AA >60 05/11/2017 03:34 AM    Calcium 8.3 05/11/2017 03:34 AM     Lab Results   Component Value Date/Time    Protein, total 6.8 05/10/2017 12:27 AM    Albumin 2.9 05/10/2017 12:27 AM           Total time: 35mts     Cally Freire NP      Supporting documentation for GIP need for pain control:  [x] Frequent evaluation by a doctor, nurse practitioner, nurse   [x] Frequent medication adjustment    [x] IVs that cannot be administered at home   [x] Aggressive pain management   [] Complicated technical delivery of medications              Supporting documentation for GIP need for symptom control:  []  Sudden decline necessitating intensive nursing intervention  []  Uncontrolled / intractable nausea or vomiting   [x]  Pathological fractures  []  Advanced open wounds requiring frequent skilled care  [] Unmanageable respiratory distress  [] New or worsening delirium   [] Delirium with behavior issues  [] Imminent death  with skilled nursing needs documented above      Dulcie Necessary, NP    Pt seen & case discussed with Ms. Jodie Will. Agree with current care plan as outlined in her note. Recommend following changes:  PT/OT consult for evaluation for safe mobility, and use of any assistive devices or brace for help with transfers. Increase methadone to 10mg tid by tomorrow   Increase Roxanol to 60mg po every 3 hours as needed  Add sulindac 200,mg twice daily to help for adjuvant pain control; antiinflammatory response. Will continue to follow for comfort and adjust medications/care plan accordingly.

## 2017-05-23 NOTE — [facility] IDG CHAPLAIN NOTES
Patient: Elysia Kaminski    Date: 05/23/17  Time: 12:45 PM  SPIRITUAL ISSUES:  I visited the room of this pt who is at the [facility] on GIP status. She was in bed, awake and alert, not agitated, not restless, and appeared well medicated and comfortable. There was no family present. The pt thanked me for visiting and my offer of support. I learned [facility] [place] [id number]. She did appear to use geovanny as a coping mechanism And said she loved to [place] and hear the Word of the Lord . I suggested I read a Psalms and a passage from "[employer]. She would recite passages she knew by heart with me as I read. She enjoyed the visit and asked me to come back often and I said I would.   This morning, I provided a ministry of presence, active listening to the pt, read Psalms and Scripture, offered words of comfort, assurance, spiritual encouragement, as well as offered a prayer. I also left my contact information in her room and indicated she could reach me if needed or desired. GOALS:  Continue to visit this pt and her family while she is at the [facility] and I am in the facility, to provide pastoral care and spiritual support to assist both the pt and them with coping with this difficult medical situation and decline. Continue to build trust and familiarity with the family to encourage a discussion of their spiritual thoughts and concerns at a deeper and more personal level if they so choose. Validate the emotions and normalize the anticipatory grief of the family regarding this declining situation. Offer written spiritual material to the family as needed or requested and provide a listening presence when needed. PLAN:   Visit this pt and her family, while she is @ [facility] and I am in this facility, or PRN as requested. Coordinate with Care Team on POC.   Signed by: Belkis Porras

## 2017-05-23 NOTE — PROGRESS NOTES
0700 Report received from Critical access hospital, [facility]. Pt is [facility] level of care for pain management. 0900 Scheduled meds given for pain management. Pt rates pain 7 0f 10. Wants to take oral scheduled med and use PCA 4mg bolus dose before she is given IVP. C/O sore throat and states nose is running. Tylenol 650 mg given for sore throat. Pt requires scheduled Roxanol and PCA Morphine 4mg prn to manage pain. 1300 Scheduled meds given. Pt is drowsy but easily arousable. NAME OF PATIENT:  Jyoti Randall    LEVEL OF CARE:  [facility]    REASON FOR GIP:   Pain, despite numerous changes in medications, Medication adjustment that must be monitored 24/7 and Stabilizing treatment that cannot take place at home    *PATIENT REMAINS ELIGIBLE FOR [facility] LEVEL OF CARE AS EVIDENCED BY: (MUST BE ADDRESSED OF PATIENT GIP)  Frequent assessment for symptoms and med changes      REASON FOR RESPITE:  na    O2 SAFETY:  na    FALL INTERVENTIONS PROVIDED:   Implemented/recommended use of fall risk identification flag to all team members, Implemented/recommended resources for alarm system (personal alarm, bed alarm, call bell, etc.) , Implemented/recommended environmental changes (remove hazards, lower bed, improve lighting, etc.) and Implemented/recommended increased supervision/assistance    INTERDISPLINARY COMMUNICATION/COLLABORATION:  Physician, MSW, [place] and RN, CNA    NEW MEDICATION INITIATION DOCUMENTATION:  Documentation completed in Clinical Note in [facility]    Reason medication is being initiated:  carmen    MD / Provider name consulted re: change in status / initiation of new medication:  na    New Symptom(s):  na    New Order(s):  na    Name of the person notified of the changes:  na    Name of person being taught:  na    Instructions given:  na    Side Effects taught:  na    Response to teaching:  na      COMFORTABLE DYING MEASURE:  Is Patient/family satisfied with symptom level?   Yes    DISCHARGE PLAN:  Plans for discharge to home when symptoms are managed

## 2017-05-23 NOTE — PROGRESS NOTES
Verbal shift change report given to Rosa Prieto RN by Jan Gomes RN.  Report included the following information SBAR, Kardex, Intake/Output and MAR.       NAME OF PATIENT: Jyoti Randall      LEVEL OF CARE: GIP      REASON FOR GIP:   Pain, despite numerous changes in medications, Medication adjustment that must be monitored 24/7 and Stabilizing treatment that cannot take place at home    PATIENT REMAINS ELIGIBLE FOR GIP LEVEL OF CARE AS EVIDENCED BY:   Continue GIP level of care due to the changing requirements and continuous monitoring of patients pain and symptom medications which have been minimally controlled.      REASON FOR RESPITE: Patient is not in Respite care at this time.      O2 SAFETY: Patient on room air.       FALL INTERVENTIONS PROVIDED:   Implemented/recommended use of non-skid footwear, Implemented/recommended use of fall risk identification flag to all team members, Implemented/recommended assistive devices and encouraged their use, Implemented/recommended resources for alarm system (personal alarm, bed alarm, call bell, etc.) , Implemented/recommended environmental changes (remove hazards, lower bed, improve lighting, etc.) and Implemented/recommended increased supervision/assistance      INTERDISPLINARY COMMUNICATION/COLLABORATION:  Physician, MSW, [place] and RN, CNA      NEW MEDICATION INITIATION DOCUMENTATION: No new medications nor interventions begun on this night shift.       Reason medication is being initiated: N/A      MD / Provider name consulted re: change in status / initiation of new medication: N/A      New Symptom(s): N/A      New Order(s): N/A      Name of the person notified of the changes: N/A      Name of person being taught: N/A      Instructions given: N/A      Side Effects taught: N/A      Response to teaching: N/A      COMFORTABLE DYING MEASURE: Continue to monitor for pain, dyspnea, agitation, and restlessness and intervene accordingly.       Is Patient/family satisfied with symptom level? yes      DISCHARGE PLAN: Patient to be discharged home with mother, Lauren Henry, as caregiver under continued care of [facility] once symptoms can be managed at home.

## 2017-05-24 NOTE — PROGRESS NOTES
0700  Report received. 0730  Pt lying in bed,  Pt reports pain is 4/10. Pt reports it is getting a lot better. Lungs are clear. No sob noted.  + bowel sounds. Last reported BM was 5-20. Jama is draining yellow urine with sediment. Pt has edema in her bilateral LE. Pt has a PICC in her right upper arm. .  Dressing changed last night. Dressing is clear and intact. Pt has a CADD pump draining Dilaudid Res vol.  353.7,  No basal rate, Bolus 14mg q 10mins with max of 6. Pt medicated with Morphine at 0700.    0800  Pt reports pain is better. 0930  Pt requesting a drink. SW Rn provided a drink. Pt is without complaints at this time. 1000  Pt medicated with schedule meds. 1100  Pt resting. Pt attempted to move her legs and stated her pain is much better. Rn advised her that she was going to get a bath from head to toe and her sheets would be changed later. Pt is very afraid that she is going to hurt. Rn advised her that i would medicate her prior to moving her. Pt stated she felt safe with this Rn and she also liked my attitude. This Rn assured her that she would be well taken care of during this shift but she was not allowed to refuse a bath or clean sheets. 1230  Pt lying in bed, eating her lunch. HERLINDA Perez in visit. 18  Pt's Mother in to visit. Pt medicated with scheduled meds. 1620  Pt medicated with Lorazepam prior to bathing. Pt is very anxious about turning and repositioning. 1630  Pt turned and bathed. Pt pushed the PCA 3 times during her bath. Pt states this time it was much easier than when she first arrived. Pt positioned on her side with pillow placement. 1700  Dr Alana Kang in to visit. Rn continued to educate on turning and repositioning to prevent pressure sores. 1800  Pt sleeping. No facial grimacing at this time. 1920 Pt complained of pain in her right hip. 5/10  Pt medicated with Morphine. Pt's sister is at the bedside visiting.    2000   Report given to [facility] RN. NAME OF PATIENT:  Maryam Randall    LEVEL OF CARE:  GIP    REASON FOR GIP:   Pain, despite numerous changes in medications, Terminal agitation, despite changes to medications and Medication adjustment that must be monitored 24/7    *PATIENT REMAINS ELIGIBLE FOR GIP LEVEL OF CARE AS EVIDENCED BY: (MUST BE ADDRESSED OF PATIENT GIP)  Pt continues to receive PRN Morphine for pain. O2 SAFETY:  Oxygen sign on the door    FALL INTERVENTIONS PROVIDED:   Implemented/recommended assistive devices and encouraged their use    INTERDISPLINARY COMMUNICATION/COLLABORATION:  Physician, MSW, [place] and RN, CNA    NEW MEDICATION INITIATION DOCUMENTATION:  No new medications initiated. COMFORTABLE DYING MEASURE:  Is Patient/family satisfied with symptom level?  yes    DISCHARGE PLAN:  Pt will remain at the [facility] until her symptoms are managed , then she will return home to her Mother's care and continue to be followed by Home Hospice.

## 2017-05-24 NOTE — PROGRESS NOTES
Verbal shift change report given to Darleen Shaikh (oncoming nurse) by Evon Urbano (offgoing nurse). Report included the following information SBAR and Kardex. NAME OF PATIENT:  Federico Garcia    LEVEL OF CARE:  GIP    REASON FOR GIP:   Pain, despite numerous changes in medications, Medication adjustment that must be monitored 24/7 and Stabilizing treatment that cannot take place at home   Per Dr. Jacki Rosen, \"Continue GIP level of care due to the changing requirements and continuous monitoring of patients pain and symptom medications which have been minimally controlled. \" Patient continues to receive combinations of po and IV medications in order to control her pain. *PATIENT REMAINS ELIGIBLE FOR GIP LEVEL OF CARE AS EVIDENCED BY: (MUST BE ADDRESSED OF PATIENT GIP)      REASON FOR RESPITE:  n/a    O2 SAFETY:  No oxygen in use at this time. FALL INTERVENTIONS PROVIDED:   Implemented/recommended environmental changes (remove hazards, lower bed, improve lighting, etc.) and Implemented/recommended increased supervision/assistance    INTERDISPLINARY COMMUNICATION/COLLABORATION:  Physician, MSW, [place] and RN, CNA    NEW MEDICATION INITIATION DOCUMENTATION:  Obtained Order from Provider for initiation of symptom relief medication /other medication needed and New order for nystatin for treatment of mouth ulcers and discomfort. Reason medication is being initiated:  Mouth ulcers and discomfort. MD / Provider name consulted re: change in status / initiation of new medication:  Dr. Michell Adam. New Symptom(s):  Sore throat, sore mouth. New Order(s):  Nystatin oral suspension 4 times daily. Name of the person notified of the changes: The patient, Marva Bermeo. Name of person being taught:  The patient, Marva Bermeo. Instructions given:  Swish around in mouth before swallowing. Will be given 4 times a day.     Side Effects taught:  n/a    Response to teaching:  Rajinder understanding. COMFORTABLE DYING MEASURE:  Is Patient/family satisfied with symptom level?  yes    DISCHARGE PLAN:  Return home with her mother, to be cared for by her mother, once pain symptoms can be controlled at home. Night shift summary 1900 until 0700.  1900 - Report received. 2100 - Patient reports her pain level as a 3 to 4. Position changed slightly; patient still remains prone due to her sever discomfort in any other position. 2300 - Hypoactive bowel sounds. Jama is patent and draining yellow urine with sediment. 0110 - Patient requested and given Tylenol 650 mg po for sore mouth and throat.   0300 - Resting in bed, awake. Breathing is even and unlabored. 0500 - Patient report a pain level of 3. Right upper arm PICC line dressing changed.

## 2017-05-24 NOTE — PROGRESS NOTES
Problem: Pressure Injury - Risk of  Goal: *Prevention of pressure ulcer  Rn educated on using pillow placement with turning and repositioning to stabilize her leg. Rn also educated on turning and repositioning pt to avoid breakdown on her stomach.

## 2017-05-24 NOTE — PROGRESS NOTES
[facility]  [phone]    Patient Name: Monique Hutchins  YOB: 1980     Date of Provider Hospice Visit: 5-24-17       Level of Care: [x] General Inpatient (GIP)  [] Routine   [] Respite     Location of Care:  [] [facility] [] [facility] [] [facility] [] [facility] [x] [facility]  [] Home [] Other:      Date of Original Hospice Admission: 5-16-17  Hospice Attending: Dr. Charline Davies Diagnosis:  Endometrial cancer with mets to liver and bone  Diagnoses RELATED to the terminal prognosis: enlarged tumor of right sacrum, right iliac bone  Other Diagnoses: GERD, DM, Insomnia     HOSPICE NARRATIVE COMPOSED BY PHYSICIAN   Rationale for a prognosis of life expectancy of 6 months or less if the disease follows its normal course:  Monique Hutchins is a 39 y.o. with a past history of metastatic endometrial cancer (mets to liver and bone) who was admitted on 5/8/2017 from home after Palliative RN visited her and found her to have worsening RLE pain and swelling. By doppler and imaging she does not have DVT/PE. CT of pelvis and right leg show enlarging tumor involving right sacrum, right iliac bone, bilateral rami and proximal right femur (which appears acute). L5 and right sacral nerves are involved with tumor and pressure of tumor creates compression of right femoral and external iliac veins causing stasis. Current focus is pain control, mobility and safe disposition as mother (only caregiver) cannot care for patient at home without full support. The patient/family chose comfort measures with the support of Hospice.     HOSPICE DIAGNOSES   Active Symptoms:  1. Severe pain:  This has improved, rates her pain in her right hip and low spine about 4/10  2. Generalized bone pain, neoplasm related: persists  3. Shortness of breath  4. Debility with generalized weakness  5. Constipation   6. Anxiety/depression: still persists  7.  Palpitations; tachycardia: less, at baseline  8. Insomnia     PLAN   1. Continue GIP level of care due to the changing requirements and continuous monitoring of patients pain and symptom medications which have been minimally controlled. 2. Continue Morphine 4mg IV every 1hr as needed for pain and shortness of breath, discontinue CADD pump setting of basal rate and change PCA dose of 4mg IV every 10 mt Lock out with morphine,   Increase oral methadone to 10mg TID. 3. Continue Roxanol increased to 50mg po every 3h scheduled and dcd morphine IV 4mg prn. Assess response and adjust accordingly. 4. Miralax prn, added Bengay to apply to back/other painful  areas three times daily  5. Continue Ativan scheduled 1mg four times daily for anxiety and sleep: times adjusted; 5am, 1pm, 4pm and 10pm. Increase cymbalta to 60mg scheduled at bedtime for depression and anxiety, increase gabapentin 600mg three times daily to help with neuropathic component of pain and sleep. 6. Continue metoprolol XL to 50mg daily to help with tachycardia and watch for BP drop as well. Discussed with patient along with her  regarding her knowledge of her disease process and the invasion of the tumor in the bone, she understands her poor prognosis for good recovery  She is aware the goals is still to control her pain better, she would like to know if a brace is possible so that she can move and pivot to a sitting position in a chair   discussed this with orthopedics today and they suggest an occupational therapy consult and potentially a knee brace for the right knee to help stabilize her position and may be she will  her position to lying oon her side or getting up to the chair, she actually can pivot on the left leg, right leg is NO weight bearing  7. Needs intensive planning for discharge to home as mother needs support  8.  and SW to support family needs  9.  Disposition: home when symptoms are managed: will continue to discuss with patient the rationale for maximizing use of oral medications and avoiding dependency on nurse given IV pain medicine and ativan especially since she wants to go home soon. Pt says she will use PCA button for additional pain relief when needed. Will dc PCA as soon as possible     Prognosis estimated based on 05/16/17 clinical assessment is:   [] Few to Many Hours  [] Few to Many Days   [] Few to Many Weeks   [x] Few to Many Months     Communicated plan of care with: Hospice Case Manager; Hospice IDT; Care Team        GOALS OF CARE     Resuscitation Status: DNR  Durable DNR: [x] Yes [] No    Advance Care Planning 5/10/2017   Patient's Healthcare Decision Maker is: Legal Next of Andrea 69   Primary Decision Maker Name Marc Rear   Primary Decision Maker Phone Number -   Primary Decision Maker Relationship to Patient Parent   Confirm Advance Directive Yes, on file        HISTORY      History obtained from: chart, family, nursing     CHIEF COMPLAINT: Pain, could not sleep  The patient is:   [x] Verbal  [] Nonverbal  [] Unresponsive     HPI/SUBJECTIVE: pt resting comfortably at this time but had rough night again and had not slept, she was anxious, unable to sleep, restless and having pain. REVIEW OF SYSTEMS   Positive ROS include:  Constitutional:   Ears/nose/mouth/throat:  Respiratory:  Gastrointestinal:  Musculoskeletal:lying prone, cannot tolerate lying on her back due to sacral tumor, states her pain is 7-8/10. Neurologic:  Psychiatric:anxiety especially with movement, inability to sleep  Endocrine:           FUNCTIONAL ASSESSMENT     Palliative Performance Scale (PPS): 30%     PSYCHOSOCIAL/SPIRITUAL ASSESSMENT     Active Problems:    * No active hospital problems.  *    Past Medical History:   Diagnosis Date    Cancer [facility])     uterine    CVI (common variable immunodeficiency) (HCC)     Diabetes [facility]     Elevated liver function tests 10/21/2010    Endometrial cancer ([facility] 7/7/2010    Hypertension     Iron deficiency anemia  Menometrorrhagia 10/21/2010    Microcytic anemia 10/21/2010    Morbid obesity ([address]     Prediabetes 7/7/2010    Psychiatric disorder     depression    Radiation     completed radiation mid-march      Past Surgical History:   Procedure Laterality Date    CARDIAC SURG PROCEDURE UNLIST      hx of tachycardia    HX GYN      hysterectomy      Social History   Substance Use Topics    Smoking status: Never Smoker    Smokeless tobacco: Never Used    Alcohol use Yes     Family History   Problem Relation Age of Onset    Hypertension Mother     Hypertension Father     Stroke Maternal Grandfather     Cancer Paternal Grandmother      breast    Diabetes Paternal Grandmother       Allergies   Allergen Reactions    Egg Nausea and Vomiting     Raw egg and scrambled eggs. Egg products okay.      Pcn [Penicillins] Nausea and Vomiting     \"but could take amoxil\"    Shellfish Containing Products Unknown (comments)    Tetanus And Diphtheria Toxoids, Adsorbed, Adult Other (comments)     Developed local swelling, axillary pain, and transient fever    Tomato Unknown (comments)      Current Facility-Administered Medications   Medication Dose Route Frequency    sulindac (CLINORIL) tablet 200 mg  200 mg Oral BID WITH MEALS    nystatin (MYCOSTATIN) 100,000 unit/mL oral suspension 500,000 Units  500,000 Units Oral QID    morphine (ROXANOL) 100 mg/5 mL (20 mg/mL) concentrated solution 50 mg  50 mg Oral Q3H    DULoxetine (CYMBALTA) capsule 60 mg  60 mg Oral DAILY    gabapentin (NEURONTIN) capsule 600 mg  600 mg Oral TID    methadone (DOLOPHINE) tablet 7.5 mg  7.5 mg Oral Q8H    LORazepam (ATIVAN) tablet 1 mg  1 mg Oral QID    methyl salicylate-menthol (BENGAY) 15-10 % cream   Topical TID    metoprolol succinate (TOPROL-XL) XL tablet 50 mg  50 mg Oral DAILY    morphine injection 4 mg  4 mg IntraVENous Q1H PRN    LORazepam (ATIVAN) injection 1 mg  1 mg IntraVENous Q4H PRN    polyethylene glycol (MIRALAX) packet 17 g  17 g Oral DAILY    docusate sodium (COLACE) capsule 100 mg  100 mg Oral DAILY    acetaminophen (TYLENOL) tablet 650 mg  650 mg Oral Q4H PRN    Morphine 10mg/mL PCA 500mL   IntraVENous CONTINUOUS        PHYSICAL EXAM     Wt Readings from Last 3 Encounters:   05/10/17 121.4 kg (267 lb 10.2 oz)   02/16/17 136.1 kg (300 lb)   02/02/16 127 kg (280 lb)       Visit Vitals    BP (!) 86/40 (BP 1 Location: Right arm, BP Patient Position: At rest;Prone)    Pulse (!) 110    Temp 98.2 °F (36.8 °C)    Resp 16    SpO2 93%       Supplemental O2  [] Yes  [x] NO  Last bowel movement:     Currently this patient has:  [] Peripheral IV [x] PICC  [] PORT [] ICD    [x] Jama Catheter [] NG Tube   [] PEG Tube    [] Rectal Tube [] Drain  [] Other:     Constitutional: pt is awake complains of pain in the right hip, leg and spine 4/10  Eyes: cl  ENMT: cl  Cardiovascular: tachycardia  Respiratory: cl  Gastrointestinal: NE  Musculoskeletal: weakness, lying prone  Skin: warm, dry  Neurologic: ne  Psychiatric: anxious, in pain  Other:       Pertinent Lab and or Imaging Tests:  Lab Results   Component Value Date/Time    Sodium 141 05/11/2017 03:34 AM    Potassium 4.4 05/11/2017 03:34 AM    Chloride 110 05/11/2017 03:34 AM    CO2 24 05/11/2017 03:34 AM    Anion gap 7 05/11/2017 03:34 AM    Glucose 176 05/11/2017 03:34 AM    BUN 16 05/11/2017 03:34 AM    Creatinine 0.68 05/11/2017 03:34 AM    BUN/Creatinine ratio 24 05/11/2017 03:34 AM    GFR est AA >60 05/11/2017 03:34 AM    GFR est non-AA >60 05/11/2017 03:34 AM    Calcium 8.3 05/11/2017 03:34 AM     Lab Results   Component Value Date/Time    Protein, total 6.8 05/10/2017 12:27 AM    Albumin 2.9 05/10/2017 12:27 AM           Total time: 35mts     Cally Freire NP      Supporting documentation for GIP need for pain control:  [x] Frequent evaluation by a doctor, nurse practitioner, nurse   [x] Frequent medication adjustment    [x] IVs that cannot be administered at home   [x] Aggressive pain management   [] Complicated technical delivery of medications              Supporting documentation for GIP need for symptom control:  []  Sudden decline necessitating intensive nursing intervention  []  Uncontrolled / intractable nausea or vomiting   [x]  Pathological fractures  []  Advanced open wounds requiring frequent skilled care  [] Unmanageable respiratory distress  [] New or worsening delirium   [] Delirium with behavior issues  [] Imminent death  with skilled nursing needs documented above      Zan Nieves NP MD Addendum    Pt seen & case discussed with Ms. Ramirez. Agree with current care plan as outlined in her note. Pt seems to be doing much better today. A wedge has been issued and is being used to support pt and assist in turning in bed. Pt assisted with laying on left side; caused some discomfort doing so but overall feels better afterwards. Pt feels pain much better controlled today unless when moved around. Recommend continue with current meds; methadone increased to 10mg tid today and pt started on NSAID sulindac 200mg bid that seems to be helping. Continue to assess response. Start discharge planning and recommend home OT/PT consult for assistive device to help with transfer and pain control. Will continue to follow for comfort and adjust medications/care plan accordingly.

## 2017-05-24 NOTE — PROGRESS NOTES
Problem: Pain Management  Goal: Reduce/control pain  Pt continues to receive Morphine from her PCA pump and utilizes PRN Morphine   Outcome: Progressing Towards Goal  Pt reports that her pain is much better.

## 2017-05-24 NOTE — DISCHARGE SUMMARY
Discharge Summary       PATIENT ID: Marco Antonio Waters  MRN: [id number]   YOB: 1980    DATE OF ADMISSION: 5/8/2017  3:52 PM    DATE OF DISCHARGE: 5/16/2017   PRIMARY CARE PROVIDER: Gris Power MD     ATTENDING PHYSICIAN: Dr Chris Lesches  DISCHARGING PROVIDER: Chris Lesches, MD    To contact this individual call [phone] and ask the  to page. If unavailable ask to be transferred the Adult Hospitalist Department. CONSULTATIONS: IP CONSULT TO ORTHOPEDIC SURGERY  IP CONSULT TO ORTHOPEDIC SURGERY    PROCEDURES/SURGERIES: Procedure(s):  ANESTHESIA FOR CT    ADMITTING DIAGNOSES & HOSPITAL COURSE:   Acute respiratory failure with hypoxia (POA)   -Resoled. PE Ruled out   - CTA chest on 5/9,negative for PE  - Lovenox d/c  -O2sat on RA 99% now.      Intractable right thigh pain (POA) due   Pathologic fracture of the subtrochanteric right femur,acute  - Xray right femur 5/8 suboptimal but no fracture or acute abnormality seen  - Xray right tib/fib 5/8 with no acute abnormality  - Duplex right LE 5/8 without DVT but again, suboptimal  - Right soft tissue US 5/9 shows nonspecific soft tissue edema. No defined hematoma identified  - Obtain CT leg on 5/9:Pathologic fracture of the subtrochanteric right femur is likely acute given the clinical history and imaging appearance  - PCA for pain  - Palliative care consult. Patient medications adjusted. Pain controlled today     Tachycardia (POA) - due pain,anemia  - ECG with sinus tachycardia, non-specific T-wave abnormalities  - Monitor  -Pain control  -Pulmonary saw pt and assessed tachycardia due to pain     Hypotension (POA)   -Resolved     Leukocytosis (POA) - unclear cause,possibly due to malignancy  - Check UA  - CXR no pneumonia  - Hold off on empiric antibiotics  -Blood cx negative     Anemia (chronic) - patient with substantial drop since 2015, monitor for bleeding  - Monitor HH  - Received 1 unit prbc on 5/10 and repeated Hgb 7.3     Type 2 diabetes mellitus (chronic)  - Humalog insulin correctional coverage, schedule Accu-Cheks.       Endometrial uterine carcinoma (chronic) with metastasis- I spoke with Dr. Garrison Johnson' nurse and there is no further treatment, even palliative, available  - Palliative care consult.  -Dr Estee Davis who knows patient has discussed further plan with patient and parent.      Plan: Discharge to hospice house today        Cherelle Childress / PLAN:      1. Subtrochanteric right femur fracture  2. Endometrial uterine cancer with mets       PENDING TEST RESULTS:   At the time of discharge the following test results are still pending: none    FOLLOW UP APPOINTMENTS:    Follow-up Information     Follow up With Details Comments Contact Info    [address] [id number]  [address] [place] [address], [address] [place] [address]  [phone]             ADDITIONAL CARE RECOMMENDATIONS:   Palliative care to follow    DIET: Comfort feeding    ACTIVITY: Activity as tolerated      DISCHARGE MEDICATIONS:  Discharge Medication List as of 5/16/2017 12:06 PM      START taking these medications    Details   metoprolol tartrate (LOPRESSOR) 25 mg tablet Take 1 Tab by mouth two (2) times a day., Print, Disp-60 Tab, R-0      !! DULoxetine (CYMBALTA) 30 mg capsule Take 1 Cap by mouth daily. , Print, Disp-30 Cap, R-1      MORPHINE SULFATE/PF (MORPHINE, PF,) 150 mg/30 mL PCAS 4 mg/hr by IntraVENous route continuous. 4mg q 6min Max of 44 mg in one hour, Print, Disp-1 Vial, R-0       !! - Potential duplicate medications found. Please discuss with provider. CONTINUE these medications which have CHANGED    Details   oxyCODONE IR (OXY-IR) 30 mg immediate release tablet Take 2 Tabs by mouth every three (3) hours (while awake).  Max Daily Amount: 360 mg., Print, Disp-60 Tab, R-0      LORazepam (ATIVAN) 1 mg tablet Take 1 Tab by mouth every six (6) hours as needed for Anxiety. Max Daily Amount: 4 mg., Print, Disp-30 Tab, R-0      gabapentin (NEURONTIN) 300 mg capsule Take 1 Cap by mouth two (2) times a day., Print, Disp-60 Cap, R-0         CONTINUE these medications which have NOT CHANGED    Details   !! DULoxetine (CYMBALTA) 20 mg capsule Take 40 mg by mouth daily. Indications: ANXIETY WITH DEPRESSION, NEUROPATHIC PAIN, Historical Med      albuterol (PROVENTIL HFA, VENTOLIN HFA, PROAIR HFA) 90 mcg/actuation inhaler Take 1 Puff by inhalation every six (6) hours as needed for Wheezing., Phone In, Disp-1 Inhaler, R-1      cholecalciferol, vitamin D3, (VITAMIN D3) 2,000 unit tab Take  by mouth daily. , Historical Med      clotrimazole (LOTRIMIN) 1 % topical cream Apply 1 Each to affected area two (2) times a day. Apply to feet affected, Normal, Disp-35.4 g, R-0      sennosides (SENNA) 8.6 mg cap Take 2 Tabs by mouth two (2) times a day., Normal, Disp-120 Cap, R-2      acetaminophen (TYLENOL ARTHRITIS PAIN) 650 mg CR tablet Take 650 mg by mouth every six (6) hours as needed for Pain., Historical Med      nystatin (MYCOSTATIN) powder Apply  to affected area four (4) times daily as needed. Yeast, Normal, Disp-1 Bottle, R-1      docusate sodium (COLACE) 100 mg capsule Take 100 mg by mouth two (2) times a day., Historical Med      polyethylene glycol (MIRALAX) 17 gram packet Take 17 g by mouth daily. , Historical Med       !! - Potential duplicate medications found. Please discuss with provider.       STOP taking these medications       fentaNYL (DURAGESIC) 100 mcg/hr PATCH Comments:   Reason for Stopping:         lisinopril (PRINIVIL, ZESTRIL) 10 mg tablet Comments:   Reason for Stopping:         metoprolol succinate (TOPROL-XL) 25 mg XL tablet Comments:   Reason for Stopping:         ibuprofen (ADVIL) 200 mg tablet Comments:   Reason for Stopping:         metFORMIN (GLUCOPHAGE) 500 mg tablet Comments:   Reason for Stopping:                 NOTIFY [employer] OF THE FOLLOWING:   Fever over 101 degrees for 24 hours. Chest pain, shortness of breath, fever, chills, nausea, vomiting, diarrhea, change in mentation, falling, weakness, bleeding. Severe pain or pain not relieved by medications. Or, any other signs or symptoms that you may have questions about.     DISPOSITION:   x Home With:   OT  PT  HH  RN       Long term SNF/Inpatient Rehab    Independent/assisted living    Hospice    Other:       PATIENT CONDITION AT DISCHARGE:     Functional status    Poor     Deconditioned    x Independent      Cognition    x Lucid     Forgetful     Dementia      Catheters/lines (plus indication)    Jama     PICC     PEG    x None      Code status     Full code    x DNR      PHYSICAL EXAMINATION AT DISCHARGE:  Please see progress note      CHRONIC MEDICAL DIAGNOSES:  Problem List as of 5/16/2017  Date Reviewed: 5/15/2017          Codes Class Noted - Resolved    * (Principal)Acute respiratory failure with hypoxia ([facility] ICD-10-CM: J96.01  ICD-9-CM: 518.81  5/8/2017 - Present        Advance care planning ICD-10-CM: Z71.89  ICD-9-CM: V65.49  1/27/2017 - Present    Overview Signed 1/27/2017 12:18 PM by Marjorie Alvarado MD     See ACP notes and Advance Directive on file             Chronic neoplasm-related pain ICD-10-CM: G89.3  ICD-9-CM: 338.3  12/21/2015 - Present        Vaginal pain ICD-10-CM: R10.2  ICD-9-CM: 625.9  5/19/2015 - Present        Tachycardia ICD-10-CM: R00.0  ICD-9-CM: 785.0  11/20/2014 - Present        Bone metastases ([facility] ICD-10-CM: C79.51  ICD-9-CM: 198.5  3/10/2014 - Present        DM (diabetes mellitus) ([facility] ICD-10-CM: E11.9  ICD-9-CM: 250.00  4/30/2012 - Present        Conjunctivitis ICD-10-CM: H10.9  ICD-9-CM: 372.30  8/22/2011 - Present        Insomnia ICD-10-CM: G47.00  ICD-9-CM: 780.52  2/11/2011 - Present        Elevated liver function tests ICD-10-CM: R94.5  ICD-9-CM: 790.6  10/21/2010 - Present        Endometrial cancer ([facility] ICD-10-CM: C54.1  ICD-9-CM: 182.0 7/7/2010 - Present        Iron deficiency anemia ICD-10-CM: D50.9  ICD-9-CM: 280.9  7/7/2010 - Present        Morbid obesity ([address] ICD-10-CM: E66.01  ICD-9-CM: 278.01  7/7/2010 - Present        Edema ICD-10-CM: R60.9  ICD-9-CM: 782.3  7/7/2010 - Present        GERD (gastroesophageal reflux disease) ICD-10-CM: K21.9  ICD-9-CM: 530.81  7/7/2010 - Present        RESOLVED: Menometrorrhagia ICD-10-CM: N92.1  ICD-9-CM: 626.2  10/21/2010 - 2/11/2011        RESOLVED: Prediabetes ICD-10-CM: R73.03  ICD-9-CM: 790.29  7/7/2010 - 5/21/2012              Greater than 39 minutes were spent with the patient on counseling and coordination of care    Signed:   Lois Kumar MD  5/24/2017  9:26 AM

## 2017-05-25 NOTE — PROGRESS NOTES
Verbal shift change report given to Angie Arreola RN by Ella New RN.  Report included the following information SBAR, Kardex, Intake/Output and MAR.       NAME OF PATIENT: Jyoti Randall      LEVEL OF CARE: GIP      REASON FOR GIP:   Pain, despite numerous changes in medications, Medication adjustment that must be monitored 24/7 and Stabilizing treatment that cannot take place at home     PATIENT REMAINS ELIGIBLE FOR GIP LEVEL OF CARE AS EVIDENCED BY: Continue GIP level of care due to the changing requirements and continuous monitoring of patients pain and symptom medications which have been minimally controlled.      REASON FOR RESPITE: Patient is not in Respite care at this time.      O2 SAFETY: Patient on room air.       FALL INTERVENTIONS PROVIDED:   Implemented/recommended use of non-skid footwear, Implemented/recommended use of fall risk identification flag to all team members, Implemented/recommended assistive devices and encouraged their use, Implemented/recommended resources for alarm system (personal alarm, bed alarm, call bell, etc.) , Implemented/recommended environmental changes (remove hazards, lower bed, improve lighting, etc.) and Implemented/recommended increased supervision/assistance      INTERDISPLINARY COMMUNICATION/COLLABORATION:  Physician, MSW, Maynard and RN, CNA      NEW MEDICATION INITIATION DOCUMENTATION: No new medications nor interventions begun on this night shift.       Reason medication is being initiated: N/A      MD / Provider name consulted re: change in status / initiation of new medication: N/A      New Symptom(s): N/A      New Order(s): N/A      Name of the person notified of the changes: N/A      Name of person being taught: N/A      Instructions given: N/A      Side Effects taught: N/A      Response to teaching: N/A      COMFORTABLE DYING MEASURE: Continue to monitor for pain, dyspnea, agitation, and restlessness and intervene accordingly.       Is Patient/family satisfied with symptom level? yes      DISCHARGE PLAN: Patient to be discharged home with mother, Jose Humphreys, as caregiver under continued care of [facility] once symptoms can be managed at home.

## 2017-05-25 NOTE — PROGRESS NOTES
NAME OF PATIENT:  Sima Randall    LEVEL OF CARE:  GIP    REASON FOR GIP:   Pain, despite numerous changes in medications, Medication adjustment that must be monitored 24/7 and Stabilizing treatment that cannot take place at home    *PATIENT REMAINS ELIGIBLE FOR GIP LEVEL OF CARE AS EVIDENCED BY: (MUST BE ADDRESSED OF PATIENT GIP) Continue GIP level of care due to the changing requirements and continuous monitoring of patients pain and symptom medications which have been minimally controlled. REASON FOR RESPITE:  n/a    O2 SAFETY:  n/a    FALL INTERVENTIONS PROVIDED:   Implemented/recommended resources for alarm system (personal alarm, bed alarm, call bell, etc.) , Implemented/recommended environmental changes (remove hazards, lower bed, improve lighting, etc.) and Implemented/recommended increased supervision/assistance    INTERDISPLINARY COMMUNICATION/COLLABORATION:  Physician, MSW, Leta and RN, CNA    NEW MEDICATION INITIATION DOCUMENTATION:  n/a    Reason medication is being initiated:  n/a    MD / Provider name consulted re: change in status / initiation of new medication:  n/a    New Symptom(s):  n/a    New Order(s):  n/a    Name of the person notified of the changes:  n/a    Name of person being taught:  n/a    Instructions given:  n/a    Side Effects taught:  n/a    Response to teaching:  n/a      COMFORTABLE DYING MEASURE:  Is Patient/family satisfied with symptom level?  yes    DISCHARGE PLAN:  Patient is scheduled to go home Friday, May 26th and to be followed by [facility].

## 2017-05-25 NOTE — PROGRESS NOTES
[facility] [address] Follow-up Visit       I visited the room of this pt who is at the [facility] on GIP status. When I visited her, she was in bed,semi- awake and semi-alert, not agitated, not restless, and appeared well medicated and comfortable. There was no family present. She remembered me from my last visit  The pt was open to my visit and my offer of support. As I had previously learned, she and her family attend [address]  on the [facility]. She is also visited by Nurys Ponce from the [facility]. The pt. continues to  appear to use geovanny as a coping mechanism. She said she was sleepy and asked that I pick one psalm to read which I did. She enjoyed the visit and asked me to come back often and I said I would.   This afternoon, I provided a ministry of presence, active listening to the pt, read Psalms and Scripture, offered words of comfort, assurance, spiritual encouragement, as well as offered a prayer. I reminded her that she could reach me if needed or desired. GOALS:  Continue to visit this pt and her family while she is at the [facility] and I am in the facility, to provide pastoral care and spiritual support to assist both the pt and them with coping with this difficult medical situation and decline. Continue to build trust and familiarity with the family to encourage a discussion of their spiritual thoughts and concerns at a deeper and more personal level if they so choose. Validate the emotions and normalize the anticipatory grief of the family regarding this declining situation. Offer written spiritual material to the family as needed or requested and provide a listening presence when needed. PLAN:   Visit this pt and her family, while she is @ [facility] and I am in this facility, or PRN as requested. Coordinate with Care Team on POC.  VISIT FREQUENCY:   1 wk.2 starting 5/17 plus 4 prn 14 days.    Mayank Grant, 2018 [facility]    [phone]

## 2017-05-25 NOTE — PROGRESS NOTES
[facility]  [phone]    Patient Name: Alphonso Choudhary  YOB: 1980     Date of Provider Hospice Visit: 5-25-17       Level of Care: [x] General Inpatient (GIP)  [] Routine   [] Respite     Location of Care:  [] [facility] [] [facility] [] [facility] [] [facility] [x] [facility]  [] Home [] Other:      Date of Original Hospice Admission: 5-16-17  Hospice Attending: Dr. Afshin eDleon Diagnosis:  Endometrial cancer with mets to liver and bone  Diagnoses RELATED to the terminal prognosis: enlarged tumor of right sacrum, right iliac bone  Other Diagnoses: GERD, DM, Insomnia     HOSPICE NARRATIVE COMPOSED BY PHYSICIAN   Rationale for a prognosis of life expectancy of 6 months or less if the disease follows its normal course:  Alphonso Choudhary is a 39 y.o. with a past history of metastatic endometrial cancer (mets to liver and bone) who was admitted on 5/8/2017 from home after Palliative RN visited her and found her to have worsening RLE pain and swelling. By doppler and imaging she does not have DVT/PE. CT of pelvis and right leg show enlarging tumor involving right sacrum, right iliac bone, bilateral rami and proximal right femur (which appears acute). L5 and right sacral nerves are involved with tumor and pressure of tumor creates compression of right femoral and external iliac veins causing stasis. Current focus is pain control, mobility and safe disposition as mother (only caregiver) cannot care for patient at home without full support. The patient/family chose comfort measures with the support of Hospice.     HOSPICE DIAGNOSES   Active Symptoms:  1. Severe pain:  This has improved, rates her pain in her right hip and low spine about 4/10  2. Generalized bone pain, neoplasm related: persists  3. Shortness of breath  4. Debility with generalized weakness  5. Constipation   6. Anxiety/depression: still persists  7.  Palpitations; tachycardia: less, currently 117  8. Insomnia     PLAN   1. Continue GIP level of care due to the changing requirements and continuous monitoring of patients pain and symptom medications which have been minimally controlled. 2. Continue Morphine 4mg IV every 1hr as needed for pain and shortness of breath, discontinue CADD pump setting of basal rate and change PCA dose of 4mg IV every 10 mt Lock out with morphine,   Increase oral methadone to 10mg TID. 3. Continue Roxanol decreased to 30mg po every 3h scheduled as methadone increase will begin to reach steady state. Assess response and adjust accordingly. 4. Miralax prn, added Bengay to apply to back/other painful  areas three times daily  5. Continue Ativan scheduled 1mg four times daily for anxiety and sleep: times adjusted; 5am, 1pm, 4pm and 10pm. Increase cymbalta to 60mg scheduled at bedtime for depression and anxiety, increase gabapentin 600mg three times daily to help with neuropathic component of pain and sleep. 6. Continue metoprolol XL to 50mg daily to help with tachycardia and watch for BP drop as well. Discussed with patient along with her  regarding her knowledge of her disease process and the invasion of the tumor in the bone, she understands her poor prognosis for good recovery  She is aware the goals is still to control her pain better, she would like to know if a brace is possible so that she can move and pivot to a sitting position in a chair, discussed with orthopedics and they suggest an occupational therapy or physical therapy consult and potentially a knee brace for the right knee to help stabilize her position and/or a hip wedge, potentially she will be able to move  Herself into  position to lying onto her side or getting up to the chair, she actually can pivot on the left leg, right leg is NO weight bearing . The goals is for the patient to go home by Tuesday May 30, 2017, will order OTPT consult for home with hospice.    7.Continue  intensive planning for discharge to home as mother needs support  8.  and SW to support family needs  9. Disposition: home when symptoms are managed: will continue to discuss with patient the rationale for maximizing use of oral medications and avoiding dependency on nurse given IV pain medicine and ativan especially since she wants to go home soon. Pt says she will use PCA button for additional pain relief when needed. Will dc PCA as soon as possible     Prognosis estimated based on 05/16/17 clinical assessment is:   [] Few to Many Hours  [] Few to Many Days   [] Few to Many Weeks   [x] Few to Many Months     Communicated plan of care with: Hospice Case Manager; Hospice IDT; Care Team        GOALS OF CARE     Resuscitation Status: DNR  Durable DNR: [x] Yes [] No    Advance Care Planning 5/10/2017   Patient's Healthcare Decision Maker is: Legal Next of Andrea 69   Primary Decision Maker Name Wil Lam   Primary Decision Maker Phone Number -   Primary Decision Maker Relationship to Patient Parent   Confirm Advance Directive Yes, on file        HISTORY      History obtained from: chart, family, nursing     CHIEF COMPLAINT: Pain, could not sleep  The patient is:   [x] Verbal  [] Nonverbal  [] Unresponsive     HPI/SUBJECTIVE: pt resting comfortably at this time but had rough night again and had not slept, she was anxious, unable to sleep, restless and having pain. REVIEW OF SYSTEMS   Positive ROS include:  Constitutional:   Ears/nose/mouth/throat:  Respiratory:  Gastrointestinal:  Musculoskeletal:lying prone, cannot tolerate lying on her back due to sacral tumor, states her pain is 7-8/10. Neurologic:  Psychiatric:anxiety especially with movement, inability to sleep  Endocrine:           FUNCTIONAL ASSESSMENT     Palliative Performance Scale (PPS): 30%     PSYCHOSOCIAL/SPIRITUAL ASSESSMENT     Active Problems:    * No active hospital problems.  *    Past Medical History:   Diagnosis Date    Cancer [facility])     uterine  CVI (common variable immunodeficiency) (HCC)     [facility]     Elevated liver function tests 10/21/2010    Endometrial cancer ([facility] 7/7/2010    Hypertension     Iron deficiency anemia     Menometrorrhagia 10/21/2010    Microcytic anemia 10/21/2010    Morbid obesity ([facility]     Prediabetes 7/7/2010    Psychiatric disorder     depression    Radiation     completed radiation mid-march      Past Surgical History:   Procedure Laterality Date    CARDIAC SURG PROCEDURE UNLIST      hx of tachycardia    HX GYN      hysterectomy      Social History   Substance Use Topics    Smoking status: Never Smoker    Smokeless tobacco: Never Used    Alcohol use Yes     Family History   Problem Relation Age of Onset    Hypertension Mother     Hypertension Father     Stroke Maternal Grandfather     Cancer Paternal Grandmother      breast    Diabetes Paternal Grandmother       Allergies   Allergen Reactions    Egg Nausea and Vomiting     Raw egg and scrambled eggs. Egg products okay.      Pcn [Penicillins] Nausea and Vomiting     \"but could take amoxil\"    Shellfish Containing Products Unknown (comments)    Tetanus And Diphtheria Toxoids, Adsorbed, Adult Other (comments)     Developed local swelling, axillary pain, and transient fever    Tomato Unknown (comments)      Current Facility-Administered Medications   Medication Dose Route Frequency    methadone (DOLOPHINE) tablet 10 mg  10 mg Oral Q8H    sulindac (CLINORIL) tablet 200 mg  200 mg Oral BID WITH MEALS    nystatin (MYCOSTATIN) 100,000 unit/mL oral suspension 500,000 Units  500,000 Units Oral QID    morphine (ROXANOL) 100 mg/5 mL (20 mg/mL) concentrated solution 50 mg  50 mg Oral Q3H    DULoxetine (CYMBALTA) capsule 60 mg  60 mg Oral DAILY    gabapentin (NEURONTIN) capsule 600 mg  600 mg Oral TID    LORazepam (ATIVAN) tablet 1 mg  1 mg Oral QID    methyl salicylate-menthol (BENGAY) 15-10 % cream   Topical TID    metoprolol succinate (TOPROL-XL) XL tablet 50 mg  50 mg Oral DAILY    LORazepam (ATIVAN) injection 1 mg  1 mg IntraVENous Q4H PRN    polyethylene glycol (MIRALAX) packet 17 g  17 g Oral DAILY    docusate sodium (COLACE) capsule 100 mg  100 mg Oral DAILY    acetaminophen (TYLENOL) tablet 650 mg  650 mg Oral Q4H PRN    Morphine 10mg/mL PCA 500mL   IntraVENous CONTINUOUS        PHYSICAL EXAM     Wt Readings from Last 3 Encounters:   05/10/17 121.4 kg (267 lb 10.2 oz)   02/16/17 136.1 kg (300 lb)   02/02/16 127 kg (280 lb)       Visit Vitals    /55 (BP 1 Location: Right arm)    Pulse (!) 117    Temp 98.4 °F (36.9 °C)    Resp 12    SpO2 92%       Supplemental O2  [] Yes  [x] NO  Last bowel movement:     Currently this patient has:  [] Peripheral IV [x] PICC  [] PORT [] ICD    [x] Jama Catheter [] NG Tube   [] PEG Tube    [] Rectal Tube [] Drain  [] Other:     Constitutional: pt is awake complains of pain in the right hip, leg and spine 310  Eyes: cl  ENMT: cl  Cardiovascular: tachycardia  Respiratory: cl  Gastrointestinal: NE  Musculoskeletal: weakness, lying prone  Skin: warm, dry  Neurologic: ne  Psychiatric: anxious, in pain  Other:       Pertinent Lab and or Imaging Tests:  Lab Results   Component Value Date/Time    Sodium 141 05/11/2017 03:34 AM    Potassium 4.4 05/11/2017 03:34 AM    Chloride 110 05/11/2017 03:34 AM    CO2 24 05/11/2017 03:34 AM    Anion gap 7 05/11/2017 03:34 AM    Glucose 176 05/11/2017 03:34 AM    BUN 16 05/11/2017 03:34 AM    Creatinine 0.68 05/11/2017 03:34 AM    BUN/Creatinine ratio 24 05/11/2017 03:34 AM    GFR est AA >60 05/11/2017 03:34 AM    GFR est non-AA >60 05/11/2017 03:34 AM    Calcium 8.3 05/11/2017 03:34 AM     Lab Results   Component Value Date/Time    Protein, total 6.8 05/10/2017 12:27 AM    Albumin 2.9 05/10/2017 12:27 AM           Total time: 35mts     Cally Freire NP      Supporting documentation for GIP need for pain control:  [x] Frequent evaluation by a doctor, nurse practitioner, nurse   [x] Frequent medication adjustment    [x] IVs that cannot be administered at home   [x] Aggressive pain management   [] Complicated technical delivery of medications              Supporting documentation for GIP need for symptom control:  []  Sudden decline necessitating intensive nursing intervention  []  Uncontrolled / intractable nausea or vomiting   [x]  Pathological fractures  []  Advanced open wounds requiring frequent skilled care  [] Unmanageable respiratory distress  [] New or worsening delirium   [] Delirium with behavior issues  [] Imminent death  with skilled nursing needs documented above      Kelton Mg NP MD Addendum    Pt seen & case discussed with Ms. Barbara Pedersen. Agree with current care plan as outlined in her note. Pt seems to be quite sleepy today (likely due to increase in methadone dose) and says that she needs this rest as she has not had good sleep in days. Pain seems to be better controlled. Pt willing to go home next week. Home health PT/OT consult has been written for and will be initiated for one time consult to assess pt's ability for rehab and recommendations for assistive device to help with transfer and pain control. Meanwhile continue with methadone increased to 10mg tid. May decrease morphine to 30mg po q3h scheduled and continue with prn dosing and PCA pump. Work on further wean off PCA pump prior to d/c pt home. Continue  NSAID sulindac 200mg bid that seems to be helping. Reduce ativan to 1mg three times daily to avoid excess sedation. Continue to assess response. Ms. Nikkie Reaves assisting with discharge planning; setting up transport, DME, tuck in visit etc for Tuesday    Will continue to follow for comfort and adjust medications/care plan accordingly.

## 2017-05-25 NOTE — PROGRESS NOTES
0715:  Verbal shift change report given to Waqas Lemus RN (oncoming nurse) by Ivette Lucero RN (offgoing nurse). Report included the following information SBAR, Kardex, Intake/Output and MAR.   0840:  Assessed patient (see Simple Assessment) and administered scheduled medications (see MAR); asked patient her pain level; 4/10. Educated patient on the use of the PCA to administer prn dose of morphine and that she should push it when she is in pain and that assigned nurse will do frequent rounding to assess pain. She pushed the PCA. Will continue to monitor. 1340:  Patient sleeping; had to wake to administer scheduled medications (see MAR). Asked patient to rate pain; 2/10.  1435:  Received order from Christa Doan NP for magic mouthwash. Spoke with Dillan Cancino, Pharmacist, to order medication. Also spoke with Dillan Cancino to evaluate need to assure that patient has enough medications available at d/c on Tuesday, 5/30.    1445:  Spoke with Maricel Samuel at [place]; order has been place for bed & bedside table to be delivered on Tuesday morning. 1530:  Rounded on patient; she stated that she felt that her bed was moving to the middle of the floor & that she felt that she was on the floor. Reassured patient that she is in her bed and that it is not moving. She also stated that her breathing feels funny. Place 2L of O2/nc on patient. Communicated this information to Dr. Linzie Sicard MD Judyann Ferrier NP. Per Boneta Slade Dr. Linzie Sicard, hold 1600 roxanol & start duo-neb treatments. Orders placed. 1635:  Rounded on patient; patient has spiritual care in room. O2 is at 97%; no s/s of distress.

## 2017-05-25 NOTE — [facility] IDG SOCIAL WORKER NOTES
Patient: Kevin Loyd    Date: 05/25/17  Time: 12:58 PM    [facility] [place]  Notes    Patient is awake at times but very lethargic  alert and oriented x 3. To meet with mother  tomorrow to discuss discharge plan. Patient relayed she was going home to her parents home  Her father has a brain injury and wife has to provide 24 hour care in addition to her 13year old granddaughter  who is in the home. Continue to follow for discharge when stable  provide emotional support for illness related changes as they occur.         Signed by: Carmen Schaffer

## 2017-05-26 NOTE — PROGRESS NOTES
1900 Recd report from [facility], patient continues to reguire GIP level of care for pain management and close monitoring of methadone dosing, pain is improving however patient still will not turn off her abdomen  2000 Assessed lethargic, slurred speech, alert but slow to respond, moves upper extremities only, stays laying flat on abd due to tumor at sacral area and right leg too painful to move, appetite down, comfort food, no difficulty swallowing, davila to bedside drain  2200 Scheduled meds at this time without difficulty nystatin and magic mouthwash scheduled at same time will separate, patient wants to review meds she got today doesn't remember getting any meds. Pain appears to be well controlled, patient has only used pca morphine once per hour  0000 Nebulizer held patient is sleeping soundly  0130 Scheduled roxanol given, patient asked for coffee thought it was morning asked for fresh ice and pepsi  0400 resting quietly  0530 Scheduled meds given patient denies pain thought that PCA stopped working assured her it has not  0600 Patient thought something was coming in her door, reassured  0700 report to oncoming shift.     NAME OF PATIENT: Jyoti Randall     LEVEL OF CARE: GIP     REASON FOR GIP:   Pain, despite numerous changes in medications, Medication adjustment that must be monitored 24/7 and Stabilizing treatment that cannot take place at home     *PATIENT REMAINS ELIGIBLE FOR [facility] LEVEL OF CARE AS EVIDENCED BY: (MUST BE ADDRESSED OF PATIENT GIP) Continue GIP level of care due to the changing requirements and continuous monitoring of patients pain and symptom medications which have been minimally controlled.         REASON FOR RESPITE:  n/a     O2 SAFETY:  n/a     FALL INTERVENTIONS PROVIDED:   Implemented/recommended resources for alarm system (personal alarm, bed alarm, call bell, etc.) , Implemented/recommended environmental changes (remove hazards, lower bed, improve lighting, etc.) and Implemented/recommended increased supervision/assistance     INTERDISPLINARY COMMUNICATION/COLLABORATION:  Physician, MSW, Leta and RN, CNA     NEW MEDICATION INITIATION DOCUMENTATION:  n/a     Reason medication is being initiated: n/a     MD / Provider name consulted re: change in status / initiation of new medication: n/a     New Symptom(s): n/a     New Order(s): n/a     Name of the person notified of the changes: n/a     Name of person being taught: n/a     Instructions given: n/a     Side Effects taught: n/a     Response to teaching: n/a        COMFORTABLE DYING MEASURE:  Is Patient/family satisfied with symptom level? yes     DISCHARGE PLAN: Patient is scheduled to go home Friday, May 26th and to be followed by [facility].

## 2017-05-26 NOTE — PROGRESS NOTES
0940:  Received report from [place], [address].  1200:  Administered scheduled medications (see MAR); patient refused nystatin & magic mouthwash. Isa Fonseca requested staff to use pillow wedge to assist patient with turning on to her left side. When explained to patient, she became very concerned and anxiety about pain. She states that she's comfortable and does not want the wedge used; she only wants a pillow under her right side which is presently there. CNA was present and asked her if she wanted a bath and she stated that she wants to wait until tomorrow when Layo Vincent is on shift. Patient requested prn dose of lorazepam 1mg/IV to help her sleep; dose was given. 1300:  Administered scheduled medications (see MAR) and check O2 level (97% RA). Patient asked about the \"red shorts\" and writer asked \"what shorts\"; she stated that she wasn't sure if she was dreaming or not. Held 1300 dose of roxanol 30mg/SL as her 1000 dose was given late (will contact MD). 1410:  Spoke with Isa Fonseca NP and notified her of patient's condition and the held roxanol. She asked about VS and information was given (O2 @ 97% RA, ). Patient is sleeping; will check VS prior to 1600 dose of roxanol. 1815:  Administered scheduled medications (see MAR); mother is visiting and gave update on patient's condition. Asked patient about pain level, she stated 5/10 and she requested pain medication. Administered prn roxanol 30mg/SL.

## 2017-05-26 NOTE — PROGRESS NOTES
0700 Report received from [address], [address], here to see the patient. Patient's bp low, metoprolol held. Patient reported that she is extremely tired, but afraid to fall asleep. When questioned as to why the patient reported that when [facility] asked her if she was tired and told her that she was going to adjust her medications it made her afraid. This nurse reassured the patient that it was okay to sleep. 7553 Report given to [facility], HERLINDA.

## 2017-05-26 NOTE — PROGRESS NOTES
Consulted with Dr. Vielka Flowers and Nora Louis NP regarding patient's discharge - it was decided that patient would stay until next Tuesday and a 1 x PT/OT consult would be made for evaluation and tx for assistive device to help with transfer/ambulation/repositioning in bed and pain control. Should therapist feel she could benefit then patient would be discharged to [facility] with idea she could return to hospice when therapy completed.

## 2017-05-26 NOTE — PROGRESS NOTES
Visit to patient's room to meet mother Justin Escobedo. She had her  with her - he has a brain injury and can't be left alone. Mother and msw met out in the hallway. We discussed discharge plan and she stated being unable to care for daughter if she couldn't at least get to the [facility]. Msw gave her a Medicaid application for NHP but patient would have to go home to await placement. Will discuss with Dr. Rosemary Masters and Karen Melgar NP. Patient is still GIP Level of Care. Continue to follow for discharge plan. Patient wants to go home. She is still in the same position in bed as when she arrived.

## 2017-05-26 NOTE — PROGRESS NOTES
Medications as per [facility] in the past several days  Cymbalta was increased from 30-60 daily on 5-21  Neurontin was increased from 300 TID to 600 TID 5-21 gradually over 4 days  Lorazepam 1mg 4 x day 5-21- till 5-25  Then changed to 3 X day on 5-25  Also currently ativan 1mg q 4hrs prn  Methadone 5mg Q 8hrs 5-17 increased to 7.5 TID 5-21 then increased again on 5-24 to 10mg TID  Roxanol 10mg q 3hrs prn 5-18  Roxanol 20mg Q3hrs 5-19-5-20  Roxanol 30mg q 3hrs 5-25-5-26  Roxanol 30mg q 3hrs 5-26  Roxanol 40mg q 3hrs 5-20- stopped 5-22  Roxanol 50mg q 3hrs 5-22- stopped 5-25  PCA was stopped 5-26  Morphine 4mg IV q 1hr prn stopped 5-24    Current meds:  Methadone reduced to 5 mg TID  Roxanol 30mg q 3hrs prn  Ativan 1mg 3 x day  Tushar Noonan NP

## 2017-05-26 NOTE — PROGRESS NOTES
SPORTS MEDICINE AND PRIMARY CARE  Kraen Cali MD, [facility] [address] [place] [address],[address] [address]  Phone:  [phone]  Fax: [phone]      Chief Complaint   Patient presents with    Follow-up     3 month visit         SUBECTIVE:    Jorge Brannon. is a [de-identified] y.o. male Patient returns today ambulatory, alert and appropriate and has the capacity to give an accurate history. Since we last saw him he was seen by Dr. Alexis Conner and on 2/17 the atrial fibrillation with controlled ventricular response was converted to sinus rhythm with 200 joules of biphasic energy x 1 with no  complications noted. Patient returns today for follow-up with a known history of aortic valve replacement 7/26/16, pulmonary hypertension, paroxysmal atrial fibrillation, multimyeloma, gout, epistaxis, chronic kidney failure, and is seen for evaluation. Patient returns today feeling much better. And he is ready to start an exercise program.  Patient is seen for evaluation. Current Outpatient Prescriptions   Medication Sig Dispense Refill    COMBIGAN 0.2-0.5 % drop ophthalmic solution INSTILL 1 DROP INTO RIGHT EYE TWICE A DAY  1    erythromycin (ILOTYCIN) ophthalmic ointment APPLY (1CM) BY OPHTHALMIC ROUTE 2 TIMES EVERY DAY RIBBON INTO THE CANTHUS ARE IN THE RIGHT EYE  0    LOTEMAX 0.5 % ophthalmic suspension INSTILL 1 DROP 2 TIMES DAILY INTO RIGHT EYE  1    potassium chloride SR (K-TAB) 20 mEq tablet TAKE 1 TABLET BY ORAL ROUTE EVERY DAY WITH FOOD  5    propafenone (RYTHMOL) 225 mg tablet TAKE 1 TABLET BY MOUTH EVERY 8 HRS  3    propafenone (RYTHMOL) 150 mg tablet Take 1.5 Tabs by mouth every eight (8) hours. 90 Tab 5    allopurinol (ZYLOPRIM) 100 mg tablet Take 1 Tab by mouth daily. 180 Tab 3    potassium chloride (K-DUR, KLOR-CON) 20 mEq tablet Take 20 mEq by mouth daily.  sodium chloride (OCEAN) 0.65 % nasal spray 2 Sprays by Both Nostrils route four (4) times daily.  45 mL 11    amLODIPine (NORVASC) 5 mg Visit to patient's room with Danny Fontenot NP to inform her of plans for PT/OT. She understood and was agreeable. Msw obtained script from Dr. Linzie Sicard  for [facility] referral and faxed it over. Bianka Maya RN aware of discharge on Tuesday and will be her  as she knows her from [facility]. tablet Take 1 Tab by mouth daily. 90 Tab 1    apixaban (ELIQUIS) 2.5 mg tablet Take 1 Tab by mouth two (2) times a day. 180 Tab 3    hydrALAZINE (APRESOLINE) 50 mg tablet Take 1 Tab by mouth three (3) times daily. 100 Tab 11    omeprazole (PRILOSEC) 20 mg capsule Take 20 mg by mouth daily.  folic acid (FOLVITE) 1 mg tablet   0    albuterol (PROVENTIL HFA, VENTOLIN HFA, PROAIR HFA) 90 mcg/actuation inhaler Take 1 Puff by inhalation every four (4) hours as needed for Wheezing. 1 Inhaler 11    bumetanide (BUMEX) 0.5 mg tablet Take 2 Tabs by mouth two (2) times a day. 60 Tab 3    simvastatin (ZOCOR) 40 mg tablet Take 1 Tab by mouth nightly. 90 Tab 3    amoxicillin (AMOXIL) 500 mg capsule TAKE 4 TABLETS BY ORAL ROUTE 60 MINUTES BEFORE YOUR PROCEDURE  11     Past Medical History:   Diagnosis Date    Adverse effect of anesthesia     nasal procedure didn't work-patient awake    Alcohol abuse     Aortic regurgitation     CKD (chronic kidney disease), stage III     Epistaxis 11/27/2016    Erectile dysfunction     GI bleed     Gout     gout, osteoarthritis    Hypertension     Multiple myeloma ([address]     PAF (paroxysmal atrial fibrillation) ([address] 2/17/16    Pulmonary HTN ([address]     Right-sided epistaxis 11/27/2016    S/P AVR (aortic valve replacement) 07/26/2016    merrill gill md -     S/P cardiac cath 7/6/16    severe ai, normal coronaries     S/P colonoscopy 10-8-04    S/P colonoscopy with polypectomy 5/9/16    Skin lesion      Past Surgical History:   Procedure Laterality Date    CARDIAC SURG PROCEDURE UNLIST      cardiac cath   [facility] CARDIAC SURG PROCEDURE UNLIST  07/26/2016    AVR    HX HEENT      nasal procedure 20 years ago    HX KNEE REPLACEMENT Bilateral     bilateral knee replacement    HX ORTHOPAEDIC Bilateral     wrist     Allergies   Allergen Reactions    Amiodarone Other (comments)     Bradycardia -excessive       REVIEW OF SYSTEMS:   No chest pain. No shortness of breath.          Social History     Social History    Marital status:      Spouse name: N/A    Number of children: N/A    Years of education: N/A     Social History Main Topics    Smoking status: Former Smoker     Years: 4.00     Quit date: 1960    Smokeless tobacco: Never Used    Alcohol use No      Comment: quit currently- end of 2016 - prior 4-6 oz/day    Drug use: No    Sexual activity: Not Asked     Other Topics Concern    None     Social History Narrative    Family History: Mother:  36 yrs, Chronic anemiaFather:  80 yrs, I think aneurysm hypertensionBrother(s):  79 yrs,    obese,dm,drug abuse,miSon(s): alive, adopted1 son(s) . Social History: Alcohol Use Patient uses alcohol, Drinks per occasion: 2, Drinks per w [place]: 10. Smoking Status Patient is a never smoker. Marital Status: . Lives w ith: alone. Occupation/W ork: employed full time insurance. Education/School: has highschool diploma.   r  Family History   Problem Relation Age of Onset    Anemia Mother     No Known Problems Father     Other Brother      drug abuse       OBJECTIVE:  Visit Vitals    /85 (BP 1 Location: Right arm, BP Patient Position: Sitting)    Pulse 78    Temp 98.5 °F (36.9 °C) (Oral)    Resp 16    Ht 5' 8\" (1.727 m)    Wt 176 lb 9.6 oz (80.1 kg)    SpO2 97%    BMI 26.85 kg/m2     ENT: perrla,  eom intact  NECK: supple.  Thyroid normal  CHEST: clear to ascultation and percussion   HEART: regular rate and rhythm  ABD: soft, bowel sounds active  EXTREMITIES: no edema, pulse 1+     Admission on 2017, Discharged on 2017   Component Date Value Ref Range Status    Ventricular Rate 2017 57  BPM Final    Atrial Rate 2017 79  BPM Final    QRS Duration 2017 94  ms Final    Q-T Interval 2017 436  ms Final    QTC Calculation (Bezet) 2017 424  ms Final    Calculated R Axis 2017 108  degrees Final    Calculated T Axis 2017 140  degrees Final    Diagnosis 02/17/2017    Final                    Value:Normal sinus rhythm with Mobitz I (Wenckebach) block  Rightward axis  Anterior infarct (cited on or before 04-OCT-2016)  ST & T wave abnormality, consider inferolateral ischemia or digitalis effect  When compared with ECG of 04-OCT-2016 14:08,  Rate is slower  Wenkebach block now present. Confirmed by Nell Gonzalez M.D., Riana Hammonds ([id number]) on 2/18/2017 4:09:43 PM     Office Visit on 02/15/2017   Component Date Value Ref Range Status    WBC 02/15/2017 5.1  3.4 - 10.8 x10E3/uL Final    RBC 02/15/2017 4.17  4.14 - 5.80 x10E6/uL Final    HGB 02/15/2017 13.2  12.6 - 17.7 g/dL Final    HCT 02/15/2017 40.3  37.5 - 51.0 % Final    MCV 02/15/2017 97  79 - 97 fL Final    MCH 02/15/2017 31.7  26.6 - 33.0 pg Final    MCHC 02/15/2017 32.8  31.5 - 35.7 g/dL Final    RDW 02/15/2017 15.7* 12.3 - 15.4 % Final    PLATELET 92/90/6173 753  150 - 379 x10E3/uL Final    NEUTROPHILS 02/15/2017 70  % Final    Lymphocytes 02/15/2017 18  % Final    MONOCYTES 02/15/2017 8  % Final    EOSINOPHILS 02/15/2017 4  % Final    BASOPHILS 02/15/2017 0  % Final    ABS. NEUTROPHILS 02/15/2017 3.5  1.4 - 7.0 x10E3/uL Final    Abs Lymphocytes 02/15/2017 0.9  0.7 - 3.1 x10E3/uL Final    ABS. MONOCYTES 02/15/2017 0.4  0.1 - 0.9 x10E3/uL Final    ABS. EOSINOPHILS 02/15/2017 0.2  0.0 - 0.4 x10E3/uL Final    ABS. BASOPHILS 02/15/2017 0.0  0.0 - 0.2 x10E3/uL Final    IMMATURE GRANULOCYTES 02/15/2017 0  % Final    ABS. IMM.  GRANS. 02/15/2017 0.0  0.0 - 0.1 x10E3/uL Final    Glucose 02/15/2017 81  65 - 99 mg/dL Final    BUN 02/15/2017 21  8 - 27 mg/dL Final    Creatinine 02/15/2017 1.57* 0.76 - 1.27 mg/dL Final    GFR est non-AA 02/15/2017 41* >59 mL/min/1.73 Final    GFR est AA 02/15/2017 47* >59 mL/min/1.73 Final    BUN/Creatinine ratio 02/15/2017 13  10 - 22 Final    Sodium 02/15/2017 142  134 - 144 mmol/L Final    Potassium 02/15/2017 4.0  3.5 - 5.2 mmol/L Final    Chloride 02/15/2017 100  96 - 106 mmol/L Final    CO2 02/15/2017 26  18 - 29 mmol/L Final    Calcium 02/15/2017 9.4  8.6 - 10.2 mg/dL Final    TSH 02/15/2017 1.630  0.450 - 4.500 uIU/mL Final          ASSESSMENT:  1. Paroxysmal atrial fibrillation (HCC)    2. Multiple myeloma, remission status unspecified ([facility]    3. Essential hypertension    4. Nonrheumatic aortic valve insufficiency      By auscultation the patient is in sinus rhythm. We will confirm this with an EKG. Blood pressure control is excellent. We encourage him to begin physical activity starting slow and going slow but gradually get to a point that he is doing it for 30-45 minutes a day. He will return to see us in two or three months, sooner if he has any problems. His BMI remains good. PLAN:  .  Orders Placed This Encounter    AMB POC EKG ROUTINE W/ 12 LEADS, INTER & REP    amoxicillin (AMOXIL) 500 mg capsule    COMBIGAN 0.2-0.5 % drop ophthalmic solution    erythromycin (ILOTYCIN) ophthalmic ointment    LOTEMAX 0.5 % ophthalmic suspension    potassium chloride SR (K-TAB) 20 mEq tablet    propafenone (RYTHMOL) 225 mg tablet       Follow-up Disposition:  Return in about 3 months (around 5/28/2017). ATTENTION:   This medical record was transcribed using an electronic medical records system. Although proofread, it may and can contain electronic and spelling errors. Other human spelling and other errors may be present. Corrections may be executed at a later time. Please feel free to contact us for any clarifications as needed.

## 2017-05-26 NOTE — PROGRESS NOTES
Placed telephone call to mother to relay information about change in discharge plan and PT/OT consult. This was fully explained to her and she was agreeable. Plan for discharge on Tuesday 5- 30-17. DME being ordered by Chinedu Mir RN for am on Tuesday.

## 2017-05-26 NOTE — PROGRESS NOTES
Placed telephone call to patient's mother to notify her that patient will be removed from GIP Level of Care and discharge plans needed to be in place. She again stated her inability to care for daughter but the more we talked she agreed. Msw explained home hospice services and that she could complete application for Medicaid for NHP or a home care aide if needed. We made arrangements for patient to go home on Friday with the DME to be delivered tomorrow. Msw to make transport arrangements and follow for discharge.

## 2017-05-26 NOTE — PROGRESS NOTES
[address] [facility] to Those in Need  [phone]    Patient Name: Adam Barry  YOB: 1980     Date of Provider Hospice Visit: 5-26-17       Level of Care: [x] General Inpatient (GIP)  [] Routine   [] Respite     Location of Care:  [] [facility] [place] [] [facility] [] [address] [] [facility] [x] Hospice [facility]  [] Home [] Other:      Date of Original Hospice Admission: 5-16-17  Hospice Attending: Dr. Anthony Orozco Diagnosis:  Endometrial cancer with mets to liver and bone  Diagnoses RELATED to the terminal prognosis: enlarged tumor of right sacrum, right iliac bone  Other Diagnoses: GERD, DM, Insomnia     HOSPICE NARRATIVE COMPOSED BY PHYSICIAN   Rationale for a prognosis of life expectancy of 6 months or less if the disease follows its normal course:  Adam Barry is a 39 y.o. with a past history of metastatic endometrial cancer (mets to liver and bone) who was admitted on 5/8/2017 from home after Palliative RN visited her and found her to have worsening RLE pain and swelling. By doppler and imaging she does not have DVT/PE. CT of pelvis and right leg show enlarging tumor involving right sacrum, right iliac bone, bilateral rami and proximal right femur (which appears acute). L5 and right sacral nerves are involved with tumor and pressure of tumor creates compression of right femoral and external iliac veins causing stasis. Current focus is pain control, mobility and safe disposition as mother (only caregiver) cannot care for patient at home without full support. The patient/family chose comfort measures with the support of Hospice.     HOSPICE DIAGNOSES   Active Symptoms:  1. Severe pain:  This has improved, rates her pain in her right hip and low spine about 6/10  2. Generalized bone pain, neoplasm related: persists  3. Shortness of breath  4. Debility with generalized weakness  5. Constipation   6. Anxiety/depression: still persists  7. Palpitations; tachycardia   8. Insomnia  9. Pt is lethargic, blood pressures have been low, speech slurred, difficult to arouse     PLAN   1. Continue GIP level of care due to the changing requirements and continuous monitoring of patients pain and symptom medications which have been minimally controlled. Because of lethargy, blood pressures have been low, speech slurred, difficult to arouse, symptoms have improved over the last 2 hours  meds have been changed to the following  2. Methadone 5 mg TID  3. Roxanol 30 mg po q 3 hrs prn changed from schedule dosing  Assess response and adjust accordingly. 4. Miralax prn, added Beng[place] to apply to back/other painful  areas three times daily  5. Continue Ativan scheduled 1mg 3 times daily for anxiety and sleep   6. Continue  cymbalta to 60mg scheduled at bedtime for depression and anxiety, increase gabapentin 600mg three times daily to help with neuropathic component of pain and sleep. 7. holding metoprolol XL to 50mg daily today because of low BP       Discussed with patient along with her  regarding her knowledge of her disease process and the invasion of the tumor in the bone, she understands her poor prognosis for good recovery  She is aware the goals is still to control her pain better, she would like to know if a brace is possible so that she can move and pivot to a sitting position in a chair, discussed with orthopedics and they suggest an occupational therapy or physical therapy consult and potentially a knee brace for the right knee to help stabilize her position and/or a hip wedge, potentially she will be able to move  Herself into  position to lying onto her side or getting up to the chair, she actually can pivot on the left leg, right leg is NO weight bearing . The goals is for the patient to go home by Tuesday May 30, 2017, will order OTPT consult for home with hospice.     Continue  intensive planning for discharge to home as mother needs support    and SW to support family needs   Disposition: home when symptoms are managed: will continue to discuss with patient the rationale for maximizing use of oral medications and avoiding dependency on nurse given IV pain medicine and ativan especially since she wants to go home soon.     Prognosis estimated based on 05/16/17 clinical assessment is:   [] Few to Many Hours  [] Few to Many Days   [x] Few to Many Weeks   [] Few to Many Months     Communicated plan of care with: Hospice Case Manager; Hospice IDT; Care Team        GOALS OF CARE     Resuscitation Status: DNR  Durable DNR: [x] Yes [] No    Advance Care Planning 5/10/2017   Patient's Healthcare Decision Maker is: Legal Next of Andrea 69   Primary Decision Maker Name Dolores Apodaca   Primary Decision Maker Phone Number -   Primary Decision Maker Relationship to Patient Parent   Confirm Advance Directive Yes, on file        HISTORY      History obtained from: chart, family, nursing     CHIEF COMPLAINT: Pain, im awake, imfine  The patient is:   [x] Verbal  [] Nonverbal  [] Unresponsive     HPI/SUBJECTIVE: pt resting comfortably at this time but had rough night again and had not slept, she was anxious, unable to sleep, restless and having pain. REVIEW OF SYSTEMS   Positive ROS include:  Constitutional:   Ears/nose/mouth/throat:  Respiratory:  Gastrointestinal:  Musculoskeletal:lying prone, cannot tolerate lying on her back due to sacral tumor, states her pain is  6/10. Neurologic:  Psychiatric:anxiety especially with movement, inability to sleep  Endocrine:           FUNCTIONAL ASSESSMENT     Palliative Performance Scale (PPS): 30%     PSYCHOSOCIAL/SPIRITUAL ASSESSMENT     Active Problems:    * No active hospital problems.  *    Past Medical History:   Diagnosis Date    Cancer [facility])     uterine    CVI (common variable immunodeficiency) (HCC)     [facility]     Elevated liver function tests 10/21/2010    Endometrial cancer ([facility] 7/7/2010    Hypertension     Iron deficiency anemia     Menometrorrhagia 10/21/2010    Microcytic anemia 10/21/2010    Morbid obesity ([address]     Prediabetes 7/7/2010    Psychiatric disorder     depression    Radiation     completed radiation mid-march      Past Surgical History:   Procedure Laterality Date    CARDIAC SURG PROCEDURE UNLIST      hx of tachycardia    HX GYN      hysterectomy      Social History   Substance Use Topics    Smoking status: Never Smoker    Smokeless tobacco: Never Used    Alcohol use Yes     Family History   Problem Relation Age of Onset    Hypertension Mother     Hypertension Father     Stroke Maternal Grandfather     Cancer Paternal Grandmother      breast    Diabetes Paternal Grandmother       Allergies   Allergen Reactions    Egg Nausea and Vomiting     Raw egg and scrambled eggs. Egg products okay.      Pcn [Penicillins] Nausea and Vomiting     \"but could take amoxil\"    Shellfish Containing Products Unknown (comments)    Tetanus And Diphtheria Toxoids, Adsorbed, Adult Other (comments)     Developed local swelling, axillary pain, and transient fever    Tomato Unknown (comments)      Current Facility-Administered Medications   Medication Dose Route Frequency    magic mouthwash cpd (without sucralfate)  5 mL Oral QID    morphine (ROXANOL) 100 mg/5 mL (20 mg/mL) concentrated solution 30 mg  30 mg Oral Q3H    LORazepam (ATIVAN) tablet 1 mg  1 mg Oral TID    albuterol-ipratropium (DUO-NEB) 2.5 MG-0.5 MG/3 ML  3 mL Nebulization Q4H RT    methadone (DOLOPHINE) tablet 10 mg  10 mg Oral Q8H    sulindac (CLINORIL) tablet 200 mg  200 mg Oral BID WITH MEALS    nystatin (MYCOSTATIN) 100,000 unit/mL oral suspension 500,000 Units  500,000 Units Oral QID    DULoxetine (CYMBALTA) capsule 60 mg  60 mg Oral DAILY    gabapentin (NEURONTIN) capsule 600 mg  600 mg Oral TID    methyl salicylate-menthol (BENGAY) 15-10 % cream   Topical TID    metoprolol succinate (TOPROL-XL) XL tablet 50 mg  50 mg Oral DAILY    LORazepam (ATIVAN) injection 1 mg  1 mg IntraVENous Q4H PRN    polyethylene glycol (MIRALAX) packet 17 g  17 g Oral DAILY    docusate sodium (COLACE) capsule 100 mg  100 mg Oral DAILY    acetaminophen (TYLENOL) tablet 650 mg  650 mg Oral Q4H PRN        PHYSICAL EXAM     Wt Readings from Last 3 Encounters:   05/10/17 121.4 kg (267 lb 10.2 oz)   02/16/17 136.1 kg (300 lb)   02/02/16 127 kg (280 lb)       Visit Vitals    BP 95/57 (BP 1 Location: Left arm, BP Patient Position: At rest;Prone)    Pulse 100    Temp 98.6 °F (37 °C)    Resp 12    SpO2 97%       Supplemental O2  [] Yes  [x] NO  Last bowel movement:     Currently this patient has:  [] Peripheral IV [x] PICC  [] PORT [] ICD    [x] Jama Catheter [] NG Tube   [] PEG Tube    [] Rectal Tube [] Drain  [] Other:     Constitutional: pt is awake complains of pain in the right hip, leg and spine 6/10  Eyes: cl  ENMT: cl  Cardiovascular: tachycardia  Respiratory: cl  Gastrointestinal: NE  Musculoskeletal: weakness, lying prone  Skin: warm, dry  Neurologic: ne  Psychiatric: lethargic at times  Other:       Pertinent Lab and or Imaging Tests:  Lab Results   Component Value Date/Time    Sodium 141 05/11/2017 03:34 AM    Potassium 4.4 05/11/2017 03:34 AM    Chloride 110 05/11/2017 03:34 AM    CO2 24 05/11/2017 03:34 AM    Anion gap 7 05/11/2017 03:34 AM    Glucose 176 05/11/2017 03:34 AM    BUN 16 05/11/2017 03:34 AM    Creatinine 0.68 05/11/2017 03:34 AM    BUN/Creatinine ratio 24 05/11/2017 03:34 AM    GFR est AA >60 05/11/2017 03:34 AM    GFR est non-AA >60 05/11/2017 03:34 AM    Calcium 8.3 05/11/2017 03:34 AM     Lab Results   Component Value Date/Time    Protein, total 6.8 05/10/2017 12:27 AM    Albumin 2.9 05/10/2017 12:27 AM           Total time: 80 mts     [address], NP      Supporting documentation for GIP need for pain control:  [x] Frequent evaluation by a doctor, nurse practitioner, nurse   [x] Frequent medication adjustment    [x] IVs that cannot be administered at home   [x] Aggressive pain management   [] Complicated technical delivery of medications              Supporting documentation for GIP need for symptom control:  []  Sudden decline necessitating intensive nursing intervention  []  Uncontrolled / intractable nausea or vomiting   [x]  Pathological fractures  []  Advanced open wounds requiring frequent skilled care  [] Unmanageable respiratory distress  [] New or worsening delirium   [] Delirium with behavior issues  [] Imminent death  with skilled nursing needs documented above      Hernan Langley NP

## 2017-05-26 NOTE — PROGRESS NOTES
NAME OF PATIENT:  Mane Randall    LEVEL OF CARE:  [facility]    REASON FOR GIP:   Pain, despite numerous changes in medications, Medication adjustment that must be monitored 24/7 and Stabilizing treatment that cannot take place at home    *PATIENT REMAINS ELIGIBLE FOR GIP LEVEL OF CARE AS EVIDENCED BY: (MUST BE ADDRESSED OF PATIENT GIP) Continue GIP level of care due to the changing requirements and continuous monitoring of patients pain and symptom medications which have been minimally controlled. REASON FOR RESPITE:  n/a    O2 SAFETY:  Concentrator positioning (6\" from furniture/drapes), Tanks stored in [place] , No petroleum based products on face while oxygen in use and Oxygen sign on the door    FALL INTERVENTIONS PROVIDED:   Implemented/recommended resources for alarm system (personal alarm, bed alarm, call bell, etc.) , Implemented/recommended environmental changes (remove hazards, lower bed, improve lighting, etc.) and Implemented/recommended increased supervision/assistance    INTERDISPLINARY COMMUNICATION/COLLABORATION:  Physician, MSW, [place] and RN, CNA    NEW MEDICATION INITIATION DOCUMENTATION:  n/a    Reason medication is being initiated:  n/a    MD / Provider name consulted re: change in status / initiation of new medication:  n/a    New Symptom(s):  n/a    New Order(s):  n/a    Name of the person notified of the changes:  n/a    Name of person being taught:  n/a    Instructions given:  n/a    Side Effects taught:  n/a    Response to teaching:  n/a      COMFORTABLE DYING MEASURE:  Is Patient/family satisfied with symptom level?  yes    DISCHARGE PLAN:  Patient is scheduled to go home Friday, May 26th and to be followed by [facility].

## 2017-05-27 NOTE — PROGRESS NOTES
Heather Orlando 139 report from [facility], patient continues with GIP level of care for pain management, several medication have been changed today    Morphine 4mg PCA dosing was disontinued  Roxanol 30mg scheduled dose was discontinued  Methadone was decreased by half    2000 Patient assessed, alert to name place, confused about dates, month and day of week, states that her pain level is 1/10 until she tries to move then notes that it is way over 10/10, patient still continues to refuse to turn or have legs manipulated in any way, noted during assessment patient top right foot and knee are mashed against the bed rails however patient does not want touched at this time. Scheduled nebulizer at this time tolerated well.   2100 Visual pain assessment 0/10 mostly sleeping but visiting with family  2200 Scheduled meds given, patient refused Bengay, Nystatin and magic mouthwash, examined patient tongue did not see any sign of yeast at this time Family visiting at this time  2300 After medicated was able to move patient right leg over 3 inches to relieve pressure on the knee and foot  0000 Patient is asleep, visual pain 0/10 although patient cannot be moved from prone position, nebulizer given [facility] handheld nebulizer  0200 Visual pain check 0/10 sleeping soundly  0300 Visual pain check 0/10 sleeping soundly  0400 Visual pain check sleeping soundly this nebulizer held  0500 Patient woke up complained of 5/10 pain right leg, medicated with prn Roxanol 30 mg, also methadone given at this time due shortly, 0400 breathing trmt given that she missed at 0400, coffee and fresh ice provided  0548 Patient complained of 7/10 pain states previous meds not working yet, patient was given Tylenol as she requested and ativan prn dose IVP.  0700 Report to oncoming shift  PRNS GIVEN THIS SHIFT, TYLENOL X 1, ATIVAN X 1, ROXANOL X 1    NAME OF PATIENT: Jyoti Randall     LEVEL OF CARE: [facility]     REASON FOR GIP:   Pain, despite numerous changes in medications, Medication adjustment that must be monitored 24/7 and Stabilizing treatment that cannot take place at home     *PATIENT REMAINS ELIGIBLE FOR GIP LEVEL OF CARE AS EVIDENCED BY: (MUST BE ADDRESSED OF PATIENT GIP) Continue GIP level of care due to the changing requirements and   continuous monitoring of patients pain and symptom medications which have been minimally controlled.         REASON FOR RESPITE:  n/a     O2 SAFETY:  Concentrator positioning (6\" from furniture/drapes), Tanks stored in rodríguez , No petroleum based products on face while oxygen in use and Oxygen sign on the door     FALL INTERVENTIONS PROVIDED:   Implemented/recommended resources for alarm system (personal alarm, bed alarm, call bell, etc.) , Implemented/recommended environmental changes (remove hazards, lower bed, improve lighting, etc.) and Implemented/recommended increased supervision/assistance     INTERDISPLINARY COMMUNICATION/COLLABORATION:  Physician, MSW, Leta and RN, CNA     NEW MEDICATION INITIATION DOCUMENTATION:  n/a     Reason medication is being initiated: n/a     MD / Provider name consulted re: change in status / initiation of new medication: n/a     New Symptom(s): n/a     New Order(s): n/a     Name of the person notified of the changes: n/a     Name of person being taught: n/a     Instructions given: n/a     Side Effects taught: n/a     Response to teaching: n/a        COMFORTABLE DYING MEASURE:  Is Patient/family satisfied with symptom level? yes     DISCHARGE PLAN: Patient is scheduled to go home tues May 30 to have physical therapy eval.

## 2017-05-27 NOTE — PROGRESS NOTES
0700 Report received from [facility]. Pt is GIP for pain management. 0900 Taty MARISCALNP in to assess the pt. Talked with the pt about plans for pain management and not planning to increase Methadone until lab results are back and other measures used first to try to manage pain. Roxanol 30mg given prn for for c/o pain rated 10 of 10. States she is not getting pain relief. Pt is upset that staff felt she was having confusion when on higher doses of pain meds and the med doses were decreased. 1100 Phone call to Taty Gleason to discuss pt concerns. Roxanol scheduled every 3 hours to help manage pain. Complete bed bath given. Pt able to tilt to left side slightly for comfort. 1300 Pt requires scheduled Roxanol and PRN Lorazepam IV to manage pain. She still states she is unable to rest due to pain. 1500 Pts mother at the bedside. 1700 Scheduled meds given forpain given. Pt rates pain relief only at a 5 or 6.           NAME OF PATIENT:  Jyoti Randall    LEVEL OF CARE:  GIP    REASON FOR GIP:   Pain, despite numerous changes in medications, Medication adjustment that must be monitored 24/7 and Stabilizing treatment that cannot take place at home    *PATIENT REMAINS ELIGIBLE FOR [facility] LEVEL OF CARE AS EVIDENCED BY: (MUST BE ADDRESSED OF PATIENT GIP) Frequent assessment and med changes required to manage pain      REASON FOR RESPITE:  na    O2 SAFETY:  na    FALL INTERVENTIONS PROVIDED:   Implemented/recommended use of fall risk identification flag to all team members, Implemented/recommended resources for alarm system (personal alarm, bed alarm, call bell, etc.) , Implemented/recommended environmental changes (remove hazards, lower bed, improve lighting, etc.) and Implemented/recommended increased supervision/assistance    INTERDISPLINARY COMMUNICATION/COLLABORATION:  Physician, MSW, Leta and RN, CNA    NEW MEDICATION INITIATION DOCUMENTATION:  Documentation completed in Clinical Note in [facility]    Reason medication is being initiated:  Roxanol changed from prn to scheduled    MD / Provider name consulted re: change in status / initiation of new medication:  Lola Stoddard NP    New Symptom(s):  Increased pain    New Order(s):  Roxanol scheduled    Name of the person notified of the changes:  pt    Name of person being taught:  pt    Instructions given:  Scheduled to manage pain    Side Effects taught:  Increased drowsiness    Response to teaching:  Voices understanding but not satisfied with the relief received      COMFORTABLE DYING MEASURE:  Is Patient/family satisfied with symptom level?   No, states pain is not improved    DISCHARGE PLAN:  Discharge to home when symptoms are managed

## 2017-05-27 NOTE — PROGRESS NOTES
Discussed patient and reviewed medications in detail with NP [facility]. Agree with dose reductions as described. Avoid increase in methadone no sooner than every 7 days. Minimize concomitant BDZ use. Check liver and kidney function for metabolism given cancer involvement in liver and risk of ureteral obstruction due to pelvic tumor. Will monitor closely.

## 2017-05-28 NOTE — PROGRESS NOTES
1832: Patient medicated with SL morphine for Malgorzata Kumari, [address] [facility]

## 2017-05-28 NOTE — PROGRESS NOTES
[facility]  [phone]    Patient Name: Joyce Davis  YOB: 1980     Date of Provider Hospice Visit: 5-28-17       Level of Care: [x] General Inpatient (GIP)  [] Routine   [] Respite     Location of Care:  [] [facility] [] [facility] [] [address] [] [facility] [x] Hospice House [facility]  [] Home [] Other:      Date of Original Hospice Admission: 5-16-17  Hospice Attending: Dr. Giuliana Yates Diagnosis:  Endometrial cancer with mets to liver and bone  Diagnoses RELATED to the terminal prognosis: enlarged tumor of right sacrum, right iliac bone  Other Diagnoses: GERD, DM, Insomnia     HOSPICE NARRATIVE COMPOSED BY PHYSICIAN   Rationale for a prognosis of life expectancy of 6 months or less if the disease follows its normal course:  Joyce Davis is a 39 y.o. with a past history of metastatic endometrial cancer (mets to liver and bone) who was admitted on 5/8/2017 from home after Palliative RN visited her and found her to have worsening RLE pain and swelling. By doppler and imaging she does not have DVT/PE. CT of pelvis and right leg show enlarging tumor involving right sacrum, right iliac bone, bilateral rami and proximal right femur (which appears acute). L5 and right sacral nerves are involved with tumor and pressure of tumor creates compression of right femoral and external iliac veins causing stasis. Current focus is pain control, mobility and safe disposition as mother (only caregiver) cannot care for patient at home without full support. The patient/family chose comfort measures with the support of Hospice.     HOSPICE DIAGNOSES   Active Symptoms:  1. Severe pain:  This has improved, rates her pain in her right hip and low spine about 6/10  2. Generalized bone pain, neoplasm related: persists  3. Shortness of breath  4. Debility with generalized weakness  5. Constipation   6. Anxiety/depression: still persists  7. Palpitations; tachycardia   8. Insomnia  9. Pt is lethargic, blood pressures have been low, speech slurred, difficult to arouse     PLAN   1. Continue GIP level of care due to the changing requirements and continuous monitoring of patients pain and symptom medications which have been minimally controlled. 2. Methadone 5 mg TID pt is more alert and awake with reduction of methadone and Roxanol and DC of dilaudid PCA dosing  3. Roxanol 30 mg po q 3 hrs changed back to scheduled dosing as she is requiring more often now q 3hrs  Assess response and adjust accordingly. 4. Miralax prn, added Bengay to apply to back/other painful  areas three times daily  5. Continue Ativan scheduled 1mg 3 times daily for anxiety and sleep   6. Continue  cymbalta to 60mg scheduled at bedtime for depression and anxiety, increase gabapentin 600mg three times daily to help with neuropathic component of pain and sleep. 7. holding metoprolol XL to 50mg daily today because of low BP   8. Added a body pillow to comfort, pt must l;ay ion the left side for atleast 1hr day      Discussed with patient along with her  regarding her knowledge of her disease process and the invasion of the tumor in the bone, she understands her poor prognosis for good recovery  She is aware the goals is still to control her pain better, she would like to know if a brace is possible so that she can move and pivot to a sitting position in a chair, discussed with orthopedics and they suggest an occupational therapy or physical therapy consult and potentially a knee brace for the right knee to help stabilize her position and/or a hip wedge, potentially she will be able to move  Herself into  position to lying onto her side or getting up to the chair, she actually can pivot on the left leg, right leg is NO weight bearing . The goals is for the patient to go home by Tuesday May 30, 2017, will order OTPT consult for home with hospice.     Continue  intensive planning for discharge to home as mother needs support    and SW to support family needs   Disposition: home when symptoms are managed: will continue to discuss with patient the rationale for maximizing use of oral medications and avoiding dependency on nurse given IV pain medicine and ativan especially since she wants to go home soon.     Prognosis estimated based on 05/16/17 clinical assessment is:   [] Few to Many Hours  [] Few to Many Days   [x] Few to Many Weeks   [] Few to Many Months     Communicated plan of care with: Hospice Case Manager; Hospice IDT; Care Team        GOALS OF CARE     Resuscitation Status: DNR  Durable DNR: [x] Yes [] No    Advance Care Planning 5/10/2017   Patient's Healthcare Decision Maker is: Legal Next of Andrea Velarde   Primary Decision Maker Name Author Iman   Primary Decision Maker Phone Number -   Primary Decision Maker Relationship to Patient Parent   Confirm Advance Directive Yes, on file        HISTORY      History obtained from: chart, family, nursing     CHIEF COMPLAINT: Pain, im awake, imfine  The patient is:   [x] Verbal  [] Nonverbal  [] Unresponsive     HPI/SUBJECTIVE: pt resting comfortably at this time but had rough night again and had not slept, she was anxious, unable to sleep, restless and having pain. REVIEW OF SYSTEMS   Positive ROS include:  Constitutional:   Ears/nose/mouth/throat:  Respiratory:  Gastrointestinal:  Musculoskeletal:lying prone, cannot tolerate lying on her back due to sacral tumor, states her pain is  6/10. Neurologic:  Psychiatric:anxiety especially with movement, inability to sleep  Endocrine:           FUNCTIONAL ASSESSMENT     Palliative Performance Scale (PPS): 30%     PSYCHOSOCIAL/SPIRITUAL ASSESSMENT     Active Problems:    * No active hospital problems.  *    Past Medical History:   Diagnosis Date    Cancer [facility])     uterine    CVI (common variable immunodeficiency) (HCC)     Diabetes [facility] [address]     Elevated liver function tests 10/21/2010    Endometrial cancer ([facility] 7/7/2010    Hypertension     Iron deficiency anemia     Menometrorrhagia 10/21/2010    Microcytic anemia 10/21/2010    Morbid obesity ([facility]     Prediabetes 7/7/2010    Psychiatric disorder     depression    Radiation     completed radiation mid-march      Past Surgical History:   Procedure Laterality Date    CARDIAC SURG PROCEDURE UNLIST      hx of tachycardia    HX GYN      hysterectomy      Social History   Substance Use Topics    Smoking status: Never Smoker    Smokeless tobacco: Never Used    Alcohol use Yes     Family History   Problem Relation Age of Onset    Hypertension Mother     Hypertension Father     Stroke Maternal Grandfather     Cancer Paternal Grandmother      breast    Diabetes Paternal Grandmother       Allergies   Allergen Reactions    Egg Nausea and Vomiting     Raw egg and scrambled eggs. Egg products okay.      Pcn [Penicillins] Nausea and Vomiting     \"but could take amoxil\"    Shellfish Containing Products Unknown (comments)    Tetanus And Diphtheria Toxoids, Adsorbed, Adult Other (comments)     Developed local swelling, axillary pain, and transient fever    Tomato Unknown (comments)      Current Facility-Administered Medications   Medication Dose Route Frequency    morphine (ROXANOL) 100 mg/5 mL (20 mg/mL) concentrated solution 30 mg  30 mg Oral Q3H    methadone (DOLOPHINE) tablet 5 mg  5 mg Oral Q8H    magic mouthwash cpd (without sucralfate)  5 mL Oral QID    LORazepam (ATIVAN) tablet 1 mg  1 mg Oral TID    albuterol-ipratropium (DUO-NEB) 2.5 MG-0.5 MG/3 ML  3 mL Nebulization Q4H RT    sulindac (CLINORIL) tablet 200 mg  200 mg Oral BID WITH MEALS    nystatin (MYCOSTATIN) 100,000 unit/mL oral suspension 500,000 Units  500,000 Units Oral QID    DULoxetine (CYMBALTA) capsule 60 mg  60 mg Oral DAILY    gabapentin (NEURONTIN) capsule 600 mg  600 mg Oral TID    methyl salicylate-menthol (BENGAY) 15-10 % cream   Topical TID    metoprolol succinate (TOPROL-XL) XL tablet 50 mg  50 mg Oral DAILY    LORazepam (ATIVAN) injection 1 mg  1 mg IntraVENous Q4H PRN    polyethylene glycol (MIRALAX) packet 17 g  17 g Oral DAILY    docusate sodium (COLACE) capsule 100 mg  100 mg Oral DAILY    acetaminophen (TYLENOL) tablet 650 mg  650 mg Oral Q4H PRN        PHYSICAL EXAM     Wt Readings from Last 3 Encounters:   05/10/17 121.4 kg (267 lb 10.2 oz)   02/16/17 136.1 kg (300 lb)   02/02/16 127 kg (280 lb)       Visit Vitals    /52 (BP 1 Location: Right arm, BP Patient Position: Prone)    Pulse 99    Temp 97.9 °F (36.6 °C)    Resp 18    SpO2 (!) 86%       Supplemental O2  [] Yes  [x] NO  Last bowel movement:     Currently this patient has:  [] Peripheral IV [x] PICC  [] PORT [] ICD    [x] Jama Catheter [] NG Tube   [] PEG Tube    [] Rectal Tube [] Drain  [] Other:     Constitutional: pt is awake complains of pain in the right hip, leg and spine 7/10  Eyes: cl  ENMT: cl  Cardiovascular: tachycardia  Respiratory: cl  Gastrointestinal: NE  Musculoskeletal: weakness, lying prone  Skin: warm, dry  Neurologic: ne  Psychiatric: calm  Other:       Pertinent Lab and or Imaging Tests:  Lab Results   Component Value Date/Time    Sodium 141 05/11/2017 03:34 AM    Potassium 4.4 05/11/2017 03:34 AM    Chloride 110 05/11/2017 03:34 AM    CO2 24 05/11/2017 03:34 AM    Anion gap 7 05/11/2017 03:34 AM    Glucose 176 05/11/2017 03:34 AM    BUN 16 05/11/2017 03:34 AM    Creatinine 0.68 05/11/2017 03:34 AM    BUN/Creatinine ratio 24 05/11/2017 03:34 AM    GFR est AA >60 05/11/2017 03:34 AM    GFR est non-AA >60 05/11/2017 03:34 AM    Calcium 8.3 05/11/2017 03:34 AM     Lab Results   Component Value Date/Time    Protein, total 6.8 05/10/2017 12:27 AM    Albumin 2.9 05/10/2017 12:27 AM           Total time: 80 mts     [address], NP      Supporting documentation for GIP need for pain control:  [x] Frequent evaluation by a doctor, nurse practitioner, nurse   [x] Frequent medication adjustment    [x] IVs that cannot be administered at home   [x] Aggressive pain management   [] Complicated technical delivery of medications              Supporting documentation for GIP need for symptom control:  []  Sudden decline necessitating intensive nursing intervention  []  Uncontrolled / intractable nausea or vomiting   [x]  Pathological fractures  []  Advanced open wounds requiring frequent skilled care  [] Unmanageable respiratory distress  [] New or worsening delirium   [] Delirium with behavior issues  [] Imminent death  with skilled nursing needs documented above      Tushar Noonan NP

## 2017-05-28 NOTE — PROGRESS NOTES
0700 Received report from James Mejia RN Pt is GIP for pain management. 0800 Pt is alert, she is very quiet this am. States she had a restful night and her pain is better this am.  She voices just having a lot on her mind. She states she has an apartment which she is considering giving up because she is going to live with her Mom and many other changes that have to occur in her life. She states she just wants to sleep today. 1200 Pt awakened for lunch. States she was able to sleep and that's what she needed. 1330 Medicated with scheduled meds for pain. Rates pain 3 of 10.  1600 Bath given. Pt repositioned in bed. 1700 C/O pain in left knee. BenGay applied on request.  Parents at the bedside. 1710 Called to room by the family stating the pt is complaining if chest pain. Advised family we are unable to treat or evaluate. 911 called at family request for transport to the hospital. Pt comforted and reassured. Other family members remain at bedside with the pt  1800 Family at the bedside. Scheduled meds given to manage pain.       NAME OF PATIENT:  Akiko Randall    LEVEL OF CARE:  [facility]    REASON FOR GIP:   Pain, despite numerous changes in medications, Medication adjustment that must be monitored 24/7 and Stabilizing treatment that cannot take place at home    *PATIENT REMAINS ELIGIBLE FOR GIP LEVEL OF CARE AS EVIDENCED BY: (MUST BE ADDRESSED OF PATIENT GIP)      REASON FOR RESPITE:  na    O2 SAFETY:  na    FALL INTERVENTIONS PROVIDED:   Implemented/recommended resources for alarm system (personal alarm, bed alarm, call bell, etc.) , Implemented/recommended environmental changes (remove hazards, lower bed, improve lighting, etc.) and Implemented/recommended increased supervision/assistance    INTERDISPLINARY COMMUNICATION/COLLABORATION:  Physician, MSW, Leta and RN, CNA    NEW MEDICATION INITIATION DOCUMENTATION:  Documentation completed in Clinical Note in [address] [facility]    Reason medication is being initiated: na    MD / Provider name consulted re: change in status / initiation of new medication:  na    New Symptom(s):  na    New Order(s): na    Name of the person notified of the changes:  na    Name of person being taught:  carmen    Instructions given:  na    Side Effects taught:  na    Response to teaching:  na      COMFORTABLE DYING MEASURE:  Is Patient/family satisfied with symptom level?   Yes    DISCHARGE PLAN:  To be discharged to home with care off her Mother when symptoms are managed

## 2017-05-28 NOTE — PROGRESS NOTES
Heather Orlando 139 report from [facility] patient remains GIP level of care for pain management  1930 Patient called nurse to room for ice chips pepsi and pain meds, states pain is 8/10 generally  2054 Gave scheduled roxanol at this time pain 5/10  2200 Scheduled evening meds Ativan, Methadone, Cymbalta, patient refuses Nystatin, bengay and magic mouth wash  2230 Patient repositioned, foot and right knee cushioned away from side rail  0000 Woke pt from sound sleep to take scheduled roxanol, decided to eat leftover chicken visual pain 0/10  0030 Patient request fresh pepsi and ice  0300 Woke patient from sound sleep to take scheduled roxanol, visual pain 0/10  0600 Woke pt from sound sleep to give scheduled roxanol and methadone visual pain 0/10  0700 Report to oncoming shift  PRNS THIS SHIFT NONE    NAME OF PATIENT: Jyoti Randall     LEVEL OF CARE: GIP     REASON FOR GIP:   Pain, despite numerous changes in medications, Medication adjustment that must be monitored 24/7 and Stabilizing treatment that cannot take place at home     *PATIENT REMAINS ELIGIBLE FOR [facility] LEVEL OF CARE AS EVIDENCED BY: (MUST BE ADDRESSED OF PATIENT GIP) Frequent assessment and med changes required to manage pain        REASON FOR RESPITE:  na     O2 SAFETY:  na     FALL INTERVENTIONS PROVIDED:   Implemented/recommended use of fall risk identification flag to all team members, Implemented/recommended resources for alarm system (personal alarm, bed alarm, call bell, etc.) , Implemented/recommended environmental changes (remove hazards, lower bed, improve lighting, etc.) and Implemented/recommended increased supervision/assistance     INTERDISPLINARY COMMUNICATION/COLLABORATION:  Physician, MSW, [place] and RN, CNA     NEW MEDICATION INITIATION DOCUMENTATION:  Documentation completed in Clinical Note in [facility]     Reason medication is being initiated: Roxanol changed from prn to scheduled     MD / Provider name consulted re: change in status / initiation of new medication: Ortiz Forman, NP     New Symptom(s): Increased pain     New Order(s): Roxanol scheduled     Name of the person notified of the changes: pt     Name of person being taught: pt     Instructions given: Scheduled to manage pain     Side Effects taught: Increased drowsiness     Response to teaching: Voices understanding but not satisfied with the relief received        COMFORTABLE DYING MEASURE:  Is Patient/family satisfied with symptom level?  No, states pain is not improved     DISCHARGE PLAN: Discharge to home when symptoms are managed

## 2017-05-29 NOTE — PROGRESS NOTES
Heather Orlando 139 report from [facility] pt has been depressed today with issues at home, pain well managed today  2000 Assessed, eating french fries and burger family has brought but is lethargic at this time cautioned about eating while sleeping  2030 Assessed alert to name, slurred speech, eyes drooping, lung cl diminished in bases, shallow resp  2010 Reviewed pt discharge medications that arrived over the weekend and noted several problems  --Albuterol medication pt does not have a machine and pt has refused this med for days stating she does not need it  --Liquid morphine does not have any syringes in bag  --Nystatin patient does not take anymore  --Methadone is 10mg pills, pt dose was changed to 5  --Metoprolol - blood pressure have been running low  --pt will need oxygen concentrator if not already ordered  I will leave note for MD to  review discharge meds before pt goes home on Tuesday 2100 Woke pt from sound sleep to give scheduled roxanol 30 mg, visual pain 0/10  2200 Went to give pt her 2200 meds she was sound asleep with a mouth full of food, cleared and took meds, visual pain 0/10  2300  Pt called asked to check oxygen, pt has not worn oxygen a couple days, however now that roxanol is scheduled every 3 hrs pt has been breathing more shallow and slower rate, saturation 88%, pt placed back on 2l oxygen  0000 Pt is sleeping soundly, did not awaken to name being called or noise from rolling computer. Will hold this dose of Roxanol. Pt can hardly hold head up this shift, speech is slurred, went to sleep with mouth full of food. Visual pain 0/10meds given,   0300 Woke pt from sound sleep asked if she wanted to take 0300 dose of Roxanol, pt whispered yes, pt was unable to lift head off the bed to drink juice fell asleep  0600 Scheduled meds at this time pt is awake more alert requested to be moved more to the left hip  0700 More staff have arrived to assist with turning, pt turned to left hip pain 6/10   0730 NO PRNS THIS SHIFT, ONE DOSE OF ROXANOL HELD DUE TO OBTUNDED      NAME OF PATIENT: Jyoti Randall     LEVEL OF CARE: GIP     REASON FOR GIP:   Pain, despite numerous changes in medications, Medication adjustment that must be monitored 24/7 and Stabilizing treatment that cannot take place at home     *PATIENT REMAINS ELIGIBLE FOR GIP LEVEL OF CARE AS EVIDENCED BY: (MUST BE ADDRESSED OF PATIENT GIP)        REASON FOR RESPITE:  na     O2 SAFETY:  na     FALL INTERVENTIONS PROVIDED:   Implemented/recommended resources for alarm system (personal alarm, bed alarm, call bell, etc.) , Implemented/recommended environmental changes (remove hazards, lower bed, improve lighting, etc.) and Implemented/recommended increased supervision/assistance     INTERDISPLINARY COMMUNICATION/COLLABORATION:  Physician, MSW, Leta and RN, CNA     NEW MEDICATION INITIATION DOCUMENTATION:  Documentation completed in Clinical Note in [facility]     Reason medication is being initiated: carmen     MD / Provider name consulted re: change in status / initiation of new medication: na     New Symptom(s): na     New Order(s): na     Name of the person notified of the changes: na     Name of person being taught: na     Instructions given: na     Side Effects taught: na     Response to teaching: na        COMFORTABLE DYING MEASURE:  Is Patient/family satisfied with symptom level?  Yes     DISCHARGE PLAN: To be discharged to home with care off her Mother when symptoms are managed

## 2017-05-29 NOTE — PROGRESS NOTES
[facility]  [phone]    Patient Name: Adam Barry  YOB: 1980     Date of Provider Hospice Visit: 5-29-17       Level of Care: [x] General Inpatient (GIP)  [] Routine   [] Respite     Location of Care:  [] [facility] [] [facility] [] [facility] [] [facility] [x] [facility]ospice [facility]  [] Home [] Other:      Date of Original Hospice Admission: 5-16-17  Hospice Attending: Dr. Anthony Orozco Diagnosis:  Endometrial cancer with mets to liver and bone  Diagnoses RELATED to the terminal prognosis: enlarged tumor of right sacrum, right iliac bone  Other Diagnoses: GERD, DM, Insomnia     HOSPICE NARRATIVE COMPOSED BY PHYSICIAN   Rationale for a prognosis of life expectancy of 6 months or less if the disease follows its normal course:  Adam Barry is a 39 y.o. with a past history of metastatic endometrial cancer (mets to liver and bone) who was admitted on 5/8/2017 from home after Palliative RN visited her and found her to have worsening RLE pain and swelling. By doppler and imaging she does not have DVT/PE. CT of pelvis and right leg show enlarging tumor involving right sacrum, right iliac bone, bilateral rami and proximal right femur (which appears acute). L5 and right sacral nerves are involved with tumor and pressure of tumor creates compression of right femoral and external iliac veins causing stasis. Current focus is pain control, mobility and safe disposition as mother (only caregiver) cannot care for patient at home without full support. The patient/family chose comfort measures with the support of Hospice.     HOSPICE DIAGNOSES   Active Symptoms:  1. Severe pain:  This has improved, rates her pain in her right hip and low spine about 6/10  2. Generalized bone pain, neoplasm related: persists  3. Shortness of breath  4. Debility with generalized weakness  5. Constipation   6. Anxiety/depression: still persists  7. Palpitations; tachycardia   8. Insomnia  9. Pt is lethargic, blood pressures have been low, speech slurred, difficult to arouse     PLAN   1. Continue GIP level of care due to the changing requirements and continuous monitoring of patients pain and symptom medications which have been minimally controlled. 2. Methadone 5 mg TID pt is more alert and awake with reduction of methadone and Roxanol and DC of dilaudid PCA dosing  3. Continue Roxanol 30 mg po q 3 hrs changed back to scheduled dosing as she is requiring more often now q 3hrs  Assess response and adjust accordingly. 4. Miralax prn, added Bengay to apply to back/other painful  areas three times daily  5. Continue Ativan scheduled 1mg 3 times daily for anxiety and sleep   6. Continue  cymbalta to 60mg scheduled at bedtime for depression and anxiety, increase gabapentin 600mg three times daily to help with neuropathic component of pain and sleep. 7. holding metoprolol XL to 50mg daily today because of low BP   8. Added a body pillow to comfort, pt must l;ay ion the left side for atleast 1hr day, pt did well with this      Discussed with patient along with her  regarding her knowledge of her disease process and the invasion of the tumor in the bone, she understands her poor prognosis for good recovery  She is aware the goals is still to control her pain better, she would like to know if a brace is possible so that she can move and pivot to a sitting position in a chair, discussed with orthopedics and they suggest an occupational therapy or physical therapy consult and potentially a knee brace for the right knee to help stabilize her position and/or a hip wedge, potentially she will be able to move  Herself into  position to lying onto her side or getting up to the chair, she actually can pivot on the left leg, right leg is NO weight bearing . The goals is for the patient to go home by Tuesday May 30, 2017, will order OTPT consult for home with hospice.     Continue intensive planning for discharge to home as mother needs support    and SW to support family needs   Disposition: home when symptoms are managed: will continue to discuss with patient the rationale for maximizing use of oral medications and avoiding dependency on nurse given IV pain medicine and ativan especially since she wants to go home soon.     Prognosis estimated based on 05/16/17 clinical assessment is:   [] Few to Many Hours  [] Few to Many Days   [x] Few to Many Weeks   [] Few to Many Months     Communicated plan of care with: Hospice Case Manager; Hospice IDT; Care Team        GOALS OF CARE     Resuscitation Status: DNR  Durable DNR: [x] Yes [] No    Advance Care Planning 5/10/2017   Patient's Healthcare Decision Maker is: Legal Next of Andrea Velarde   Primary Decision Maker Name Filiberto Isaac   Primary Decision Maker Phone Number -   Primary Decision Maker Relationship to Patient Parent   Confirm Advance Directive Yes, on file        HISTORY      History obtained from: chart, family, nursing     CHIEF COMPLAINT: Pain, im awake, imfine  The patient is:   [x] Verbal  [] Nonverbal  [] Unresponsive     HPI/SUBJECTIVE: pt resting comfortably at this time but had rough night again and had not slept, she was anxious, unable to sleep, restless and having pain. REVIEW OF SYSTEMS   Positive ROS include:  Constitutional:   Ears/nose/mouth/throat:  Respiratory:  Gastrointestinal:  Musculoskeletal:lying prone, cannot tolerate lying on her back due to sacral tumor, states her pain is  6/10. Neurologic:  Psychiatric:anxiety especially with movement, inability to sleep  Endocrine:           FUNCTIONAL ASSESSMENT     Palliative Performance Scale (PPS): 30%     PSYCHOSOCIAL/SPIRITUAL ASSESSMENT     Active Problems:    * No active hospital problems.  *    Past Medical History:   Diagnosis Date    Cancer [facility])     uterine    CVI (common variable immunodeficiency) (HCC)     Diabetes ([facility] [address]     Elevated liver function tests 10/21/2010    Endometrial cancer ([facility] [address] 7/7/2010    Hypertension     Iron deficiency anemia     Menometrorrhagia 10/21/2010    Microcytic anemia 10/21/2010    Morbid obesity ([facility] [address]     Prediabetes 7/7/2010    Psychiatric disorder     depression    Radiation     completed radiation mid-march      Past Surgical History:   Procedure Laterality Date    CARDIAC SURG PROCEDURE UNLIST      hx of tachycardia    HX GYN      hysterectomy      Social History   Substance Use Topics    Smoking status: Never Smoker    Smokeless tobacco: Never Used    Alcohol use Yes     Family History   Problem Relation Age of Onset    Hypertension Mother     Hypertension Father     Stroke Maternal Grandfather     Cancer Paternal Grandmother      breast    Diabetes Paternal Grandmother       Allergies   Allergen Reactions    Egg Nausea and Vomiting     Raw egg and scrambled eggs. Egg products okay.      Pcn [Penicillins] Nausea and Vomiting     \"but could take amoxil\"    Shellfish Containing Products Unknown (comments)    Tetanus And Diphtheria Toxoids, Adsorbed, Adult Other (comments)     Developed local swelling, axillary pain, and transient fever    Tomato Unknown (comments)      Current Facility-Administered Medications   Medication Dose Route Frequency    albuterol-ipratropium (DUO-NEB) 2.5 MG-0.5 MG/3 ML  3 mL Nebulization Q4H PRN    magic mouthwash cpd (without sucralfate)  5 mL Oral Q6H PRN    nystatin (MYCOSTATIN) 100,000 unit/mL oral suspension 500,000 Units  500,000 Units Oral Q6H PRN    morphine (ROXANOL) 100 mg/5 mL (20 mg/mL) concentrated solution 30 mg  30 mg Oral Q3H    methadone (DOLOPHINE) tablet 5 mg  5 mg Oral Q8H    LORazepam (ATIVAN) tablet 1 mg  1 mg Oral TID    sulindac (CLINORIL) tablet 200 mg  200 mg Oral BID WITH MEALS    DULoxetine (CYMBALTA) capsule 60 mg  60 mg Oral DAILY    gabapentin (NEURONTIN) capsule 600 mg  600 mg Oral TID    methyl salicylate-menthol (BENGAY) 15-10 % cream Topical TID    metoprolol succinate (TOPROL-XL) XL tablet 50 mg  50 mg Oral DAILY    LORazepam (ATIVAN) injection 1 mg  1 mg IntraVENous Q4H PRN    polyethylene glycol (MIRALAX) packet 17 g  17 g Oral DAILY    docusate sodium (COLACE) capsule 100 mg  100 mg Oral DAILY    acetaminophen (TYLENOL) tablet 650 mg  650 mg Oral Q4H PRN        PHYSICAL EXAM     Wt Readings from Last 3 Encounters:   05/10/17 121.4 kg (267 lb 10.2 oz)   02/16/17 136.1 kg (300 lb)   02/02/16 127 kg (280 lb)       Visit Vitals    /62 (BP 1 Location: Right arm, BP Patient Position: At rest;Lying left side)    Pulse (!) 105    Temp 97.8 °F (36.6 °C)    Resp 20    SpO2 94%       Supplemental O2  [] Yes  [x] NO  Last bowel movement:     Currently this patient has:  [] Peripheral IV [x] PICC  [] PORT [] ICD    [x] Jama Catheter [] NG Tube   [] PEG Tube    [] Rectal Tube [] Drain  [] Other:     Constitutional: pt is awake complains of pain in the right hip, leg and spine 7/10  Eyes: cl  ENMT: cl  Cardiovascular: tachycardia  Respiratory: cl  Gastrointestinal: NE  Musculoskeletal: weakness, lying prone  Skin: warm, dry  Neurologic: ne  Psychiatric: calm  Other:       Pertinent Lab and or Imaging Tests:  Lab Results   Component Value Date/Time    Sodium 141 05/11/2017 03:34 AM    Potassium 4.4 05/11/2017 03:34 AM    Chloride 110 05/11/2017 03:34 AM    CO2 24 05/11/2017 03:34 AM    Anion gap 7 05/11/2017 03:34 AM    Glucose 176 05/11/2017 03:34 AM    BUN 16 05/11/2017 03:34 AM    Creatinine 0.68 05/11/2017 03:34 AM    BUN/Creatinine ratio 24 05/11/2017 03:34 AM    GFR est AA >60 05/11/2017 03:34 AM    GFR est non-AA >60 05/11/2017 03:34 AM    Calcium 8.3 05/11/2017 03:34 AM     Lab Results   Component Value Date/Time    Protein, total 6.8 05/10/2017 12:27 AM    Albumin 2.9 05/10/2017 12:27 AM           Total time: 80 mts     Cally F Malgieri, NP      Supporting documentation for GIP need for pain control:  [x] Frequent evaluation by a doctor, nurse practitioner, nurse   [x] Frequent medication adjustment    [x] IVs that cannot be administered at home   [x] Aggressive pain management   [] Complicated technical delivery of medications              Supporting documentation for GIP need for symptom control:  []  Sudden decline necessitating intensive nursing intervention  []  Uncontrolled / intractable nausea or vomiting   [x]  Pathological fractures  []  Advanced open wounds requiring frequent skilled care  [] Unmanageable respiratory distress  [] New or worsening delirium   [] Delirium with behavior issues  [] Imminent death  with skilled nursing needs documented above      Geoff Pi, NP

## 2017-05-29 NOTE — [facility] IDG NURSE NOTES
Patient: Jennifer Mehta    Date: 05/29/17  Time: 2:41 AM    [facility] Nurse Notes  [facility]  Patient Name  Jyoti Randall  Episode -   Date of IDT Meeting  5/30/17  UPDATED COMPREHENSIVE ASSESSMENT    Neuro - alert oriented often obtunded due to medications, slurred speech  Musculo- pt cannot move from prone position, has been laying on abd since admission 5/16/17, refuses to turn due to unmanaged pain, able to move upper extremities but still very difficult to lift her upper torso to move over in bed. Several pillows and wedges have been ordered but pt is unable to utilize, pt has pathological fracture of the right femur with moderate amt of edema  Resp - breath sounds diminished, saturations dropping 5/28 below 90% requiring oxygen 2l  Heart - pedal pulses difficult to assess pt will not allow palpation, she is supposed to have right femoral and external iliac venous compression causing stasis but see no outward signs of this accept pain  GI- appetite fair, pt often start eating and falls asleep, calls for soft drinks but leaves sitting on table until flat  Went to give meds this shift and pt had a mouth full of food still in her mouth. Last BM 5/27/17. It is difficult for pt to defacate laying on abdomen and even more so to give pericare with the pain in back, sacrum and right leg   Jama to bedside drain draining malodorous green/phil sedimented urine  Skin- no known breakdown, that can be assessed, unable to visualize, breast, abd or upper thighs, difficult to give proper bath due to patient lying on abd, patient allows one nurse to give bath due to this nurse reaches under patient trying to bathe instead of trying to move her.   Activity - complete bed rest  Adl's total care  Diet-  Regular,  can feed self if she could stay awake    ATTENDING Physician  Dr Mendosa Knee    Dr Melvin Christie to move     LAB VALUES (when available) pending     KARNOFSKY 30%     FAST for all dementia     Progression to DEPENDENCE WITH ADLs (include time frame) Pt is unable to perform any ADL's     PRESSURE ULCERS None that can be assessed     DISEASE SPECIFIC Diabetes, Pathological fracture r femur     FALL RISK: High    PROBLEM: Unmanaged pain                       Patient is unable to give own meds                       Unknown if mother can give 24 care needed and manage meds                       Pt assures me that mother can give meds and provide care                       Reviewed pt discharge medications that arrived over the weekend and noted several problems  --Albuterol medication- pt does not have a machine and pt has refused this med for days stating she does not need it  --Liquid morphine does not have any syringes in bag, Pt will not be able to pull this med up herself or remember when to take it  --Nystatin patient does not take anymore  --Methadone is 10mg pills, pt dose was changed to 5mg  --Metoprolol - blood pressure have been running low  --Pt will need oxygen concentrator if not already ordered, sats dropping below 90%    GOAL: Tolerable pain to allow pt to be turned and bathed    INTERVENTIONS(INDIVIDUALIZED)  Manage pain to allow pt to move and to be turned and bathed properly   Determine if mother will be able to provide care and give meds  Monitor meals to prevent aspiration  Order oxygen concentrator           Nursing Visit Frequency  Hospice Aide Visit Frequency     SW  COPING  GRIEF  ADVANCED DIRECTIVES    BEREAVEMENT  RESOURCES NEEDED  SW Visit Frequency     Bereavement   NO CHANGE     Volunteer  NO VOLUNTEER       SPIRITUAL ISSUES  GRIEF  [place] Visit Frequency     Clinician Signatures  Nurse______________________________  SW________________________________  Chaplain____________________________  Abhilash Rodriguez Coordinator__________________  Bereavement_________________________          Signed by: Chi Feliz RN

## 2017-05-29 NOTE — PROGRESS NOTES
0715:  Verbal shift change report given to Livia Crisostomo RN (oncoming nurse) by Chari Farnsworth RN (offgoing nurse). Report included the following information SBAR, Kardex, Intake/Output and MAR.   0735:  Rounded on patient; patient alert and able to answer questions; turned patient on to left side using body pillow under the right side; patient tolerated well.  1000:  Administered scheduled medications (see MAR) and assessed patient (see Simple Assessment). Patient requested tylenol (pain level 6/10); administered prn tylenol 650mg/PO. 1120:  Reassessed patient; patient sleeping (visual 0/10).

## 2017-05-29 NOTE — PROGRESS NOTES
NAME OF PATIENT:  Sagar Randall    LEVEL OF CARE:  GIP    REASON FOR GIP:   Pain, despite numerous changes in medications, Medication adjustment that must be monitored 24/7 and Stabilizing treatment that cannot take place at home    *PATIENT REMAINS ELIGIBLE FOR GIP LEVEL OF CARE AS EVIDENCED BY: (MUST BE ADDRESSED OF PATIENT GIP) Continue GIP level of care due to the changing requirements and continuous monitoring of patients pain and symptom medications which have been minimally controlled. REASON FOR RESPITE:  n/a    O2 SAFETY:  Concentrator positioning (6\" from furniture/drapes), Tanks stored in rodríguez , No petroleum based products on face while oxygen in use and Oxygen sign on the door    FALL INTERVENTIONS PROVIDED:   Implemented/recommended resources for alarm system (personal alarm, bed alarm, call bell, etc.) , Implemented/recommended environmental changes (remove hazards, lower bed, improve lighting, etc.) and Implemented/recommended increased supervision/assistance    INTERDISPLINARY COMMUNICATION/COLLABORATION:  Physician, MSW, Leta and RN, CNA    NEW MEDICATION INITIATION DOCUMENTATION:  n/a    Reason medication is being initiated:  n/a    MD / Provider name consulted re: change in status / initiation of new medication:  n/a    New Symptom(s):  n/a    New Order(s):  n/a    Name of the person notified of the changes:  n/a    Name of person being taught:  n/a    Instructions given:  n/a    Side Effects taught:  n/a    Response to teaching:  n/a      COMFORTABLE DYING MEASURE:  Is Patient/family satisfied with symptom level?  yes    DISCHARGE PLAN:  Patient is scheduled for discharge on Tuesday, May 30, 2017 [facility]

## 2017-05-30 NOTE — PROGRESS NOTES
0700:  Verbal shift change report given to Kirsten Julio RN (oncoming nurse) by Francisca Reid RN (offgoing nurse). Report included the following information SBAR, Kardex, Intake/Output and MAR.   0815:  Assessed patient (see Simple Assessment) and administered scheduled medications (see MAR). Patient stated that her pain is 6/10. Roxanol 30mg/SL & clinoril 200mg/PO was given in scheduled medications. Will continue to monitor. 1015:  [facility] today. He stated that the  is on his way. Requested O2 to be delivered; Deep Vega will have it delivered. Rylee Ledezma will be doing the tuck-in around 2:30pm.  Medications were ordered last Thursday with adjustments made this morning; Kami Bowden put in orders to be delivered to the home today. 1215:  Administered scheduled roxanol 30mg/SL. Educated patient on prn roxanol 20mg/SL q2hrs and assured her that she can have a dose prior to discharge. 1315:  Patient bathed by Tyrone Jenkins and Rhonda Ivey CNA. Administered prn roxanol 20mg/SL d/t movement from bath and to prepare for transport.

## 2017-05-30 NOTE — PROGRESS NOTES
Jyoti's mother called this morning to confirm that Juan Manuel Mitchell was being discharged home from the hospice house today. I went to the hospice house to be with Juan Manuel Mitchell as I knew she would be anxious about transport. Her mother needed to be home to receive the hospital bed and others items for her discharge. Both Jyoti and her mother were very appreciative. Jyoti and I talked about the discharge and what it meant to her. She noted she wanted to go home but was anxious as to whether her pain could be controlled at home. I affirmed her feelings noting that the mixed feeling were understandable and maybe even to be expected. She concluded she wanted to go home and try the plan for pain control. She noted in our last visit that she felt she has had the opportunity to rest while at the hospice house.      Freddy Sal., [address], [address] [place] [address]

## 2017-05-30 NOTE — PROGRESS NOTES
[address] [facility],[address]  Follow-up note visit. I visited the room of this pt who is leaving the [facility] for her mothers home this afternoon. She was in bed, awake and alert, not agitated, not restless, and appeared well medicated and comfortable. She was talking on the telephone to her mother, but put the phone down when I entered. The pt thanked me for visiting and my offer of support when she goes home. Her spirits were good and upbeat. I learned in earlier visits that she [facility] her [facility]. I wished her a safe journey and noted that I would keep her in my prayers. She said \"God is with me\" and I agreed  I offered a prayer and a [place] for her journey home. GOALS:  Continue to visit this pt and her family while she is at the [facility] and I am in the facility, to provide pastoral care and spiritual support to assist both the pt and them with coping with this difficult medical situation and decline. Transition Spiritual care of the patient to Avi Thayer. PLAN:   Visit this pt and her family, while she is @ [facility] and I am in this facility, or PRN as requested. Coordinate with Care Team on POC.    Visit Frequency:  None Scheduled, as requested only as supplemental to [address] [facility]  [phone]

## 2017-05-30 NOTE — PROGRESS NOTES
1900: Shift report received from Orlando Sousa [place].  2000: Pt in bed, laying on her stomach. Alert and oriented at this time. She asked to be repositioned in the bed.   2200: Pt medicated with scheduled meds, Pain at 5/10. Pt does not feel comfortable in the bed and feels like the legs are uneven and hanging out. Repositioned to pts wishes. Skin warm and intact. 0005: Pt sleeping, woken up to medicate with scheduled meds. 0230: Pt sleeping. 0330: Pt was sleeping, was woken up to take her scheduled meds. \\  0612: Pt sleeping. Medicated with scheduled meds. Pt supposed to go home today at 1300. Pt voiced concerns about being discharged home and wonders how well her pain will be managed. She was informed that there will be a nurse available to her and she will be getting the same meds that she is getting right now but changed can always be made by the nurse to make sure she is comfortable. Pt has been sleeping well all night but still states pain of 5/10 at this time. 8592: Pt restless, requests Ativan to be able to sleep a little longer. PRN Ativan 1 mg iv given at this time. NAME OF PATIENT:  Hilda Randall    LEVEL OF CARE:  GIP    REASON FOR GIP:   Pain, despite numerous changes in medications, Medication adjustment that must be monitored 24/7 and Stabilizing treatment that cannot take place at home    *PATIENT REMAINS ELIGIBLE FOR GIP LEVEL OF CARE AS EVIDENCED BY: (MUST BE ADDRESSED OF PATIENT GIP)  Pt continues to have pain at 5/10 throughout the day and night.     REASON FOR RESPITE:  n/a    O2 SAFETY:  n/a    FALL INTERVENTIONS PROVIDED:   Implemented/recommended assistive devices and encouraged their use, Implemented/recommended resources for alarm system (personal alarm, bed alarm, call bell, etc.)  and Implemented/recommended environmental changes (remove hazards, lower bed, improve lighting, etc.)    INTERDISPLINARY COMMUNICATION/COLLABORATION:  Physician, MSW, [place] and RN, CNA    NEW MEDICATION INITIATION DOCUMENTATION:  n/a    COMFORTABLE DYING MEASURE:  Is Patient/family satisfied with symptom level?  no    DISCHARGE PLAN:  Pt will go home today, 5/30 at 1300.

## 2017-05-30 NOTE — PROGRESS NOTES
Msw completed arrangements for patient's transfer home on Tuesday 5-30-17 at 130pm via [facility].   Allen Vega will do tuck-in visit

## 2017-05-30 NOTE — PROGRESS NOTES
NAME OF PATIENT:  Segun Randall    LEVEL OF CARE:  GIP    REASON FOR GIP:   Pain, despite numerous changes in medications, Medication adjustment that must be monitored 24/7 and Stabilizing treatment that cannot take place at home    *PATIENT REMAINS ELIGIBLE FOR GIP LEVEL OF CARE AS EVIDENCED BY: (MUST BE ADDRESSED OF PATIENT GIP) Continue GIP level of care due to the changing requirements and continuous monitoring of patients pain and symptom medications which have been minimally controlled. REASON FOR RESPITE:  n/a    O2 SAFETY:  Concentrator positioning (6\" from furniture/drapes), Tanks stored in rodríguez , No petroleum based products on face while oxygen in use and Oxygen sign on the door    FALL INTERVENTIONS PROVIDED:   Implemented/recommended resources for alarm system (personal alarm, bed alarm, call bell, etc.) , Implemented/recommended environmental changes (remove hazards, lower bed, improve lighting, etc.) and Implemented/recommended increased supervision/assistance    INTERDISPLINARY COMMUNICATION/COLLABORATION:  Physician, MSW, [place] and RN, CNA    NEW MEDICATION INITIATION DOCUMENTATION:  n/a    Reason medication is being initiated:  n/a    MD / Provider name consulted re: change in status / initiation of new medication:  n/a    New Symptom(s):  n/a    New Order(s):  n/a    Name of the person notified of the changes:  n/a    Name of person being taught:  n/a    Instructions given:  n/a    Side Effects taught:  n/a    Response to teaching:  n/a      COMFORTABLE DYING MEASURE:  Is Patient/family satisfied with symptom level?  yes    DISCHARGE PLAN:  Patient is scheduled for discharge today at 1:30pm and [facility]

## 2017-05-30 NOTE — HSP[facility] IDG SOCIAL WORKER NOTES
Patient: Adria Duggan    Date: 05/30/17  Time: 11:01 AM    [facility]  Notes  Patient is awake at times but very lethargic  alert and oriented x 3. Plan is for patient to be discharged home to parents house today. Msw has requested a 1 x HH PT/OT evaluation per Dr. Madhu Ramirez and Soni Sorenson NP. This will occur following return home. Patient and mother aware. DME being delivered to home this AM.  [facility] to  at 130p.             Signed by: Jarvis Willett

## 2017-05-30 NOTE — PROGRESS NOTES
1330:  Patient was picked up by [facility] transport; patient is in prone position. DNR, MAR, and medications sent with patient along with body pillow and wedge pillow.   Report called to Amandeep Garner who will be doing tuck-in around 2:30pm.

## 2017-05-30 NOTE — HSPC IDG CHAPLAIN NOTES
Patient: Mal Mena    Date: 05/30/17  Time: 2:36 PM  SPIRITUAL ISSUES:  I visited the room of this pt who is leaving the [facility] for her mothers home this afternoon. She was in bed, awake and alert, not agitated, not restless, and appeared well medicated and comfortable. She was talking on the telephone to her mother, but put the phone down when I entered. The pt thanked me for visiting and my offer of support when she goes home,. Her spirits were good and upbeat. I learned in earlier visits that she and her family [facility] GOALS:  Continue to visit this pt and her family while she is at the [facility] and I am in the facility, to provide pastoral care and spiritual support to assist both the pt and them with coping with this difficult medical situation and decline. Transition Spiritual care of the patient to Avi Thayer. PLAN:   Visit this pt and her family, while she is @ [facility] and I am in this facility, or PRN as requested. Coordinate with Care Team on POC.   Signed by: Osmar Mcwilliams

## 2017-05-30 NOTE — HSPC IDG CHAPLAIN NOTES
Patient: Estrellita Galindo    Date: 05/30/17  Time: 11:24 AM  SPIRITUAL ISSUES:  I visited the room of this pt who is leaving the [facility] for her mothers home this afternoon. She was in bed, awake and alert, not agitated, not restless, and appeared well medicated and comfortable. She was talking on the telephone to her mother, but put the phone down when I entered. The pt thanked me for visiting and my offer of support when she goes home,. Her spirits were good and upbeat. I learned in earlier visits that she and her family attend [facility] on the [email or web address]. GOALS:  Continue to visit this pt and her family while she is at the [facility] and I am in the facility, to provide pastoral care and spiritual support to assist both the pt and them with coping with this difficult medical situation and decline. Transition Spiritual care of the patient to Avi Thayer. PLAN:   Visit this pt and her family, while she is @ [facility] and I am in this facility, or PRN as requested. Coordinate with Care Team on POC  . Signed by: Kevin Bonilla

## 2017-05-30 NOTE — PROGRESS NOTES
Coordination with HERLINDA Parra who will order DME to arrive at parent's home on Tuesday in AM.  Mother aware.

## 2017-06-01 NOTE — PROGRESS NOTES
This was a follow-up visit to continue support to Karli and her mother following discharge from [facility]. Both are happy that she is home. Jyoti expressed thanks to hospice house staff noting that they were very helpful and that she would miss all of them, adding several individual names. She also expressed thanks for individuals like, Tony Nassar, who will be coming out to continue support to her at home. I will do a follow-up visit next week.      Jaime Read, Mary Stevens., [facility], [address] [place] [address]

## 2017-06-05 NOTE — HOSPICE
I received a call from Yany regarding this home hospice patient. She has come to know the patient very well as she supported her during the time she was seen by Palliative Care. She has continued to visit the pt when she left the hospital and came to the [facility]. Last week, the pt was successfully d/c ed back to her mother's home where she wished to be. Today, she was admitted to the [facility] for uncontrolled symptoms. Won Morton had spoken with both the pt and her mother. We discussed her support of this patient now that she has returned to the [facility] on GIP Status. We agreed that she would be lead  for her and I would provide supplemental care as need or requested. PLAN:  Visit this pt and complete an KIRBY and SPOC as required by policy. Coordinate with Paris Fall as she visits and support this pt and family at [address] [place] [address] supports as required and/or requested.

## 2017-06-05 NOTE — H&P
[facility]  [phone]    Patient Name: Keely Ray  YOB: 1980    Date of Provider Hospice Visit: 06/05/17    Level of Care:   [x] General Inpatient (GIP)    [] Routine   [] Respite    Location of Care:  [] [facility] [] [facility] [] [facility] [] [facility] [x] Hospice [facility]    Date of [facility] Admission: 5-16-17  Hospice Medical Director for Inpatient Care: Dr. Sujey Panchal Diagnosis:  Endometrial Adenocarcinoma       Dominique Dancer       . Rationale for a prognosis of life expectancy of 6 months or less if the disease follows its normal course:  Keely Ray is a 39 y.o. with a past history of metastatic endometrial cancer (mets to liver and bone) who was admitted to [facility] on 5- f By doppler and imaging she does not have DVT/PE. CT of pelvis and right leg show enlarging tumor involving right sacrum, right iliac bone, bilateral rami and proximal right femur (which appears acute). L5 and right sacral nerves are involved with tumor and pressure of tumor creates compression of right femoral and external iliac veins causing stasis. She has complete destruction of the right hip, femur and pelvis with tumor invasion and complete loss of bone. Current focus is pain control, she is mildly confused,  and caregivers at home were having extreme difficulty in managing her pain and she required daily visits by the nurse with frequent medications adjustments and addition of medication IV route PCA and continuous in order to attempt control of her pain without success. Mobility is still an issue as patient will not successfully move to the left side as she continues to lay prone due to exponential pain issues with any kind of movement.   The patient/family chose comfort measures with the support of Hospice.      The patient's principle diagnosis has resulted in decreased functional status with complete dependence in ADLS,        Functionally, the patient's Karnofsky and/or Palliative Performance Scale has declined over a period of several weeks and is estimated at 30%. Objective information that support this patients limited prognosis includes:metastatic disease intot he pelvis and pelvic bony structures including the right femur and hip joint. The patient/family chose comfort measures with the support of Hospice. HOSPICE DIAGNOSES   Active Symptoms:     1. Severe pain:  rates her pain in her right hip and low spine about 5-6/10  2. Generalized bone pain, neoplasm related: persists  3. Shortness of breath requiring  O2 NC, now SATS 98%  4. Debility with generalized weakness  5. Constipation   6. Anxiety/depression: continues to persist  7. Hx of recent Insomnia  8. Pt is awake, mildly confused  9. S+S UTI or dehydration  10. Altered mental status, possible acute hypoxic respiratory failure or hypercarbic respiratory failure, due to body habitus and position in bed  11. Mild delirium         PLAN        1. Admit to GIP level of care due to changing medication requirements, adjustments, additions of opioid pain mediations for relief and adjuvant therapy medications as tolerated, and continuous monitoring which was not accomplished at home  2. Continue methadone dosing, continue PCA dosing  3. Will consider to change PCA from morphine to dilaudid, may be toxic with morphine, kidney function is normal from 2 weeks ago, recent episodes of twitching, none noted currently  4. Continue gabapentin, continue clinoril, Cymbalta for adjuvant therapy for bone pain  5. Continue methadone at current levels 10mg q 8 hrs  6. Continue keflex, needs skin evaluation daily  7. Pt needs to lay on the left side at least 1 x per day for 20 min as tolerated  8.  Pt will need PT/OT evaluation to help determine a technique for transfer of position in bed, will order, also family and home staff along with [facility] staff need to learn repositioning as per PT/OT while she is in the [facility]  9. If pt needs IV fluids will consider   10. Team conference with inpt and outpt team as needed  11.  and SW to support family needs  15. Disposition: home when symptoms are stable    Prognosis estimated based on 06/05/17 clinical assessment is:   [] Hours to Days    [x] Days to Weeks    [] Other:    Communicated plan of care with: Hospice Case Manager;  Hospice IDT; Care Team     GOALS OF CARE     Resuscitation Status: DNR  Durable DNR: [x] Yes [] No    Advance Care Planning 5/10/2017   Patient's Healthcare Decision Maker is: Legal Next of Andrea 69   Primary Decision Maker Name Mariano Benz   Primary Decision Maker Phone Number -   Primary Decision Maker Relationship to Patient Parent   Confirm Advance Directive Yes, on file        HISTORY     History obtained from: chart, SN, CM, PT    CHIEF COMPLAINT: left hip pain, pain with movement, incidental pain, bone pain  The patient is:   [x] Verbal  [] Nonverbal  [] Unresponsive    HPI/SUBJECTIVE:  39year old female with hx of endometrial adenocarcinoma with metastatic disease to right hip and femur, bone mets       REVIEW OF SYSTEMS     The following systems were: [x] reviewed  [] unable to be reviewed    Positive ROS include:  Constitutional:  Ears/nose/mouth/throat:  Respiratory:  Gastrointestinal:  Musculoskeletal: weakness noted, she does not want to move onto the left side due to pain issues  Neurologic:  Psychiatric:  Endocrine:     Adult Non-Verbal Pain Assessment Score: na    Face  [] 0   No particular expression or smile  [] 1   Occasional grimace, tearing, frowning, wrinkled forehead  [] 2   Frequent grimace, tearing, frowning, wrinkled forehead    Activity (movement)  [] 0   Lying quietly, normal position  [] 1   Seeking attention through movement or slow, cautious movement  [] 2   Restless, excessive activity and/or withdrawal reflexes    Guarding  [] 0   Lying quietly, no positioning of hands over areas of body  [] 1   Splinting areas of the body, tense  [] 2   Rigid, stiff    Physiology (vital signs)  [] 0   Stable vital signs  [] 1   Change in any of the following: SBP > 20mm Hg; HR > 20/minute  [] 2   Change in any of the following: SBP > 30mm Hg; HR > 25/minute    Respiratory  [] 0   Baseline RR/SpO2, compliant with ventilator  [] 1   RR > 10 above baseline, or 5% drop SpO2, mild asynchrony with ventilator  [] 2   RR > 20 above baseline, or 10% drop SpO2, asynchrony with ventilator     FUNCTIONAL ASSESSMENT     Palliative Performance Scale (PPS): 30%     PSYCHOSOCIAL/SPIRITUAL ASSESSMENT     Active Problems:    * No active hospital problems. *    Past Medical History:   Diagnosis Date    Cancer [facility])     uterine    CVI (common variable immunodeficiency) (HCC)     Diabetes ([address]     Elevated liver function tests 10/21/2010    Endometrial cancer ([address] 7/7/2010    Hypertension     Iron deficiency anemia     Menometrorrhagia 10/21/2010    Microcytic anemia 10/21/2010    Morbid obesity ([address]     Prediabetes 7/7/2010    Psychiatric disorder     depression    Radiation     completed radiation mid-march      Past Surgical History:   Procedure Laterality Date    CARDIAC SURG PROCEDURE UNLIST      hx of tachycardia    HX GYN      hysterectomy      Social History   Substance Use Topics    Smoking status: Never Smoker    Smokeless tobacco: Never Used    Alcohol use Yes     Family History   Problem Relation Age of Onset    Hypertension Mother     Hypertension Father     Stroke Maternal Grandfather     Cancer Paternal Grandmother      breast    Diabetes Paternal Grandmother       Allergies   Allergen Reactions    Egg Nausea and Vomiting     Raw egg and scrambled eggs. Egg products okay.      Pcn [Penicillins] Nausea and Vomiting     \"but could take amoxil\"    Shellfish Containing Products Unknown (comments)    Tetanus And Diphtheria Toxoids, Adsorbed, Adult Other (comments)     Developed local swelling, axillary pain, and transient fever    Tomato Unknown (comments)      No current facility-administered medications for this encounter. PHYSICAL EXAM     Wt Readings from Last 3 Encounters:   05/30/17 121.1 kg (267 lb)   05/10/17 121.4 kg (267 lb 10.2 oz)   02/16/17 136.1 kg (300 lb)       There were no vitals taken for this visit. Supplemental O2  [x] Yes  [] NO  Last bowel movement:     Currently this patient has:  [] Peripheral IV [x] PICC  [] PORT [] ICD    [] Jama Catheter [] NG Tube   [] PEG Tube    [] Rectal Tube [] Drain  [] Other:     Constitutional:awake and alert, mildly confused  Eyes: cl  ENMT: cl  Cardiovascular: HRRR  Respiratory: shallow   Gastrointestinal: snt  Musculoskeletal: right hip no movement, no flexion  Skin: warm, dry  Neurologic: ne  Psychiatric: calm  Other:    Pertinent Lab and or Imaging Tests:  Lab Results   Component Value Date/Time    Sodium 141 05/11/2017 03:34 AM    Potassium 4.4 05/11/2017 03:34 AM    Chloride 110 05/11/2017 03:34 AM    CO2 24 05/11/2017 03:34 AM    Anion gap 7 05/11/2017 03:34 AM    Glucose 176 05/11/2017 03:34 AM    BUN 16 05/11/2017 03:34 AM    Creatinine 0.68 05/11/2017 03:34 AM    BUN/Creatinine ratio 24 05/11/2017 03:34 AM    GFR est AA >60 05/11/2017 03:34 AM    GFR est non-AA >60 05/11/2017 03:34 AM    Calcium 8.3 05/11/2017 03:34 AM     Lab Results   Component Value Date/Time    Protein, total 6.8 05/10/2017 12:27 AM    Albumin 2.9 05/10/2017 12:27 AM           Total time: 90  Counseling / coordination time: 45  > 50% counseling / coordination?: y      This patient meets Hospice General Inpatient (GIP) Level of Care.     The precipitating event that resulted in the need for GIP was: increased pain, delirium, confusion and lethargy    The interventions tried that have been unsuccessful at controlling symptoms include: adjustments of her opioid medications    Supporting documentation for GIP need for pain control:  [x] Frequent evaluation by a doctor, nurse practitioner, nurse   [x] Frequent medication adjustment    [] IVs that cannot be administered at home   [x] Aggressive pain management   [] Complicated technical delivery of medications              Supporting documentation for GIP need for symptom control:  [x]  Sudden decline necessitating intensive nursing intervention  []  Uncontrolled / intractable nausea or vomiting   [x]  Pathological fractures  []  Advanced open wounds requiring frequent skilled care  [] Unmanageable respiratory distress  [] New or worsening delirium   [x] Delirium with behavior issues  [] Imminent death  with skilled nursing needs documented above    MD Addendum:    Pt seen & examined, case discussed with Ms. Toyna Browne. Also had care conference by phone today with team that included Attending Dr. Kelli Love, home team, Harley Muñoz, [facility] staff including CHELO, and [place]. We reviewed patient's medical history, symptoms, concerns, social situation, prognosis and events that led to current hospice admission and details of happenings at home as well as [facility]. We discussed realistic goals of care for pt and what we can achieve reasonably regarding her pain control and functionality. Agree with current care plan of care; continue current medications that includes; methadone, morphine PCA, adjuvant pain control with help of gabapentin, cymbalta and NSAID sulindac and use PCA dose for incidental pain. Also we will arrange OT/PT consult at [facility] to evaluate for assistive devices to help support pt with pain control, respositioning in bed and possible transfers. Home team will be included in the teaching per Pt/OT evaluation. Weekly care conference to update and revisit/evaluate goals of care and what is achieved to determine further action plan. Will continue to follow for comfort and adjust medications/care plan accordingly.

## 2017-06-05 NOTE — PROGRESS NOTES
1400  Pt arrived on the floor. Pt was lethargic but aroused to verbal stimuli. Pt states pain is a 5. Rn encouraged her to push her PCA Morphine. Pt was transferred from the stretcher without difficulty. Pt moaned with movement but tolerated the transfer. VS  92/60, 92, 18, Sats 91% on Ra.  (pt had taken off her O2). Temp 98.6. Lungs diminished but clear. No dyspnea noted. Faint bowel sounds. Last reported BM was yest.  Jama is draining phil urine. Pt has pitting edema in her thighs. Right leg edema is greater than left. Pt has a PICC in her Right upper arm  Dressing is dry, clear and intact. Skin on back and bottom is intact. Cadd pump is infusing Morphine 4mg/hr with 2 mg q 10 mins. Res Vol 452.2, Attempts 119, given 80.    1600  Pt resting. No complaints at this time. 1750  Pt hit the PCA pump. Rn, JANES and Rn PM turned and repositioned pt on her back. Pt tolerated the procedure with slow turning and frequent pushing of the PCA button by the pt.  1830  Pt resting. No complaints. 1900  Report given. NAME OF PATIENT:  Gail Randall    LEVEL OF CARE:  GIP    REASON FOR GIP:   Pain, despite numerous changes in medications, Medication adjustment that must be monitored 24/7 and Stabilizing treatment that cannot take place at home    *PATIENT REMAINS ELIGIBLE FOR GIP LEVEL OF CARE AS EVIDENCED BY: (MUST BE ADDRESSED OF PATIENT GIP)    O2 SAFETY:  Pt is not using. FALL INTERVENTIONS PROVIDED:   Implemented/recommended resources for alarm system (personal alarm, bed alarm, call bell, etc.)     INTERDISPLINARY COMMUNICATION/COLLABORATION:  Physician, MSW, [place] and RN, CNA    NEW MEDICATION INITIATION DOCUMENTATION:  No new medications initiated.     Consulted AT MD to report change in pt status, Obtained Order from Provider for initiation of symptom relief medication /other medication needed and Documentation completed in Clinical Note in [facility] MEASURE:  Is Patient/family satisfied with symptom level?  yes    DISCHARGE PLAN:  Pt will return home to her Mother's care and continue to be followed by Home Hospice when her symptoms are managed.

## 2017-06-06 NOTE — PROGRESS NOTES
[facility]  [phone]    Patient Name: Hadley Baxter  YOB: 1980    Date of Provider Hospice Visit: 06/06/17    Level of Care:   [x] General Inpatient (GIP)    [] Routine   [] Respite    Location of Care:  [] [facility] [] [facility] [] [facility] [] [facility] [x] [facility]  [] Home [] Other:      Date of Original Hospice Admission: 5-16-17  Hospice Medical Director for Inpatient Care: Dr. Gigi Unger Diagnosis:  Metastatic Endometrial Adenocarcinoma       HOSPICE DIAGNOSES   Active Symptoms:  1. Generalised pain, especially right lower extremity, lower back  2. Delirium; new, confusion, fluctuating, some hallucinations  3. Shortness of breath with low O2 sats  4. Anxiety/depression; persists  5. Insomnia; Hx of but lately pt quite lethargic  6. Fatigue/weakness/lethargy  7. Constipation     PLAN   1. Continue GIP level of care due to changing medication requirements, adjustments, additions of opioid pain mediations for relief and adjuvant therapy medications as tolerated, and continuous monitoring while pt in delirious state  2. IVF for hydration; ? Dehydration causing delirium, use D5 1/2 NS at 75cc/hr x 1 litre. 3. D/C keflex; do not see need for AB, no signs of infection in skin or UTI. 4. Change ativan to four times daily as needed for anxiety  5. Continue morphine PCA for now and will consider to change PCA to reduce morphine basal to 2mg/hr and remove PCA dose as she is not using it much. Try dilaudid nurse given 1mg IV q15mts as needed for incidental pain, before ADL care. Also if delirium clears up with this change then will consider using dilaudid for PCA. 6. Continue gabapentin, continue clinoril, Cymbalta for adjuvant therapy for bone pain  7. Continue methadone at current levels 10mg q 8 hrs  8.  Pt will need PT/OT evaluation to help determine a technique for transfer of position in bed, will order, also family and home staff along with [facility] staff need to learn repositioning as per PT/OT while she is in the [facility]  9. Weekly care conference with team  10. Family meeting with mother and care team at [facility] on Friday June 9 at 10.30am.   6.  and SW to support family needs  15. Disposition: home when symptoms are stable    Prognosis estimated based on 06/06/17 clinical assessment is:   [] Few to Many Hours  [] Hours to Days   [] Few to Many Days   [] Days to Weeks   [x] Few to Many Weeks   [] Weeks to Months   [] Few to Many Months    Communicated plan of care with: Hospice Case Manager; Hospice IDT; Care Team     GOALS OF CARE     Resuscitation Status: DNR  Durable DNR: [x] Yes [] No    Advance Care Planning 5/10/2017   Patient's Healthcare Decision Maker is: Legal Next of Andrea 69   Primary Decision Maker Name Faith Courser   Primary Decision Maker Phone Number -   Primary Decision Maker Relationship to Patient Parent   Confirm Advance Directive Yes, on file        HISTORY     History obtained from: chart, staff, pt, family    CHIEF COMPLAINT: i have pain  The patient is:   [x] Verbal  [] Nonverbal  [] Unresponsive    HPI/SUBJECTIVE:  Pt seen lethargic and somewhat delirious; per nursing was little more confused early this am and had some hallucinations as well; seeing bugs on mirror. Pt for the first time laying on back. Appetite reduced; ate few bites this am for breakfast  Not drinking enough water/fluids but take pepsi/sodas and ice chips  C/o pain but says that she forgets to push PCA button and has not been using it much.    Pt also somewhat dehydrated; low BP, HR little up, dark concentrated urine  Pt's O2 sats are also low: 88% without O2.       REVIEW OF SYSTEMS     The following systems were: [x] reviewed  [] unable to be reviewed    Positive ROS include:  Constitutional: fatigue, weakness  Ears/nose/mouth/throat:   Respiratory:shortness of breath  Gastrointestinal:  Musculoskeletal:pain in legs, joints, swelling legs  Neurologic: numbness and burning pain  Psychiatric:anxiety, depression  Endocrine:          FUNCTIONAL ASSESSMENT     Palliative Performance Scale (PPS): 30%     PSYCHOSOCIAL/SPIRITUAL ASSESSMENT     Active Problems:    * No active hospital problems. *    Past Medical History:   Diagnosis Date    Cancer [facility])     uterine    CVI (common variable immunodeficiency) (HCC)     [facility]     Elevated liver function tests 10/21/2010    Endometrial cancer ([facility] 7/7/2010    Hypertension     Iron deficiency anemia     Menometrorrhagia 10/21/2010    Microcytic anemia 10/21/2010    Morbid obesity ([facility]     Prediabetes 7/7/2010    Psychiatric disorder     depression    Radiation     completed radiation mid-march      Past Surgical History:   Procedure Laterality Date    CARDIAC SURG PROCEDURE UNLIST      hx of tachycardia    HX GYN      hysterectomy      Social History   Substance Use Topics    Smoking status: Never Smoker    Smokeless tobacco: Never Used    Alcohol use Yes     Family History   Problem Relation Age of Onset    Hypertension Mother     Hypertension Father     Stroke Maternal Grandfather     Cancer Paternal Grandmother      breast    Diabetes Paternal Grandmother       Allergies   Allergen Reactions    Egg Nausea and Vomiting     Raw egg and scrambled eggs. Egg products okay.      Pcn [Penicillins] Nausea and Vomiting     \"but could take amoxil\"    Shellfish Containing Products Unknown (comments)    Tetanus And Diphtheria Toxoids, Adsorbed, Adult Other (comments)     Developed local swelling, axillary pain, and transient fever    Tomato Unknown (comments)      Current Facility-Administered Medications   Medication Dose Route Frequency    LORazepam (ATIVAN) tablet 1 mg  1 mg Oral TID PRN    dextrose 5% and 0.9% NaCl infusion  75 mL/hr IntraVENous CONTINUOUS    [START ON 6/7/2017] dextrose 5 % - 0.45% NaCl infusion  75 mL/hr IntraVENous CONTINUOUS    metoprolol tartrate (LOPRESSOR) tablet 25 mg  25 mg Oral BID    HYDROmorphone (PF) (DILAUDID) injection 1 mg  1 mg IntraVENous Q15MIN PRN    methadone (DOLOPHINE) tablet 10 mg  10 mg Oral Q8H    polyethylene glycol (MIRALAX) packet 17 g  17 g Oral DAILY    magic mouthwash cpd (without sucralfate)  5 mL Oral QID PRN    gabapentin (NEURONTIN) capsule 600 mg  600 mg Oral QID    docusate sodium (COLACE) capsule 100 mg  100 mg Oral DAILY    DULoxetine (CYMBALTA) capsule 60 mg  60 mg Oral QHS    sulindac (CLINORIL) tablet 200 mg  200 mg Oral BID WITH MEALS    morphine (PF)  mg/30 ml   IntraVENous CONTINUOUS        PHYSICAL EXAM     Wt Readings from Last 3 Encounters:   05/30/17 121.1 kg (267 lb)   05/10/17 121.4 kg (267 lb 10.2 oz)   02/16/17 136.1 kg (300 lb)       Visit Vitals    /72 (BP 1 Location: Right arm, BP Patient Position: Supine)    Pulse 98    Temp 97.8 °F (36.6 °C)    Resp 18    SpO2 (!) 88%         Supplemental O2  [x] Yes  [] NO  Last bowel movement:     Currently this patient has:  [] Peripheral IV [x] PICC  [] PORT [] ICD    [x] Davila Catheter [] NG Tube   [] PEG Tube    [] Rectal Tube [] Drain  [] Other:     Constitutional: lethargic, pale, appears somewhat confused; slow in responses, sleepy, inability to focus and be attentive, difficulty finding words. Pt laying on her back for the first time today  Eyes: pallor++  ENMT: dry oral mucosa  Cardiovascular: distant heart sounds, tachycardic  Respiratory:  Normal breathing, diminished air entry  Gastrointestinal:  distended and firm, non tender, BS +, pt has a davila's, due to heave built and swollen right thigh/hip area due to fracture, pt being in prone position before pt has some slight skin irritation in groin folds.    Musculoskeletal:edema ++ lower extremities right leg>left leg  Skin: warm, dry, some skin irritation in the groin folds  Neurologic: lethargic, delirious  Psychiatric: fluctuating mood; tearful and anxious  Other: davila's: urine dark concentrated      Pertinent Lab and or Imaging Tests: Total time: 45mts  Counseling / coordination time: 30mts spent in discussing and care coordination with pt,  Ms. Brandon Zavala and RN Ms. Carolina Kramer  > 50% counseling / coordination?: y      This patient meets Heather Machuca 135 (GIP) Level of Care.       Supporting documentation for GIP need for pain control:  [x] Frequent evaluation by a doctor, nurse practitioner, nurse   [x] Frequent medication adjustment    [x] IVs that cannot be administered at home   [x] Aggressive pain management   [] Complicated technical delivery of medications              Supporting documentation for GIP need for symptom control:  [x]  Sudden decline necessitating intensive nursing intervention  []  Uncontrolled / intractable nausea or vomiting   [x]  Pathological fractures  []  Advanced open wounds requiring frequent skilled care  [] Unmanageable respiratory distress  [x] New or worsening delirium   [] Delirium with behavior issues  [] Imminent death  with skilled nursing needs documented above

## 2017-06-06 NOTE — [facility] IDG NURSE NOTES
Patient: Jessica Babin    Date: 06/06/17  Time: 12:37 AM    [facility]    Patient Name Jessica Babin  Episode -    Date of IDT Meeting  06/06/2017  Mona Mckeon Physician Dr. Nahun Miguel is a 39 y.o. with a past history of metastatic endometrial cancer (mets to liver and bone) who was admitted to [facility] on 5-. By doppler and imaging she does not have DVT/PE. CT of pelvis and right leg show enlarging tumor involving right sacrum, right iliac bone, bilateral rami and proximal right femur (which appears acute). L5 and right sacral nerves are involved with tumor and pressure of tumor creates compression of right femoral and external iliac veins causing stasis. She has complete destruction of the right hip, femur and pelvis with tumor invasion and complete loss of bone. The patient/family chose comfort measures with the support of Hospice. SYMPTOMS  Pain  SIGNS/SYMPTOMS: Patient unable to bear pain of growing tumor involving right sacrum, ilium, spine, femur. Current focus is pain control, although she is also mildly confused.  and caregivers at home were having extreme difficulty in managing her pain and she required daily visits by the nurse with frequent medication adjustments and addition of medication IV route PCA and continuous in order to attempt control of her pain without success. Mobility is still an issue as patient resists movement of any kind due to pain. She was placed supine on admission to [facility] on 6/5/2017. With use of PCA pump, patient allows slight rotation left to right q4 hours throughout the night. Two slight spots of potential breakdown identified on upper abdomen following weeks in prone position with little movement. LAB VALUES (when available)    KARNOFSKY 30  Progression to DEPENDENCE WITH ADLs (include time frame) Patient is bedridden and complete care. turning due to pain. Patient feeds herself and is alert and oriented, but completely immobile. PRESSURE ULCERS: None  DISEASE SPECIFIC: Growing bone and spinal metastases resulting in pain that is difficult to manage. Signs/symptoms UTI or dehydration. FALL RISK: low  PROBLEM: Pain 4/10 with medication, pain 10/10 with movement. Patient resists turning or movement of any kind due to pain. GOAL:  Pain management  INTERVENTIONS(INDIVIDUALIZED): Consider changing  PCA from morphine to dilaudid, may be toxic with morphine, kidney function is normal from 2 weeks ago, recent episodes of twitching, none noted currently. Pt needs to lay on the left side at least 1 x per day for 20 min as tolerated  Pt will need PT/OT evaluation to help determine a technique for transfer of position in bed, will order, also family and home staff along with [facility] staff need to learn repositioning as per PT/OT while she is in the [facility]. If pt needs IV fluids to relieve signs/symptoms of UTI/dehydration, will consider. Keflex started to prevent infection of skin irritation in groin from difficulty cleansing due to pain on any movement.     Nursing Visit Frequency 24 hr @ [address] [place] [address] [place] Visit Frequency 24 hr @ [facility]    COPING    GRIEF    ADVANCED DIRECTIVES        BEREAVEMENT    RESOURCES NEEDED     Visit Frequency    Bereavement   NO CHANGE    Volunteer    NO VOLUNTEER        SPIRITUAL ISSUES    GRIEF    COPING       AVAILABLE SPIRITUAL RESOURCES     Visit Frequency    Clinician Signatures    Nurse______________________________    SW________________________________    Chaplain____________________________    Volunteer Coordinator__________________    Bereavement_________________________           Signed by: Martha Fernández RN         Electronically signed by Bryson Bedoya RN at 17 [phone]

## 2017-06-06 NOTE — PROGRESS NOTES
0700 Report received from [facility]. Pt is GIP for pain control. 0830 Pt very lethargic, unable to focus to feed self. Disoriented to time and place. Pt was fed 1/2 breakfast.   1030 Dr Rebecca Dumonts in to assess the pt. Pt lethargic and speech is slow and has trouble finding words. 1100 IV fluids D5NS started at 75ml/hr. 1130 Complete bath and hair washed  [place] at the bedside  Pt c/o pain with movement of of both lower extremities. Doesn't  tolerate any movement of right leg.  1230 Pt drowsy, unable to awaken to eat lunch. She is arousable but unable to keep focusd to feed. 1430 PCA dose changed. Basal decreased to 2mg/hr, PCA dose discontinued,   1630 Pt awake, visitor at the bedside. Took meds with difficulty swallowing even with applesauce. Explained to pt that PCA feature of CADD discontinued per MD and pt must request med IVP when needed. Pt drowsy but shakes head that she understands. Will continue to remind pt and reinforce. NAME OF PATIENT:  Sunny Randall    LEVEL OF CARE:  GIP    REASON FOR GIP:   Pain, despite numerous changes in medications, Medication adjustment that must be monitored 24/7 and Stabilizing treatment that cannot take place at home    *PATIENT REMAINS ELIGIBLE FOR GIP LEVEL OF CARE AS EVIDENCED BY: (MUST BE ADDRESSED OF PATIENT GIP) Frequent assessment and medication adjustments required to manage pain.       REASON FOR RESPITE:  na    O2 SAFETY:  na    FALL INTERVENTIONS PROVIDED:   Implemented/recommended use of fall risk identification flag to all team members, Implemented/recommended resources for alarm system (personal alarm, bed alarm, call bell, etc.) , Implemented/recommended environmental changes (remove hazards, lower bed, improve lighting, etc.) and Implemented/recommended increased supervision/assistance    INTERDISPLINARY COMMUNICATION/COLLABORATION:  Physician, MSW, [place] and RN, CNA    NEW MEDICATION INITIATION DOCUMENTATION:  Documentation completed in Clinical Note in [facility]   PCA basal rate decreased and PCA mode discontinued. IV fluids added for hydration    Reason medication is being initiated: To decrease drowsiness yet manage pain    MD / Provider name consulted re: change in status / initiation of new medication:  Dr Jessica Mcfadden Symptom(s):  Lethargy,confusion    New Order(s):  Pain medication adjusted , IV fluids for hydration    Name of the person notified of the changes:  Pt herself    Name of person being taught:  Pt herself    Instructions given:  Purpose of medication changes    Side Effects taught:  na    Response to teaching:  Pt states she understanding      COMFORTABLE DYING MEASURE:  Is Patient/family satisfied with symptom level?   Yes    DISCHARGE PLAN: Discharge back to home when symptoms are managed

## 2017-06-06 NOTE — PROGRESS NOTES
Sebrooklyn Garcia L[address] Initial Visit after re-admission to [facility] on GIP Status    I visited the room of this pt who arrived back at the [facility] yesterday and is on GIP Status. She was in bed, semi-awake and not fully alert, not agitated, not restless, and appeared well medicated and comfortable. She was lying on her back and appeared comfortable. She was talking on the telephone to her mother, but put the phone down when I entered. I confirmed with her the Rosana had visited her yesterday and I confirmed that I would be visiting as well, if she so desired. She was agreeable to periodic visits. The pt thanked me for visiting and my offer of support. Her spirits were moderate and her attitude somewhat stoic as she did not wish to come back to the [facility]  I learned in earlier visits that she and her [facility] GOALS:  Continue to visit this pt and her family while she is at the [facility] and I am in the facility, to provide pastoral care and spiritual support to assist both the pt and them with coping with this difficult medical situation and decline. Coordinate w/ Chaplain Rankin as she assumes primary care for this patient  Coordinate with Chaplain Foster if/when the pt returns to her mothers home. Visit this pt and her family, while she is @ [facility] and I am in this facility, or PRN as requested. Coordinate with Care Team on POC.  VISIT FREQUENCY:  1 wk 2 starting 6/6 plus 4 prn 14 days.   Alona Campos, [facility] [place] [facility], [facility]  [phone]

## 2017-06-06 NOTE — PROGRESS NOTES
On yesterday, i received a call from Rob's mother. She informed me that Rob was returning to the hospice house. I informed her that I would see Juan Mancia yesterday or today. Rob and I have visited briefly this morning. She is sleeping off and on. I will attempt a follow-up contact with her mother.        Heather Orellana., [facility], [facility]

## 2017-06-06 NOTE — PROGRESS NOTES
IDG Note    Patient: Valdo Ewing    Date: 06/06/17  Time: 1:15 PM    [facility] [place] Nurse Notes      [facility]  Patient Name [address]  Date of IDT Meeting  6-6-17  [address] Physician-  Ofe Villanueva                        CLINICAL STATUS-  GIP for endometrial cancer    SYMPTOMS- uncontrolled pain/pain management    SIGNS- R leg pain/generalized pain, confusion, delirium, unable to ambulate, difficulty gathering thoughts at times. Patient has PCA/CADD Morphine via dbl lumen PICC at 4mg/hr; 2mg every 10 mins with max of 6 doses/hr along with methadone 10mg every 8 hours    LAB VALUES: labs done at home to rule out dehydration vs UTI. Consider OT/PT for mechanics of movement    Progression to DEPENDENCE WITH ADLs (include time frame)- complete care, immobile, incontinent, bedbound    PRESSURE ULCERS- n/a but has 2+ edema in R leg/thigh and foot    DISEASE SPECIFIC- pain, immobility, confusion    FALL RISK: moderate    PROBLEM: uncontrolled pain    GOAL: pain control     INTERVENTIONS(INDIVIDUALIZED): medicate for patient comfort, fall prevention, prevention of pressure ulcers, provide spiritual, physical, and emotional support to both patient and family, reorient to where patient is, and care for basic needs/ ADLs as needed while at [facility].     Signed by: Crystal Hobbs RN

## 2017-06-06 NOTE — PROGRESS NOTES
1900 - Bedside and Verbal shift change report given to Debbie [address]ell Street (oncoming nurse) by Juliano Min (offgoing nurse). Report included the following information SBAR, Kardex and MAR.     0700 - Bedside and Verbal shift change report given to Juliano Min (oncoming nurse) by Sagar Montalvo RN (offgoing nurse). Report included the following information SBAR, Kardex and MAR. NAME OF PATIENT:  Lear Salts    LEVEL OF CARE:  GIP    REASON FOR GIP:   Pain, despite numerous changes in medications, Medication adjustment that must be monitored 24/7 and Stabilizing treatment that cannot take place at home    *PATIENT REMAINS ELIGIBLE FOR GIP LEVEL OF CARE AS EVIDENCED BY: Scheduled Methadone and Morphine PCA with a continuous rate. O2 SAFETY:  Concentrator positioning (6\" from furniture/drapes), No petroleum based products on face while oxygen in use and Oxygen sign on the door    FALL INTERVENTIONS PROVIDED:   Implemented/recommended environmental changes (remove hazards, lower bed, improve lighting, etc.)    INTERDISPLINARY COMMUNICATION/COLLABORATION:  Physician, MSW, [place] and RN, CNA    NEW MEDICATION INITIATION DOCUMENTATION:  NO NEW MEDICATION INITIATED    Reason medication is being initiated:  N/A    MD / Provider name consulted re: change in status / initiation of new medication:  N/A    New Symptom(s):  N/A    New Order(s):  N/A    Name of the person notified of the changes:  N/A    Name of person being taught:  N/A    Instructions given:  N/A    Side Effects taught:  N/A    Response to teaching:  N/A      COMFORTABLE DYING MEASURE:  Is Patient/family satisfied with symptom level?  yes    DISCHARGE PLAN:  Probable end-of-life care will be provided by [facility] staff.

## 2017-06-06 NOTE — PROGRESS NOTES
Verbal shift change report given to Joel Horton RN  by Baron Jericho RN. Report included the following information SBAR, Kardex, Intake/Output and MAR. NAME OF PATIENT: Jyoti Randall     LEVEL OF CARE: GIP    REASON FOR RESPITE:  Patient not in Respite care at this time.       REASON FOR GIP:   Pain, despite numerous changes in medications, Medication adjustment that must be monitored 24/7 and Stabilizing treatment that cannot take place at home     PATIENT REMAINS ELIGIBLE FOR GIP LEVEL OF CARE AS EVIDENCED BY:   GIP level of care due to changing medication requirements, adjustments, additions of opioid pain mediations for relief and adjuvant therapy medications as tolerated, and continuous monitoring which was not accomplished at home. REASON FOR RESPITE: Patient is not in Respite care at this time.     O2 SAFETY:  Patient is on room air at this time.       FALL INTERVENTIONS PROVIDED:   Implemented/recommended use of non-skid footwear, Implemented/recommended use of fall risk identification flag to all team members, Implemented/recommended assistive devices and encouraged their use, Implemented/recommended resources for alarm system (personal alarm, bed alarm, call bell, etc.) , Implemented/recommended environmental changes (remove hazards, lower bed, improve lighting, etc.) and Implemented/recommended increased supervision/assistance    INTERDISPLINARY COMMUNICATION/COLLABORATION:   Physician, MSW, Leta and RN, CNA    NEW MEDICATION INITIATION DOCUMENTATION:  No new medications nor interventions begun on this night shift.       Reason medication is being initiated:  N/A    MD / Provider name consulted re:  Change in status/initiation of new medication:  N/A    New Symptom(s):  N/A    New Order(s):  N/A    Name of the person notified of the changes:  N/A    Name of the person being taught:  N/A    Instructions given:  N/A    Side Effects taught:  N/A    Response to teaching: N/A    COMFORTABLE DYING MEASURE:  Continue to monitor for pain, dyspnea, agitation, and restlessness and intervene accordingly. Is Patient/Family satisfied with symptom level? Yes    DISCHARGE PLAN:   Patient to be discharged home in her mother, Libra's care under the continuing care of [facility] once symptoms can be managed effectively at home.

## 2017-06-06 NOTE — [facility] IDG CHAPLAIN NOTES
Patient: Valdo Ewing    Date: 06/06/17  Time: 3:27 PM    SPIRITUAL ISSUES:  I visited the room of this pt who arrived back at the [facility] yesterday and is on GIP Status. She was in bed, semi-awake and not fully alert, not agitated, not restless, and appeared well medicated and comfortable. She was lying on her back and appeared comfortable. She was talking on the telephone to her mother, but put the phone down when I entered. I confirmed with her the Rosana had visited her yesterday and I confirmed that I would be visiting as well, if she so desired. She was agreeable to periodic visits. The pt thanked me for visiting and my offer of support. Her spirits were moderate and her attitude somewhat stoic as she did not wish to come back to the [facility]  I learned in earlier visits that she and her family attend [facility] on the [facility]. GOALS:  Continue to visit this pt and her family while she is at the [facility] and I am in the facility, to provide pastoral care and spiritual support to assist both the pt and them with coping with this difficult medical situation and decline. Coordinate w/ Chaplain Rankin as she assumes primary care for this patient  Coordinate with Chaplain Foster if/when the pt returns to her mothers home. Visit this pt and her family, while she is @ [facility] and I am in this facility, or PRN as requested. Coordinate with Care Team on POC.   Signed by: Igor Gil

## 2017-06-07 NOTE — PROGRESS NOTES
0700 Report received from Jessica Tompkins, [facility]. Pt is GIP for pain management. 0800 [place] from  in to ask meal preference and states the pt unable to make decisions and states she is seeing other people in the room. 0900 Pt awakened stating she has snakes in her room. Recognized this nurse and calls me by name. Reassured there are no snakes in the room. Pt then states,\"Am I hallucinating again, when will it stop\"  1000 Ate 1/2 of breakfast, must be fed. Alert to self and surroundings now. Colleen Alvares NP in to see. Pt tells her her pain is 7 of 10. Dilaudid 1mg given IVP for pain. Fed ice chips. 1030 Pt appears to be sleeping. Eyes closed. No facial grimace. 1100 Awake, oriented at this time. Able to have conversation and she is laughing. 1256 Pt states that she has had a BM. Medicated with Dilaudid 1mg IVP prior to repositioning pt. Cleaned up of a large soft BM. Pt was able to turn to her right side with much pain but tolerated fairly. 1320 Pt medicated with Dilaudid 1mg IVP to move pt to her left side to complete bed change. Pt was unable to tolerate turning to left side. She screamed and cried. Able to remove soiled sheets from the left side and replace . 1400 Dilaudid 1mg given post procedure to manage pain and Ativan 1mg po to for anxiety. Gabapentin discontinued today. 1430 Pt appears to be asleep. Eyes closed,no facial grimace noted. [facility] with Black De Jesus NP re. Change in Morphine PCA to Dilaudid PCA. Informed her that a hard script is needed for fax to [employer] [employer]. She states she will maintain Morphine PCA at 2mg/ hr Basal rate and change medication tomorrow. 1800 Pt remains asleep. Has refused dinner. States her family/friends will bring dinner tonight.               NAME OF PATIENT:  Angeles Elliott    LEVEL OF CARE:  GIP    REASON FOR GIP:   Pain, despite numerous changes in medications, Medication adjustment that must be monitored 24/7 and Stabilizing treatment that cannot take place at home    *PATIENT REMAINS ELIGIBLE FOR GIP LEVEL OF CARE AS EVIDENCED BY: (MUST BE ADDRESSED OF PATIENT GIP) Frequent assessment and med changes required to manage symptoms      REASON FOR RESPITE:  na    O2 SAFETY:  Concentrator positioning (6\" from furniture/drapes), Tanks stored in rodríguez , No petroleum based products on face while oxygen in use and Oxygen sign on the door    FALL INTERVENTIONS PROVIDED:   Implemented/recommended use of fall risk identification flag to all team members, Implemented/recommended resources for alarm system (personal alarm, bed alarm, call bell, etc.) , Implemented/recommended environmental changes (remove hazards, lower bed, improve lighting, etc.) and Implemented/recommended increased supervision/assistance    INTERDISPLINARY COMMUNICATION/COLLABORATION:  Physician, MSW, L[place] and RN, CNA    NEW MEDICATION INITIATION DOCUMENTATION:  Documentation completed in Clinical Note in [facility]    Reason medication is being initiated:  Changes made to manage confusion,hallucinations    MD / Provider name consulted re: change in status / initiation of new medication:  Dr Ulisses Rain Symptom(s):  Confusion, hallucinations    New Order(s):   Gabapentin discontinued today.       Name of the person notified of the changes: The pt     Name of person being taught:  The pt    Instructions given:  Purpose for med changes to manage symptoms    Side Effects taught:  none    Response to teaching:  Pt in agreement and voices understanding of changes    COMFORTABLE DYING MEASURE:  Is Patient/family satisfied with symptom level?   Yes    DISCHARGE PLAN:  Discharge to home when symptoms are managed

## 2017-06-07 NOTE — PROGRESS NOTES
[address] [facility] Help to Those in Need  [phone]    Patient Name: Roxana Dawn  YOB: 1980    Date of Provider Hospice Visit: 06/07/17    Level of Care:   [x] General Inpatient (GIP)    [] Routine   [] Respite    Location of Care:  [] [facility] [place] [] [facility] [] [address] [] [facility] [x] [facility]  [] Home [] Other:      Date of Original Hospice Admission: 5-16-17  Hospice Medical Director for Inpatient Care: Dr. Kwadwo Armendariz Diagnosis:  Metastatic Endometrial Adenocarcinoma       HOSPICE DIAGNOSES   Active Symptoms:  1. Generalised pain, especially right lower extremity, lower back, especially with any kind of movement, edema to the right thigh  2. Delirium; new, confusion, fluctuating, some hallucinations  3. Shortness of breath with low O2 sats  4. Anxiety/depression; persists  5. Insomnia   6. Fatigue/weakness/lethargy  7. Constipation     PLAN   1. Continue GIP level of care due to changing medication requirements, adjustments, additions of opioid pain mediations for relief and adjuvant therapy medications as tolerated, and continuous monitoring while pt in delirious state  2. IVF for hydration;Dehydration possibly causing delirium, use D5 1/2 NS increase to 125ml/hr  x 2 litre. 3. D/C keflex; do not see need for AB, no signs of infection in skin or UTI. 4. Continue ativan to four times daily as needed for anxiety  5. disContinue morphine PCA, changed to dilaudid 1mg hr continuous, continue dilaudid 1mg IV q15mts as needed for incidental pain,has had 3 doses today, before ADL care. 6. Will decrease gabapentin to 300 q hs, as it may be causing confusion, continue clinoril, Cymbalta for adjuvant therapy for bone pain  7. Continue methadone at current levels 10mg q 8 hrs  8. Pt is now laying supine, do not place in prone position  9.  Pt will need PT/OT evaluation to help determine a technique for transfer of position in bed, will order, also family and home staff along with [facility] staff need to learn repositioning as per PT/OT while she is in the [facility]  10. Weekly care conference with team  11. Family meeting with mother and care team at [facility] on Friday June 9 at 10.30am.   15.  and SW to support family needs  15. Disposition: home when symptoms are stable    Prognosis estimated based on 06/07/17 clinical assessment is:   [] Few to Many Hours  [] Hours to Days   [] Few to Many Days   [] Days to Weeks   [x] Few to Many Weeks   [] Weeks to Months   [] Few to Many Months    Communicated plan of care with: Hospice Case Manager; Hospice IDT; Care Team     GOALS OF CARE     Resuscitation Status: DNR  Durable DNR: [x] Yes [] No    Advance Care Planning 5/10/2017   Patient's Healthcare Decision Maker is: Legal Next of Andrea 69   Primary Decision Maker Name Amelia Hernandez   Primary Decision Maker Phone Number -   Primary Decision Maker Relationship to Patient Parent   Confirm Advance Directive Yes, on file        HISTORY     History obtained from: chart, staff, pt, family    CHIEF COMPLAINT: i have pain  The patient is:   [x] Verbal  [] Nonverbal  [] Unresponsive    HPI/SUBJECTIVE:  Pt has been lethargic and somewhat delirious   Pt for the first time laying on back.  She is very happy with this  Appetite fair; many snack foods   Not drinking enough water/fluids but take pepsi/sodas and ice chips  C/o pain 7/10  Pt also somewhat dehydrated; low BP, HR little up, dark concentrated urine  Pt's O2 sats are also low: 88% without O2.       REVIEW OF SYSTEMS     The following systems were: [x] reviewed  [] unable to be reviewed    Positive ROS include:  Constitutional: fatigue, weakness  Ears/nose/mouth/throat:   Respiratory:shortness of breath  Gastrointestinal:  Musculoskeletal:pain in legs, joints, swelling legs  Neurologic: numbness and burning pain  Psychiatric:anxiety, depression  Endocrine:          FUNCTIONAL ASSESSMENT     Palliative Performance Scale (PPS): 30% PSYCHOSOCIAL/SPIRITUAL ASSESSMENT     Active Problems:    * No active hospital problems. *    Past Medical History:   Diagnosis Date    Cancer [facility])     uterine    CVI (common variable immunodeficiency) (HCC)     Diabetes ([facility]     Elevated liver function tests 10/21/2010    Endometrial cancer ([facility] 7/7/2010    Hypertension     Iron deficiency anemia     Menometrorrhagia 10/21/2010    Microcytic anemia 10/21/2010    Morbid obesity ([facility]     Prediabetes 7/7/2010    Psychiatric disorder     depression    Radiation     completed radiation mid-march      Past Surgical History:   Procedure Laterality Date    CARDIAC SURG PROCEDURE UNLIST      hx of tachycardia    HX GYN      hysterectomy      Social History   Substance Use Topics    Smoking status: Never Smoker    Smokeless tobacco: Never Used    Alcohol use Yes     Family History   Problem Relation Age of Onset    Hypertension Mother     Hypertension Father     Stroke Maternal Grandfather     Cancer Paternal Grandmother      breast    Diabetes Paternal Grandmother       Allergies   Allergen Reactions    Egg Nausea and Vomiting     Raw egg and scrambled eggs. Egg products okay.      Pcn [Penicillins] Nausea and Vomiting     \"but could take amoxil\"    Shellfish Containing Products Unknown (comments)    Tetanus And Diphtheria Toxoids, Adsorbed, Adult Other (comments)     Developed local swelling, axillary pain, and transient fever    Tomato Unknown (comments)      Current Facility-Administered Medications   Medication Dose Route Frequency    LORazepam (ATIVAN) tablet 1 mg  1 mg Oral TID PRN    dextrose 5% and 0.9% NaCl infusion  75 mL/hr IntraVENous CONTINUOUS    dextrose 5 % - 0.45% NaCl infusion  75 mL/hr IntraVENous CONTINUOUS    metoprolol tartrate (LOPRESSOR) tablet 25 mg  25 mg Oral BID    HYDROmorphone (PF) (DILAUDID) injection 1 mg  1 mg IntraVENous Q15MIN PRN    methadone (DOLOPHINE) tablet 10 mg  10 mg Oral Q8H    polyethylene glycol (MIRALAX) packet 17 g  17 g Oral DAILY    magic mouthwash cpd (without sucralfate)  5 mL Oral QID PRN    gabapentin (NEURONTIN) capsule 600 mg  600 mg Oral QID    docusate sodium (COLACE) capsule 100 mg  100 mg Oral DAILY    DULoxetine (CYMBALTA) capsule 60 mg  60 mg Oral QHS    sulindac (CLINORIL) tablet 200 mg  200 mg Oral BID WITH MEALS    morphine (PF)  mg/30 ml   IntraVENous CONTINUOUS        PHYSICAL EXAM     Wt Readings from Last 3 Encounters:   05/30/17 121.1 kg (267 lb)   05/10/17 121.4 kg (267 lb 10.2 oz)   02/16/17 136.1 kg (300 lb)       Visit Vitals    /74 (BP Patient Position: At rest)    Pulse 96    Temp 97.9 °F (36.6 °C)    Resp 16    SpO2 96%         Supplemental O2  [x] Yes  [] NO  Last bowel movement:     Currently this patient has:  [] Peripheral IV [x] PICC  [] PORT [] ICD    [x] Davila Catheter [] NG Tube   [] PEG Tube    [] Rectal Tube [] Drain  [] Other:     Constitutional: lethargic, pale, appears somewhat confused; slow in responses, wake, seems alert attentive still having difficulty finding words. Pt laying on her back   Eyes: pallor++  ENMT: dry oral mucosa  Cardiovascular: distant heart sounds, tachycardic  Respiratory:  Normal breathing, diminished air entry  Gastrointestinal:  distended and firm, non tender, BS +, pt has a davila's, due to heavy build and swollen right thigh/hip area due to fracture, pt being in prone position before pt has some slight skin irritation in groin folds. Musculoskeletal:edema ++ lower extremities right leg>left leg  Skin: warm, dry, some skin irritation in the groin folds  Neurologic: lethargic, delirious  Psychiatric: fluctuating mood; tearful and anxious  Other: davila's: urine less dark concentrated      Pertinent Lab and or Imaging Tests: Total time: 45mts  Counseling / coordination time: 30mts spent in discussing and care coordination with pt,  Ms. Elli Amse and RN Ms. Emma Kimball  > 50% counseling / coordination?: y      This patient meets Hospice General Inpatient (GIP) Level of Care.       Supporting documentation for GIP need for pain control:  [x] Frequent evaluation by a doctor, nurse practitioner, nurse   [x] Frequent medication adjustment    [x] IVs that cannot be administered at home   [x] Aggressive pain management   [] Complicated technical delivery of medications              Supporting documentation for GIP need for symptom control:  [x]  Sudden decline necessitating intensive nursing intervention  []  Uncontrolled / intractable nausea or vomiting   [x]  Pathological fractures  []  Advanced open wounds requiring frequent skilled care  [] Unmanageable respiratory distress  [x] New or worsening delirium   [] Delirium with behavior issues  [] Imminent death  with skilled nursing needs documented above  Kerwin Du NP

## 2017-06-07 NOTE — PROGRESS NOTES
1900 - Bedside and Verbal shift change report given to [facility] (oncoming nurse) by Payton Carolina (offgoing nurse). Report included the following information SBAR, Kardex and MAR.     0700 - Bedside and Verbal shift change report given to [facility] (oncoming nurse) by Hailey Brewster RN (offgoing nurse). Report included the following information SBAR, Kardex and MAR. NAME OF PATIENT:  Ashley Randall    LEVEL OF CARE:  GIP    REASON FOR GIP:   Pain, despite numerous changes in medications, Medication adjustment that must be monitored 24/7 and Stabilizing treatment that cannot take place at home    *PATIENT REMAINS ELIGIBLE FOR GIP LEVEL OF CARE AS EVIDENCED BY: PRN administration of Dilaudid for pain greater than 6 out of 10 and pt still remains to complain of no pain relief. REASON FOR RESPITE:  N/A    O2 SAFETY:  Concentrator positioning (6\" from furniture/drapes), No petroleum based products on face while oxygen in use and Oxygen sign on the door    FALL INTERVENTIONS PROVIDED:   Implemented/recommended resources for alarm system (personal alarm, bed alarm, call bell, etc.)  and Implemented/recommended environmental changes (remove hazards, lower bed, improve lighting, etc.)    INTERDISPLINARY COMMUNICATION/COLLABORATION:  Physician, MSW, Leta and RN, CNA    NEW MEDICATION INITIATION DOCUMENTATION:  NO NEW MEDICATION    Reason medication is being initiated:  N/A    MD / Provider name consulted re: change in status / initiation of new medication:  N/A    New Symptom(s):  N/A    New Order(s):  N/A    Name of the person notified of the changes:  N/A    Name of person being taught:  N/A    Instructions given:  N/A    Side Effects taught:  N/A    Response to teaching:  N/A      COMFORTABLE DYING MEASURE:  Is Patient/family satisfied with symptom level?  yes    DISCHARGE PLAN:  Will remain at [facility] for pain management.

## 2017-06-07 NOTE — HOSPICE
[facility]  Case Communication Note:  At [facility] on 6/6, the [facility] group decided to have a family meeting with the Shaye Casillas family to discuss what is possible, realistic, and likely to occur with the care and treatment of Tony Crawford and her family. The meeting is scheduled for 10:30 Friday morning at [facility] and I suggested that I invite Colleen Espinoza as she has such a strong relationship with the patient and the family. Dr Flora Zamora agreed wholeheartedly. PLAN:  I e-mailed [phone] and invited her to the meeting. I have not yet heard whether her schedule will allow her to attend.   Marco Hathaway, [facility], [facility]  Hospice Chaplain

## 2017-06-08 NOTE — PROGRESS NOTES
[facility]  [phone]    Patient Name: Palma Diaz  YOB: 1980    Date of Provider Hospice Visit: 06/08/17    Level of Care:   [x] General Inpatient (GIP)    [] Routine   [] Respite    Location of Care:  [] [facility] [] [facility] [] [facility] [] [facility] [x] [facility]  [] Home [] Other:      Date of Original Hospice Admission: 5-16-17  Hospice Medical Director for Inpatient Care: Dr. Susan Anguiano Diagnosis:  Metastatic Endometrial Adenocarcinoma       HOSPICE DIAGNOSES   Active Symptoms:  1. Generalised pain, especially right lower extremity, lower back, especially with any kind of movement, edema to the right thigh: persists  2. Delirium; better today, more alert and lucid, no hallucinations  3. Shortness of breath with low O2 sats: improved  4. Anxiety/depression; persists  5. Insomnia   6. Fatigue/weakness/lethargy  7. Constipation     PLAN   1. Continue GIP level of care due to changing medication requirements, adjustments, additions of opioid pain mediations for relief and adjuvant therapy medications as tolerated, and continuous monitoring while pt in delirious state  2. IVF for hydration;Dehydration possibly causing delirium, use D5 1/2 NS increase to 125ml/hr  x 2 litre. 3. Continue ativan to four times daily as needed for anxiety  4. Change PCA to dilaudid 1mg / hr continuous, after loading dose of 2mg IV, add PCA dose of 0.5mg every 10 mts with 4 hour maximum of 16mg  5. Continue nurse given dilaudid of 2mg IV every 15mts as needed for severe pain. 6. Continue gabapentin 300mg at bedtime  7. Continue clinoril, Cymbalta for adjuvant therapy for bone pain  8. Continue methadone at current levels 10mg every 8 hrs  9. Pt is now laying supine, do not place in prone position  10.  Pt will need PT/OT evaluation to help determine a technique for transfer of position in bed, will order, also family and home staff along with [facility] staff need to learn repositioning as per PT/OT while she is in the [facility]  11. Weekly care conference with team  12. Family meeting with mother and care team at [facility] on Friday June 9 at 10.30am.   15.  and SW to support family needs  15. Disposition: home when symptoms are stable    Prognosis estimated based on 06/08/17 clinical assessment is:   [] Few to Many Hours  [] Hours to Days   [] Few to Many Days   [] Days to Weeks   [x] Few to Many Weeks   [] Weeks to Months   [] Few to Many Months    Communicated plan of care with: Hospice Case Manager; Hospice IDT; Care Team     GOALS OF CARE     Resuscitation Status: DNR  Durable DNR: [x] Yes [] No    Advance Care Planning 5/10/2017   Patient's Healthcare Decision Maker is: Legal Next of Andrea 69   Primary Decision Maker Name Albino Dumont   Primary Decision Maker Phone Number -   Primary Decision Maker Relationship to Patient Parent   Confirm Advance Directive Yes, on file        HISTORY     History obtained from: chart, staff, pt    CHIEF COMPLAINT: i am OK  The patient is:   [x] Verbal  [] Nonverbal  [] Unresponsive    HPI/SUBJECTIVE:  Pt seems to be slightly better today. more awake and alert, responding to questions appropriately, still little drowsy but trying to stay awake and eating breakfast, needing assistance with feeding. Pain still an issue; got 8 doses of Iv dilaudid 2mg each in past 24 hrs, in addition to methadone on schedule and morphine PCA basal of 2mg/hour.       REVIEW OF SYSTEMS     The following systems were: [x] reviewed  [] unable to be reviewed    Positive ROS include:  Constitutional: fatigue, weakness  Ears/nose/mouth/throat:   Respiratory:shortness of breath  Gastrointestinal:  Musculoskeletal:pain in legs, joints, swelling legs  Neurologic: numbness and burning pain  Psychiatric:anxiety, depression  Endocrine:          FUNCTIONAL ASSESSMENT     Palliative Performance Scale (PPS): 30%     PSYCHOSOCIAL/SPIRITUAL ASSESSMENT     Active Problems: * No active hospital problems. *    Past Medical History:   Diagnosis Date    Cancer [facility])     uterine    CVI (common variable immunodeficiency) (HCC)     Diabetes ([facility]     Elevated liver function tests 10/21/2010    Endometrial cancer ([facility] 7/7/2010    Hypertension     Iron deficiency anemia     Menometrorrhagia 10/21/2010    Microcytic anemia 10/21/2010    Morbid obesity ([facility]     Prediabetes 7/7/2010    Psychiatric disorder     depression    Radiation     completed radiation mid-march      Past Surgical History:   Procedure Laterality Date    CARDIAC SURG PROCEDURE UNLIST      hx of tachycardia    HX GYN      hysterectomy      Social History   Substance Use Topics    Smoking status: Never Smoker    Smokeless tobacco: Never Used    Alcohol use Yes     Family History   Problem Relation Age of Onset    Hypertension Mother     Hypertension Father     Stroke Maternal Grandfather     Cancer Paternal Grandmother      breast    Diabetes Paternal Grandmother       Allergies   Allergen Reactions    Egg Nausea and Vomiting     Raw egg and scrambled eggs. Egg products okay.      Pcn [Penicillins] Nausea and Vomiting     \"but could take amoxil\"    Shellfish Containing Products Unknown (comments)    Tetanus And Diphtheria Toxoids, Adsorbed, Adult Other (comments)     Developed local swelling, axillary pain, and transient fever    Tomato Unknown (comments)      Current Facility-Administered Medications   Medication Dose Route Frequency    HYDROmorphone (PF) 15 mg/30 ml (DILAUDID) PCA   IntraVENous CONTINUOUS    gabapentin (NEURONTIN) capsule 300 mg  300 mg Oral QHS    LORazepam (ATIVAN) tablet 1 mg  1 mg Oral TID PRN    dextrose 5 % - 0.45% NaCl infusion  125 mL/hr IntraVENous CONTINUOUS    metoprolol tartrate (LOPRESSOR) tablet 25 mg  25 mg Oral BID    HYDROmorphone (PF) (DILAUDID) injection 1 mg  1 mg IntraVENous Q15MIN PRN    methadone (DOLOPHINE) tablet 10 mg  10 mg Oral Q8H    polyethylene glycol (MIRALAX) packet 17 g  17 g Oral DAILY    magic mouthwash cpd (without sucralfate)  5 mL Oral QID PRN    docusate sodium (COLACE) capsule 100 mg  100 mg Oral DAILY    DULoxetine (CYMBALTA) capsule 60 mg  60 mg Oral QHS    sulindac (CLINORIL) tablet 200 mg  200 mg Oral BID WITH MEALS        PHYSICAL EXAM     Wt Readings from Last 3 Encounters:   05/30/17 121.1 kg (267 lb)   05/10/17 121.4 kg (267 lb 10.2 oz)   02/16/17 136.1 kg (300 lb)       Visit Vitals    /72 (BP 1 Location: Left arm, BP Patient Position: At rest)    Pulse (!) 116    Temp 98.2 °F (36.8 °C)    Resp 20    SpO2 92%         Supplemental O2  [x] Yes  [] NO  Last bowel movement:     Currently this patient has:  [] Peripheral IV [x] PICC  [] PORT [] ICD    [x] Davila Catheter [] NG Tube   [] PEG Tube    [] Rectal Tube [] Drain  [] Other:     Constitutional: pale,  laying on her back, awake but intermittently drowsy, not confused, no hallucinations  Eyes: pallor++  ENMT: moist oral mucosa  Cardiovascular: distant heart sounds, tachycardic  Respiratory:  Normal breathing, diminished air entry  Gastrointestinal:  distended and firm, non tender, BS +, pt has a davila's, due to heavy build and swollen right thigh/hip area due to fracture, pt being in prone position before pt has some slight skin irritation in groin folds. Musculoskeletal:edema ++ lower extremities right leg>left leg  Skin: warm, dry, some skin irritation in the groin folds  Neurologic: awake, alert, lucid  Psychiatric: fluctuating mood, calm affect at this time  Other: davila's: urine clearer      Pertinent Lab and or Imaging Tests: Total time: 35mts  Counseling / coordination time: 15mts spent discussing with staff  > 50% counseling / coordination?:       This patient meets Hospice General Inpatient (GIP) Level of Care.       Supporting documentation for GIP need for pain control:  [x] Frequent evaluation by a doctor, nurse practitioner, nurse   [x] Frequent medication adjustment    [x] IVs that cannot be administered at home   [x] Aggressive pain management   [] Complicated technical delivery of medications              Supporting documentation for GIP need for symptom control:  [x]  Sudden decline necessitating intensive nursing intervention  []  Uncontrolled / intractable nausea or vomiting   [x]  Pathological fractures  []  Advanced open wounds requiring frequent skilled care  [] Unmanageable respiratory distress  [x] New or worsening delirium   [] Delirium with behavior issues  [] Imminent death  with skilled nursing needs documented above  Emani Strange MD

## 2017-06-08 NOTE — PROGRESS NOTES
0700 Report received from Felicia Larsen here to write hard script for dilaudid. Morphine to remain in place until dilaudid arrives from HCP. Morphine infusing per order and fluids infusing per orders. 8686 Patient given her scheduled medications. Patient reporting generalized pain of 9/10, PRN dose of dilaudid administered. 7771 Patient asleep. 6274 Prescription faxed to HCP by Chris Ojeda RN. Confirmation of successful delivery received. 1045 Pt asleep. 1130 Per Charlena Libman, CNA, patient reporting pain and crying. Patient found asleep, but easily aroused. Patient reporting generalized pain of 7/10. PRN dose of dilaudid administered. Patient confused. 1330 Patient asleep. 1400 Pt given her scheduled medication. 1600 Patient's family friend at the bedside visiting. 1750 Patient given her scheduled medication. 1810 Patient in bed watching television. ; Call put into HCP to follow up on dilaudid delivery who reported that they had not seen the fax, but that she would follow up on this. [address] to Harshad Luke, pharmacist, who reported that she found the fax but that the medication would have to be delivered tomorrow. Dr. Emma Larsen made aware.          NAME OF PATIENT:  Prema Randall    LEVEL OF CARE:  GIP    REASON FOR GIP:   Pain, despite numerous changes in medications    *PATIENT REMAINS ELIGIBLE FOR GIP LEVEL OF CARE AS EVIDENCED BY: Adjustments in medications that cannot take place in the home    REASON FOR RESPITE:  NA    O2 SAFETY:  NA    FALL INTERVENTIONS PROVIDED:   Implemented/recommended resources for alarm system (personal alarm, bed alarm, call bell, etc.)  and Implemented/recommended environmental changes (remove hazards, lower bed, improve lighting, etc.)    INTERDISPLINARY COMMUNICATION/COLLABORATION:  Physician, MSW, [place] and RN, CNA    NEW MEDICATION INITIATION DOCUMENTATION:  Documentation completed in Clinical Note in [address] [facility]    Reason medication is being initiated: pain    MD / Provider name consulted re: change in status / initiation of new medication:  Dr. Pedro Valdez Symptom(s):  Pain     New Order(s):  Dilaudid 1 mg basal, 0.5 mg every 10 min PRN    Name of the person notified of the changes:  patient    Name of person being taught:  Natali Escamilla    Instructions given:  Change from morphine to dilaudid     Side Effects taught:  Increased drowsiness    Response to teaching:  Understanding verbalized      COMFORTABLE DYING MEASURE:  Is Patient/family satisfied with symptom level?  no    DISCHARGE PLAN:  Patient to discharge home to the care of her family once symptoms managed.

## 2017-06-09 NOTE — HOSPICE
NAME OF PATIENT:  Riki Randall    LEVEL OF CARE:  GIP    REASON FOR GIP:   Pain, despite numerous changes in medications, Medication adjustment that must be monitored 24/7 and Stabilizing treatment that cannot take place at home    *PATIENT REMAINS ELIGIBLE FOR [facility] LEVEL OF CARE AS EVIDENCED BY: (MUST BE ADDRESSED OF PATIENT GIP) Pt has PCA Morphine with a basal rate,and is receiving prn doses also for generalized pain. PCA orders were changed today to Dilaudid and this will be started tomorrow. REASON FOR RESPITE:      O2 SAFETY:  Concentrator positioning (6\" from furniture/drapes), Tanks stored in rodríguez , No petroleum based products on face while oxygen in use and Oxygen sign on the door    FALL INTERVENTIONS PROVIDED:   Implemented/recommended use of non-skid footwear, Implemented/recommended use of fall risk identification flag to all team members, Implemented/recommended assistive devices and encouraged their use, Implemented/recommended resources for alarm system (personal alarm, bed alarm, call bell, etc.)  and Implemented/recommended environmental changes (remove hazards, lower bed, improve lighting, etc.)    INTERDISPLINARY COMMUNICATION/COLLABORATION:  Physician, MSW, [place] and RN, CNA    NEW MEDICATION INITIATION DOCUMENTATION:      Reason medication is being initiated:      MD / Provider name consulted re: change in status / initiation of new medication:      New Symptom(s):      New Order(s):      Name of the person notified of the changes:      Name of person being taught:      Instructions given:      Side Effects taught:      Response to teachin [address] free  Is Patient/family satisfied with symptom level?  yes    DISCHARGE PLAN:  Family meeting tomorrow to discuss pans    19:20,report received,assumed care of pt who is GIP for pain,hospice dx is metastatic endometrial cancer. Pt is alert and occ. Confused. Pt states generalized pain is 7/10. PCA Morphine continues @ basal rate of 2mg. Dilaudid 1mg IVP prn dose given for pain management. PCA will change tomorrow to Dilaudid. Dr. Randy Andersen called for IV orders,we will DC IVF after the current bag is finished. Pt states that she does not want to be repositioned. Gross edema noted of thighs,R>L.  20:30,pt is sleeping. 22:35,medicated with Dilaudid 1mg IVP for generalized pain. Pt taking ice chips and po fluids well. Pt is watching TV. Pt has taken 02 off and is comfortable for now,will replace if needed. 23:30,asleep.  01:00,c/o generalized pain,medicated with Dilaudid,IVF d'cd per order. Attempt was made to reposition,but pt was unable to tolerate. 03:00,asleep.  03:30,awake,pain rated at 8/10,medicated with Dilaudid IVP and Ativan po for comfort. 04:30,pr sleeping. 06:00,pain is 6/10,medicated with Dilaudid 1mg IVP. Pt ate a slice of apple pie.

## 2017-06-09 NOTE — HOSPICE
[facility]  Care Coordination Note    Chaplain Kevon Pulliam came to the [facility] to visit this pt. She has become the primary  responsible for the spiritual care and support of this family. She confirmed that she would attedn the family meeting scheduled for 10:30 tomorrow morning and that I would not. I have forwarded this not to DR Merida and she agrees that Rosana is the appropriate person to be present. We coordinated on and and spiritual care appoeach on this difficult stiuation. PLAN:  Continue to provide supplemental support to this patient and family while she is @ [facility]. Continue to coordinate w/ Rosana as needed.   Janis Capps, [facility], [address]  [phone]

## 2017-06-09 NOTE — PROGRESS NOTES
I was informed of and invited to attend a family meeting for the Shaye Casillas family by Mrs. Shaye Casillas and the [facility]. Along with staff, Ms Shaye Casillas and Jyoti's sisters were in attendance. Family grieving but able to give voice to their grief. Provided support with rest of staff in the meeting. Following meeting Ms Brenda Olivarez asked me to provide support as she visited with [facility]. I provided support while allowing for periods of privacy as she and Jyoti's sister's visited with [facility]. [facility] was quiet but acknowledged her own grief. She gave her mother permission to share with the rest of the family in order that they might be aware of Jyoti's current medical situation. Mrs. Shaye Casillas is going to schedule a meeting with family and has asked if I will attend. I will attend to continue support to her and the family. All of the family expressed thanks for the support they are receiving from staff. Thanks for the opportunity to work with this family.      Taylor Holbrook., [address], [address] [place] [address]

## 2017-06-09 NOTE — PROGRESS NOTES
0700  Report received. 0725  Pt lying in bed, sleep. Pt aroused to verbal stimuli. Pt reports pain is a 5 or 6. Pt stated she is just waking up and is trying to decide what it is. Pt is located in her right hip and leg. Lungs diminished. No cough noted. O2 at 2lpm.  Jama is draining light phil urine. Last reported BM was 6-7. Skin is intact. Pt has +3 pitting edema in her lower ext. 7804  rn and CNA attempting to turn and reposition pt. Pt moaning. Pt medicated with Dilaudid. Pt tolerated movement with some grimacing and moaning. 56  Pt's Mother at the bedside. Rn educated on the skin condition of her bottom. Pt has stage 1 sores on her bottom in her crack. Calmoseptine applied. 1000  Family meeting planned with Family, Dr Rodrigo Fuller, Mariluz Johnson, CHELO BROWN and Adelaide Valverde and this Rn.     1010  Pt complained of pain. Pt reports pain is a 7 and located in her right leg and knee. Pt medicated with Dilaudid. 1100  Pt resting. No complaints at this time. 26  Pt's Mother at the bedside and visiting with [address] [place] [address]. No complaints at this time. 1425  Morphine PCA  Stopped . Res vol 424.6ml  Used 65.50mg.    1435  Dilaudid PCA started. 2 mg Loading dose given. Settings. Dilaudid 1mg/hr with 0.5 mg every 10 mins. PICC continues to have good blood return. 1600  Pt sleeping. No complaints at this time. 18  Pt visiting with a friend. No complaints at this time. 200  Pt was taken outside in her bed. Pt tolerated the outdoors without difficulty. Pt was smiling and laughing. Pt stated her pain was ok. 1900  Report given  Pt has her best friend at the bedside.           NAME OF PATIENT:  [place] Greta    LEVEL OF CARE:  GIP    REASON FOR GIP:   Pain, despite numerous changes in medications, Medication adjustment that must be monitored 24/7 and Stabilizing treatment that cannot take place at home    *PATIENT REMAINS ELIGIBLE FOR GIP LEVEL OF CARE AS EVIDENCED BY: (MUST BE ADDRESSED OF PATIENT GIP)    O2 SAFETY:O2 at 2lpm.  Oxygen sign on the door    FALL INTERVENTIONS PROVIDED:   Pt will remain at the [facility] until she passes away or until her family makes alternative living arrangements. Implemented/recommended resources for alarm system (personal alarm, bed alarm, call bell, etc.)  and Implemented/recommended increased supervision/assistance    INTERDISPLINARY COMMUNICATION/COLLABORATION:  Physician, MSW, [place] and RN, CNA    NEW MEDICATION INITIATION DOCUMENTATION:  Dilaudid  PCA 1mg/hr with 0.5mg every 10 min PRN. Obtained Order from Provider for initiation of symptom relief medication /other medication needed and Documentation completed in Clinical Note in [address]:  Is Patient/family satisfied with symptom level?  yes    DISCHARGE PLAN:  Pt will remain at the [facility] until she passes away or until her family makes alternative living arrangements.

## 2017-06-09 NOTE — PROGRESS NOTES
[facility] Help to Those in Need  [phone]    Patient Name: David Hernandes  YOB: 1980    Date of Provider Hospice Visit: 06/09/17    Level of Care:   [x] General Inpatient (GIP)    [] Routine   [] Respite    Location of Care:  [] [facility] [] [facility] [] [facility] [] [facility] [x] Hospice [facility]  [] Home [] Other:      Date of Original Hospice Admission: 5-16-17  Hospice Medical Director for Inpatient Care: Dr. Estefany Calles Diagnosis:  Metastatic Endometrial Adenocarcinoma       HOSPICE DIAGNOSES   Active Symptoms:  1. Generalised pain, especially right lower extremity, lower back, especially with any kind of movement, edema to the right thigh: persists, difficult to control  2. Delirium; still present, fluctuates, some hallucinations, overall slightly better  3. Shortness of breath with low O2 sats: improves with use of O2  4. Anxiety/depression; persists, significant   5. Insomnia   6. Fatigue/weakness/lethargy: worse  7. Constipation     PLAN   1. Continue GIP level of care due to changing medication requirements, adjustments, additions of opioid pain mediations for relief and adjuvant therapy medications as tolerated, and continuous monitoring while pt in delirious state  2. IVF for hydration;Dehydration possibly causing delirium: pt received 2 litre. 3. Continue ativan to four times daily as needed for anxiety  4. Continue PCA dilaudid 1mg / hr continuous, after loading dose of 2mg IV,  PCA dose of 0.5mg every 10 mts with 4 hour maximum of 16mg  5. Continue nurse given dilaudid of 2mg IV every 15mts as needed for severe pain. 6. Continue gabapentin 300mg at bedtime  7. Continue clinoril, Cymbalta for adjuvant therapy for bone pain  8. Continue methadone at current levels 10mg every 8 hrs  9. Pt is now laying supine, nurses helping to reposition and turn to side during cleaning/ADL care and to prevent skin breakdown.    10. Weekly care conference with team  11. Family meeting with mother and care team conducted today. Family including mom Lenny Lyn, 2 sisters Marcelino Dumont and Edilma Del Rosario, family friend and Kashmir Wilson, CHELO's [place] and Luzmaria Carreon, home JACQUELINE Montes, [facility] HERLINDA wren and myself; facilitated the meeting, provided a summary update on pt's current medical condition, symptoms, expected outcomes and prognosis, reviewed pain management, difficulties with pain control, challenges with personal care, increasing confusion and pain, suggestive of disease progression and possible involvement of brain along with effect of medications, general agreement that prognosis is poor and expected survival is limited. Family grieving and very tearful but understanding of situation. Discussed realistic goals and expectations and all agree that pt will likely be more weak and sedated as pain meds being adjusted and pain is under better control. Pt will be a very difficult case to manage at home safely and almost impractical as she is requiring 2-3 people assist to even move in bed and has significant incident pain with movements. Plan is for pt to stay at [facility] for pain and symptoms management and subsequently may need to stay here as well under routine care status. We also discussed PT/OT consult and agreed that it is probably not of much benefit at this stage and may cause more distress and pain. Will not pursue PT/OT consult. Family was encouraged and they (mom) will be talking to Ms. Montes (pt) letting her know about her disease prognosis and seek her permission to talk to rest of the family especially the nieces and nephews who Stoney Hernandezland has been very close to and actually taken care of one of the nieces as her own daughter. 15.  and SW to support family needs: family in need of ongoing psychosocial, emotional and spiritual support. Family meeting concluded with Kashmir Wilson leading all in an uplifting prayer  13.  Disposition: home or routine care status when symptoms are stable    Prognosis estimated based on 06/09/17 clinical assessment is:   [] Few to Many Hours  [] Hours to Days   [] Few to Many Days   [] Days to Weeks   [x] Few to Many Weeks   [] Weeks to Months   [] Few to Many Months    Communicated plan of care with: Hospice Case Manager; Hospice IDT; Care Team     GOALS OF CARE     Resuscitation Status: DNR  Durable DNR: [x] Yes [] No    Advance Care Planning 5/10/2017   Patient's Healthcare Decision Maker is: Legal Next of Andrea Velarde   Primary Decision Maker Name Santa Hyde   Primary Decision Maker Phone Number -   Primary Decision Maker Relationship to Patient Parent   Confirm Advance Directive Yes, on file        HISTORY     History obtained from: chart, staff, pt    CHIEF COMPLAINT: i am OK  The patient is:   [x] Verbal  [] Nonverbal  [] Unresponsive    HPI/SUBJECTIVE:  Pt appears weak and pale, asking for pain med as she is hurting. She was moved to her side this am for cleaning purposes. Has been receiving dilaudid IV prn doses regularly: 5 doses since midnight, 1 lorazepam dose. REVIEW OF SYSTEMS     The following systems were: [x] reviewed  [] unable to be reviewed    Positive ROS include:  Constitutional: fatigue, weakness  Ears/nose/mouth/throat:   Respiratory:shortness of breath  Gastrointestinal:  Musculoskeletal:pain in legs, joints, swelling legs  Neurologic: numbness and burning pain, confusion, hallucinations  Psychiatric:anxiety, depression  Endocrine:          FUNCTIONAL ASSESSMENT     Palliative Performance Scale (PPS): 30%     PSYCHOSOCIAL/SPIRITUAL ASSESSMENT     Active Problems:    * No active hospital problems.  *    Past Medical History:   Diagnosis Date    Cancer [facility])     uterine    [facility] [address]     Diabetes [facility] [address] [facility]     Elevated liver function tests 10/21/2010    Endometrial cancer ([facility] [address] 7/7/2010    Hypertension     Iron deficiency anemia     Menometrorrhagia 10/21/2010    Microcytic anemia 10/21/2010    Morbid obesity [facility] [address]     Prediabetes 7/7/2010    Psychiatric disorder     depression    Radiation     completed radiation mid-march      Past Surgical History:   Procedure Laterality Date    CARDIAC SURG PROCEDURE UNLIST      hx of tachycardia    HX GYN      hysterectomy      Social History   Substance Use Topics    Smoking status: Never Smoker    Smokeless tobacco: Never Used    Alcohol use Yes     Family History   Problem Relation Age of Onset    Hypertension Mother     Hypertension Father     Stroke Maternal Grandfather     Cancer Paternal Grandmother      breast    Diabetes Paternal Grandmother       Allergies   Allergen Reactions    Egg Nausea and Vomiting     Raw egg and scrambled eggs. Egg products okay.      Pcn [Penicillins] Nausea and Vomiting     \"but could take amoxil\"    Shellfish Containing Products Unknown (comments)    Tetanus And Diphtheria Toxoids, Adsorbed, Adult Other (comments)     Developed local swelling, axillary pain, and transient fever    Tomato Unknown (comments)      Current Facility-Administered Medications   Medication Dose Route Frequency    HYDROmorphone (PF) 15 mg/30 ml (DILAUDID) PCA   IntraVENous CONTINUOUS    gabapentin (NEURONTIN) capsule 300 mg  300 mg Oral QHS    LORazepam (ATIVAN) tablet 1 mg  1 mg Oral TID PRN    metoprolol tartrate (LOPRESSOR) tablet 25 mg  25 mg Oral BID    HYDROmorphone (PF) (DILAUDID) injection 1 mg  1 mg IntraVENous Q15MIN PRN    methadone (DOLOPHINE) tablet 10 mg  10 mg Oral Q8H    polyethylene glycol (MIRALAX) packet 17 g  17 g Oral DAILY    magic mouthwash cpd (without sucralfate)  5 mL Oral QID PRN    docusate sodium (COLACE) capsule 100 mg  100 mg Oral DAILY    DULoxetine (CYMBALTA) capsule 60 mg  60 mg Oral QHS    sulindac (CLINORIL) tablet 200 mg  200 mg Oral BID WITH MEALS        PHYSICAL EXAM     Wt Readings from Last 3 Encounters:   05/30/17 121.1 kg (267 lb)   05/10/17 121.4 kg (267 lb 10.2 oz)   02/16/17 136.1 kg (300 lb)       Visit Vitals    /60 (BP 1 Location: Left arm)    Pulse 81    Temp 98.2 °F (36.8 °C)    Resp 12    SpO2 99%         Supplemental O2  [x] Yes  [] NO  Last bowel movement:     Currently this patient has:  [] Peripheral IV [x] PICC  [] PORT [] ICD    [x] Davila Catheter [] NG Tube   [] PEG Tube    [] Rectal Tube [] Drain  [] Other:     Constitutional: pale, laying on her back, intermittently confused, in pain  Eyes: pallor++  ENMT: moist oral mucosa  Cardiovascular: distant heart sounds, tachycardic  Respiratory:  Normal breathing, diminished air entry  Gastrointestinal:  distended and firm, non tender, BS +, pt has a davila's, due to heavy build and swollen right thigh/hip area due to fracture, pt being in prone position before pt has some slight skin irritation in groin folds. Musculoskeletal:edema ++ lower extremities right leg>left leg  Skin: warm, dry, some skin irritation in the groin folds  Neurologic: awake, alert, intermittent confusion  Psychiatric: fluctuating mood, calm affect at this time  Other: davila's: urine clearer      Pertinent Lab and or Imaging Tests: Total time:120  mts  Counseling / coordination time: 80 mts spent in family meeting  > 50% counseling / coordination?: y      This patient meets Hospice General Inpatient (GIP) Level of Care.       Supporting documentation for GIP need for pain control:  [x] Frequent evaluation by a doctor, nurse practitioner, nurse   [x] Frequent medication adjustment    [x] IVs that cannot be administered at home   [x] Aggressive pain management   [] Complicated technical delivery of medications              Supporting documentation for GIP need for symptom control:  [x]  Sudden decline necessitating intensive nursing intervention  []  Uncontrolled / intractable nausea or vomiting   [x]  Pathological fractures  []  Advanced open wounds requiring frequent skilled care  [] Unmanageable respiratory distress  [x] New or worsening delirium   [] Delirium with behavior issues  [] Imminent death  with skilled nursing needs documented above  Marilin Raygoza MD

## 2017-06-10 NOTE — PROGRESS NOTES
NAME OF PATIENT:  Teresa Mcmillan    LEVEL OF CARE:  GIP3    REASON FOR GIP:   Pain, despite numerous changes in medications and Medication adjustment that must be monitored 24/7    *PATIENT REMAINS ELIGIBLE FOR [facility] LEVEL OF CARE AS EVIDENCED BY: (MUST BE ADDRESSED OF PATIENT GIP)      REASON FOR RESPITE:  GIP    O2 SAFETY:  Concentrator positioning (6\" from furniture/drapes), Tanks stored in [place]  and No petroleum based products on face while oxygen in use    FALL INTERVENTIONS PROVIDED:   Implemented/recommended assistive devices and encouraged their use, Implemented/recommended resources for alarm system (personal alarm, bed alarm, call bell, etc.)  and Implemented/recommended environmental changes (remove hazards, lower bed, improve lighting, etc.)    INTERDISPLINARY COMMUNICATION/COLLABORATION:  Physician, MSW, [place] and RN, CNA    NEW MEDICATION INITIATION DOCUMENTATION:  Documentation completed in Clinical Note in [facility]    Reason medication is being initiated:  N/A    MD / Provider name consulted re: change in status / initiation of new medication:  N/A    New Symptom(s):  N/A    New Order(s):  N/A    Name of the person notified of the changes:  N/A    Name of person being taught:  N/A    Instructions given:  N/A    Side Effects taught:  N/A    Response to teaching:  N/A      COMFORTABLE DYING MEASURE:  Is Patient/family satisfied with symptom level?  yes    DISCHARGE PLAN:  Family understands the plan of care and knows the dx is terminal. Family meetings have been held.      0700: Care assumed of patient after report from [address]. Patient is [facility] level of care for a dx of metastatic endometrial cancer with symptom of unmanageable pain. Patient is morbidly obese and difficult to care for due to pathological fractures, pain in right leg and generalized pain and as a result is unable to be turned per her request at times.  Patient has a dilaudid PCA infusing at 1mg continuous with 0.5 mg every 10 minutes for a max of 16mg every 4 hours. Patient rates pain as 6/10. Lungs are clear and diminished throughout and on 2L NC for comfort. Patient last BM was 6-9-17 per report. Patient has excoriated skin (folds, abdomen, and a possible stage 1 per report on gluteus loreta). Patient has a davila draining urine. PICC line placed upper R arm and dressing changed on 6-10-17. She is on bedrest and requires total care. Difficult to care for independently and requires multiple staff members turn, bathe, reposition, and provide basic needs for pain if she needs to be toileted. Patient has extensive family and visitors who come in daily. Patient tolerates a regular diet and eats fast food for every meal outside of the meals provided by [facility].     0900: No change in patient status. Tolerated PO clinoril with ease. Held stool softeners as pt is not constipated and held metoprolol due to hypotension. Patient in no distress. [id number]: Dr. Johana Fonseca at bedside to evaluate patient. No changes made to medications at this time. Patient in room and resting quietly. Visitor at bedside with patient. Patient does not want to be turned at this time. 1210: PRN dilaudid given at this time as pt states she was in pain. 7/10.     1450: Patient medicated with scheduled methadone at this time. Family remains in the room and pt is in no acute distress. 1600: New air mattress ordered for patient. Per Jonathon Bates, it will assist patient will turning and repositioning. 1655: Mattress delivered at this time and signed for by Rich Marie. Patient in room resting with eyes closed. Respirations even and unlabored. No distress noted. 1850: No change in patient status. Patient states that she is okay to not be turned as it was rough on her last PM. States she will let us know when she wants to be turned again.

## 2017-06-10 NOTE — HOSPICE
NAME OF PATIENT:  Dong Parker    LEVEL OF CARE:  GIP    REASON FOR GIP:   Pain, despite numerous changes in medications, Medication adjustment that must be monitored 24/7 and Stabilizing treatment that cannot take place at home    *PATIENT REMAINS ELIGIBLE FOR GIP LEVEL OF CARE AS EVIDENCED BY: (MUST BE ADDRESSED OF PATIENT GIP) Pt is receiving PCA Dilaudid with basal and PCA dosing      REASON FOR RESPITE:  N/A    O2 SAFETY:  Concentrator positioning (6\" from furniture/drapes), Tanks stored in [place] , No petroleum based products on face while oxygen in use and Oxygen sign on the door    FALL INTERVENTIONS PROVIDED:   Implemented/recommended use of non-skid footwear, Implemented/recommended use of fall risk identification flag to all team members, Implemented/recommended assistive devices and encouraged their use, Implemented/recommended resources for alarm system (personal alarm, bed alarm, call bell, etc.)  and Implemented/recommended environmental changes (remove hazards, lower bed, improve lighting, etc.)    INTERDISPLINARY COMMUNICATION/COLLABORATION:  Physician, MSW, [place] and RN, CNA    NEW MEDICATION INITIATION DOCUMENTATION:      Reason medication is being initiated:      MD / Provider name consulted re: change in status / initiation of new medication:      New Symptom(s):      New Order(s):      Name of the person notified of the changes:      Name of person being taught:      Instructions given:      Side Effects taught:      Response to teachin [address] free  Is Patient/family satisfied with symptom level?  yes    DISCHARGE PLAN:  Pt will possibly stay here @ [facility]    20:00,report received,assumed care of pt who is GIP for generalized pain,hospice dx is metastatic endometrial cancer. Pt rates her pain as 6/10. She has PCA Dilaudid with basal and PCA doses. She is refusing to allow me to reposition her because it is too painful.  Lower extremities remain very edematous and LLE is pronated. Davila is intact with phil urine. Pt has visitors @ bedside. 22:00,visitors remain @ bedside,pt appears comfortable. She is eating pizza. 23:00,visitors have left,pt is asleep.  01:00,remains asleep.  03:00,asleep.  04:30,awake,pain level is 6/10. Pt slightly moved and wedge pillow placed to rt side. She c/o pain with slightest movement. Pt watching TV and eating ice chips. 05:00,pt wants to move to left more,she does not want wedge pillow behind her. Pt repositioned . PICC line leaking? PICC redressed,no leaking noted at insertion site,cap replaced,dressing care done. Pt requested Ativan for rest,1mg po given. 05:30,davila drained 300cc,PCA cleared,total dose given since it was started yesterday is 24.55mg,doses given were 16,with 29 attempts. Pt requested Tylenol and it was ordered and given. Pt appears comfortable now.

## 2017-06-11 NOTE — PROGRESS NOTES
[facility]  [phone]    Patient Name: Angeles Elliott  YOB: 1980    Date of Provider Hospice Visit: 06/11/17    Level of Care:   [x] General Inpatient (GIP)    [] Routine   [] Respite    Location of Care:  [] [facility] [] [facility] [] [facility] [] [facility] [x] Hospice [facility]  [] Home [] Other:      Date of Original Hospice Admission: 5-16-17  Hospice Medical Director for Inpatient Care: Dr. Shiela Kenney Diagnosis:  Metastatic Endometrial Adenocarcinoma       HOSPICE DIAGNOSES   Active Symptoms:  1. Generalised pain, especially right lower extremity, lower back, especially with any kind of movement: overall better  2. Delirium;  fluctuates, some hallucinations, overall slightly better  3. Shortness of breath with low O2 sats: improves with use of O2  4. Anxiety/depression; overall better  5. Insomnia   6. Fatigue/weakness/lethargy: worse  7. Constipation     PLAN   1. Continue GIP level of care due to changing medication requirements, adjustments, additions of opioid pain mediations for relief and adjuvant therapy medications as tolerated, and continuous monitoring while pt in delirious state   2. Continue ativan to three times daily as needed for anxiety  3. Continue PCA dilaudid 1mg / hr continuous, PCA dose of 0.5mg every 10 mts. 4. Continue nurse given dilaudid of 2mg IV every 15mts as needed for severe pain. 5. Continue gabapentin 300mg at bedtime  6. Continue clinoril, Cymbalta for adjuvant therapy for bone pain  7. Continue methadone at current levels 10mg every 8 hrs  8. Pt is now laying supine, nurses helping to reposition and turn to side during cleaning/ADL care and to prevent skin breakdown. 9. Weekly care conference with team  10.  and SW to support family needs: family in need of ongoing psychosocial, emotional and spiritual support.    11. Disposition: home or routine care status when symptoms are stable    Prognosis estimated based on 06/11/17 clinical assessment is:   [] Few to Many Hours  [] Hours to Days   [] Few to Many Days   [x] Days to Weeks   [] Few to Many Weeks   [] Weeks to Months   [] Few to Many Months    Communicated plan of care with: Hospice Case Manager; Hospice IDT; Care Team     GOALS OF CARE     Resuscitation Status: DNR  Durable DNR: [x] Yes [] No    Advance Care Planning 5/10/2017   Patient's Healthcare Decision Maker is: Legal Next of Andrea Velarde   Primary Decision Maker Name Ivonne Rodriguez   Primary Decision Maker Phone Number -   Primary Decision Maker Relationship to Patient Parent   Confirm Advance Directive Yes, on file        HISTORY     History obtained from: chart, staff    CHIEF COMPLAINT: sleeping at this time; did not answer  The patient is:   [x] Verbal  [] Nonverbal  [] Unresponsive    HPI/SUBJECTIVE:  Pt very weak, mostly sleeping, when asked if she has pain; says yes but falls asleep by the time med is given. Received 1 prn ativan in the night, dilaudid 2 prn doses today       REVIEW OF SYSTEMS     The following systems were: [x] reviewed  [] unable to be reviewed    Positive ROS include:  Constitutional: fatigue, weakness  Ears/nose/mouth/throat:   Respiratory:shortness of breath  Gastrointestinal:  Musculoskeletal:pain in legs, joints, swelling legs  Neurologic: numbness and burning pain, confusion, hallucinations  Psychiatric:anxiety, depression  Endocrine:          FUNCTIONAL ASSESSMENT     Palliative Performance Scale (PPS): 30%     PSYCHOSOCIAL/SPIRITUAL ASSESSMENT     Active Problems:    * No active hospital problems.  *    Past Medical History:   Diagnosis Date    Cancer [facility])     uterine    [facility]     Diabetes [facility]     Elevated liver function tests 10/21/2010    Endometrial cancer ([facility] 7/7/2010    Hypertension     Iron deficiency anemia     Menometrorrhagia 10/21/2010    Microcytic anemia 10/21/2010    Morbid obesity ([facility]     Prediabetes 7/7/2010    Psychiatric disorder     depression    Radiation     completed radiation mid-march      Past Surgical History:   Procedure Laterality Date    CARDIAC SURG PROCEDURE UNLIST      hx of tachycardia    HX GYN      hysterectomy      Social History   Substance Use Topics    Smoking status: Never Smoker    Smokeless tobacco: Never Used    Alcohol use Yes     Family History   Problem Relation Age of Onset    Hypertension Mother     Hypertension Father     Stroke Maternal Grandfather     Cancer Paternal Grandmother      breast    Diabetes Paternal Grandmother       Allergies   Allergen Reactions    Egg Nausea and Vomiting     Raw egg and scrambled eggs. Egg products okay.      Pcn [Penicillins] Nausea and Vomiting     \"but could take amoxil\"    Shellfish Containing Products Unknown (comments)    Tetanus And Diphtheria Toxoids, Adsorbed, Adult Other (comments)     Developed local swelling, axillary pain, and transient fever    Tomato Unknown (comments)      Current Facility-Administered Medications   Medication Dose Route Frequency    acetaminophen (TYLENOL) tablet 650 mg  650 mg Oral Q4H PRN    diphenhydrAMINE (BENADRYL) capsule 50 mg  50 mg Oral Q6H PRN    Hydromorphone 500mg/500mL bag [facility]   IntraVENous CONTINUOUS    gabapentin (NEURONTIN) capsule 300 mg  300 mg Oral QHS    LORazepam (ATIVAN) tablet 1 mg  1 mg Oral TID PRN    metoprolol tartrate (LOPRESSOR) tablet 25 mg  25 mg Oral BID    HYDROmorphone (PF) (DILAUDID) injection 1 mg  1 mg IntraVENous Q15MIN PRN    methadone (DOLOPHINE) tablet 10 mg  10 mg Oral Q8H    polyethylene glycol (MIRALAX) packet 17 g  17 g Oral DAILY    magic mouthwash cpd (without sucralfate)  5 mL Oral QID PRN    docusate sodium (COLACE) capsule 100 mg  100 mg Oral DAILY    DULoxetine (CYMBALTA) capsule 60 mg  60 mg Oral QHS    sulindac (CLINORIL) tablet 200 mg  200 mg Oral BID WITH MEALS        PHYSICAL EXAM     Wt Readings from Last 3 Encounters:   05/30/17 121.1 kg (267 lb)   05/10/17 121.4 kg (267 lb 10.2 oz)   02/16/17 136.1 kg (300 lb)       Visit Vitals    /80 (BP 1 Location: Right arm, BP Patient Position: Lying left side)    Pulse 89    Temp 98.2 °F (36.8 °C)    Resp 18    SpO2 94%         Supplemental O2  [x] Yes  [] NO  Last bowel movement: 2 days ago    Currently this patient has:  [] Peripheral IV [x] PICC  [] PORT [] ICD    [x] Davila Catheter [] NG Tube   [] PEG Tube    [] Rectal Tube [] Drain  [] Other:     Constitutional: pale, laying on her back, drowsy, resting comfortably  Eyes: pallor++  ENMT: moist oral mucosa  Cardiovascular: distant heart sounds, tachycardic  Respiratory:  Normal breathing, diminished air entry  Gastrointestinal:  distended and firm, non tender, BS +, pt has a davila's  Musculoskeletal:edema ++ lower extremities right leg>left leg  Skin: warm, dry, some skin irritation in the groin folds  Neurologic: drowsy, intermittent confusion especially when she wakes up  Psychiatric: fluctuating mood, calm affect  Other: davila's: urine clearer      Pertinent Lab and or Imaging Tests: Total time:35  mts  Counseling / coordination time:   > 50% counseling / coordination?:       This patient meets Hospice General Inpatient (GIP) Level of Care.       Supporting documentation for GIP need for pain control:  [x] Frequent evaluation by a doctor, nurse practitioner, nurse   [x] Frequent medication adjustment    [x] IVs that cannot be administered at home   [x] Aggressive pain management   [] Complicated technical delivery of medications              Supporting documentation for GIP need for symptom control:  [x]  Sudden decline necessitating intensive nursing intervention  []  Uncontrolled / intractable nausea or vomiting   [x]  Pathological fractures  []  Advanced open wounds requiring frequent skilled care  [] Unmanageable respiratory distress  [x] New or worsening delirium   [] Delirium with behavior issues  [] Imminent death  with skilled nursing needs documented above  Sarbjit Cheek MD

## 2017-06-11 NOTE — PROGRESS NOTES
Verbal shift change report given to Velma Bess, RN by Jacqui Castro RN. Report included the following information SBAR, Kardex, Intake/Output and MAR.      NAME OF PATIENT: Jyoti Randall      LEVEL OF CARE: GIP     REASON FOR RESPITE: Patient not in Respite care at this time.       REASON FOR GIP:   Pain, despite numerous changes in medications, Medication adjustment that must be monitored 24/7 and Stabilizing treatment that cannot take place at home      PATIENT REMAINS ELIGIBLE FOR GIP LEVEL OF CARE AS EVIDENCED BY: GIP level of care due to changing medication requirements, adjustments, additions of opioid pain mediations for relief and adjuvant therapy medications as tolerated, and continuous monitoring which was not accomplished at home.     REASON FOR RESPITE: Patient is not in Respite care at this time.      O2 SAFETY: Patient is on room air at this time.   [address] [facility] [address]:   Implemented/recommended use of non-skid footwear, Implemented/recommended use of fall risk identification flag to all team members, Implemented/recommended assistive devices and encouraged their use, Implemented/recommended resources for alarm system (personal alarm, bed alarm, call bell, etc.) , Implemented/recommended environmental changes (remove hazards, lower bed, improve lighting, etc.) and Implemented/recommended increased supervision/assistance     INTERDISPLINARY COMMUNICATION/COLLABORATION: Physician, MSW, [place] and RN, CNA     NEW MEDICATION INITIATION DOCUMENTATION: No new medications nor interventions begun on this night shift.      Reason medication is being initiated: N/A     MD / Provider name consulted re: Change in status/initiation of new medication: N/A     New Symptom(s): N/A     New Order(s): N/A     Name of the person notified of the changes: N/A     Name of the person being taught: N/A     Instructions given: N/A     Side Effects taught: N/A     Response to teaching: N/A     COMFORTABLE DYING MEASURE: Continue to monitor for pain, dyspnea, agitation, and restlessness and intervene accordingly.      Is Patient/Family satisfied with symptom level? Yes     DISCHARGE PLAN: Patient to be discharged home in her mother, Libra's care under the continuing care [facility] once symptoms can be managed effectively at home.

## 2017-06-11 NOTE — PROGRESS NOTES
0700 Report received from [facility], [facility]. Pt is GIP for pain management. 0800 Pt appears to be asleep.   0900 Pt feeding self. Ate 30% meal. Pt is alert and oriented x3. Voices pain level 5 of 10.  1000 Pt appears to be asleep. 1100 Dr Jyoti Garza in to assess the pt. Pt resting with eyes closed. 1200 Visitors in room. Pt awake, refused lunch. States just doesn't feel much like eating. Small tears flowing from eyes. Spent time with pt. Allowed to ventilate. Had prayer with the pt.  1300 Mother at the bedside and other visitors. 1450 Pt c/o pain in back and right leg. Dilaudid 1mg IVP for breakthrough pain  1700 Pt is resting with family at the bedside. Refused dinner. Family to bring dinner  1800 Bed bath given  Pt repositioned for comfort. 2000 Pt crying with pain. States 7 of 10. Lorazepam po and Dilaudid 1mg given IV for pain management          NAME OF PATIENT:  Jyoti Randall    LEVEL OF CARE:  GIP    REASON FOR GIP:   Pain, despite numerous changes in medications, Medication adjustment that must be monitored 24/7 and Stabilizing treatment that cannot take place at home    *PATIENT REMAINS ELIGIBLE FOR GIP LEVEL OF CARE AS EVIDENCED BY: (MUST BE ADDRESSED OF PATIENT GIP)  Frequent assessment and med required to manage pain.       REASON FOR RESPITE:  na    O2 SAFETY:  Concentrator positioning (6\" from furniture/drapes), Tanks stored in rodríguez , No petroleum based products on face while oxygen in use and Oxygen sign on the door    FALL INTERVENTIONS PROVIDED:   Implemented/recommended use of fall risk identification flag to all team members, Implemented/recommended resources for alarm system (personal alarm, bed alarm, call bell, etc.) , Implemented/recommended environmental changes (remove hazards, lower bed, improve lighting, etc.) and Implemented/recommended increased supervision/assistance    INTERDISPLINARY COMMUNICATION/COLLABORATION:  Physician, MSW, [place] and RN, CNA    NEW MEDICATION INITIATION DOCUMENTATION:  Documentation completed in Clinical Note in [facility]    Reason medication is being initiated:  na    MD / Provider name consulted re: change in status / initiation of new medication:  na    New Symptom(s):  na    New Order(s):  na    Name of the person notified of the changes:  na    Name of person being taught:  na    Instructions given:  na  Side Effects taught:  na    Response to teaching:  na      COMFORTABLE DYING MEASURE:  Is Patient/family satisfied with symptom level? Yes    DISCHARGE PLAN:  Discharge to home when symptoms are managed or remain for end of life.

## 2017-06-12 NOTE — PROGRESS NOTES
On Friday, 6/9/17, in the morning I attended and helped to provide  support to family as they were informed by Dr. Tayler Rascon of Jyoti's current medical condition and expectations as we move forward. Afterwards at Ms [place] invitation joined her as she talked with Curtis Bhatia to continue support. In that meeting, They agreed that Mrs. Randall would inform Jyoti's brother and her nieces of her current medical diagnosis. Ms Miquel Howell informed me of her plan to have family get together at 7:30 pm and invited me to be there to provide support. Upon my arrival all of the family was present. In summary she informed them that the doctors have done all they can for Jyoti. She noted that this meant the end was close but only God could say when the end would occur. She encouraged all present to try to spend quality time with [facility] as they were able. She noted her desire that they not have regrets later. The family was interactive and very supportive of one another. After the meeting her sisters came back up to the [facility]. Mr and Mrs Miquel Howell took Bryan niece out to [facility], giving her some focused attention. I followed up with a visit to [facility] today. This morning two of her aunts were here. I left and returned this afternoon to give her the opportunity to share as she desired. I will continue to follow with supplement support from [facility]. Thanks to all the staff here at [place] [id number]..       Kishore Godoy, Martha Souza., [facility], [address] [place] [address]

## 2017-06-12 NOTE — [facility] IDG NURSE NOTES
Patient: Pérez Melendez    Date: 06/12/17  Time: 3:01 AM    [facility] Nurse Notes    [address] [facility]  Patient Name  Jyoti bonner  Episode -   Date of IDT Meeting  6-13-17    UPDATED COMPREHENSIVE ASSESSMENT      Neuro - alert and oriented  Muscular - moves upper extremities, moves left leg slightly, unable to move r leg which is externally rotated and very edematous  Resp - lungs clear, room air  Heart WNL BP 98/60 lopressor held last pm  GI - appetite good eats mostly fast food from family and sweets, last BM 6/9, on bowel regimen   - davila to BSD inserted 5/15/17  Skin - intact    ATTENDING Physician  Dr Júnior Hutchins pain     SIGNS pt states when she is in pain, always has pain if moved     LAB VALUES (when available)     KARNOFSKY 30%     FAST for all dementia N/A     Progression to DEPENDENCE WITH ADLs (include time frame) Complete Care pt can feed self     PRESSURE ULCERS None     DISEASE SPECIFIC Diabetes     FALL RISK: None  PROBLEM: 1. Pt is unable to turn well in bed could use Bariatric Bed  2. Pain management continues to be an issue for the patient. She states that her pain is never managed. Although she was started on Dilaudid per PCA pump it is a very low dose. Another side issue is that pt prefers for nurse to give IVP bolus instead of using the PCA button. She asked how often she could have that and when I told her q 15 minutes she asked for it 3 times before she was obtunded. I explained to the patient that the IVP was for when pain was out of control and she had been pushing PCA frequently. She assured me that she had. At end of shift it was noted that patient had only pushed PCA 13 times entire shift. 3. Apparently there was a family meeting about prognosis and pt not going home  And EOL here at [facility]. It was not properly communicated to nurses that the patient was not involved in the meeting and was not aware.  The issue came up about the specialty bed and if she could take it home. That is when pt stated she was going home and that is why she has the dilaudid pump. GOAL: Pain managed    INTERVENTIONS(INDIVIDUALIZED)   1-Provide patient with specialty bed so that she can be properly cared for. 2-Increase pain medicine so that patient does not have to ask for repeated doses of IVP meds.    3- Involve pt in discharge planning           Nursing Visit Frequency  Hospice Aide Visit Frequency       COPING  GRIEF  ADVANCED DIRECTIVES    BEREAVEMENT  RESOURCES NEEDED   Visit Frequency     Bereavement   NO CHANGE     Volunteer  NO VOLUNTEER       SPIRITUAL ISSUES  GRIEF  COPING  [address] RESOURCES   Visit Frequency     Clinician Signatures  Nurse______________________________  SW________________________________  Chaplain____________________________  Volunteer Coordinator__________________  Bereavement_________________________        Signed by: Kathlene Castelan RN

## 2017-06-12 NOTE — PROGRESS NOTES
0700  Report received. 0730  Pt requesting pain meds  Rn reminded her to push her pain button. 0840  Pt lying in bed, alert and oriented. Pt stated pain is a 5/10 on pain scale. Rn continued to remind her of her PCA button. Pt also complains of itching. Lungs clear but diminished. No cough noted.  + bowel sounds. Last reported Bm was 6-9. Jama is draining light tea colored urine with sediment. Pt has +2 -3 pitting edema in her lower ext. Pt reports she has bumps that itch on her lower back. Back assessed no bumps present. Pt has a right upper arm PICC. Dressing is clear and intact. CADD pump is infusing Dilaudid 1mg/hr with 0.5mg q 10 mins with a max of 6/hr. Res Vol 399.6  Attempts 11  Given 7  Total infused 5.80 mg given since 640 am.    0900  Pt sitting up in the bed, eating her breakfast.  Pt's best friend at the bedside. No complaints at this time. 1015  Pt asleep. No complaints at this time. 200  Pt with visitors in the room. Pt aroused to verbal stimuli and visiting with her family members. No complaints of pain at this time. 1230  Pt sleeping. No facial grimacing at this time. Respirations shallow and not labored. 1400  Pt states her pain is 5/10. Pt medicated with Dilaudid prior to changing mattress on bed. Pt medicated with lorazepam   1544  Pt states she is having pain in her leg and cant feel it. Pt medicated with scheduled Methadone. Rn explained the new mattress makes her leg feel lighter as it is on an air mattress. Pt was very appreciative of the information. 1721  Pt complained of pain, 7/10. Pt medicated with Dilaudid. Pt has family at the bedside. 1800  Pt resting. No complaints at this time. Pt laughing and visiting with her family and friends. 1900  Report given.         NAME OF PATIENT:  Teresa Mcmillan    LEVEL OF CARE:  GIP    REASON FOR GIP:   Pain, despite numerous changes in medications, Medication adjustment that must be monitored 24/7 and Stabilizing treatment that cannot take place at home    *PATIENT REMAINS ELIGIBLE FOR GIP LEVEL OF CARE AS EVIDENCED BY: (MUST BE ADDRESSED OF PATIENT GIP)  Pt continues to have a CADD pump infusing Dilaudid. .  Pt received 4 PRN doses last night. Today she has gotten 2 so far. Pt is over 300lbs. She requires 3 people to turn. O2 SAFETY:  Pt  Only uses PRN. Oxygen sign on the door    FALL INTERVENTIONS PROVIDED:   Implemented/recommended assistive devices and encouraged their use    INTERDISPLINARY COMMUNICATION/COLLABORATION:  Physician, MSW, [place] and RN, CNA    NEW MEDICATION INITIATION DOCUMENTATION:  No new medications initiated. Consulted AT MD to report change in pt status    COMFORTABLE DYING MEASURE:  Is Patient/family satisfied with symptom level?  yes    DISCHARGE PLAN:  Pt will remain at the [facility] for End of life care.

## 2017-06-12 NOTE — HOSPICE
[facility] Communication Note    Care Coordination w/ Sharlene Turk    I met with Brandenduarte Paintere and again discussed this case, family and patient, in light of the family meeting held on Friday. I learned that this meeting had not taken place in the patient's room and the request of the family and according to [facility], the patient is not aware that her prognosis is very limited and that she probably will not be leaving the [facility]. The family, while accepting has some members that are having great difficulty accepting this prognosis. [facility] is maintaining a close relationship with the patient and her family and I continue to provide supplemental spiritual care and support.   Felipa Christian, [facility], [facility]  Hospice Chaplain

## 2017-06-12 NOTE — PROGRESS NOTES
[facility]  [phone]    Patient Name: Liliana Goff  YOB: 1980    Date of Provider Hospice Visit: 06/12/17    Level of Care:   [x] General Inpatient (GIP)    [] Routine   [] Respite    Location of Care:  [] [facility] [] [facility] [] [facility] [] [facility] [x] [facility]osp[facility]  [] Home [] Other:      Date of Original Hospice Admission: 5-16-17  Hospice Medical Director for Inpatient Care: Dr. Ambrocio Wayne Diagnosis:  Metastatic Endometrial Adenocarcinoma       HOSPICE DIAGNOSES   Active Symptoms:  1. Generalised pain, especially right lower extremity, lower back, especially with any kind of movement: overall better, rates pain 4/10, now lying supine  2. Delirium;  fluctuates, some hallucinations, overall slightly better  3. Shortness of breath with low O2 sats: improves with use of O2  4. Anxiety/depression; overall better  5. Insomnia   6. Fatigue/weakness/lethargy: worse   7. Constipation     PLAN   1. Continue GIP level of care due to changing medication requirements, adjustments, additions of opioid pain mediations for relief and adjuvant therapy medications as tolerated, and continuous monitoring while pt in delirious state   2. Continue ativan to three times daily as needed for anxiety  3. Continue PCA dilaudid 1mg / hr continuous, PCA dose of 0.5mg every 10 mts. 4. Continue nurse given dilaudid of 2mg IV every 15mts as needed for severe pain. 5. Continue gabapentin 300mg at bedtime  6. Increased dilaudid break through to 1.5mg q 15 min  7. Please set up air mattress for her continued comfort  8. Continue clinoril, Cymbalta for adjuvant therapy for bone pain  9. Continue methadone at current levels 10mg every 8 hrs  10. Pt is now laying supine, nurses helping to reposition and turn to side during cleaning/ADL care and to prevent skin breakdown. 11. Weekly care conference with team  12.   and SW to support family needs: family in need of ongoing psychosocial, emotional and spiritual support. 13. Disposition: home or routine care status when symptoms are stable    Prognosis estimated based on 06/12/17 clinical assessment is:   [] Few to Many Hours  [] Hours to Days   [] Few to Many Days   [x] Days to Weeks    [] Few to Many Weeks   [] Weeks to Months   [] Few to Many Months    Communicated plan of care with: Hospice Case Manager; Hospice IDT; Care Team     GOALS OF CARE     Resuscitation Status: DNR  Durable DNR: [x] Yes [] No    Advance Care Planning 5/10/2017   Patient's Healthcare Decision Maker is: Legal Next of Andrea 69   Primary Decision Maker Name Kameron Torres   Primary Decision Maker Phone Number -   Primary Decision Maker Relationship to Patient Parent   Confirm Advance Directive Yes, on file        HISTORY     History obtained from: chart, staff    CHIEF COMPLAINT: pt is lethargic  The patient is:   [x] Verbal  [] Nonverbal  [] Unresponsive    HPI/SUBJECTIVE:  Pt very weak, sleeps most of the time, falls asleep easily. REVIEW OF SYSTEMS     The following systems were: [x] reviewed  [] unable to be reviewed    Positive ROS include:  Constitutional: fatigue, weakness  Ears/nose/mouth/throat:   Respiratory:shortness of breath  Gastrointestinal:  Musculoskeletal:pain in legs, joints, swelling legs  Neurologic: numbness and burning pain, confusion, hallucinations  Psychiatric:anxiety, depression  Endocrine:          FUNCTIONAL ASSESSMENT     Palliative Performance Scale (PPS): 30%     PSYCHOSOCIAL/SPIRITUAL ASSESSMENT     Active Problems:    * No active hospital problems.  *    Past Medical History:   Diagnosis Date    Cancer [facility])     uterine    [facility]     Diabetes [facility]     Elevated liver function tests 10/21/2010    Endometrial cancer ([facility] 7/7/2010    Hypertension     Iron deficiency anemia     Menometrorrhagia 10/21/2010    Microcytic anemia 10/21/2010    Morbid obesity [facility]  Prediabetes 7/7/2010    Psychiatric disorder     depression    Radiation     completed radiation mid-march      Past Surgical History:   Procedure Laterality Date    CARDIAC SURG PROCEDURE UNLIST      hx of tachycardia    HX GYN      hysterectomy      Social History   Substance Use Topics    Smoking status: Never Smoker    Smokeless tobacco: Never Used    Alcohol use Yes     Family History   Problem Relation Age of Onset    Hypertension Mother     Hypertension Father     Stroke Maternal Grandfather     Cancer Paternal Grandmother      breast    Diabetes Paternal Grandmother       Allergies   Allergen Reactions    Egg Nausea and Vomiting     Raw egg and scrambled eggs. Egg products okay.      Pcn [Penicillins] Nausea and Vomiting     \"but could take amoxil\"    Shellfish Containing Products Unknown (comments)    Tetanus And Diphtheria Toxoids, Adsorbed, Adult Other (comments)     Developed local swelling, axillary pain, and transient fever    Tomato Unknown (comments)      Current Facility-Administered Medications   Medication Dose Route Frequency    acetaminophen (TYLENOL) tablet 650 mg  650 mg Oral Q4H PRN    diphenhydrAMINE (BENADRYL) capsule 50 mg  50 mg Oral Q6H PRN    Hydromorphone 500mg/500mL bag [facility]   IntraVENous CONTINUOUS    gabapentin (NEURONTIN) capsule 300 mg  300 mg Oral QHS    LORazepam (ATIVAN) tablet 1 mg  1 mg Oral TID PRN    metoprolol tartrate (LOPRESSOR) tablet 25 mg  25 mg Oral BID    HYDROmorphone (PF) (DILAUDID) injection 1 mg  1 mg IntraVENous Q15MIN PRN    methadone (DOLOPHINE) tablet 10 mg  10 mg Oral Q8H    polyethylene glycol (MIRALAX) packet 17 g  17 g Oral DAILY    magic mouthwash cpd (without sucralfate)  5 mL Oral QID PRN    docusate sodium (COLACE) capsule 100 mg  100 mg Oral DAILY    DULoxetine (CYMBALTA) capsule 60 mg  60 mg Oral QHS    sulindac (CLINORIL) tablet 200 mg  200 mg Oral BID WITH MEALS        PHYSICAL EXAM     Wt Readings from Last 3 Encounters:   05/30/17 121.1 kg (267 lb)   05/10/17 121.4 kg (267 lb 10.2 oz)   02/16/17 136.1 kg (300 lb)       Visit Vitals    /82 (BP 1 Location: Left arm, BP Patient Position: At rest)    Pulse 80    Temp 98.2 °F (36.8 °C)    Resp 18    SpO2 96%         Supplemental O2  [x] Yes  [] NO  Last bowel movement: 2 days ago    Currently this patient has:  [] Peripheral IV [x] PICC  [] PORT [] ICD    [x] Davila Catheter [] NG Tube   [] PEG Tube    [] Rectal Tube [] Drain  [x] Other: now with a special mattress  Constitutional: pale, laying on her back, drowsy, resting comfortably  Eyes: pallor++  ENMT: moist oral mucosa  Cardiovascular: distant heart sounds, tachycardic  Respiratory:  Normal breathing, diminished air entry  Gastrointestinal:  distended and firm, non tender, BS +, pt has a davila's  Musculoskeletal:edema ++ lower extremities right leg>left leg  Skin: warm, dry, some skin irritation in the groin folds  Neurologic: drowsy, intermittent confusion especially when she wakes up  Psychiatric: fluctuating mood, calm affect  Other: davila's: urine clearer      Pertinent Lab and or Imaging Tests: Total time:35  mts  Counseling / coordination time:   > 50% counseling / coordination?:       This patient meets Hospice General Inpatient (GIP) Level of Care.       Supporting documentation for GIP need for pain control:  [x] Frequent evaluation by a doctor, nurse practitioner, nurse   [x] Frequent medication adjustment    [x] IVs that cannot be administered at home   [x] Aggressive pain management   [] Complicated technical delivery of medications              Supporting documentation for GIP need for symptom control:  [x]  Sudden decline necessitating intensive nursing intervention  []  Uncontrolled / intractable nausea or vomiting   [x]  Pathological fractures  []  Advanced open wounds requiring frequent skilled care  [] Unmanageable respiratory distress  [x] New or worsening delirium   [] Delirium with behavior issues  [] Imminent death  with skilled nursing needs documented above  [place]is[place] NP

## 2017-06-13 NOTE — PROGRESS NOTES
[facility]  follow-up Visit after re-admission to [facility] on GIP Status     I visited the room of this pt who arrived back at the [facility] yesterday and is on GIP Status. She was in bed, awake and alert. She did not appear agitated, not restless, and said she was comfortable. She was still lying on her back and and I know this makes her very happy after two years on her stomach. We spoke about the wonder support and relationship she had developed with Rosana. Jose spirits were moderate and her attitude somewhat good. I learned in earlier visits that she and her family attend [facility] on the [address] [employer] [address]. I also read to her several Psalms and a passage from [employer]. I aslo affirmed her great geovanny and trust in God. I confirmed that I would be continue to visit, if she so desired. She was agreeable to periodic visits. She thanked me for visiting and my offer of support. GOALS:  Continue to visit this pt and her family while she is at the [facility] and I am in the facility, to provide pastoral care and spiritual support to assist both the pt and them with coping with this difficult medical situation and decline. Coordinate w/ Chaplain Rankin as she assumes primary care for this patient  Coordinate with Chaplain Foster if/when the pt returns to her mothers home. PLAN:  Continue to provide supplemental  support to that being provided by Rosana. Coordinate with Rosana as requested. Coordinate with Care Team on POC.  VISIT FREQUENCY:  1 wk 2 starting 6/6 plus 4 prn 14 days.   Collins Matute, [facility], [facility]  [phone]

## 2017-06-13 NOTE — [facility] IDG SOCIAL WORKER NOTES
Patient: David Hernandes    Date: 06/13/17  Time: 8:53 AM    [facility]  Notes  Family meeting with mother, 2 sisters, Dr. Wanda Beverly RN, Sierra gomez and Stevo Gar. Dr. Rodrigo Fuller talked about possibility of patient not being able to return home but not ruled out. She also explained that patients pain was still an ongoing issue that was being addressed but  she wouldnt be able to experience total relief. Family very tearful and saddened when Dr. Rodrigo Fuller expressed belief that patient was declining and would most likely pass at the [facility]. Emotional support provided by staff as family expressed their grief. Mother spoke  to patient following meeting and requested that other family members be made aware of dx and prognosis. She agreed. Meeting was held later in day, attended by [facility], during which family was informed of patients status. Family trying to cope with their issues of loss/grief.               Signed by: Eugenio Acevedo

## 2017-06-13 NOTE — PROGRESS NOTES
1900 - Bedside and Verbal shift change report given to Debbie Casillas (oncoming nurse) by Lian Mathis (offgoing nurse). Report included the following information SBAR, Kardex and MAR.     0700 - Bedside and Verbal shift change report given to Lian Mathis (oncoming nurse) by Carlyn Faulkner RN (offgoing nurse). Report included the following information SBAR, Kardex and MAR. NAME OF PATIENT:  Kusum Randall    LEVEL OF CARE:  GIP    REASON FOR GIP:   Pain, despite numerous changes in medications, Medication adjustment that must be monitored 24/7 and Stabilizing treatment that cannot take place at home    *PATIENT REMAINS ELIGIBLE FOR GIP LEVEL OF CARE AS EVIDENCED BY: (Requires PRN pain meds for breakthrough pain and consistently rates her pain at 6 out of 10 for aching back pain despite receiving a continuous PCA dose.       O2 SAFETY:  Concentrator positioning (6\" from furniture/drapes), No petroleum based products on face while oxygen in use and Oxygen sign on the door    FALL INTERVENTIONS PROVIDED:   Implemented/recommended resources for alarm system (personal alarm, bed alarm, call bell, etc.)  and Implemented/recommended environmental changes (remove hazards, lower bed, improve lighting, etc.)    INTERDISPLINARY COMMUNICATION/COLLABORATION:  Physician, MSW, Leta and RN, CNA    NEW MEDICATION INITIATION DOCUMENTATION:  NO NEW MEDS    Reason medication is being initiated:  N/A    MD / Provider name consulted re: change in status / initiation of new medication:  N/A    New Symptom(s):  N/A    New Order(s):  N/A    Name of the person notified of the changes:  N/A    Name of person being taught:  N/A    Instructions given:  N/A    Side Effects taught:  N/A    Response to teaching:  N/A      COMFORTABLE DYING MEASURE:  Is Patient/family satisfied with symptom level?  yes    DISCHARGE PLAN:  Will remain at [facility]

## 2017-06-13 NOTE — PROGRESS NOTES
This was an attempted follow-up.  However, she was sleeping so I left a note and allowed her to rest.     Dann Mejia., [facility], [address] [place] [address]

## 2017-06-13 NOTE — PROGRESS NOTES
[facility]  [phone]    Patient Name: Bernadette Gifford  YOB: 1980    Date of Provider Hospice Visit: 06/13/17    Level of Care:   [x] General Inpatient (GIP)    [] Routine   [] Respite    Location of Care:  [] [facility] [] [facility] [] [address] [] [facility] [x] [facility]  [] Home [] Other:      Date of Original Hospice Admission: 5-16-17  Hospice Medical Director for Inpatient Care: Dr. Alan Diagnosis:  Metastatic Endometrial Adenocarcinoma       HOSPICE DIAGNOSES   Active Symptoms:  1. Generalised pain, especially right lower extremity, lower back, especially with movements, still requiring prn pain meds frequently  2. Delirium;  fluctuates, some hallucinations, overall slightly better  3. Shortness of breath with low O2 sats: improves with use of O2  4. Anxiety/depression; still an issue  5. Insomnia   6. Fatigue/weakness/lethargy: worse   7. Constipation     PLAN   1. Continue GIP level of care due to changing medication requirements, adjustments, additions of opioid pain mediations for relief and adjuvant therapy medications as tolerated, and continuous monitoring while pt in delirious state   2. Continue ativan 1mg  three times daily as needed for anxiety. 3. Add low dose ativan 0.5mg twice daily  4. Increase PCA dilaudid 2mg / hr continuous, PCA dose of 1mg every 6 mts. 5. Increase nurse given dilaudid to 3mg IV every 15mts as needed for severe pain. 6. Continue gabapentin 300mg at bedtime  7. Please set up air mattress for her continued comfort  8. Continue clinoril, Cymbalta for adjuvant therapy for bone pain  9. Continue methadone at current levels 10mg every 8 hrs  10. Add Toradol 30mg IV every 8 hours as needed for adjuvant pain control; antiinflammatory. 11. Pt is now laying supine, nurses helping to reposition and turn to side during cleaning/ADL care and to prevent skin breakdown.    12. Weekly care conference with team  13.  and SW to support family needs: family in need of ongoing psychosocial, emotional and spiritual support. 14. Disposition: home or routine care status when symptoms are stable    Prognosis estimated based on 06/13/17 clinical assessment is:   [] Few to Many Hours  [] Hours to Days   [] Few to Many Days   [x] Days to Weeks    [] Few to Many Weeks   [] Weeks to Months   [] Few to Many Months    Communicated plan of care with: Hospice Case Manager; Hospice IDT; Care Team     GOALS OF CARE     Resuscitation Status: DNR  Durable DNR: [x] Yes [] No    Advance Care Planning 5/10/2017   Patient's Healthcare Decision Maker is: Legal Next of Andrea 69   Primary Decision Maker Name Emy Bailey   Primary Decision Maker Phone Number -   Primary Decision Maker Relationship to Patient Parent   Confirm Advance Directive Yes, on file        HISTORY     History obtained from: chart, staff    CHIEF COMPLAINT: pt states that dilaudid PCA is working a lot better  The patient is:   [x] Verbal  [] Nonverbal  [] Unresponsive    HPI/SUBJECTIVE:  Pt remains weak, pain still not very well controlled. pt took 48 attempts on PCA and got 33 doses. Also got 3 doses of prn nurse given dilaudid and 2 doses of prn benadryl for itching. REVIEW OF SYSTEMS     The following systems were: [x] reviewed  [] unable to be reviewed    Positive ROS include:  Constitutional: fatigue, weakness  Ears/nose/mouth/throat:   Respiratory:shortness of breath  Gastrointestinal:  Musculoskeletal:pain in legs, joints, swelling legs, itching  Neurologic: numbness and burning pain, confusion, hallucinations  Psychiatric:anxiety, depression  Endocrine:          FUNCTIONAL ASSESSMENT     Palliative Performance Scale (PPS): 30%     PSYCHOSOCIAL/SPIRITUAL ASSESSMENT     Active Problems:    * No active hospital problems.  *    Past Medical History:   Diagnosis Date    Cancer [facility])     uterine    [facility]     Diabetes [facility]     Elevated liver function tests 10/21/2010    Endometrial cancer ([facility] 7/7/2010    Hypertension     Iron deficiency anemia     Menometrorrhagia 10/21/2010    Microcytic anemia 10/21/2010    Morbid obesity ([facility]     Prediabetes 7/7/2010    Psychiatric disorder     depression    Radiation     completed radiation mid-march      Past Surgical History:   Procedure Laterality Date    CARDIAC SURG PROCEDURE UNLIST      hx of tachycardia    HX GYN      hysterectomy      Social History   Substance Use Topics    Smoking status: Never Smoker    Smokeless tobacco: Never Used    Alcohol use Yes     Family History   Problem Relation Age of Onset    Hypertension Mother     Hypertension Father     Stroke Maternal Grandfather     Cancer Paternal Grandmother      breast    Diabetes Paternal Grandmother       Allergies   Allergen Reactions    Egg Nausea and Vomiting     Raw egg and scrambled eggs. Egg products okay.      Pcn [Penicillins] Nausea and Vomiting     \"but could take amoxil\"    Shellfish Containing Products Unknown (comments)    Tetanus And Diphtheria Toxoids, Adsorbed, Adult Other (comments)     Developed local swelling, axillary pain, and transient fever    Tomato Unknown (comments)      Current Facility-Administered Medications   Medication Dose Route Frequency    HYDROmorphone (PF) (DILAUDID) injection 3 mg  3 mg IntraVENous Q15MIN PRN    LORazepam (ATIVAN) tablet 0.5 mg  0.5 mg Oral BID    ketorolac (TORADOL) injection 30 mg  30 mg IntraVENous Q8H PRN    acetaminophen (TYLENOL) tablet 650 mg  650 mg Oral Q4H PRN    diphenhydrAMINE (BENADRYL) capsule 50 mg  50 mg Oral Q6H PRN    Hydromorphone 500mg/500mL bag [facility]   IntraVENous CONTINUOUS    gabapentin (NEURONTIN) capsule 300 mg  300 mg Oral QHS    LORazepam (ATIVAN) tablet 1 mg  1 mg Oral TID PRN    metoprolol tartrate (LOPRESSOR) tablet 25 mg  25 mg Oral BID    methadone (DOLOPHINE) tablet 10 mg  10 mg Oral Q8H    polyethylene glycol (MIRALAX) packet 17 g  17 g Oral DAILY    magic mouthwash cpd (without sucralfate)  5 mL Oral QID PRN    docusate sodium (COLACE) capsule 100 mg  100 mg Oral DAILY    DULoxetine (CYMBALTA) capsule 60 mg  60 mg Oral QHS    sulindac (CLINORIL) tablet 200 mg  200 mg Oral BID WITH MEALS        PHYSICAL EXAM     Wt Readings from Last 3 Encounters:   05/30/17 121.1 kg (267 lb)   05/10/17 121.4 kg (267 lb 10.2 oz)   02/16/17 136.1 kg (300 lb)       Visit Vitals    /68 (BP 1 Location: Left arm)    Pulse 65    Temp 98.3 °F (36.8 °C)    Resp 16    SpO2 96%         Supplemental O2  [x] Yes  [] NO  Last bowel movement: 2 days ago    Currently this patient has:  [] Peripheral IV [x] PICC  [] PORT [] ICD    [x] Davila Catheter [] NG Tube   [] PEG Tube    [] Rectal Tube [] Drain  [x] Other: now with a special mattress  Constitutional: pale, laying on her back, alert, resting comfortably  Eyes: pallor++  ENMT: moist oral mucosa  Cardiovascular: distant heart sounds, tachycardic  Respiratory:  Normal breathing, diminished air entry  Gastrointestinal:  distended and firm, non tender, BS +, pt has a davila's  Musculoskeletal:edema ++ lower extremities right leg>left leg  Skin: warm, dry, some skin irritation in the groin folds, itching   Neurologic: drowsy, intermittent confusion especially when she wakes up  Psychiatric: fluctuating mood, calm affect  Other: davila's: urine clear      Pertinent Lab and or Imaging Tests: Total time:35  mts  Counseling / coordination time: 20mts discussing with mother. > 50% counseling / coordination?: y      This patient meets Hospice General Inpatient (GIP) Level of Care.       Supporting documentation for GIP need for pain control:  [x] Frequent evaluation by a doctor, nurse practitioner, nurse   [x] Frequent medication adjustment    [x] IVs that cannot be administered at home   [x] Aggressive pain management   [] Complicated technical delivery of medications              Supporting documentation for GIP need for symptom control:  [x]  Sudden decline necessitating intensive nursing intervention  []  Uncontrolled / intractable nausea or vomiting   [x]  Pathological fractures  []  Advanced open wounds requiring frequent skilled care  [] Unmanageable respiratory distress  [x] New or worsening delirium   [] Delirium with behavior issues  [] Imminent death  with skilled nursing needs documented above  Gold Carbone MD

## 2017-06-13 NOTE — PROGRESS NOTES
0700  Report received. 0740  Pt lying in bed, asleep. No facial grimacing at this time. Pt aroused to verbal stimuli and states pain is OK. Pt rated it at a 4/10. Lungs clear but diminished. Pt does not have her O2 on.   + bowel sounds. Last reported BM was 6-9. Jama is draining clear, concentrated urine. Pt has +2-3 edema in her feet and ankles. Right thigh also has edema and is very tight. No warmth or redness noted. Skin is intact on all visible parts of Pt's body. Pt is unable to turn and requires 3 people to turn her. Pt has a PICC in her right upper arm  Dressing is clear and intact. CADD is infusing Dilaudid. Res vol 363.8. Basal 1mg/hr with 0.5mg q 10 mins . Pt was sleeping at the end of assessment. 800  Pt sleeping.    0950  Pt complained of right leg pain. Pain 8/10  Pt stated she just bumped her leg on the rail. Pt medicated with Dilaudid. 1030  Pt sleeping. 1130  Pt complained of pain in her leg. Pt rated her pain 5-5 1/2 . Pt stated her pain is in her groin area. Pt medicated with Dilaudid. Pt's cousin is at the bedside. 1200  Pt states her pain is better. 1400  Pt sleeping. No facial grimacing at this time. 26  Pt continues to be able to tolerate her meds without difficulty. No complaints. 1500  Rn sat with Jyoti and allowed her to ask any question that was on her mind. Jyoti wanted to know if she was going to be able to breath as she dies. Rn explained the signs and symptoms of EOL care. She also stated that her worst fear is leaving her Mother. This Rn encouraged her to write her a letter and to spend some time with her Mother. Rn provided emotional support to Karli as we continued to discuss dying. Jyoti wanted to know if she was going to be healed. This Rn continued to discuss  EOL care and the healing process that takes place when you pass away.   Karli stated that she doesn't want extended family members to know that she has Cancer. This Rn stated that our conversation was between us.    1600  Pt resting. No complaints. 1800  Pt resting. No complaints at this time. Pt's Sister at the bedside. Pt reports she feels ok right now. 1900  Report given. NAME OF PATIENT:  Mehdi Randall    LEVEL OF CARE:  GIP    REASON FOR GIP:   Pain, despite numerous changes in medications, Medication adjustment that must be monitored 24/7 and Stabilizing treatment that cannot take place at home    *PATIENT REMAINS ELIGIBLE FOR GIP LEVEL OF CARE AS EVIDENCED BY: (MUST BE ADDRESSED OF PATIENT GIP)    O2 SAFETY:  Oxygen sign on the door    FALL INTERVENTIONS PROVIDED:   Implemented/recommended assistive devices and encouraged their use    INTERDISPLINARY COMMUNICATION/COLLABORATION:  Physician, MSW, [place] and RN, CNA    NEW MEDICATION INITIATION DOCUMENTATION:  No new medications ordered. Lorazepam now scheduled. COMFORTABLE DYING MEASURE:  Is Patient/family satisfied with symptom level?  yes    DISCHARGE PLAN:  Pt will remain at the [facility] until she passes away or until her family makes alternative living arrangements.

## 2017-06-14 NOTE — PROGRESS NOTES
[address] [facility] Help to Those in Need  [phone]    Patient Name: Dong Parker  YOB: 1980    Date of Provider Hospice Visit: 06/14/17    Level of Care:   [x] General Inpatient (GIP)    [] Routine   [] Respite    Location of Care:  [] [facility] [] [facility] [] [facility] [] [facility] [x] Hospice [facility]  [] Home [] Other:      Date of Original Hospice Admission: 5-16-17  Hospice Medical Director for Inpatient Care: Dr. Mesfin Hoang Diagnosis:  Metastatic Endometrial Adenocarcinoma       HOSPICE DIAGNOSES   Active Symptoms:  1. Generalised pain, especially right lower extremity, lower back, especially with any kind of movement: overall better, rates pain 4.5/10, now lying supine on special air mattress   2. Delirium;  fluctuates, some hallucinations, overall slightly better  3. Shortness of breath with low O2 sats: improves with use of O2  4. Anxiety/depression; overall better  5. Insomnia   6. Fatigue/weakness/lethargy: worse   7. Constipation     PLAN   1. Continue GIP level of care due to changing medication requirements, adjustments, additions of opioid pain mediations for relief and adjuvant therapy medications as tolerated  2. Continue ativan  three times daily as needed for anxiety  3. Continue PCA dilaudid 2mg / hr continuous, PCA dose of 1 mg every 6 mts. recently adjusted upwards  4. Continue nurse given dilaudid of 3mg IV every 15mts as needed for severe pain. 5. Continue gabapentin 300mg at bedtime  6. Please set up air mattress for her continued comfort  7. Continue clinoril, Cymbalta for adjuvant therapy for bone pain  8. Continue methadone at current levels 10mg every 8 hrs  9. Pt is now laying supine, nurses helping to reposition and turn to side during cleaning/ADL care and to prevent skin breakdown. 10. Weekly care conference with team  11.  and SW to support family needs: family in need of ongoing psychosocial, emotional and spiritual support. 12. Disposition: home or routine care status when symptoms are stable    Prognosis estimated based on 06/14/17 clinical assessment is:   [] Few to Many Hours  [] Hours to Days   [] Few to Many Days   [x] Days to Weeks    [] Few to Many Weeks   [] Weeks to Months   [] Few to Many Months    Communicated plan of care with: Hospice Case Manager; Hospice IDT; Care Team     GOALS OF CARE     Resuscitation Status: DNR  Durable DNR: [x] Yes [] No    Advance Care Planning 5/10/2017   Patient's Healthcare Decision Maker is: Legal Next jocelyn Mendez 69   Primary Decision Maker Name Faith Coursesaran   Primary Decision Maker Phone Number -   Primary Decision Maker Relationship to Patient Parent   Confirm Advance Directive Yes, on file        HISTORY     History obtained from: chart, staff    CHIEF COMPLAINT: pt is lethargic  The patient is:   [x] Verbal  [] Nonverbal  [] Unresponsive    HPI/SUBJECTIVE:  Pt very weak, awake and alert, complains that mattress is not inflating well         REVIEW OF SYSTEMS     The following systems were: [x] reviewed  [] unable to be reviewed    Positive ROS include:  Constitutional: fatigue, weakness  Ears/nose/mouth/throat:   Respiratory:shortness of breath and spo2 drop  Gastrointestinal:  Musculoskeletal:pain in legs, joints, swelling legs  Neurologic: numbness and burning pain, confusion, hallucinations  Psychiatric:anxiety, depression  Endocrine:          FUNCTIONAL ASSESSMENT     Palliative Performance Scale (PPS): 30%     PSYCHOSOCIAL/SPIRITUAL ASSESSMENT     Active Problems:    * No active hospital problems.  *    Past Medical History:   Diagnosis Date    Cancer [facility])     uterine    [facility]     Diabetes ([facility]     Elevated liver function tests 10/21/2010    Endometrial cancer ([facility] 7/7/2010    Hypertension     Iron deficiency anemia     Menometrorrhagia 10/21/2010    Microcytic anemia 10/21/2010    Morbid obesity ([facility]     Prediabetes 7/7/2010    Psychiatric disorder     depression    Radiation     completed radiation mid-march      Past Surgical History:   Procedure Laterality Date    CARDIAC SURG PROCEDURE UNLIST      hx of tachycardia    HX GYN      hysterectomy      Social History   Substance Use Topics    Smoking status: Never Smoker    Smokeless tobacco: Never Used    Alcohol use Yes     Family History   Problem Relation Age of Onset    Hypertension Mother     Hypertension Father     Stroke Maternal Grandfather     Cancer Paternal Grandmother      breast    Diabetes Paternal Grandmother       Allergies   Allergen Reactions    Egg Nausea and Vomiting     Raw egg and scrambled eggs. Egg products okay.      Pcn [Penicillins] Nausea and Vomiting     \"but could take amoxil\"    Shellfish Containing Products Unknown (comments)    Tetanus And Diphtheria Toxoids, Adsorbed, Adult Other (comments)     Developed local swelling, axillary pain, and transient fever    Tomato Unknown (comments)      Current Facility-Administered Medications   Medication Dose Route Frequency    HYDROmorphone (PF) (DILAUDID) injection 3 mg  3 mg IntraVENous Q15MIN PRN    LORazepam (ATIVAN) tablet 0.5 mg  0.5 mg Oral BID    ketorolac (TORADOL) injection 30 mg  30 mg IntraVENous Q8H PRN    acetaminophen (TYLENOL) tablet 650 mg  650 mg Oral Q4H PRN    diphenhydrAMINE (BENADRYL) capsule 50 mg  50 mg Oral Q6H PRN    Hydromorphone 500mg/500mL bag [facility]   IntraVENous CONTINUOUS    gabapentin (NEURONTIN) capsule 300 mg  300 mg Oral QHS    LORazepam (ATIVAN) tablet 1 mg  1 mg Oral TID PRN    metoprolol tartrate (LOPRESSOR) tablet 25 mg  25 mg Oral BID    methadone (DOLOPHINE) tablet 10 mg  10 mg Oral Q8H    polyethylene glycol (MIRALAX) packet 17 g  17 g Oral DAILY    magic mouthwash cpd (without sucralfate)  5 mL Oral QID PRN    docusate sodium (COLACE) capsule 100 mg  100 mg Oral DAILY    DULoxetine (CYMBALTA) capsule 60 mg  60 mg Oral QHS    sulindac (CLINORIL) tablet 200 mg  200 mg Oral BID WITH MEALS        PHYSICAL EXAM     Wt Readings from Last 3 Encounters:   05/30/17 121.1 kg (267 lb)   05/10/17 121.4 kg (267 lb 10.2 oz)   02/16/17 136.1 kg (300 lb)       Visit Vitals    BP (!) 84/52 (BP 1 Location: Left arm)    Pulse 87    Temp 98.4 °F (36.9 °C)    Resp 16    SpO2 (!) 70%         Supplemental O2  [x] Yes  [] NO  Last bowel movement: 2 days ago    Currently this patient has:  [] Peripheral IV [x] PICC  [] PORT [] ICD    [x] Davila Catheter [] NG Tube   [] PEG Tube    [] Rectal Tube [] Drain  [x] Other: now with a special mattress  Constitutional: pale, laying on her back, drowsy, resting comfortably  Eyes: pallor++  ENMT: moist oral mucosa  Cardiovascular: distant heart sounds, tachycardic  Respiratory:  Normal breathing, diminished air entry  Gastrointestinal:  distended and firm, non tender, BS +, pt has a davila's  Musculoskeletal:edema ++ lower extremities right leg>left leg  Skin: warm, dry, some skin irritation in the groin folds  Neurologic: drowsy, intermittent confusion especially when she wakes up  Psychiatric: fluctuating mood, calm affect  Other: davila's: urine clearer      Pertinent Lab and or Imaging Tests: Total time:35  mts  Counseling / coordination time:   > 50% counseling / coordination?:       This patient meets Hospice General Inpatient (GIP) Level of Care.       Supporting documentation for GIP need for pain control:  [x] Frequent evaluation by a doctor, nurse practitioner, nurse   [x] Frequent medication adjustment    [x] IVs that cannot be administered at home   [x] Aggressive pain management   [] Complicated technical delivery of medications              Supporting documentation for GIP need for symptom control:  [x]  Sudden decline necessitating intensive nursing intervention  []  Uncontrolled / intractable nausea or vomiting   [x]  Pathological fractures  []  Advanced open wounds requiring frequent skilled care  [] Unmanageable respiratory distress  [x] New or worsening delirium   [] Delirium with behavior issues  [] Imminent death  with skilled nursing needs documented above  Enrrique Barcenas MD MD addendum    Pt seen & case discussed with Ms. Alistair Meza. Agree with current care plan as outlined in her note. Pt overall has been doing slightly better; pain still an issue especially with movements and affects her ADL care. Pt slowly accepting her poor prognosis and her main worry is her mother and the emotional impact of her approaching death on mom. She stays anxious about issues and is needing ativan and benadryl often to control the symptom. Discussed case in length during IDG session and with SW Ms. Willow Cameron. We will need to start discharge planning soon. Will continue to follow for comfort and adjust medications/care plan accordingly.

## 2017-06-14 NOTE — HOSPICE
NAME OF PATIENT:  Jyoti Randall    LEVEL OF CARE:  GIP    REASON FOR GIP:   Pain, despite numerous changes in medications, Medication adjustment that must be monitored 24/7 and Stabilizing treatment that cannot take place at home    *PATIENT REMAINS ELIGIBLE FOR [facility] LEVEL OF CARE AS EVIDENCED BY: (MUST BE ADDRESSED OF PATIENT GIP) Pt has PCA Dilaudid,dose increased today,also has prn meds for breakthrough      REASON FOR RESPITE:  N/A    O2 SAFETY:  N/A    FALL INTERVENTIONS PROVIDED:   Implemented/recommended use of non-skid footwear, Implemented/recommended use of fall risk identification flag to all team members, Implemented/recommended assistive devices and encouraged their use, Implemented/recommended resources for alarm system (personal alarm, bed alarm, call bell, etc.)  and Implemented/recommended environmental changes (remove hazards, lower bed, improve lighting, etc.)    INTERDISPLINARY COMMUNICATION/COLLABORATION:  Physician, MSW, [place] and RN, CNA    NEW MEDICATION INITIATION DOCUMENTATION:  N/a    Reason medication is being initiated:      MD / Provider name consulted re: change in status / initiation of new medication:      New Symptom(s):      New Order(s):      Name of the person notified of the changes:      Name of person being taught:      Instructions given:      Side Effects taught:    Response to teachin [address] free  Is Patient/family satisfied with symptom level?  yes    DISCHARGE PLAN:  Return home with family and be followed by home hospice once sx are managed,unless pt passes away here first.    20:00,report received,assumed care of pt who is GIP for pain,hospice dx is metastatic endometrial cancer. Pt is drowsy but easily aroused. PCA Dilaudid continues,pt rates generalized pain 6/10. Pt is now on an air mattress for added comfort. Edema of bilateral extremities remains 2-3+,rt>left. Pt refusing to be repositioned @ this time.   21:30,pt refuses bath or repositioning @ this time. 23:30,pt asleep.  01:45,awake,Benadryl 50mg po given for sleep. Pain level is 3-4,watching TV.  04:00,pt asleep.  06:00,awake,given scheduled Methadone,pt has had 2 attempts and 2 been given 2 PCA doses of Dilaudid this past shift.

## 2017-06-14 NOTE — PROGRESS NOTES
0800 Patient given her scheduled medications. Pt reporting itching to her back and requesting benadryl, PRN dose administered. Powder applied to patient's back. [id number] Patient sitting up in bed consuming breakfast.  1100 Patient asleep  1230 Visitors at the bedside. 1426 Patient given her scheduled medications. 1730 Patient given her scheduled medications. Patient's sister at the bedside. Patient requesting a PRN dose of benadryl. 1900 Report given to Morro Lerma RN. Patient's pump cleared. Res volume 280.3 ml, 10 attemps/7 given within 24 hours, 34.75 mg given in the last 14 hours. NAME OF PATIENT:  Jyoti Randall    LEVEL OF CARE:  GIP    REASON FOR GIP:   Pain, despite numerous changes in medications    *PATIENT REMAINS ELIGIBLE FOR GIP LEVEL OF CARE AS EVIDENCED BY: Adjustments in medications that cannot take place in the home       REASON FOR RESPITE:  NA    O2 SAFETY:  NA    FALL INTERVENTIONS PROVIDED:   Implemented/recommended resources for alarm system (personal alarm, bed alarm, call bell, etc.)  and Implemented/recommended environmental changes (remove hazards, lower bed, improve lighting, etc.)    INTERDISPLINARY COMMUNICATION/COLLABORATION:  Physician, MSW, [place] and RN, CNA    NEW MEDICATION INITIATION DOCUMENTATION:  NA    Reason medication is being initiated:  NA    MD / Provider name consulted re: change in status / initiation of new medication:  NA    New Symptom(s):  NA    New Order(s):  NA    Name of the person notified of the changes:  NA    Name of person being taught: NA    Instructions given:  NA    Side Effects taught:  NA    Response to teaching:  NA      COMFORTABLE DYING MEASURE:  Is Patient/family satisfied with symptom level? NA    DISCHARGE PLAN:  Patient to discharge home to the care of her family, or to pass away at the [facility].

## 2017-06-15 NOTE — PROGRESS NOTES
NAME OF PATIENT:  Michae Frankel Barksdale    LEVEL OF CARE:  GIP    REASON FOR GIP:   Pain, despite numerous changes in medications, Medication adjustment that must be monitored 24/7 and Stabilizing treatment that cannot take place at home    *PATIENT REMAINS ELIGIBLE FOR GIP LEVEL OF CARE AS EVIDENCED BY: (MUST BE ADDRESSED OF PATIENT GIP) Continue GIP level of care due to changing medication requirements, adjustments, additions of opioid pain mediations for relief and adjuvant therapy medications as tolerated, and continuous monitoring while pt in delirious state. REASON FOR RESPITE:  n/a    O2 SAFETY:  Concentrator positioning (6\" from furniture/drapes), Tanks stored in ro[place]íguez , No petroleum based products on face while oxygen in use and Oxygen sign on the door    FALL INTERVENTIONS PROVIDED:   Implemented/recommended resources for alarm system (personal alarm, bed alarm, call bell, etc.) , Implemented/recommended environmental changes (remove hazards, lower bed, improve lighting, etc.) and Implemented/recommended increased supervision/assistance    INTERDISPLINARY COMMUNICATION/COLLABORATION:  Physician, MSW, [place] and RN, CNA    NEW MEDICATION INITIATION DOCUMENTATION:  n/a    Reason medication is being initiated:  n/a    MD / Provider name consulted re: change in status / initiation of new medication:  n/a    New Symptom(s):  n/a    New Order(s):  n/a    Name of the person notified of the changes:  n/a    Name of person being taught:  n/a    Instructions given:  n/a    Side Effects taught:  n/a    Response to teaching:  n/a      COMFORTABLE DYING MEASURE:  Is Patient/family satisfied with symptom level?  yes    DISCHARGE PLAN:  Once symptoms have been managed, patient will go home to be cared for my family and followed by [facility].

## 2017-06-15 NOTE — PROGRESS NOTES
0700 Report received from [place] [facility]. Pt is GIP for pain management. Pt is alert and oriented this morning. She rates her pain as 4 of 10 at rest. She was sleeping with no facial grimace. Her best friend is at the bedside. 0900 Visitors in the room. Pt is alert. Ate 1/2 breakfast.  1100 Visitors at the bedside, pts Cousinsfrom Out of town. Pt is very alert and laughing and talking with them  1200  Dr Flora Zamora in to assess the pt. Pt states Benadryl is required for itching. She thinks it is caused by the Dilaudid. Pt request 1/2 the dose of Benadryl. Approved by Dr Flora Zamora. Pt request additional IVP dose of Dilaudid and Benadryl. Dilaudid 3mg IVP and Benadryl 25mg given for discomfort. Rates pain 6 out of 10.  1230 Pt is drowsy. States pain is improved to 3 of 10.  1400  Rachele Left at the bedside. Pt is drifting in and out of sleep. 1530 Appears to be asleep. 1730 Partial bath given. Pt repositioned for comfort. Refused dinner.  Waiting for family to bring in        NAME OF PATIENT:  [address] [facility] OF CARE: GIP    REASON FOR GIP:   Pain, despite numerous changes in medications, Medication adjustment that must be monitored 24/7 and Stabilizing treatment that cannot take place at home    *PATIENT REMAINS ELIGIBLE FOR GIP LEVEL OF CARE AS EVIDENCED BY: (MUST BE ADDRESSED OF PATIENT GIP) Frequent assessment and med chnages required to manage symptoms      REASON FOR RESPITE:  na    O2 SAFETY:  prn use only    FALL INTERVENTIONS PROVIDED:   Implemented/recommended use of fall risk identification flag to all team members, Implemented/recommended resources for alarm system (personal alarm, bed alarm, call bell, etc.) , Implemented/recommended environmental changes (remove hazards, lower bed, improve lighting, etc.) and Implemented/recommended increased supervision/assistance    INTERDISPLINARY COMMUNICATION/COLLABORATION:  Physician, MSW, [place] and RN, CNA    NEW MEDICATION INITIATION DOCUMENTATION:  Documentation completed in Clinical Note in [facility] Care    Reason medication is being initiated: To manage itching caused by medication    MD / Provider name consulted re: change in status / initiation of new medication:  Dr Dorothy Smith Symptom(s):  na    New Order(s):  Benadryl dose decreased to 25mg from 50mg    Name of the person notified of the changes:  pt    Name of person being taught:  pt    Instructions given:   Remains prn, dose decreased per pt request    Side Effects taught:  na    Response to teaching:  voices understanding      COMFORTABLE DYING MEASURE:  Is Patient/family satisfied with symptom level? Yes    DISCHARGE PLAN:  Remain at [facility] for care unmanageable at home.

## 2017-06-15 NOTE — PROGRESS NOTES
[facility]  [phone]    Patient Name: Megan Alfredo  YOB: 1980    Date of Provider Hospice Visit: 06/15/17    Level of Care:   [x] General Inpatient (GIP)    [] Routine   [] Respite    Location of Care:  [] [facility] [] [facility] [] [address] [] [facility] [x] [facility]ospice [facility]  [] Home [] Other:      Date of Original Hospice Admission: 5-16-17  Hospice Medical Director for Inpatient Care: Dr. Jony De Luna Diagnosis:  Metastatic Endometrial Adenocarcinoma       HOSPICE DIAGNOSES   Active Symptoms:  1. Generalised pain, especially right lower extremity, lower back, especially with movements  2. Delirium; Fluctuates, mild confusion at times  3. Shortness of breath with low O2 sats: improves with use of O2  4. Anxiety/depression; still an issue  5. Insomnia   6. Fatigue/weakness/lethargy: worse   7. Constipation  8. Itching     PLAN   1. Continue GIP level of care due to changing medication requirements, adjustments, additions of opioid pain mediations for relief and adjuvant therapy medications as tolerated, and continuous monitoring while pt in delirious state   2. Continue ativan 1mg  three times daily as needed for anxiety. 3. Continue low dose ativan 0.5mg twice daily  4. Continue PCA dilaudid 2mg / hr continuous, PCA dose of 1mg every 6 mts. 5. Continue nurse given dilaudid 3mg IV every 15mts as needed for severe pain. 6. Continue gabapentin 300mg at bedtime  7. Please set up air mattress for her continued comfort  8. Continue clinoril, Cymbalta for adjuvant therapy for bone pain  9. Continue methadone at current levels 10mg every 8 hrs  10. Benadryl 25mg oral everfy 4 hrs as needed for itching  11. Continue Toradol 30mg IV every 8 hours as needed for adjuvant pain control; antiinflammatory. 12. Pt is now laying supine, nurses helping to reposition and turn to side during cleaning/ADL care and to prevent skin breakdown.    13. Weekly care conference with team  14.  and SW to support family needs: family in need of ongoing psychosocial, emotional and spiritual support. 15. Disposition: home or routine care status when symptoms are stable: SW is having conversations with family now regarding resources for support at home versus routine care status here at [facility]    Prognosis estimated based on 06/15/17 clinical assessment is:   [] Few to Many Hours  [] Hours to Days   [] Few to Many Days   [] Days to Weeks    [x] Few to Many Weeks   [] Weeks to Months   [] Few to Many Months    Communicated plan of care with: Hospice Case Manager; Hospice IDT; Care Team     GOALS OF CARE     Resuscitation Status: DNR  Durable DNR: [x] Yes [] No    Advance Care Planning 5/10/2017   Patient's Healthcare Decision Maker is: Legal Next of Andrea 69   Primary Decision Maker Name Ulises West   Primary Decision Maker Phone Number -   Primary Decision Maker Relationship to Patient Parent   Confirm Advance Directive Yes, on file        HISTORY     History obtained from: chart, staff    CHIEF COMPLAINT: pt c/o itching  The patient is:   [x] Verbal  [] Nonverbal  [] Unresponsive    HPI/SUBJECTIVE:  Pt remains weak, pain control gradually improving. Pt on dilaudid PCA and had 10 attempts in one day and got 7 doses. Pt having itching for about a week since PCA was changed from morphine to dilaudid however it is not too bad and not getting worse and has no skin rash/hives etc. Pt says itching is tolerable and responds well to benadryl and she does not want the dilaudid to be changed/stopped as it is helping her with better pain relief. Pt got 1 prn ativan, 1 toradol prn, 2 prn dilaudid overnight.         REVIEW OF SYSTEMS     The following systems were: [x] reviewed  [] unable to be reviewed    Positive ROS include:  Constitutional: fatigue, weakness  Ears/nose/mouth/throat:   Respiratory:shortness of breath  Gastrointestinal:  Musculoskeletal:pain in legs, joints, swelling legs, itching  Neurologic: numbness and burning pain, confusion  Psychiatric:anxiety, depression  Endocrine:          FUNCTIONAL ASSESSMENT     Palliative Performance Scale (PPS): 30%     PSYCHOSOCIAL/SPIRITUAL ASSESSMENT     Active Problems:    * No active hospital problems. *    Past Medical History:   Diagnosis Date    Cancer [facility])     uterine    CVI (common variable immunodeficiency) (HCC)     [facility] [address]     Elevated liver function tests 10/21/2010    [facility]2010    Hypertension     Iron deficiency anemia     Menometrorrhagia 10/21/2010    Microcytic anemia 10/21/2010    Morbid obesity [facility]     Prediabetes 7/7/2010    Psychiatric disorder     depression    Radiation     completed radiation mid-march      Past Surgical History:   Procedure Laterality Date    CARDIAC SURG PROCEDURE UNLIST      hx of tachycardia    HX GYN      hysterectomy      Social History   Substance Use Topics    Smoking status: Never Smoker    Smokeless tobacco: Never Used    Alcohol use Yes     Family History   Problem Relation Age of Onset    Hypertension Mother     Hypertension Father     Stroke Maternal Grandfather     Cancer Paternal Grandmother      breast    Diabetes Paternal Grandmother       Allergies   Allergen Reactions    Egg Nausea and Vomiting     Raw egg and scrambled eggs. Egg products okay.      Pcn [Penicillins] Nausea and Vomiting     \"but could take amoxil\"    Shellfish Containing Products Unknown (comments)    Tetanus And Diphtheria Toxoids, Adsorbed, Adult Other (comments)     Developed local swelling, axillary pain, and transient fever    Tomato Unknown (comments)      Current Facility-Administered Medications   Medication Dose Route Frequency    diphenhydrAMINE (BENADRYL) capsule 25 mg  25 mg Oral Q4H PRN    HYDROmorphone (PF) (DILAUDID) injection 3 mg  3 mg IntraVENous Q15MIN PRN    LORazepam (ATIVAN) tablet 0.5 mg  0.5 mg Oral BID    ketorolac (TORADOL) injection 30 mg  30 mg IntraVENous Q8H PRN    acetaminophen (TYLENOL) tablet 650 mg  650 mg Oral Q4H PRN    Hydromorphone 500mg/500mL bag [employer]   IntraVENous CONTINUOUS    gabapentin (NEURONTIN) capsule 300 mg  300 mg Oral QHS    LORazepam (ATIVAN) tablet 1 mg  1 mg Oral TID PRN    metoprolol tartrate (LOPRESSOR) tablet 25 mg  25 mg Oral BID    methadone (DOLOPHINE) tablet 10 mg  10 mg Oral Q8H    polyethylene glycol (MIRALAX) packet 17 g  17 g Oral DAILY    magic mouthwash cpd (without sucralfate)  5 mL Oral QID PRN    docusate sodium (COLACE) capsule 100 mg  100 mg Oral DAILY    DULoxetine (CYMBALTA) capsule 60 mg  60 mg Oral QHS    sulindac (CLINORIL) tablet 200 mg  200 mg Oral BID WITH MEALS        PHYSICAL EXAM     Wt Readings from Last 3 Encounters:   05/30/17 121.1 kg (267 lb)   05/10/17 121.4 kg (267 lb 10.2 oz)   02/16/17 136.1 kg (300 lb)       Visit Vitals    /60    Pulse 68    Temp 98.1 °F (36.7 °C)    Resp 16    SpO2 90%         Supplemental O2  [x] Yes  [] NO  Last bowel movement:     Currently this patient has:  [] Peripheral IV [x] PICC  [] PORT [] ICD    [x] Davila Catheter [] NG Tube   [] PEG Tube    [] Rectal Tube [] Drain  [x] Other: now with a special mattress    Constitutional: pale, laying on her back, alert, resting comfortably  Eyes: pallor++  ENMT: moist oral mucosa  Cardiovascular: distant heart sounds, tachycardic  Respiratory:  Normal breathing, diminished air entry  Gastrointestinal:  distended and firm, non tender, BS +, pt has a davila's  Musculoskeletal:edema ++ lower extremities right leg>left leg  Skin: warm, dry, some skin irritation in the groin folds, itching   Neurologic: drowsy, intermittent confusion especially when she wakes up  Psychiatric: fluctuating mood, calm affect  Other: davila's: urine clear      Pertinent Lab and or Imaging Tests:           Total time:35  mts  Counseling / coordination time: 13 mts discussing with staff   > 50% counseling / coordination?: n      This patient meets Hospice General Inpatient (GIP) Level of Care.       Supporting documentation for GIP need for pain control:  [x] Frequent evaluation by a doctor, nurse practitioner, nurse   [x] Frequent medication adjustment    [x] IVs that cannot be administered at home   [x] Aggressive pain management   [] Complicated technical delivery of medications              Supporting documentation for GIP need for symptom control:  [x]  Sudden decline necessitating intensive nursing intervention  []  Uncontrolled / intractable nausea or vomiting   [x]  Pathological fractures  []  Advanced open wounds requiring frequent skilled care  [] Unmanageable respiratory distress  [x] New or worsening delirium   [] Delirium with behavior issues  [] Imminent death  with skilled nursing needs documented above  Lucho Rand MD

## 2017-06-15 NOTE — PROGRESS NOTES
[address]  follow-up Visit  to [facility] pt on GIP Status      I visited the room of this pt on GIP Status. She was in bed, awake and alert. She did not appear agitated, not restless, and said she was comfortable. She was still lying on her back and appeared more awake and alert than during my last visit. We spoke about the wonderful support and relationships she had developed during this difficult time and the support she received from her Zoroastrian, [facility], her family, and from Rosana. Her spirits were moderate and her attitude good. I also read to her several Psalms . I also affirmed her great geovanny and trust in God. I confirmed that I would be continue to visit, if she so desired. She was agreeable to periodic visits. She thanked me for visiting and my offer of support. GOALS:  Continue to visit this pt and her family while she is at the [facility] and I am in the facility, to provide pastoral care and spiritual support to assist both the pt and them with coping with this difficult medical situation and decline. Coordinate w/ Chaplain Rankin as she assumes primary care for this patient  Coordinate with Chaplain Foster if/when the pt returns to her mother home. PLAN:  Continue to provide supplemental  support to that being provided by Rosana. Coordinate with Rosana as requested. Coordinate with Care Team on POC.  VISIT FREQUENCY:  1 wk 2 starting 6/6 plus 4 prn 14 days.   Davidson Emery, [facility], [address] [facility]  [phone]

## 2017-06-15 NOTE — PROGRESS NOTES
Problem: Pressure Injury - Risk of  Goal: *Prevention of pressure ulcer  Outcome: Progressing Towards Goal  Assessment of skin at beginning of shift; no s/s of skin breakdown. Frequent turning/repositioning along with assessment of skin. Will continue to monitor.

## 2017-06-15 NOTE — PROGRESS NOTES
1915:  Verbal shift change report given to Marin Townsend RN (oncoming nurse) by Kelsea Hurst RN (offgoing nurse). Report included the following information SBAR, Kardex, Intake/Output and MAR.   1950:  Patient stating she is in pain (8/10) and has pushed PCA. Administered prn dilaudid 3mg/IV. 0240:  Patient rang call-bell and requested ativan & benadryl. Educated patient on side-affects of dilaudid; itching. 0505:  Patient requested toradol for pain in her joints; administered prn toradol 30mg/IV.  0715:  Verified rate with Rosita Osler, RN and cleared pump:  Reservoir 248.3mL; attempt 12/given 11; total given 32.05mg.

## 2017-06-15 NOTE — PROGRESS NOTES
Problem: Pain  Goal: *Control of Pain  Outcome: Progressing Towards Goal  Frequent assessment for pain level using numeric scale (0-10). Pain stated pain 8/10 at being of shift; administered prn medication and use of scheduled pain medications. Presently, patient is stating that her pain is manageable. Will continue to monitor.

## 2017-06-16 NOTE — PROGRESS NOTES
[facility]  [phone]    Patient Name: Roxana Dawn  YOB: 1980    Date of Provider Hospice Visit: 06/16/17    Level of Care:   [x] General Inpatient (GIP)    [] Routine   [] Respite    Location of Care:  [] [facility] [] [facility] [] [facility] [] [facility] [x] Hospice House [facility]  [] Home [] Other:      Date of Original Hospice Admission: 5-16-17  Hospice Medical Director for Inpatient Care: Dr. Kwadwo Armendariz Diagnosis:  Metastatic Endometrial Adenocarcinoma       HOSPICE DIAGNOSES   Active Symptoms:  1. Generalised pain, especially right lower extremity, lower back, especially with movements  2. Delirium;  much less/improved, mild confusion at times  3. Shortness of breath: intermittent  4. Anxiety/depression; still an issue  5. Insomnia   6. Fatigue/weakness/lethargy: worse   7. Constipation  8. Itching: less     PLAN   1. Continue GIP level of care due to changing medication requirements, adjustments, additions of opioid pain mediations for relief and adjuvant therapy medications as tolerated, and close monitoring  2. Continue ativan 1mg  three times daily as needed for anxiety. 3. Continue low dose ativan 0.5mg twice daily  4. Continue PCA dilaudid 2mg / hr continuous, PCA dose of 1mg every 6 mts. 5. Continue nurse given dilaudid 3mg IV every 15mts as needed for severe pain. 6. Continue gabapentin 300mg at bedtime  7. Please set up air mattress for her continued comfort  8. Continue clinoril, Cymbalta for adjuvant therapy for bone pain  9. Continue methadone at current levels 10mg every 8 hrs  10. Benadryl 25mg oral everfy 4 hrs as needed for itching  11. Continue Toradol 30mg IV every 8 hours as needed for adjuvant pain control; antiinflammatory. 12. Pt is now laying supine, nurses helping to reposition and turn to side during cleaning/ADL care and to prevent skin breakdown. 13. Weekly care conference with team  14.   and SW to support family needs: family in need of ongoing psychosocial, emotional and spiritual support. 15. Disposition: home or routine care status when symptoms are stable: SW is having conversations with family now regarding resources for support at home versus routine care status here at [facility]    Prognosis estimated based on 06/16/17 clinical assessment is:   [] Few to Many Hours  [] Hours to Days   [] Few to Many Days   [] Days to Weeks    [x] Few to Many Weeks   [] Weeks to Months   [] Few to Many Months    Communicated plan of care with: Hospice Case Manager; Hospice IDT; Care Team     GOALS OF CARE     Resuscitation Status: DNR  Durable DNR: [x] Yes [] No    Advance Care Planning 5/10/2017   Patient's Healthcare Decision Maker is: Legal Next of Andrea 69   Primary Decision Maker Name Jovani Aponte   Primary Decision Maker Phone Number -   Primary Decision Maker Relationship to Patient Parent   Confirm Advance Directive Yes, on file        HISTORY     History obtained from: chart, staff    CHIEF COMPLAINT: pt c/o itching  The patient is:   [x] Verbal  [] Nonverbal  [] Unresponsive    HPI/SUBJECTIVE: pt states she is tired and sleepy, unable to get eyes open for long, pain comes and goes, off and on. Pt got 3 prn ativan & 5 prn dilaudid overnight into today. Has not needed anymore benadryl since yesterday. Got 6 doses with 6 attempts on dilaudid PCA. Pain rates at 3-4/10 mostly, worse with movements.         REVIEW OF SYSTEMS     The following systems were: [x] reviewed  [] unable to be reviewed    Positive ROS include:  Constitutional: fatigue, weakness  Ears/nose/mouth/throat:   Respiratory:shortness of breath  Gastrointestinal:  Musculoskeletal:pain in legs, joints, swelling legs, itching  Neurologic: numbness and burning pain, confusion  Psychiatric:anxiety, depression  Endocrine:          FUNCTIONAL ASSESSMENT     Palliative Performance Scale (PPS): 30%     PSYCHOSOCIAL/SPIRITUAL ASSESSMENT     Active Problems:    * No active hospital problems. *    Past Medical History:   Diagnosis Date    Cancer [facility])     uterine    CVI (common variable immunodeficiency) (HCC)     [facility]     Elevated liver function tests 10/21/2010    Endometrial cancer ([facility] [address] 7/7/2010    Hypertension     Iron deficiency anemia     Menometrorrhagia 10/21/2010    Microcytic anemia 10/21/2010    Morbid obesity ([facility] [facility]     Prediabetes 7/7/2010    Psychiatric disorder     depression    Radiation     completed radiation mid-march      Past Surgical History:   Procedure Laterality Date    CARDIAC SURG PROCEDURE UNLIST      hx of tachycardia    HX GYN      hysterectomy      Social History   Substance Use Topics    Smoking status: Never Smoker    Smokeless tobacco: Never Used    Alcohol use Yes     Family History   Problem Relation Age of Onset    Hypertension Mother     Hypertension Father     Stroke Maternal Grandfather     Cancer Paternal Grandmother      breast    Diabetes Paternal Grandmother       Allergies   Allergen Reactions    Egg Nausea and Vomiting     Raw egg and scrambled eggs. Egg products okay.      Pcn [Penicillins] Nausea and Vomiting     \"but could take amoxil\"    Shellfish Containing Products Unknown (comments)    Tetanus And Diphtheria Toxoids, Adsorbed, Adult Other (comments)     Developed local swelling, axillary pain, and transient fever    Tomato Unknown (comments)      Current Facility-Administered Medications   Medication Dose Route Frequency    diphenhydrAMINE (BENADRYL) capsule 25 mg  25 mg Oral Q4H PRN    HYDROmorphone (PF) (DILAUDID) injection 3 mg  3 mg IntraVENous Q15MIN PRN    LORazepam (ATIVAN) tablet 0.5 mg  0.5 mg Oral BID    ketorolac (TORADOL) injection 30 mg  30 mg IntraVENous Q8H PRN    acetaminophen (TYLENOL) tablet 650 mg  650 mg Oral Q4H PRN    Hydromorphone 500mg/500mL bag [employer]   IntraVENous CONTINUOUS    gabapentin (NEURONTIN) capsule 300 mg  300 mg Oral QHS    LORazepam (ATIVAN) tablet 1 mg  1 mg Oral TID PRN    metoprolol tartrate (LOPRESSOR) tablet 25 mg  25 mg Oral BID    methadone (DOLOPHINE) tablet 10 mg  10 mg Oral Q8H    polyethylene glycol (MIRALAX) packet 17 g  17 g Oral DAILY    magic mouthwash cpd (without sucralfate)  5 mL Oral QID PRN    docusate sodium (COLACE) capsule 100 mg  100 mg Oral DAILY    DULoxetine (CYMBALTA) capsule 60 mg  60 mg Oral QHS    sulindac (CLINORIL) tablet 200 mg  200 mg Oral BID WITH MEALS        PHYSICAL EXAM     Wt Readings from Last 3 Encounters:   05/30/17 121.1 kg (267 lb)   05/10/17 121.4 kg (267 lb 10.2 oz)   02/16/17 136.1 kg (300 lb)       Visit Vitals    /60    Pulse 63    Temp 98.4 °F (36.9 °C)    Resp 16    SpO2 100%         Supplemental O2  [x] Yes  [] NO  Last bowel movement:     Currently this patient has:  [] Peripheral IV [x] PICC  [] PORT [] ICD    [x] Davila Catheter [] NG Tube   [] PEG Tube    [] Rectal Tube [] Drain  [x] Other: now with a special mattress    Constitutional: pale, laying on her back, alert, resting comfortably  Eyes: pallor++  ENMT: moist oral mucosa  Cardiovascular: distant heart sounds, tachycardic  Respiratory:  Normal breathing, diminished air entry  Gastrointestinal:  distended and firm, non tender, BS +, pt has a davila's  Musculoskeletal:edema ++ lower extremities right leg>left leg  Skin: warm, dry, some skin irritation in the groin folds, itching   Neurologic: drowsy, intermittent confusion especially when she wakes up  Psychiatric: fluctuating mood, calm affect  Other: davila's: urine clear      Pertinent Lab and or Imaging Tests: Total time:35  mts  Counseling / coordination time: 13 mts discussing with staff   > 50% counseling / coordination?: n      This patient meets Hospice General Inpatient (GIP) Level of Care.       Supporting documentation for GIP need for pain control:  [x] Frequent evaluation by a doctor, nurse practitioner, nurse   [x] Frequent medication adjustment    [x] IVs that cannot be administered at home   [x] Aggressive pain management   [] Complicated technical delivery of medications              Supporting documentation for GIP need for symptom control:  [x]  Sudden decline necessitating intensive nursing intervention  []  Uncontrolled / intractable nausea or vomiting   [x]  Pathological fractures  []  Advanced open wounds requiring frequent skilled care  [] Unmanageable respiratory distress  [x] New or worsening delirium   [] Delirium with behavior issues  [] Imminent death  with skilled nursing needs documented above  Melody Javier MD

## 2017-06-16 NOTE — PROGRESS NOTES
0700 Report received from [facility], [facility]. Pt is GIP for pain control. 0830 Pt awakened for breakfast. Pt refuses breakfast, states she wants to sleep a while longer. Rates pain as a 3 of 10. C/o soreness. 1030 Pt appears to be asleep. PCA Dilaudid required to manage pain. Pt uses PCA dose prn for breakthrough. 1200 Pts family in to meet with [facility] MSW.  1400 Pt has slept a great amount today. States she is just tired today. States she has soreness but has requested no bolus Dilaudid for pain. 1700 Pt awake. Assisted with partial bath and oral care.                 NAME OF PATIENT:  Jyoti Randall    LEVEL OF CARE:  [facility]    REASON FOR GIP:   Pain, despite numerous changes in medications, Medication adjustment that must be monitored 24/7 and Stabilizing treatment that cannot take place at home    *PATIENT REMAINS ELIGIBLE FOR [facility] LEVEL OF CARE AS EVIDENCED BY: (MUST BE ADDRESSED OF PATIENT GIP)      REASON FOR RESPITE:  na    O2 SAFETY:  na    FALL INTERVENTIONS PROVIDED:   Implemented/recommended use of fall risk identification flag to all team members, Implemented/recommended resources for alarm system (personal alarm, bed alarm, call bell, etc.) , Implemented/recommended environmental changes (remove hazards, lower bed, improve lighting, etc.) and Implemented/recommended increased supervision/assistance    INTERDISPLINARY COMMUNICATION/COLLABORATION:  Physician, MSW, Leta and RN, CNA    NEW MEDICATION INITIATION DOCUMENTATION:  Documentation completed in Clinical Note in [facility]    Reason medication is being initiated:  carmen    MD / Provider name consulted re: change in status / initiation of new medication:  Dr Sourav Mujica Symptom(s):  na    New Order(s):  na  Name of the person notified of the changes: na    Name of person being taught:  na    Instructions given:  na    Side Effects taught:  na    Response to teaching:  na      COMFORTABLE DYING MEASURE:  Is Patient/family satisfied with symptom level?   Yes    DISCHARGE PLAN: Discharge to home when symptoms managed or remain at [facility] until end of life

## 2017-06-16 NOTE — PROGRESS NOTES
1900: Shift report received from [facility], [address] [facility].  2030: Pt in bed, alert and oriented. About to medicate pt, but she sates she is about to have a BM. Chux placed underneath and she was reminded to call when she needs assistance. PCA Dilaudid pump running at 2 mg/ hour basal rate with 1 mg bolus rate, available every 6 minutes. 2100: Pt had BM and was cleaned up. Moving her is very difficult as she is in severe pain when her right leg is moved at all. Pt medicated with PRN Dilaudid 3 mg. Took three staff members to turn pt, pt is very frightful of pain and needs to be moved very slowly. Pt needs to know she is in control of any movement, no sudden actions should be taken. Pts skin intact and warm. 2230: Pt requested another PRN dose of Dilaudid for pain at 4/10. Pt is alert and oriented at this time. 0045: Pt requests another PRN dose of pain meds, so she is receiving 3 mg of Dilaudid at this time. Pt states she has pushed the PCA pump as well pain still hasn't been relieved. Pt rates pain at 7/10 at this time. 0340: Pt called to ask for PRN Dilaudid and Ativan. Medicated at this time. 0630: Pt had been asleep. Woke up upon RN entering the room. Pt medicated with scheduled pain meds. States pain is at 7/10. PCA pump: 185.7 ml/mg remaining in Dilaudid pump, 9 attempts made for bolus, 8 received since 1800 of 6/15/17. 33.75 mg received since 1800 of 6/15/17. Pump cleared. Pump continuous to run at 2 mg/ hour basal rate and 1 mg bolus every 6 mins.     NAME OF PATIENT:  Jyoti Randall    LEVEL OF CARE:  GIP    REASON FOR GIP:   Pain, despite numerous changes in medications and Stabilizing treatment that cannot take place at home    *PATIENT REMAINS ELIGIBLE FOR GIP LEVEL OF CARE AS EVIDENCED BY: (MUST BE ADDRESSED OF PATIENT GIP)  Pt has severe pain when being turned      REASON FOR RESPITE:  n/a    O2 SAFETY:  Concentrator positioning (6\" from furniture/drapes), No petroleum based products on face while oxygen in use and Oxygen sign on the door    FALL INTERVENTIONS PROVIDED:   Implemented/recommended resources for alarm system (personal alarm, bed alarm, call bell, etc.)     INTERDISPLINARY COMMUNICATION/COLLABORATION:  Physician, MSW, Leta and RN, CNA    NEW MEDICATION INITIATION DOCUMENTATION:  n/a    COMFORTABLE DYING MEASURE:  Is Patient/family satisfied with symptom level?  no    DISCHARGE PLAN:  Pt may return home if status improves and symptoms are managed.

## 2017-06-17 NOTE — PROGRESS NOTES
Verbal shift change report given to Serenity Conrad, RN by Jazmyn Britton RN. Report included the following information SBAR, Kardex, Intake/Output and MAR.      NAME OF PATIENT: Jyoti Randall      LEVEL OF CARE: GIP      REASON FOR RESPITE: Patient not in Respite care at this time.       REASON FOR GIP:   Pain, despite numerous changes in medications, Medication adjustment that must be monitored 24/7 and Stabilizing treatment that cannot take place at home      PATIENT REMAINS ELIGIBLE FOR GIP LEVEL OF CARE AS EVIDENCED BY: GIP level of care due to changing medication requirements, adjustments, additions of opioid pain mediations for relief and adjuvant therapy medications as tolerated, and continuous monitoring which was not accomplished at home.      REASON FOR RESPITE: Patient is not in Respite care at this time.      O2 SAFETY: Patient is on room air at this time.       FALL INTERVENTIONS PROVIDED:   Implemented/recommended use of non-skid footwear, Implemented/recommended use of fall risk identification flag to all team members, Implemented/recommended assistive devices and encouraged their use, Implemented/recommended resources for alarm system (personal alarm, bed alarm, call bell, etc.) , Implemented/recommended environmental changes (remove hazards, lower bed, improve lighting, etc.) and Implemented/recommended increased supervision/assistance      INTERDISPLINARY COMMUNICATION/COLLABORATION: Physician, MSW, Leta and RN, CNA      NEW MEDICATION INITIATION DOCUMENTATION: No new medications nor interventions begun on this night shift.       Reason medication is being initiated: N/A      MD / Provider name consulted re: Change in status/initiation of new medication: N/A      New Symptom(s): N/A      New Order(s): N/A      Name of the person notified of the changes: N/A      Name of the person being taught: N/A      Instructions given: N/A      Side Effects taught: N/A      Response to teaching: N/A      COMFORTABLE DYING MEASURE: Continue to monitor for pain, dyspnea, agitation, and restlessness and intervene accordingly.       Is Patient/Family satisfied with symptom level?  Yes      DISCHARGE PLAN: Patient to be discharged home in her mother, Libra's care under the continuing care of [facility] once symptoms can be managed effectively at home.

## 2017-06-17 NOTE — PROGRESS NOTES
Problem: Pain  Goal: *Control of Pain  Outcome: Progressing Towards Goal  Assessment of pain level at beginning of shift and throughout; patient's pain is chronic and level of pain is 4/10 which is acceptable to the patient. Will continue to monitor.

## 2017-06-17 NOTE — PROGRESS NOTES
NAME OF PATIENT:  Anish Randall    LEVEL OF CARE:  GIP    REASON FOR GIP:   Pain, despite numerous changes in medications, Medication adjustment that must be monitored 24/7 and Stabilizing treatment that cannot take place at home    *PATIENT REMAINS ELIGIBLE FOR GIP LEVEL OF CARE AS EVIDENCED BY: (MUST BE ADDRESSED OF PATIENT GIP) Continue GIP level of care due to changing medication requirements, adjustments, additions of opioid pain mediations for relief and adjuvant therapy medications as tolerated, and close monitoring. REASON FOR RESPITE:  n/a    O2 SAFETY:  n/a    FALL INTERVENTIONS PROVIDED:   Implemented/recommended use of fall risk identification flag to all team members, Implemented/recommended resources for alarm system (personal alarm, bed alarm, call bell, etc.) , Implemented/recommended environmental changes (remove hazards, lower bed, improve lighting, etc.) and Implemented/recommended increased supervision/assistance    INTERDISPLINARY COMMUNICATION/COLLABORATION:  Physician, MSW, [place] and RN, CNA    NEW MEDICATION INITIATION DOCUMENTATION:  n/a    Reason medication is being initiated:  n/a    MD / Provider name consulted re: change in status / initiation of new medication:  n/a    New Symptom(s):  n/a    New Order(s):  n/a    Name of the person notified of the changes:  n/a    Name of person being taught:  n/a    Instructions given:  n/a    Side Effects taught:  n/a    Response to teaching:  n/a      COMFORTABLE DYING MEASURE:  Is Patient/family satisfied with symptom level?  yes    DISCHARGE PLAN:  Discharge to home [facility] until end of life.

## 2017-06-17 NOTE — PROGRESS NOTES
[address] [facility]  [phone]    Patient Name: Palma Diaz  YOB: 1980    Date of Provider Hospice Visit: 06/17/17    Level of Care:   [x] General Inpatient (GIP)    [] Routine   [] Respite    Location of Care:  [] [facility] [] [facility] [] [facility] [] [facility] [x] Hospice [facility]  [] Home [] Other:      Date of Original Hospice Admission: 5-16-17  Hospice Medical Director for Inpatient Care: Dr. Susan Anguiano Diagnosis:  Metastatic Endometrial Adenocarcinoma       HOSPICE DIAGNOSES   Active Symptoms:  1. Generalised pain, especially right lower extremity, lower back, especially with movements  2. Delirium; Fluctuates, mild confusion at times  3. Shortness of breath with low O2 sats: improves with use of O2  4. Anxiety/depression; still an issue  5. Insomnia   6. Fatigue/weakness/lethargy: worse   7. Constipation  8. Itching     PLAN   1. Continue GIP level of care due to changing medication requirements, adjustments, additions of opioid pain mediations for relief and adjuvant therapy medications as tolerated, and continuous monitoring while pt in delirious state   2. Continue ativan 1mg  three times daily as needed for anxiety. Patient has required 2 doses in last 24 hours and resting this morning. 3. Continue low dose ativan 0.5mg twice daily  4. Continue PCA dilaudid 2mg / hr continuous, PCA dose of 1mg every 6 mts. Seems to be tolerating well but did require additional 9 mg of IV dilaudid in last 24 hours via prn route. Will monitor needs over the next 24 hours and consider making adjustments in PCA  5. Continue nurse given dilaudid 3mg IV every 15mts as needed for severe pain-3 doses in last 24 hours. 6. Continue gabapentin 300mg at bedtime  7. Please set up air mattress for her continued comfort  8. Continue clinoril, Cymbalta for adjuvant therapy for bone pain  9. Continue methadone at current levels 10mg every 8 hrs  10.  Benadryl 25mg oral everfy 4 hrs as needed for itching  11. Continue Toradol 30mg IV every 8 hours as needed for adjuvant pain control; antiinflammatory. Has used 1 dose  12. Pt is now laying supine, nurses helping to reposition and turn to side during cleaning/ADL care and to prevent skin breakdown. 13. Weekly care conference with team  14.  and SW to support family needs: family in need of ongoing psychosocial, emotional and spiritual support. 15. Disposition: home or routine care status when symptoms are stable: SW is having conversations with family now regarding resources for support at home versus routine care status here at [facility]    Prognosis estimated based on 06/17/17 clinical assessment is:   [] Few to Many Hours  [] Hours to Days   [] Few to Many Days   [] Days to Weeks    [x] Few to Many Weeks   [] Weeks to Months   [] Few to Many Months    Communicated plan of care with: Hospice Case Manager; Hospice IDT; Care Team     GOALS OF CARE     Resuscitation Status: DNR  Durable DNR: [x] Yes [] No    Advance Care Planning 5/10/2017   Patient's Healthcare Decision Maker is: Legal Next of Andrea 69   Primary Decision Maker Name Giancarlo Pisano   Primary Decision Maker Phone Number -   Primary Decision Maker Relationship to Patient Parent   Confirm Advance Directive Yes, on file        HISTORY     History obtained from: chart, staff    CHIEF COMPLAINT: pt c/o itching  The patient is:   [x] Verbal  [] Nonverbal  [] Unresponsive    HPI/SUBJECTIVE:  Pt remains weak, pain control gradually improving. Pt on dilaudid PCA and had 10 attempts in one day and got 7 doses. Pt having itching for about a week since PCA was changed from morphine to dilaudid however it is not too bad and not getting worse and has no skin rash/hives etc. Pt says itching is tolerable and responds well to benadryl and she does not want the dilaudid to be changed/stopped as it is helping her with better pain relief. Patient is sleeping.  Opens her eyes to my voice and does state pain is better controlled. Denies any nausea or vomiting       REVIEW OF SYSTEMS     The following systems were: [x] reviewed  [] unable to be reviewed    Positive ROS include:  Constitutional: fatigue, weakness  Ears/nose/mouth/throat:   Respiratory:shortness of breath  Gastrointestinal:  Musculoskeletal:pain in legs, joints, swelling legs, itching  Neurologic: numbness and burning pain, confusion  Psychiatric:anxiety, depression  Endocrine:          FUNCTIONAL ASSESSMENT     Palliative Performance Scale (PPS): 30%     PSYCHOSOCIAL/SPIRITUAL ASSESSMENT     Active Problems:    * No active hospital problems. *    Past Medical History:   Diagnosis Date    Cancer [facility])     uterine    CVI (common variable immunodeficiency) (HCC)     Diabetes ([facility]     Elevated liver function tests 10/21/2010    Endometrial cancer ([facility] 7/7/2010    Hypertension     Iron deficiency anemia     Menometrorrhagia 10/21/2010    Microcytic anemia 10/21/2010    Morbid obesity ([facility] [facility]     Prediabetes 7/7/2010    Psychiatric disorder     depression    Radiation     completed radiation mid-march      Past Surgical History:   Procedure Laterality Date    CARDIAC SURG PROCEDURE UNLIST      hx of tachycardia    HX GYN      hysterectomy      Social History   Substance Use Topics    Smoking status: Never Smoker    Smokeless tobacco: Never Used    Alcohol use Yes     Family History   Problem Relation Age of Onset    Hypertension Mother     Hypertension Father     Stroke Maternal Grandfather     Cancer Paternal Grandmother      breast    Diabetes Paternal Grandmother       Allergies   Allergen Reactions    Egg Nausea and Vomiting     Raw egg and scrambled eggs. Egg products okay.      Pcn [Penicillins] Nausea and Vomiting     \"but could take amoxil\"    Shellfish Containing Products Unknown (comments)    Tetanus And Diphtheria Toxoids, Adsorbed, Adult Other (comments)     Developed local swelling, axillary pain, and transient fever    Tomato Unknown (comments)      Current Facility-Administered Medications   Medication Dose Route Frequency    diphenhydrAMINE (BENADRYL) capsule 25 mg  25 mg Oral Q4H PRN    HYDROmorphone (PF) (DILAUDID) injection 3 mg  3 mg IntraVENous Q15MIN PRN    LORazepam (ATIVAN) tablet 0.5 mg  0.5 mg Oral BID    ketorolac (TORADOL) injection 30 mg  30 mg IntraVENous Q8H PRN    acetaminophen (TYLENOL) tablet 650 mg  650 mg Oral Q4H PRN    Hydromorphone 500mg/500mL bag [facility]   IntraVENous CONTINUOUS    gabapentin (NEURONTIN) capsule 300 mg  300 mg Oral QHS    LORazepam (ATIVAN) tablet 1 mg  1 mg Oral TID PRN    metoprolol tartrate (LOPRESSOR) tablet 25 mg  25 mg Oral BID    methadone (DOLOPHINE) tablet 10 mg  10 mg Oral Q8H    polyethylene glycol (MIRALAX) packet 17 g  17 g Oral DAILY    magic mouthwash cpd (without sucralfate)  5 mL Oral QID PRN    docusate sodium (COLACE) capsule 100 mg  100 mg Oral DAILY    DULoxetine (CYMBALTA) capsule 60 mg  60 mg Oral QHS    sulindac (CLINORIL) tablet 200 mg  200 mg Oral BID WITH MEALS        PHYSICAL EXAM     Wt Readings from Last 3 Encounters:   05/30/17 121.1 kg (267 lb)   05/10/17 121.4 kg (267 lb 10.2 oz)   02/16/17 136.1 kg (300 lb)       Visit Vitals    /80 (BP 1 Location: Left arm)    Pulse 94    Temp 98.2 °F (36.8 °C)    Resp 16    SpO2 93%         Supplemental O2  [x] Yes  [] NO  Last bowel movement:     Currently this patient has:  [] Peripheral IV [x] PICC  [] PORT [] ICD    [x] Jama Catheter [] NG Tube   [] PEG Tube    [] Rectal Tube [] Drain  [x] Other: now with a special mattress    Constitutional: pale, laying on her back, alert, arouses to my voice but was sleeping when I entered  Eyes: pallor++  ENMT: moist oral mucosa  Cardiovascular: distant heart sounds, tachycardic  Respiratory:  Normal breathing, diminished air entry  Gastrointestinal:  distended and firm, non tender, BS +, pt has a davila's  Musculoskeletal:edema ++ lower extremities right leg>left leg  Skin: warm, dry, some skin irritation in the groin folds, itching   Neurologic: drowsy, intermittent confusion especially when she wakes up  Psychiatric: fluctuating mood, calm affect  Other: davila's: urine clear      Pertinent Lab and or Imaging Tests: Total time:35  mts  Counseling / coordination time: 13 mts discussing with staff   > 50% counseling / coordination?: n      This patient meets Hospice General Inpatient (GIP) Level of Care.       Supporting documentation for GIP need for pain control:  [x] Frequent evaluation by a doctor, nurse practitioner, nurse   [x] Frequent medication adjustment    [x] IVs that cannot be administered at home   [x] Aggressive pain management   [] Complicated technical delivery of medications              Supporting documentation for GIP need for symptom control:  [x]  Sudden decline necessitating intensive nursing intervention  []  Uncontrolled / intractable nausea or vomiting   [x]  Pathological fractures  []  Advanced open wounds requiring frequent skilled care  [] Unmanageable respiratory distress  [x] New or worsening delirium   [] Delirium with behavior issues  [] Imminent death  with skilled nursing needs documented above  Ivanna Anderson MD

## 2017-06-17 NOTE — PROGRESS NOTES
Problem: Pressure Injury - Risk of  Goal: *Prevention of pressure ulcer  Outcome: Progressing Towards Goal  Assessment of skin each shift to assess for skin breakdown along with turning/repositioning. Patient has no s/s of skin breakdown. Will continue to monitor.

## 2017-06-17 NOTE — PROGRESS NOTES
0715:  Verbal shift change report given to Dinh Phan RN (oncoming nurse) by Ryan Shen RN (offgoing nurse). Report included the following information SBAR, Kardex, Intake/Output and MAR.  1000:  Assessed patient (see MAR) and administered scheduled medications (see MAR); patient refused miralax, stated that she had a \"good\" BM yesterday and only wanted to have BM every 3 days. Dr. Ingrid Osuna notified. 1440:  Patient requesting benadryl for itching and toradol for joint pain; administered prn benadryl 25mg/PO & toradol 30mg/IV. 1645:  Reassessed effectiveness of toradol; patient states that her joints feel better. Will continue to monitor.

## 2017-06-18 NOTE — PROGRESS NOTES
[facility]  [phone]    Patient Name: Hadley Baxter  YOB: 1980    Date of Provider Hospice Visit: 06/18/17    Level of Care:   [x] General Inpatient (GIP)    [] Routine   [] Respite    Location of Care:  [] [facility] [] [address] [] [facility] [] [facility] [x] [facility]  [] Home [] Other:      Date of Original Hospice Admission: 5-16-17  Hospice Medical Director for Inpatient Care: Dr. Gigi Unger Diagnosis:  Metastatic Endometrial Adenocarcinoma       HOSPICE DIAGNOSES   Active Symptoms:  1. Generalised pain, especially right lower extremity, lower back, especially with movements  2. Delirium; Fluctuates, mild confusion at times  3. Shortness of breath with low O2 sats: improves with use of O2  4. Anxiety/depression; still an issue  5. Insomnia   6. Fatigue/weakness/lethargy: worse   7. Constipation  8. Itching     PLAN   1. Continue GIP level of care due to changing medication requirements, adjustments, additions of opioid pain mediations for relief and adjuvant therapy medications as tolerated, and continuous monitoring while pt in delirious state   2. Patient much more awake and lucid today. She rates pain to be 6-7/10 but feels current dosing of medication is working well. Still with leg pain. She had a lot of visitors yesterday and she was exhausted  3. Continue ativan 1mg  three times daily as needed for anxiety. Patient has required 1 doses in last 24 hours and resting this morning. 4. Continue low dose ativan 0.5mg twice daily  5. Continue PCA dilaudid 2mg / hr continuous, PCA dose of 1mg every 6 mts. She required 3 mg IV prn dose of dilaudid and reviewing the bolus dosing over the last 12 hours-18 attempts with 14 delivered=17 additional mg of dilaudid over 12 hours which would extrapolate to 34 extra mg over 24 hours. Discussed with Felecia(bedside nurse) and patient also seem to respond to toradol for joint pain.  Will increase basal to 3 mg/hour and continue current bolus dosing. 6. Continue nurse given dilaudid 3mg IV every 15mts as needed for severe pain-1 dose in last 24 hours. 7. Continue gabapentin 300mg at bedtime  8. Please set up air mattress for her continued comfort  9. Continue clinoril, Cymbalta for adjuvant therapy for bone pain  10. Continue methadone at current levels 10mg every 8 hrs  11. Benadryl 25mg oral everfy 4 hrs as needed for itching  12. Continue Toradol 30mg IV every 8 hours as needed for adjuvant pain control; antiinflammatory. Has used 2 doses in last 24 hours. We do need to be careful combining this with clinoril over the long term. 15. Pt is now laying supine, nurses helping to reposition and turn to side during cleaning/ADL care and to prevent skin breakdown. 14. Weekly care conference with team  15.  and SW to support family needs: family in need of ongoing psychosocial, emotional and spiritual support. 16. Disposition: home or routine care status when symptoms are stable: SW is having conversations with family now regarding resources for support at home versus routine care status here at [facility]    Prognosis estimated based on 06/18/17 clinical assessment is:   [] Few to Many Hours  [] Hours to Days   [] Few to Many Days   [] Days to Weeks    [x] Few to Many Weeks   [] Weeks to Months   [] Few to Many Months    Communicated plan of care with: Hospice Case Manager;  Hospice IDT; Care Team     GOALS OF CARE     Resuscitation Status: DNR  Durable DNR: [x] Yes [] No    Advance Care Planning 5/10/2017   Patient's Healthcare Decision Maker is: Legal Next of Andrea 69   Primary Decision Maker Name Loan Smart   Primary Decision Maker Phone Number -   Primary Decision Maker Relationship to Patient Parent   Confirm Advance Directive Yes, on file        HISTORY     History obtained from: chart, staff    CHIEF COMPLAINT: pt c/o itching  The patient is:   [x] Verbal  [] Nonverbal  [] Unresponsive    HPI/SUBJECTIVE:  Pt remains weak, pain control gradually improving. Pt on dilaudid PCA and had 10 attempts in one day and got 7 doses. Pt having itching for about a week since PCA was changed from morphine to dilaudid however it is not too bad and not getting worse and has no skin rash/hives etc. Pt says itching is tolerable and responds well to benadryl and she does not want the dilaudid to be changed/stopped as it is helping her with better pain relief. Patient awake and alert this morning. Pain in right leg remains     REVIEW OF SYSTEMS     The following systems were: [x] reviewed  [] unable to be reviewed    Positive ROS include:  Constitutional: fatigue, weakness  Ears/nose/mouth/throat:   Respiratory:shortness of breath  Gastrointestinal:  Musculoskeletal:pain in legs, joints, swelling legs, itching  Neurologic: numbness and burning pain, confusion  Psychiatric:anxiety, depression  Endocrine:          FUNCTIONAL ASSESSMENT     Palliative Performance Scale (PPS): 30%     PSYCHOSOCIAL/SPIRITUAL ASSESSMENT     Active Problems:    * No active hospital problems.  *    Past Medical History:   Diagnosis Date    Cancer [facility])     uterine    [facility] [facility]     Diabetes [facility] [facility]     Elevated liver function tests 10/21/2010    Endometrial cancer ([facility] [address] 7/7/2010    Hypertension     Iron deficiency anemia     Menometrorrhagia 10/21/2010    Microcytic anemia 10/21/2010    Morbid obesity ([facility] [address]     Prediabetes 7/7/2010    Psychiatric disorder     depression    Radiation     completed radiation mid-march      Past Surgical History:   Procedure Laterality Date    CARDIAC SURG PROCEDURE UNLIST      hx of tachycardia    HX GYN      hysterectomy      Social History   Substance Use Topics    Smoking status: Never Smoker    Smokeless tobacco: Never Used    Alcohol use Yes     Family History   Problem Relation Age of Onset    Hypertension Mother     Hypertension Father     Stroke Maternal Grandfather     Cancer Paternal Grandmother      breast    Diabetes Paternal Grandmother       Allergies   Allergen Reactions    Egg Nausea and Vomiting     Raw egg and scrambled eggs. Egg products okay.      Pcn [Penicillins] Nausea and Vomiting     \"but could take amoxil\"    Shellfish Containing Products Unknown (comments)    Tetanus And Diphtheria Toxoids, Adsorbed, Adult Other (comments)     Developed local swelling, axillary pain, and transient fever    Tomato Unknown (comments)      Current Facility-Administered Medications   Medication Dose Route Frequency    diphenhydrAMINE (BENADRYL) capsule 25 mg  25 mg Oral Q4H PRN    HYDROmorphone (PF) (DILAUDID) injection 3 mg  3 mg IntraVENous Q15MIN PRN    LORazepam (ATIVAN) tablet 0.5 mg  0.5 mg Oral BID    ketorolac (TORADOL) injection 30 mg  30 mg IntraVENous Q8H PRN    acetaminophen (TYLENOL) tablet 650 mg  650 mg Oral Q4H PRN    Hydromorphone 500mg/500mL bag [facility]   IntraVENous CONTINUOUS    gabapentin (NEURONTIN) capsule 300 mg  300 mg Oral QHS    LORazepam (ATIVAN) tablet 1 mg  1 mg Oral TID PRN    metoprolol tartrate (LOPRESSOR) tablet 25 mg  25 mg Oral BID    methadone (DOLOPHINE) tablet 10 mg  10 mg Oral Q8H    polyethylene glycol (MIRALAX) packet 17 g  17 g Oral DAILY    magic mouthwash cpd (without sucralfate)  5 mL Oral QID PRN    docusate sodium (COLACE) capsule 100 mg  100 mg Oral DAILY    DULoxetine (CYMBALTA) capsule 60 mg  60 mg Oral QHS    sulindac (CLINORIL) tablet 200 mg  200 mg Oral BID WITH MEALS        PHYSICAL EXAM     Wt Readings from Last 3 Encounters:   05/30/17 121.1 kg (267 lb)   05/10/17 121.4 kg (267 lb 10.2 oz)   02/16/17 136.1 kg (300 lb)       Visit Vitals    /78 (BP 1 Location: Right arm)    Pulse (!) 120    Temp (!) 100.5 °F (38.1 °C)    Resp 20    SpO2 98%         Supplemental O2  [x] Yes  [] NO  Last bowel movement:     Currently this patient has:  [] Peripheral IV [x] PICC  [] PORT [] ICD    [x] Davila Catheter [] NG Tube   [] PEG Tube    [] Rectal Tube [] Drain  [x] Other: now with a special mattress    Constitutional: pale, laying on her back,much more alert and oriented. Not very hungary. Eyes: pallor++  ENMT: moist oral mucosa  Cardiovascular: distant heart sounds, tachycardic  Respiratory:  Normal breathing, diminished air entry  Gastrointestinal:  distended and firm, non tender, BS +, pt has a davila's  Musculoskeletal:edema ++ lower extremities right leg>left leg  Skin: warm, dry, some skin irritation in the groin folds, itching   Neurologic: awake and alert today, interacting  Psychiatric: fluctuating mood, calm affect  Other: davila's: urine clear      Pertinent Lab and or Imaging Tests: Total time:35  mts  Counseling / coordination time: 20   > 50% counseling / coordination?: n      This patient meets Hospice General Inpatient (GIP) Level of Care.       Supporting documentation for GIP need for pain control:  [x] Frequent evaluation by a doctor, nurse practitioner, nurse   [x] Frequent medication adjustment    [x] IVs that cannot be administered at home   [x] Aggressive pain management   [] Complicated technical delivery of medications              Supporting documentation for GIP need for symptom control:  [x]  Sudden decline necessitating intensive nursing intervention  []  Uncontrolled / intractable nausea or vomiting   [x]  Pathological fractures  []  Advanced open wounds requiring frequent skilled care  [] Unmanageable respiratory distress  [x] New or worsening delirium   [] Delirium with behavior issues  [] Imminent death  with skilled nursing needs documented above  Lisa Monte MD

## 2017-06-18 NOTE — HOSPICE
NAME OF PATIENT:  Bernadette Gifford    LEVEL OF CARE:  GIP    REASON FOR GIP:   Pain, despite numerous changes in medications, Medication adjustment that must be monitored 24/7 and Stabilizing treatment that cannot take place at home    *PATIENT REMAINS ELIGIBLE FOR GIP LEVEL OF CARE AS EVIDENCED BY: (MUST BE ADDRESSED OF PATIENT GIP) pt is receiving PCA Dilaudid,basal and prn,as well as prn Torodol and prn IVP Dilaudid for management of pain      REASON FOR RESPITE:  N/A    O2 SAFETY:  Concentrator positioning (6\" from furniture/drapes), Tanks stored in [place] , No petroleum based products on face while oxygen in use and Oxygen sign on the door    FALL INTERVENTIONS PROVIDED:   Implemented/recommended use of non-skid footwear, Implemented/recommended use of fall risk identification flag to all team members, Implemented/recommended assistive devices and encouraged their use, Implemented/recommended resources for alarm system (personal alarm, bed alarm, call bell, etc.)  and Implemented/recommended environmental changes (remove hazards, lower bed, improve lighting, etc.)    INTERDISPLINARY COMMUNICATION/COLLABORATION:  Physician, MSW, [place] and RN, CNA    NEW MEDICATION INITIATION DOCUMENTATION:  N/A    Reason medication is being initiated:      MD / Provider name consulted re: change in status / initiation of new medication:      New Symptom(s):      New Order(s):      Name of the person notified of the changes:      Name of person being taught:      Instructions given:      Side Effects taught:      Response to teachin [address]  free  Is Patient/family satisfied with symptom level?  yes    DISCHARGE PLAN:  Pt will likely stay here @ [facility] and be changed to Routine status    20:00,report received,assumed care of pt who is GIP for pain,hospice dx is metastatic endometrial ca. PCA Dilaudid continues @ 2mg basal as well as 1mg as needed every 10 min. AMARJIT romeo changed. Pt states generalized pain is 6/10.  20:23,Dilaudid 3mg IVP prn dose given for pain,6/10  21:25,pt still rates pain 5/10,visiting with girlfriend. Pt encouraged to push PCA button. 22:25,Torodol IVP given for comfort. Pt asked for Ativan,1mg po given. 23:30,asleep.  01:30,asleep.  04:00,awake,drinking pepsi. Pt rates pain 7/10,in rt thigh. 04:20,Dilaudid 3 MG IVP given for comfort. 05:30,asleep.

## 2017-06-18 NOTE — PROGRESS NOTES
NAME OF PATIENT:  Anish Randall    LEVEL OF CARE:  GIP    REASON FOR GIP:   Pain, despite numerous changes in medications, Medication adjustment that must be monitored 24/7 and Stabilizing treatment that cannot take place at home    *PATIENT REMAINS ELIGIBLE FOR GIP LEVEL OF CARE AS EVIDENCED BY: (MUST BE ADDRESSED OF PATIENT GIP) Continue GIP level of care due to changing medication requirements, adjustments, additions of opioid pain mediations for relief and adjuvant therapy medications as tolerated, and continuous monitoring while pt in delirious state. REASON FOR RESPITE:  n/a    O2 SAFETY:  n/a    FALL INTERVENTIONS PROVIDED:   Implemented/recommended resources for alarm system (personal alarm, bed alarm, call bell, etc.) , Implemented/recommended environmental changes (remove hazards, lower bed, improve lighting, etc.) and Implemented/recommended increased supervision/assistance    INTERDISPLINARY COMMUNICATION/COLLABORATION:  Physician, MSW, [place] and RN, CNA    NEW MEDICATION INITIATION DOCUMENTATION:  n/a    Reason medication is being initiated:  n/a    MD / Provider name consulted re: change in status / initiation of new medication:  n/a    New Symptom(s):  n/a    New Order(s):  n/a    Name of the person notified of the changes:  n/a    Name of person being taught:  n/a    Instructions given:  n/a    Side Effects taught:  n/a    Response to teaching:  n/a      COMFORTABLE DYING MEASURE:  Is Patient/family satisfied with symptom level?  yes    DISCHARGE PLAN:  Discharge to home when symptoms [facility] until end of life.

## 2017-06-18 NOTE — PROGRESS NOTES
0715:  Verbal shift change report given to Hunter Perez RN (oncoming nurse) by Fredy Calderón RN (offgoing nurse). Report included the following information SBAR, Kardex, Intake/Output and MAR.   0840:  Administered scheduled medications (see MAR) and prn toradol 30mg/IV for pain/fever. 0940:  Patient states pain level 7/10; administered prn dilaudid. Applied rate change on CADD pump per MD order. 1020:  Reassessed pain 6/10; declined additional prn medication. 1240:  Rounded and reassessed temperature; 99.2/PO  1800:  Cleared pump:  Attempts 21/given 17, total dose 46.95mg  1840:  Patient called out; davila catheter leaking. Balloon has no fluid. Will change davila; pre-medicated with dilaudid 3mg/IV. 1930: Davila catheter was replaced with team of 5 staff members; Ryann Dotson. Patient did very well.

## 2017-06-19 NOTE — HOSPICE
NAME OF PATIENT:  Renetta Worthy    LEVEL OF CARE:  [facility]    REASON FOR GIP:   Pain, despite numerous changes in medications, Medication adjustment that must be monitored 24/7 and Stabilizing treatment that cannot take place at home    *PATIENT REMAINS ELIGIBLE FOR [facility] LEVEL OF CARE AS EVIDENCED BY: (MUST BE ADDRESSED OF PATIENT GIP) Pt is receiving PCA Dilaudid as well as prn doses for management of pain      REASON FOR RESPITE:  N/A    O2 SAFETY:  Concentrator positioning (6\" from furniture/drapes), Tanks stored in [place] , No petroleum based products on face while oxygen in use and Oxygen sign on the door    FALL INTERVENTIONS PROVIDED:   Implemented/recommended use of non-skid footwear, Implemented/recommended use of fall risk identification flag to all team members, Implemented/recommended assistive devices and encouraged their use, Implemented/recommended resources for alarm system (personal alarm, bed alarm, call bell, etc.)  and Implemented/recommended environmental changes (remove hazards, lower bed, improve lighting, etc.)    INTERDISPLINARY COMMUNICATION/COLLABORATION:  Physician, MSW, [place] and RN, CNA    NEW MEDICATION INITIATION DOCUMENTATION:      Reason medication is being initiated:      MD / Provider name consulted re: change in status / initiation of new medication:      New Symptom(s):    New Order(s):      Name of the person notified of the changes:      Name of person being taught:      Instructions given:      Side Effects taught:      Response to teachin [address] free  Is Patient/family satisfied with symptom level?  yes    DISCHARGE PLAN:  Pt will likely remain @ [facility]    19:30,report received,Jama was replaced,due to leaking. Pt tolerated fairly well with a lot of emotional support and prn PCA doses. PCA Dilaudid basal rate was increased today. Pt usually rates her pain level @ 7/10. Her pain is generalized. Linens were changed and pt was repositioned for comfort. Pt remains on an Air Flow mattress. 22:00,Dilaudid 3mg IVP for generalized pain 6/10.  23:00,pt asleep.  01:30,no changes,sleeping. 04:30,awake,\"I'm bored\". CNA and RN sat in room and conversed with pt ,she fell back to sleep after about 15 minutes. 05:45,PCA cleared,total of 50.65mg infused,watching TV,comfortable.

## 2017-06-19 NOTE — PROGRESS NOTES
NAME OF PATIENT:  Jyoti Randall    LEVEL OF CARE:  GIP    REASON FOR GIP:   Medication adjustment that must be monitored 24/7 and Stabilizing treatment that cannot take place at home    *PATIENT REMAINS ELIGIBLE FOR GIP LEVEL OF CARE AS EVIDENCED BY: (MUST BE ADDRESSED OF PATIENT GIP)      REASON FOR RESPITE:  N/A    O2 SAFETY:  Concentrator positioning (6\" from furniture/drapes), Tanks stored in [place] , No petroleum based products on face while oxygen in use and Oxygen sign on the door    FALL INTERVENTIONS PROVIDED:   Implemented/recommended use of non-skid footwear, Implemented/recommended use of fall risk identification flag to all team members, Implemented/recommended assistive devices and encouraged their use, Implemented/recommended environmental changes (remove hazards, lower bed, improve lighting, etc.) and Implemented/recommended increased supervision/assistance    INTERDISPLINARY COMMUNICATION/COLLABORATION:  Physician, MSW, Leta and RN, CNA    NEW MEDICATION INITIATION DOCUMENTATION:  Documentation completed in Clinical Note in [facility]    Reason medication is being initiated:  Bladder spasms    MD / Provider name consulted re: change in status / initiation of new medication:  Fuad    New Symptom(s):  Bladder spasms     New Order(s):  Pyridium 100mg TID    Name of the person notified of the changes:  Patient    Name of person being taught:  Patient    Instructions given:  n/a    Side Effects taught:  n/a    Response to teaching:  N/a      COMFORTABLE DYING MEASURE:  Is Patient/family satisfied with symptom level?  yes    DISCHARGE PLAN:  Plan to remain at [facility] for GIP then to routine level of care      0730: Care assumed of patient who is GIP level of care. Dx of endometrial cancer for care that cannot be managed at home. Patient had dilaudid PCA at 3mg/hr with 1mg every 10 mins. Patient central line dressing changed overnight. Patient in no acute distress at this time.     8931: Patient medicated with scheduled meds at this time by Jeremy Montemayor. Miralax held at this time per pt request. States she feels as though she does not need it. Patient AO and in no distress. Will continue to closely monitor. 1135: Patient called and stated that something was under her mattress. It was a plug to her air mattress. Bed padded with blanket for comfort. 1259: PRN dose of dilaudid given for pain. Patient states pain 6/10 at this time. 1428: Patient medicated with methadone and pyridium scheduled. Pt educated that urine will change colors (orange). Verbalized understanding. 1715: Jama catheter drained and 200 UOP phil colored. Refused dinner tray at this time. Only wanted a pepsi and ice. 1815: Patient medicated with scheduled PO meds. Mom and daughter at bedside.

## 2017-06-19 NOTE — PROGRESS NOTES
[address] [facility] Help to Those in Need  [phone]    Patient Name: Ivanna Zamora  YOB: 1980    Date of Provider Hospice Visit: 06/19/17    Level of Care:   [x] General Inpatient (GIP)    [] Routine   [] Respite    Location of Care:  [] [facility] [] [facility] [] [facility] [] [facility] [x] Hospice House [facility]  [] Home [] Other:      Date of Original Hospice Admission: 5-16-17  Hospice Medical Director for Inpatient Care: Dr. Niru Heaton Diagnosis:  Metastatic Endometrial Adenocarcinoma       HOSPICE DIAGNOSES   Active Symptoms:  1. Generalised pain, especially right lower extremity, lower back, especially with movements  2. Delirium; Fluctuates, mild confusion at times  3. Shortness of breath with low O2 sats: improves with use of O2  4. Anxiety/depression; still an issue  5. Insomnia   6. Fatigue/weakness/lethargy: worse   7. Constipation  8. Itching  9. Bladder spasms     PLAN   1. Continue GIP level of care due to changing medication requirements, adjustments, additions of opioid pain mediations for relief and adjuvant therapy medications as tolerated, and continuous monitoring while pt in delirious state   2. Continue ativan 1mg  three times daily as needed for anxiety. 3. Continue low dose ativan 0.5mg twice daily  4. Continue PCA dilaudid 3mg / hr continuous, PCA dose of 1mg every 6 mts. 5. Continue nurse given dilaudid 3mg IV every 15mts as needed for severe pain  6. Continue gabapentin 300mg at bedtime  7. Add pyridium 100mg three times daily x 3 days to help with bladder spasms that have started since her urinary catheter was changed this weekend. 8. Please set up air mattress for her continued comfort  9. Continue clinoril, Cymbalta for adjuvant therapy for bone pain  10. Continue methadone at current levels 10mg every 8 hrs  11. Benadryl 25mg oral everfy 4 hrs as needed for itching  12.  Continue Toradol 30mg IV every 8 hours as needed for adjuvant pain control; antiinflammatory. 15. Pt is now laying supine, nurses helping to reposition and turn to side during cleaning/ADL care and to prevent skin breakdown. 14. Weekly care conference with team  15.  and SW to support family needs: family in need of ongoing psychosocial, emotional and spiritual support. Caitlinsherrell Acosta has been very supportive and regularly visiting pt/family and helping them. 16. Disposition: home or routine care status when symptoms are stable: SW is having conversations with family now regarding resources for support at home versus routine care status here at [facility]. Mom has completed application for Foundation support. Prognosis estimated based on 06/19/17 clinical assessment is:   [] Few to Many Hours  [] Hours to Days   [] Few to Many Days   [] Days to Weeks    [x] Few to Many Weeks   [] Weeks to Months   [] Few to Many Months    Communicated plan of care with: Hospice Case Manager; Hospice IDT; Care Team     GOALS OF CARE     Resuscitation Status: DNR  Durable DNR: [x] Yes [] No    Advance Care Planning 5/10/2017   Patient's Healthcare Decision Maker is: Legal Next of Andrea 69   Primary Decision Maker Name Janeen Hamman   Primary Decision Maker Phone Number -   Primary Decision Maker Relationship to Patient Parent   Confirm Advance Directive Yes, on file        HISTORY     History obtained from: chart, staff    CHIEF COMPLAINT: pain in bladder area/vaginal area  The patient is:   [x] Verbal  [] Nonverbal  [] Unresponsive    HPI/SUBJECTIVE:  Pt remains weak, but overall pain better controlled. PCA dose was adjusted on weekend and seems to be helping.    Pt's foleys catheter was also replaced and that has irritated her bladder and now she is having some bladder spasms and pain in local area     REVIEW OF SYSTEMS     The following systems were: [x] reviewed  [] unable to be reviewed    Positive ROS include:  Constitutional: fatigue, weakness  Ears/nose/mouth/throat:   Respiratory:shortness of breath  Gastrointestinal:  Musculoskeletal:pain in legs, joints, swelling legs, itching  Neurologic: numbness and burning pain, confusion  Psychiatric:anxiety, depression  Endocrine:          FUNCTIONAL ASSESSMENT     Palliative Performance Scale (PPS): 30%     PSYCHOSOCIAL/SPIRITUAL ASSESSMENT     Active Problems:    * No active hospital problems. *    Past Medical History:   Diagnosis Date    Cancer [facility])     uterine    CVI (common variable immunodeficiency) (HCC)     Diabetes ([facility]     Elevated liver function tests 10/21/2010    [facility] 7/7/2010    Hypertension     Iron deficiency anemia     Menometrorrhagia 10/21/2010    Microcytic anemia 10/21/2010    Morbid obesity ([facility]     Prediabetes 7/7/2010    Psychiatric disorder     depression    Radiation     completed radiation mid-march      Past Surgical History:   Procedure Laterality Date    CARDIAC SURG PROCEDURE UNLIST      hx of tachycardia    HX GYN      hysterectomy      Social History   Substance Use Topics    Smoking status: Never Smoker    Smokeless tobacco: Never Used    Alcohol use Yes     Family History   Problem Relation Age of Onset    Hypertension Mother     Hypertension Father     Stroke Maternal Grandfather     Cancer Paternal Grandmother      breast    Diabetes Paternal Grandmother       Allergies   Allergen Reactions    Egg Nausea and Vomiting     Raw egg and scrambled eggs. Egg products okay.      Pcn [Penicillins] Nausea and Vomiting     \"but could take amoxil\"    Shellfish Containing Products Unknown (comments)    Tetanus And Diphtheria Toxoids, Adsorbed, Adult Other (comments)     Developed local swelling, axillary pain, and transient fever    Tomato Unknown (comments)      Current Facility-Administered Medications   Medication Dose Route Frequency    phenazopyridine (PYRIDIUM) tablet 100 mg  100 mg Oral TIDPC    diphenhydrAMINE (BENADRYL) capsule 25 mg  25 mg Oral Q4H PRN    HYDROmorphone (PF) (DILAUDID) injection 3 mg  3 mg IntraVENous Q15MIN PRN    LORazepam (ATIVAN) tablet 0.5 mg  0.5 mg Oral BID    acetaminophen (TYLENOL) tablet 650 mg  650 mg Oral Q4H PRN    Hydromorphone 500mg/500mL bag [facility]   IntraVENous CONTINUOUS    gabapentin (NEURONTIN) capsule 300 mg  300 mg Oral QHS    LORazepam (ATIVAN) tablet 1 mg  1 mg Oral TID PRN    metoprolol tartrate (LOPRESSOR) tablet 25 mg  25 mg Oral BID    methadone (DOLOPHINE) tablet 10 mg  10 mg Oral Q8H    polyethylene glycol (MIRALAX) packet 17 g  17 g Oral DAILY    magic mouthwash cpd (without sucralfate)  5 mL Oral QID PRN    docusate sodium (COLACE) capsule 100 mg  100 mg Oral DAILY    DULoxetine (CYMBALTA) capsule 60 mg  60 mg Oral QHS    sulindac (CLINORIL) tablet 200 mg  200 mg Oral BID WITH MEALS        PHYSICAL EXAM     Wt Readings from Last 3 Encounters:   05/30/17 121.1 kg (267 lb)   05/10/17 121.4 kg (267 lb 10.2 oz)   02/16/17 136.1 kg (300 lb)       Visit Vitals    /63 (BP 1 Location: Left arm, BP Patient Position: At rest)    Pulse 99    Temp 99.7 °F (37.6 °C)    Resp 16    SpO2 95%         Supplemental O2  [x] Yes  [] NO  Last bowel movement:     Currently this patient has:  [] Peripheral IV [x] PICC  [] PORT [] ICD    [x] Davila Catheter [] NG Tube   [] PEG Tube    [] Rectal Tube [] Drain  [x] Other: now with a special mattress    Constitutional: pale, laying on her back,much more alert and oriented. Not very hungary.   Eyes: pallor++  ENMT: moist oral mucosa  Cardiovascular: distant heart sounds, tachycardic  Respiratory:  Normal breathing, diminished air entry  Gastrointestinal:  distended and firm, non tender, BS +, pt has a davila's  Musculoskeletal:edema ++ lower extremities right leg>left leg  Skin: warm, dry, some skin irritation in the groin folds, itching   Neurologic: awake and alert today, interacting  Psychiatric: fluctuating mood, calm affect  Other: davila's: urine clear      Pertinent Lab and or Imaging Tests: Total time:35  mts  Counseling / coordination time: 20 mts discussing with pt, staff, Caitlin Acosta also at bedside. > 50% counseling / coordination?: y      This patient meets Hospice General Inpatient (GIP) Level of Care.       Supporting documentation for GIP need for pain control:  [x] Frequent evaluation by a doctor, nurse practitioner, nurse   [x] Frequent medication adjustment    [x] IVs that cannot be administered at home   [x] Aggressive pain management   [] Complicated technical delivery of medications              Supporting documentation for GIP need for symptom control:  [x]  Sudden decline necessitating intensive nursing intervention  []  Uncontrolled / intractable nausea or vomiting   [x]  Pathological fractures  []  Advanced open wounds requiring frequent skilled care  [] Unmanageable respiratory distress  [x] New or worsening delirium   [] Delirium with behavior issues  [] Imminent death  with skilled nursing needs documented above  Damion Holman MD

## 2017-06-19 NOTE — [facility] IDG MASTER NOTE
[facility] Collabor[facility]  Date: 17  Time: 9:38 PM    ____[facility]   Patient Name  Jyoti Randall  Episode -   Date of IDT Meeting 17  UPDATED COMPREHENSIVE ASSESSMENT   ATTENDING Physician   CLINICAL STATUS metastatic endometrial cancer  SYMPTOMS pain,immobility  SIGNS   LAB VALUES (when available)   KARNOFSKY   FAST for all dementia   Progression to DEPENDENCE WITH ADLs (include time frame) Pt is unable to perform ADL's,she is immobile,requiring help of many to turn her. She is able to feed herself. PRESSURE ULCERS N/A  DISEASE SPECIFIC pain,edema of lower extremities,immobility,requires PCA Dilaudid as well as Methadone and prn Torodol for pain management. Pt now has air flow mattress. FALL RISK: low  PROBLEM: pain  GOAL: comfort  INTERVENTIONS(INDIVIDUALIZED) med admin. as needed,emotional support,  Nursing Visit Frequency 24hr  Hospice Aide Visit Frequency 24hr  SW   COPING   GRIEF   ADVANCED DIRECTIVES      BEREAVEMENT   RESOURCES NEEDED    Visit Frequency   Bereavement   NO CHANGE   Volunteer   NO VOLUNTEER      SPIRITUAL ISSUES   GRIEF   COPING      AVAILABLE [facility] RESOURCES    Visit Frequency   Clinician Signatures   Nurse_____Haley Hernandez RN_________________________   SW________________________________   Chaplain____________________________   Volunteer Coordinator__________________   Bereavement_________________________  _______________    Patient: Palma Legacy  Insurance ID: [unfilled]  MRN: [id number]  CCN:   [place] Claim No. :     Hospice Election Date:   Current Benefit Period:   Current Benefit Period Start Date:     Medical Director:   Hospice Attending Provider:     ___________________    Diagnoses:  Diagnoses of Elevated liver function tests, Chronic neoplasm-related pain, Bone metastases ([address] Insomnia due to medical condition, Endometrial cancer [facility]), Advance care planning, Morbid obesity, unspecified obesity type, and Vaginal pain were pertinent to this visit. Current Medications:    Current Facility-Administered Medications:     diphenhydrAMINE (BENADRYL) capsule 25 mg, 25 mg, Oral, Q4H PRN, Peyton Falcon MD, 25 mg at 06/17/17 1433    HYDROmorphone (PF) (DILAUDID) injection 3 mg, 3 mg, IntraVENous, Q15MIN PRN, Peyton Falcon MD, 3 mg at 06/18/17 1845    LORazepam (ATIVAN) tablet 0.5 mg, 0.5 mg, Oral, BID, Peyton Falcon MD, 0.5 mg at 06/18/17 1747    acetaminophen (TYLENOL) tablet 650 mg, 650 mg, Oral, Q4H PRN, Peyton Falcon MD, 650 mg at 06/12/17 0007    Hydromorphone 500mg/500mL bag [facility], , IntraVENous, CONTINUOUS, Gina Sharpe MD    gabapentin (NEURONTIN) capsule 300 mg, 300 mg, Oral, QHS, Cally Friere NP, 300 mg at 06/18/17 2111    LORazepam (ATIVAN) tablet 1 mg, 1 mg, Oral, TID PRN, Peyton Falcon MD, 1 mg at 06/17/17 2223    metoprolol tartrate (LOPRESSOR) tablet 25 mg, 25 mg, Oral, BID, Peyton Falcon MD, 25 mg at 06/18/17 2111    methadone (DOLOPHINE) tablet 10 mg, 10 mg, Oral, Q8H, Cally Freire NP, 10 mg at 06/18/17 2110    polyethylene glycol (MIRALAX) packet 17 g, 17 g, Oral, DAILY, Cally Freire NP, Stopped at 06/18/17 0900    magic mouthwash cpd (without sucralfate), 5 mL, Oral, QID PRN, Torin Freire NP    docusate sodium (COLACE) capsule 100 mg, 100 mg, Oral, DAILY, Cally Freire NP, 100 mg at 06/18/17 0840    DULoxetine (CYMBALTA) capsule 60 mg, 60 mg, Oral, QHS, Cally Freire NP, 60 mg at 06/18/17 2100    sulindac (CLINORIL) tablet 200 mg, 200 mg, Oral, BID WITH MEALS, Cally Frerie NP, 200 mg at 06/18/17 9327    Orders:  Orders Placed This Encounter    DIET REGULAR     Standing Status:   Standing     Number of Occurrences:   1    NURSING-MISCELLANEOUS: Pt admitted for [facility] levelof care; SN visit daily x 14 days with 5 visits PRN symptom control; Sw visit 2 x weekly and 5 visits PRN family support;   1 x weekly and 5 visits PRN spiritual support; CNA daily x 14 days. ... Standing Status:   Standing     Number of Occurrences:   1     Order Specific Question:   Description of Order:     Answer:   Pt admitted for [facility] levelof care; SN visit daily x 14 days with 5 visits PRN symptom control; Sw visit 2 x weekly and 5 visits PRN family support;  1 x weekly and 5 visits PRN spiritual support; CNA daily x 14 days.  NURSING-MISCELLANEOUS: (see comments) 1. NO admission labs, x-rays or other diagnostic tests, unless pertinent to symptom control . 2. Discontinue ALL prior medications. CONTINUOUS     1. NO admission labs, x-rays or other diagnostic tests, unless pertinent to symptom control . 2. Discontinue ALL prior medications. Standing Status:   Standing     Number of Occurrences:   1     Order Specific Question:   Description of Order:     Answer:   (see comments)    COMFORT MEASURES ONLY     Standing Status:   Standing     Number of Occurrences:   1    VITAL SIGNS     Standing Status:   Standing     Number of Occurrences:   1    NOTIFY PROVIDER: SPECIFY NOTIFY PROVIDER: FOR PAIN, DYSPNEA, AGITATION, OTHER DISTRESS OR NOT RESPONDING TO ORDERED INTERVENTIONS CONTINUOUS Routine     Standing Status:   Standing     Number of Occurrences:   1     Order Specific Question:   Please describe the test or procedure you would like to order. Answer:   NOTIFY PROVIDER: FOR PAIN, DYSPNEA, AGITATION, OTHER DISTRESS OR NOT RESPONDING TO ORDERED INTERVENTIONS    NURSING-MISCELLANEOUS: BITES AND SIPS FOR COMFORT CONTINUOUS     Standing Status:   Standing     Number of Occurrences:   1     Order Specific Question:   Description of Order:     Answer:   BITES AND SIPS FOR COMFORT    ORAL CARE     Keep mouth moisturized with sponge sticks/toothettes and tap water. Vaseline to lips and nares as needed.      Standing Status:   Standing     Number of Occurrences:   1    NURSING-MISCELLANEOUS: PET THERAPY CONTINUOUS     Standing Status:   Standing     Number of Occurrences:   1     Order Specific Question:   Description of Order:     Answer:   PET THERAPY    BEDREST, COMPLETE     Standing Status:   Standing     Number of Occurrences:   1    TURN & POSITION     TURN & POSITION EVERY 6 HOURS - PATIENT MAY REFUSE     Standing Status:   Standing     Number of Occurrences:   1    DO NOT RESUSCITATE     Standing Status:   Standing     Number of Occurrences:   1    DIET ONE TIME MESSAGE     Regular     Standing Status:   Standing     Number of Occurrences:   1    OXYGEN CANNULA Liters per minute: 2; Indications for O2 therapy: OTHER PRN Routine     Oxygen as needed. Adjust for comfort. Discontinue if not contributing to patient comfort. Standing Status:   Standing     Number of Occurrences:   [id number]     Order Specific Question:   Liters per minute:      Answer:   2     Order Specific Question:   Indications for O2 therapy     Answer:   OTHER    DISCONTD: cephALEXin (KEFLEX) capsule 500 mg     Order Specific Question:   Antibiotic Indications     Answer:   Skin and Soft Tissue Infection    DISCONTD: LORazepam (ATIVAN) tablet 1 mg    methadone (DOLOPHINE) tablet 10 mg    polyethylene glycol (MIRALAX) packet 17 g    magic mouthwash cpd (without sucralfate)    DISCONTD: gabapentin (NEURONTIN) capsule 600 mg    DISCONTD: metoprolol tartrate (LOPRESSOR) tablet 50 mg    docusate sodium (COLACE) capsule 100 mg    DULoxetine (CYMBALTA) capsule 60 mg    sulindac (CLINORIL) tablet 200 mg    DISCONTD: morphine (PF)  mg/30 ml    LORazepam (ATIVAN) tablet 1 mg    DISCONTD: dextrose 5% and 0.9% NaCl infusion    DISCONTD: dextrose 5 % - 0.45% NaCl infusion    metoprolol tartrate (LOPRESSOR) tablet 25 mg    DISCONTD: HYDROmorphone (PF) (DILAUDID) injection 1 mg    DISCONTD: HYDROmorphone (PF) 15 mg/30 ml (DILAUDID) PCA    gabapentin (NEURONTIN) capsule 300 mg    Hydromorphone 500mg/500mL bag [facility]    acetaminophen (TYLENOL) tablet 650 mg    DISCONTD: diphenhydrAMINE (BENADRYL) capsule 50 mg    DISCONTD: HYDROmorphone (PF) (DILAUDID) injection 1.5 mg    HYDROmorphone (PF) (DILAUDID) injection 3 mg    LORazepam (ATIVAN) tablet 0.5 mg    ketorolac (TORADOL) injection 30 mg    diphenhydrAMINE (BENADRYL) capsule 25 mg    INITIAL PHYSICIAN ORDER: HOSPICE Level Of Care: General; Reason for Admission: Admit for [facility] level of care for symptom management of Pain     Standing Status:   Standing     Number of Occurrences:   1     Order Specific Question:   Status     Answer:   Hospice     Order Specific Question:   Level Of Care     Answer:   General     Order Specific Question:   Reason for Admission     Answer:   Admit for [facility] level of care for symptom management of Pain     Order Specific Question:   Admitting Physician     Answer:   Javier Drain     Order Specific Question:   Attending Physician     Answer:   Javier Drain     Order Specific Question:   Discharge Plan:     Answer:   Home with Home Hospice       Allergies: Allergies   Allergen Reactions    Egg Nausea and Vomiting     Raw egg and scrambled eggs. Egg products okay.      Pcn [Penicillins] Nausea and Vomiting     \"but could take amoxil\"    Shellfish Containing Products Unknown (comments)    Tetanus And Diphtheria Toxoids, Adsorbed, Adult Other (comments)     Developed local swelling, axillary pain, and transient fever    Tomato Unknown (comments)       ___________________    Care Team Notes          POC/IDG Notes      [facility] IDG  Notes by Job Malik at 06/13/17 0853  Version 1 of 1    Author:  Job Malik Service:  Juwan Brooks Author Type:      Filed:  06/13/17 0854 Date of Service:  06/13/17 0853 Status:  Signed    :  Job Malik ()           Patient: Ivanna Zamora    Date: 06/13/17  Time: 8:53 AM    [facility]  Notes  Family meeting with mother, 2 sisters, Dr. Robin Horner RN, Daniela Quigley RN, [facility] . Dr. Humble Rodrigez talked about possibility of patient not being able to return home but not ruled out. She also explained that patients pain was still an ongoing issue that was being addressed but  she wouldnt be able to experience total relief. Family very tearful and saddened when Dr. Humble Rodrigez expressed belief that patient was declining and would most likely pass at the [facility]. Emotional support provided by staff as family expressed their grief. Mother spoke  to patient following meeting and requested that other family members be made aware of dx and prognosis. She agreed. Meeting was held later in day, attended by [facility], during which family was informed of patients status. Family trying to cope with their issues of loss/grief.               Signed by: Jemma FOWLER [facility] IDG Nurse Notes by Essence Roa RN at 06/12/17 0301  Version 2 of 2    Author:  Essence Roa RN Service:  [facility] Author Type:  Registered Nurse    Filed:  06/13/17 0356 Date of Service:  06/12/17 0301 Status:  Addendum    :  Essence Roa RN (Registered Nurse)      Related Notes: Original Note by Essence Roa RN (Registered Nurse) filed at 06/12/17 0310         Patient: Liliana Goff    Date: 06/12/17  Time: 3:01 AM    [facility] Nurse [facility]    [facility]  Patient Name  Jyoti bonner  Episode -   Date of IDT Meeting  6-13-17    UPDATED COMPREHENSIVE ASSESSMENT      Neuro - alert and oriented  Muscular - moves upper extremities, moves left leg slightly, unable to move r leg which is externally rotated and very edematous  Resp - lungs clear, room air  Heart WNL BP 98/60 lopressor held last pm  GI - appetite good eats mostly fast food from family and sweets, last BM 6/9, on bowel regimen   - davila to BSD inserted 5/15/17  Skin - intact    ATTENDING Physician  Dr Kristen Pedersen pain     SIGNS pt states when she is in pain, always has pain if moved     LAB VALUES (when available)     KARNOFSKY 30%     FAST for all dementia N/A     Progression to DEPENDENCE WITH ADLs (include time frame) Complete Care pt can feed self     PRESSURE ULCERS None     DISEASE SPECIFIC Diabetes     FALL RISK: None  PROBLEM: 1. Pt is unable to turn well in bed could use Bariatric Bed  2. Pain management continues to be an issue for the patient. She states that her pain is never managed. Although she was started on Dilaudid per PCA pump it is a very low dose. Another side issue is that pt prefers for nurse to give IVP bolus instead of using the PCA button. She asked how often she could have that and when I told her q 15 minutes she asked for it 3 times before she was obtunded. I explained to the patient that the IVP was for when pain was out of control and she had been pushing PCA frequently. She assured me that she had. At end of shift it was noted that patient had only pushed PCA 13 times entire shift. 3. Apparently there was a family meeting about prognosis and pt not going home  And EOL here at [facility]. It was not properly communicated to nurses that the patient was not involved in the meeting and was not aware. The issue came up about the specialty bed and if she could take it home. That is when pt stated she was going home and that is why she has the dilaudid pump. GOAL: Pain managed    INTERVENTIONS(INDIVIDUALIZED)   1-Provide patient with specialty bed so that she can be properly cared for. 2-Increase pain medicine so that patient does not have to ask for repeated doses of IVP meds.    3- Involve pt in discharge planning           Nursing Visit Frequency  Hospice Aide Visit Frequency     SW  COPING  [facility]  RESOURCES NEEDED  SW Visit Frequency     Bereavement   NO CHANGE     Volunteer  NO VOLUNTEER       SPIRITUAL ISSUES  GRIEF  COPING     AVAILABLE [facility]   Visit Frequency     Clinician Signatures  Nurse______________________________  SW________________________________  Chaplain____________________________  Volunteer Coordinator__________________  Bereavement_________________________        Signed by: Latha Jimenez RN       [facility] ID Nurse Notes by Latha Jimenez RN at 17  Version 1 of 2    Author:  Latha Jimenez RN Service:  [facility] Author Type:  Registered Nurse    Filed:  17 Date of Service:  17 Status:  Signed    :  Latha Jimenez RN (Registered Nurse)      Related Notes: Addendum by Latha Jimenez RN (Registered Nurse) filed at 17 [phone]         Patient: Shaheed Kramer    Date: 17  Time: 3:01 AM    [facility] Nurse Notes        [facility]  Patient Name  Jyoti bonner  Episode -   Date of IDT Meeting  17    UPDATED COMPREHENSIVE ASSESSMENT      Neuro - alert and oriented  Muscular - moves upper extremities, moves left leg slightly, unable to move r leg which is externally rotated and very edematous  Resp - lungs clear, room air  Heart WNL BP 98/60 lopressor held last pm  GI - appetite good eats mostly fast food from family and sweets, last BM , on bowel regimen   - davila to BSD inserted 5/15/17  Skin - intact    ATTENDING Physician  Dr Margot Das pain     SIGNS pt states when she is in pain, always has pain if moved     LAB VALUES (when available)     KARNOFSKY 30%     FAST for all dementia N/A     Progression to DEPENDENCE WITH ADLs (include time frame) Complete Care pt can feed self     PRESSURE ULCERS None     DISEASE SPECIFIC Diabetes     FALL RISK: None  PROBLEM: Pt is unable to turn well in bed could use Bariatric Bed  GOAL: Pain managed  INTERVENTIONS(INDIVIDUALIZED) Pain management           Nursing Visit Frequency  Hospice Aide Visit Frequency     SW  COPING  GRIEF  ADVANCED DIRECTIVES    BEREAVEMENT  RESOURCES NEEDED  SW Visit Frequency     Bereavement   NO CHANGE     Volunteer  NO VOLUNTEER       SPIRITUAL ISSUES  GRIEF  COPING     AVAILABLE [facility] RESOURCES   Visit Frequency     Clinician Signatures  Nurse______________________________  SW________________________________  Chaplain____________________________  Volunteer Coordinator__________________  Bereavement_________________________        Signed by: Jarrod Rendon RN       [facility] IDG Nurse Notes by Christal Rhodes RN at 17  Version 1 of     Author:  Christal Rhodes RN Service:  NURSING Author Type:  Registered Nurse    Filed:  06/10/17 2018 Date of Service:  17 Status:  Signed    :  Christal Rhodes RN (Registered Nurse)           Patient: Dong Parker    Date: 17  Time: 12:37 AM    [address] [place] [address]    Patient Name Dong Parker  Episode -    Date of IDT Meeting  2017  [facility] Bones Physician Dr. Suzanne Recinos is a 39 y.o. with a past history of metastatic endometrial cancer (mets to liver and bone) who was admitted to [address] [place] [address] on 2017. By doppler and imaging she does not have DVT/PE. CT of pelvis and right leg show enlarging tumor involving right sacrum, right iliac bone, bilateral rami and proximal right femur (which appears acute). L5 and right sacral nerves are involved with tumor and pressure of tumor creates compression of right femoral and external iliac veins causing stasis. She has complete destruction of the right hip, femur and pelvis with tumor invasion and complete loss of bone. The patient/family chose comfort measures with the support of Hospice.   SYMPTOMS  Pain  SIGNS/SYMPTOMS: Patient unable to bear pain of growing tumor involving right sacrum, ilium, spine, femur.  Current focus is pain control, although she is also mildly confused.  and caregivers at home were having extreme difficulty in managing her pain and she required daily visits by the nurse with frequent medication adjustments and addition of medication IV route PCA and continuous in order to attempt control of her pain without success. Mobility is still an issue as patient resists movement of any kind due to pain. She was placed supine on admission to [facility] on 6/5/2017. With use of PCA pump, patient allows slight rotation left to right q4 hours throughout the night. Two slight spots of potential breakdown identified on upper abdomen following weeks in prone position with little movement. LAB VALUES (when available)    KARNOFSKY 30  Progression to DEPENDENCE WITH ADLs (include time frame) Patient is bedridden and complete care. turning due to pain. Patient feeds herself and is alert and oriented, but completely immobile. PRESSURE ULCERS: None  DISEASE SPECIFIC: Growing bone and spinal metastases resulting in pain that is difficult to manage. Signs/symptoms UTI or dehydration. FALL RISK: low  PROBLEM: Pain 4/10 with medication, pain 10/10 with movement. Patient resists turning or movement of any kind due to pain. GOAL:  Pain management  INTERVENTIONS(INDIVIDUALIZED): Consider changing  PCA from morphine to dilaudid, may be toxic with morphine, kidney function is normal from 2 weeks ago, recent episodes of twitching, none noted currently. Pt needs to lay on the left side at least 1 x per day for 20 min as tolerated  Pt will need PT/OT evaluation to help determine a technique for transfer of position in bed, will order, also family and home staff along with [facility] staff need to learn repositioning as per PT/OT while she is in the [facility]. If pt needs IV fluids to relieve signs/symptoms of UTI/dehydration, will consider.   Keflex started to prevent infection of skin irritation in groin from difficulty cleansing due to pain on any movement. Nursing Visit Frequency 24 hr @ [address] Visit Frequency 24 hr @ [facility]  SW    COPING    GRIEF    ADVANCED DIRECTIVES        BEREAVEMENT    RESOURCES NEEDED    SW Visit Frequency    Bereavement   NO CHANGE    Volunteer    NO VOLUNTEER        SPIRITUAL ISSUES    GRIEF    COPING       AVAILABLE SPIRITUAL RESOURCES     Visit Frequency    Clinician Signatures    Nurse______________________________    SW________________________________    Chaplain____________________________    Volunteer Coordinator__________________    Bereavement_________________________           Signed by: Gisella De Jesus RN         Electronically signed by Tegan Daniels RN at 17 0755              [facility] IDG  Notes by Macrina Kellogg at 17 1526  Version 1 of 1    Author:  Macrina Kellogg Service:  HOSPICE Author Type:  Pastoral Care    Filed:  17 1527 Date of Service:  17 Status:  Signed    :  Macrina Kellogg (Pastoral Care)           Patient: Shanelle Larry    Date: 17  Time: 3:27 PM    SPIRITUAL ISSUES:  I visited the room of this pt who arrived back at the [facility] yesterday and is on GIP Status. She was in bed, semi-awake and not fully alert, not agitated, not restless, and appeared well medicated and comfortable. She was lying on her back and appeared comfortable. She was talking on the telephone to her mother, but put the phone down when I entered. I confirmed with her the Rosana had visited her yesterday and I confirmed that I would be visiting as well, if she so desired. She was agreeable to periodic visits. The pt thanked me for visiting and my offer of support. Her spirits were moderate and her attitude somewhat stoic as she did not wish to come back to the [facility]  I learned in earlier visits that she and her family [facility]. GOALS:  Continue to visit this pt and her family while she is at the [facility] and I am in the facility, to provide pastoral care and spiritual support to assist both the pt and them with coping with this difficult medical situation and decline. Coordinate w/ Chaplain Rankin as she assumes primary care for this patient  Coordinate with Chaplain Foster if/when the pt returns to her mothers home. Visit this pt and her family, while she is @ [facility] and I am in this facility, or PRN as requested. Coordinate with Care Team on POC.   Signed by: Magdaleno Kussmaul

## 2017-06-20 NOTE — PROGRESS NOTES
[facility]  [phone]    Patient Name: Hadley Baxter  YOB: 1980    Date of Provider Hospice Visit: 06/20/17    Level of Care:   [x] General Inpatient (GIP)    [] Routine   [] Respite    Location of Care:  [] [facility] [] [facility] [] [facility] [] [facility] [x] Hospice [facility]  [] Home [] Other:      Date of Original Hospice Admission: 5-16-17  Hospice Medical Director for Inpatient Care: Dr. Gigi Unger Diagnosis:  Metastatic Endometrial Adenocarcinoma       HOSPICE DIAGNOSES   Active Symptoms:  1. Generalised pain, especially right lower extremity, lower back, especially with movements  2. Delirium; Fluctuates, mild confusion at times, improved for most part  3. Shortness of breath with low O2 sats: improves with use of O2  4. Anxiety/depression; still an issue  5. Insomnia; less, spending more time sleeping   6. Fatigue/weakness/lethargy: worse   7. Constipation  8. Itching: less, improved  9. Bladder spasms; less today     PLAN   1. Continue GIP level of care due to changing medication requirements, adjustments, additions of opioid pain mediations for relief and adjuvant therapy medications as tolerated, and continuous monitoring while pt in delirious state   2. Continue ativan 1mg  three times daily as needed for anxiety. 3. Continue low dose ativan 0.5mg twice daily  4. Continue PCA dilaudid 3mg / hr continuous, PCA dose of 1mg every 6 mts. 5. Continue nurse given dilaudid 3mg IV every 15mts as needed for severe pain  6. Continue gabapentin 300mg at bedtime  7. Continue pyridium 100mg three times daily x 3 days to help with bladder spasms  8. Please set up air mattress for her continued comfort  9. Continue clinoril, Cymbalta for adjuvant therapy for bone pain  10. Continue methadone at current levels 10mg every 8 hrs  11. Benadryl 25mg oral everfy 4 hrs as needed for itching  12.  Pt is now laying supine, nurses helping to reposition and turn to side during cleaning/ADL care and to prevent skin breakdown. 15.  and SW to support family needs: family in need of ongoing psychosocial, emotional and spiritual support. Laurence Nicholas has been very supportive and regularly visiting pt/family and helping them. 14. Disposition: home or routine care status when symptoms are stable: SW is having conversations with family now regarding resources for support at home versus routine care status here at [facility]. Mom has completed application for Foundation support. Prognosis estimated based on 06/20/17 clinical assessment is:   [] Few to Many Hours  [] Hours to Days   [] Few to Many Days   [] Days to Weeks    [x] Few to Many Weeks   [] Weeks to Months   [] Few to Many Months    Communicated plan of care with: Hospice Case Manager; Hospice IDT; Care Team     GOALS OF CARE     Resuscitation Status: DNR  Durable DNR: [x] Yes [] No    Advance Care Planning 5/10/2017   Patient's Healthcare Decision Maker is: Legal Next of Andrea Velarde   Primary Decision Maker Name Emy Bailey   Primary Decision Maker Phone Number -   Primary Decision Maker Relationship to Patient Parent   Confirm Advance Directive Yes, on file        HISTORY     History obtained from: chart, staff    CHIEF COMPLAINT: pain in bladder area/vaginal area  The patient is:   [x] Verbal  [] Nonverbal  [] Unresponsive    HPI/SUBJECTIVE:  Pt seems to be little sleepy at this time. Answers appropriately. Pain in vaginal area due to catheter insertion has eased up now with help of pyridium.    Got 1 prn ativan this am.   Requiring all scheduled meds     REVIEW OF SYSTEMS     The following systems were: [x] reviewed  [] unable to be reviewed    Positive ROS include:  Constitutional: fatigue, weakness  Ears/nose/mouth/throat:   Respiratory:shortness of breath  Gastrointestinal:  Musculoskeletal:pain in legs, joints, swelling legs, itching  Neurologic: numbness and burning pain  Psychiatric:anxiety, depression  Endocrine:          FUNCTIONAL ASSESSMENT     Palliative Performance Scale (PPS): 30%     PSYCHOSOCIAL/SPIRITUAL ASSESSMENT     Active Problems:    * No active hospital problems. *    Past Medical History:   Diagnosis Date    Cancer [facility])     uterine    CVI (common variable immunodeficiency) (HCC)     Diabetes ([facility] [address]     Elevated liver function tests 10/21/2010    Endometrial cancer ([facility] [address] 7/7/2010    Hypertension     Iron deficiency anemia     Menometrorrhagia 10/21/2010    Microcytic anemia 10/21/2010    Morbid obesity ([facility] [address]     Prediabetes 7/7/2010    Psychiatric disorder     depression    Radiation     completed radiation mid-march      Past Surgical History:   Procedure Laterality Date    CARDIAC SURG PROCEDURE UNLIST      hx of tachycardia    HX GYN      hysterectomy      Social History   Substance Use Topics    Smoking status: Never Smoker    Smokeless tobacco: Never Used    Alcohol use Yes     Family History   Problem Relation Age of Onset    Hypertension Mother     Hypertension Father     Stroke Maternal Grandfather     Cancer Paternal Grandmother      breast    Diabetes Paternal Grandmother       Allergies   Allergen Reactions    Egg Nausea and Vomiting     Raw egg and scrambled eggs. Egg products okay.      Pcn [Penicillins] Nausea and Vomiting     \"but could take amoxil\"    Shellfish Containing Products Unknown (comments)    Tetanus And Diphtheria Toxoids, Adsorbed, Adult Other (comments)     Developed local swelling, axillary pain, and transient fever    Tomato Unknown (comments)      Current Facility-Administered Medications   Medication Dose Route Frequency    phenazopyridine (PYRIDIUM) tablet 100 mg  100 mg Oral TIDPC    diphenhydrAMINE (BENADRYL) capsule 25 mg  25 mg Oral Q4H PRN    HYDROmorphone (PF) (DILAUDID) injection 3 mg  3 mg IntraVENous Q15MIN PRN    LORazepam (ATIVAN) tablet 0.5 mg  0.5 mg Oral BID    acetaminophen (TYLENOL) tablet 650 mg  650 mg Oral Q4H PRN    Hydromorphone 500mg/500mL bag [facility]   IntraVENous CONTINUOUS    gabapentin (NEURONTIN) capsule 300 mg  300 mg Oral QHS    LORazepam (ATIVAN) tablet 1 mg  1 mg Oral TID PRN    metoprolol tartrate (LOPRESSOR) tablet 25 mg  25 mg Oral BID    methadone (DOLOPHINE) tablet 10 mg  10 mg Oral Q8H    polyethylene glycol (MIRALAX) packet 17 g  17 g Oral DAILY    magic mouthwash cpd (without sucralfate)  5 mL Oral QID PRN    docusate sodium (COLACE) capsule 100 mg  100 mg Oral DAILY    DULoxetine (CYMBALTA) capsule 60 mg  60 mg Oral QHS    sulindac (CLINORIL) tablet 200 mg  200 mg Oral BID WITH MEALS        PHYSICAL EXAM     Wt Readings from Last 3 Encounters:   05/30/17 121.1 kg (267 lb)   05/10/17 121.4 kg (267 lb 10.2 oz)   02/16/17 136.1 kg (300 lb)       Visit Vitals    /64 (BP 1 Location: Left arm, BP Patient Position: At rest)    Pulse 65    Temp 96.5 °F (35.8 °C)    Resp 18    SpO2 92%         Supplemental O2  [x] Yes  [] NO  Last bowel movement:     Currently this patient has:  [] Peripheral IV [x] PICC  [] PORT [] ICD    [x] Davila Catheter [] NG Tube   [] PEG Tube    [] Rectal Tube [] Drain  [x] Other: now with a special mattress    Constitutional: pale, laying on her back,much more alert and oriented. Pain in vaginal area has eased up some  Eyes: pallor++  ENMT: moist oral mucosa  Cardiovascular: distant heart sounds, tachycardic  Respiratory:  Normal breathing, diminished air entry  Gastrointestinal:  distended and firm, non tender, BS +, pt has a davila's  Musculoskeletal:edema ++ lower extremities right leg>left leg  Skin: warm, dry, some skin irritation in the groin folds, itching   Neurologic: awake and alert today, interacting  Psychiatric: fluctuating mood, calm affect  Other: davila's: urine clear orange discoloration due to pyridium      Pertinent Lab and or Imaging Tests:           Total time:35  mts  Counseling / coordination time:   > 50% counseling / coordination?:      This patient meets Hospice General Inpatient (GIP) Level of Care.       Supporting documentation for GIP need for pain control:  [x] Frequent evaluation by a doctor, nurse practitioner, nurse   [x] Frequent medication adjustment    [x] IVs that cannot be administered at home   [x] Aggressive pain management   [] Complicated technical delivery of medications              Supporting documentation for GIP need for symptom control:  [x]  Sudden decline necessitating intensive nursing intervention  []  Uncontrolled / intractable nausea or vomiting   [x]  Pathological fractures  []  Advanced open wounds requiring frequent skilled care  [] Unmanageable respiratory distress  [x] New or worsening delirium   [] Delirium with behavior issues  [] Imminent death  with skilled nursing needs documented above  Carlie Jason MD

## 2017-06-20 NOTE — PROGRESS NOTES
Patient: Dong Parker    Date: 06/20/17  Time: 2:57 AM    \[facility]\"" Nurse Notes    Date: 06/20/2017  Time: Olivier Pires   Patient Name [address]  Episode -   Date of IDT Meeting 6/20/17  UPDATED COMPREHENSIVE ASSESSMENT Pt has been alert and fully oriented, able to express needs adequately. VS are within normal limits with only her HR being slightly elevated. Pts lungs are clear. Pain is rated at 4-5/10 at the moment. Pt appears to be comfortable, absence of grimacing or anything that would indicate discomfort. Pt is still utilizing her PCA with 7 attempts on last shift. Pts Dilaudid pump is currently running at 3 mg/ hour basal rate and 1 mg bolus rate, every 6 mins. ATTENDING Physician   CLINICAL STATUS   SYMPTOMS pain  SIGNS elevated HR, adult numeric pain scale ratings of 4-5/10 at times when pt is \"comfortable\", PCA pump bolus doses maximized most days   LAB VALUES (when available)   KARNOFSKY   FAST for all dementia   Progression to DEPENDENCE WITH ADLs (include time frame) Pt is completely limited and dependant on help with any ADLs. She can move her upper extremities but is unable to voluntarily move her lower extremities. If she is being moved she is in severe pain. PRESSURE ULCERS N/A  DISEASE SPECIFIC Severe pain when legs are being moved even slightly, affecting all aspects of daily activities and increasing risks for pressure ulcers and skin lacerations. FALL RISK: low  PROBLEM: pain  GOAL: comfort, absence of pain, pt needs to reach a level at which she rates her pain lower than 3 at all times and movement does not cause her severe pain and anxiety. INTERVENTIONS(INDIVIDUALIZED)  If needed, her PCA basal rate needs to be increased so that pt is pain-free most of the time and the frequency of needing a bolus dose decreases which would indicate an adequate pain regimen. Pt will be positioned for comfort according to her wishes.    Nursing Visit Frequency 24hr  Hospice Aide Visit Frequency 24 hr          Signed by: Melony Hinton

## 2017-06-20 NOTE — HSPC IDG NURSE NOTES
Patient: Briana Roger       Date: 06/20/17  Time: 2:57 AM     [facility] Nurse Notes     Date: 06/20/2017  Time: Olivier Pires   Patient Name [address]  Episode -   Date of IDT Meeting 6/20/17  UPDATED COMPREHENSIVE ASSESSMENT Pt has been alert and fully oriented, able to express needs adequately. VS are within normal limits with only her HR being slightly elevated. Pts lungs are clear. Pain is rated at 4-5/10 at the moment. Pt appears to be comfortable, absence of grimacing or anything that would indicate discomfort. Pt is still utilizing her PCA with 7 attempts on last shift. Pts Dilaudid pump is currently running at 3 mg/ hour basal rate and 1 mg bolus rate, every 6 mins. ATTENDING Physician   CLINICAL STATUS   SYMPTOMS pain  SIGNS elevated HR, adult numeric pain scale ratings of 4-5/10 at times when pt is \"comfortable\", PCA pump bolus doses maximized most days   LAB VALUES (when available)   KARNOFSKY   FAST for all dementia   Progression to DEPENDENCE WITH ADLs (include time frame) Pt is completely limited and dependant on help with any ADLs. She can move her upper extremities but is unable to voluntarily move her lower extremities. If she is being moved she is in severe pain. PRESSURE ULCERS N/A  DISEASE SPECIFIC Severe pain when legs are being moved even slightly, affecting all aspects of daily activities and increasing risks for pressure ulcers and skin lacerations. FALL RISK: low  PROBLEM: pain  GOAL: comfort, absence of pain, pt needs to reach a level at which she rates her pain lower than 3 at all times and movement does not cause her severe pain and anxiety. INTERVENTIONS(INDIVIDUALIZED) If needed, her PCA basal rate needs to be increased so that pt is pain-free most of the time and the frequency of needing a bolus dose decreases which would indicate an adequate pain regimen. Pt will be positioned for comfort according to her wishes.    Nursing Visit Frequency 24hr  Hospice Aide Visit Frequency 24 hr             Signed by: Angelique Macdonald

## 2017-06-20 NOTE — HSPC IDG CHAPLAIN NOTES
Patient: Mitchel Harvey    Date: 06/20/17  Time: 1:22 PM  SPIRITUAL ISSUES:  I visited the room of this pt who is again at the [facility] and is on GIP Status. She was in bed, semi-awake and not fully alert, not agitated, not restless, and appeared well medicated and comfortable. She was lying on her back and appeared comfortable. I confirmed with her the Rosana had continued to visit her and that I would be visiting as well, if she so desired. Her spirits were moderate and her attitude somewhat stoic as she did not wish to come back to the [facility]  Patient and Family use geovanny as a primary coping mechanism but are very stressed and struggling with this long, difficult journey. GOALS:  Continue to visit this pt and her family while she is at the [facility] and I am in the facility, to provide pastoral care and spiritual support to assist both the pt and them with coping with this difficult medical situation and decline. Coordinate w/ Chaplain Pinky Haney as she assumes primary care for this patient  Coordinate with Chaplain Foster if/when the pt returns to her mothers home. Visit this pt and her family, while she is @ [facility] and I am in this facility, or PRN as requested. Coordinate with Care Team on POC.       Signed by: Daniel Mera

## 2017-06-20 NOTE — PROGRESS NOTES
Problem: Pain  Goal: *Control of Pain  Outcome: Progressing Towards Goal  Pt appears to be more comfortable and also more alert and oriented. She still rates her pain at 4-5/10, but overall it seems like her pain is being managed better and there are no spikes in pain and not such a need for PRN medications at this time.

## 2017-06-20 NOTE — PROGRESS NOTES
0700  Report received. 0800  Pt lying in bed, awake. Pt reports pain is a 4 or 5/10. Rn encouraged pt to hit her PCA button. Lungs clear but diminished. No cough noted. Pt does not have her O2 on.  +bowel sounds. Last reported BM was 6-16. Jama is draining Orange colored urine. Pt has +3 pitting edema in her bilateral lower est.  Right thigh from groin down also has edema. CADD pump is infusing Dilaudid 3mg/hr with 1mg every 6 mins. Pt has a PICC in her right arm Dressing is clear and intact. 3639   Pt's best friend left for the day. Pt asleep. No facial grimacing at this time. 1130  Pt sleeping. No facial grimacing at this time. 1315  Pt continues to rest comfortably. No facial grimacing, respirations shallow. 1500  Pt sleeping. 1530  Pt getting a bath.  in to visit. CNA to complete bath after visit. 1700  Pt resting, no complaints. 1800  Pt getting a bath, pt had a BM. Pt turned and repositioned. Pt's Mother at the bedside. Pt tolerated turning and repositioning . 1900  Report given. NAME OF PATIENT:  Ghada Randall    LEVEL OF CARE:  GIP    REASON FOR GIP:   Pain, despite numerous changes in medications, Medication adjustment that must be monitored 24/7 and Stabilizing treatment that cannot take place at home    *PATIENT REMAINS ELIGIBLE FOR GIP LEVEL OF CARE AS EVIDENCED BY: (MUST BE ADDRESSED OF PATIENT GIP)    O2 SAFETY:  Oxygen sign on the door    FALL INTERVENTIONS PROVIDED:   Implemented/recommended assistive devices and encouraged their use    INTERDISPLINARY COMMUNICATION/COLLABORATION:  Physician, MSW, [place] and RN, CNA    NEW MEDICATION INITIATION DOCUMENTATION: No new medications initiated. COMFORTABLE DYING MEASURE:  Is Patient/family satisfied with symptom level?  yes    DISCHARGE PLAN:  Pt will remain at the [facility] until her family makes alternative living arrangements.

## 2017-06-20 NOTE — PROGRESS NOTES
1900: Shift report received from [facility] [address]: Pt in bed, family/friends at the bedside. Pt alert and oriented and appears comfortable. States pain at 4-5/10. Lungs clear. Davila draining bright red urin, will check on that again shortly. Dialudid PCA:  running continuously at 3 mg/ hour basal rate. 1 mg bolus rate, lockout 6 mins, max dose per hour: 6  Pump cleared: 323.5 ml remaining, 8 attempts for bolus, 8 received; 48.75 mg given total since 0630 on 6/20/17. Current bag of Dilaudid can be used until 6/23/17.  2120: Pt requests PRN Dilaudid for increasing pain, now at 6/10. Medicated at this time with scheduled meds and PRn Dilaudid. 2230: Pt states she is still in pain, medicated with PRN Dilaudid at this time. 0500: Pt called and asked for a PRN dose of Tylenol for pain rated at 5/10. She was also reminded of her PCA pump. Pt states she had a good night and was able to sleep well. 5613: Pts davila has drained 250ml of phil/red urin. 0630: PCA Dilaudid pump cleared: 283.1 ml remaining, 10 attempts for bolus/ 10 given, total of 40.45 mg Dilaudid administered since 6/20/17 1950. Bag expires 6/23/17.       NAME OF PATIENT:  Jyoti Randall    LEVEL OF CARE:  GIP    REASON FOR GIP:   Pain, despite numerous changes in medications and Stabilizing treatment that cannot take place at home    *PATIENT REMAINS ELIGIBLE FOR GIP LEVEL OF CARE AS EVIDENCED BY: (MUST BE ADDRESSED OF PATIENT GIP)      REASON FOR RESPITE:  n/a    O2 SAFETY:  Concentrator positioning (6\" from furniture/drapes), No petroleum based products on face while oxygen in use and Oxygen sign on the door    FALL INTERVENTIONS PROVIDED:   Implemented/recommended resources for alarm system (personal alarm, bed alarm, call bell, etc.)  and Implemented/recommended environmental changes (remove hazards, lower bed, improve lighting, etc.)    INTERDISPLINARY COMMUNICATION/COLLABORATION:  Physician, MSW, [place] and RN, CNA    NEW MEDICATION INITIATION DOCUMENTATION:  no new medications ordered today    COMFORTABLE DYING MEASURE:  Is Patient/family satisfied with symptom level?  yes    DISCHARGE PLAN:  Pt can only return home when symptoms (pain) are managed well.

## 2017-06-20 NOTE — PROGRESS NOTES
1900[facility]  [facility]: Pt in bed with family member at the bedside. Pt is alert and oriented, appears to be very comfortable at this time. VS stable, lungs clear. States pain is at 4-5/10. Skin warm and intact. PCA Dilaudid running at 3 mg/hour basal rate and 1 mg bolus rate every 6 minutes. Pump cleared at 2140 due to other pt needing immediate attention. 2300: Pt talking to visitor, her best friend, who is staying the night at her bedside. 2345: Pt called to have her oxygen sat checked, she had been using oxygen via nasal cannula to increase her sats, she is now at 94. 0235: Pt sleeping. 7566: Pt woken up to medicate her with scheduled meds. PCA pump cleared: 372.3 ml remaining, attempts for bolus rate: 3, given: 3, a total of 29.1 mg given since 6/19 at 2140. Setting continue at: Basal rate 3 mg/hour and bolus dose 1 mg/ lockout every 6 mins, 6 max doses per hour. 2461: Pt requested a PRN dose of Ativan, she states she was unable to sleep well during the night. Pt was asleep during rounds all night and appeared calm and comfortable. Medicated with PRN 1 mg of Ativan. NAME OF PATIENT:  Hamzah Nataly    LEVEL OF CARE:  [facility]    REASON FOR GIP:   Pain, despite numerous changes in medications and Stabilizing treatment that cannot take place at home   Pt needs assistance 24/7 and continues to have pain that needs to be monitored and treated at all times to keep pt feeling comfortable.      *PATIENT REMAINS ELIGIBLE FOR GIP LEVEL OF CARE AS EVIDENCED BY: (MUST BE ADDRESSED OF PATIENT GIP)  Pt continues to have pain despite numerous changes in meds in the past. Pts PCA Dilaudid pump was increased from 2 mg to 3 mg recently to manage pts pain      REASON FOR RESPITE:  n/a    O2 SAFETY:  Concentrator positioning (6\" from furniture/drapes), No petroleum based products on face while oxygen in use and Oxygen sign on the door    FALL INTERVENTIONS PROVIDED: Implemented/recommended assistive devices and encouraged their use and Implemented/recommended resources for alarm system (personal alarm, bed alarm, call bell, etc.)     INTERDISPLINARY COMMUNICATION/COLLABORATION:  Physician, MSW, [place] and RN, CNA    NEW MEDICATION INITIATION DOCUMENTATION:  no changes in meds at this time    COMFORTABLE DYING MEASURE:  Is Patient/family satisfied with symptom level?  yes    DISCHARGE PLAN:  Pt will remain as GIP at the [facility] until symptoms are managed and pt can either continue to stay at the [facility] and be changed to Routine LOC or she can return home.

## 2017-06-21 NOTE — PROGRESS NOTES
1900 - Bedside and Verbal shift change report given to Debbie Casillas (oncoming nurse) by Steven Olivares (offgoing nurse). Report included the following information SBAR, Kardex and MAR.     0700 - Bedside and Verbal shift change report given to Steven Olivares (oncoming nurse) by Chilango Chang RN (offgoing nurse). Report included the following information SBAR, Kardex and MAR. NAME OF PATIENT:  Pérez Melendez    LEVEL OF CARE:  GIP    REASON FOR GIP:   Pain, despite numerous changes in medications, Medication adjustment that must be monitored 24/7 and Stabilizing treatment that cannot take place at home    *PATIENT REMAINS ELIGIBLE FOR GIP LEVEL OF CARE AS EVIDENCED BY: (Requires use of PCA doses for breakthrough pain; verbalizes pain at 6 but will tolerate better at a 4.)      REASON FOR RESPITE:  N/A    O2 SAFETY:  Concentrator positioning (6\" from furniture/drapes), No petroleum based products on face while oxygen in use and Oxygen sign on the door    FALL INTERVENTIONS PROVIDED:   Implemented/recommended resources for alarm system (personal alarm, bed alarm, call bell, etc.)  and Implemented/recommended environmental changes (remove hazards, lower bed, improve lighting, etc.)    INTERDISPLINARY COMMUNICATION/COLLABORATION:  Physician, MSW, [place] and RN, CNA    NEW MEDICATION INITIATION DOCUMENTATION:  N/A    Reason medication is being initiated:  N/A    MD / Provider name consulted re: change in status / initiation of new medication:  N/A    New Symptom(s):  N/A    New Order(s):  N/A    Name of the person notified of the changes:  N/A    Name of person being taught:  N/A    Instructions given:  N/A    Side Effects taught:  N/A    Response to teaching:  N/A      COMFORTABLE DYING MEASURE:  Is Patient/family satisfied with symptom level?  yes    DISCHARGE PLAN:  Will remain at [facility] for further evaluation.

## 2017-06-21 NOTE — PROGRESS NOTES
[facility]  [phone]    Patient Name: Megan Alfredo  YOB: 1980    Date of Provider Hospice Visit: 06/21/17    Level of Care:   [x] General Inpatient (GIP)    [] Routine   [] Respite    Location of Care:  [] [facility] [] [address] [place] [] [facility] [] [facility] [x] [facility]  [] Home [] Other:      Date of Original Hospice Admission: 5-16-17  Hospice Medical Director for Inpatient Care: Dr. Jony De Luna Diagnosis:  Metastatic Endometrial Adenocarcinoma       HOSPICE DIAGNOSES   Active Symptoms:  1. Generalised pain, especially right lower extremity, lower back, especially with movements  2. Delirium; Fluctuates, mild confusion at times, improved for most part  3. Shortness of breath with low O2 sats: improves with use of O2  4. Anxiety/depression; still an issue  5. Insomnia; less, spending more time sleeping   6. Fatigue/weakness/lethargy: worse   7. Constipation  8. Itching: less, improved  9. Bladder spasms; less today     PLAN   1. Continue GIP level of care due to changing medication requirements, adjustments, additions of opioid pain mediations for relief and adjuvant therapy medications as tolerated, and continuous monitoring while pt in delirious state   2. Continue ativan 1mg  three times daily as needed for anxiety, had 2 additional doses as needed in past 24hrs  3. Continue low dose ativan 0.5mg twice daily  4. Continue PCA dilaudid 3mg / hr continuous, PCA dose of 1mg every 6 mts. 5. Continue nurse given dilaudid 3mg IV every 15mts as needed for severe pain, had 2 additional doses in past 24 hrs  6. Continue gabapentin 300mg at bedtime  7. Continue pyridium 100mg three times daily x 3 days to help with bladder spasms  8. Continue air mattress for her continued comfort  9. Continue clinoril, Cymbalta for adjuvant therapy for bone pain  10. Continue methadone at current levels 10mg every 8 hrs  11.  Benadryl 25mg oral everfy 4 hrs as needed for itching  12. Continue  reposition and turn to side during cleaning/ADL care and to prevent skin breakdown. 15.  and SW to support family needs: family in need of ongoing psychosocial, emotional and spiritual support. Villabrooklyn Goldberg has been very supportive and regularly visiting pt/family and helping them. 14. Disposition: home or routine care status when symptoms are stable: SW is having conversations with family now regarding resources for support at home versus routine care status here at [facility]. Mom has completed application for Foundation support. Prognosis estimated based on 06/21/17 clinical assessment is:   [] Few to Many Hours  [] Hours to Days   [x] Few to Many Days   [] Days to Weeks    [] Few to Many Weeks   [] Weeks to Months   [] Few to Many Months    Communicated plan of care with: Hospice Case Manager; Hospice IDT; Care Team     GOALS OF CARE     Resuscitation Status: DNR  Durable DNR: [x] Yes [] No    Advance Care Planning 5/10/2017   Patient's Healthcare Decision Maker is: Legal Next of Andrea Velarde   Primary Decision Maker Name Faith Ervin   Primary Decision Maker Phone Number -   Primary Decision Maker Relationship to Patient Parent   Confirm Advance Directive Yes, on file   Patient Would Like to Complete Advance Directive -        HISTORY     History obtained from: chart, staff    CHIEF COMPLAINT: pain in bladder area/vaginal area  The patient is:   [x] Verbal  [] Nonverbal  [] Unresponsive    HPI/SUBJECTIVE:  Pt seems to be little sleepy at this time. Answers appropriately. Pain in vaginal area due to catheter insertion has eased up now with help of pyridium. Skin color had changed she is very ashen and has a very decreased appetite.         REVIEW OF SYSTEMS     The following systems were: [x] reviewed  [] unable to be reviewed    Positive ROS include:  Constitutional: fatigue, weakness  Ears/nose/mouth/throat:   Respiratory:shortness of breath  Gastrointestinal:  Musculoskeletal:pain in legs, joints, swelling legs, itching  Neurologic: numbness and burning pain  Psychiatric:anxiety, depression  Endocrine:          FUNCTIONAL ASSESSMENT     Palliative Performance Scale (PPS): 30%     PSYCHOSOCIAL/SPIRITUAL ASSESSMENT     Active Problems:    * No active hospital problems. *    Past Medical History:   Diagnosis Date    Cancer [facility])     uterine    CVI (common variable immunodeficiency) (HCC)     Diabetes ([facility]     Elevated liver function tests 10/21/2010    [facility] 7/7/2010    Hypertension     Iron deficiency anemia     Menometrorrhagia 10/21/2010    Microcytic anemia 10/21/2010    Morbid obesity ([facility]     Prediabetes 7/7/2010    Psychiatric disorder     depression    Radiation     completed radiation mid-march      Past Surgical History:   Procedure Laterality Date    CARDIAC SURG PROCEDURE UNLIST      hx of tachycardia    HX GYN      hysterectomy      Social History   Substance Use Topics    Smoking status: Never Smoker    Smokeless tobacco: Never Used    Alcohol use Yes     Family History   Problem Relation Age of Onset    Hypertension Mother     Hypertension Father     Stroke Maternal Grandfather     Cancer Paternal Grandmother      breast    Diabetes Paternal Grandmother       Allergies   Allergen Reactions    Egg Nausea and Vomiting     Raw egg and scrambled eggs. Egg products okay.      Pcn [Penicillins] Nausea and Vomiting     \"but could take amoxil\"    Shellfish Containing Products Unknown (comments)    Tetanus And Diphtheria Toxoids, Adsorbed, Adult Other (comments)     Developed local swelling, axillary pain, and transient fever    Tomato Unknown (comments)      Current Facility-Administered Medications   Medication Dose Route Frequency    phenazopyridine (PYRIDIUM) tablet 100 mg  100 mg Oral TIDPC    diphenhydrAMINE (BENADRYL) capsule 25 mg  25 mg Oral Q4H PRN    HYDROmorphone (PF) (DILAUDID) injection 3 mg  3 mg IntraVENous Q15MIN PRN    LORazepam (ATIVAN) tablet 0.5 mg  0.5 mg Oral BID    acetaminophen (TYLENOL) tablet 650 mg  650 mg Oral Q4H PRN    Hydromorphone 500mg/500mL bag [facility]   IntraVENous CONTINUOUS    gabapentin (NEURONTIN) capsule 300 mg  300 mg Oral QHS    LORazepam (ATIVAN) tablet 1 mg  1 mg Oral TID PRN    metoprolol tartrate (LOPRESSOR) tablet 25 mg  25 mg Oral BID    methadone (DOLOPHINE) tablet 10 mg  10 mg Oral Q8H    polyethylene glycol (MIRALAX) packet 17 g  17 g Oral DAILY    magic mouthwash cpd (without sucralfate)  5 mL Oral QID PRN    docusate sodium (COLACE) capsule 100 mg  100 mg Oral DAILY    DULoxetine (CYMBALTA) capsule 60 mg  60 mg Oral QHS    sulindac (CLINORIL) tablet 200 mg  200 mg Oral BID WITH MEALS        PHYSICAL EXAM     Wt Readings from Last 3 Encounters:   05/30/17 121.1 kg (267 lb)   05/10/17 121.4 kg (267 lb 10.2 oz)   02/16/17 136.1 kg (300 lb)       Visit Vitals    BP (!) 88/66 (BP 1 Location: Left arm)    Pulse 94    Temp 97.7 °F (36.5 °C)    Resp 12    SpO2 90%         Supplemental O2  [x] Yes  [] NO  Last bowel movement:     Currently this patient has:  [] Peripheral IV [x] PICC  [] PORT [] ICD    [x] Davila Catheter [] NG Tube   [] PEG Tube    [] Rectal Tube [] Drain  [x] Other: now with a special mattress    Constitutional: pale, laying on her back, very sleepy, continues with pain in vaginal area at times  Eyes: pallor++  ENMT: moist oral mucosa  Cardiovascular: distant heart sounds, tachycardic  Respiratory:  Normal breathing, diminished air entry  Gastrointestinal:  distended and firm, non tender, BS +, pt has a davila's  Musculoskeletal:edema ++ lower extremities right leg>left leg  Skin: warm, dry, some skin irritation in the groin folds, itching   Neurologic:sleepy but can follow commands  Psychiatric: fluctuating mood, calm affect  Other: davila's: urine clear orange discoloration due to pyridium      Pertinent Lab and or Imaging Tests: Total time: 45 mts  Counseling / coordination time:   > 50% counseling / coordination?:      This patient meets Hospice General Inpatient (GIP) Level of Care.       Supporting documentation for GIP need for pain control:  [x] Frequent evaluation by a doctor, nurse practitioner, nurse   [x] Frequent medication adjustment    [x] IVs that cannot be administered at home   [x] Aggressive pain management   [] Complicated technical delivery of medications              Supporting documentation for GIP need for symptom control:  [x]  Sudden decline necessitating intensive nursing intervention  []  Uncontrolled / intractable nausea or vomiting   [x]  Pathological fractures  []  Advanced open wounds requiring frequent skilled care  [] Unmanageable respiratory distress  [x] New or worsening delirium   [] Delirium with behavior issues  [] Imminent death  with skilled nursing needs documented above  Holly Nickerson NP

## 2017-06-21 NOTE — PROGRESS NOTES
[address]  follow-up Visit  to [facility] pt on GIP Status      This afternoon, I visited the room of this pt on GIP Status. She was in bed, awake but quiet. . She did not appear agitated, not restless, and said she was comfortable but quickly closed her eyes. .   I asked if she would like me to read Psalms to her and she said \"yes\". I read several Psalms to her and discussed the emotions the Psalmist was seeking . I also affirmed her great geovanny and trust in God. I confirmed that I would be continue to visit, if she so desired. Her spirits were moderate, and her outlook not as positive as I have seen on past visits. She thanked me for visiting and my offer of on-going support. GOALS:  Continue to visit this pt and her family while she is at the [facility] and I am in the facility, to provide pastoral care and spiritual support to assist both the pt and them with coping with this difficult medical situation and decline. Coordinate w/ Chaplain Rankin as she assumes primary care for this patient  Coordinate with Chaplain Foster if/when the pt returns to her mother home. PLAN:  Continue to provide supplemental  support to that being provided by Rosana. Coordinate with Rosana as requested. Coordinate with Care Team on POC.  VISIT FREQUENCY:  1 wk 2 starting 6/21 plus 4 prn 14 days.   Ivory Sanchez, [facility], [address] [place] [address]  [phone]

## 2017-06-21 NOTE — PROGRESS NOTES
0700  Report received from [address]  Pt lying in bed,asleep. 0845  Pt lying in bed, eating her breakfast.  Pt reports she is having pain in her lower abdomen. Rn encouraged her to use her PCA button and lower the head of the bed. Lungs clear. No dyspnea noted.  + bowel sounds  Last BM was yest.  Jama is draining bright orange urine. Pt continues to have +2-3 edema in her bilateral Lower Ext. Pt has a Right upper Arm PICC  Dressing is clear and intact. CADD pump is infusing Dilaudid   Res vol 273.9. Basal rate is 3mg/hr, with 1mg q 6 mins. 0900  Pt tolerated 100% of her breakfast.  Pt turned and repositioned in bed. Pt stated she is not able to sit up in the bed due to the pressure that her abdomen puts on her legs. Pt stated when she lowers the head of the bed the pressure is better. 1130  Pt sleeping. No facial grimacing at this time. 1245  Pt sleeping. 1400  Pt sleeping. 1610  Pt sitting up watching TV   No complaints at this time. 18  Pt's Mother and niece in to visit. Pt was sitting up in bed smiling. No complaints at this time. 1900  Report given. NAME OF PATIENT:  Jericho Randall    LEVEL OF CARE:  GIP    REASON FOR GIP:   Pain, despite numerous changes in medications, Medication adjustment that must be monitored 24/7 and Stabilizing treatment that cannot take place at home    *PATIENT REMAINS ELIGIBLE FOR GIP LEVEL OF CARE AS EVIDENCED BY: (MUST BE ADDRESSED OF PATIENT GIP)  Pt continues to utilize her Dilaudid CADD pump. O2 SAFETY:  Not using    FALL INTERVENTIONS PROVIDED:   Implemented/recommended assistive devices and encouraged their use and Implemented/recommended environmental changes (remove hazards, lower bed, improve lighting, etc.)    INTERDISPLINARY COMMUNICATION/COLLABORATION:  Physician, MSW, [place] and RN, CNA    NEW MEDICATION INITIATION DOCUMENTATION:  No new medications initiated.       COMFORTABLE DYING MEASURE:  Is Patient/family satisfied with symptom level?  yes    DISCHARGE PLAN:  Pt will remain at the [facility] until her family makes alternative living arrangements.

## 2017-06-22 NOTE — PROGRESS NOTES
Today is 6/22/17 and I am just concluding a visit with Cely Hebert to continue support to her and her mother. We have agreed to regular support visits. She knows that I will not see her the weekend. However, her or her mother is free to call as needed/desired. I spoke with her mother briefly on Monday after she texted me that Cely Hebert appears to be doing better than expected following the family meeting. She is aware of her sickness but thank God for whatever time Cely Neas is been given. I also visited with Cely Anup Monday and Tuesday. I will continue to follow.      Jack Irby

## 2017-06-22 NOTE — PROGRESS NOTES
0700  Report received. 0740  Pt lying in bed, awake. Pt reports she feel pretty good this am.  No complaints of pain or dyspnea at this time. Lungs diminished. No cough noted. +bowel sounds. Last Bowel Movement was 6-20. Jama is draining reddish orange urine. Pt has edema from her groin to her toes. Feet are +2-3. Pt has a PICC in her AMARJIT. Dressing is clear and intact  CADD pump is infusing Dilaudid at 3m/hr with 1mg q 6 mins. Res vol 192.7.    0900  Pt asleep. No facial grimacing or signs of discomfort. 0945  Pt tolerated 100% of her breakfast without difficulty. 1100  Pt asleep. 1230  Pt did not eat any lunch. 1300  Pt continues to sleep. No signs of distress. 1500  Pt's  in to visit. Pt aroused without difficulty. No complaints at this time. 1700  Pt asleep. No needs at this time. 1900  Report given. NAME OF PATIENT:  Prema Randall    LEVEL OF CARE:  GIP    REASON FOR GIP:   Pain, despite numerous changes in medications, Medication adjustment that must be monitored 24/7 and Stabilizing treatment that cannot take place at home    *PATIENT REMAINS ELIGIBLE FOR GIP LEVEL OF CARE AS EVIDENCED BY: (MUST BE ADDRESSED OF PATIENT GIP)    O2 SAFETY:  Oxygen sign on the door    FALL INTERVENTIONS PROVIDED:   Implemented/recommended assistive devices and encouraged their use    INTERDISPLINARY COMMUNICATION/COLLABORATION:  Physician, MSW, [place] and RN, CNA    NEW MEDICATION INITIATION DOCUMENTATION:  No new medications initiated. COMFORTABLE DYING MEASURE:  Is Patient/family satisfied with symptom level?  yes    DISCHARGE PLAN:  Pt will remain at the [facility] until her family makes alternative living arrangements.

## 2017-06-22 NOTE — PROGRESS NOTES
[address] [facility] follow-up Visit  to [facility] pt on GIP Status      This afternoon, I visited the room of this pt on GIP Status. She was in bed, asleep. I called her name and she opened her eyes briefly and then went back to sleep. She did not appear agitated, nor restless, and comfortable. I quietly read Psalms to her and she would occasionally speak but not open her eyes. I also affirmed her great geovanny and trust in God. I confirmed that I would be continue to visit, if she so desired. GOALS:  Continue to visit this pt and her family while she is at the [facility] and I am in the facility, to provide pastoral care and spiritual support to assist both the pt and them with coping with this difficult medical situation and decline. Coordinate w/ Luis Vázquez as she assumes primary care for this patient  Coordinate with Home  if/when the pt returns to her mother home. PLAN:  Continue to provide supplemental  support to that being provided by Luis Vázquez. Coordinate with Luis Vázquez as requested. Coordinate with Care Team on POC.  VISIT FREQUENCY:  1 wk 2 starting 6/21 plus 4 prn 14 days.   Davidson Emery, [facility], [address] [phone]

## 2017-06-22 NOTE — PROGRESS NOTES
[facility]  [phone]    Patient Name: Torin Joe  YOB: 1980    Date of Provider Hospice Visit: 06/22/17    Level of Care:   [x] General Inpatient (GIP)    [] Routine   [] Respite    Location of Care:  [] [facility] [] [facility] [] [facility] [] [facility] [x] [facility]  [] Home [] Other:      Date of Original Hospice Admission: 5-16-17  Hospice Medical Director for Inpatient Care: Dr. Annette Esparza Diagnosis:  Metastatic Endometrial Adenocarcinoma       HOSPICE DIAGNOSES   Active Symptoms:  1. Generalised pain, especially right lower extremity, lower back, especially with movements, rates 5/10   2. Delirium; Fluctuates, mild confusion at times, improved for most part, none recent  3. Shortness of breath with low O2 sats: improves with use of O2  4. Anxiety/depression; still an issue  5. Insomnia; less, spending more time sleeping   6. Fatigue/weakness/lethargy: worse, BP very low at times  7. Constipation improved, refusing miralax  8. Itching: less, improved  9. Bladder spasms; less today     PLAN   1. GIP level of care due to changing medication requirements, adjustments, additions of opioid pain mediations for relief and adjuvant therapy medications as tolerated, and continuous monitoring while pt in delirious state   2. Continue ativan 1mg  three times daily as needed for anxiety, has had 1 dosing in the past 24 hrs  3. Continue low dose ativan 0.5mg twice daily  4. Continue PCA dilaudid 3mg / hr continuous, PCA dose of 1mg every 6 mts. 5. Continue nurse given dilaudid 3mg IV every 15mts as needed for severe pain, had 2 additional doses in past 24 hrs  6. Continue gabapentin 300mg at bedtime  7. Continue pyridium 100mg three times daily x 3 days to help with bladder spasms: completed  8. Continue air mattress for her continued comfort  9. Continue clinoril, Cymbalta for adjuvant therapy for bone pain  10.  Continue methadone at current levels 10mg every 8 hrs  11. Benadryl 25mg oral everfy 4 hrs as needed for itching: took 1 prn benadryl yesterday  12. Continue  reposition and turn to side during cleaning/ADL care and to prevent skin breakdown. 15.  and SW to support family needs: family in need of ongoing psychosocial, emotional and spiritual support. Charlette Fishero has been very supportive and regularly visiting pt/family and helping them. 14. Disposition: home or routine care status when symptoms are stable: SW is having conversations with family now regarding resources for support at home versus routine care status here at [facility]. Mom has completed application for [employer] support. Prognosis estimated based on 06/22/17 clinical assessment is:   [] Few to Many Hours  [] Hours to Days   [x] Few to Many Days   [] Days to Weeks    [] Few to Many Weeks   [] Weeks to Months   [] Few to Many Months    Communicated plan of care with: Hospice Case Manager; Hospice IDT; Care Team     GOALS OF CARE     Resuscitation Status: DNR  Durable DNR: [x] Yes [] No    Advance Care Planning 5/10/2017   Patient's Healthcare Decision Maker is: Legal Next of Andrea Velarde   Primary Decision Maker Name Jeffery Rain   Primary Decision Maker Phone Number -   Primary Decision Maker Relationship to Patient Parent   Confirm Advance Directive Yes, on file   Patient Would Like to Complete Advance Directive -        HISTORY     History obtained from: chart, staff    CHIEF COMPLAINT: pain in bladder area/vaginal area  The patient is:   [x] Verbal  [] Nonverbal  [] Unresponsive    HPI/SUBJECTIVE:  Pt is awake and alert, has eaten breakfast with a better appetite this morning,has pain in her bladder area and vagina and back/hip rated 5/10 this morning, about to be medicated.         REVIEW OF SYSTEMS     The following systems were: [x] reviewed  [] unable to be reviewed    Positive ROS include:  Constitutional: fatigue, weakness  Ears/nose/mouth/throat: Respiratory:  Gastrointestinal:  Musculoskeletal:weakness,pain in the right hip  Neurologic: numbness and burning pain  Psychiatric:anxiety, depression  Endocrine:          FUNCTIONAL ASSESSMENT     Palliative Performance Scale (PPS): 30%     PSYCHOSOCIAL/SPIRITUAL ASSESSMENT     Active Problems:    * No active hospital problems. *    Past Medical History:   Diagnosis Date    Cancer [facility])     uterine    CVI (common variable immunodeficiency) (HCC)     Diabetes ([facility] [address]     Elevated liver function tests 10/21/2010    Endometrial cancer ([facility] [address] 7/7/2010    Hypertension     Iron deficiency anemia     Menometrorrhagia 10/21/2010    Microcytic anemia 10/21/2010    Morbid obesity ([facility] [address]     Prediabetes 7/7/2010    Psychiatric disorder     depression    Radiation     completed radiation mid-march      Past Surgical History:   Procedure Laterality Date    CARDIAC SURG PROCEDURE UNLIST      hx of tachycardia    HX GYN      hysterectomy      Social History   Substance Use Topics    Smoking status: Never Smoker    Smokeless tobacco: Never Used    Alcohol use Yes     Family History   Problem Relation Age of Onset    Hypertension Mother     Hypertension Father     Stroke Maternal Grandfather     Cancer Paternal Grandmother      breast    Diabetes Paternal Grandmother       Allergies   Allergen Reactions    Egg Nausea and Vomiting     Raw egg and scrambled eggs. Egg products okay.      Pcn [Penicillins] Nausea and Vomiting     \"but could take amoxil\"    Shellfish Containing Products Unknown (comments)    Tetanus And Diphtheria Toxoids, Adsorbed, Adult Other (comments)     Developed local swelling, axillary pain, and transient fever    Tomato Unknown (comments)      Current Facility-Administered Medications   Medication Dose Route Frequency    diphenhydrAMINE (BENADRYL) capsule 25 mg  25 mg Oral Q4H PRN    HYDROmorphone (PF) (DILAUDID) injection 3 mg  3 mg IntraVENous Q15MIN PRN    LORazepam (ATIVAN) tablet 0.5 mg  0.5 mg Oral BID    acetaminophen (TYLENOL) tablet 650 mg  650 mg Oral Q4H PRN    Hydromorphone 500mg/500mL bag [facility]   IntraVENous CONTINUOUS    gabapentin (NEURONTIN) capsule 300 mg  300 mg Oral QHS    LORazepam (ATIVAN) tablet 1 mg  1 mg Oral TID PRN    metoprolol tartrate (LOPRESSOR) tablet 25 mg  25 mg Oral BID    methadone (DOLOPHINE) tablet 10 mg  10 mg Oral Q8H    polyethylene glycol (MIRALAX) packet 17 g  17 g Oral DAILY    magic mouthwash cpd (without sucralfate)  5 mL Oral QID PRN    docusate sodium (COLACE) capsule 100 mg  100 mg Oral DAILY    DULoxetine (CYMBALTA) capsule 60 mg  60 mg Oral QHS    sulindac (CLINORIL) tablet 200 mg  200 mg Oral BID WITH MEALS        PHYSICAL EXAM     Wt Readings from Last 3 Encounters:   05/30/17 121.1 kg (267 lb)   05/10/17 121.4 kg (267 lb 10.2 oz)   02/16/17 136.1 kg (300 lb)       Visit Vitals    /62 (BP 1 Location: Left arm)    Pulse 66    Temp 97.8 °F (36.6 °C)    Resp 16    SpO2 100%         Supplemental O2  [x] Yes  [] NO  Last bowel movement:     Currently this patient has:  [] Peripheral IV [x] PICC  [] PORT [] ICD    [x] Davila Catheter [] NG Tube   [] PEG Tube    [] Rectal Tube [] Drain  [x] Other: now with a special mattress    Constitutional: pale, laying on her back, awake and alert, continues with pain in vaginal area at times  Eyes: pallor++  ENMT: moist oral mucosa  Cardiovascular: distant heart sounds, tachycardic  Respiratory:  Normal breathing, diminished air entry  Gastrointestinal:  distended and firm, non tender, BS +, pt has a davila's  Musculoskeletal:edema ++ lower extremities right leg>left leg  Skin: warm, dry, some skin irritation in the groin folds, itching   Neurologic:sleepy but can follow commands  Psychiatric: fluctuating mood, calm affect         Pertinent Lab and or Imaging Tests:           Total time: 45 mts  Counseling / coordination time:   > 50% counseling / coordination?: Sarbjit Cheek MD MD Addendum    Pt seen & case discussed with Ms. Pinky Soto. Agree with current care plan as outlined in her note. Recommend SW Ms. Kami Isaacs to talk to mother regarding switching pt to routine care soon once she is stable with symptoms control. Pt declining steadily and is more weak, stays asleep most times, limited oral intake, pain reasonably well controlled, more of anxiety and needing prn ativan. Family has applied for medicaid support and waiting for approval.       Will continue to follow for comfort and adjust medications/care plan accordingly.

## 2017-06-23 NOTE — PROGRESS NOTES
NAME OF PATIENT:  Hamzah Ingram    LEVEL OF CARE:  Routine    REASON FOR GIP:   N/a- changed to routine LOC on 6-23-17    *PATIENT REMAINS ELIGIBLE FOR [facility] LEVEL OF CARE AS EVIDENCED BY: (MUST BE ADDRESSED OF PATIENT GIP) n/a      REASON FOR RESPITE:  N/A    O2 SAFETY:  Concentrator positioning (6\" from furniture/drapes), Tanks stored in rodríguez , No petroleum based products on face while oxygen in use and Oxygen sign on the door    FALL INTERVENTIONS PROVIDED:   Implemented/recommended resources for alarm system (personal alarm, bed alarm, call bell, etc.) , Implemented/recommended environmental changes (remove hazards, lower bed, improve lighting, etc.) and Implemented/recommended increased supervision/assistance    INTERDISPLINARY COMMUNICATION/COLLABORATION:  Physician, MSW, [place] and RN, CNA    NEW MEDICATION INITIATION DOCUMENTATION:  Documentation completed in Clinical Note in [facility]    Reason medication is being initiated:  N/A    MD / Provider name consulted re: change in status / initiation of new medication:  N/A    New Symptom(s):  N/A    New Order(s):  N/A    Name of the person notified of the changes:  N/A    Name of person being taught:  N/A    Instructions given:  N/A    Side Effects taught:  N/A    Response to teaching:  N/A      COMFORTABLE DYING MEASURE:  Is Patient/family satisfied with symptom level?  yes    DISCHARGE PLAN:  Patient remains GIP level of care. No plans to be d/c home. [id number]: Care assumed of patient. When rounding, patient is found to be in no acute distress. Patient is GIP level of care with a dx of endometrical cancer with symptom of pain that cannot no longer be managed at home due to frequent med/dose changes and for care that is exhausting of the family. Patient is very pleasant and AOx3. Lungs diminished with audible S1/S2. Last BM was 6-22-17. Jama draining yellow urine to gravity.  Per report, patient has breakdown on her sacral area that is discolored. Writer has not seen as it has been too painful to move/adjust the patient. Patient complains of leg pain (right) and even \"oohs and aahs\" when extra weight is applied to the bed. Patient asked for additional booster meds (i.e PRN dose of dilaudid) but has not been pressing her PCA. Patient states that her PRN doses work better so she does not utilize her PCA button like she should. Writer educated patient that she can use her PCA every 6 minutes for pain control. Patient verbalized understanding and states she would use it. She also requested ativan at this time. Patient medicated with PO ativan, metoprolol, clinoril, and colace. Miralax held per patient request. CNA at bedside to obtain vital signs. 1020: Patient resting with eyes closed. Respirations even and unlabored with no accessory muscle use noted. Will continue to closely monitor patient progress. 1200: Patient in room resting with eyes closed. Respirations even and unlabored with no accessory muscle use noted. 1330: Patient switched from GIP to Routine LOC per Cally NP.     3040: Patient medicated with scheduled methadone per orders. Given pepsi and a cup of ice. Will continue to monitor. 1714: Patient medicated with scheduled medications at this time. Verbalized no complaints. 1911: 19/34 attempts and 58.45 ml given via dilaudid PCA.  Report to Mikhail Carcamo

## 2017-06-23 NOTE — HOSPICE
NAME OF PATIENT:  Shaheed Kramer    LEVEL OF CARE:  GIP    REASON FOR GIP:   Pain, despite numerous changes in medications, Medication adjustment that must be monitored 24/7 and Stabilizing treatment that cannot take place at home    *PATIENT REMAINS ELIGIBLE FOR GIP LEVEL OF CARE AS EVIDENCED BY: (MUST BE ADDRESSED OF PATIENT GIP) Pt is receiving PCA Dilaudid a swell as prn doses,as well as Ativan for anxiety      REASON FOR RESPITE:  N/A    O2 SAFETY:  Concentrator positioning (6\" from furniture/drapes), Tanks stored in rodríguez , No petroleum based products on face while oxygen in use and Oxygen sign on the door    FALL INTERVENTIONS PROVIDED:   Implemented/recommended use of non-skid footwear, Implemented/recommended use of fall risk identification flag to all team members, Implemented/recommended assistive devices and encouraged their use, Implemented/recommended resources for alarm system (personal alarm, bed alarm, call bell, etc.)  and Implemented/recommended environmental changes (remove hazards, lower bed, improve lighting, etc.)    INTERDISPLINARY COMMUNICATION/COLLABORATION:  Physician, MSW, [place] and RN, CNA    NEW MEDICATION INITIATION DOCUMENTATION:  N/A  Reason medication is being initiated:      MD / Provider name consulted re: change in status / initiation of new medication:      New Symptom(s):      New Order(s):      Name of the person notified of the changes:      Name of person being taught:      Instructions given:      Side Effects taught:      Response to teachin [address] free  Is Patient/family satisfied with symptom level?  yes    DISCHARGE PLAN:  SW is discussing details with family,pt may become routine with Foundation help while DC plans are being explored. 19:30,report received,assumed care of pt. Pt is GIP for generalized pain,hospice dx is metastatic endometrial cancer. Mom and Dad are @ bedside,pt is eating TACO MARTIN. PCA Dilaudid continues to infuse via AMARJIT PICC line. Pt rates generalized pain at 5/10 while eating. 22:00,watching TV,comfortable. 00:00,pt had lg soft brown stool. Pt premedicated with Dilaudid 3mg IVP prior to cleaning and turning. Linens changed,noted dermatitis of sacral crack,appears moisture related. 01:00,requested and medicated with Ativan 1mg po for anxiety. 01:50,pt requesting prn dose of Dilaudid 3mg IVP for pain level of 6/10,pt medicated and emotional support provided  03:00,pt is sleeping. 05:00,asleep  06:00,watching TV,comfortable @ present. 06:30,repositioned for comfort,PCA cleared. Total dose was 42.65mg.

## 2017-06-24 NOTE — HOSPICE
NAME OF PATIENT:  Jyoti Randall    LEVEL OF CARE:  Routine    REASON FOR GIP:   N/A    *PATIENT REMAINS ELIGIBLE FOR GIP LEVEL OF CARE AS EVIDENCED BY: (MUST BE ADDRESSED OF PATIENT GIP)  N/A    REASON FOR RESPITE:  N/A    O2 SAFETY:  Concentrator positioning (6\" from furniture/drapes), Tanks stored in rodríguez , No petroleum based products on face while oxygen in use and Oxygen sign on the door    FALL INTERVENTIONS PROVIDED:   Implemented/recommended use of non-skid footwear, Implemented/recommended use of fall risk identification flag to all team members, Implemented/recommended assistive devices and encouraged their use, Implemented/recommended resources for alarm system (personal alarm, bed alarm, call bell, etc.)  and Implemented/recommended environmental changes (remove hazards, lower bed, improve lighting, etc.)    INTERDISPLINARY COMMUNICATION/COLLABORATION:  Physician, MSW, [place] and RN, CNA    NEW MEDICATION INITIATION DOCUMENTATION:  N/A    Reason medication is being initiated:      MD / Provider name consulted re: change in status / initiation of new medication:      New Symptom(s):    New Order(s):      Name of the person notified of the changes:      Name of person being taught:      Instructions given:      Side Effects taught:      Response to teachin [address] free  Is Patient/family satisfied with symptom level?  yes    DISCHARGE PLAN:  Pt may stay here,MSW will discuss plans with Mother    20:00,report received,assumed care of pt who is routine care now,hospice dx is metastatic endometrial cancer. PCA Dilaudid continues with basal and PCA doses. Pt seems comfortable,but drowsy. Jama intact,continue to monitor. 00:00,pt is sleeping,she has not asked for any prn doses of Dilaudid or Ativan. 02:00,awake,repositioned in bed,medicated with prn dose of Dilaudid 3mg IVP for comfort. Pt requesting Ativan also,  1mg po given.   03:00,asleep.  06:00,c/o itching,Benadryl 25mg po given. PCA cleared. Total Dilaudid 41.35mg for this shift.

## 2017-06-24 NOTE — PROGRESS NOTES
1000 C/O headache. Tylenol 650 mg given po. Pt is quiet, states she just wants to rest. States she has company coming today. 1040 Pt states  I need a Ativan. Yuliya got so much going on in my head. Told given 30 mins ago with scheduled meds. Given emotional support. Request Tylenol for headache  1200 Bath given and bed pad changed. Pt voices wanting to talk with her brother who has not spoken to her in weeks. She is crying. 1430 Pt voices pain 6 of 10. States she feels like her right foot is wrapped in something tight. Request IVP med for pain not releived with the PCA dose. Dilaudid  3mg given IVP and Lorazepam 1mg po for anxiety  1530 Pt appears to be asleep. 1600 Parents at the bedside. They brought food to her. She has refused all meals here today. C/O endometrial abd cramping,   1850 Tylenol 650 mg given for c/o abd pain/cramping.  Sister at the bedside              NAME OF PATIENT:  David Greta    LEVEL OF CARE:  GIP    REASON FOR GIP:   Pain, despite numerous changes in medications, Medication adjustment that must be monitored 24/7 and Stabilizing treatment that cannot take place at home    *PATIENT REMAINS ELIGIBLE FOR [facility] LEVEL OF CARE AS EVIDENCED BY: (MUST BE ADDRESSED OF PATIENT GIP) na      REASON FOR RESPITE:  na    O2 SAFETY:  na    FALL INTERVENTIONS PROVIDED:   Implemented/recommended use of fall risk identification flag to all team members, Implemented/recommended resources for alarm system (personal alarm, bed alarm, call bell, etc.) , Implemented/recommended environmental changes (remove hazards, lower bed, improve lighting, etc.) and Implemented/recommended increased supervision/assistance    INTERDISPLINARY COMMUNICATION/COLLABORATION:  Physician, MSW, [place] and RN, CNA    NEW MEDICATION INITIATION DOCUMENTATION:  Documentation completed in Clinical Note in [facility]    Reason medication is being initiated:  na    MD / Provider name consulted re: change in status / initiation of new medication: na    New Symptom(s):  na    New Order(s):  na    Name of the person notified of the changes:  na    Name of person being taught:  na    Instructions given:  na    Side Effects taught:  na    Response to teaching:  na      COMFORTABLE DYING MEASURE:  Is Patient/family satisfied with symptom level? Yes  DISCHARGE PLAN:  Remain at [facility] for end of life Routine care.

## 2017-06-25 NOTE — PROGRESS NOTES
2230: Pt medicated with scheduled meds as well as PRN Tylenol per request and Dilaudid for breakthrough pain.

## 2017-06-25 NOTE — PROGRESS NOTES
1200: Assumed care of pt after report from Qasim Lee, [facility]. Patient in room resting quietly. 1504: Patient requested PRN dose of pain med and ativan at this time. 1741: Patient medicated with scheduled ativan. 1801: Rang on bell stating that her right leg was sore. States she has pressed her PCA but is not helping. Rates it a 6/10. States a PRN dose works better for her. Will medicate with PRN dilaudid at this time.

## 2017-06-25 NOTE — PROGRESS NOTES
Verbal shift change report given to Althea Wood (oncoming nurse) by Matthias Parra (offgoing nurse). Report included the following information SBAR and Kardex. NAME OF PATIENT:  Darroll Cockayne Barksdale    LEVEL OF CARE:  Routine    REASON FOR GIP:   n/a    *PATIENT REMAINS ELIGIBLE FOR GIP LEVEL OF CARE AS EVIDENCED BY: (MUST BE ADDRESSED OF PATIENT GIP)      REASON FOR RESPITE:  n/a    O2 SAFETY:  No oxygen in use at this time. FALL INTERVENTIONS PROVIDED:   Implemented/recommended use of fall risk identification flag to all team members, Implemented/recommended resources for alarm system (personal alarm, bed alarm, call bell, etc.)  and Implemented/recommended increased supervision/assistance    INTERDISPLINARY COMMUNICATION/COLLABORATION:  Physician, MSW, [place] and RN, CNA    NEW MEDICATION INITIATION DOCUMENTATION:  No new medication at this time. Reason medication is being initiated:  n/a    MD / Provider name consulted re: change in status / initiation of new medication:  n/a    New Symptom(s):  No new symptoms at this time. New Order(s):  No new orders. Name of the person notified of the changes:  Na/    Name of person being taught:  n/a    Instructions given:  n/a    Side Effects taught:  n/a    Response to teaching:  n/a      COMFORTABLE DYING MEASURE:  Is Patient/family satisfied with symptom level?  yes    DISCHARGE PLAN:  None at this time, due in part to patient's acuity. Night shift summary for 1900 until 0700:  2000 - Resting quietly in bed. Jama is patent and draining dark cherry-colored urine. 2226 - Tylenol 650 mg po given at the patient's request. Dilaudid 3 mg IV given for increasing pain in patient's right leg and hip, and her groin area. Ativan 1 mg po given for restlessness. 0143 - Dilaudid 3 mg IV given for increasing pain in patient's groin area. Patient is also using Dilaudid PCA. Receiving Dilaudid PCA at 3 mg per hour with 1 mg every 6 minutes as needed.  No redness, swelling, pain, or leaking at right double lumen PICC line, dressing dated 6/23/2017.  0216 - Dilaudid 3 mg IV given prior to patient being turned for incontinence care. Patient incontinent of a large amount of soft brown stool. It is still very difficult for the patient to turn from side to side due to painful right hip and leg, pain level from 4 to 7.   0400 - Asleep. Breathing is even and unlabored. 0600 - Arousable. Denies pain or discomfort.

## 2017-06-25 NOTE — PROGRESS NOTES
0700 Bedside, Verbal and Written shift change report given to Purnima Kelly RN  (oncoming nurse) by Brigida Franco (offgoing nurse). Report included the following information SBAR, Kardex and MAR. NAME OF PATIENT:  Jyoti Randall    LEVEL OF CARE:  Routine     REASON FOR GIP:   n/a    *PATIENT REMAINS ELIGIBLE FOR GIP LEVEL OF CARE AS EVIDENCED BY: (MUST BE ADDRESSED OF PATIENT GIP)      REASON FOR RESPITE:  routine    O2 SAFETY:  room air    FALL INTERVENTIONS PROVIDED:   Implemented/recommended resources for alarm system (personal alarm, bed alarm, call bell, etc.)  and Implemented/recommended environmental changes (remove hazards, lower bed, improve lighting, etc.)    INTERDISPLINARY COMMUNICATION/COLLABORATION:  Physician, MSW, [place] and RN, CNA    NEW MEDICATION INITIATION DOCUMENTATION:  n/a    Reason medication is being initiated:  N/a      MD / Provider name consulted re: change in status / initiation of new medication:  n/a    New Symptom(s):  n/a    New Order(s):  n/a    Name of the person notified of the changes:  n/a    Name of person being taught:  n/a    Instructions given:  n/a    Side Effects taught:  n/a    Response to teaching:  n/a      COMFORTABLE DYING MEASURE:  Is Patient/family satisfied with symptom level?  yes    DISCHARGE PLAN: patient likely to remain at [facility].

## 2017-06-26 NOTE — PROGRESS NOTES
This was a follow-up visit to continue support to Karli. We have agreed on regular visits during the week. I will see her tomorrow.    Moise Camarillo, Igor Royal., [facility], [address] [place] [address]

## 2017-06-26 NOTE — PROGRESS NOTES
0715:  Verbal shift change report given to Bertram James RN (oncoming nurse) by Alana Ramsey RN (offgoing nurse). Report included the following information SBAR, Kardex, Intake/Output and MAR.   1100:  Administered scheduled medications; pt very lethargic. Asked patient for pain level and she stated \"I'm okay. \"  3168:  Cleaned patient for BM with assistance from [employer]. Administered prn dilaudid 3mg/IV and lorazepam 1mg/PO. NAME OF PATIENT:  Jyoti Randall    LEVEL OF CARE:  Routine    REASON FOR GIP:   n/a    *PATIENT REMAINS ELIGIBLE FOR GIP LEVEL OF CARE AS EVIDENCED BY: (MUST BE ADDRESSED OF PATIENT GIP)      REASON FOR RESPITE:  n/a    O2 SAFETY:  n/a    FALL INTERVENTIONS PROVIDED:   Implemented/recommended resources for alarm system (personal alarm, bed alarm, call bell, etc.) , Implemented/recommended environmental changes (remove hazards, lower bed, improve lighting, etc.) and Implemented/recommended increased supervision/assistance    INTERDISPLINARY COMMUNICATION/COLLABORATION:  Physician, MSW, Leta and RN, CNA    NEW MEDICATION INITIATION DOCUMENTATION:  n/a    Reason medication is being initiated:  n/a    MD / Provider name consulted re: change in status / initiation of new medication:  n/a    New Symptom(s):  n/a    New Order(s):  n/a    Name of the person notified of the changes:  n/a    Name of person being taught:  n/a    Instructions given:  n/a    Side Effects taught:  n/a    Response to teaching:  n/a      COMFORTABLE DYING MEASURE:  Is Patient/family satisfied with symptom level?  yes    DISCHARGE PLAN:  Remain at [facility] for end of life Routine care.

## 2017-06-27 NOTE — PROGRESS NOTES
0700:  Verbal shift change report given to Hunter Perez RN (oncoming nurse) by Fredy Calderón RN (offgoing nurse). Report included the following information SBAR, Kardex, Intake/Output and MAR.   0730:  Rounded; patient sleeping. 0800:  Rounded; patient watching TV; resting comfortably. 0915:  Administered scheduled medications; patient getting reading to eat breakfast.  Patient stating that she is uncomfortable and requesting to be adjusted. Placed head-of-bed lower (per her request). 1030:  Rounded; patient complaining of pain in the vaginal area; encouraged use of PCA/dilaudid; patient gave herself demand dose of 1mg.  1130:  Rounded; patient is requesting toradol d/t past use that provided pain relief. Will contact Dr. Severo Hopkins for order  1200:  Dr. Severo Hopkins on floor doing rounds; gave update on patient along with PCA demand doses within the last 24 hrs (  1330:  Administered scheduled medication; family & friends present. 1712:  Patient stating that pain is increasing and that the PCA isn't working (pain 5/10). Check PCA and pump is working correctly. Administered prn toradol 30mg/IV. 1810:  Rounded on patient; patient states that her pain level is better (3/10); friends present and patient is smiling and laughing.      NAME OF PATIENT:  Isabella Randall    LEVEL OF CARE:  Routine    REASON FOR GIP:   n/a    *PATIENT REMAINS ELIGIBLE FOR GIP LEVEL OF CARE AS EVIDENCED BY: (MUST BE ADDRESSED OF PATIENT GIP)      REASON FOR RESPITE:  n/a    O2 SAFETY:  Concentrator positioning (6\" from furniture/drapes), Tanks stored in ro[place]íguez , No petroleum based products on face while oxygen in use and Oxygen sign on the door    FALL INTERVENTIONS PROVIDED:   Implemented/recommended resources for alarm system (personal alarm, bed alarm, call bell, etc.) , Implemented/recommended environmental changes (remove hazards, lower bed, improve lighting, etc.) and Implemented/recommended increased supervision/assistance    INTERDISPLINARY COMMUNICATION/COLLABORATION:  Physician, MSW, Leta and RN, CNA    NEW MEDICATION INITIATION DOCUMENTATION:  n/a    Reason medication is being initiated:  n/a    MD / Provider name consulted re: change in status / initiation of new medication:  n/a    New Symptom(s):  n/a    New Order(s):  n/a    Name of the person notified of the changes:  n/a    Name of person being taught:  n/a    Instructions given:  n/a    Side Effects taught:  n/a    Response to teaching:  n/a      COMFORTABLE DYING MEASURE:  Is Patient/family satisfied with symptom level?  yes    DISCHARGE PLAN:  Family, along with assistance from [employer] staff, are working on [facility].

## 2017-06-27 NOTE — PROGRESS NOTES
This was a planned visit to continue support to [place]. I was able to carry a dish she stated she wanted doing a previous visit. A high school friend was there. A friend from high school was there. However, she encouraged me to stay and I did as long as I could. We both noted we have not had alone time to talk in a little while. So I will stop by tomorrow early to hopefully avoid other visitors.      Moises Blackwell, Coty Patel., [address] [facility], [address] [place] [address]

## 2017-06-27 NOTE — [facility] IDG MASTER NOTE
[facility]osp[facility] Interdisciplinary Group Collaborative  Date: 17  Time: 9:52 PM             POC/IDG Flowsheet    New Reading  Flowsheets    No data found. POC/IDG Conference Note    Create Note Go to Notes Refresh      [facility] IDG Master Note        Purnima Lomeli, RN Registered Nurse Addendum NURSING Date of Service: 2017   Addend Delete  Bookmark Copy       Hospice Interdisciplinary Group Collaborative  Date: 17  Time: 200     ____[address]   Patient Name  Jyoti Randall  Episode -   Date of IDT Meeting 17  UPDATED COMPREHENSIVE ASSESSMENT   ATTENDING Physician   CLINICAL STATUS metastatic endometrial cancer  SYMPTOMS pain,immobility  SIGNS 98.5-104-16,118/66  LAB VALUES (when available)   KARNOFSKY 30  FAST for all dementia   Progression to DEPENDENCE WITH ADLs (include time frame) Pt is unable to perform ADL's,she is immobile,requiring help of many to turn her. She is able to feed herself. She has a davila,has been constipated and needed an enema yesterday  PRESSURE ULCERS N/A  DISEASE SPECIFIC pain,edema of lower extremities,immobility,requires PCA Dilaudid as well as Methadone and prn Torodol for pain management. Pt now has air flow mattress. PCA dose has been increased to 4mg basal and 2mg PCA. Pt is also requesting prn doses. FALL RISK: low  PROBLEM: pain  GOAL: comfort  INTERVENTIONS(INDIVIDUALIZED) med admin. as needed,emotional support,  Nursing Visit Frequency 24hr  Hospice Aide Visit Frequency 24hr  SW   COPING   GRIEF   ADVANCED DIRECTIVES      BEREAVEMENT   RESOURCES NEEDED   SW Visit Frequency   Bereavement   NO CHANGE   Volunteer   NO VOLUNTEER      SPIRITUAL ISSUES   GRIEF   COPING      AVAILABLE [facility]    Visit Frequency   Clinician Signatures   Nurse_____Haley Hernandez RN_________________________   SW________________________________   Chaplain____________________________   Volunteer Coordinator__________________ Bereavement_________________________  _______________                      ___________________    Patient: Teresa Mcmillan  Insurance ID: [unfilled]  MRN: [id number]  CCN:  [place] Claim No. :     Hospice Election Date:   Current Benefit Period:   Current Benefit Period Start Date:     Medical Director:   Hospice Attending Provider:     ___________________    Diagnoses:  Diagnoses of Elevated liver function tests, Chronic neoplasm-related pain, Bone metastases ([facility] [address] Insomnia due to medical condition, [facility] Advance care planning, Morbid obesity, unspecified obesity type, Vaginal pain, and Generalized edema were pertinent to this visit.     Current Medications:    Current Facility-Administered Medications:     diphenhydrAMINE (BENADRYL) capsule 25 mg, 25 mg, Oral, Q4H PRN, Tommie Rodriguez MD, 25 mg at 06/24/17 0546    HYDROmorphone (PF) (DILAUDID) injection 3 mg, 3 mg, IntraVENous, Q15MIN PRN, Tommie Rodriguez MD, 3 mg at 06/26/17 1545    LORazepam (ATIVAN) tablet 0.5 mg, 0.5 mg, Oral, BID, Tommie Rodriguez MD, 0.5 mg at 06/26/17 1740    acetaminophen (TYLENOL) tablet 650 mg, 650 mg, Oral, Q4H PRN, Tommie Rodriguez MD, 650 mg at 06/26/17 1838    Hydromorphone 500mg/500mL bag [facility], , IntraVENous, CONTINUOUS, Edith Lawson MD    gabapentin (NEURONTIN) capsule 300 mg, 300 mg, Oral, QHS, Cally Freire NP, 300 mg at 06/26/17 2134    LORazepam (ATIVAN) tablet 1 mg, 1 mg, Oral, TID PRN, Tommie Rodriguez MD, 1 mg at 06/26/17 1551    metoprolol tartrate (LOPRESSOR) tablet 25 mg, 25 mg, Oral, BID, Tommie Rodriguez MD, 25 mg at 06/26/17 2134    methadone (DOLOPHINE) tablet 10 mg, 10 mg, Oral, Q8H, Cally Freire NP, 10 mg at 06/26/17 2134    polyethylene glycol (MIRALAX) packet 17 g, 17 g, Oral, DAILY, Cally Freire NP, 17 g at 06/24/17 1004    magic mouthwash cpd (without sucralfate), 5 mL, Oral, QID PRN, Carola Freire NP    docusate sodium (COLACE) capsule 100 mg, 100 mg, Oral, DAILY, Cally Freire NP, 100 mg at 06/26/17 1102    DULoxetine (CYMBALTA) capsule 60 mg, 60 mg, Oral, QHS, Cally Freire NP, 60 mg at 06/26/17 2100    sulindac (CLINORIL) tablet 200 mg, 200 mg, Oral, BID WITH MEALS, Cally Freire NP, 200 mg at 06/26/17 1739    Orders:  Orders Placed This Encounter    DIET REGULAR     Standing Status:   Standing     Number of Occurrences:   1    NURSING-MISCELLANEOUS: (see comments) 1. NO admission labs, x-rays or other diagnostic tests, unless pertinent to symptom control . 2. Discontinue ALL prior medications. CONTINUOUS     1. NO admission labs, x-rays or other diagnostic tests, unless pertinent to symptom control . 2. Discontinue ALL prior medications. Standing Status:   Standing     Number of Occurrences:   1     Order Specific Question:   Description of Order:     Answer:   (see comments)    COMFORT MEASURES ONLY     Standing Status:   Standing     Number of Occurrences:   1    VITAL SIGNS     Standing Status:   Standing     Number of Occurrences:   1    NOTIFY PROVIDER: SPECIFY NOTIFY PROVIDER: FOR PAIN, DYSPNEA, AGITATION, OTHER DISTRESS OR NOT RESPONDING TO ORDERED INTERVENTIONS CONTINUOUS Routine     Standing Status:   Standing     Number of Occurrences:   1     Order Specific Question:   Please describe the test or procedure you would like to order. Answer:   NOTIFY PROVIDER: FOR PAIN, DYSPNEA, AGITATION, OTHER DISTRESS OR NOT RESPONDING TO ORDERED INTERVENTIONS    NURSING-MISCELLANEOUS: BITES AND SIPS FOR COMFORT CONTINUOUS     Standing Status:   Standing     Number of Occurrences:   1     Order Specific Question:   Description of Order:     Answer:   BITES AND SIPS FOR COMFORT    ORAL CARE     Keep mouth moisturized with sponge sticks/toothettes and tap water. Vaseline to lips and nares as needed.      Standing Status:   Standing     Number of Occurrences:   1    NURSING-MISCELLANEOUS: PET THERAPY CONTINUOUS     Standing Status: Standing     Number of Occurrences:   1     Order Specific Question:   Description of Order:     Answer:   PET THERAPY    BEDREST, COMPLETE     Standing Status:   Standing     Number of Occurrences:   1    TURN & POSITION     TURN & POSITION EVERY 6 HOURS - PATIENT MAY REFUSE     Standing Status:   Standing     Number of Occurrences:   1    NURSING-MISCELLANEOUS: Routine LOC Admit to routine level of carel SN visits 2x weekly with 5 visits PRN; CNA daily x5 days; SW visit 1x month;  visit 1x month. CONTINUOUS     Admit to routine level of carel SN visits 2x weekly with 5 visits PRN; CNA daily x5 days; SW visit 1x month;  visit 1x month. Standing Status:   Standing     Number of Occurrences:   1     Order Specific Question:   Description of Order:     Answer:   Routine LOC    DO NOT RESUSCITATE     Standing Status:   Standing     Number of Occurrences:   1    DIET ONE TIME MESSAGE     Regular     Standing Status:   Standing     Number of Occurrences:   1    OXYGEN CANNULA Liters per minute: 2; Indications for O2 therapy: OTHER PRN Routine     Oxygen as needed. Adjust for comfort. Discontinue if not contributing to patient comfort. Standing Status:   Standing     Number of Occurrences:   31891     Order Specific Question:   Liters per minute:      Answer:   2     Order Specific Question:   Indications for O2 therapy     Answer:   OTHER    DISCONTD: cephALEXin (KEFLEX) capsule 500 mg     Order Specific Question:   Antibiotic Indications     Answer:   Skin and Soft Tissue Infection    DISCONTD: LORazepam (ATIVAN) tablet 1 mg    methadone (DOLOPHINE) tablet 10 mg    polyethylene glycol (MIRALAX) packet 17 g    magic mouthwash cpd (without sucralfate)    DISCONTD: gabapentin (NEURONTIN) capsule 600 mg    DISCONTD: metoprolol tartrate (LOPRESSOR) tablet 50 mg    docusate sodium (COLACE) capsule 100 mg    DULoxetine (CYMBALTA) capsule 60 mg    sulindac (CLINORIL) tablet 200 mg    DISCONTD: morphine (PF)  mg/30 ml    LORazepam (ATIVAN) tablet 1 mg    DISCONTD: dextrose 5% and 0.9% NaCl infusion    DISCONTD: dextrose 5 % - 0.45% NaCl infusion    metoprolol tartrate (LOPRESSOR) tablet 25 mg    DISCONTD: HYDROmorphone (PF) (DILAUDID) injection 1 mg    DISCONTD: HYDROmorphone (PF) 15 mg/30 ml (DILAUDID) PCA    gabapentin (NEURONTIN) capsule 300 mg    Hydromorphone 500mg/500mL bag [facility]    acetaminophen (TYLENOL) tablet 650 mg    DISCONTD: diphenhydrAMINE (BENADRYL) capsule 50 mg    DISCONTD: HYDROmorphone (PF) (DILAUDID) injection 1.5 mg    HYDROmorphone (PF) (DILAUDID) injection 3 mg    LORazepam (ATIVAN) tablet 0.5 mg    ketorolac (TORADOL) injection 30 mg    diphenhydrAMINE (BENADRYL) capsule 25 mg    phenazopyridine (PYRIDIUM) tablet 100 mg    INITIAL PHYSICIAN ORDER: HOSPICE Level Of Care: General; Reason for Admission: Admit for GIP level of care for symptom management of Pain     Standing Status:   Standing     Number of Occurrences:   1     Order Specific Question:   Status     Answer:   Hospice     Order Specific Question:   Level Of Care     Answer:   General     Order Specific Question:   Reason for Admission     Answer:   Admit for GIP level of care for symptom management of Pain     Order Specific Question:   Admitting Physician     Answer:   Max Ya     Order Specific Question:   Attending Physician     Answer:   Fionaine Bes     Order Specific Question:   Discharge Plan:     Answer:   Home with Home Hospice    INITIAL PHYSICIAN ORDER: HOSPICE Level Of Care: Routine; Reason for Admission: Routine level of care change     Standing Status:   Standing     Number of Occurrences:   1     Order Specific Question:   Status     Answer:   Hospice     Order Specific Question:   Level Of Care     Answer:   Routine     Order Specific Question:   Reason for Admission     Answer:   Routine level of care change     Order Specific Question:   Admitting Physician     Answer:   Bill Yarbrough     Order Specific Question:   Attending Physician     Answer:   Bill Yarbrough       Allergies: Allergies   Allergen Reactions    Egg Nausea and Vomiting     Raw egg and scrambled eggs. Egg products okay.  Pcn [Penicillins] Nausea and Vomiting     \"but could take amoxil\"    Shellfish Containing Products Unknown (comments)    Tetanus And Diphtheria Toxoids, Adsorbed, Adult Other (comments)     Developed local swelling, axillary pain, and transient fever    Tomato Unknown (comments)       ___________________    Care Team Notes          POC/IDG Notes      \[facility]\"" IDG  Notes by Rickie Vital at 06/20/17 1321  Version 1 of 1    Author:  Rickie Vital Service:  [facility] Author Type:  Pastoral Care    Filed:  06/20/17 1323 Date of Service:  06/20/17 1321 Status:  Signed    :  Rickie Vital (Pastoral Care)           Patient: Tulio Heard    Date: 06/20/17  Time: 1:22 PM  SPIRITUAL ISSUES:  I visited the room of this pt who is again at the [facility] and is on GIP Status. She was in bed, semi-awake and not fully alert, not agitated, not restless, and appeared well medicated and comfortable. She was lying on her back and appeared comfortable. I confirmed with her the Rosana had continued to visit her and that I would be visiting as well, if she so desired. Her spirits were moderate and her attitude somewhat stoic as she did not wish to come back to the [facility]  Patient and Family use geovanny as a primary coping mechanism but are very stressed and struggling with this long, difficult journey. GOALS:  Continue to visit this pt and her family while she is at the [facility] and I am in the facility, to provide pastoral care and spiritual support to assist both the pt and them with coping with this difficult medical situation and decline.    Coordinate ketan/ Rosana as she assumes primary care for this patient  Coordinate with Chaplain Foster if/when the pt returns to her mothers home. Visit this pt and her family, while she is @ [facility] and I am in this facility, or PRN as requested. Coordinate with Care Team on POC. Signed by: Nazia FOWLER [facility] IDG Nurse Notes by Alana Fontaine at 06/20/17 0316  Version 1 of 1    Author:  Alana Fontaine Service:  (none) Author Type:  Registered Nurse    Filed:  06/20/17 0317 Date of Service:  06/20/17 0316 Status:  Signed    :  Alana Fontaine (Registered Nurse)           Patient: Hamzah Ingram       Date: 06/20/17  Time: 2:57 AM     [facility] Nurse Notes     Date: 06/20/2017  Time: Olivier 9   Patient Name [address]  Episode -   Date of IDT Meeting 6/20/17  UPDATED COMPREHENSIVE ASSESSMENT Pt has been alert and fully oriented, able to express needs adequately. VS are within normal limits with only her HR being slightly elevated. Pts lungs are clear. Pain is rated at 4-5/10 at the moment. Pt appears to be comfortable, absence of grimacing or anything that would indicate discomfort. Pt is still utilizing her PCA with 7 attempts on last shift. Pts Dilaudid pump is currently running at 3 mg/ hour basal rate and 1 mg bolus rate, every 6 mins. ATTENDING Physician   CLINICAL STATUS   SYMPTOMS pain  SIGNS elevated HR, adult numeric pain scale ratings of 4-5/10 at times when pt is \"comfortable\", PCA pump bolus doses maximized most days   LAB VALUES (when available)   KARNOFSKY   FAST for all dementia   Progression to DEPENDENCE WITH ADLs (include time frame) Pt is completely limited and dependant on help with any ADLs. She can move her upper extremities but is unable to voluntarily move her lower extremities. If she is being moved she is in severe pain.    PRESSURE ULCERS N/A  DISEASE SPECIFIC Severe pain when legs are being moved even slightly, affecting all aspects of daily activities and increasing risks for pressure ulcers and skin lacerations. FALL RISK: low  PROBLEM: pain  GOAL: comfort, absence of pain, pt needs to reach a level at which she rates her pain lower than 3 at all times and movement does not cause her severe pain and anxiety. INTERVENTIONS(INDIVIDUALIZED) If needed, her PCA basal rate needs to be increased so that pt is pain-free most of the time and the frequency of needing a bolus dose decreases which would indicate an adequate pain regimen. Pt will be positioned for comfort according to her wishes. Nursing Visit Frequency 24hr  Hospice Aide Visit Frequency 24 hr             Signed by: Yesika FOWLER [facility] IDG  Notes by Keri Johnson at 06/13/17 3076  Version 1 of 1    Author:  Keri Johnson Service:  [place] [facility] Author Type:      Filed:  06/13/17 0854 Date of Service:  06/13/17 0853 Status:  Signed    :  Keri Johnson ()           Patient: Shaheed Kramer    Date: 06/13/17  Time: 8:53 AM    [facility]  Notes  Family meeting with mother, 2 sisters, Dr. Phil Crockett RN, [facility] and [address] [place] [address]. Dr. Cristhian Wheeler talked about possibility of patient not being able to return home but not ruled out. She also explained that patients pain was still an ongoing issue that was being addressed but  she wouldnt be able to experience total relief. Family very tearful and saddened when Dr. Cristhian Wheeler expressed belief that patient was declining and would most likely pass at the [facility]. Emotional support provided by staff as family expressed their grief. Mother spoke  to patient following meeting and requested that other family members be made aware of dx and prognosis. She agreed. Meeting was held later in day, attended by [facility], during which family was informed of patients status. Family trying to cope with their issues of loss/grief.               Signed by: Keri FOWLER [facility] IDG Nurse Notes by Latha Jimenez RN at 06/12/17 0301  Version 2 of 2    Author:  Melita Pearl RN Service:  Imelda Harrisstephanie Author Type:  Registered Nurse    Filed:  06/13/17 0356 Date of Service:  06/12/17 0301 Status:  Addendum    :  Melita Pearl RN (Registered Nurse)      Related Notes: Original Note by Melita Pearl RN (Registered Nurse) filed at 06/12/17 0310         Patient: Pratik Memos    Date: 06/12/17  Time: 3:01 AM    [facility] Nurse Notes    [facility]  Patient Name  Jyoti bonner  Episode -   Date of IDT Meeting  6-13-17    UPDATED COMPREHENSIVE ASSESSMENT      Neuro - alert and oriented  Muscular - moves upper extremities, moves left leg slightly, unable to move r leg which is externally rotated and very edematous  Resp - lungs clear, room air  Heart WNL BP 98/60 lopressor held last pm  GI - appetite good eats mostly fast food from family and sweets, last BM 6/9, on bowel regimen   - davila to BSD inserted 5/15/17  Skin - intact    ATTENDING Physician  Dr John Gan pain     SIGNS pt states when she is in pain, always has pain if moved     LAB VALUES (when available)     KARNOFSKY 30%     FAST for all dementia N/A     Progression to DEPENDENCE WITH ADLs (include time frame) Complete Care pt can feed self     PRESSURE ULCERS None     DISEASE SPECIFIC Diabetes     FALL RISK: None  PROBLEM: 1. Pt is unable to turn well in bed could use Bariatric Bed  2. Pain management continues to be an issue for the patient. She states that her pain is never managed. Although she was started on Dilaudid per PCA pump it is a very low dose. Another side issue is that pt prefers for nurse to give IVP bolus instead of using the PCA button. She asked how often she could have that and when I told her q 15 minutes she asked for it 3 times before she was obtunded.  I explained to the patient that the IVP was for when pain was out of control and she had been pushing PCA frequently. She assured me that she had. At end of shift it was noted that patient had only pushed PCA 13 times entire shift. 3. Apparently there was a family meeting about prognosis and pt not going home  And EOL here at [facility]. It was not properly communicated to nurses that the patient was not involved in the meeting and was not aware. The issue came up about the specialty bed and if she could take it home. That is when pt stated she was going home and that is why she has the dilaudid pump. GOAL: Pain managed    INTERVENTIONS(INDIVIDUALIZED)   1-Provide patient with specialty bed so that she can be properly cared for. 2-Increase pain medicine so that patient does not have to ask for repeated doses of IVP meds.    3- Involve pt in discharge planning           Nursing Visit Frequency  Hospice Aide Visit Frequency       COPING  GRIEF  ADVANCED DIRECTIVES    BEREAVEMENT  RESOURCES NEEDED   Visit Frequency     Bereavement   NO CHANGE     Volunteer  NO VOLUNTEER       SPIRITUAL ISSUES  GRIEF  COPING  [address] RESOURCES   Visit Frequency     Clinician Signatures  Nurse______________________________  SW________________________________  Chaplain____________________________  Volunteer Coordinator__________________  Bereavement_________________________        Signed by: Renan Vital RN       [facility] IDG Nurse Notes by Renan Vital RN at 17  Version 1 of 2    Author:  Renan Vital RN Service:  Roman Gamble Author Type:  Registered Nurse    Filed:  17 Date of Service:  17 Status:  Signed    :  Renan Vital RN (Registered Nurse)      Related Notes: Addendum by Renan Vital RN (Registered Nurse) filed at 17 [phone]         Patient: Roxana Dawn    Date: 17  Time: 3:01 AM    [facility] Nurse Notes        [facility]  Patient Name  Jyoti bonner  Episode -   Date of IDT Meeting  17    UPDATED COMPREHENSIVE ASSESSMENT      Neuro - alert and oriented  Muscular - moves upper extremities, moves left leg slightly, unable to move r leg which is externally rotated and very edematous  Resp - lungs clear, room air  Heart WNL BP 98/60 lopressor held last pm  GI - appetite good eats mostly fast food from family and sweets, last BM , on bowel regimen   - davila to BSD inserted 5/15/17  Skin - intact    ATTENDING Physician  Dr Bruce Prajapati pain     SIGNS pt states when she is in pain, always has pain if moved     LAB VALUES (when available)     KARNOFSKY 30%     FAST for all dementia N/A     Progression to DEPENDENCE WITH ADLs (include time frame) Complete Care pt can feed self     PRESSURE ULCERS None     DISEASE SPECIFIC Diabetes     FALL RISK: None  PROBLEM: Pt is unable to turn well in bed could use Bariatric Bed  GOAL: Pain managed  INTERVENTIONS(INDIVIDUALIZED) Pain management           Nursing Visit Frequency  Hospice Aide Visit Frequency       COPING  GRIEF  ADVANCED DIRECTIVES    BEREAVEMENT  RESOURCES NEEDED   Visit Frequency     Bereavement   NO CHANGE     Volunteer  NO VOLUNTEER       SPIRITUAL ISSUES  GRIEF  COPING  [address] RESOURCES   Visit Frequency     Clinician Signatures  Nurse______________________________  SW________________________________  Chaplain____________________________  Volunteer Coordinator__________________  Bereavement_________________________        Signed by: Escobar Slaughter RN       [facility] IDG Nurse Notes by Mayur Garnica RN at 17  Version 1 of     Author:  Mayur Garnica RN Service:  NURSING Author Type:  Registered Nurse    Filed:  06/10/17 2018 Date of Service:  17 Status:  Signed    :  Mayur Garnica RN (Registered Nurse)           Patient: Brenda Stone    Date: 06/06/17  Time: 12:37 AM    [facility]    Patient Name Caroline Jones  Episode -    Date of IDT Meeting  06/06/2017  Eliazar Hurtado Physician Dr. Lon Patricia is a 39 y.o. with a past history of metastatic endometrial cancer (mets to liver and bone) who was admitted to [facility] on 5-. By doppler and imaging she does not have DVT/PE. CT of pelvis and right leg show enlarging tumor involving right sacrum, right iliac bone, bilateral rami and proximal right femur (which appears acute). L5 and right sacral nerves are involved with tumor and pressure of tumor creates compression of right femoral and external iliac veins causing stasis. She has complete destruction of the right hip, femur and pelvis with tumor invasion and complete loss of bone. The patient/family chose comfort measures with the support of Hospice. SYMPTOMS  Pain  SIGNS/SYMPTOMS: Patient unable to bear pain of growing tumor involving right sacrum, ilium, spine, femur. Current focus is pain control, although she is also mildly confused.  and caregivers at home were having extreme difficulty in managing her pain and she required daily visits by the nurse with frequent medication adjustments and addition of medication IV route PCA and continuous in order to attempt control of her pain without success. Mobility is still an issue as patient resists movement of any kind due to pain. She was placed supine on admission to [facility] on 6/5/2017. With use of PCA pump, patient allows slight rotation left to right q4 hours throughout the night. Two slight spots of potential breakdown identified on upper abdomen following weeks in prone position with little movement. LAB VALUES (when available)    KARNOFSKY 30  Progression to DEPENDENCE WITH ADLs (include time frame) Patient is bedridden and complete care. turning due to pain.  Patient feeds herself and is alert and oriented, but completely immobile. PRESSURE ULCERS: None  DISEASE SPECIFIC: Growing bone and spinal metastases resulting in pain that is difficult to manage. Signs/symptoms UTI or dehydration. FALL RISK: low  PROBLEM: Pain 4/10 with medication, pain 10/10 with movement. Patient resists turning or movement of any kind due to pain. GOAL:  Pain management  INTERVENTIONS(INDIVIDUALIZED): Consider changing  PCA from morphine to dilaudid, may be toxic with morphine, kidney function is normal from 2 weeks ago, recent episodes of twitching, none noted currently. Pt needs to lay on the left side at least 1 x per day for 20 min as tolerated  Pt will need PT/OT evaluation to help determine a technique for transfer of position in bed, will order, also family and home staff along with [facility] staff need to learn repositioning as per PT/OT while she is in the [facility]. If pt needs IV fluids to relieve signs/symptoms of UTI/dehydration, will consider. Keflex started to prevent infection of skin irritation in groin from difficulty cleansing due to pain on any movement.     Nursing Visit Frequency 24 hr @ [address] [place], [place] [address]d Visit Frequency 24 hr @ [facility]    COPING    GRIEF    ADVANCED DIRECTIVES        BEREAVEMENT    RESOURCES NEEDED     Visit Frequency    Bereavement   NO CHANGE    Volunteer    NO VOLUNTEER        SPIRITUAL ISSUES    GRIEF    COPING       AVAILABLE SPIRITUAL RESOURCES     Visit Frequency    Clinician Signatures    Nurse______________________________    SW________________________________    Chaplain____________________________    Volunteer Coordinator__________________    Bereavement_________________________           Signed by: Kelsi Palmer RN         Electronically signed by Christal Rhodes RN at 17 1580              [facility] ID  Notes by Bishnu Liang at 17 1526  Version 1 of 1    Author:  Bishnu Liang Service:  Jolly Meraz Author Type:  Pastoral Care    Filed:  06/06/17 1527 Date of Service:  06/06/17 1526 Status:  Signed    :  Reba Spencer (Pastoral Care)           Patient: Renetta Worthy    Date: 06/06/17  Time: 3:27 PM    SPIRITUAL ISSUES:  I visited the room of this pt who arrived back at the [facility] yesterday and is on GIP Status. She was in bed, semi-awake and not fully alert, not agitated, not restless, and appeared well medicated and comfortable. She was lying on her back and appeared comfortable. She was talking on the telephone to her mother, but put the phone down when I entered. I confirmed with her the Rosana had visited her yesterday and I confirmed that I would be visiting as well, if she so desired. She was agreeable to periodic visits. The pt thanked me for visiting and my offer of support. Her spirits were moderate and her attitude somewhat stoic as she did not wish to come back to the [facility]  I learned in earlier visits that she and her family attend [facility] 24 on the [facility]. GOALS:  Continue to visit this pt and her family while she is at the [facility] and I am in the facility, to provide pastoral care and spiritual support to assist both the pt and them with coping with this difficult medical situation and decline. Coordinate w/ Chaplain Rankin as she assumes primary care for this patient  Coordinate with Chaplain Foster if/when the pt returns to her mothers home. Visit this pt and her family, while she is @ [facility] and I am in this facility, or PRN as requested. Coordinate with Care Team on POC.   Signed by: Reba Spencer

## 2017-06-27 NOTE — HOSPICE
NAME OF PATIENT:  Jyoti Randall    LEVEL OF CARE:  Routine    REASON FOR GIP:       *PATIENT REMAINS ELIGIBLE FOR GIP LEVEL OF CARE AS EVIDENCED BY: (MUST BE ADDRESSED OF PATIENT GIP)      REASON FOR RESPITE:      O2 SAFETY:  Concentrator positioning (6\" from furniture/drapes), Tanks stored in rodríguez , No petroleum based products on face while oxygen in use and Oxygen sign on the door    FALL INTERVENTIONS PROVIDED:   Implemented/recommended use of non-skid footwear, Implemented/recommended use of fall risk identification flag to all team members, Implemented/recommended assistive devices and encouraged their use, Implemented/recommended resources for alarm system (personal alarm, bed alarm, call bell, etc.)  and Implemented/recommended environmental changes (remove hazards, lower bed, improve lighting, etc.)    INTERDISPLINARY COMMUNICATION/COLLABORATION:  Physician, MSW, [place] and RN, CNA    NEW MEDICATION INITIATION DOCUMENTATION:      Reason medication is being initiated:      MD / Provider name consulted re: change in status / initiation of new medication:     New Symptom(s):      New Order(s):      Name of the person notified of the changes:      Name of person being taught:      Instructions given:      Side Effects taught:   Response to teachin [address] free  Is Patient/family satisfied with symptom level?  yes    DISCHARGE PLAN: unsure if pt will tx,awaiting Medicaid certification    99:22,KKLMUR received,assumed care of pt,who is Routine care,hospice dx is metastatic endometrial cancer. Pt has visitors @ bedside and she is eating a hamburger. PCA Dilaudid continues @ 3mg/hr,1mg prn via AMARJIT PICC. Vearl Gums is inatct with hazy phil urine. 21:30,pain free,very drowsy. 00:00,asleep.  03:00,awake,requesting Ativan and Benadryl. She is itching. Pt watching TV. Pain level is 6/10. Pt is c/o \"hardness\" of mattress,adjustment made to mattress setting. Encouraged pt to press PCA button for pain management when awake.  03:30,asks for Dilaudid 3mg IVP prn dose;given. 04:30,asleep.  06:00,Pt asleep,awakened for scheduled med,drowsy,comfortable.

## 2017-06-27 NOTE — PROGRESS NOTES
[facility]  [phone]    Patient Name: Mitchel Harvey  YOB: 1980    Date of Provider Hospice Visit: 17    Level of Care:   [x] General Inpatient (GIP)    [] Routine   [] Respite    Location of Care:  [] [facility] [] [facility] [] [facility] [] [facility] [x] [facility]  [] Home [] Other:      Date of Original Hospice Admission: 17  Hospice Medical Director for Inpatient Care: Dr. Cosimo Apgar Diagnosis:  Metastatic Endometrial Adenocarcinoma       HOSPICE DIAGNOSES   Active Symptoms:  1. Generalised pain, worse especially right lower extremity, lower back, especially with movements, rates 5/10   2. Delirium; Fluctuates, mild confusion at times, improved for most part, none recent  3. Shortness of breath with low O2 sats: improves with use of O2  4. Anxiety/depression; still an issue, persists  5. Insomnia; less, spending more time sleeping   6. Fatigue/weakness/lethargy: worse, BP very low at times  7. Constipation improved  8. Itching: less, improved, stable  9. Bladder spasms/vaginal pressure; recurrent, likely due to cancer of endometrium disease advancing     PLAN   Change to [facility] level of care as medication requirements and as needed break through doses have increased, pt is more symptomatic and needing close attention/monitoring and adjustment in meds for better relief of pain. 1. Continue ativan 1mg  three times daily as needed for anxiety  2. Increase low dose ativan 0.5mg to three times daily  3. Increase PCA dilaudid 4mg / hr continuous, PCA dose of 2mg every 6 mts. 4. Increase nurse given dilaudid 4mg IV every 15mts as needed for severe pain  5. Continue gabapentin 300mg at bedtime  6. Continue air mattress for her continued comfort  7. Continue clinoril, Cymbalta for adjuvant therapy for bone pain  8. Continue methadone at current levels 10mg every 8 hrs  9.  Benadryl 25mg oral everfy 4 hrs as needed for itching: took 1 prn benadryl yesterday  10. Add Toradol 30mg IV every 6 hours as needed for severe pain; antiinflammatory. 11. Continue  reposition and turn to side during cleaning/ADL care and to prevent skin breakdown. 15.  and SW to support family needs  15. Disposition:  routine care status when symptoms are stable: SW Ms. Remberto Nixon is assisting pt's mom to complete medicaid application and foundation forms have been submitted for additional resource help. Prognosis estimated based on 06/27/17 clinical assessment is:   [] Few to Many Hours  [] Hours to Days   [] Few to Many Days   [] Days to Weeks    [x] Few to Many Weeks   [] Weeks to Months   [] Few to Many Months    Communicated plan of care with: Hospice Case Manager; Hospice IDT; Care Team     GOALS OF CARE     Resuscitation Status: DNR  Durable DNR: [x] Yes [] No    Advance Care Planning 5/10/2017   Patient's Healthcare Decision Maker is: Legal Next of Andrea 69   Primary Decision Maker Name Kameron Torres   Primary Decision Maker Phone Number -   Primary Decision Maker Relationship to Patient Parent   Confirm Advance Directive Yes, on file   Patient Would Like to Complete Advance Directive -        HISTORY     History obtained from: chart, staff    CHIEF COMPLAINT: pain/pressure in bladder area/vaginal area  The patient is:   [x] Verbal  [] Nonverbal  [] Unresponsive    HPI/SUBJECTIVE:  Pt is little more lethargic today and c/o pain 5/10 all over, talks about pressure sensation in her vaginal area and feels her tumor disease is increasing. Pt asking for more pain meds. Also is quite anxious. Staff reports increased pain with movements and with ADL care, pt still requiring 2-3 staff members assisting her in cleaning etc.   Pt has taken several prn on all meds; ativan, dilaudid, benadryl. PCA use; 10 given out of 13 attempts in dayshift and 6 given out of 9 overnight.       REVIEW OF SYSTEMS     The following systems were: [x] reviewed  [] unable to be reviewed    Positive ROS include:  Constitutional: fatigue, weakness  Ears/nose/mouth/throat:   Respiratory:  Gastrointestinal:vaginal pressure sensation  Musculoskeletal:weakness,pain in the right hip  Neurologic: numbness and burning pain  Psychiatric:anxiety, depression  Endocrine:          FUNCTIONAL ASSESSMENT     Palliative Performance Scale (PPS): 30%     PSYCHOSOCIAL/SPIRITUAL ASSESSMENT     Principal Problem:    Endometrial cancer ([address] (7/7/2010)    Active Problems: Morbid obesity ([address] (7/7/2010)      Bone metastases ([address] (3/10/2014)      Past Medical History:   Diagnosis Date    Cancer ([address]     uterine    CVI (common variable immunodeficiency) ([address]     Diabetes ([address]     Elevated liver function tests 10/21/2010    Endometrial cancer [facility] 7/7/2010    Hypertension     Iron deficiency anemia     Menometrorrhagia 10/21/2010    Microcytic anemia 10/21/2010    Morbid obesity ([address]     Prediabetes 7/7/2010    Psychiatric disorder     depression    Radiation     completed radiation mid-march      Past Surgical History:   Procedure Laterality Date    CARDIAC SURG PROCEDURE UNLIST      hx of tachycardia    HX GYN      hysterectomy      Social History   Substance Use Topics    Smoking status: Never Smoker    Smokeless tobacco: Never Used    Alcohol use Yes     Family History   Problem Relation Age of Onset    Hypertension Mother     Hypertension Father     Stroke Maternal Grandfather     Cancer Paternal Grandmother      breast    Diabetes Paternal Grandmother       Allergies   Allergen Reactions    Egg Nausea and Vomiting     Raw egg and scrambled eggs. Egg products okay.      Pcn [Penicillins] Nausea and Vomiting     \"but could take amoxil\"    Shellfish Containing Products Unknown (comments)    Tetanus And Diphtheria Toxoids, Adsorbed, Adult Other (comments)     Developed local swelling, axillary pain, and transient fever    Tomato Unknown (comments)      Current Facility-Administered Medications   Medication Dose Route Frequency    ketorolac (TORADOL) injection 30 mg  30 mg IntraVENous Q6H PRN    HYDROmorphone (PF) (DILAUDID) injection 4 mg  4 mg IntraVENous Q15MIN PRN    LORazepam (ATIVAN) tablet 0.5 mg  0.5 mg Oral TIDPC    diphenhydrAMINE (BENADRYL) capsule 25 mg  25 mg Oral Q4H PRN    acetaminophen (TYLENOL) tablet 650 mg  650 mg Oral Q4H PRN    Hydromorphone 500mg/500mL bag [facility]   IntraVENous CONTINUOUS    gabapentin (NEURONTIN) capsule 300 mg  300 mg Oral QHS    LORazepam (ATIVAN) tablet 1 mg  1 mg Oral TID PRN    metoprolol tartrate (LOPRESSOR) tablet 25 mg  25 mg Oral BID    methadone (DOLOPHINE) tablet 10 mg  10 mg Oral Q8H    polyethylene glycol (MIRALAX) packet 17 g  17 g Oral DAILY    magic mouthwash cpd (without sucralfate)  5 mL Oral QID PRN    docusate sodium (COLACE) capsule 100 mg  100 mg Oral DAILY    DULoxetine (CYMBALTA) capsule 60 mg  60 mg Oral QHS    sulindac (CLINORIL) tablet 200 mg  200 mg Oral BID WITH MEALS        PHYSICAL EXAM     Wt Readings from Last 3 Encounters:   05/30/17 121.1 kg (267 lb)   05/10/17 121.4 kg (267 lb 10.2 oz)   02/16/17 136.1 kg (300 lb)       Visit Vitals    /75 (BP 1 Location: Left arm, BP Patient Position: At rest;Supine)    Pulse 96    Temp 97.9 °F (36.6 °C)    Resp 16    SpO2 100%         Supplemental O2  [x] Yes  [] NO  Last bowel movement:     Currently this patient has:  [] Peripheral IV [x] PICC  [] PORT [] ICD    [x] Jama Catheter [] NG Tube   [] PEG Tube    [] Rectal Tube [] Drain  [x] Other: now with a special mattress    Constitutional: pale, laying on her back, lethargic but appears anxious when she talks, c/o pain/pressure in vaginal area at times  Eyes: pallor++  ENMT: moist oral mucosa  Cardiovascular: distant heart sounds, tachycardic  Respiratory:  Normal breathing, diminished air entry  Gastrointestinal:  distended and firm, non tender, BS +, pt has a davila's  Musculoskeletal:edema ++ lower extremities right leg>left leg  Skin: warm, dry, some skin irritation in the groin folds, itching   Neurologic:sleepy but can follow commands  Psychiatric: fluctuating mood, anxious affect         Pertinent Lab and or Imaging Tests: Total time: 35 mts  Counseling / coordination time: 15mts discussing with staff and pt  > 50% counseling / coordination?:         Emani Strange MD      This patient meets Hospice General Inpatient (GIP) Level of Care.     Supporting documentation for GIP need for pain control:  [x] Frequent evaluation by a doctor, nurse practitioner, nurse   [x] Frequent medication adjustment    [x] IVs that cannot be administered at home   [x] Aggressive pain management   [] Complicated technical delivery of medications              Supporting documentation for GIP need for symptom control:  []  Sudden decline necessitating intensive nursing intervention  []  Uncontrolled / intractable nausea or vomiting   []  Pathological fractures  []  Advanced open wounds requiring frequent skilled care  [] Unmanageable respiratory distress  [] New or worsening delirium   [] Delirium with behavior issues  [] Imminent death  with skilled nursing needs documented above

## 2017-06-28 NOTE — [facility] IDG CHAPLAIN NOTES
Patient: Valdo Ewing    Date: 06/28/17  Time: 6:51 AM  SPIRITUAL ISSUES:  I visited the room of this pt back at the [facility] and is on now Routine Status. She was in bed, semi-awake and not fully alert, not agitated, not restless, and appeared well medicated and comfortable. She was lying on her back and appeared comfortable. I confirmed with her the [place] had continued to visit her and that I would be visiting as well, if she so desired. Her spirits were moderate and her attitude somewhat stoic as she did not wish to come back to the [facility]  Patient and Family use geovanny as a primary coping mechanism but are very stressed and struggling with this long, difficult journey. GOALS:  Continue to visit this pt and her family while she is at the [facility] and I am in the facility, to provide pastoral care and spiritual support to assist both the pt and them with coping with this difficult medical situation and decline. Coordinate w/ Chaplain Rankin as she assumes primary care for this patient  Coordinate with Chaplain Foster if/when the pt returns to her mothers home. Visit this pt and her family, while she is @ [facility] and I am in this facility, or PRN as requested. Coordinate with Care Team on POC.   Signed by: Igor Gil

## 2017-06-28 NOTE — PROGRESS NOTES
Verbal shift change report given to Nahid Champagne RN by Earl Putnam RN. Report included the following information SBAR, Kardex, Intake/Output and MAR.     1958  Patient in great deal of pain following bed change after bowel movement. Medicated with PRN IV dilaudid. 2208  Patient continues to complain of 8/10 pain in right leg. Medicated with PRN IV dilaudid and encourage patient to use PCA dosing as well.    2326. Patient still unable to be comfortable. Medicated again with PRN IV dilaudid and encouraged PCA use. 9749  Patient medicated with still another dose of IV dilaudid for continued pain over 5/10 continuous. 0300  Patient finally sleeping comfortably. Maria E at the bedside. 8920  Patient awake and hurting again this morning.   Medicated again with IV dilaudid.       NAME OF PATIENT: Jyoti Randall      LEVEL OF CARE: GIP      REASON FOR RESPITE: Patient not in Respite care at this time.       REASON FOR GIP:   Pain, despite numerous changes in medications, Medication adjustment that must be monitored 24/7 and Stabilizing treatment that cannot take place at home      PATIENT REMAINS ELIGIBLE FOR GIP LEVEL OF CARE AS EVIDENCED BY: GIP level of care due to changing medication requirements, adjustments, additions of opioid pain mediations for relief and adjuvant therapy medications as tolerated, and continuous monitoring which was not accomplished at home.      REASON FOR RESPITE: Patient is not in Respite care at this time.      O2 SAFETY: Patient is on room air at this time.       FALL INTERVENTIONS PROVIDED:   Implemented/recommended use of non-skid footwear, Implemented/recommended use of fall risk identification flag to all team members, Implemented/recommended assistive devices and encouraged their use, Implemented/recommended resources for alarm system (personal alarm, bed alarm, call bell, etc.) , Implemented/recommended environmental changes (remove hazards, lower bed, improve lighting, etc.) and Implemented/recommended increased supervision/assistance      INTERDISPLINARY COMMUNICATION/COLLABORATION: Physician, MSW, [place] and RN, CNA      NEW MEDICATION INITIATION DOCUMENTATION: No new medications nor interventions begun on this night shift.       Reason medication is being initiated: N/A      MD / Provider name consulted re: Change in status/initiation of new medication: N/A      New Symptom(s): N/A      New Order(s): N/A      Name of the person notified of the changes: N/A      Name of the person being taught: N/A      Instructions given: N/A      Side Effects taught: N/A      Response to teaching: N/A      COMFORTABLE DYING MEASURE: Continue to monitor for pain, dyspnea, agitation, and restlessness and intervene accordingly.       Is Patient/Family satisfied with symptom level?  Yes      DISCHARGE PLAN: Patient to be discharged home in her mother, Libra's care under the continuing care of [facility] once symptoms can be managed effectively at home.   [email or web address].Number

## 2017-06-28 NOTE — PROGRESS NOTES
NAME OF PATIENT:  Jyoti Randall    LEVEL OF CARE:  [facility]    REASON FOR GIP:   Pain, despite numerous changes in medications, Medication adjustment that must be monitored 24/7 and Stabilizing treatment that cannot take place at home    *PATIENT REMAINS ELIGIBLE FOR [facility] LEVEL OF CARE AS EVIDENCED BY: (MUST BE ADDRESSED OF PATIENT GIP)      REASON FOR RESPITE:  N/A    O2 SAFETY:  Concentrator positioning (6\" from furniture/drapes), Tanks stored in rodríguez , No petroleum based products on face while oxygen in use and Oxygen sign on the door    FALL INTERVENTIONS PROVIDED:   Implemented/recommended use of non-skid footwear, Implemented/recommended use of fall risk identification flag to all team members, Implemented/recommended assistive devices and encouraged their use, Implemented/recommended resources for alarm system (personal alarm, bed alarm, call bell, etc.) , Implemented/recommended environmental changes (remove hazards, lower bed, improve lighting, etc.) and Implemented/recommended increased supervision/assistance    INTERDISPLINARY COMMUNICATION/COLLABORATION:  Physician, MSW, [place] and RN, CNA    NEW MEDICATION INITIATION DOCUMENTATION:  Documentation completed in Clinical Note in [facility]    Reason medication is being initiated:  N/A    MD / Provider name consulted re: change in status / initiation of new medication:  N/A    New Symptom(s):  N/A    New Order(s):  N/A    Name of the person notified of the changes:  N/A    Name of person being taught:  N/A    Instructions given:  N/A    Side Effects taught:  N/A    Response to teaching:  N/A      COMFORTABLE DYING MEASURE:  Is Patient/family satisfied with symptom level?  no    DISCHARGE PLAN:  Unsure of final plan at this time. 8683: Report received from HERLINDA Myles to assume care. Patient status back to [facility] for pain that cannot be managed at home and frequent medication changes. Patient is AOx3 and very positive.     1311: Patient medicated with scheduled meds and PRN dose of IV dilaudid. Mom at the bedside. 1430: Patient rang on call and stated that after her bath she is more anxious and in increasing pain rating it 7/10. Patient medicated with PRN doses of ativan, dilaudid IVP, benadryl for itching, and toradol for leg pain. 1545: Patient verbalizes relief from pain and itching at this time. States she is \"getting good sleep\" and feels much better. Leg no longer hurting. 1740: Patient medicated with scheduled ativan. Resting with eyes closed and in no distress. 1930: Pump cleared with 6/6 attempts and 60.50 mg given.

## 2017-06-28 NOTE — PROGRESS NOTES
[address] [facility]  follow-up Visit  to [facility] pt now on routine status     This morning, I walked with Lexie, a long time freind of this pt from the front door of the [facility] to the room of the pt. Lauren Miguel had spent the night with the pt,  and was bringing back a breakfast for herself and for the pt if she \"felt hungry. \" Lauren Miguel is a  and has time off to spend with the pt for the next 30 days. When we entered the room, the pt was in bed, asleep. Lauren Miguel called her name and she opened her eyes briefly and then went back to sleep. She did not appear agitated, nor restless, and comfortable. Lauren Miguel and I spoke for some time,as she recounted the friendship she has enjoyed with the pt. She stressed the positive attitude which the pt displayed in the face of this difficult time. I also affirmed the patient's great geovanny and trust in God. I confirmed that I would be continue to visit, if she so desired.    GOALS:  Continue to visit this pt, her family, and her friends while she is at the [facility] and I am in the facility, to provide pastoral care and spiritual support to assist both the pt and them with coping with this difficult medical situation and decline. Coordinate w/ Luis Vázquez as she assumes primary care for this patient  Coordinate with Home  if/when the pt returns to her mother home. PLAN:  Continue to provide supplemental  support to that being provided by Luis Vázquez. Coordinate with Luis Vázquez as requested. Coordinate with Care Team on POC.  VISIT FREQUENCY:  1 wk 3 starting 6/27 plus 4 prn 21 days.   Ema Velasco, [facility], [facility]

## 2017-06-28 NOTE — [facility] IDG SOCIAL WORKER NOTES
Patient: Torin Joe    Date: 06/28/17  Time: 8:13 AM    [facility]  Notes  Completed referral for financial assistance from Yelit[facility] Mccann 28.. Patient has been changed from Routine to GIP today due to increased pain. Plan remains for her to return home. Msw to follow with emotional support for patient/family as they cope with illness related changes.           Signed by: Shaila Langston

## 2017-06-28 NOTE — PROGRESS NOTES
[facility] Help to Those in Need  [phone]    Patient Name: Keely Ray  YOB: 1980    Date of Provider Hospice Visit: 06/28/17    Level of Care:   [x] General Inpatient (GIP)    [] Routine   [] Respite    Location of Care:  [] [facility] [place] [] [facility] [] [facility] [] [facility] [x] [facility]ospice [facility]  [] Home [] Other:      Date of Original Hospice Admission: 5-16-17  Hospice Medical Director for Inpatient Care: Dr. Mayo Celis Diagnosis:  Metastatic Endometrial Adenocarcinoma       HOSPICE DIAGNOSES   Active Symptoms:  1. Generalised pain, worse especially right lower extremity, lower back, especially with movements, rates 5/10   2. Delirium; Fluctuates, mild confusion at times, improved for most part, none recent  3. Shortness of breath with low O2 sats: improves with use of O2  4. Anxiety/depression; still an issue, persists  5. Insomnia; less, spending more time sleeping   6. Fatigue/weakness/lethargy: worse, BP very low at times  7. Constipation improved  8. Itching: less, improved, stable  9. Bladder spasms/vaginal pressure; recurrent, likely due to cancer of endometrium disease advancing  10. Episodes of comfort then an exacerbation of pain which requires multiple medical pain med interventions     PLAN   Change to GIP level of care as medication requirements and as needed break through doses have increased, pt is more symptomatic and needing close attention/monitoring and adjustment in meds for better relief of pain. 1. Continue ativan 1mg  three times daily as needed for anxiety  2. Increase low dose ativan 0.5mg to three times daily  3. Increase PCA dilaudid 4mg / hr continuous, PCA dose of 2mg every 6 mts. 4. Increase nurse given dilaudid 4mg IV every 15mts as needed for severe pain, has had 5 doses for break through  in 24 hours  5. Continue gabapentin 300mg at bedtime  6. Continue air mattress for her continued comfort  7.  Continue clinoril, Cymbalta for adjuvant therapy for bone pain  8. Continue methadone at current levels 10mg every 8 hrs  9. Benadryl 25mg oral everfy 4 hrs as needed for itching: took 1 prn benadryl yesterday  10. Add Toradol 30mg IV every 6 hours as needed for severe pain; antiinflammatory. 11. Continue  reposition and turn to side during cleaning/ADL care and to prevent skin breakdown. 15.  and SW to support family needs  15. Disposition:  routine care status when symptoms are stable: SW Ms. Archibald Frankel is assisting pt's mom to complete medicaid application and foundation forms have been submitted for additional resource help. 15. Spoke with Dr. Reginald Branch Radiologist [facility] regarding a possible procedure to alleviate pain,, CT Scan guided soft tissue ablation, will discuss with patient and family      Prognosis estimated based on 06/28/17 clinical assessment is:   [] Few to Many Hours  [] Hours to Days   [] Few to Many Days   [] Days to Weeks    [x] Few to Many Weeks   [] Weeks to Months   [] Few to Many Months    Communicated plan of care with: Hospice Case Manager; Hospice IDT; Care Team     GOALS OF CARE     Resuscitation Status: DNR  Durable DNR: [x] Yes [] No    Advance Care Planning 5/10/2017   Patient's Healthcare Decision Maker is: Legal Next of Andrea 69   Primary Decision Maker Name Jeanette Womack   Primary Decision Maker Phone Number -   Primary Decision Maker Relationship to Patient Parent   Confirm Advance Directive Yes, on file   Patient Would Like to Complete Advance Directive -        HISTORY     History obtained from: chart, staff    CHIEF COMPLAINT: pain/pressure in bladder area/vaginal area  The patient is:   [x] Verbal  [] Nonverbal  [] Unresponsive    HPI/SUBJECTIVE:  Pt is little more lethargic today and c/o pain 5/10 all over, talks about pressure sensation in her vaginal area and feels her tumor disease is increasing. Pt asking for more pain meds. Also is quite anxious.    Staff reports increased pain with movements and with ADL care, pt still requiring 2-3 staff members assisting her in cleaning etc.   Pt has taken several prn on all meds; ativan, dilaudid, benadryl. PCA use; 10 given out of 13 attempts in dayshift and 6 given out of 9 overnight. REVIEW OF SYSTEMS     The following systems were: [x] reviewed  [] unable to be reviewed    Positive ROS include:  Constitutional: fatigue, weakness  Ears/nose/mouth/throat:   Respiratory:  Gastrointestinal:vaginal pressure sensation  Musculoskeletal:weakness,pain in the right hip  Neurologic: numbness and burning pain  Psychiatric:anxiety, depression  Endocrine:          FUNCTIONAL ASSESSMENT     Palliative Performance Scale (PPS): 30%     PSYCHOSOCIAL/SPIRITUAL ASSESSMENT     Principal Problem:    Endometrial cancer ([address] (7/7/2010)    Active Problems:     Morbid obesity ([address] (7/7/2010)      Bone metastases ([address] (3/10/2014)      Past Medical History:   Diagnosis Date    Cancer ([address]     uterine    CVI (common variable immunodeficiency) ([address]     Diabetes ([address]     Elevated liver function tests 10/21/2010    Endometrial cancer ([address] 7/7/2010    Hypertension     Iron deficiency anemia     Menometrorrhagia 10/21/2010    Microcytic anemia 10/21/2010    Morbid obesity ([address]     Prediabetes 7/7/2010    Psychiatric disorder     depression    Radiation     completed radiation mid-march      Past Surgical History:   Procedure Laterality Date    CARDIAC SURG PROCEDURE UNLIST      hx of tachycardia    HX GYN      hysterectomy      Social History   Substance Use Topics    Smoking status: Never Smoker    Smokeless tobacco: Never Used    Alcohol use Yes     Family History   Problem Relation Age of Onset    Hypertension Mother     Hypertension Father     Stroke Maternal Grandfather     Cancer Paternal Grandmother      breast    Diabetes Paternal Grandmother       Allergies   Allergen Reactions    Egg Nausea and Vomiting     Raw egg and scrambled eggs. Egg products okay.      Pcn [Penicillins] Nausea and Vomiting     \"but could take amoxil\"    Shellfish Containing Products Unknown (comments)    Tetanus And Diphtheria Toxoids, Adsorbed, Adult Other (comments)     Developed local swelling, axillary pain, and transient fever    Tomato Unknown (comments)      Current Facility-Administered Medications   Medication Dose Route Frequency    ketorolac (TORADOL) injection 30 mg  30 mg IntraVENous Q6H PRN    HYDROmorphone (PF) (DILAUDID) injection 4 mg  4 mg IntraVENous Q15MIN PRN    LORazepam (ATIVAN) tablet 0.5 mg  0.5 mg Oral TIDPC    diphenhydrAMINE (BENADRYL) capsule 25 mg  25 mg Oral Q4H PRN    acetaminophen (TYLENOL) tablet 650 mg  650 mg Oral Q4H PRN    Hydromorphone 500mg/500mL bag [employer]   IntraVENous CONTINUOUS    gabapentin (NEURONTIN) capsule 300 mg  300 mg Oral QHS    LORazepam (ATIVAN) tablet 1 mg  1 mg Oral TID PRN    metoprolol tartrate (LOPRESSOR) tablet 25 mg  25 mg Oral BID    methadone (DOLOPHINE) tablet 10 mg  10 mg Oral Q8H    polyethylene glycol (MIRALAX) packet 17 g  17 g Oral DAILY    magic mouthwash cpd (without sucralfate)  5 mL Oral QID PRN    docusate sodium (COLACE) capsule 100 mg  100 mg Oral DAILY    DULoxetine (CYMBALTA) capsule 60 mg  60 mg Oral QHS    sulindac (CLINORIL) tablet 200 mg  200 mg Oral BID WITH MEALS        PHYSICAL EXAM     Wt Readings from Last 3 Encounters:   05/30/17 121.1 kg (267 lb)   05/10/17 121.4 kg (267 lb 10.2 oz)   02/16/17 136.1 kg (300 lb)       Visit Vitals    /67 (BP 1 Location: Right arm, BP Patient Position: At rest)    Pulse 98    Temp 98.4 °F (36.9 °C)    Resp 16    SpO2 99%         Supplemental O2  [x] Yes  [] NO  Last bowel movement:     Currently this patient has:  [] Peripheral IV [x] PICC  [] PORT [] ICD    [x] [place] Catheter [] NG Tube   [] PEG Tube    [] Rectal Tube [] Drain  [x] Other: now with a special mattress    Constitutional: pale, laying on her back, lethargic but appears anxious when she talks, c/o pain/pressure in vaginal area at times  Eyes: pallor++  ENMT: moist oral mucosa  Cardiovascular: distant heart sounds, tachycardic  Respiratory:  Normal breathing, diminished air entry  Gastrointestinal:  distended and firm, non tender, BS +, pt has a davila's  Musculoskeletal:edema ++ lower extremities right leg>left leg  Skin: warm, dry, some skin irritation in the groin folds, itching   Neurologic:sleepy but can follow commands  Psychiatric: fluctuating mood, anxious affect         Pertinent Lab and or Imaging Tests: Total time: 70 mts  Counseling / coordination time: 48 mts discussing with staff and pt  > 50% counseling / coordination?:         El Mariscal NP      This patient meets [facility] General Inpatient ([facility]) Level of Care. Supporting documentation for [facility] need for pain control:  [x] Frequent evaluation by a doctor, nurse practitioner, nurse   [x] Frequent medication adjustment    [x] IVs that cannot be administered at home   [x] Aggressive pain management   [] Complicated technical delivery of medications              Supporting documentation for GIP need for symptom control:  []  Sudden decline necessitating intensive nursing intervention  []  Uncontrolled / intractable nausea or vomiting   []  Pathological fractures  []  Advanced open wounds requiring frequent skilled care  [] Unmanageable respiratory distress  [] New or worsening delirium   [] Delirium with behavior issues  [] Imminent death  with skilled nursing needs documented above        MD Addendum    Pt seen & case discussed with Ms. Gerald Katz. Agree with current care plan as outlined in her note. Pt was switched to [facility] level care again as of yesterday; she has started to show signs of worsening symptoms of pain and increasing anxiety. Pt having more pressure sensation in her vaginal area due to increasing tumor burden. Movements more painful. Reached out to IR Dr. Humble Alarcon and discussed case in length with him. He reviewed chart thoroughly and he is recommending Ct guided soft tissue ablation of tumor to shrink it and ablate nerves to control pain. He is agreeable to do this procedure and said if it works it will be immediate relief of pain. He is quite hopeful that he can help with pain control and improved QOL via this procedure. If pt/family agrees to this procedure and gives consent then pt will need to be transported to [facility] as Dr. Humble Alarcon will do this procedure over there. I will contact pt's mother for her to come in and have family meeting to discuss possibility of this procedure. If family/pt agree to this then we will proceed with further arrangements and follow up with PCP Dr. Bakari De La Vega as well. Will continue to follow for comfort and adjust medications/care plan accordingly. Md addendum  Spoke to Karli and her mother Sanam Oneal and explained the rationale of the procedure and its benefits and process. They seem to be understanding the benefits and want to speak to Dr. Humble Alarcon more about it. Will coordinate a conference call b/w Karli, her mother and Dr. Humble Alarcon in next couple of days. Also coordinated care and informed PCP Dr. Bakari De La Vega of these developments.

## 2017-06-29 NOTE — PROGRESS NOTES
[address] Help to Those in Need  [phone]    Patient Name: Teresa Mcmillan  YOB: 1980    Date of Provider Hospice Visit: 06/29/17    Level of Care:   [x] General Inpatient (GIP)    [] Routine   [] Respite    Location of Care:  [] [facility] [] [facility] [] [facility] [] [facility] [x] Hospice House [facility]  [] Home [] Other:      Date of Original Hospice Admission: 5-16-17  Hospice Medical Director for Inpatient Care: Dr. Anisa Gonzalez Diagnosis:  Metastatic Endometrial Adenocarcinoma       HOSPICE DIAGNOSES   Active Symptoms:  1. Generalised pain, worse especially right lower extremity, lower back, especially with movements, rates 5/10   2. Delirium; Fluctuates, mild confusion at times, improved for most part, none recent  3. Shortness of breath with low O2 sats: improves with use of O2  4. Anxiety/depression; still an issue, persists  5. Insomnia; less, spending more time sleeping   6. Fatigue/weakness/lethargy: worse, BP very low at times  7. Constipation improved  8. Itching: less, improved, stable  9. Bladder spasms/vaginal pressure; recurrent, likely due to cancer of endometrium disease advancing  10. Episodes of comfort then an exacerbation of pain which requires multiple medical pain med interventions  11. Ivonne Campbell blood in davila catheter     PLAN   Change to GIP level of care as medication requirements and as needed break through doses have increased, pt is more symptomatic and needing close attention/monitoring and adjustment in meds for better relief of pain. 1. Continue ativan 1mg  three times daily as needed for anxiety  2. Increase low dose ativan 0.5mg to three times daily  3. Increase PCA dilaudid 4mg / hr continuous, PCA dose of 2mg every 6 mts. Pt has to be encouraged to push the PCA button  4.  Increase nurse given dilaudid 4mg IV every 15mts as needed for severe pain, has had 5 doses for break through  in 24 hours, has required 6 doses in 24 hours  5. Continue gabapentin 300mg at bedtime  6. Continue air mattress for her continued comfort  7. Continue clinoril, Cymbalta for adjuvant therapy for bone pain  8. Continue methadone at current levels 10mg every 8 hrs  9. Benadryl 25mg oral everfy 4 hrs as needed for itching: took 1 prn benadryl yesterday  10. Add Toradol 30mg IV every 6 hours as needed for severe pain; antiinflammatory. 11. Continue  reposition and turn to side during cleaning/ADL care and to prevent skin breakdown. 15.  and SW to support family needs  15. Disposition:  routine care status when symptoms are stable: SW Ms. Maynor Alvarez is assisting pt's mom to complete medicaid application and foundation forms have been submitted for additional resource help. 15.  Trinh Munson arranging meeting with Dr. Susanna Chairez Radiologist [facility] and patient regarding a possible procedure to alleviate pain,, CT Scan guided soft tissue ablation, will discuss with patient and family      Prognosis estimated based on 06/29/17 clinical assessment is:   [] Few to Many Hours  [] Hours to Days   [] Few to Many Days   [] Days to Weeks    [x] Few to Many Weeks   [] Weeks to Months   [] Few to Many Months    Communicated plan of care with: Hospice Case Manager;  Hospice IDT; Care Team     GOALS OF CARE     Resuscitation Status: DNR  Durable DNR: [x] Yes [] No    Advance Care Planning 5/10/2017   Patient's Healthcare Decision Maker is: Legal Next of Andrea 69   Primary Decision Maker Name Miroslava Hanley   Primary Decision Maker Phone Number -   Primary Decision Maker Relationship to Patient Parent   Confirm Advance Directive Yes, on file   Patient Would Like to Complete Advance Directive -        HISTORY     History obtained from: chart, staff    CHIEF COMPLAINT: pain/pressure in bladder area/vaginal area  The patient is:   [x] Verbal  [] Nonverbal  [] Unresponsive    HPI/SUBJECTIVE:  Pt is little more lethargic today and c/o pain 5/10 all over, talks about pressure sensation in her vaginal area and feels her tumor disease is increasing. Pt asking for more pain meds. Also is quite anxious. Staff reports increased pain with movements and with ADL care, pt still requiring 2-3 staff members assisting her in cleaning etc.   Pt has taken several prn on all meds; ativan, dilaudid, benadryl. PCA use; 10 given out of 13 attempts in dayshift and 6 given out of 9 overnight. REVIEW OF SYSTEMS     The following systems were: [x] reviewed  [] unable to be reviewed    Positive ROS include:  Constitutional: fatigue, weakness  Ears/nose/mouth/throat:   Respiratory:  Gastrointestinal:vaginal pressure sensation  Musculoskeletal:weakness,pain in the right hip  Neurologic: numbness and burning pain  Psychiatric:anxiety, depression  Endocrine:          FUNCTIONAL ASSESSMENT     Palliative Performance Scale (PPS): 30%     PSYCHOSOCIAL/SPIRITUAL ASSESSMENT     Principal Problem:    Endometrial cancer ([address] (7/7/2010)    Active Problems:     Morbid obesity ([address] (7/7/2010)      Bone metastases ([address] (3/10/2014)      Past Medical History:   Diagnosis Date    Cancer ([address]     uterine    CVI (common variable immunodeficiency) ([address]     Diabetes ([address]     Elevated liver function tests 10/21/2010    Endometrial cancer ([address] 7/7/2010    Hypertension     Iron deficiency anemia     Menometrorrhagia 10/21/2010    Microcytic anemia 10/21/2010    Morbid obesity ([address]     Prediabetes 7/7/2010    Psychiatric disorder     depression    Radiation     completed radiation mid-march      Past Surgical History:   Procedure Laterality Date    CARDIAC SURG PROCEDURE UNLIST      hx of tachycardia    HX GYN      hysterectomy      Social History   Substance Use Topics    Smoking status: Never Smoker    Smokeless tobacco: Never Used    Alcohol use Yes     Family History   Problem Relation Age of Onset    Hypertension Mother     Hypertension Father     Stroke Maternal Grandfather  Cancer Paternal Grandmother      breast    Diabetes Paternal Grandmother       Allergies   Allergen Reactions    Egg Nausea and Vomiting     Raw egg and scrambled eggs. Egg products okay.      Pcn [Penicillins] Nausea and Vomiting     \"but could take amoxil\"    Shellfish Containing Products Unknown (comments)    Tetanus And Diphtheria Toxoids, Adsorbed, Adult Other (comments)     Developed local swelling, axillary pain, and transient fever    Tomato Unknown (comments)      Current Facility-Administered Medications   Medication Dose Route Frequency    ketorolac (TORADOL) injection 30 mg  30 mg IntraVENous Q6H PRN    HYDROmorphone (PF) (DILAUDID) injection 4 mg  4 mg IntraVENous Q15MIN PRN    LORazepam (ATIVAN) tablet 0.5 mg  0.5 mg Oral TIDPC    diphenhydrAMINE (BENADRYL) capsule 25 mg  25 mg Oral Q4H PRN    acetaminophen (TYLENOL) tablet 650 mg  650 mg Oral Q4H PRN    Hydromorphone 500mg/500mL bag [employer]   IntraVENous CONTINUOUS    gabapentin (NEURONTIN) capsule 300 mg  300 mg Oral QHS    LORazepam (ATIVAN) tablet 1 mg  1 mg Oral TID PRN    metoprolol tartrate (LOPRESSOR) tablet 25 mg  25 mg Oral BID    methadone (DOLOPHINE) tablet 10 mg  10 mg Oral Q8H    polyethylene glycol (MIRALAX) packet 17 g  17 g Oral DAILY    magic mouthwash cpd (without sucralfate)  5 mL Oral QID PRN    docusate sodium (COLACE) capsule 100 mg  100 mg Oral DAILY    DULoxetine (CYMBALTA) capsule 60 mg  60 mg Oral QHS    sulindac (CLINORIL) tablet 200 mg  200 mg Oral BID WITH MEALS        PHYSICAL EXAM     Wt Readings from Last 3 Encounters:   05/30/17 121.1 kg (267 lb)   05/10/17 121.4 kg (267 lb 10.2 oz)   02/16/17 136.1 kg (300 lb)       Visit Vitals    /72 (BP 1 Location: Left arm, BP Patient Position: At rest;Supine)    Pulse (!) 104    Temp 98.3 °F (36.8 °C)    Resp 18    SpO2 97%         Supplemental O2  [x] Yes  [] NO  Last bowel movement:     Currently this patient has:  [] Peripheral IV [x] PICC  [] PORT [] ICD    [x] Davila Catheter [] NG Tube   [] PEG Tube    [] Rectal Tube [] Drain  [x] Other: now with a special mattress    Constitutional: pale, laying on her back, awake but appears anxious when she talks, c/o pain/pressure in vaginal area at times, bleeding noted in davila catheter  Eyes: pallor++  ENMT: moist oral mucosa  Cardiovascular: distant heart sounds, tachycardic  Respiratory:  Normal breathing, diminished air entry  Gastrointestinal:  distended and firm, non tender, BS +, pt has a davila's  Musculoskeletal:edema ++ lower extremities right leg>left leg  Skin: warm, dry, some skin irritation in the groin folds, itching   Neurologic:sleepy but can follow commands  Psychiatric: fluctuating mood, anxious affect         Pertinent Lab and or Imaging Tests: Total time: 39 mts            Isac Mathur NP      This patient meets [facility] General Inpatient ([facility]) Level of Care. Supporting documentation for GIP need for pain control:  [x] Frequent evaluation by a doctor, nurse practitioner, nurse   [x] Frequent medication adjustment    [x] IVs that cannot be administered at home   [x] Aggressive pain management   [] Complicated technical delivery of medications              Supporting documentation for GIP need for symptom control:  []  Sudden decline necessitating intensive nursing intervention  []  Uncontrolled / intractable nausea or vomiting   []  Pathological fractures  []  Advanced open wounds requiring frequent skilled care  [] Unmanageable respiratory distress  [] New or worsening delirium   [] Delirium with behavior issues  [] Imminent death  with skilled nursing needs documented above        MD Addendum    Pt seen & case discussed with Ms. Tori Yoder. Agree with current care plan as outlined in her note. Pt was switched to [facility] level care again as of yesterday; she has started to show signs of worsening symptoms of pain and increasing anxiety.  Pt having more pressure sensation in her vaginal area due to increasing tumor burden. Movements more painful. Reached out to IR Dr. Darwin Hall and discussed case in length with him. He reviewed chart thoroughly and he is recommending Ct guided soft tissue ablation of tumor to shrink it and ablate nerves to control pain. He is agreeable to do this procedure and said if it works it will be immediate relief of pain. He is quite hopeful that he can help with pain control and improved QOL via this procedure. If pt/family agrees to this procedure and gives consent then pt will need to be transported [facility] as Dr. Darwin Hall will do this procedure over there. I will contact pt's mother for her to come in and have family meeting to discuss possibility of this procedure. If family/pt agree to this then we will proceed with further arrangements and follow up with PCP Dr. Reece Wren as well. Will continue to follow for comfort and adjust medications/care plan accordingly. Md addendum  Spoke to Karli and her mother Luz Elena Ny and explained the rationale of the procedure and its benefits and process. They seem to be understanding the benefits and want to speak to Dr. Darwin Hall more about it. Will coordinate a conference call b/w Karli, her mother and Dr. Darwin Hall in next couple of days. Also coordinated care and informed PCP Dr. Reece Wren of these developments.

## 2017-06-29 NOTE — PROGRESS NOTES
NAME OF PATIENT:  Jyoti Randall    LEVEL OF CARE:  GIP    REASON FOR GIP:   Pain, despite numerous changes in medications, Medication adjustment that must be monitored 24/7 and Stabilizing treatment that cannot take place at home    *PATIENT REMAINS ELIGIBLE FOR [facility] LEVEL OF CARE AS EVIDENCED BY: (MUST BE ADDRESSED OF PATIENT GIP)      REASON FOR RESPITE:  N/A    O2 SAFETY:  Concentrator positioning (6\" from furniture/drapes), Tanks stored in rodríguez , No petroleum based products on face while oxygen in use and Oxygen sign on the door    FALL INTERVENTIONS PROVIDED:   Implemented/recommended assistive devices and encouraged their use, Implemented/recommended environmental changes (remove hazards, lower bed, improve lighting, etc.) and Implemented/recommended increased supervision/assistance    INTERDISPLINARY COMMUNICATION/COLLABORATION:  Physician, MSW, [place] and RN, CNA    NEW MEDICATION INITIATION DOCUMENTATION:  Documentation completed in Clinical Note in [facility]    Reason medication is being initiated:  N/A    MD / Provider name consulted re: change in status / initiation of new medication:  N/A    New Symptom(s):  N/A    New Order(s):  N/A    Name of the person notified of the changes:  N/A    Name of person being taught:  N/A    Instructions given:  N/A    Side Effects taught:  N/A    Response to teaching:  N/A      COMFORTABLE DYING MEASURE:  Is Patient/family satisfied with symptom level?  no    DISCHARGE PLAN:  UNSURE OF DC PLANS AT THIS TIME.      0830: Care assumed of patient. Patient in room resting comfortably and in no acute distress. VSS. Family visiting in the room at this time. Remains GIP for pain control and for s/s that cannot be managed at home. 1028: Patient medicated with scheduled meds and prn dose of benadryl for itching. Family in room visiting with patient and she is eating breakfast from [employer]. Patient remains in good spirits, AOx3 and in no acute distress.  PCA continues to infuse w/o incident. 1056: Patient encouraged to utilize her PCA button. Patient does not understand that her PCA dose is equivalent to her IVP dose. 1220: Patient resting with eyes closed. No distress noted. 1458: Patient in good spirits as her friend and  are here to do her nails. Patient smiling and appearing in no pain at this time. PCA button in bed with patient and educated on pressing the button when she is having pain. Patient verbalized understanding. 1802: Given ativan at this time. No distress noted.

## 2017-06-29 NOTE — PROGRESS NOTES
Verbal shift change report given to Tera Jo (oncoming nurse) by William Marrero (offgoing nurse). Report included the following information SBAR and Kardex. NAME OF PATIENT:  Isabella Randall    LEVEL OF CARE:  [facility]    REASON FOR GIP:   Pain, despite numerous changes in medications, Medication adjustment that must be monitored 24/7 and Stabilizing treatment that cannot take place at home    *PATIENT REMAINS ELIGIBLE FOR GIP LEVEL OF CARE AS EVIDENCED BY: (MUST BE ADDRESSED OF PATIENT GIP)  Patient continues to require close monitoring and treatment of symptoms (pain), with medication adjustments as necessary. Medication adjustments and additional medications remain necessary in order to stabilize the patient's symptoms. REASON FOR RESPITE:  n/a    O2 SAFETY:  n/a    FALL INTERVENTIONS PROVIDED:   Implemented/recommended environmental changes (remove hazards, lower bed, improve lighting, etc.) and Implemented/recommended increased supervision/assistance    INTERDISPLINARY COMMUNICATION/COLLABORATION:  Physician, MSW, [place] and RN, CNA    NEW MEDICATION INITIATION DOCUMENTATION:  No new medications. Reason medication is being initiated:  n/a    MD / Provider name consulted re: change in status / initiation of new medication:  n/a    New Symptom(s):  No new symptoms. New Order(s):  No new orders. Name of the person notified of the changes:  n/a    Name of person being taught:  n/a    Instructions given:  n/a    Side Effects taught:  n/a    Response to teaching:  n/a      COMFORTABLE DYING MEASURE:  Is Patient/family satisfied with symptom level?  yes    DISCHARGE PLAN:  No discharge plans at this time. Attempting to stabilize symptoms and pain medications is ongoing. Night shift summary:  1900 - Received report. 1930 - Patient continues to have pain on her right side from shoulder to leg. Alert and oriented x 3-4. Breathing is even and unlabored; 100% on room air. Visitor at bedside. 2038 - PRN Dilaudid 4 mg IV given when the patient requested additional pain medication. Patient encouraged to push her PCA button, but she said that many times she forgets to. Ativan 1 mg po given at patient's request.   2157 - Dilaudid PCA medication bag changed in the CADD pump. Jama is patent and draining dark cherry-colored urine. Bowel sounds are active. 2200 - No redness, swelling, leaking, or pain at right upper arm PICC line, 2 patent ports. Patient is awake, alert, and talking on the phone. 0300 - Calmoseptine and A&D ointment applied to area of skin breakdown on right leg. Lower extremity edema.   0322 - PRN Dilaudid 4 mg IV and Toradol 30 mg IV given when the patient requested additional pain medication. 0600 - Sleeping. Tonight she received 80.7 mg (4 mg per hour) of Dilaudid via CAD pump; 35 attempts and 25 delivered of PCA Dilaudid 2 mg PCA.

## 2017-06-29 NOTE — PROGRESS NOTES
Follow-up visit to continue support. She is doing well. Sleeping a lot this afternoon. She says she is in and out. She fall off asleep but wake up talking. She want company. She knows that I will not see her the weekend but will be back next week.      Coty Christiansen., [facility], [address] [place] [address]

## 2017-06-30 NOTE — PROGRESS NOTES
0700 Report received from [facility], [address] [facility]. Pt is [facility] level of care for pain management. 0800 Pt is sleeping. Reported that she did not sleep well last night. 0950 Pt is complaining of pain rated 5 of 10. States the PCA dose x 2 has not been effectiven. Pt is drowsy with slow speech, states she just wants to sleep. Dilaudid 4 mg given IVP for c/o pain in back and right leg. 1030 Resting with eyes closed. Appears to be in comfort. 1200 Awake sipping on Soda. Has refused breakfast and lunch. 1315 Pt awake states pain is now 3 of 10.   1500 Pt Resting in bed watching TV and talking on the phone. 1645 Pt called to nurses station for the nurse. States she has been waiting over an hour for help. Informed pt she  had not call out prior. She states she wants the pillows and covers moved from between her legs. No pillows are present between legs. Pt appears grumpy. Very quiet towards this nurse. Request Dilaudid IVP for pain . Med given. 1700 Tordol  30mg given IVP as requested for pain. Pts right leg repositioned. 1730 Pump cleared 248.8 LTC          15 attempts/ 10 injects received           62.8 mg given. 1900 Pt c/o pain in leg of 7 of 10. Family at the bedside. States she wants to be out of pain so that she can feel like eating. Dilaudid 4mg given IVP. NAME OF PATIENT:  Charla Randall    LEVEL OF CARE:  GIP    REASON FOR GIP:   Pain, despite numerous changes in medications, Medication adjustment that must be monitored 24/7 and Stabilizing treatment that cannot take place at home    *PATIENT REMAINS ELIGIBLE FOR [facility] LEVEL OF CARE AS EVIDENCED BY: (MUST BE ADDRESSED OF PATIENT GIP) frequent assessment and med changes required to manage pain.     REASON FOR RESPITE:  na    O2 SAFETY:  prn use    FALL INTERVENTIONS PROVIDED:   Implemented/recommended use of fall risk identification flag to all team members, Implemented/recommended resources for alarm system (personal alarm, bed alarm, call bell, etc.) , Implemented/recommended environmental changes (remove hazards, lower bed, improve lighting, etc.) and Implemented/recommended increased supervision/assistance    INTERDISPLINARY COMMUNICATION/COLLABORATION:  Physician, MSW, [place] and RN, CNA    NEW MEDICATION INITIATION DOCUMENTATION:  Documentation completed in Clinical Note in [facility]    Reason medication is being initiated:  na    MD / Provider name consulted re: change in status / initiation of new medication:  na    New Symptom(s):  na    New Order(s):  na    Name of the person notified of the changes:  na    Name of person being taught:  na    Instructions given:  na    Side Effects taught:  na    Response to teaching:  na      COMFORTABLE DYING MEASURE:  Is Patient/family satisfied with symptom level?   Yes    DISCHARGE PLAN:  Remain at [facility] until symptoms are managed

## 2017-06-30 NOTE — PROGRESS NOTES
Verbal shift change report given to Azul Choudhury RN by Miah Pedroza RN. Report included the following information SBAR, Kardex, Intake/Output and MAR.     1935  Patient itching from something she thinks she might have eaten. Medicated with PRN PO benadryl. 2021  Patient complaining of pain 8/10 in right leg. Medicated with PRN IV dilaudid and IV toradol. 2121  Patient continues to complain of itching and pain 8/10 in right leg. Medicated with PRN IV dilaudid. 2230  Patient continues to complain of itching and pain 8/10 pain in right leg. Medicated with PRN tylenol and benadrylIV dilaudid and encourage patient to use PCA dosing as well. 2326  Patient still unable to be comfortable. Medicated again with PRN IV dilaudid and encouraged PCA use. 2423  Patient medicated with still another dose of IV dilaudid and IV toradol for continued pain over 5/10 continuous. 0300  Patient finally sleeping comfortably. 7546  Patient awake and hurting again this morning. Medicated again with IV dilaudid.     3650  PCA Dilaudid cleared for total dose of 123.8 mg, 21 attempts and 16 deliveries over course of shift.        NAME OF PATIENT: Jyoti Randall      LEVEL OF CARE: GIP      REASON FOR RESPITE: Patient not in Respite care at this time.       REASON FOR GIP:   Pain, despite numerous changes in medications, Medication adjustment that must be monitored 24/7 and Stabilizing treatment that cannot take place at home      PATIENT REMAINS ELIGIBLE FOR GIP LEVEL OF CARE AS EVIDENCED BY: GIP level of care due to changing medication requirements, adjustments, additions of opioid pain mediations for relief and adjuvant therapy medications as tolerated, and continuous monitoring which was not accomplished at home.      REASON FOR RESPITE: Patient is not in Respite care at this time.      O2 SAFETY: Patient is on room air at this time.       FALL INTERVENTIONS PROVIDED:   Implemented/recommended use of non-skid footwear, Implemented/recommended use of fall risk identification flag to all team members, Implemented/recommended assistive devices and encouraged their use, Implemented/recommended resources for alarm system (personal alarm, bed alarm, call bell, etc.) , Implemented/recommended environmental changes (remove hazards, lower bed, improve lighting, etc.) and Implemented/recommended increased supervision/assistance      INTERDISPLINARY COMMUNICATION/COLLABORATION: Physician, MSW, [place] and RN, CNA      NEW MEDICATION INITIATION DOCUMENTATION: No new medications nor interventions begun on this night shift.       Reason medication is being initiated: N/A      MD / Provider name consulted re: Change in status/initiation of new medication: N/A      New Symptom(s): N/A      New Order(s): N/A      Name of the person notified of the changes: N/A      Name of the person being taught: N/A      Instructions given: N/A      Side Effects taught: N/A      Response to teaching: N/A      COMFORTABLE DYING MEASURE: Continue to monitor for pain, dyspnea, agitation, and restlessness and intervene accordingly.       Is Patient/Family satisfied with symptom level? Yes      DISCHARGE PLAN: Patient to be discharged home in her mother, Libra's care under the continuing care of [facility] once symptoms can be managed effectively at home.

## 2017-06-30 NOTE — PROGRESS NOTES
[facility]  [phone]    Patient Name: Oral Rivas  YOB: 1980    Date of Provider Hospice Visit: 06/30/17    Level of Care:   [x] General Inpatient (GIP)    [] Routine   [] Respite    Location of Care:  [] [facility] [] [facility] [] [address] [] [facility] [x] Hospice House [facility]  [] Home [] Other:      Date of Original Hospice Admission: 5-16-17  Hospice Medical Director for Inpatient Care: Dr. Derrick Eagle Diagnosis:  Metastatic Endometrial Adenocarcinoma       HOSPICE DIAGNOSES   Active Symptoms:  1. Generalised pain, worse especially right lower extremity, lower back, especially with movements, rates 5/10   2. Delirium; Fluctuates, mild confusion at times, improved for most part, none recent  3. Shortness of breath with low O2 sats: improves with use of O2  4. Anxiety/depression; still an issue, persists  5. Insomnia; less, spending more time sleeping   6. Fatigue/weakness/lethargy: worse, BP very low at times  7. Constipation improved  8. Itching: less, improved, stable  9. Bladder spasms/vaginal pressure; recurrent, likely due to cancer of endometrium disease advancing  10. Episodes of comfort then an exacerbation of pain which requires multiple medical pain med interventions  11. Reba Mares blood in davila catheter     PLAN   Change to GIP level of care as medication requirements and as needed break through doses have increased, pt is more symptomatic and needing close attention/monitoring and adjustment in meds for better relief of pain. 1. Continue ativan 1mg  three times daily as needed for anxiety  2. Increase low dose ativan 0.5mg to three times daily  3. Increase PCA dilaudid 4mg / hr continuous, PCA dose of 2mg every 6 mts. Pt has to be encouraged to push the PCA button, pt had 21 attempts and 16 delivers of PCA dilaudid on shift prior to this  4.  Increase nurse given dilaudid 4mg IV every 15mts as needed for severe pain, has had 8 doses for break through  in 24 hours   5. Continue gabapentin 300mg at bedtime  6. Continue air mattress for her continued comfort  7. Continue clinoril, Cymbalta for adjuvant therapy for bone pain  8. Continue methadone at current levels 10mg every 8 hrs  9. Benadryl 25mg oral everfy 4 hrs as needed for itching: took 1 prn benadryl yesterday  10. continue Toradol 30mg IV every 6 hours as needed for severe pain; antiinflammatory. Has had 5 doses in 24 hours  11. Continue  reposition and turn to side during cleaning/ADL care and to prevent skin breakdown. 15.  and SW to support family needs  15. Disposition:  routine care status when symptoms are stable: SW Ms. Deloris Chisholm is assisting pt's mom to complete medicaid application and foundation forms have been submitted for additional resource help. 15.  Deloris ERAZO arranging meeting with Dr. Ricky Marie Radiologist [facility] and patient regarding a possible procedure to alleviate pain,, CT Scan guided soft tissue ablation, will discuss with patient and family      Prognosis estimated based on 06/30/17 clinical assessment is:   [] Few to Many Hours  [] Hours to Days   [] Few to Many Days   [] Days to Weeks    [x] Few to Many Weeks   [] Weeks to Months   [] Few to Many Months    Communicated plan of care with: Hospice Case Manager;  Hospice IDT; Care Team     GOALS OF CARE     Resuscitation Status: DNR  Durable DNR: [x] Yes [] No    Advance Care Planning 5/10/2017   Patient's Healthcare Decision Maker is: Legal Next of Andrea Velarde   Primary Decision Maker Name Jeffery Rain   Primary Decision Maker Phone Number -   Primary Decision Maker Relationship to Patient Parent   Confirm Advance Directive Yes, on file   Patient Would Like to Complete Advance Directive -        HISTORY     History obtained from: chart, staff    CHIEF COMPLAINT: pain/pressure in bladder area/vaginal area  The patient is:   [x] Verbal  [] Nonverbal  [] Unresponsive    HPI/SUBJECTIVE:  Pt is little more lethargic today and c/o pain 5/10 all over, talks about pressure sensation in her vaginal area and feels her tumor disease is increasing. Pt asking for more pain meds. Also is quite anxious. Staff reports increased pain with movements and with ADL care, pt still requiring 2-3 staff members assisting her in cleaning etc.   Pt has taken several prn on all meds; ativan, dilaudid, benadryl. PCA use; 10 given out of 13 attempts in dayshift and 6 given out of 9 overnight. REVIEW OF SYSTEMS     The following systems were: [x] reviewed  [] unable to be reviewed    Positive ROS include:  Constitutional: fatigue, weakness  Ears/nose/mouth/throat:   Respiratory:  Gastrointestinal:vaginal pressure sensation  Musculoskeletal:weakness,pain in the right hip  Neurologic: numbness and burning pain  Psychiatric:anxiety, depression  Endocrine:          FUNCTIONAL ASSESSMENT     Palliative Performance Scale (PPS): 30%     PSYCHOSOCIAL/SPIRITUAL ASSESSMENT     Principal Problem:    Endometrial cancer ([address] (7/7/2010)    Active Problems:     Morbid obesity ([address] (7/7/2010)      Bone metastases ([address] (3/10/2014)      Past Medical History:   Diagnosis Date    Cancer ([address]     uterine    CVI (common variable immunodeficiency) ([address]     Diabetes ([address]     Elevated liver function tests 10/21/2010    Endometrial cancer ([address] 7/7/2010    Hypertension     Iron deficiency anemia     Menometrorrhagia 10/21/2010    Microcytic anemia 10/21/2010    Morbid obesity ([address]     Prediabetes 7/7/2010    Psychiatric disorder     depression    Radiation     completed radiation mid-march      Past Surgical History:   Procedure Laterality Date    CARDIAC SURG PROCEDURE UNLIST      hx of tachycardia    HX GYN      hysterectomy      Social History   Substance Use Topics    Smoking status: Never Smoker    Smokeless tobacco: Never Used    Alcohol use Yes     Family History   Problem Relation Age of Onset    Hypertension Mother     Hypertension Father     Stroke Maternal Grandfather     Cancer Paternal Grandmother      breast    Diabetes Paternal Grandmother       Allergies   Allergen Reactions    Egg Nausea and Vomiting     Raw egg and scrambled eggs. Egg products okay.      Pcn [Penicillins] Nausea and Vomiting     \"but could take amoxil\"    Shellfish Containing Products Unknown (comments)    Tetanus And Diphtheria Toxoids, Adsorbed, Adult Other (comments)     Developed local swelling, axillary pain, and transient fever    Tomato Unknown (comments)      Current Facility-Administered Medications   Medication Dose Route Frequency    ketorolac (TORADOL) injection 30 mg  30 mg IntraVENous Q6H PRN    HYDROmorphone (PF) (DILAUDID) injection 4 mg  4 mg IntraVENous Q15MIN PRN    LORazepam (ATIVAN) tablet 0.5 mg  0.5 mg Oral TIDPC    diphenhydrAMINE (BENADRYL) capsule 25 mg  25 mg Oral Q4H PRN    acetaminophen (TYLENOL) tablet 650 mg  650 mg Oral Q4H PRN    Hydromorphone 500mg/500mL bag [facility]   IntraVENous CONTINUOUS    gabapentin (NEURONTIN) capsule 300 mg  300 mg Oral QHS    LORazepam (ATIVAN) tablet 1 mg  1 mg Oral TID PRN    metoprolol tartrate (LOPRESSOR) tablet 25 mg  25 mg Oral BID    methadone (DOLOPHINE) tablet 10 mg  10 mg Oral Q8H    polyethylene glycol (MIRALAX) packet 17 g  17 g Oral DAILY    magic mouthwash cpd (without sucralfate)  5 mL Oral QID PRN    docusate sodium (COLACE) capsule 100 mg  100 mg Oral DAILY    DULoxetine (CYMBALTA) capsule 60 mg  60 mg Oral QHS    sulindac (CLINORIL) tablet 200 mg  200 mg Oral BID WITH MEALS        PHYSICAL EXAM     Wt Readings from Last 3 Encounters:   05/30/17 121.1 kg (267 lb)   05/10/17 121.4 kg (267 lb 10.2 oz)   02/16/17 136.1 kg (300 lb)       Visit Vitals    /88 (BP 1 Location: Left arm, BP Patient Position: At rest;Supine)    Pulse (!) 105    Temp 98.5 °F (36.9 °C)    Resp 20    SpO2 99%         Supplemental O2  [x] Yes  [] NO  Last bowel movement:     Currently this patient has:  [] Peripheral IV [x] PICC  [] PORT [] ICD    [x] Davila Catheter [] NG Tube   [] PEG Tube    [] Rectal Tube [] Drain  [x] Other: now with a special mattress    Constitutional: pale, laying on her back, awake but appears anxious when she talks, c/o pain/pressure in vaginal area at times, bleeding noted in davila catheter  Eyes: pallor++  ENMT: moist oral mucosa  Cardiovascular: distant heart sounds, tachycardic  Respiratory:  Normal breathing, diminished air entry  Gastrointestinal:  distended and firm, non tender, BS +, pt has a davila's  Musculoskeletal:edema ++ lower extremities right leg>left leg  Skin: warm, dry, some skin irritation in the groin folds, itching   Neurologic:sleepy but can follow commands  Psychiatric: fluctuating mood, anxious affect         Pertinent Lab and or Imaging Tests: Total time: 39 mts            Gissell Waite NP      This patient meets Hospice General Inpatient (GIP) Level of Care.     Supporting documentation for GIP need for pain control:  [x] Frequent evaluation by a doctor, nurse practitioner, nurse   [x] Frequent medication adjustment    [x] IVs that cannot be administered at home   [x] Aggressive pain management   [] Complicated technical delivery of medications              Supporting documentation for GIP need for symptom control:  []  Sudden decline necessitating intensive nursing intervention  []  Uncontrolled / intractable nausea or vomiting   []  Pathological fractures  []  Advanced open wounds requiring frequent skilled care  [] Unmanageable respiratory distress  [] New or worsening delirium   [] Delirium with behavior issues  [] Imminent death  with skilled nursing needs documented above      Gissell Waite NP

## 2017-06-30 NOTE — PROGRESS NOTES
POST DISCHARGE NOTE. Received a call from Cece Romo at [facility] asking for a copy of the patient's UAI ([phone], P#[phone]). Explained that the patient did not have Medicaid at this time of discharge and a UAI was not completed.     ADAM Rudolph

## 2017-07-01 NOTE — PROGRESS NOTES
* UPDATE ON PCA DEMAND USAGE:  Patient has attempted 35 demands with 23 given between 6/30/17 @0600 to 7/1/17 @0600 (24hrs) *      0715:  Verbal shift change report given to Holly Sams RN (oncoming nurse) by Ryan Valerio RN (offgoing nurse). Report included the following information SBAR, Kardex, Intake/Output and MAR.   0900:  Assessed patient (see Simple Assessment) & administration of scheduled medications (see MAR). Asked patient her pain level, she stated 5/10 and pushed PCA. Patient requested adjustment of right leg and lowering of Head of Bed; adjustment made to the satisfaction of patient. 1415:  Administered scheduled medications (see MAR). Patient requesting toradol (4/10); administered prn toradol 30mg/IV.  1900:  CADD pump (dilaudid) cleared: past 12hr = 69.9mg, attempts 16, given 8. NAME OF PATIENT:  Margarita Randall    LEVEL OF CARE:  GIP    REASON FOR GIP:   Pain, despite numerous changes in medications, Medication adjustment that must be monitored 24/7 and Stabilizing treatment that cannot take place at home    *PATIENT REMAINS ELIGIBLE FOR GIP LEVEL OF CARE AS EVIDENCED BY: (MUST BE ADDRESSED OF PATIENT GIP) Change to GIP level of care as medication requirements and as needed break through doses have increased, pt is more symptomatic and needing close attention/monitoring and adjustment in meds for better relief of pain.        REASON FOR RESPITE:  n/a    O2 SAFETY:  n/a    FALL INTERVENTIONS PROVIDED:   Implemented/recommended resources for alarm system (personal alarm, bed alarm, call bell, etc.) , Implemented/recommended environmental changes (remove hazards, lower bed, improve lighting, etc.) and Implemented/recommended increased supervision/assistance    INTERDISPLINARY COMMUNICATION/COLLABORATION:  Physician, MSW, [place] and RN, CNA    NEW MEDICATION INITIATION DOCUMENTATION:  n/a    Reason medication is being initiated:  n/a    MD / Provider name consulted re: change in status / initiation of new medication:  n/a    New Symptom(s):  n/a    New Order(s):  n/a    Name of the person notified of the changes:  n/a    Name of person being taught:  n/a    Instructions given:  n/a    Side Effects taught:  n/a    Response to teaching:  n/a      COMFORTABLE DYING MEASURE:  Is Patient/family satisfied with symptom level?  yes    DISCHARGE PLAN:  Patient will remain at [facility] until symptoms are managed.

## 2017-07-01 NOTE — PROGRESS NOTES
[facility] Help to Those in Need  [phone]    Patient Name: Thais Perez  YOB: 1980    Date of Provider Hospice Visit: 07/01/17    Level of Care:   [x] General Inpatient (GIP)    [] Routine   [] Respite    Location of Care:  [] [facility] [] [facility] [] [facility] [] [facility] [x] [facility]  [] Home [] Other:      Date of Original Hospice Admission: 5-16-17  Hospice Medical Director for Inpatient Care: Dr. Dail Bosworth Diagnosis:  Metastatic Endometrial Adenocarcinoma       HOSPICE DIAGNOSES   Active Symptoms:  1. Generalised pain, worse especially right lower extremity, lower back, especially with movements, rates 5/10   2. Delirium; Fluctuates, mild confusion at times, improved for most part, none recent  3. Shortness of breath with low O2 sats: improves with use of O2  4. Anxiety/depression; still an issue, persists  5. Insomnia; less, spending more time sleeping   6. Fatigue/weakness/lethargy: worse, BP very low at times  7. Constipation improved  8. Itching: less, improved, stable  9. Bladder spasms/vaginal pressure; recurrent, likely due to cancer of endometrium disease advancing  10. Episodes of comfort then an exacerbation of pain which requires multiple medical pain med interventions  11. Rogerio Waller blood in davila catheter     PLAN   Changed to GIP level of care as medication requirements and as needed break through doses have increased, pt is more symptomatic and needing close attention/monitoring and adjustment in meds for better relief of pain. 1. Continue ativan 1mg  three times daily as needed for anxiety  2. Increase low dose ativan 0.5mg to three times daily  3. Continue PCA dilaudid 4mg / hr continuous, PCA dose of 2mg every 6 mts. Pt has to be encouraged to push the PCA button, pt had 35 attempts and 23 delivers of PCA dilaudid last 24 hrs  4.  Increase nurse given dilaudid 4mg IV every 15mts as needed for severe pain, has had 5 doses in last 24 hrs. 5. Continue gabapentin 300mg at bedtime  6. Continue air mattress for her continued comfort  7. Continue clinoril, Cymbalta for adjuvant therapy for bone pain  8. Continue methadone at current levels 10mg every 8 hrs  9. Benadryl 25mg oral everfy 4 hrs as needed for itching: took 1 prn benadryl yesterday  10. continue Toradol 30mg IV every 6 hours as needed for severe pain; antiinflammatory. 11. Continue  reposition and turn to side during cleaning/ADL care and to prevent skin breakdown. 15.  and SW to support family needs  15. Disposition:  routine care status when symptoms are stable: SW Ms. Karon Acevedo is assisting pt's mom to complete medicaid application and foundation forms have been submitted for additional resource help. 15.  Solis Gleason arranging meeting with Dr. Lakisha Badillo Radiologist [facility] and patient regarding a possible procedure to alleviate pain,, CT Scan guided soft tissue ablation, will discuss with patient and family      Prognosis estimated based on 07/01/17 clinical assessment is:   [] Few to Many Hours  [] Hours to Days   [] Few to Many Days   [] Days to Weeks    [x] Few to Many Weeks   [] Weeks to Months   [] Few to Many Months    Communicated plan of care with: Hospice Case Manager;  Hospice IDT; Care Team     GOALS OF CARE     Resuscitation Status: DNR  Durable DNR: [x] Yes [] No    Advance Care Planning 5/10/2017   Patient's Healthcare Decision Maker is: Legal Next of Andrea 69   Primary Decision Maker Name Carla Mixon   Primary Decision Maker Phone Number -   Primary Decision Maker Relationship to Patient Parent   Confirm Advance Directive Yes, on file   Patient Would Like to Complete Advance Directive -        HISTORY     History obtained from: chart, staff    CHIEF COMPLAINT: pain/pressure in bladder area/vaginal area  The patient is:   [x] Verbal  [] Nonverbal  [] Unresponsive    HPI/SUBJECTIVE:    Pt has been lethargic, still taking medicines and liquids/food ok but unable to sit up due to right groin pain  Reports pain is ok, moderate in right groin and leg  Able to sleep  toradol helps relieve the pain per staff  Still using PSA frequently 35 attempts and 23 delivered doses last 24 hrs. REVIEW OF SYSTEMS     The following systems were: [x] reviewed  [] unable to be reviewed    Positive ROS include:  Constitutional: fatigue, weakness  Ears/nose/mouth/throat:   Respiratory:  Gastrointestinal:vaginal pressure sensation  Musculoskeletal:weakness,pain in the right hip  Neurologic: numbness and burning pain  Psychiatric:anxiety, depression  Endocrine:          FUNCTIONAL ASSESSMENT     Palliative Performance Scale (PPS): 30%     PSYCHOSOCIAL/SPIRITUAL ASSESSMENT     Principal Problem:    Endometrial cancer ([address] (7/7/2010)    Active Problems: Morbid obesity ([address] (7/7/2010)      Bone metastases ([address] (3/10/2014)      Past Medical History:   Diagnosis Date    Cancer ([address]     uterine    CVI (common variable immunodeficiency) ([address]     Diabetes ([address]     Elevated liver function tests 10/21/2010    Endometrial cancer ([address] 7/7/2010    Hypertension     Iron deficiency anemia     Menometrorrhagia 10/21/2010    Microcytic anemia 10/21/2010    Morbid obesity ([address]     Prediabetes 7/7/2010    Psychiatric disorder     depression    Radiation     completed radiation mid-march      Past Surgical History:   Procedure Laterality Date    CARDIAC SURG PROCEDURE UNLIST      hx of tachycardia    HX GYN      hysterectomy      Social History   Substance Use Topics    Smoking status: Never Smoker    Smokeless tobacco: Never Used    Alcohol use Yes     Family History   Problem Relation Age of Onset    Hypertension Mother     Hypertension Father     Stroke Maternal Grandfather     Cancer Paternal Grandmother      breast    Diabetes Paternal Grandmother       Allergies   Allergen Reactions    Egg Nausea and Vomiting     Raw egg and scrambled eggs.  Egg products okay.      Pcn [Penicillins] Nausea and Vomiting     \"but could take amoxil\"    Shellfish Containing Products Unknown (comments)    Tetanus And Diphtheria Toxoids, Adsorbed, Adult Other (comments)     Developed local swelling, axillary pain, and transient fever    Tomato Unknown (comments)      Current Facility-Administered Medications   Medication Dose Route Frequency    ketorolac (TORADOL) injection 30 mg  30 mg IntraVENous Q6H PRN    HYDROmorphone (PF) (DILAUDID) injection 4 mg  4 mg IntraVENous Q15MIN PRN    LORazepam (ATIVAN) tablet 0.5 mg  0.5 mg Oral TIDPC    diphenhydrAMINE (BENADRYL) capsule 25 mg  25 mg Oral Q4H PRN    acetaminophen (TYLENOL) tablet 650 mg  650 mg Oral Q4H PRN    Hydromorphone 500mg/500mL bag [facility]   IntraVENous CONTINUOUS    gabapentin (NEURONTIN) capsule 300 mg  300 mg Oral QHS    LORazepam (ATIVAN) tablet 1 mg  1 mg Oral TID PRN    metoprolol tartrate (LOPRESSOR) tablet 25 mg  25 mg Oral BID    methadone (DOLOPHINE) tablet 10 mg  10 mg Oral Q8H    polyethylene glycol (MIRALAX) packet 17 g  17 g Oral DAILY    magic mouthwash cpd (without sucralfate)  5 mL Oral QID PRN    docusate sodium (COLACE) capsule 100 mg  100 mg Oral DAILY    DULoxetine (CYMBALTA) capsule 60 mg  60 mg Oral QHS    sulindac (CLINORIL) tablet 200 mg  200 mg Oral BID WITH MEALS        PHYSICAL EXAM     Wt Readings from Last 3 Encounters:   05/30/17 267 lb (121.1 kg)   05/10/17 267 lb 10.2 oz (121.4 kg)   02/16/17 300 lb (136.1 kg)       Visit Vitals    /70 (BP 1 Location: Left arm)    Pulse 78    Temp 97.8 °F (36.6 °C)    Resp 16    SpO2 100%         Supplemental O2  [x] Yes  [] NO  Last bowel movement:     Currently this patient has:  [] Peripheral IV [x] PICC  [] PORT [] ICD    [x] Jama Catheter [] NG Tube   [] PEG Tube    [] Rectal Tube [] Drain  [x] Other: now with a special mattress    Constitutional: pale, laying on her back, awake but appears anxious when she talks, c/o pain/pressure in vaginal area at times, bleeding noted in davila catheter  Eyes: pallor++  ENMT: moist oral mucosa  Cardiovascular: distant heart sounds, tachycardic  Respiratory:  Normal breathing, diminished air entry  Gastrointestinal:  distended and firm, non tender, BS +, pt has a davila's  Musculoskeletal:edema ++ lower extremities right leg>left leg  Skin: warm, dry, some skin irritation in the groin folds, itching   Neurologic:sleepy but can follow commands  Psychiatric: fluctuating mood, anxious affect         Pertinent Lab and or Imaging Tests: Total time: 39 mts            Flora Rosado MD      This patient meets Hospice General Inpatient (GIP) Level of Care.     Supporting documentation for GIP need for pain control:  [x] Frequent evaluation by a doctor, nurse practitioner, nurse   [x] Frequent medication adjustment    [x] IVs that cannot be administered at home   [x] Aggressive pain management   [] Complicated technical delivery of medications              Supporting documentation for GIP need for symptom control:  []  Sudden decline necessitating intensive nursing intervention  []  Uncontrolled / intractable nausea or vomiting   []  Pathological fractures  []  Advanced open wounds requiring frequent skilled care  [] Unmanageable respiratory distress  [] New or worsening delirium   [] Delirium with behavior issues  [] Imminent death  with skilled nursing needs documented above      Flora Rosado MD

## 2017-07-01 NOTE — PROGRESS NOTES
Verbal shift change report given to Alena Vizcaino (oncoming nurse) by Jazmyn Britton (offgoing nurse). Report included the following information SBAR and Kardex. NAME OF PATIENT:  Ghada Randall    LEVEL OF CARE:  GIP. REASON FOR GIP:   Pain, despite numerous changes in medications, Medication adjustment that must be monitored 24/7, Stabilizing treatment that cannot take place at home and Pain, delirium, shortness of breath, and anxiety. *PATIENT REMAINS ELIGIBLE FOR GIP LEVEL OF CARE AS EVIDENCED BY: (MUST BE ADDRESSED OF PATIENT GIP)  Patient continues to require close monitoring and treatment of symptoms (pain, delirium, shortness of breath, anxiety), with medication adjustments as necessary. Medication adjustments and additional medications remain necessary in order to stabilize the patient's symptoms. REASON FOR RESPITE:  n/a    O2 SAFETY:  No oxygen in use at this time. FALL INTERVENTIONS PROVIDED:   Implemented/recommended use of fall risk identification flag to all team members, Implemented/recommended environmental changes (remove hazards, lower bed, improve lighting, etc.) and Implemented/recommended increased supervision/assistance    INTERDISPLINARY COMMUNICATION/COLLABORATION:  Physician, MSW, [employer] and RN, CNA    NEW MEDICATION INITIATION DOCUMENTATION:  n/a    Reason medication is being initiated: n/a      MD / Provider name consulted re: change in status / initiation of new medication:  n/a    New Symptom(s):  No new symptoms. New Order(s):  No new orders. Name of the person notified of the changes:  n/a    Name of person being taught:  n/a    Instructions given:  n/a    Side Effects taught:   n/a    Response to teachin [address]:  Is Patient/family satisfied with symptom level?  yes    DISCHARGE PLAN:  No definite discharge plan at this time. Night shift summary:   - Report received.  Patient continues to receive Dilaudid 4 mg per hour in the CADD pump, with Dilaudid 2 mg PCA every 6 minutes up to 10 mg hourly limit. 2000 - Denies pain or need for additional pain medication at this time. Patient is alert and oriented, but lethargic or sleeping most of the time. Breathing is even and unlabored. 2046 - PRN Ativan 1 mg po given for increasing anxiety and per patient's request. Lower extremities remain edematous. Jama is patent and draining phil urine with cherry red colored sediment. Bowel sounds are active. 2140 - Asleep. Morena Fall is sleeping at bedside. 2300 - Awake and eating some pizza. 8611 - Awake and talkative. Taking and tolerating po fluids. 8620 - Patient complaining of pain (level 7) and requesting pain medication. PRN Dilaudid 4 mg IV and PRN Toradol 30 mg IV given.   0450 - PRN Ativan 1 mg tablet po, patient's request.

## 2017-07-01 NOTE — HOSPICE
NAME OF PATIENT:  Vonda Randall    LEVEL OF CARE:  GIP    REASON FOR GIP:   Pain, despite numerous changes in medications, Medication adjustment that must be monitored 24/7 and Stabilizing treatment that cannot take place at home    *PATIENT REMAINS ELIGIBLE FOR [facility] LEVEL OF CARE AS EVIDENCED BY: (MUST BE ADDRESSED OF PATIENT GIP) pt is receiving PCA Dilaudid 4mg every hour as well as prn doses. REASON FOR RESPITE:  N/a    O2 SAFETY:  Concentrator positioning (6\" from furniture/drapes), Tanks stored in [address] , No petroleum based products on face while oxygen in use and Oxygen sign on the door    FALL INTERVENTIONS PROVIDED:   Implemented/recommended use of non-skid footwear, Implemented/recommended use of fall risk identification flag to all team members, Implemented/recommended assistive devices and encouraged their use, Implemented/recommended resources for alarm system (personal alarm, bed alarm, call bell, etc.)  and Implemented/recommended environmental changes (remove hazards, lower bed, improve lighting, etc.)    INTERDISPLINARY COMMUNICATION/COLLABORATION:  Physician, MSW, [employer] and RN, CNA    NEW MEDICATION INITIATION DOCUMENTATION:  N/A    Reason medication is being initiated:      MD / Provider name consulted re: change in status / initiation of new medication:      New Symptom(s):      New Order(s):      Name of the person notified of the changes:      Name of person being taught:      Instructions given:      Side Effects taught:      Response to teachin [address] free  Is Patient/family satisfied with symptom level?  yes    DISCHARGE PLAN: Pt will remain @ [facility] until sx are managed. 18:30,report received,assumed care of pt who is GIP for pain,hospice dx is metastatic endometrial cancer. Pt is asleep @ this time. PCA Dilaudid 4mg basal,with 2mg as needed every 6 minutes. 19:30,c/o generalized pain 6/10,medicated with dilaudid 4mg IVP.   20:30,pt states she is getting comfortable now,friend visiting @ bedside. Pt refused to let us turn and change her bed linens. 21:30,pt given scheduled meds and also Ativan for sleep. Friend is sleeping on couch in room overnight. 23:30,pt asleep.  01:30,no changes. 03:30,remains asleep.  05:30,awake now,pain level is 4.  06:30,asking for \"Booster\" and Torodol,medicated with Dilaudid 4mg IVP and IVP Torodol,for pain level of 6. Total dose of Dilaudid for past 12 hrs=75.6mg,attempts=34,doses delivered=16.

## 2017-07-02 NOTE — PROGRESS NOTES
0715:  Verbal shift change report given to Melody Alfonso RN (oncoming nurse) by Emmie Tirado RN (offgoing nurse). Report included the following information SBAR, Kardex, Intake/Output and MAR.   0940:  Assessed patient (see Simple Assessment) and administered scheduled medications. Patient complaining of pain level 7/10; patient stated that she thinks she sat up for too long; lowered head-of-bed to 20 degrees; patient stated improvement. Administered prn dilaudid 4mg/IV. 1340:  Patient complaining of constipation; suggested she take the miralax that she refused this morning. Administered the miralax that was scheduled this morning with prune juice. 1500:  Patient has had BM; administered prn dilaudid 4mg/IV to provide pain relief from movement. 1515:  Patient's mother came to visit; will clean patient after visit. 1600: Mother has left; patient required prn of dilaudid 4mg/IV to staff to clean patient. 1628, 1710, 1854:  Administered prn dilaudid during bathing, changing sheets, & cleaning patient's BM. Patient had large, soft BM but rectum still has hard, large stool but is painful to pass and painful to touch. Administered Fleet's Enema Saline Laxative. NAME OF PATIENT:  Ashley Randall    LEVEL OF CARE:  GIP    REASON FOR GIP:   Pain, despite numerous changes in medications, Medication adjustment that must be monitored 24/7 and Stabilizing treatment that cannot take place at home    *PATIENT REMAINS ELIGIBLE FOR GIP LEVEL OF CARE AS EVIDENCED BY: (MUST BE ADDRESSED OF PATIENT GIP) Pt is more symptomatic and needing close attention/monitoring and adjustment in meds for better relief of pain.        REASON FOR RESPITE:  n/a    O2 SAFETY:  n/a    FALL INTERVENTIONS PROVIDED:   Implemented/recommended resources for alarm system (personal alarm, bed alarm, call bell, etc.) , Implemented/recommended environmental changes (remove hazards, lower bed, improve lighting, etc.) and Implemented/recommended increased supervision/assistance    INTERDISPLINARY COMMUNICATION/COLLABORATION:  Physician, MSW, [place] and RN, CNA    NEW MEDICATION INITIATION DOCUMENTATION:  n/a    Reason medication is being initiated:  n/a    MD / Provider name consulted re: change in status / initiation of new medication:  n/a    New Symptom(s):  n/a    New Order(s):  n/a    Name of the person notified of the changes:  n/a    Name of person being taught:  n/a    Instructions given:  n/a    Side Effects taught:  n/a    Response to teaching:  n/a      COMFORTABLE DYING MEASURE:  Is Patient/family satisfied with symptom level?  yes    DISCHARGE PLAN:  Patient is at [facility] LOC. Family is working on application for [employer] to make financial arrangements for LTC once patient's symptoms have been managed.

## 2017-07-02 NOTE — PROGRESS NOTES
[address] [facility] Help to Those in Need  [phone]    Patient Name: Kevin Newsome  YOB: 1980    Date of Provider Hospice Visit: 07/02/17    Level of Care:   [x] General Inpatient (GIP)    [] Routine   [] Respite    Location of Care:  [] [facility] [place] [] [facility] [] [facility] [] [facility] [x] [facility]  [] Home [] Other:      Date of [address] [address] Admission: 5-16-17  Hospice Medical Director for Inpatient Care: Dr. Joana Nguyen Diagnosis:  Metastatic Endometrial Adenocarcinoma       HOSPICE DIAGNOSES   Active Symptoms:  1. Generalised pain, worse especially right lower extremity, lower back, especially with movements, rates 5/10   2. Delirium; Fluctuates, mild confusion at times, improved for most part, none recent  3. Shortness of breath with low O2 sats: improves with use of O2  4. Anxiety/depression; still an issue, persists  5. Insomnia; less, spending more time sleeping   6. Fatigue/weakness/lethargy: worse, BP very low at times  7. Constipation improved  8. Itching: less, improved, stable  9. Bladder spasms/vaginal pressure; recurrent, likely due to cancer of endometrium disease advancing  10. Episodes of comfort then an exacerbation of pain which requires multiple medical pain med interventions  11. Larry Neth blood in davila catheter     PLAN   Changed to GIP level of care as medication requirements and as needed break through doses have increased, pt is more symptomatic and needing close attention/monitoring and adjustment in meds for better relief of pain. 1. Continue ativan 1mg  three times daily as needed for anxiety  2. Increased low dose ativan 0.5mg to three times daily  3. Continue PCA dilaudid 4mg / hr continuous, PCA dose of 2mg every 6 mts. Pt has to be encouraged to push the PCA button, pt had 35 attempts and 23 delivers of PCA dilaudid last 24 hrs  4.  Increase nurse given dilaudid 4mg IV every 15mts as needed for severe pain, has had 5 doses in last 24 hrs. 5. Continue gabapentin 300mg at bedtime  6. Continue air mattress for her continued comfort  7. Continue clinoril, Cymbalta for adjuvant therapy for bone pain  8. Continue methadone at current levels 10mg every 8 hrs, we discussed increasing dose to 15 mg tid but pt felt comfortable after prn dose of iv dilaudid and feels less pain when supine. Increased pain when sitting up to eat. She did not want the methadone dose increased today  9. Benadryl 25mg oral everfy 4 hrs as needed for itching: took 1 prn benadryl yesterday  10. continue Toradol 30mg IV every 6 hours as needed for severe pain; antiinflammatory. 11. Continue  reposition and turn to side during cleaning/ADL care and to prevent skin breakdown. 15.  and SW to support family needs  15. Disposition:  routine care status when symptoms are stable: SW Ms. Marielos Sanders is assisting pt's mom to complete medicaid application and foundation forms have been submitted for additional resource help. 15.  Asia Aly arranging meeting with Dr. Nancy Valadez Radiologist [facility] and patient regarding a possible procedure to alleviate pain,, CT Scan guided soft tissue ablation, will discuss with patient and family      Prognosis estimated based on 07/02/17 clinical assessment is:   [] Few to Many Hours  [] Hours to Days   [] Few to Many Days   [] Days to Weeks    [x] Few to Many Weeks   [] Weeks to Months   [] Few to Many Months    Communicated plan of care with: Hospice Case Manager;  Hospice IDT; Care Team     GOALS OF CARE     Resuscitation Status: DNR  Durable DNR: [x] Yes [] No    Advance Care Planning 5/10/2017   Patient's Healthcare Decision Maker is: Legal Next of Andrea 69   Primary Decision Maker Name Tennille Espinoza   Primary Decision Maker Phone Number -   Primary Decision Maker Relationship to Patient Parent   Confirm Advance Directive Yes, on file   Patient Would Like to Complete Advance Directive -        HISTORY     History obtained from: chart, staff    CHIEF COMPLAINT: pain/pressure in bladder area/vaginal area  The patient is:   [x] Verbal  [] Nonverbal  [] Unresponsive    HPI/SUBJECTIVE:    Pt has been lethargic, still taking medicines and liquids/food ok but has increased pain when sitting up in bed  Reports pain is controlled after iv dilaudid bolus--5/10   Able to sleep  toradol helps relieve the pain per staff  Still using PSA frequently 50 attempts and 24 delivered doses last 24 hrs. REVIEW OF SYSTEMS     The following systems were: [x] reviewed  [] unable to be reviewed    Positive ROS include:  Constitutional: fatigue, weakness  Ears/nose/mouth/throat:   Respiratory:  Gastrointestinal:vaginal pressure sensation  Musculoskeletal:weakness,pain in the right hip  Neurologic: numbness and burning pain  Psychiatric:anxiety, depression  Endocrine:          FUNCTIONAL ASSESSMENT     Palliative Performance Scale (PPS): 30%     PSYCHOSOCIAL/SPIRITUAL ASSESSMENT     Principal Problem:    Endometrial cancer ([address] (7/7/2010)    Active Problems:     Morbid obesity ([address] (7/7/2010)      Bone metastases ([address] (3/10/2014)      Past Medical History:   Diagnosis Date    Cancer ([address]     uterine    CVI (common variable immunodeficiency) ([address]     Diabetes ([address]     Elevated liver function tests 10/21/2010    Endometrial cancer ([address] 7/7/2010    Hypertension     Iron deficiency anemia     Menometrorrhagia 10/21/2010    Microcytic anemia 10/21/2010    Morbid obesity ([address]     Prediabetes 7/7/2010    Psychiatric disorder     depression    Radiation     completed radiation mid-march      Past Surgical History:   Procedure Laterality Date    CARDIAC SURG PROCEDURE UNLIST      hx of tachycardia    HX GYN      hysterectomy      Social History   Substance Use Topics    Smoking status: Never Smoker    Smokeless tobacco: Never Used    Alcohol use Yes     Family History   Problem Relation Age of Onset    Hypertension Mother    [facility] Hypertension Father     Stroke Maternal Grandfather     Cancer Paternal Grandmother      breast    Diabetes Paternal Grandmother       Allergies   Allergen Reactions    Egg Nausea and Vomiting     Raw egg and scrambled eggs. Egg products okay.      Pcn [Penicillins] Nausea and Vomiting     \"but could take amoxil\"    Shellfish Containing Products Unknown (comments)    Tetanus And Diphtheria Toxoids, Adsorbed, Adult Other (comments)     Developed local swelling, axillary pain, and transient fever    Tomato Unknown (comments)      Current Facility-Administered Medications   Medication Dose Route Frequency    ketorolac (TORADOL) injection 30 mg  30 mg IntraVENous Q6H PRN    HYDROmorphone (PF) (DILAUDID) injection 4 mg  4 mg IntraVENous Q15MIN PRN    LORazepam (ATIVAN) tablet 0.5 mg  0.5 mg Oral TIDPC    diphenhydrAMINE (BENADRYL) capsule 25 mg  25 mg Oral Q4H PRN    acetaminophen (TYLENOL) tablet 650 mg  650 mg Oral Q4H PRN    Hydromorphone 500mg/500mL bag [employer]   IntraVENous CONTINUOUS    gabapentin (NEURONTIN) capsule 300 mg  300 mg Oral QHS    LORazepam (ATIVAN) tablet 1 mg  1 mg Oral TID PRN    metoprolol tartrate (LOPRESSOR) tablet 25 mg  25 mg Oral BID    methadone (DOLOPHINE) tablet 10 mg  10 mg Oral Q8H    polyethylene glycol (MIRALAX) packet 17 g  17 g Oral DAILY    magic mouthwash cpd (without sucralfate)  5 mL Oral QID PRN    docusate sodium (COLACE) capsule 100 mg  100 mg Oral DAILY    DULoxetine (CYMBALTA) capsule 60 mg  60 mg Oral QHS    sulindac (CLINORIL) tablet 200 mg  200 mg Oral BID WITH MEALS        PHYSICAL EXAM     Wt Readings from Last 3 Encounters:   05/30/17 267 lb (121.1 kg)   05/10/17 267 lb 10.2 oz (121.4 kg)   02/16/17 300 lb (136.1 kg)       Visit Vitals    /70 (BP 1 Location: Left arm)    Pulse 95    Temp 98.3 °F (36.8 °C)    Resp 16    SpO2 (!) 85%         Supplemental O2  [x] Yes  [] NO  Last bowel movement:     Currently this patient has:  [] Peripheral IV [x] PICC  [] PORT [] ICD    [x] Davila Catheter [] NG Tube   [] PEG Tube    [] Rectal Tube [] Drain  [x] Other: now with a special mattress    Constitutional: pale, laying on her back, awake but appears anxious when she talks, c/o pain/pressure in vaginal area at times, bleeding noted in davila catheter  Eyes: pallor++  ENMT: moist oral mucosa  Cardiovascular: distant heart sounds, tachycardic  Respiratory:  Normal breathing, diminished air entry  Gastrointestinal:  distended and firm, non tender, BS +, pt has a davila's  Musculoskeletal:edema ++ lower extremities right leg>left leg  Skin: warm, dry, some skin irritation in the groin folds, itching   Neurologic:sleepy but can follow commands  Psychiatric: fluctuating mood, anxious affect         Pertinent Lab and or Imaging Tests: Total time: 39 mts            Tim Leslie MD      This patient meets Hospice General Inpatient (GIP) Level of Care.     Supporting documentation for GIP need for pain control:  [x] Frequent evaluation by a doctor, nurse practitioner, nurse   [x] Frequent medication adjustment    [x] IVs that cannot be administered at home   [x] Aggressive pain management   [] Complicated technical delivery of medications              Supporting documentation for GIP need for symptom control:  []  Sudden decline necessitating intensive nursing intervention  []  Uncontrolled / intractable nausea or vomiting   []  Pathological fractures  []  Advanced open wounds requiring frequent skilled care  [] Unmanageable respiratory distress  [] New or worsening delirium   [] Delirium with behavior issues  [] Imminent death  with skilled nursing needs documented above      Tim Leslie MD

## 2017-07-03 NOTE — HOSPICE
NAME OF PATIENT:  Nadja Baptiste    LEVEL OF CARE:  GIP    REASON FOR GIP:   Pain, despite numerous changes in medications, Medication adjustment that must be monitored 24/7 and Stabilizing treatment that cannot take place at home    *PATIENT REMAINS ELIGIBLE FOR GIP LEVEL OF CARE AS EVIDENCED BY: (MUST BE ADDRESSED OF PATIENT GIP) pt is receiving PCA Dilaudid as well as receiving additional doses,also IVP Torodol for pain management      REASON FOR RESPITE:      O2 SAFETY:  Concentrator positioning (6\" from furniture/drapes), Tanks stored in [place] , No petroleum based products on face while oxygen in use and Oxygen sign on the door    FALL INTERVENTIONS PROVIDED:   Implemented/recommended use of non-skid footwear, Implemented/recommended use of fall risk identification flag to all team members, Implemented/recommended assistive devices and encouraged their use, Implemented/recommended resources for alarm system (personal alarm, bed alarm, call bell, etc.)  and Implemented/recommended environmental changes (remove hazards, lower bed, improve lighting, etc.)    INTERDISPLINARY COMMUNICATION/COLLABORATION:  Physician, MSW, [place] and RN, CNA    NEW MEDICATION INITIATION DOCUMENTATION:  N/a    Reason medication is being initiated:      MD / Provider name consulted re: change in status / initiation of new medication:      New Symptom(s):      New Order(s):     Name of the person notified of the changes:      Name of person being taught:      Instructions given:      Side Effects taught:      Response to teachin [address] free  Is Patient/family satisfied with symptom level?  yes    DISCHARGE PLAN:  LTC once sx managed    20:00,report received,assumed care of pt who is GIP for pain,hospice dx is metastatic endometrial cancer. Pt states pain is 7/10. Torodol IVP given for generalized pain. PCA Dilaudid continues,basal plus PCA doses. 22:00,asleep,no changes.   01:20,awake,asking for pain med,has only pushed PCA 6 x since 1900,encouraged her to push it,Torodol and Ativan given for comfort. 04:30,remains asleep.  06:00,Benadryl 25mg given for itching. PCA cleared,total for past 12 hr=59mg,attempts=10,doses given =8.

## 2017-07-03 NOTE — PROGRESS NOTES
[address] [facility] Help to Those in Need  [phone]    Patient Name: Torin Joe  YOB: 1980    Date of Provider Hospice Visit: 07/03/17    Level of Care:   [x] General Inpatient (GIP)    [] Routine   [] Respite    Location of Care:  [] [facility] [] [facility] [] [facility] [] [facility] [x] [facility]ice [facility]  [] Home [] Other:      Date of Original Hospice Admission: 5-16-17  Hospice Medical Director for Inpatient Care: Dr. Anntete Esparza Diagnosis:  Metastatic Endometrial Adenocarcinoma       HOSPICE DIAGNOSES   Active Symptoms:  1. Generalised pain, worse especially right lower extremity, lower back, especially with movements, rates 5/10   2. Delirium; Fluctuates, mild confusion at times, improved for most part, none recent  3. Shortness of breath with low O2 sats: improves with use of O2  4. Anxiety/depression; still an issue, persists  5. Insomnia; less, spending more time sleeping   6. Fatigue/weakness/lethargy: worse, BP very low at times  7. Constipation improved  8. Itching: less, improved, stable  9. Bladder spasms/vaginal pressure; recurrent, likely due to cancer of endometrium disease advancing  10. Episodes of comfort then an exacerbation of pain which requires multiple medical pain med interventions  11. Imelda Donnelly blood in davila catheter     PLAN   Continue  GIP level of care as medication requirements and as needed break through doses have increased, pt is more symptomatic and needing close attention/monitoring and adjustment in meds for better relief of pain. 1. Continue ativan 1mg  three times daily as needed for anxiety  2. Increase low dose ativan 0.5mg to three times daily  3. Increase PCA dilaudid 4mg / hr continuous, PCA dose of 2mg every 6 mts. Pt has to be encouraged to push the PCA button, pt had 21 attempts and 16 delivers of PCA dilaudid on shift prior to this  4.  Increase nurse given dilaudid 4mg IV every 15mts as needed for severe pain, has had 8 doses for break through  in 24 hours   5. Continue gabapentin 300mg at bedtime  6. Continue air mattress for her continued comfort  7. Continue clinoril, Cymbalta for adjuvant therapy for bone pain  8. Continue methadone at current levels 10mg every 8 hrs  9. Benadryl 25mg oral everfy 4 hrs as needed for itching: took 1 prn benadryl yesterday  10. continue Toradol 30mg IV every 6 hours as needed for severe pain; antiinflammatory. Has had 5 doses in 24 hours  11. Continue  reposition and turn to side during cleaning/ADL care and to prevent skin breakdown. 15.  and SW to support family needs  15. Disposition:  routine care status when symptoms are stable: SW Ms. Anthony Paris is assisting pt's mom to complete medicaid application and foundation forms have been submitted for additional resource help. 15.  Anthony ERAZO arranging meeting with Dr. Erik Santos Radiologist [facility] and patient regarding a possible procedure to alleviate pain,, CT Scan guided soft tissue ablation, will discuss with patient and family      Prognosis estimated based on 07/03/17 clinical assessment is:   [] Few to Many Hours  [] Hours to Days   [] Few to Many Days   [] Days to Weeks    [x] Few to Many Weeks   [] Weeks to Months   [] Few to Many Months    Communicated plan of care with: Hospice Case Manager;  Hospice IDT; Care Team     GOALS OF CARE     Resuscitation Status: DNR  Durable DNR: [x] Yes [] No    Advance Care Planning 5/10/2017   Patient's Healthcare Decision Maker is: Legal Next of Andrea Velarde   Primary Decision Maker Name Juanoscar Vinay   Primary Decision Maker Phone Number -   Primary Decision Maker Relationship to Patient Parent   Confirm Advance Directive Yes, on file   Patient Would Like to Complete Advance Directive -        HISTORY     History obtained from: chart, staff    CHIEF COMPLAINT: pain/pressure in bladder area/vaginal area  The patient is:   [x] Verbal  [] Nonverbal  [] Unresponsive    HPI/SUBJECTIVE:  Pt is little more lethargic today and c/o pain 5/10 all over, talks about pressure sensation in her vaginal area and feels her tumor disease is increasing. Pt asking for more pain meds. Also is quite anxious. Staff reports increased pain with movements and with ADL care, pt still requiring 2-3 staff members assisting her in cleaning etc.   Pt has taken several prn on all meds; ativan, dilaudid, benadryl. PCA use; 10 given out of 13 attempts in dayshift and 6 given out of 9 overnight. REVIEW OF SYSTEMS     The following systems were: [x] reviewed  [] unable to be reviewed    Positive ROS include:  Constitutional: fatigue, weakness  Ears/nose/mouth/throat:   Respiratory:  Gastrointestinal:vaginal pressure sensation  Musculoskeletal:weakness,pain in the right hip  Neurologic: numbness and burning pain  Psychiatric:anxiety, depression  Endocrine:          FUNCTIONAL ASSESSMENT     Palliative Performance Scale (PPS): 30%     PSYCHOSOCIAL/SPIRITUAL ASSESSMENT     Principal Problem:    Endometrial cancer ([address] (7/7/2010)    Active Problems:     Morbid obesity ([address] (7/7/2010)      Bone metastases ([address] (3/10/2014)      Past Medical History:   Diagnosis Date    Cancer ([address]     uterine    CVI (common variable immunodeficiency) ([address]     Diabetes ([address]     Elevated liver function tests 10/21/2010    Endometrial cancer ([address] 7/7/2010    Hypertension     Iron deficiency anemia     Menometrorrhagia 10/21/2010    Microcytic anemia 10/21/2010    Morbid obesity ([address]     Prediabetes 7/7/2010    Psychiatric disorder     depression    Radiation     completed radiation mid-march      Past Surgical History:   Procedure Laterality Date    CARDIAC SURG PROCEDURE UNLIST      hx of tachycardia    HX GYN      hysterectomy      Social History   Substance Use Topics    Smoking status: Never Smoker    Smokeless tobacco: Never Used    Alcohol use Yes     Family History   Problem Relation Age of Onset    Hypertension Mother     Hypertension Father     Stroke Maternal Grandfather     Cancer Paternal Grandmother      breast    Diabetes Paternal Grandmother       Allergies   Allergen Reactions    Egg Nausea and Vomiting     Raw egg and scrambled eggs. Egg products okay.      Pcn [Penicillins] Nausea and Vomiting     \"but could take amoxil\"    Shellfish Containing Products Unknown (comments)    Tetanus And Diphtheria Toxoids, Adsorbed, Adult Other (comments)     Developed local swelling, axillary pain, and transient fever    Tomato Unknown (comments)      Current Facility-Administered Medications   Medication Dose Route Frequency    ketorolac (TORADOL) injection 30 mg  30 mg IntraVENous Q6H PRN    HYDROmorphone (PF) (DILAUDID) injection 4 mg  4 mg IntraVENous Q15MIN PRN    LORazepam (ATIVAN) tablet 0.5 mg  0.5 mg Oral TIDPC    diphenhydrAMINE (BENADRYL) capsule 25 mg  25 mg Oral Q4H PRN    acetaminophen (TYLENOL) tablet 650 mg  650 mg Oral Q4H PRN    Hydromorphone 500mg/500mL bag [employer]   IntraVENous CONTINUOUS    gabapentin (NEURONTIN) capsule 300 mg  300 mg Oral QHS    LORazepam (ATIVAN) tablet 1 mg  1 mg Oral TID PRN    metoprolol tartrate (LOPRESSOR) tablet 25 mg  25 mg Oral BID    methadone (DOLOPHINE) tablet 10 mg  10 mg Oral Q8H    polyethylene glycol (MIRALAX) packet 17 g  17 g Oral DAILY    magic mouthwash cpd (without sucralfate)  5 mL Oral QID PRN    docusate sodium (COLACE) capsule 100 mg  100 mg Oral DAILY    DULoxetine (CYMBALTA) capsule 60 mg  60 mg Oral QHS    sulindac (CLINORIL) tablet 200 mg  200 mg Oral BID WITH MEALS        PHYSICAL EXAM     Wt Readings from Last 3 Encounters:   05/30/17 121.1 kg (267 lb)   05/10/17 121.4 kg (267 lb 10.2 oz)   02/16/17 136.1 kg (300 lb)       Visit Vitals    /70    Pulse (!) 112    Temp 97.6 °F (36.4 °C)    Resp 16    SpO2 (!) 85%         Supplemental O2  [x] Yes  [] NO  Last bowel movement:     Currently this patient has:  [] Peripheral IV [x] PICC  [] PORT [] ICD    [x] Davila Catheter [] NG Tube   [] PEG Tube    [] Rectal Tube [] Drain  [x] Other: now with a special mattress    Constitutional: pale, laying on her back, awake but appears anxious when she talks, c/o pain/pressure in vaginal area at times, bleeding noted in davila catheter  Eyes: pallor++  ENMT: moist oral mucosa  Cardiovascular: distant heart sounds, tachycardic  Respiratory:  Normal breathing, diminished air entry  Gastrointestinal:  distended and firm, non tender, BS +, pt has a davila's  Musculoskeletal:edema ++ lower extremities right leg>left leg  Skin: warm, dry, some skin irritation in the groin folds, itching   Neurologic:sleepy but can follow commands  Psychiatric: fluctuating mood, anxious affect          This patient meets Hospice General Inpatient (GIP) Level of Care.     Supporting documentation for GIP need for pain control:  [x] Frequent evaluation by a doctor, nurse practitioner, nurse   [x] Frequent medication adjustment    [x] IVs that cannot be administered at home   [x] Aggressive pain management   [] Complicated technical delivery of medications                                                                                                                                                                                                                                                 Supporting documentation for GIP need for symptom control:  []  Sudden decline necessitating intensive nursing intervention  []  Uncontrolled / intractable nausea or vomiting   []  Pathological fractures  []  Advanced open wounds requiring frequent skilled care  [] Unmanageable respiratory distress  [] New or worsening delirium   [] Delirium with behavior issues  [] Imminent death  with skilled nursing needs documented above                  Total time: 39 jeramy Navarrete NP

## 2017-07-03 NOTE — PROGRESS NOTES
[address] follow-up Visit  to [facility] pt now on routine status      When I entered the room, the pt was in bed, asleep. I called her name and she opened her eyes briefly and then went back to sleep. She did not appear agitated, nor restless, and did appeared comfortable. I read Darletta Villegas, offered words of comfort, assurance, and spiritual encouragement. I also affirmed the patient's great geovanny and trust in God. I confirmed that I would be continue to visit, if she so desired. She slightly nodded head yes. GOALS:  Continue to visit this pt, her family, and her friends while she is at the [facility] and I am in the facility, to provide pastoral care and spiritual support to assist both the pt and them with coping with this difficult medical situation and decline. Coordinate w/ Luis Vázquez as she assumes primary care for this patient  Coordinate with Home  if/when the pt returns to her mother home. PLAN:  Continue to provide supplemental  support to that being provided by Luis Vázquez. Coordinate with Luis Vázquez as requested. Coordinate with Care Team on POC.  VISIT FREQUENCY:  1 wk 3 starting 6/27 plus 4 prn 21 days.   Steve Flores MTS, BCC

## 2017-07-03 NOTE — PROGRESS NOTES
0700 Report received from Abiola Bond, [employer]. Pt is GIP level of care for pain management. 0815 Pt needs to be repositioned in bed. Medicated prior to activity with Dilaudid 4mg IVP.  0900 Resting in bed with eyes closed. Awakens states pain is 6 or 7. Gemini Shave in to assess. Refuses breakfast.  1100 New PCA Dilaudid bag ordered from [facility] by Balbina Rendon RN. 2 bags will be sent today. 1230 Medicated with Dilaudid IVP prior to changing bed linen. Pt has increased anxiety scheduled Ativan given. 1250 Pt repositioned in bed Linen changed. Pt tolerated fairly. [address] at the bedside. Pt has not eaten at all today, only sips of Pepsi. She offered to go out to get her some food but she declined. Stated her sister is coming soon. 1700 Sister Mena Rolls in to visit, [facility] Pt requesting Dilaudid IVP and Tordal. States she is in too much pain to eat, yet falling asleep after talking. Dilaudid 4mg IVP and  Tordal given for pain she rates as 7 of 10. PCA CADD pump , A/D 11/9, total intake 53mg.       NAME OF PATIENT:  Jyoti Randall    LEVEL OF CARE:  GIP    REASON FOR GIP:   Pain, despite numerous changes in medications, Medication adjustment that must be monitored 24/7 and Stabilizing treatment that cannot take place at home    *PATIENT REMAINS ELIGIBLE FOR GIP LEVEL OF CARE AS EVIDENCED BY: (MUST BE ADDRESSED OF PATIENT GIP) frequent assessment and med adjustments needed to maintain comfort       REASON FOR RESPITE:  na    O2 SAFETY:  na    FALL INTERVENTIONS PROVIDED:   Implemented/recommended use of fall risk identification flag to all team members, Implemented/recommended resources for alarm system (personal alarm, bed alarm, call bell, etc.) , Implemented/recommended environmental changes (remove hazards, lower bed, improve lighting, etc.) and Implemented/recommended increased supervision/assistance    INTERDISPLINARY COMMUNICATION/COLLABORATION:  Physician, MSW, [place] and RN, CNA    NEW MEDICATION INITIATION DOCUMENTATION:  Documentation completed in Clinical Note in [address] [facility]    Reason medication is being initiated:  na    MD / Provider name consulted re: change in status / initiation of new medication: Joslyn Hernandez NP    New Symptom(s):  na    New Order(s):  na  Name of the person notified of the changes:  na    Name of person being taught:  na    Instructions given:  na    Side Effects taught:  na    Response to teaching:  na      COMFORTABLE DYING MEASURE:  Is Patient/family satisfied with symptom level?   Yes    DISCHARGE PLAN:  Discharge to home when symptoms are managed

## 2017-07-04 NOTE — PROGRESS NOTES
1900: Shift report received from [facility], [facility].  2100: Pt in bed, appears tired, opens eyes to voice and answers questions appropriately but seems drowsy. Pts sister coming in at this time to visit. Pt does not voice any kind of distress at this time. 2150: Pt in bed, 2 visitors at the bedside. Pts lungs clear, bowel sounds audible. Extremities cool. Pt rates pain at 6/10 at this time. Medicated with scheduled meds. Pt asked for Senna for constipation. Pt states she feels as if she didn't have a successfully and complete BM even though she had some loose stools today. Will call Celestino NP on call to ask for PRN order for Senna. 2155: Unable to reach Celestino at this time. 2300: Thalia Moura NP was called and order was obtained for Senna once daily at a time of pts choice. 0200: Pt sleeping. 0430: Pt sleeping. 3842: pt woken up, medicated with scheduled meds. Pt requested Miralax and Senna at this time. Also medicated with PRN Toradol for pain rated at 6/10. Pump checked, Dilaudid IV expires 7/10/17. Pump cleared at this time. NAME OF PATIENT:  Jyoti Randall    LEVEL OF CARE:  GIP    REASON FOR GIP:   Pain, despite numerous changes in medications and Stabilizing treatment that cannot take place at home    *PATIENT REMAINS ELIGIBLE FOR GIP LEVEL OF CARE AS EVIDENCED BY: (MUST BE ADDRESSED OF PATIENT GIP)  Pt continuous to utilize multiple PRN meds for pain at least on one shift a day. Pt rates her pain at 6/10.       REASON FOR RESPITE:  n/a    O2 SAFETY:  n/a    FALL INTERVENTIONS PROVIDED:   Implemented/recommended resources for alarm system (personal alarm, bed alarm, call bell, etc.)     INTERDISPLINARY COMMUNICATION/COLLABORATION:  Physician, MSW, [place] and RN, CNA    NEW MEDICATION INITIATION DOCUMENTATION:  Consulted AT MD to report change in pt status, Obtained Order from Provider for initiation of symptom relief medication /other medication needed and Documentation completed in Clinical Note in [address]    Reason medication is being initiated:  Pt states she feels constipated    MD / Provider name consulted re: change in status / initiation of new medication:  LAURA Freire    New Symptom(s):  obstipation    New Order(s):  Senna-docusate 8.6-50 mg, 2 tablets once daily at time of pts choosing     Name of the person notified of the changes:  Jakob    Name of person being taught:  Pt. Instructions given:  Pt has taken this medication before     COMFORTABLE DYING MEASURE:  Is Patient/family satisfied with symptom level?  yes    DISCHARGE PLAN:  It is being discussed at this time. Alex Hopper will remain at [facility] until symptoms are managed.

## 2017-07-04 NOTE — PROGRESS NOTES
0700  Report received. 9270  Pt lying in bed, asleep. Pt aroused to verbal stimuli. No co pain at this time. Lungs diminished ut clear. No cough noted. Pt is on RA. + bowel sounds. Last reported Bm was yest.  Jama is draining dark colored urine. Pt has + 2-3 edema in her bilateral lower ext. Dilaudid is infusing into her R PICC. Dressing is clear and intact. Dilaudid res vol is 221.4ml, concentration is 1mg/ml, 4mg/hr with 2mg q 6 mins. Max is 10.    1007  Pt tolerated her Pills without difficulty. No complaints of discomfort at this time. Pt stated she was going back to sleep. 1200  Pt continues to sleep. No facial grimacing at this time. No signs of distress. 1400  Pt continues resting. Rn had to wake up pt to get her to take her meds. Pt easily aroused, took her pills and then was back to sleep. No complaints. 1550  Pt asleep. No facial grimacing. Pt did not arouse to answer her cell phone or the bedside phone. [phone]  Pt's sister in to visit. No complaints at this time. 1748  Pt complained of lower abdominal pain and requested Dilaudid and tramadol. Pt medicated with both. Pt states the pain is a 5/10.    1830  Pt playing in her phone. No facial grimacing at this time. No complaints. 1900  Report given. NAME OF PATIENT:  Tiki Randall    LEVEL OF CARE:  GIP    REASON FOR GIP:   Pain, despite numerous changes in medications, Medication adjustment that must be monitored 24/7 and Stabilizing treatment that cannot take place at home    *PATIENT REMAINS ELIGIBLE FOR GIP LEVEL OF CARE AS EVIDENCED BY: (MUST BE ADDRESSED OF PATIENT GIP)  Pt received IV Tramadol and Dialudid 4mg IV x 1 dose for pain.         O2 SAFETY:  Oxygen sign on the door    FALL INTERVENTIONS PROVIDED:   Implemented/recommended use of fall risk identification flag to all team members    INTERDISPLINARY COMMUNICATION/COLLABORATION:  Physician, MSW, [place] and RN, CNA    NEW MEDICATION INITIATION DOCUMENTATION:  No new medications initiated. COMFORTABLE DYING MEASURE:  Is Patient/family satisfied with symptom level?  yes    DISCHARGE PLAN:  Pt will remain at the [facility] until her family makes alternative living arrangements.

## 2017-07-05 NOTE — PROGRESS NOTES
[address] [facility] Help to Those in Need  [phone]    Patient Name: Palma Diaz  YOB: 1980    Date of Provider Hospice Visit: 07/05/17    Level of Care:   [x] General Inpatient (GIP)    [] Routine   [] Respite    Location of Care:  [] [facility] [] [facility] [] [facility] [] [facility] [x] Hospice [facility]  [] Home [] Other:      Date of Original Hospice Admission: 5-16-17  Hospice Medical Director for Inpatient Care: Dr. Susan Anguiano Diagnosis:  Metastatic Endometrial Adenocarcinoma       HOSPICE DIAGNOSES   Active Symptoms:  1. Generalised pain, worse especially right lower extremity, lower back, especially with movements, rates 3-4/10   2. Delirium;   none recent  3. Anxiety/depression; still an issue, persists, but more controlled  4. Insomnia; less, spending more time sleeping   5. Fatigue/weakness/lethargy: worse, BP very low at times  6. Constipation improved  7. Itching: less, improved, stable  8. Bladder spasms/vaginal pressure; recurrent, likely due to cancer of endometrium disease advancing  9. Episodes of comfort then an exacerbation of pain which requires multiple medical pain med interventions  10. Noreen Rod blood in davila catheter     PLAN   Changed to routine level of care, symptoms managed   1. Continue ativan 1mg  three times daily as needed for anxiety  2. Continue  low dose ativan 0.5mg to three times daily  3. Continue  PCA dilaudid 4mg / hr continuous, PCA dose of 2mg every 6 mts. Pt has to be encouraged to push the PCA button,   4. Continue dilaudid 4mg IV every 15mts as needed for severe pain, has had 5 doses for break through  in 24 hours   5. Continue gabapentin 300mg at bedtime  6. Continue air mattress for her continued comfort  7. Continue clinoril, Cymbalta for adjuvant therapy for bone pain  8. Continue methadone at current levels 10mg every 8 hrs  9.  Benadryl 25mg oral everfy 4 hrs as needed for itching: took 1 prn benadryl yesterday  10. continue Toradol 30mg IV every 6 hours as needed for severe pain; antiinflammatory. Has had 5 doses in 24 hours  11. Continue  reposition and turn to side during cleaning/ADL care and to prevent skin breakdown. 15.  and SW to support family needs  15. Disposition:  routine care status, symptoms are stable: SW Ms. Jalil pillai is assisting pt's mom to complete medicaid application and foundation forms have been submitted for additional resource help. 15.  [place] arranging meeting with Dr. Scott Kincaid Radiologist [facility] and patient regarding a possible procedure to alleviate pain,, CT Scan guided soft tissue ablation, will discuss with patient and family      Prognosis estimated based on 07/05/17 clinical assessment is:   [] Few to Many Hours  [] Hours to Days   [] Few to Many Days   [] Days to Weeks    [x] Few to Many Weeks   [] Weeks to Months   [] Few to Many Months    Communicated plan of care with: Hospice Case Manager; Hospice IDT; Care Team     GOALS OF CARE     Resuscitation Status: DNR  Durable DNR: [x] Yes [] No    Advance Care Planning 5/10/2017   Patient's Healthcare Decision Maker is: Legal Next of Andrea 69   Primary Decision Maker Name Cheo Valdez   Primary Decision Maker Phone Number -   Primary Decision Maker Relationship to Patient Parent   Confirm Advance Directive Yes, on file   Patient Would Like to Complete Advance Directive -        HISTORY     History obtained from: chart, staff    CHIEF COMPLAINT: pain/pressure in bladder area/vaginal area  The patient is:   [x] Verbal  [] Nonverbal  [] Unresponsive    HPI/SUBJECTIVE:  Pt is lethargic today and c/o pain 3-4/10 all over, talks about pressure sensation in her vaginal area and feels her tumor disease is increasing. Pt asking for more pain meds. Also is quite anxious.    Staff reports increased pain with movements and with ADL care, pt still requiring 2-3 staff members assisting her in cleaning etc.   Pt has taken several prn on all meds; ativan, dilaudid, benadryl. PCA use; 10 given out of 13 attempts in dayshift and 6 given out of 9 overnight. REVIEW OF SYSTEMS     The following systems were: [x] reviewed  [] unable to be reviewed    Positive ROS include:  Constitutional: fatigue, weakness  Ears/nose/mouth/throat:   Respiratory:  Gastrointestinal:vaginal pressure sensation  Musculoskeletal:weakness,pain in the right hip  Neurologic: numbness and burning pain  Psychiatric:anxiety, depression  Endocrine:          FUNCTIONAL ASSESSMENT     Palliative Performance Scale (PPS): 30%     PSYCHOSOCIAL/SPIRITUAL ASSESSMENT     Principal Problem:    Endometrial cancer ([address] (7/7/2010)    Active Problems: Morbid obesity ([address] (7/7/2010)      Bone metastases ([address] (3/10/2014)      Past Medical History:   Diagnosis Date    Cancer ([address]     uterine    CVI (common variable immunodeficiency) ([address]     Diabetes ([address] [address] [facility] [address]     Elevated liver function tests 10/21/2010    Endometrial cancer ([address] 7/7/2010    Hypertension     Iron deficiency anemia     Menometrorrhagia 10/21/2010    Microcytic anemia 10/21/2010    Morbid obesity ([address]     Prediabetes 7/7/2010    Psychiatric disorder     depression    Radiation     completed radiation mid-march      Past Surgical History:   Procedure Laterality Date    CARDIAC SURG PROCEDURE UNLIST      hx of tachycardia    HX GYN      hysterectomy      Social History   Substance Use Topics    Smoking status: Never Smoker    Smokeless tobacco: Never Used    Alcohol use Yes     Family History   Problem Relation Age of Onset    Hypertension Mother     Hypertension Father     Stroke Maternal Grandfather     Cancer Paternal Grandmother      breast    Diabetes Paternal Grandmother       Allergies   Allergen Reactions    Egg Nausea and Vomiting     Raw egg and scrambled eggs. Egg products okay.      Pcn [Penicillins] Nausea and Vomiting     \"but could take amoxil\"    Shellfish Containing Products Unknown (comments)    Tetanus And Diphtheria Toxoids, Adsorbed, Adult Other (comments)     Developed local swelling, axillary pain, and transient fever    Tomato Unknown (comments)      Current Facility-Administered Medications   Medication Dose Route Frequency    senna-docusate (PERICOLACE) 8.6-50 mg per tablet 2 Tab  2 Tab Oral DAILY    ketorolac (TORADOL) injection 30 mg  30 mg IntraVENous Q6H PRN    HYDROmorphone (PF) (DILAUDID) injection 4 mg  4 mg IntraVENous Q15MIN PRN    LORazepam (ATIVAN) tablet 0.5 mg  0.5 mg Oral TIDPC    diphenhydrAMINE (BENADRYL) capsule 25 mg  25 mg Oral Q4H PRN    acetaminophen (TYLENOL) tablet 650 mg  650 mg Oral Q4H PRN    Hydromorphone 500mg/500mL bag [facility]   IntraVENous CONTINUOUS    gabapentin (NEURONTIN) capsule 300 mg  300 mg Oral QHS    LORazepam (ATIVAN) tablet 1 mg  1 mg Oral TID PRN    metoprolol tartrate (LOPRESSOR) tablet 25 mg  25 mg Oral BID    methadone (DOLOPHINE) tablet 10 mg  10 mg Oral Q8H    polyethylene glycol (MIRALAX) packet 17 g  17 g Oral DAILY    magic mouthwash cpd (without sucralfate)  5 mL Oral QID PRN    DULoxetine (CYMBALTA) capsule 60 mg  60 mg Oral QHS    sulindac (CLINORIL) tablet 200 mg  200 mg Oral BID WITH MEALS        PHYSICAL EXAM     Wt Readings from Last 3 Encounters:   05/30/17 121.1 kg (267 lb)   05/10/17 121.4 kg (267 lb 10.2 oz)   02/16/17 136.1 kg (300 lb)       Visit Vitals    BP 90/62 (BP 1 Location: Left arm, BP Patient Position: At rest)    Pulse (!) 112    Temp 97.6 °F (36.4 °C)    Resp 20    SpO2 (!) 86%         Supplemental O2  [x] Yes  [] NO  Last bowel movement:     Currently this patient has:  [] Peripheral IV [x] PICC  [] PORT [] ICD    [x] Jama Catheter [] NG Tube   [] PEG Tube    [] Rectal Tube [] Drain  [x] Other: now with a special mattress    Constitutional: pale, laying on her back, awake but appears anxious when she talks, c/o pain/pressure in vaginal area at times, bleeding noted in davila catheter  Eyes: pallor++  ENMT: moist oral mucosa  Cardiovascular: distant heart sounds, tachycardic  Respiratory:  Normal breathing, diminished air entry  Gastrointestinal:  distended and firm, non tender, BS +, pt has a davila's  Musculoskeletal:edema ++ lower extremities right leg>left leg  Skin: warm, dry, some skin irritation in the groin folds, itching   Neurologic:sleepy but can follow commands  Psychiatric: fluctuating mood, anxious affect          This patient meets Hospice General Inpatient (GIP) Level of Care.     Supporting documentation for GIP need for pain control:  [x] Frequent evaluation by a doctor, nurse practitioner, nurse   [x] Frequent medication adjustment    [x] IVs that cannot be administered at home   [x] Aggressive pain management   [] Complicated technical delivery of medications                                                                                                                                                                                                                                                 Supporting documentation for GIP need for symptom control:  []  Sudden decline necessitating intensive nursing intervention  []  Uncontrolled / intractable nausea or vomiting   []  Pathological fractures  []  Advanced open wounds requiring frequent skilled care  [] Unmanageable respiratory distress  [] New or worsening delirium   [] Delirium with behavior issues  [] Imminent death  with skilled nursing needs documented above                  Total time: 39 mts           Effie Jensen NP

## 2017-07-06 NOTE — PROGRESS NOTES
1900: Shift report received from [place], [address]: Pt in bed, alert and oriented. Pt reports to breakthrough pain at this time and wants her dinner to be reheated. 2100: Pt ate approx. 70% of a [employer] meal. Lungs clear, bowel sounds audible. Skin warm and skin intact. Pt medicated with scheduled meds. 2130: Lotion and powder applied to pts back and abdomen. 0215: New bag of Dilaudid hung. Pt requests Toradol for pain. It was explained to her that the 6 hours in between doses have not been reached. She asked for Tylenol PRN instead and was medicated. 0530: Pt was awake upon entering the room, medicated with scheduled pain med. No complaints of breakthrough pain or need for further actions at this time. Pump cleared at this time. NAME OF PATIENT:  Jyoti Randall    LEVEL OF CARE:  Routine     REASON FOR GIP:   n/a    *PATIENT REMAINS ELIGIBLE FOR [facility] LEVEL OF CARE AS EVIDENCED BY: (MUST BE ADDRESSED OF PATIENT GIP)      REASON FOR RESPITE:  n/a    O2 SAFETY:  Concentrator positioning (6\" from furniture/drapes), No petroleum based products on face while oxygen in use and Oxygen sign on the door    FALL INTERVENTIONS PROVIDED:   Implemented/recommended resources for alarm system (personal alarm, bed alarm, call bell, etc.)     INTERDISPLINARY COMMUNICATION/COLLABORATION:  Physician, MSW, [place] and RN, CNA    NEW MEDICATION INITIATION DOCUMENTATION:  no new orders at this time    COMFORTABLE DYING MEASURE:  Is Patient/family satisfied with symptom level?  yes    DISCHARGE PLAN:  Pt just got changed from GIP to Routine Level of Care and will stay at the [facility] until pt/family decides on a different placement.

## 2017-07-06 NOTE — PROGRESS NOTES
0700  Report received.   7464  Pt lying in bed, asleep. Pt arouses to verbal stimuli. Pt states her pain is good this am.  No additional medications requested. Lungs diminished, but clear.  + bowel sounds. Last BM was yest.  Jama is draining yellowish cloudy urine w sediment. Pt has +3 edema in her R leg and +2 in her left. Pt has a Right UA PICC. Dressing is clear and intact. CADD pump is infusing Dilaudid 4mg/hr with 2mg q 6 mins. Max 10. Res vol 470.7ml. 900  Pt asleep. 0945  Pt refused breakfast today. Pt stated she was not hungry. 1200  Pt continues to sleep. No facial grimacing, no complaints of pain. 1400  Rn aroused pt to take her scheduled meds. Pt stated she did not sleep well last night. No complaints at this time. 1700  Pt sleeping. No complaints at this time. 1800  Pt had a BM. RN and CNA turned, repositioned and cleaned up Ms Jamie Mondragon. Pt tolerated the procedure well. 1900  Report given. Pt's sister is at the bedside. NAME OF PATIENT:  Briana Roger    LEVEL OF CARE:  Routine    O2 SAFETY:  Oxygen sign on the door    FALL INTERVENTIONS PROVIDED:   Pt is not a fall risk. She is unable to turn herself. INTERDISPLINARY COMMUNICATION/COLLABORATION:  Physician, [place], [place] and RN, CNA    NEW MEDICATION INITIATION DOCUMENTATION:  No new medications initiated. COMFORTABLE DYING MEASURE:  Is Patient/family satisfied with symptom level?  yes    DISCHARGE PLAN:  Pt will remain at the [facility] until her family makes alternative living arrangements.

## 2017-07-07 NOTE — HOSPICE
NAME OF PATIENT:  Jyoti Randall    LEVEL OF CARE:  Routine    REASON FOR GIP:       *PATIENT REMAINS ELIGIBLE FOR GIP LEVEL OF CARE AS EVIDENCED BY: (MUST BE ADDRESSED OF PATIENT GIP)      REASON FOR RESPITE:      O2 SAFETY:      FALL INTERVENTIONS PROVIDED:   Implemented/recommended use of non-skid footwear, Implemented/recommended use of fall risk identification flag to all team members, Implemented/recommended assistive devices and encouraged their use, Implemented/recommended resources for alarm system (personal alarm, bed alarm, call bell, etc.)  and Implemented/recommended environmental changes (remove hazards, lower bed, improve lighting, etc.)    INTERDISPLINARY COMMUNICATION/COLLABORATION:  Physician, MSW, [place] and RN, CNA    NEW MEDICATION INITIATION DOCUMENTATION:      Reason medication is being initiated:      MD / Provider name consulted re: change in status / initiation of new medication:      New Symptom(s):      New Order(s):      Name of the person notified of the changes:      Name of person being taught:      Instructions given:      Side Effects taught:      Response to teachin [address] free  Is Patient/family satisfied with symptom level?  yes    DISCHARGE PLAN: Pt will remain @ [facility] until family makes alternative living arrangements. 20:00,report received,assumed care of pt who is now routine care,hospice dx is Metastatic endometrial cancer. ,pt requests additional Dilaudid and Torodol for pain level 6/10. Both were given for comfort. 21:00,pt has periods of confusion. 23:00,comfortable now. Friend is sleeping in room. 00:20,pt called and said she had a BM,I pre-medicated her with Dilaudid 4mg IVP for pain. Multiple lg hard pieces of stool were noted. 01:02,pt asking for more pain med,Dilaudid 4mg IVP given for rectal pain. 01:30,pt very upset and crying,wanted heating pad to lie on. Medicated with Torodol and Ativan for comfort.   02:30,requesting more pain med,Dilaudid 4mg IVP given,crying out,emotional support given. 04:00,called out for more pain med,Dilaudid 4mg IVP given for continued rectal discomfort. 05:00,asleep!  06:00,requesting pain med,Dilaudid 4mg IVP given. Totals for shift=55 attempts,27 given,total of 87.2 mg in addition to 24mg prn doses=111mg since 2000.

## 2017-07-07 NOTE — ROUTINE PROCESS
0715:  Verbal shift change report given to Himanshu Mc RN (oncoming nurse) by Israel Spencer RN (offgoing nurse). Report included the following information SBAR, Kardex, Intake/Output and MAR.   0830:  Administered scheduled medications (see MAR); patient very lethargic and O2 level at 88%/RA. Asked patient if she was feeling SOB and she stated no. Patient agreed to put on O2 @ 2L. Will reassess. 0945:  Patient called out for repositioning and pain & anxiety medication; patient stated that she did not sleep very well last night. Check O2 level; 91% @ 2L.  1400:  Patient called for assigned nurse requesting pain medication (4/10); administered prn dilaudid 4mg/IV. 1530:  Reassessment of pain; patient sleeping; no s/s of distress/pain. 1820:  Administered prn dilaudid 4mg/IV prior to bathing, cleaning BM, changing linens, and repositioning.     NAME OF PATIENT:  Jyoti Randall    LEVEL OF CARE:  Routine    REASON FOR GIP:   n/a    *PATIENT REMAINS ELIGIBLE FOR GIP LEVEL OF CARE AS EVIDENCED BY: (MUST BE ADDRESSED OF PATIENT GIP)      REASON FOR RESPITE:  n/a    O2 SAFETY:  Concentrator positioning (6\" from furniture/drapes), Tanks stored in rodríguez , No petroleum based products on face while oxygen in use and Oxygen sign on the door    FALL INTERVENTIONS PROVIDED:   Implemented/recommended resources for alarm system (personal alarm, bed alarm, call bell, etc.) , Implemented/recommended environmental changes (remove hazards, lower bed, improve lighting, etc.) and Implemented/recommended increased supervision/assistance    INTERDISPLINARY COMMUNICATION/COLLABORATION:  Physician, MSW, [place] and RN, CNA    NEW MEDICATION INITIATION DOCUMENTATION:  n/a    Reason medication is being initiated:  n/a    MD / Provider name consulted re: change in status / initiation of new medication:  n/a    New Symptom(s):  n/a    New Order(s):  n/a    Name of the person notified of the changes:  n/a    Name of person being taught:  n/a    Instructions given:  n/a    Side Effects taught:  n/a    Response to teaching:  n/a      COMFORTABLE DYING MEASURE:  Is Patient/family satisfied with symptom level?  yes    DISCHARGE PLAN:  Pt will remain at the [facility] until her family makes alternative living arrangements.

## 2017-07-08 NOTE — PROGRESS NOTES
0700  Report received from [facility]. 0750  Pt sleeping. No facial grimacing at this time. 0830  Pt lying in bed, awake. No complaints of pain at this time. Lungs clear but diminished. No cough noted.  + bowel sounds. Last reported BM was last night. Jama is draining phil concentrated urine. Pt continues to have +3 edema in her right lower ext and +2 in her left. Pt has a right upper arm PICC that is infusing Dilaudid 4mg/hr with 2mg q 6 mins. Res vol is 172.4ml. Dressing is clear and intact. 1000  Pt sleeping. No needs at this time. Ms Terry Daniels did not eat any breakfast.    1230  Pt sleeping. Pt did eat 1 1/2 chicken tenders for lunch. 1400  Pt continues to sleep. No facial grimacing at this time. Pt aroused to take her scheduled meds and then went back to sleep. No needs at this time. 1700 Pt did not want any dinner. She is waiting for her sister to bring her dinner. No complaints. 1850  Pt sitting up in bed eating a burger. No complaints at this time. 1900  Report given      NAME OF PATIENT:  Jyoti Randall    LEVEL OF CARE:  Routine    O2 SAFETY:  Oxygen sign on the door    FALL INTERVENTIONS PROVIDED:   Implemented/recommended assistive devices and encouraged their use    INTERDISPLINARY COMMUNICATION/COLLABORATION:  Physician, MSW, [place] and RN, CNA    NEW MEDICATION INITIATION DOCUMENTATION:  No new medications initiated. COMFORTABLE DYING MEASURE:  Is Patient/family satisfied with symptom level?  yes    DISCHARGE PLAN:  Pt will remain at the [facility] until her family makes alternative living arrangements.

## 2017-07-08 NOTE — PROGRESS NOTES
1900 Report received from [facility]. Pt is Routine level of care. Pt had large soft BM. Cleaned up and bed linen changed. 1940 Pt states pain 8 of 10 after repositioming. Dilaudid 4mg and Tordal 30 mg given IVP. 2000 Sister and a friend at the bedside. Pt eating a taco salad  2030 Pt states she still has pain of 6 and request Dilaudid IVP. Encouraged to push her PCA. She is drowsy and has slurring of speech. She is falling to sleep as we talk. 2100 Family have gone home Pt sleeping. Voices relief of pain. Scheduled hs meds given. 2345 Pt pushing to have a BM. States she is constipated. Requested Prune juice. Given 8 oz with Miralax dose . 0030 Pt states the rectal pressure has subsided. No BM. 1250 Lorazepam 1mg po and Dilaudid 4mg IVP given for pain and anxiety. Lavada Saucer 0100 Pt appears to be asleep. 0245 Continues to rest quietly. 0500 Awake alert. Verbalizes about the possible surgical procedure offered for symptom relief. She is hopeful for relief of pain. 0600 PCA Dilaudid .4, 21 attempts/ 13 administered, 71.5mg administered.         NAME OF PATIENT:  Jyoti Randall    LEVEL OF CARE: Routine    REASON FOR GIP:   na    *PATIENT REMAINS ELIGIBLE FOR GIP LEVEL OF CARE AS EVIDENCED BY: (MUST BE ADDRESSED OF PATIENT GIP)  na    REASON FOR RESPITE:  na    O2 SAFETY:  prn use    FALL INTERVENTIONS PROVIDED:   Implemented/recommended use of fall risk identification flag to all team members, Implemented/recommended resources for alarm system (personal alarm, bed alarm, call bell, etc.) , Implemented/recommended environmental changes (remove hazards, lower bed, improve lighting, etc.) and Implemented/recommended increased supervision/assistance    INTERDISPLINARY COMMUNICATION/COLLABORATION:  Physician, MSW, [place] and RN, CNA    NEW MEDICATION INITIATION DOCUMENTATION:  Documentation completed in Clinical Note in [facility]    Reason medication is being initiated:  carmen    MD / Provider name consulted re: change in status / initiation of new medication:  na    New Symptom(s):  na    New Order(s):  na    Name of the person notified of the changes:  na    Name of person being taught:  na    Instructions given:  na    Side Effects taught: na    Response to teaching:  na      COMFORTABLE DYING MEASURE:  Is Patient/family satisfied with symptom level? Yes    DISCHARGE PLAN: Long term arrangements being made by family.

## 2017-07-09 NOTE — PROGRESS NOTES
NAME OF PATIENT:  Jyoti Randall    LEVEL OF CARE:  Routine    REASON FOR GIP:   n/a    *PATIENT REMAINS ELIGIBLE FOR GIP LEVEL OF CARE AS EVIDENCED BY: (MUST BE ADDRESSED OF PATIENT GIP)      REASON FOR RESPITE:  n/a    O2 SAFETY:  room air    FALL INTERVENTIONS PROVIDED:   Implemented/recommended use of non-skid footwear, Implemented/recommended use of fall risk identification flag to all team members, Implemented/recommended assistive devices and encouraged their use, Implemented/recommended resources for alarm system (personal alarm, bed alarm, call bell, etc.) , Implemented/recommended environmental changes (remove hazards, lower bed, improve lighting, etc.) and Implemented/recommended increased supervision/assistance    INTERDISPLINARY COMMUNICATION/COLLABORATION:  Physician, MSW, [place] and RN, CNA    NEW MEDICATION INITIATION DOCUMENTATION:  Documentation completed in Clinical Note in [facility]    Reason medication is being initiated:  n/a    MD / Provider name consulted re: change in status / initiation of new medication:  n/a    New Symptom(s):  n/a    New Order(s):  n/a    Name of the person notified of the changes:  n/a    Name of person being taught:  n/a    Instructions given:  n/a    Side Effects taught:  n/a    Response to teaching:  n/a      COMFORTABLE DYING MEASURE:  Is Patient/family satisfied with symptom level?  no    DISCHARGE PLAN:  Unsure of d/c plan at this time. Patient goes from GIP to routine LOC.      0700: Report received from Carlos Fall RN to assume patient care. Patient remains routine LOC with a dx of endometrial cancer with bone mets. Reports pain to right leg/hip. AOx3 at this time and in no distress. 0845: Patient medicated with scheduled meds and PRN doses of dilaudid and toradol for pain. PICC dressing changed at this time. Patient tolerated well. 1230: Patient resting with eyes closed. No distress noted. Will continue to monitor.     1845: New dilaudid PCA bag hung at this time.  Patient medicated with PRN doses of dilaudid and toradol IVP for pain

## 2017-07-09 NOTE — PROGRESS NOTES
200 Recd report from [facility], patient remains Routine level of care with Hospice diagnosis Endometrial Cancer with mets to bone, Ct in May reported There is a large mass destroying the right pelvis and proximal right  femur which has significantly enlarged since the prior PET scan. Maximal  dimension is 32.3 x 22.9 x 14.3 cm. On the prior PET/CT, the AP dimension was  10.6 cm and the transverse dimension was 8.7 cm. This mass destroys the right  iliac wing, extends into the sacrum, the right hip, right inferior superior  pubic rami, and extends to involve the left acetabulum, left inferior pubic  ramus and left superior pubic ramus. There is pathologic fracture of the right  proximal femur. Patient is unable to ambulate or turn self in bed. Right hip pain increased with movement. Pain is managed with metadone and dilaudid PCA, patient states pain is managed well at this time  2130 PM meds tolerated without difficulty  2300 Patient up asking for drinks and ice chips denies pain. 0045 Patient asking to check davila if it is connected, all is ok, denies pain. 0200 Resting quietly no complaints. 0400 Patient had large soft BM, josselyn care done with great difficulty due to limited space in bed to turn. 0530 Requested Dilaudid IVP general pain, scheduled methadone given also.   0700 Report to oncoming shift      NAME OF PATIENT:  Jyoti Randall     LEVEL OF CARE:  Routine     O2 SAFETY:  Oxygen sign on the door     FALL INTERVENTIONS PROVIDED:   Implemented/recommended assistive devices and encouraged their use     INTERDISPLINARY COMMUNICATION/COLLABORATION:  Physician, MSW, [place] and RN, CNA     NEW MEDICATION INITIATION DOCUMENTATION:  No new medications initiated.       COMFORTABLE DYING MEASURE:  Is Patient/family satisfied with symptom level?  yes     DISCHARGE PLAN:  Pt will remain at the [facility] until her family makes alternative living arrangements.

## 2017-07-10 NOTE — PROGRESS NOTES
1900 Report received from [facility]. Pt is Routine level of care. 2000 Family and friends at the bedside nad have brought food for the pt. She states she feels like she needs her oxygen. O2 at 2 Liters applied n/c. Sats 99% with O2  2130 Medicated with Tordal and Dilaudid 4mg for c/o pain rated at 5 of 10 in right leg. Friend spending the night with her. at the bedside. 0000 Appears to be asleep.  0200 Pt awake, states she is ok. Rates pain at a 2 to3.  0450 Pt medicated with Tordal and Dilaudid 4mg IVP for c/o pain  5 of 10.  0520 PCA .4, 23 attempts 11 injects, 105 mg given.         NAME OF PATIENT:  Jyoti Randall    LEVEL OF CARE: Routine    REASON FOR GIP:   na    *PATIENT REMAINS ELIGIBLE FOR [facility] LEVEL OF CARE AS EVIDENCED BY: (MUST BE ADDRESSED OF PATIENT GIP)      REASON FOR RESPITE:  na    O2 SAFETY:  Concentrator positioning (6\" from furniture/drapes), Tanks stored in rodríguez , No petroleum based products on face while oxygen in use and Oxygen sign on the door    FALL INTERVENTIONS PROVIDED:   Implemented/recommended use of fall risk identification flag to all team members, Implemented/recommended resources for alarm system (personal alarm, bed alarm, call bell, etc.) , Implemented/recommended environmental changes (remove hazards, lower bed, improve lighting, etc.) and Implemented/recommended increased supervision/assistance    INTERDISPLINARY COMMUNICATION/COLLABORATION:  Physician, MSW, [place] and RN, CNA    NEW MEDICATION INITIATION DOCUMENTATION:  Documentation completed in Clinical Note in [facility]    Reason medication is being initiated: na    MD / Provider name consulted re: change in status / initiation of new medication:  na    New Symptom(s):  na    New Order(s):  na  Name of the person notified of the changes:  na    Name of person being taught:  na    Instructions given:  na    Side Effects taught:  na  Response to teaching:  na      COMFORTABLE DYING MEASURE:  Is Patient/family satisfied with symptom level?   Yes    DISCHARGE PLAN: Long term care plans in progress

## 2017-07-10 NOTE — PROGRESS NOTES
This was a follow-up visit to continue pastoral care support to Karli and her mother, who arrived shortly after I did. Karli has made a decision to move forward with surgery to help decrease her pain level. When I saw her last week she was leaning toward this decision. She noted that if it decreased the pain she might have a better quality of life for the reminder of her life. Today she noted that she was tired over the weekend but started to get  her energy back yesterday. I kept our visit short today so her and her mother could have some alone time. This will be a busy time for her mother as Mr Shaila Chavarria have appointments all week.        Tony Lorenzo., [facility], [address] [place] [address]

## 2017-07-10 NOTE — PROGRESS NOTES
491 [facility] follow-up Visit to [facility] pt now on routine status     When I entered the room, the pt was asleep. I called her name and she opened her eyes and said hello. She did not appear agitated, nor restless, and did appeared comfortable. We spoke for a brief time and I broached the subject of the medical procedure which has been offered. She said she was not certain one way or the other. She said was asking her family and frends their opinios. Which I validated. I then read Scriptures, offered words of comfort, assurance, and spiritual encouragement. A friend of hers came to visit so I excused myself. GOALS:  Continue to visit this pt, her family, and her friends while she is at the [facility] and I am in the facility, to provide pastoral care and spiritual support to assist both the pt and them with coping with this difficult medical situation and decline. Coordinate w/ Luis Vázquez as she assumes primary care for this patient  Coordinate with Home  if/when the pt returns to her mother home. PLAN:  Continue to provide supplemental  support to that being provided by Luis Vázquez. Coordinate with Luis Vázquez as requested. Coordinate with Care Team on POC.  VISIT FREQUENCY:  1 wk 3 starting 6/27 plus 4 prn 21 days.   aNn Arzate, [facility], [facility]  [phone]

## 2017-07-10 NOTE — PROGRESS NOTES
0700  Report received. 0745  Pt lying in bed, awake. Pt reports pain is pretty good today. 3/10. Lungs clear but diminished. No cough noted.  + bowel sounds. Last reported BM was yest.  Jama is draining cloudy phil urine. Pt continues to have +3 edema in her Right LE and +2 in her Left LE.  R  PICC is infusing Dilaudid at 4mg/hr with 2mg q 6 mins. Dressing is clear and intact. 0830  Pt did not eat any breakfast.  Pt sleeping. 1145  Pt continues to rest comfortably. 1300  Pt sleeping. No facial grimacing. 1400  Pt had a BM. Pt turned and repositioned and cleaned up. No complaints at this time. 1500  Pt's Mother at the bedside. No complaints   Rn continued to educate on the changes in Karli. 1700  Pt sleeping. No complaints. 1800 Pt's sister at the bedside. No complaints    1900  Report given. NAME OF PATIENT:  Ashwini De Los Santos    LEVEL OF CARE:  Routine    O2 SAFETY:  RA    FALL INTERVENTIONS PROVIDED:   Pt is not a fall risk. INTERDISPLINARY COMMUNICATION/COLLABORATION:  Physician, MSW, [place] and RN, CNA    NEW MEDICATION INITIATION DOCUMENTATION:  No new medications initiated. COMFORTABLE DYING MEASURE:  Is Patient/family satisfied with symptom level?  yes    DISCHARGE PLAN:  Pt will remain at the [facility] until her family makes alternative living arrangements.

## 2017-07-11 NOTE — PROGRESS NOTES
0715:  Verbal shift change report given to Fabiola Clemente RN (oncoming nurse) by Mercedes Conrad RN (offgoing nurse). Report included the following information SBAR, Kardex, Intake/Output and MAR.   0910:  Administered scheduled medications (see MAR); held metoprolol 25mg d/t low BP (90/60; pulse 84). Patient refused miralax 17g. Encouraged patient to take miralax to prevent constipation/hard BM but she stated that she felt that she did not need it.   1800:  Dr. Sanam Moreau requested a U/A on the patient; here are the results:  Glu: negative  Bilirubin: moderate  Ketones:  Negative  Specific Gravity: 1.020  Blood:  Large (+++)  pH:  7.5  Protein:  300 (+++)  Urobilinogen:  2  Nitrite:  Negative  Leukocytes:  moderate      NAME OF PATIENT:  Jyoti Randall    LEVEL OF CARE:  Routine    REASON FOR GIP:   n/a    *PATIENT REMAINS ELIGIBLE FOR GIP LEVEL OF CARE AS EVIDENCED BY: (MUST BE ADDRESSED OF PATIENT GIP)      REASON FOR RESPITE:  n/a    O2 SAFETY:  Concentrator positioning (6\" from furniture/drapes), Tanks stored in rodríguez , No petroleum based products on face while oxygen in use, Oxygen sign on the door and patient receives oxygen prn.     FALL INTERVENTIONS PROVIDED:   Implemented/recommended resources for alarm system (personal alarm, bed alarm, call bell, etc.) , Implemented/recommended environmental changes (remove hazards, lower bed, improve lighting, etc.) and Implemented/recommended increased supervision/assistance    INTERDISPLINARY COMMUNICATION/COLLABORATION:  Physician, MSW, [place] and RN, CNA    NEW MEDICATION INITIATION DOCUMENTATION:  n/a    Reason medication is being initiated:  n/a    MD / Provider name consulted re: change in status / initiation of new medication:  n/a    New Symptom(s):  n/a    New Order(s):  n/a    Name of the person notified of the changes:  n/a    Name of person being taught:  n/a    Instructions given:  n/a    Side Effects taught:  n/a    Response to teaching: n/a      COMFORTABLE DYING MEASURE:  Is Patient/family satisfied with symptom level?  yes    DISCHARGE PLAN:  Pt will remain at the [facility] until her family makes alternative living arrangements.

## 2017-07-11 NOTE — HOSPICE
NAME OF PATIENT:  Jyoti Randall    LEVEL OF CARE:  Routine    REASON FOR GIP:       *PATIENT REMAINS ELIGIBLE FOR GIP LEVEL OF CARE AS EVIDENCED BY: (MUST BE ADDRESSED OF PATIENT GIP)      REASON FOR RESPITE:      O2 SAFETY:      FALL INTERVENTIONS PROVIDED:   Implemented/recommended use of non-skid footwear, Implemented/recommended use of fall risk identification flag to all team members, Implemented/recommended assistive devices and encouraged their use, Implemented/recommended resources for alarm system (personal alarm, bed alarm, call bell, etc.)  and Implemented/recommended environmental changes (remove hazards, lower bed, improve lighting, etc.)    INTERDISPLINARY COMMUNICATION/COLLABORATION:  Physician, MSW, [place] and RN, CNA    NEW MEDICATION INITIATION DOCUMENTATION:      Reason medication is being initiated:      MD / Provider name consulted re: change in status / initiation of new medication:      New Symptom(s):      New Order(s):      Name of the person notified of the changes:      Name of person being taught:      Instructions given:      Side Effects taught:      Response to teachin [address] free  Is Patient/family satisfied with symptom level?  yes    DISCHARGE PLAN:  Possible LTC placement when pt is approved for Medicaid    20:00,report received,assumed care of pt who is Routine care,hospice dx is metastatic endometrial cancer. Pt has family visiting @ bedside. She voices pain level is 4/10. PCA Dilaudid continues  @ 4mg basal with 2mg every 6 min prn. PCA pump was cleared from previous shift,doses given were 9,attempts 13,total was 76.10mg.

## 2017-07-11 NOTE — HOSPICE
NAME OF PATIENT:  Jyoti Randall    LEVEL OF CARE:  Routine    REASON FOR GIP:       *PATIENT REMAINS ELIGIBLE FOR GIP LEVEL OF CARE AS EVIDENCED BY: (MUST BE ADDRESSED OF PATIENT GIP)      REASON FOR RESPITE:      O2 SAFETY:      FALL INTERVENTIONS PROVIDED:   Implemented/recommended use of non-skid footwear, Implemented/recommended use of fall risk identification flag to all team members, Implemented/recommended assistive devices and encouraged their use, Implemented/recommended resources for alarm system (personal alarm, bed alarm, call bell, etc.)  and Implemented/recommended environmental changes (remove hazards, lower bed, improve lighting, etc.)    INTERDISPLINARY COMMUNICATION/COLLABORATION:  Physician, MSW, [place] and RN, CNA    NEW MEDICATION INITIATION DOCUMENTATION:      Reason medication is being initiated:      MD / Provider name consulted re: change in status / initiation of new medication:      New Symptom(s):      New Order(s):      Name of the person notified of the changes:      Name of person being taught:      Instructions given:      Side Effects taught:      Response to teachin [address] free  Is Patient/family satisfied with symptom level?  yes    DISCHARGE PLAN:  LTC placement when available    20:00,report received,assumed care of pt who is routine care,hospice dx is met.endometrial cancer. Pt is drowsy,states pain level is 4. Family members present. PCA pump cleared from previous shift. Doses given=9,attempsts=13,total dose is 76.10.  23;30,medicated with Ativan and Benadryl per pt request for sleep and itching. 00:30,pt asleep.  04:30,awake,watching TV,\"I'm bored\"the patient asking for pain med,PCA button has only been pushed 2x since 19:30. Pt medicated with Torodol. 21:30,pt asleep.  04:30,awake,watching TV,repositioned in bed,pain level is 3-4.  06:00,PCA attempts =6,doses given =4,total of 49.10mg of Dilaudid. Pt eating a cheeseburger.

## 2017-07-11 NOTE — [facility] IDG NURSE NOTES
Patient: Mikayla Christy    Date: 07/10/17  Time: 10:15 PM    [address] [place] [address] Nurse Notes                                                                                                                            Favorites                                    Pedro Backer        Inbox?(1)? Drafts? [2]? Sent Items      Deleted Items?(3)?         admin    Archive Retention Folders        Junk E-mail      Notes      RSS Feeds      Search Folders        [address] [place] [address] IDT notes- due ward Wayne Copas, Celestino Oquendo    Tuesday, April 18, 2017 4:10 AM                                                        Harjeet Christian. [address] [place], [address] [facility]  W: [phone] F: [phone]    email:  [email or web address] provides good help to those in need at the end of life, embracing dignity, compassion and quality care for all patients, their families and the community. Description: Description: [email or web address]         Good Help to Those in [facility] [address]                                      Renee Pimentel            Actions            To:            Cornell Johnson?; Malcolm Samuels      Cc:            Dottie Prather               Tuesday, September 20, 2016 5:14 PM                          This message was sent with High importance. You forwarded this message on 4/18/2017 4:10 AM.          Himanshu Schwartz and [address],    Please complete the IDT notes tonight on all [facility] patients for tomorrows IDT meeting. Click on the Hospice navigator activity (on the left side of the screen), then click on the IDG tab, then click on Create Note (under Nurse Notes). You can then copy and paste the template below and fill it in. If you have any questions, please contact me. Thank you.     [address]    Patient Name      Episode -     Date of IDT Meeting      UPDATED COMPREHENSIVE ASSESSMENT      ATTENDING Physician                          CLINICAL STATUS         SYMPTOMS         SIGNS         LAB VALUES (when available)         KARNOFSKY          FAST for all dementia         Progression to DEPENDENCE WITH ADLs (include time frame)         PRESSURE ULCERS          DISEASE SPECIFIC         FALL RISK:    PROBLEM:    GOAL:    INTERVENTIONS(INDIVIDUALIZED)                   Nursing Visit Frequency    Hospice Aide Visit Frequency         SW    COPING    GRIEF    ADVANCED DIRECTIVES        BEREAVEMENT    RESOURCES NEEDED    SW Visit Frequency         Bereavement     NO CHANGE         Volunteer    NO VOLUNTEER             SPIRITUAL ISSUES    GRIEF    COPING         AVAILABLE [facility] RESOURCES     Visit Frequency         Clinician Signatures    Nurse______________________________    SW________________________________    Chaplain____________________________    Volunteer Coordinator__________________    Bereavement_________________________             Options             Find Someone                                                                                                  Favorites                                    Anabela Zuniga        Inbox?(1)? Drafts? [2]? Sent Items      Deleted Items?(3)?         admin    Archive Retention Folders        Junk E-mail      Notes      RSS Feeds      Search Folders        [address] Celestino Oquendo    Tuesday, April 18, 2017 4:10 AM                                                        Harjeetkannan Christian. [address] [place], [address] [facility]  W: [phone] F: [phone]    email:  [email or web address] [facility] provides good help to those in need at the end of life, embracing dignity, compassion and quality care for all patients, their families and the community. Description: Description: [email or web address]         Good Help to Those in [address]                                      Renee Pimentel            Actions            To:            Cornell Johnson?; Malcolm Samuels      Cc:            Dottie Prather               Tuesday, September 20, 2016 5:14 PM                          This message was sent with High importance. You forwarded this message on 4/18/2017 4:10 AM.          Himanshu Schwartz and [address],    Please complete the IDT notes tonight on all [facility] patients for tomorrows IDT meeting. Click on the Hospice navigator activity (on the left side of the screen), then click on the IDG tab, then click on Create Note (under Nurse Notes). You can then copy and paste the template below and fill it in. If you have any questions, please contact me. Thank you.     [address]    Patient Name      Episode -     Date of IDT Meeting      UPDATED COMPREHENSIVE ASSESSMENT      ATTENDING Physician                          CLINICAL STATUS         SYMPTOMS         SIGNS         LAB VALUES (when available)         KARNOFSKY          FAST for all dementia         Progression to DEPENDENCE WITH ADLs (include time frame)         PRESSURE ULCERS          DISEASE SPECIFIC         FALL RISK:    PROBLEM:    GOAL:    INTERVENTIONS(INDIVIDUALIZED)                   Nursing Visit Frequency    Hospice Aide Visit Frequency         SW    COPING    GRIEF    ADVANCED DIRECTIVES        BEREAVEMENT    RESOURCES NEEDED    SW Visit Frequency         Bereavement     NO CHANGE         Volunteer    NO VOLUNTEER             SPIRITUAL ISSUES    GRIEF    COPING         AVAILABLE [facility] RESOURCES     Visit Frequency         Clinician Signatures    Nurse______________________________    SW________________________________    Chaplain____________________________    Volunteer Coordinator__________________    Bereavement_________________________             Options             Find Someone                                                                                                  Favorites                                    Anabela Zuniga        Inbox?(1)? Drafts? [2]? Sent Items      Deleted Items?(3)?         admin    Archive Retention Folders        Junk E-mail      Notes      RSS Feeds      Search Folders        [address] [facility]    Patient Name  Aliza Amato    Episode -     Date of IDT Meeting  17    UPDATED COMPREHENSIVE ASSESSMENT      ATTENDING Physician                          CLINICAL STATUS metastatic endometrial cancer         SYMPTOMS generalized pain         SIGNS 97.5-92-16-99%,100/80         LAB VALUES (when available)         KARNOFSKY          FAST for all dementia         Progression to DEPENDENCE WITH ADLs (include time frame) Complete care,unable to do anything for herself except feed herself. It requires 3 people to turn her,change her sheets and clean her after she has a stool. PRESSURE ULCERS left buttocks cheek         DISEASE SPECIFIC pain,immobility from tumor growth and nerve pressure         FALL RISK:low    PROBLEM:pain,immobility    GOAL:pain management    INTERVENTIONS(INDIVIDUALIZED) Pt has an airflow mattress,but I believe that a larger,\"big boy\" bed would be more appropriate. It is very difficult to reposition her. Administration of prn doses of Torodol and Dilaudid,in addition to her Dilaudid PCA pump. The possibility of having a procedure to ablate some tumor or tissue is being explored. Nursing Visit Frequency 24hr    Hospice Aide Visit Frequency 24hr         SW    COPING    GRIEF    ADVANCED DIRECTIVES        BEREAVEMENT    RESOURCES NEEDED    SW Visit Frequency         Bereavement     NO CHANGE         Volunteer    NO VOLUNTEER             SPIRITUAL ISSUES    GRIEF    COPING    [address]     Visit Frequency         Clinician Signatures    Nurse___Haley Hernandez RN___________________________    CHELO________________________________    Chaplain____________________________    Abhilash Rodriguez Coordinator__________________    Bereavement_________________________    Signed by:  Riki Kohli RN

## 2017-07-11 NOTE — [facility] IDG CHAPLAIN NOTES
Patient: Angeles Elliott    Date: 07/11/17  Time: 1:02 PM  SPIRITUAL ISSUES:  I visited the room of this pt at the [facility] who is on Routine Status. She was in bed, semi-awake and not fully alert, not agitated, not restless, and appeared well medicated and comfortable. She was lying on her back and appeared comfortable. I confirmed with her the [place] had continued to visit her and that I would be visiting as well, if she so desired. Her spirits were moderate and her attitude somewhat stoic as she did not wish to come back to the [facility]  Patient and Family use geovanny as a primary coping mechanism but are very stressed and struggling with this long, difficult journey. GOALS:  Continue to visit this pt and her family while she is at the [facility] and I am in the facility, to provide pastoral care and spiritual support to assist both the pt and them with coping with this difficult medical situation and decline. Coordinate w/  Jonny Begum as she assumes primary care for this patient  Coordinate with Chaplain Foster if/when the pt returns to her mothers home. Visit this pt and her family, while she is @ [facility] and I am in this facility, or PRN as requested. Coordinate with Care Team on POC  . Signed by: Rhonda Cortez

## 2017-07-11 NOTE — PROGRESS NOTES
[facility] Help to Those in Need  [phone]    Patient Name: Hamzah Ingram  YOB: 1980    Date of Provider Hospice Visit: 07/11/17    Level of Care:   [] General Inpatient (GIP)    [x] Routine   [] Respite    Location of Care:  [] [facility] [] [facility] [] [facility] [] [facility] [x] Hospice [facility]  [] Home [] Other:      Date of Original Hospice Admission: 5-16-17  Hospice Medical Director for Inpatient Care: Dr. Kai Watkins Diagnosis:  Metastatic Endometrial Adenocarcinoma       HOSPICE DIAGNOSES   Active Symptoms:  1. Generalised pain, worse especially right lower extremity, lower back, especially with movements  2. Delirium;   none recent  3. Anxiety/depression; still an issue, persists, but more controlled  4. Insomnia; less, spending more time sleeping   5. Fatigue/weakness/lethargy: worse, BP very low at times  6. Constipation improved  7. Itching: less, improved, stable  8. Bladder spasms/vaginal pressure; recurrent, likely due to cancer of endometrium disease advancing  9. Episodes of comfort then an exacerbation of pain which requires multiple medical pain med interventions  10. Bloody urine in davila's catheter and bag     PLAN   Continue routine level of care   1. Continue ativan 1mg  three times daily as needed for anxiety  2. Continue  low dose ativan 0.5mg to three times daily  3. Continue  PCA dilaudid 4mg / hr continuous, PCA dose of 2mg every 6 mts. Pt has to be encouraged to push the PCA button,   4. Continue dilaudid 4mg IV every 15mts as needed for severe pain   5. Continue gabapentin 300mg at bedtime  6. Continue air mattress for her continued comfort  7. Continue clinoril, Cymbalta for adjuvant therapy for bone pain  8. Continue methadone at current levels 10mg every 8 hrs  9. Benadryl 25mg oral everfy 4 hrs as needed for itching  10. continue Toradol 30mg IV every 6 hours as needed for severe pain; antiinflammatory.  Pt finds that Toradol gives instant pain relief  11. Reduce dose of metoprolol to 12.5mg twice daily as her BP is on low side and HR in 70-80's; not tachycardic. 12. Check U/A to r/o UTI that may be contributing to increased pain. 13. Continue  reposition and turn to side during cleaning/ADL care and to prevent skin breakdown. 15.  and SW to support family needs  13. Disposition:  routine care status, symptoms are stable: SW Ms. Radha Hutchins is assisting pt's mom to complete medicaid application and foundation forms have been submitted for additional resource help. 12. Spoke with pt and her mother again in presence of chaplain Elli Ames and they feel like they would like to pursue intervention; CT guided soft tissue ablation of tumor. Spoke with Dr. Sander Adrian Radiologist again and he has agreed to do the procedure. He has already spoken to pt/mom once and he offered to speak to them again to answer any further questions. Also spoke to [place] at [facility] and we will checking with [facility] regarding GIP admission/coverage for procedure under hospice. Prognosis estimated based on 07/11/17 clinical assessment is:   [] Few to Many Hours  [] Hours to Days   [] Few to Many Days   [] Days to Weeks    [x] Few to Many Weeks   [] Weeks to Months   [] Few to Many Months    Communicated plan of care with: Hospice Case Manager;  Hospice IDT; Care Team     GOALS OF CARE     Resuscitation Status: DNR  Durable DNR: [x] Yes [] No    Advance Care Planning 5/10/2017   Patient's Healthcare Decision Maker is: Legal Next of Andrea 69   Primary Decision Maker Name Paula Goins   Primary Decision Maker Phone Number -   Primary Decision Maker Relationship to Patient Parent   Confirm Advance Directive Yes, on file   Patient Would Like to Complete Advance Directive -        HISTORY     History obtained from: chart, staff    CHIEF COMPLAINT: pain in lower back and vaginal area  The patient is:   [x] Verbal  [] Nonverbal  [] Unresponsive    HPI/SUBJECTIVE: Pt feels more sleepy today and is little down in spirits. Increased pain with movements today requiring additional dilaudid through PCA and nurse given and Toradol. Also Bp on low side. Considering to do the procedure in hopes for better pain control. REVIEW OF SYSTEMS     The following systems were: [x] reviewed  [] unable to be reviewed    Positive ROS include:  Constitutional: fatigue, weakness  Ears/nose/mouth/throat:   Respiratory:  Gastrointestinal:vaginal pressure sensation  Musculoskeletal:weakness,pain in the right hip  Neurologic: numbness and burning pain  Psychiatric:anxiety, depression  Endocrine:          FUNCTIONAL ASSESSMENT     Palliative Performance Scale (PPS): 30%     PSYCHOSOCIAL/SPIRITUAL ASSESSMENT     Principal Problem:    Endometrial cancer ([address] (7/7/2010)    Active Problems:     Morbid obesity ([address] (7/7/2010)      Bone metastases ([address] (3/10/2014)      Past Medical History:   Diagnosis Date    Cancer ([address]     uterine    CVI (common variable immunodeficiency) ([address]     Diabetes ([address]     Elevated liver function tests 10/21/2010    Endometrial cancer ([address] 7/7/2010    Hypertension     Iron deficiency anemia     Menometrorrhagia 10/21/2010    Microcytic anemia 10/21/2010    Morbid obesity ([address]     Prediabetes 7/7/2010    Psychiatric disorder     depression    Radiation     completed radiation mid-march      Past Surgical History:   Procedure Laterality Date    CARDIAC SURG PROCEDURE UNLIST      hx of tachycardia    HX GYN      hysterectomy      Social History   Substance Use Topics    Smoking status: Never Smoker    Smokeless tobacco: Never Used    Alcohol use Yes     Family History   Problem Relation Age of Onset    Hypertension Mother     Hypertension Father     Stroke Maternal Grandfather     Cancer Paternal Grandmother      breast    Diabetes Paternal Grandmother       Allergies   Allergen Reactions    Egg Nausea and Vomiting     Raw egg and scrambled eggs. Egg products okay.      Pcn [Penicillins] Nausea and Vomiting     \"but could take amoxil\"    Shellfish Containing Products Unknown (comments)    Tetanus And Diphtheria Toxoids, Adsorbed, Adult Other (comments)     Developed local swelling, axillary pain, and transient fever    Tomato Unknown (comments)      Current Facility-Administered Medications   Medication Dose Route Frequency    metoprolol tartrate (LOPRESSOR) tablet 12.5 mg  12.5 mg Oral BID    bisacodyl (DULCOLAX) tablet 5 mg  5 mg Oral DAILY PRN    ketorolac (TORADOL) injection 30 mg  30 mg IntraVENous Q6H PRN    senna-docusate (PERICOLACE) 8.6-50 mg per tablet 2 Tab  2 Tab Oral DAILY    HYDROmorphone (PF) (DILAUDID) injection 4 mg  4 mg IntraVENous Q15MIN PRN    LORazepam (ATIVAN) tablet 0.5 mg  0.5 mg Oral TIDPC    diphenhydrAMINE (BENADRYL) capsule 25 mg  25 mg Oral Q4H PRN    acetaminophen (TYLENOL) tablet 650 mg  650 mg Oral Q4H PRN    Hydromorphone 500mg/500mL bag [facility]   IntraVENous CONTINUOUS    gabapentin (NEURONTIN) capsule 300 mg  300 mg Oral QHS    LORazepam (ATIVAN) tablet 1 mg  1 mg Oral TID PRN    methadone (DOLOPHINE) tablet 10 mg  10 mg Oral Q8H    polyethylene glycol (MIRALAX) packet 17 g  17 g Oral DAILY    magic mouthwash cpd (without sucralfate)  5 mL Oral QID PRN    DULoxetine (CYMBALTA) capsule 60 mg  60 mg Oral QHS    sulindac (CLINORIL) tablet 200 mg  200 mg Oral BID WITH MEALS        PHYSICAL EXAM     Wt Readings from Last 3 Encounters:   05/30/17 121.1 kg (267 lb)   05/10/17 121.4 kg (267 lb 10.2 oz)   02/16/17 136.1 kg (300 lb)       Visit Vitals    BP 90/60 (BP 1 Location: Left arm)    Pulse 84    Temp 96.7 °F (35.9 °C)    Resp 20    SpO2 100%         Supplemental O2  [x] Yes  [] NO  Last bowel movement:     Currently this patient has:  [] Peripheral IV [x] PICC  [] PORT [] ICD    [x] Jama Catheter [] NG Tube   [] PEG Tube    [] Rectal Tube [] Drain  [x] Other: now with a special mattress    Constitutional: pale, laying on her back, awake but appears anxious when she talks, c/o pain/pressure in vagina area.  blood noted in davila catheter bag  Eyes: pallor++  ENMT: moist oral mucosa  Cardiovascular: distant heart sounds, tachycardic  Respiratory:  Normal breathing, diminished air entry  Gastrointestinal:  distended and firm, non tender, BS +, pt has a davila's  Musculoskeletal:edema ++ lower extremities right leg>left leg  Skin: warm, dry, some skin irritation in the groin folds, itching   Neurologic:sleepy but can follow commands  Psychiatric: fluctuating mood, anxious affect          Total time: 35 mts        Jersey Jo MD

## 2017-07-11 NOTE — [facility] IDG MASTER NOTE
[facility]  Date: 07/10/17  Time: 10:51 PM    _______  Patient Name  Cammy Branch  Episode -   Date of IDT Meeting  17  UPDATED COMPREHENSIVE ASSESSMENT    ATTENDING Physician                        CLINICAL STATUS metastatic endometrial cancer     SYMPTOMS generalized pain     SIGNS 97.5-92-16-99%,100/80     LAB VALUES (when available)     KARNOFSKY      FAST for all dementia     Progression to DEPENDENCE WITH ADLs (include time frame) complete care,can only feed herself. It requires at least 3 people to turn,change sheets,clean after stooling. PRESSURE ULCERS Left buttocks     DISEASE SPECIFIC generalized pain,requiring PCA Dilaudid with basal rate of 4 mg and 2mg every 6 minutes,as well as IVP Torodol and prn doses of Dilaudid 3mg IVP,immobility and skin breakdown. FALL RISK:low to moderate  PROBLEM:pain  GOAL:pain management  INTERVENTIONS(INDIVIDUALIZED) Pt needs a \"big boy\" bed to allow for turning,and further prevention of skin breakdown. Continued management of pain.            Nursing Visit Frequency 24 hr  Hospice Aide Visit Frequency 24hr       COPING  GRIEF  ADVANCED DIRECTIVES    BEREAVEMENT  RESOURCES NEEDED  SW Visit Frequency     Bereavement   NO CHANGE     Volunteer  NO VOLUNTEER       SPIRITUAL ISSUES  GRIEF  COPING     AVAILABLE [facility]   Visit Frequency     Clinician Signatures  Nurse____Haley Hernandez RN__________________________  SW________________________________  Chaplain____________________________  Volunteer Coordinator__________________  Bereavement_________________________  Patient Name    Episode -   Date of IDT Meeting    UPDATED COMPREHENSIVE ASSESSMENT    ATTENDING Physician                        CLINICAL STATUS     SYMPTOMS     SIGNS     LAB VALUES (when available)     KARNOFSKY      FAST for all dementia     Progression to DEPENDENCE WITH ADLs (include time frame)     PRESSURE ULCERS      DISEASE SPECIFIC     FALL RISK:  PROBLEM:  GOAL:  INTERVENTIONS(INDIVIDUALIZED)           Nursing Visit Frequency  Hospice Aide Visit Frequency     SW  COPING  GRIEF  ADVANCED DIRECTIVES    BEREAVEMENT  RESOURCES NEEDED  SW Visit Frequency     Bereavement   NO CHANGE     Volunteer  NO VOLUNTEER       SPIRITUAL ISSUES  GRIEF  COPING  [address] RESOURCES   Visit Frequency     Clinician Signatures  Nurse______________________________  SW________________________________  Chaplain____________________________  Volunteer Coordinator__________________  Bereavement_________________________  Patient Name    Episode -   Date of IDT Meeting    UPDATED COMPREHENSIVE ASSESSMENT    ATTENDING Physician                        CLINICAL STATUS     SYMPTOMS     SIGNS     LAB VALUES (when available)     KARNOFSKY      FAST for all dementia     Progression to DEPENDENCE WITH ADLs (include time frame)     PRESSURE ULCERS      DISEASE SPECIFIC     FALL RISK:  PROBLEM:  GOAL:  INTERVENTIONS(INDIVIDUALIZED)           Nursing Visit Frequency  Hospice Aide Visit Frequency     SW  COPING  GRIEF  ADVANCED DIRECTIVES    BEREAVEMENT  RESOURCES NEEDED  SW Visit Frequency     Bereavement   NO CHANGE     Volunteer  NO VOLUNTEER       SPIRITUAL ISSUES  GRIEF  COPING     AVAILABLE [facility] RESOURCES   Visit Frequency     Clinician Signatures  Nurse______________________________  SW________________________________  Chaplain____________________________  Volunteer Coordinator__________________  Bereavement_____________________________________    Patient: Pérez Melendez  Insurance ID: [unfilled]  N: [id number]  CCN:   [place] Claim No. :     Hospice Election Date:   Current Benefit Period:   Current Benefit Period Start Date:     Medical Director:   Hospice Attending Provider:     ___________________    Diagnoses:  Diagnoses of Elevated liver function tests, Chronic neoplasm-related pain, Bone metastases ([facility] [address] Insomnia due to medical condition, Endometrial cancer [facility]), Advance care planning, Morbid obesity, unspecified obesity type, Vaginal pain, and Generalized edema were pertinent to this visit.     Current Medications:    Current Facility-Administered Medications:     ketorolac (TORADOL) injection 30 mg, 30 mg, IntraVENous, Q6H PRN, Jaciel Dc, LAURA, 30 mg at 07/10/17 1351    senna-docusate (PERICOLACE) 8.6-50 mg per tablet 2 Tab, 2 Tab, Oral, DAILY, Cally Lockhart NP, 2 Tab at 07/10/17 0911    HYDROmorphone (PF) (DILAUDID) injection 4 mg, 4 mg, IntraVENous, Q15MIN PRN, Melody Javier MD, 4 mg at 07/10/17 1351    LORazepam (ATIVAN) tablet 0.5 mg, 0.5 mg, Oral, TIDPC, Melody Javier MD, 0.5 mg at 07/10/17 1800    diphenhydrAMINE (BENADRYL) capsule 25 mg, 25 mg, Oral, Q4H PRN, Melody Javier MD, 25 mg at 07/03/17 0604    acetaminophen (TYLENOL) tablet 650 mg, 650 mg, Oral, Q4H PRN, Melody Javier MD, 650 mg at 07/06/17 0229    Hydromorphone 500mg/500mL bag [facility], , IntraVENous, CONTINUOUS, Melody Javier MD    gabapentin (NEURONTIN) capsule 300 mg, 300 mg, Oral, QHS, Cally rFeire NP, 300 mg at 07/10/17 2118    LORazepam (ATIVAN) tablet 1 mg, 1 mg, Oral, TID PRN, Melody Javier MD, 1 mg at 07/08/17 0040    metoprolol tartrate (LOPRESSOR) tablet 25 mg, 25 mg, Oral, BID, Melody Javier MD, 25 mg at 07/10/17 2118    methadone (DOLOPHINE) tablet 10 mg, 10 mg, Oral, Q8H, Cally Freire NP, 10 mg at 07/10/17 2118    polyethylene glycol (MIRALAX) packet 17 g, 17 g, Oral, DAILY, Cally Freire NP, 17 g at 07/08/17 0000    magic mouthwash cpd (without sucralfate), 5 mL, Oral, QID PRN, Yosef Freire NP    DULoxetine (CYMBALTA) capsule 60 mg, 60 mg, Oral, QHS, Cally Freire NP, 60 mg at 07/10/17 2100    sulindac (CLINORIL) tablet 200 mg, 200 mg, Oral, BID WITH MEALS, Cally Freire NP, 200 mg at 07/10/17 1700    Orders:  Orders Placed This Encounter    DIET REGULAR     Standing Status:   Standing     Number of Occurrences:   1    NURSING-MISCELLANEOUS: (see comments) 1. NO admission labs, x-rays or other diagnostic tests, unless pertinent to symptom control . 2. Discontinue ALL prior medications. CONTINUOUS     1. NO admission labs, x-rays or other diagnostic tests, unless pertinent to symptom control . 2. Discontinue ALL prior medications. Standing Status:   Standing     Number of Occurrences:   1     Order Specific Question:   Description of Order:     Answer:   (see comments)    COMFORT MEASURES ONLY     Standing Status:   Standing     Number of Occurrences:   1    VITAL SIGNS     Standing Status:   Standing     Number of Occurrences:   1    NOTIFY PROVIDER: SPECIFY NOTIFY PROVIDER: FOR PAIN, DYSPNEA, AGITATION, OTHER DISTRESS OR NOT RESPONDING TO ORDERED INTERVENTIONS CONTINUOUS Routine     Standing Status:   Standing     Number of Occurrences:   1     Order Specific Question:   Please describe the test or procedure you would like to order. Answer:   NOTIFY PROVIDER: FOR PAIN, DYSPNEA, AGITATION, OTHER DISTRESS OR NOT RESPONDING TO ORDERED INTERVENTIONS    ORAL CARE     Keep mouth moisturized with sponge sticks/toothettes and tap water. Vaseline to lips and nares as needed. Standing Status:   Standing     Number of Occurrences:   1    NURSING-MISCELLANEOUS: PET THERAPY CONTINUOUS     Standing Status:   Standing     Number of Occurrences:   1     Order Specific Question:   Description of Order:     Answer:   PET THERAPY    BEDREST, COMPLETE     Standing Status:   Standing     Number of Occurrences:   1    TURN & POSITION     TURN & POSITION EVERY 6 HOURS - PATIENT MAY REFUSE     Standing Status:   Standing     Number of Occurrences:   1    NURSING-MISCELLANEOUS: Routine level of care change effective 1000 am on 7-5-17. Admit to routine level of care;  Sn visits 2 x weekly with 5 visits PRN; SW visit 1 x monthly;  visit 1 x monthly; CNA daily x 5 days. CONTINUOUS     Standing Status:   Standing     Number of Occurrences:   1     Order Specific Question:   Description of Order:     Answer:   Routine level of care change effective 1000 am on 7-5-17. Admit to routine level of care; Sn visits 2 x weekly with 5 visits PRN; SW visit 1 x monthly;  visit 1 x monthly; CNA daily x 5 days.  DO NOT RESUSCITATE     Standing Status:   Standing     Number of Occurrences:   1    OXYGEN CANNULA Liters per minute: 2; Indications for O2 therapy: OTHER PRN Routine     Oxygen as needed. Adjust for comfort. Discontinue if not contributing to patient comfort. Standing Status:   Standing     Number of Occurrences:   [id number]     Order Specific Question:   Liters per minute:      Answer:   2     Order Specific Question:   Indications for O2 therapy     Answer:   OTHER    DISCONTD: cephALEXin (KEFLEX) capsule 500 mg     Order Specific Question:   Antibiotic Indications     Answer:   Skin and Soft Tissue Infection    DISCONTD: LORazepam (ATIVAN) tablet 1 mg    methadone (DOLOPHINE) tablet 10 mg    polyethylene glycol (MIRALAX) packet 17 g    magic mouthwash cpd (without sucralfate)    DISCONTD: gabapentin (NEURONTIN) capsule 600 mg    DISCONTD: metoprolol tartrate (LOPRESSOR) tablet 50 mg    DISCONTD: docusate sodium (COLACE) capsule 100 mg    DULoxetine (CYMBALTA) capsule 60 mg    sulindac (CLINORIL) tablet 200 mg    DISCONTD: morphine (PF)  mg/30 ml    LORazepam (ATIVAN) tablet 1 mg    DISCONTD: dextrose 5% and 0.9% NaCl infusion    DISCONTD: dextrose 5 % - 0.45% NaCl infusion    metoprolol tartrate (LOPRESSOR) tablet 25 mg    DISCONTD: HYDROmorphone (PF) (DILAUDID) injection 1 mg    DISCONTD: HYDROmorphone (PF) 15 mg/30 ml (DILAUDID) PCA    gabapentin (NEURONTIN) capsule 300 mg    Hydromorphone 500mg/500mL bag [facility]    acetaminophen (TYLENOL) tablet 650 mg    DISCONTD: diphenhydrAMINE (BENADRYL) capsule 50 mg    DISCONTD: HYDROmorphone (PF) (DILAUDID) injection 1.5 mg    DISCONTD: HYDROmorphone (PF) (DILAUDID) injection 3 mg    DISCONTD: LORazepam (ATIVAN) tablet 0.5 mg    ketorolac (TORADOL) injection 30 mg    diphenhydrAMINE (BENADRYL) capsule 25 mg    phenazopyridine (PYRIDIUM) tablet 100 mg    ketorolac (TORADOL) injection 30 mg    HYDROmorphone (PF) (DILAUDID) injection 4 mg    LORazepam (ATIVAN) tablet 0.5 mg    ketorolac (TORADOL) injection 30 mg    senna-docusate (PERICOLACE) 8.6-50 mg per tablet 2 Tab    ketorolac (TORADOL) injection 30 mg    INITIAL PHYSICIAN ORDER: HOSPICE Level Of Care: General; Reason for Admission: Admit for GIP level of care for symptom management of Pain     Standing Status:   Standing     Number of Occurrences:   1     Order Specific Question:   Status     Answer:   Hospice     Order Specific Question:   Level Of Care     Answer:   General     Order Specific Question:   Reason for Admission     Answer:   Admit for GIP level of care for symptom management of Pain     Order Specific Question:   Admitting Physician     Answer:   Robert Garcia     Order Specific Question:   Attending Physician     Answer:   Robert Garcia     Order Specific Question:   Discharge Plan:     Answer:   Home with Home Hospice    INITIAL PHYSICIAN ORDER: HOSPICE Level Of Care: Routine; Reason for Admission: Routine level of care change     Standing Status:   Standing     Number of Occurrences:   1     Order Specific Question:   Status     Answer:   Hospice     Order Specific Question:   Level Of Care     Answer:   Routine     Order Specific Question:   Reason for Admission     Answer:   Routine level of care change     Order Specific Question:   Admitting Physician     Answer:   Robert Garcia     Order Specific Question:   Attending Physician     Answer:   Robert Garcia       Allergies:   Allergies   Allergen Reactions    Egg Nausea and Vomiting Raw egg and scrambled eggs. Egg products okay.  Pcn [Penicillins] Nausea and Vomiting     \"but could take amoxil\"    Shellfish Containing Products Unknown (comments)    Tetanus And Diphtheria Toxoids, Adsorbed, Adult Other (comments)     Developed local swelling, axillary pain, and transient fever    Tomato Unknown (comments)       ___________________    Care Team Notes          POC/IDG Notes      [facility] IDG Nurse Notes by Gaurang Colbert RN at 07/10/17 2215  Version 1 of 1    Author:  Gaurang Colbert RN Service:  NURSING Author Type:  Registered Nurse    Filed:  07/10/17 2237 Date of Service:  07/10/17 2215 Status:  Deleted by Gaurang Colbert RN at 07/10/17 2237    : Gaurang Colbert RN (Registered Nurse)           Patient: Thais Perez    Date: 07/10/17  Time: 10:15 PM    [address] [place] [address] Nurse Notes                                                                                                                            Favorites                                    Tina Grippe        Inbox?(1)? Drafts? [2]? Sent Items      Deleted Items?(3)?         admin    Archive Retention Folders        Junk E-mail      Notes      RSS Feeds      Search Folders        [address] [place] [address] Street IDT notes- due tonight                                        Willadean Scales, Jason Bermeo Items    Tuesday, April 18, 2017 4:10 AM                                                        Ari Erazo. [address] [place], [address] [facility]  W: [phone] F: [phone]    email:  [email or web address] [facility] HSPTL provides good help to those in need at the end of life, embracing dignity, compassion and quality care for all patients, their families and the community. Description: Description: [email or web address]         Good Help to Those in [facility][note 3[facility] [address]                                      Telma Gregory            Actions            To:            Kiera Do?; Darrall Arabia      Cc:            Aubrey Teran               Tuesday, September 20, 2016 5:14 PM                          This message was sent with High importance. You forwarded this message on 4/18/2017 4:10 AM.          Maria L Giang and Alex Morris,    Please complete the IDT notes tonight on all [facility] patients for tomorrows IDT meeting. Click on the Hospice navigator activity (on the left side of the screen), then click on the IDG tab, then click on Create Note (under Nurse Notes). You can then copy and paste the template below and fill it in. If you have any questions, please contact me. Thank you.     [address]    Patient Name      Episode -     Date of IDT Meeting      UPDATED COMPREHENSIVE ASSESSMENT      ATTENDING Physician                          CLINICAL STATUS         SYMPTOMS         SIGNS         LAB VALUES (when available)         KARNOFSKY          FAST for all dementia         Progression to DEPENDENCE WITH ADLs (include time frame)         PRESSURE ULCERS          DISEASE SPECIFIC         FALL RISK:    PROBLEM:    GOAL:    INTERVENTIONS(INDIVIDUALIZED)                   Nursing Visit Frequency    Hospice Aide Visit Frequency         SW    COPING    GRIEF    ADVANCED DIRECTIVES        BEREAVEMENT    RESOURCES NEEDED    SW Visit Frequency         Bereavement     NO CHANGE         Volunteer    NO VOLUNTEER             SPIRITUAL ISSUES    GRIEF    COPING         AVAILABLE 24 [facility] RESOURCES     Visit Frequency         Clinician Signatures    Nurse______________________________    SW________________________________    Chaplain____________________________    Volunteer Coordinator__________________    Bereavement_________________________             Options             Find Someone                                                                                                  Favorites                                    Laddie Session        Inbox?(1)? Drafts? [2]? Sent Items      Deleted Items?(3)?         admin    Archive Retention Folders        Junk E-mail      Notes      RSS Feeds      Search Folders        [address] Jason Bermeo Items    Tuesday, April 18, 2017 4:10 AM                                                        Ari Erazo. [address] [place], [address] [facility]  W: [phone] F: [phone]    email:  [email or web address] provides good help to those in need at the end of life, embracing dignity, compassion and quality care for all patients, their families and the community. Description: Description: [email or web address]         Good Help to Those in [facility] [address]                                      Telma Gregory            Actions            To:            Kiera Do?; Darrall Arabia      Cc:            [facility]               Tuesday, September 20, 2016 5:14 PM                          This message was sent with High importance. You forwarded this message on 4/18/2017 4:10 AM.          Maria L Giang and Alex Morris,    Please complete the IDT notes tonight on all [facility] patients for tomorrows IDT meeting. Click on the Hospice navigator activity (on the left side of the screen), then click on the IDG tab, then click on Create Note (under Nurse Notes). You can then copy and paste the template below and fill it in. If you have any questions, please contact me. Thank you.     [address]    Patient Name      Episode -     Date of IDT Meeting      UPDATED COMPREHENSIVE ASSESSMENT      ATTENDING Physician                          CLINICAL STATUS         SYMPTOMS         SIGNS         LAB VALUES (when available)         KARNOFSKY          FAST for all dementia         Progression to DEPENDENCE WITH ADLs (include time frame)         PRESSURE ULCERS          DISEASE SPECIFIC         FALL RISK:    PROBLEM:    GOAL:    INTERVENTIONS(INDIVIDUALIZED)                   Nursing Visit Frequency    Hospice Aide Visit Frequency         SW    COPING    GRIEF    ADVANCED DIRECTIVES        BEREAVEMENT    RESOURCES NEEDED    SW Visit Frequency         Bereavement     NO CHANGE         Volunteer    NO VOLUNTEER             SPIRITUAL ISSUES    GRIEF    COPING         AVAILABLE 24 [facility] RESOURCES     Visit Frequency         Clinician Signatures    Nurse______________________________    SW________________________________    Chaplain____________________________    Volunteer Coordinator__________________    Bereavement_________________________             Options             Find Someone                                                                                                  Favorites                                    Laddie Session        Inbox?(1)? Drafts? [2]? Sent Items      Deleted Items?(3)?         admin    Archive Retention Folders        Junk E-mail      Notes      RSS Feeds      Search Folders        [address] [facility]    Patient Name  Carmen Bruce    Episode -     Date of IDT Meeting  17    UPDATED COMPREHENSIVE ASSESSMENT      ATTENDING Physician                          CLINICAL STATUS metastatic endometrial cancer         SYMPTOMS generalized pain         SIGNS 97.5-92-16-99%,100/80         LAB VALUES (when available)         KARNOFSKY          FAST for all dementia         Progression to DEPENDENCE WITH ADLs (include time frame) Complete care,unable to do anything for herself except feed herself. It requires 3 people to turn her,change her sheets and clean her after she has a stool. PRESSURE ULCERS left buttocks cheek         DISEASE SPECIFIC pain,immobility from tumor growth and nerve pressure         FALL RISK:low    PROBLEM:pain,immobility    GOAL:pain management    INTERVENTIONS(INDIVIDUALIZED) Pt has an airflow mattress,but I believe that a larger,\"big boy\" bed would be more appropriate. It is very difficult to reposition her. Administration of prn doses of Torodol and Dilaudid,in addition to her Dilaudid PCA pump. The possibility of having a procedure to ablate some tumor or tissue is being explored. Nursing Visit Frequency 24hr    Hospice Aide Visit Frequency 24hr         SW    COPING    GRIEF    ADVANCED DIRECTIVES        BEREAVEMENT    RESOURCES NEEDED    SW Visit Frequency         Bereavement     NO CHANGE         Volunteer    NO VOLUNTEER             SPIRITUAL ISSUES    GRIEF    COPING    [address]     Visit Frequency         Clinician Signatures    Nurse___Haley Hernandez RN___________________________    CHELO________________________________    Chaplain____________________________    Abhilash Rodriguez Coordinator__________________    Bereavement_________________________    Signed by: Tom Lima RN       [facility] IDG  Notes by Es Norton at 06/28/17 0813  Version 1 of 1    Author:  Es Norton Service:  Guerda Ivan Author Type:      Filed:  06/28/17 0813 Date of Service:  06/28/17 0813 Status:  Signed    :  Es Norton ()           Patient: Hamzah Ingram    Date: 06/28/17  Time: 8:13 AM    [facility]  Notes  Completed referral for financial assistance from Yelitza Mccann 28.. Patient has been changed from Routine to GIP today due to increased pain. Plan remains for her to return home. Msw to follow with emotional support for patient/family as they cope with illness related changes. Signed by: Es Norton       [facility] IDG  Notes by Nazia Jimenez at 06/28/17 6609  Version 1 of 1    Author:  Nazia Jimenez Service:  HOSPICE Author Type:  Pastoral Care    Filed:  06/28/17 0652 Date of Service:  06/28/17 0651 Status:  Signed    :  Nzaia Jimenez (Pastoral Care)           Patient: Hamzah Ingram    Date: 06/28/17  Time: 6:51 AM  SPIRITUAL ISSUES:  I visited the room of this pt back at the [facility] and is on now Routine Status. She was in bed, semi-awake and not fully alert, not agitated, not restless, and appeared well medicated and comfortable. She was lying on her back and appeared comfortable. I confirmed with her the [facility] had continued to visit her and that I would be visiting as well, if she so desired. Her spirits were moderate and her attitude somewhat stoic as she did not wish to come back to the [facility]  Patient and Family use geovanny as a primary coping mechanism but are very stressed and struggling with this long, difficult journey.   GOALS:  Continue to visit this pt and her family while she is at the [facility] and I am in the facility, to provide pastoral care and spiritual support to assist both the pt and them with coping with this difficult medical situation and decline. Coordinate w/ martin Rankin as she assumes primary care for this patient  Coordinate with Chaplain Foster if/when the pt returns to her mothers home. Visit this pt and her family, while she is @ [facility] and I am in this facility, or PRN as requested. Coordinate with Care Team on POC. Signed by: John Vanessa       [facility] IDG  Notes by John Vanessa at 06/20/17 1321  Version 1 of 1    Author:  John Vanessa Service:  Yessenia Perez Author Type:  Pastoral Care    Filed:  06/20/17 1323 Date of Service:  06/20/17 1321 Status:  Signed    :  John Vanessa (Pastoral Care)           Patient: Thais Perez    Date: 06/20/17  Time: 1:22 PM  SPIRITUAL ISSUES:  I visited the room of this pt who is again at the [facility] and is on GIP Status. She was in bed, semi-awake and not fully alert, not agitated, not restless, and appeared well medicated and comfortable. She was lying on her back and appeared comfortable. I confirmed with her the [facility] had continued to visit her and that I would be visiting as well, if she so desired. Her spirits were moderate and her attitude somewhat stoic as she did not wish to come back to the [facility]  Patient and Family use geovanny as a primary coping mechanism but are very stressed and struggling with this long, difficult journey. GOALS:  Continue to visit this pt and her family while she is at the [facility] and I am in the facility, to provide pastoral care and spiritual support to assist both the pt and them with coping with this difficult medical situation and decline. Coordinate w/ Chaplain Rankin as she assumes primary care for this patient  Coordinate with Chaplain Foster if/when the pt returns to her mothers home. Visit this pt and her family, while she is @ [facility] and I am in this facility, or PRN as requested. Coordinate with Care Team on POC.       Signed by: John Vanessa       [facility] IDG Nurse Notes by Ping Jay at 06/20/17 0316  Version 1 of 1    Author:  Ping Jay Service:  (none) Author Type:  Registered Nurse    Filed:  06/20/17 0317 Date of Service:  06/20/17 0316 Status:  Signed    :  Ping Jay (Registered Nurse)           Patient: Angeles Elliott       Date: 06/20/17  Time: 2:57 AM     [facility] Nurse Notes     Date: 06/20/2017  Time: Gunzing 9   Patient Name [address]  Episode -   Date of IDT Meeting 6/20/17  UPDATED COMPREHENSIVE ASSESSMENT Pt has been alert and fully oriented, able to express needs adequately. VS are within normal limits with only her HR being slightly elevated. Pts lungs are clear. Pain is rated at 4-5/10 at the moment. Pt appears to be comfortable, absence of grimacing or anything that would indicate discomfort. Pt is still utilizing her PCA with 7 attempts on last shift. Pts Dilaudid pump is currently running at 3 mg/ hour basal rate and 1 mg bolus rate, every 6 mins. ATTENDING Physician   CLINICAL STATUS   SYMPTOMS pain  SIGNS elevated HR, adult numeric pain scale ratings of 4-5/10 at times when pt is \"comfortable\", PCA pump bolus doses maximized most days   LAB VALUES (when available)   KARNOFSKY   FAST for all dementia   Progression to DEPENDENCE WITH ADLs (include time frame) Pt is completely limited and dependant on help with any ADLs. She can move her upper extremities but is unable to voluntarily move her lower extremities. If she is being moved she is in severe pain. PRESSURE ULCERS N/A  DISEASE SPECIFIC Severe pain when legs are being moved even slightly, affecting all aspects of daily activities and increasing risks for pressure ulcers and skin lacerations.   FALL RISK: low  PROBLEM: pain  GOAL: comfort, absence of pain, pt needs to reach a level at which she rates her pain lower than 3 at all times and movement does not cause her severe pain and anxiety. INTERVENTIONS(INDIVIDUALIZED) If needed, her PCA basal rate needs to be increased so that pt is pain-free most of the time and the frequency of needing a bolus dose decreases which would indicate an adequate pain regimen. Pt will be positioned for comfort according to her wishes. Nursing Visit Frequency 24hr  Hospice Aide Visit Frequency 24 hr             Signed by: Pérez Ramon       [facility] IDG  Notes by Betsy Zamudio at 06/13/17 1781  Version 1 of 1    Author:  Betsy Zamudio Service:  Dany Cabral Author Type:      Filed:  06/13/17 0854 Date of Service:  06/13/17 0853 Status:  Signed    :  Betsy Zamudio ()           Patient: Jessica Babin    Date: 06/13/17  Time: 8:53 AM    [facility]  Notes  Family meeting with mother, 2 sisters, Dr. Emeli Donnelly RN, Shellie gomez and Stevo Gar. Dr. Pasquale Muñoz talked about possibility of patient not being able to return home but not ruled out. She also explained that patients pain was still an ongoing issue that was being addressed but  she wouldnt be able to experience total relief. Family very tearful and saddened when Dr. Pasquale Muñoz expressed belief that patient was declining and would most likely pass at the [facility]. Emotional support provided by staff as family expressed their grief. Mother spoke  to patient following meeting and requested that other family members be made aware of dx and prognosis. She agreed. Meeting was held later in day, attended by [facility], during which family was informed of patients status. Family trying to cope with their issues of loss/grief.               Signed by: Betsy Zamudio       [facility] IDG Nurse Notes by Janay Norton RN at 06/12/17 0301  Version 2 of 2    Author:  Janay Norton RN Service:  Dany Cabral Author Type:  Registered Nurse    Filed:  06/13/17 0356 Date of Service:  06/12/17 0301 Status:  Addendum    :  Jabier Watts Devyn Redmond, RN (Registered Nurse)      Related Notes: Original Note by Alexia Sheikh RN (Registered Nurse) filed at 06/12/17 0310         Patient: Hamzah Ingram    Date: 06/12/17  Time: 3:01 AM    [facility] Nurse Notes    [address]  Patient Name  Jyoti bonner  Episode -   Date of IDT Meeting  6-13-17    UPDATED COMPREHENSIVE ASSESSMENT      Neuro - alert and oriented  Muscular - moves upper extremities, moves left leg slightly, unable to move r leg which is externally rotated and very edematous  Resp - lungs clear, room air  Heart WNL BP 98/60 lopressor held last pm  GI - appetite good eats mostly fast food from family and sweets, last BM 6/9, on bowel regimen   - davila to BSD inserted 5/15/17  Skin - intact    ATTENDING Physician  Dr Carlin Expose pain     SIGNS pt states when she is in pain, always has pain if moved     LAB VALUES (when available)     KARNOFSKY 30%     FAST for all dementia N/A     Progression to DEPENDENCE WITH ADLs (include time frame) Complete Care pt can feed self     PRESSURE ULCERS None     DISEASE SPECIFIC Diabetes     FALL RISK: None  PROBLEM: 1. Pt is unable to turn well in bed could use Bariatric Bed  2. Pain management continues to be an issue for the patient. She states that her pain is never managed. Although she was started on Dilaudid per PCA pump it is a very low dose. Another side issue is that pt prefers for nurse to give IVP bolus instead of using the PCA button. She asked how often she could have that and when I told her q 15 minutes she asked for it 3 times before she was obtunded. I explained to the patient that the IVP was for when pain was out of control and she had been pushing PCA frequently. She assured me that she had. At end of shift it was noted that patient had only pushed PCA 13 times entire shift.   3. Apparently there was a family meeting about prognosis and pt not going home  And EOL here at [facility]. It was not properly communicated to nurses that the patient was not involved in the meeting and was not aware. The issue came up about the specialty bed and if she could take it home. That is when pt stated she was going home and that is why she has the dilaudid pump. GOAL: Pain managed    INTERVENTIONS(INDIVIDUALIZED)   1-Provide patient with specialty bed so that she can be properly cared for. 2-Increase pain medicine so that patient does not have to ask for repeated doses of IVP meds.    3- Involve pt in discharge planning           Nursing Visit Frequency  Hospice Aide Visit Frequency       COPING  GRIEF  ADVANCED DIRECTIVES    BEREAVEMENT  RESOURCES NEEDED   Visit Frequency     Bereavement   NO CHANGE     Volunteer  NO VOLUNTEER       SPIRITUAL ISSUES  GRIEF  COPING  [address] RESOURCES   Visit Frequency     Clinician Signatures  Nurse______________________________  ________________________________  Chaplain____________________________  Volunteer Coordinator__________________  Bereavement_________________________        Signed by: Hui Marsh RN       [facility] IDG Nurse Notes by Hui Marsh RN at 17  Version 1 of 2    Author:  Hui Marsh RN Service:  Jennifer Palm Author Type:  Registered Nurse    Filed:  17 Date of Service:  17 Status:  Signed    :  Hui Marsh RN (Registered Nurse)      Related Notes: Addendum by Hui Marsh RN (Registered Nurse) filed at 17 3855         Patient: Megan Alfredo    Date: 17  Time: 3:01 AM    [facility]        [facility]  Patient Name  Jyoti bonner  Episode -   Date of IDT Meeting  17    UPDATED COMPREHENSIVE ASSESSMENT      Neuro - alert and oriented  Muscular - moves upper extremities, moves left leg slightly, unable to move r leg which is externally rotated and very edematous  Resp - lungs clear, room air  Heart WNL BP 98/60 lopressor held last pm  GI - appetite good eats mostly fast food from family and sweets, last BM , on bowel regimen   - davila to BSD inserted 5/15/17  Skin - intact    ATTENDING Physician  Dr Yuliana Choudhary pain     SIGNS pt states when she is in pain, always has pain if moved     LAB VALUES (when available)     KARNOFSKY 30%     FAST for all dementia N/A     Progression to DEPENDENCE WITH ADLs (include time frame) Complete Care pt can feed self     PRESSURE ULCERS None     DISEASE SPECIFIC Diabetes     FALL RISK: None  PROBLEM: Pt is unable to turn well in bed could use Bariatric Bed  GOAL: Pain managed  INTERVENTIONS(INDIVIDUALIZED) Pain management           Nursing Visit Frequency  Hospice Aide Visit Frequency       COPING  GRIEF  ADVANCED DIRECTIVES    BEREAVEMENT  RESOURCES NEEDED   Visit Frequency     Bereavement   NO CHANGE     Volunteer  NO VOLUNTEER       SPIRITUAL ISSUES  GRIEF  COPING  [address] RESOURCES   Visit Frequency     Clinician Signatures  Nurse______________________________  SW________________________________  Chaplain____________________________  Volunteer Coordinator__________________  Bereavement_________________________        Signed by: Tosin Waterman RN       [facility] IDG Nurse Notes by Clinton Camacho RN at 17  Version 1 of 1    Author:  Clinton Camacho RN Service:  NURSING Author Type:  Registered Nurse    Filed:  06/10/17 2018 Date of Service:  17 Status:  Signed    :  Clinton Camacho RN (Registered Nurse)           Patient: Ashley Branch    Date: 17  Time: 12:37 AM    [facility]    Patient Name Ashley Branch  Episode -    Date of IDT Meeting  2017  Ian Gregorio Physician Dr. Edmond Ford STATUS  Shanelle Larry is a 39 y.o. with a past history of metastatic endometrial cancer (mets to liver and bone) who was admitted to [facility] on 5-. By doppler and imaging she does not have DVT/PE. CT of pelvis and right leg show enlarging tumor involving right sacrum, right iliac bone, bilateral rami and proximal right femur (which appears acute). L5 and right sacral nerves are involved with tumor and pressure of tumor creates compression of right femoral and external iliac veins causing stasis. She has complete destruction of the right hip, femur and pelvis with tumor invasion and complete loss of bone. The patient/family chose comfort measures with the support of Hospice. SYMPTOMS  Pain  SIGNS/SYMPTOMS: Patient unable to bear pain of growing tumor involving right sacrum, ilium, spine, femur. Current focus is pain control, although she is also mildly confused.  and caregivers at home were having extreme difficulty in managing her pain and she required daily visits by the nurse with frequent medication adjustments and addition of medication IV route PCA and continuous in order to attempt control of her pain without success. Mobility is still an issue as patient resists movement of any kind due to pain. She was placed supine on admission to [facility] on 6/5/2017. With use of PCA pump, patient allows slight rotation left to right q4 hours throughout the night. Two slight spots of potential breakdown identified on upper abdomen following weeks in prone position with little movement. LAB VALUES (when available)    KARNOFSKY 30  Progression to DEPENDENCE WITH ADLs (include time frame) Patient is bedridden and complete care. turning due to pain. Patient feeds herself and is alert and oriented, but completely immobile. PRESSURE ULCERS: None  DISEASE SPECIFIC: Growing bone and spinal metastases resulting in pain that is difficult to manage.   Signs/symptoms UTI or dehydration. FALL RISK: low  PROBLEM: Pain 4/10 with medication, pain 10/10 with movement. Patient resists turning or movement of any kind due to pain. GOAL:  Pain management  INTERVENTIONS(INDIVIDUALIZED): Consider changing  PCA from morphine to dilaudid, may be toxic with morphine, kidney function is normal from 2 weeks ago, recent episodes of twitching, none noted currently. Pt needs to lay on the left side at least 1 x per day for 20 min as tolerated  Pt will need PT/OT evaluation to help determine a technique for transfer of position in bed, will order, also family and home staff along with [facility] staff need to learn repositioning as per PT/OT while she is in the [facility]. If pt needs IV fluids to relieve signs/symptoms of UTI/dehydration, will consider. Keflex started to prevent infection of skin irritation in groin from difficulty cleansing due to pain on any movement.     Nursing Visit Frequency 24 hr @ [address] [place] [address] [place] Visit Frequency 24 hr @ [facility]    COPING    GRIEF    ADVANCED DIRECTIVES        BEREAVEMENT    RESOURCES NEEDED     Visit Frequency    Bereavement   NO CHANGE    Volunteer    NO VOLUNTEER        SPIRITUAL ISSUES    GRIEF    COPING       AVAILABLE SPIRITUAL RESOURCES     Visit Frequency    Clinician Signatures    Nurse______________________________    SW________________________________    Chaplain____________________________    Volunteer Coordinator__________________    Bereavement_________________________           Signed by: Mann Kumar RN         Electronically signed by Jett Lee RN at 17 0755              [address] [place] [address]  Notes by Megan Stevens at 17 1526  Version 1 of 1    Author:  Megan Stevens Service:  HOSPICE Author Type:  Pastoral Care    Filed:  17 1527 Date of Service:  17 152 Status:  Signed    :  Megan Stevens ([facility] Care)           Patient: Ashwini De Los Santos    Date: 17  Time: 3:27 PM    SPIRITUAL ISSUES:  I visited the room of this pt who arrived back at the [facility] yesterday and is on GIP Status. She was in bed, semi-awake and not fully alert, not agitated, not restless, and appeared well medicated and comfortable. She was lying on her back and appeared comfortable. She was talking on the telephone to her mother, but put the phone down when I entered. I confirmed with her the Rosana had visited her yesterday and I confirmed that I would be visiting as well, if she so desired. She was agreeable to periodic visits. The pt thanked me for visiting and my offer of support. Her spirits were moderate and her attitude somewhat stoic as she did not wish to come back to the [facility]  I learned in earlier visits that she and her family attend [facility] on the [facility]. GOALS:  Continue to visit this pt and her family while she is at the [facility] and I am in the facility, to provide pastoral care and spiritual support to assist both the pt and them with coping with this difficult medical situation and decline. Coordinate w/  Alyssia Moreno as she assumes primary care for this patient  Coordinate with Chaplain Foster if/when the pt returns to her mothers home. Visit this pt and her family, while she is @ [facility] and I am in this facility, or PRN as requested. Coordinate with Care Team on POC.   Signed by: Juan Marroquin

## 2017-07-11 NOTE — PROGRESS NOTES
1325: Pr medicated with scheduled meds. Pt also requests Toradol PRN and is medicated at this time. 1435: Pt called and asked if she had actually received Toradol, because she is still experiencing pain and usually the Toradol will help well with that. RN confirmed that Toradol was given and another dose can not be administered yet. Pt was informed that she can be medicated with Dilaudid instead. Pt states she has been utilizing the PCA pump but still has pain. 1440: Pt medicated with PRN Dilaudid at this time. 1500: Pt has visitor, she is a  that has been with pt and family for a long time and she came to check on pt. Pt is getting a little drowsy at the moment. Pt has reported her pain at 7/10.

## 2017-07-12 NOTE — PROGRESS NOTES
0700 Report received from Lauree Moritz, [facility]. Pt is Routine level of care , Dx Endometrial Ca. 0800 Pt alert, oriented, eating breakfast biscuit brought in by family. 0940 Tordal and Dilaudid 4mg given IVP for c/o pain rated at 5 of 10. States she just wants to sleep, states she did not rest well last night. 1000 Pt appears to be asleep, no facial grimace. 1200 Refused lunch  1730 Medicated with Tordal and Dilaudid IVP for c/o pain rated 4 of 10 but just states sore all over. Family to bring food for dinner. NAME OF PATIENT:  Jyoti Randall    LEVEL OF CARE:  Routine    REASON FOR GIP:   na    *PATIENT REMAINS ELIGIBLE FOR GIP LEVEL OF CARE AS EVIDENCED BY: (MUST BE ADDRESSED OF PATIENT GIP)      REASON FOR RESPITE:  na    O2 SAFETY:  Concentrator positioning (6\" from furniture/drapes), Tanks stored in rodríguez , No petroleum based products on face while oxygen in use and Oxygen sign on the door    FALL INTERVENTIONS PROVIDED:   Implemented/recommended use of fall risk identification flag to all team members, Implemented/recommended resources for alarm system (personal alarm, bed alarm, call bell, etc.) , Implemented/recommended environmental changes (remove hazards, lower bed, improve lighting, etc.) and Implemented/recommended increased supervision/assistance    INTERDISPLINARY COMMUNICATION/COLLABORATION:  Physician, MSW, [place] and RN, CNA    NEW MEDICATION INITIATION DOCUMENTATION:  Documentation completed in Clinical Note in [facility]    Reason medication is being initiated:  na    MD / Provider name consulted re: change in status / initiation of new medication:  na    New Symptom(s):  na    New Order(s): na    Name of the person notified of the changes:  na    Name of person being taught: na    Instructions given:  na    Side Effects taught: na    Response to teaching:  na      COMFORTABLE DYING MEASURE:  Is Patient/family satisfied with symptom level?   Yes    DISCHARGE PLAN: Long term placement by family.

## 2017-07-12 NOTE — HOSPICE
NAME OF PATIENT:  Jyoti Randall    LEVEL OF CARE:  Routine    REASON FOR GIP:       *PATIENT REMAINS ELIGIBLE FOR GIP LEVEL OF CARE AS EVIDENCED BY: (MUST BE ADDRESSED OF PATIENT GIP)      REASON FOR RESPITE:      O2 SAFETY:      FALL INTERVENTIONS PROVIDED:   Implemented/recommended use of non-skid footwear, Implemented/recommended use of fall risk identification flag to all team members, Implemented/recommended assistive devices and encouraged their use, Implemented/recommended resources for alarm system (personal alarm, bed alarm, call bell, etc.)  and Implemented/recommended environmental changes (remove hazards, lower bed, improve lighting, etc.)    INTERDISPLINARY COMMUNICATION/COLLABORATION:  Physician, MSW, [place] and RN, CNA    NEW MEDICATION INITIATION DOCUMENTATION:      Reason medication is being initiated:      MD / Provider name consulted re: change in status / initiation of new medication:      New Symptom(s):      New Order(s):      Name of the person notified of the changes:      Name of person being taught:      Instructions given:      Side Effects taught:      Response to teachin [address] free  Is Patient/family satisfied with symptom level?  yes    DISCHARGE PLAN:  Home with family after surgical procedure to help alleviate pain    20:00,report received,assumed care of pt who is Routine care,hospice dx is metastatic endometrial cancer. Pt asking for pain med,Torodol given,PCA Dilaudid continues. Visitors @ bedside. Jama intact with cloudy phil urine  21:15,Ativan given per pt request.  00:00,asleep.  04:00,awake,medicated with Dilaudid prn dose prior to bath. 04:14,bathed,turned and repositioned,pt tolerated well.  05:00,medicated with Dilaudid 4mg IVP,and Torodol for generalized pain. 06:00,pt asleep. Total PCA,attempts=18,doses given=8,total of 63.4 mg Dilaudid,as well as 8mg prn doses given by RN.

## 2017-07-12 NOTE — PROGRESS NOTES
This visit was part of continuing pastoral support to Karli. Yesterday she had a few questions. We shared scripture and reflections. Today she said she was having a good day and appeared to be drifting off to sleep. I probably will not see her until next week unless I heard of a need to visit.      Misael Lyles., [address], [address] [place] [address]

## 2017-07-13 NOTE — PROGRESS NOTES
NAME OF PATIENT:  Jessica Babin    LEVEL OF CARE:  Routine    REASON FOR GIP:   Pain, despite numerous changes in medications, Medication adjustment that must be monitored 24/7 and Stabilizing treatment that cannot take place at home    *PATIENT REMAINS ELIGIBLE FOR [facility] LEVEL OF CARE AS EVIDENCED BY: (MUST BE ADDRESSED OF PATIENT GIP)      REASON FOR RESPITE:  n/a    O2 SAFETY:  Concentrator positioning (6\" from furniture/drapes), Tanks stored in rodríguez , No petroleum based products on face while oxygen in use and Oxygen sign on the door    FALL INTERVENTIONS PROVIDED:   Implemented/recommended assistive devices and encouraged their use, Implemented/recommended resources for alarm system (personal alarm, bed alarm, call bell, etc.) , Implemented/recommended environmental changes (remove hazards, lower bed, improve lighting, etc.) and Implemented/recommended increased supervision/assistance    INTERDISPLINARY COMMUNICATION/COLLABORATION:  Physician, MSW, [place] and RN, CNA    NEW MEDICATION INITIATION DOCUMENTATION:  Documentation completed in Clinical Note in [facility]    Reason medication is being initiated:  n/a    MD / Provider name consulted re: change in status / initiation of new medication:  n/a    New Symptom(s):  n/a    New Order(s):  n/a    Name of the person notified of the changes:  n/a    Name of person being taught:  n/a    Instructions given:  n/a    Side Effects taught:  n/a    Response to teaching:  n/a      COMFORTABLE DYING MEASURE:  Is Patient/family satisfied with symptom level?  yes    DISCHARGE PLAN:  Unsure of plan at this time. Ablation is a plan for patient     0700: Report received from [address] [place] [address],[address], RN to assume patient care. Patient is Routine level of care with a dx of endometrial cancer with metastasis. Patient has a right leg tumor and is causing some mild discomfort. Patient has a dilaudid PCA at 4mg/hr with 2mg/hr basal for pain.  The plan is for patient to have an ablation of the tumor but they are working on the financial aspect. Patient last BM was 7-10-17 with davila draining thick, green d/c. Noted edema to BLE +2/+3 and who is on bedrest with total care. 0900: Patient medicated with scheduled meds and PRN toradol and dilaudid for pain 5/10 via PICC. 0930: Pump cleared: 9/13 attempts and 79.40ml dilaudid used. 1045: Patient asleep and in no distress. Will attempt to replace davila catheter and bed linens as they are soiled from the catheter leaking. [id number]: Bed linens changed, pt washed up, and davila catheter replaced with minimal difficulty. Pt gown changed at this time. PCA used x3 attempts. 1300: Patient had lunch from home and writer warmed it up for her (chicken legs x2 and bread). 1315: Patient rang on bell requesting PRN doses of pain med. Also medicated with scheduled meds. Lights dimmed and door closed for pt to sleep. 1620: Patient medicated with additional PRN dose of dilaudid and ativan. Right leg elevated with pillow for support. 1730: No change in patient status. Awaiting further orders.

## 2017-07-13 NOTE — PROGRESS NOTES
1900: Shift report received from [facility].  2100: Pt in bed, sleeping. Lungs clear and breathing regular. Pt medicated with scheduled meds. No complaints of breakthrough pain at this time. 2130: Pt calls and requests PRN pain medicine for pain rated at 5/10. Medicated with Toradol and Ativan.   0300: Pt in bed, playing on her phone. 0500: Noticed pt had not put out more than 10cc of urin all night. Flushed the davila with sterile saline, large amounts of sediment are in the tubing. After flushing the tube the davila starts draining about 100cc of urin. Some urin has leaked and soiled dots bed.      NAME OF PATIENT:  Jyoti Randall    LEVEL OF CARE:  Routine    REASON FOR GIP:   n/a    *PATIENT REMAINS ELIGIBLE FOR GIP LEVEL OF CARE AS EVIDENCED BY: (MUST BE ADDRESSED OF PATIENT GIP)      REASON FOR RESPITE:  n/a    O2 SAFETY:  n/a    FALL INTERVENTIONS PROVIDED:   Implemented/recommended resources for alarm system (personal alarm, bed alarm, call bell, etc.)  and Implemented/recommended environmental changes (remove hazards, lower bed, improve lighting, etc.)    INTERDISPLINARY COMMUNICATION/COLLABORATION:  Physician, MSW, [place] and RN, CNA    NEW MEDICATION INITIATION DOCUMENTATION:  no change in meds occured on this shift    COMFORTABLE DYING MEASURE:  Is Patient/family satisfied with symptom level?  yes    DISCHARGE PLAN:  Pt will stay as routine pt and then be reevaluated

## 2017-07-14 NOTE — PROGRESS NOTES
[address] [facility] Help to Those in Need  [phone]    Patient Name: Teresa Mcmillan  YOB: 1980    Date of Provider Hospice Visit: 07/14/17    Level of Care:   [] General Inpatient (GIP)    [x] Routine   [] Respite    Location of Care:  [] [facility] [] [facility] [] [facility] [] [facility] [x] Hospice House [facility]  [] Home [] Other:      Date of Original Hospice Admission: 5-16-17  Hospice Medical Director for Inpatient Care: Dr. Anisa Gonzalez Diagnosis:  Metastatic Endometrial Adenocarcinoma       HOSPICE DIAGNOSES   Active Symptoms:  1. Generalised pain, worse especially right lower extremity, lower back, especially with movements  2. Confusion; intermittent  3. Anxiety/depression; still an issue, persists  4. Insomnia; worse lately due to increased pain  5. Fatigue/weakness/lethargy: worse, BP very low at times  6. Constipation: worse  7. Itching: less, improved, stable  8. Bladder spasms/vaginal pressure; recurrent, likely due to cancer of endometrium disease advancing  9. Episodes of comfort then an exacerbation of pain which requires multiple medical pain med interventions  10. Bloody urine in davila's catheter and bag     PLAN   Continue routine level of care   1. Increase ativan 2mg  three times daily as needed for anxiety  2. Increase scheduled ativan to 1mg three times daily to help with anxiety and sleep  3. Continue  PCA dilaudid 4mg / hr continuous, PCA dose of 2mg every 6 mts. 4. Continue dilaudid 4mg IV every 15mts as needed for severe pain   5. Continue gabapentin 300mg at bedtime  6. Continue air mattress for her continued comfort  7. Continue clinoril, Cymbalta for adjuvant therapy for bone pain  8. Continue methadone at current levels 10mg every 8 hrs  9. Benadryl 25mg oral everfy 4 hrs as needed for itching  10. Continue Toradol 30mg IV every 6 hours as needed for severe pain; antiinflammatory.    11. Continue metoprolol 12.5mg twice daily as her BP is on low side and HR in 70-80's; not tachycardic. 12. Adjust meds for constipation; increase senna s to 2 tabs bid, change miralax to as needed. 13. Continue  reposition and turn to side during cleaning/ADL care and to prevent skin breakdown. 15.  and SW to support family needs  13. Disposition:  routine care status, symptoms are stable: SW Ms. Verenice Ibarra is assisting pt's mom to complete medicaid application and foundation forms have been submitted for additional resource help. 16. Plan for CT guided soft tissue ablation of tumor with assistance from Dr. Primitivo Horan Radiologist. Paul Dumont with admissions at [facility] and received approval for admitting pt there to undergo surgery through hospice. Pt will be admitted to [facility] under GIP status and will have procedure done following which hopefully she can go home and continue hospice services. Goal for this intervention is palliation of pain. Prognosis estimated based on 07/14/17 clinical assessment is:   [] Few to Many Hours  [] Hours to Days   [] Few to Many Days   [] Days to Weeks    [x] Few to Many Weeks   [] Weeks to Months   [] Few to Many Months    Communicated plan of care with: Hospice Case Manager; Hospice IDT; Care Team     GOALS OF CARE     Resuscitation Status: DNR  Durable DNR: [x] Yes [] No    Advance Care Planning 5/10/2017   Patient's Healthcare Decision Maker is: Legal Next jocelyn Mendez 69   Primary Decision Maker Name Santa Hyde   Primary Decision Maker Phone Number -   Primary Decision Maker Relationship to Patient Parent   Confirm Advance Directive Yes, on file   Patient Would Like to Complete Advance Directive -        HISTORY     History obtained from: chart, staff    CHIEF COMPLAINT: pain in lower back and vaginal area  The patient is:   [x] Verbal  [] Nonverbal  [] Unresponsive    HPI/SUBJECTIVE:  Pt c/o increased pain and inability to rest due to pain, stays anxious and requiring frequent medications and prn doses.  Also unable to rest and sleep. Stayed up all night and this am.     REVIEW OF SYSTEMS     The following systems were: [x] reviewed  [] unable to be reviewed    Positive ROS include:  Constitutional: fatigue, weakness  Ears/nose/mouth/throat:   Respiratory:  Gastrointestinal:vaginal pressure sensation  Musculoskeletal:weakness,pain in the right hip  Neurologic: numbness and burning pain  Psychiatric:anxiety, depression, insomnia  Endocrine:          FUNCTIONAL ASSESSMENT     Palliative Performance Scale (PPS): 30%     PSYCHOSOCIAL/SPIRITUAL ASSESSMENT     Principal Problem:    Endometrial cancer ([address] (7/7/2010)    Active Problems: Morbid obesity ([address] (7/7/2010)      Bone metastases ([address] (3/10/2014)      Past Medical History:   Diagnosis Date    Cancer ([address]     uterine    CVI (common variable immunodeficiency) ([address]     Diabetes ([address]     Elevated liver function tests 10/21/2010    Endometrial cancer ([address] 7/7/2010    Hypertension     Iron deficiency anemia     Menometrorrhagia 10/21/2010    Microcytic anemia 10/21/2010    Morbid obesity ([address]     Prediabetes 7/7/2010    Psychiatric disorder     depression    Radiation     completed radiation mid-march      Past Surgical History:   Procedure Laterality Date    CARDIAC SURG PROCEDURE UNLIST      hx of tachycardia    HX GYN      hysterectomy      Social History   Substance Use Topics    Smoking status: Never Smoker    Smokeless tobacco: Never Used    Alcohol use Yes     Family History   Problem Relation Age of Onset    Hypertension Mother     Hypertension Father     Stroke Maternal Grandfather     Cancer Paternal Grandmother      breast    Diabetes Paternal Grandmother       Allergies   Allergen Reactions    Egg Nausea and Vomiting     Raw egg and scrambled eggs. Egg products okay.      Pcn [Penicillins] Nausea and Vomiting     \"but could take amoxil\"    Shellfish Containing Products Unknown (comments)    Tetanus And Diphtheria Toxoids, Adsorbed, Adult Other (comments)     Developed local swelling, axillary pain, and transient fever    Tomato Unknown (comments)      Current Facility-Administered Medications   Medication Dose Route Frequency    LORazepam (ATIVAN) tablet 1 mg  1 mg Oral TIDPC    LORazepam (ATIVAN) tablet 2 mg  2 mg Oral TID PRN    ketorolac (TORADOL) injection 30 mg  30 mg IntraVENous Q6H PRN    senna-docusate (PERICOLACE) 8.6-50 mg per tablet 2 Tab  2 Tab Oral BID    polyethylene glycol (MIRALAX) packet 17 g  17 g Oral DAILY PRN    metoprolol tartrate (LOPRESSOR) tablet 12.5 mg  12.5 mg Oral BID    bisacodyl (DULCOLAX) tablet 5 mg  5 mg Oral DAILY PRN    HYDROmorphone (PF) (DILAUDID) injection 4 mg  4 mg IntraVENous Q15MIN PRN    diphenhydrAMINE (BENADRYL) capsule 25 mg  25 mg Oral Q4H PRN    acetaminophen (TYLENOL) tablet 650 mg  650 mg Oral Q4H PRN    Hydromorphone 500mg/500mL bag [employer]   IntraVENous CONTINUOUS    gabapentin (NEURONTIN) capsule 300 mg  300 mg Oral QHS    methadone (DOLOPHINE) tablet 10 mg  10 mg Oral Q8H    magic mouthwash cpd (without sucralfate)  5 mL Oral QID PRN    DULoxetine (CYMBALTA) capsule 60 mg  60 mg Oral QHS    sulindac (CLINORIL) tablet 200 mg  200 mg Oral BID WITH MEALS        PHYSICAL EXAM     Wt Readings from Last 3 Encounters:   05/30/17 121.1 kg (267 lb)   05/10/17 121.4 kg (267 lb 10.2 oz)   02/16/17 136.1 kg (300 lb)       Visit Vitals    /80    Pulse 84    Temp 97.9 °F (36.6 °C)    Resp 14    SpO2 90%         Supplemental O2  [x] Yes  [] NO  Last bowel movement:     Currently this patient has:  [] Peripheral IV [x] PICC  [] PORT [] ICD    [x] Jama Catheter [] NG Tube   [] PEG Tube    [] Rectal Tube [] Drain  [x] Other: now with a special mattress    Constitutional: pale, laying on her back, awake but appears anxious, inability to sleep.  Increased pain  Eyes: pallor++  ENMT: moist oral mucosa  Cardiovascular: distant heart sounds, tachycardic  Respiratory:  Normal breathing, diminished air entry  Gastrointestinal:  distended and firm, non tender, BS +, pt has a davila's  Musculoskeletal:edema ++ lower extremities right leg>left leg  Skin: warm, dry, some skin irritation in the groin folds, itching   Neurologic:sleepy but can follow commands  Psychiatric: fluctuating mood, anxious affect          Total time: 35 mts        Dustin Rubio MD

## 2017-07-14 NOTE — PROGRESS NOTES
NAME OF PATIENT:  Gene Randall    LEVEL OF CARE:  Routine    REASON FOR GIP:   n/a    *PATIENT REMAINS ELIGIBLE FOR GIP LEVEL OF CARE AS EVIDENCED BY: (MUST BE ADDRESSED OF PATIENT GIP)      REASON FOR RESPITE:  n/a    O2 SAFETY:  room air    FALL INTERVENTIONS PROVIDED:   Implemented/recommended assistive devices and encouraged their use, Implemented/recommended resources for alarm system (personal alarm, bed alarm, call bell, etc.) , Implemented/recommended environmental changes (remove hazards, lower bed, improve lighting, etc.) and Implemented/recommended increased supervision/assistance    INTERDISPLINARY COMMUNICATION/COLLABORATION:  Physician, MSW, [place] and RN, CNA    NEW MEDICATION INITIATION DOCUMENTATION:  Documentation completed in Clinical Note in [address] [facility]    Reason medication is being initiated: Anxiety and restlessness    MD / Provider name consulted re: change in status / initiation of new medication:  Fuad    New Symptom(s): Anxiety and restlessness    New Order(s):  Ativan 1mg scheduled and ativan 2mg prn    Name of the person notified of the changes:  Patient, [facility]    Name of person being taught:  patient    Instructions given:  n/a    Side Effects taught:  n/a    Response to teaching:  n/a      COMFORTABLE DYING MEASURE:  Is Patient/family satisfied with symptom level?  yes    DISCHARGE PLAN:  Unsure of d/c plan at this time. Pt plans to have the ablation surgery      0700: Care assumed of patient. Report received from Franklin Heimlich, [facility]. Patient is routine level of care for metastatic endometrial cancer with right leg pain. Patient increasingly restless and states that she cannot sleep. Denies any acute distress at this time. Jama continues to drain yellow urine with sediment. VS assessed by RN. 0536: Patient medicated with scheduled meds. Tolerated well. Visitor at the bedside and pt is in no acute distress. Will continue to monitor. 1020: No change in patient status. 1130: Dr. Giordano Person at bedside to eval patient. 1303: Dilaudid PCA bags ordered from [employer] to be delivered to [facility]. 1540: Patient sleeping and in no distress. 1825: Patient states that she is having leg pain 5/10 and that she would like her O2 saturation checked. Patient in room watching TV and playing on her cell phone on social media in no apparent/acute distress. 1845: O2 saturation hard to read on pulse oximeter. Patient placed on 2L NC for comfort/patient preference. Patient appears in no acute distress. Talking on cell phone to friends and family. 1900: Report given to oncoming RN.

## 2017-07-14 NOTE — PROGRESS NOTES
[address] [facility] Help to Those in Need  [phone]    Patient Name: Shanelle Larry  YOB: 1980    Date of Provider Hospice Visit: 07/13/17    Level of Care:   [] General Inpatient (GIP)    [x] Routine   [] Respite    Location of Care:  [] [facility] [] [facility] [] [facility] [] [facility] [x] Hospice House [facility]  [] Home [] Other:      Date of Original Hospice Admission: 5-16-17  Hospice Medical Director for Inpatient Care: Dr. Catherne Gottron Diagnosis:  Metastatic Endometrial Adenocarcinoma       HOSPICE DIAGNOSES   Active Symptoms:  1. Generalised pain, worse especially right lower extremity, lower back, especially with movements  2. Delirium;   none recent  3. Anxiety/depression; still an issue, persists, but more controlled  4. Insomnia; less, spending more time sleeping   5. Fatigue/weakness/lethargy: worse, BP very low at times  6. Constipation: worse  7. Itching: less, improved, stable  8. Bladder spasms/vaginal pressure; recurrent, likely due to cancer of endometrium disease advancing  9. Episodes of comfort then an exacerbation of pain which requires multiple medical pain med interventions  10. Bloody urine in davila's catheter and bag     PLAN   Continue routine level of care   1. Continue ativan 1mg  three times daily as needed for anxiety  2. Continue  low dose ativan 0.5mg to three times daily  3. Continue  PCA dilaudid 4mg / hr continuous, PCA dose of 2mg every 6 mts. Pt has to be encouraged to push the PCA button,   4. Continue dilaudid 4mg IV every 15mts as needed for severe pain   5. Continue gabapentin 300mg at bedtime  6. Continue air mattress for her continued comfort  7. Continue clinoril, Cymbalta for adjuvant therapy for bone pain  8. Continue methadone at current levels 10mg every 8 hrs  9. Benadryl 25mg oral everfy 4 hrs as needed for itching  10. continue Toradol 30mg IV every 6 hours as needed for severe pain; antiinflammatory.    11. Reduce dose of metoprolol to 12.5mg twice daily as her BP is on low side and HR in 70-80's; not tachycardic. 12. U/A was checked and not really suggestive of pierce UTI: will avoid treating  13. Adjust meds for constipation; increase senna s to 2 tabs bid, change miralax to as needed. 14. Continue  reposition and turn to side during cleaning/ADL care and to prevent skin breakdown. 13.  and SW to support family needs  12. Disposition:  routine care status, symptoms are stable: SW Ms. Tricia Moya is assisting pt's mom to complete medicaid application and foundation forms have been submitted for additional resource help. 17. Plan for CT guided soft tissue ablation of tumor with assistance from Dr. [facility] Radiologist. Partha England with admissions at [facility] and received approval for admitting pt there to undergo surgery through hospice. Pt will be admitted to [facility] under GIP status and will have procedure done following which hopefully she can go home and continue hospice services. Goal for this intervention is palliation of pain. Prognosis estimated based on 07/13/17 clinical assessment is:   [] Few to Many Hours  [] Hours to Days   [] Few to Many Days   [] Days to Weeks    [x] Few to Many Weeks   [] Weeks to Months   [] Few to Many Months    Communicated plan of care with: Hospice Case Manager;  Hospice IDT; Care Team     GOALS OF CARE     Resuscitation Status: DNR  Durable DNR: [x] Yes [] No    Advance Care Planning 5/10/2017   Patient's Healthcare Decision Maker is: Legal Next of Andrea 69   Primary Decision Maker Name Leonor Dalton   Primary Decision Maker Phone Number -   Primary Decision Maker Relationship to Patient Parent   Confirm Advance Directive Yes, on file   Patient Would Like to Complete Advance Directive -        HISTORY     History obtained from: chart, staff    CHIEF COMPLAINT: pain in lower back and vaginal area  The patient is:   [x] Verbal  [] Nonverbal  [] Unresponsive    HPI/SUBJECTIVE:  Pt had davila's catheter changed today and in this process her leg was moved that has triggered her pain. Took several doses of prn dilaudid and ativan and toradol was given as well; pain has started to settle down some now. Pt also talked about her constipation and desire to increase senna so her bowels can be regulated. Does not want to take miralax daily. REVIEW OF SYSTEMS     The following systems were: [x] reviewed  [] unable to be reviewed    Positive ROS include:  Constitutional: fatigue, weakness  Ears/nose/mouth/throat:   Respiratory:  Gastrointestinal:vaginal pressure sensation  Musculoskeletal:weakness,pain in the right hip  Neurologic: numbness and burning pain  Psychiatric:anxiety, depression  Endocrine:          FUNCTIONAL ASSESSMENT     Palliative Performance Scale (PPS): 30%     PSYCHOSOCIAL/SPIRITUAL ASSESSMENT     Principal Problem:    Endometrial cancer ([address] (7/7/2010)    Active Problems:     Morbid obesity ([address] (7/7/2010)      Bone metastases ([address] (3/10/2014)      Past Medical History:   Diagnosis Date    Cancer ([address]     uterine    CVI (common variable immunodeficiency) ([address]     Diabetes ([address]     Elevated liver function tests 10/21/2010    Endometrial cancer ([address] 7/7/2010    Hypertension     Iron deficiency anemia     Menometrorrhagia 10/21/2010    Microcytic anemia 10/21/2010    Morbid obesity ([address]     Prediabetes 7/7/2010    Psychiatric disorder     depression    Radiation     completed radiation mid-march      Past Surgical History:   Procedure Laterality Date    CARDIAC SURG PROCEDURE UNLIST      hx of tachycardia    HX GYN      hysterectomy      Social History   Substance Use Topics    Smoking status: Never Smoker    Smokeless tobacco: Never Used    Alcohol use Yes     Family History   Problem Relation Age of Onset    Hypertension Mother     Hypertension Father     Stroke Maternal Grandfather     Cancer Paternal Grandmother      breast    Diabetes Paternal Grandmother Allergies   Allergen Reactions    Egg Nausea and Vomiting     Raw egg and scrambled eggs. Egg products okay.      Pcn [Penicillins] Nausea and Vomiting     \"but could take amoxil\"    Shellfish Containing Products Unknown (comments)    Tetanus And Diphtheria Toxoids, Adsorbed, Adult Other (comments)     Developed local swelling, axillary pain, and transient fever    Tomato Unknown (comments)      Current Facility-Administered Medications   Medication Dose Route Frequency    ketorolac (TORADOL) injection 30 mg  30 mg IntraVENous Q6H PRN    senna-docusate (PERICOLACE) 8.6-50 mg per tablet 2 Tab  2 Tab Oral BID    polyethylene glycol (MIRALAX) packet 17 g  17 g Oral DAILY PRN    metoprolol tartrate (LOPRESSOR) tablet 12.5 mg  12.5 mg Oral BID    bisacodyl (DULCOLAX) tablet 5 mg  5 mg Oral DAILY PRN    HYDROmorphone (PF) (DILAUDID) injection 4 mg  4 mg IntraVENous Q15MIN PRN    LORazepam (ATIVAN) tablet 0.5 mg  0.5 mg Oral TIDPC    diphenhydrAMINE (BENADRYL) capsule 25 mg  25 mg Oral Q4H PRN    acetaminophen (TYLENOL) tablet 650 mg  650 mg Oral Q4H PRN    Hydromorphone 500mg/500mL bag [employer]   IntraVENous CONTINUOUS    gabapentin (NEURONTIN) capsule 300 mg  300 mg Oral QHS    LORazepam (ATIVAN) tablet 1 mg  1 mg Oral TID PRN    methadone (DOLOPHINE) tablet 10 mg  10 mg Oral Q8H    magic mouthwash cpd (without sucralfate)  5 mL Oral QID PRN    DULoxetine (CYMBALTA) capsule 60 mg  60 mg Oral QHS    sulindac (CLINORIL) tablet 200 mg  200 mg Oral BID WITH MEALS        PHYSICAL EXAM     Wt Readings from Last 3 Encounters:   05/30/17 121.1 kg (267 lb)   05/10/17 121.4 kg (267 lb 10.2 oz)   02/16/17 136.1 kg (300 lb)       Visit Vitals    /77 (BP 1 Location: Left arm, BP Patient Position: At rest)    Pulse (!) 58    Temp 98 °F (36.7 °C)    Resp 19    SpO2 (!) 81%         Supplemental O2  [x] Yes  [] NO  Last bowel movement:     Currently this patient has:  [] Peripheral IV [x] PICC  [] PORT [] ICD    [x] Davila Catheter [] NG Tube   [] PEG Tube    [] Rectal Tube [] Drain  [x] Other: now with a special mattress    Constitutional: pale, laying on her back, awake but appears anxious when she talks, c/o pain in her right leg since her davila's was changed today and leg was moved.    Eyes: pallor++  ENMT: moist oral mucosa  Cardiovascular: distant heart sounds, tachycardic  Respiratory:  Normal breathing, diminished air entry  Gastrointestinal:  distended and firm, non tender, BS +, pt has a davila's  Musculoskeletal:edema ++ lower extremities right leg>left leg  Skin: warm, dry, some skin irritation in the groin folds, itching   Neurologic:sleepy but can follow commands  Psychiatric: fluctuating mood, anxious affect          Total time: 35 mts        Vinay Hancock MD

## 2017-07-14 NOTE — PROGRESS NOTES
1900: Shift report received from Robin Herrera, [address]: Pt requests PRN Dilaudid for breakthrough pain. Pts lungs clear, bowel sounds hypoactive. 2015: Pt requests PRN Toradol for continuous pain. 2223: Pt medicated with scheduled meds as well as PRN Dilaudid. 0330: Pt sleeping. 0630: Pt reports itching and pain. Medicated with PRN Benadryl, Ativan and Toradol. Itchy spots were washed and treated with powder.     NAME OF PATIENT:  Jyoti Randall    LEVEL OF CARE:  Routine    REASON FOR GIP:   n/a    *PATIENT REMAINS ELIGIBLE FOR GIP LEVEL OF CARE AS EVIDENCED BY: (MUST BE ADDRESSED OF PATIENT GIP)      REASON FOR RESPITE:  n/a    O2 SAFETY:  Concentrator positioning (6\" from furniture/drapes), No petroleum based products on face while oxygen in use and Oxygen sign on the door    FALL INTERVENTIONS PROVIDED:   Implemented/recommended resources for alarm system (personal alarm, bed alarm, call bell, etc.)  and Implemented/recommended environmental changes (remove hazards, lower bed, improve lighting, etc.)    INTERDISPLINARY COMMUNICATION/COLLABORATION:  Physician, MSW, [place] and RN, CNA    NEW MEDICATION INITIATION DOCUMENTATION:  no new meds initiated on this shift    COMFORTABLE DYING MEASURE:  Is Patient/family satisfied with symptom level?  yes    DISCHARGE PLAN:  Being discussed

## 2017-07-15 NOTE — HOSPICE
NAME OF PATIENT:  Jyoti Randall    LEVEL OF CARE:  Routine    REASON FOR GIP:       *PATIENT REMAINS ELIGIBLE FOR GIP LEVEL OF CARE AS EVIDENCED BY: (MUST BE ADDRESSED OF PATIENT GIP)      REASON FOR RESPITE:      O2 SAFETY:  Concentrator positioning (6\" from furniture/drapes), Tanks stored in [place] , No petroleum based products on face while oxygen in use and Oxygen sign on the door    FALL INTERVENTIONS PROVIDED:   Implemented/recommended use of non-skid footwear, Implemented/recommended use of fall risk identification flag to all team members, Implemented/recommended assistive devices and encouraged their use, Implemented/recommended resources for alarm system (personal alarm, bed alarm, call bell, etc.)  and Implemented/recommended environmental changes (remove hazards, lower bed, improve lighting, etc.)    INTERDISPLINARY COMMUNICATION/COLLABORATION:  Physician, MSW, [place] and RN, CNA    NEW MEDICATION INITIATION DOCUMENTATION:      Reason medication is being initiated:      MD / Provider name consulted re: change in status / initiation of new medication:      New Symptom(s):      New Order(s):      Name of the person notified of the changes:     Name of person being taught:    Instructions given:      Side Effects taught:      Response to teachin [address] free  Is Patient/family satisfied with symptom level?  yes    DISCHARGE PLAN:  Plan for tx to [facility] soon for surgical intervention,then home and be followed by home hospice    19:30,report received,assumed care of pt,who is sleeping,visitors are @ bedside. PCA Dilaudid continues via rt PICC and pt appears comfortable. Jama is intact. 22:25,lg smear of stool noted,turned ,cleaned and repositioned,noted small open sore behind rt thigh,linens changed with much difficulty by 3 people. Pt crying out in pain,medicated with dilaudid 4mg IVP and Torodol for comfort. 00:00,sleeping.   04:00,remains asleep.  06:00,awakened for scheduled Methadone,generalized pain is 4/10.

## 2017-07-15 NOTE — PROGRESS NOTES
0715:  Verbal shift change report given to Garo Pfeiffer RN (oncoming nurse) by Vernell Tavares RN (offgoing nurse). Report included the following information SBAR, Kardex, Intake/Output and MAR.   0930:  Administered prn dulcolax 5mg/PO; patient has not had BM since 7/10 and is concerned about constipation. 1215:  Patient requesting medication for itching; applied A&D ointment and administered prn benadryl 25mg/PO. 1515:  Patient requested toradol & ativan; administered prn toradol 30mg/IV and lorazepam 2mg/PO. NAME OF PATIENT:  Charla Randall    LEVEL OF CARE: Routine    REASON FOR GIP:   n/a    *PATIENT REMAINS ELIGIBLE FOR GIP LEVEL OF CARE AS EVIDENCED BY: (MUST BE ADDRESSED OF PATIENT GIP)      REASON FOR RESPITE:  n/a    O2 SAFETY:  n/a    FALL INTERVENTIONS PROVIDED:   n/a    INTERDISPLINARY COMMUNICATION/COLLABORATION:  Physician, MSW, [place] and RN, CNA    NEW MEDICATION INITIATION DOCUMENTATION:  n/a    Reason medication is being initiated:  n/a    MD / Provider name consulted re: change in status / initiation of new medication:  n/a    New Symptom(s):  n/a    New Order(s):  n/a    Name of the person notified of the changes:  n/a    Name of person being taught:  n/a    Instructions given:  n/a    Side Effects taught:  n/a    Response to teaching:  n/a      COMFORTABLE DYING MEASURE:  Is Patient/family satisfied with symptom level?  yes    DISCHARGE PLAN:  Plan for tx to [facility] soon for surgical intervention,then home and be followed by home hospice.

## 2017-07-15 NOTE — HOSPICE
NAME OF PATIENT:  Jyoti Randall    LEVEL OF CARE:  Routine    REASON FOR GIP:       *PATIENT REMAINS ELIGIBLE FOR GIP LEVEL OF CARE AS EVIDENCED BY: (MUST BE ADDRESSED OF PATIENT GIP)      REASON FOR RESPITE:      O2 SAFETY:  Concentrator positioning (6\" from furniture/drapes), Tanks stored in [place] , No petroleum based products on face while oxygen in use and Oxygen sign on the door    FALL INTERVENTIONS PROVIDED:   Implemented/recommended use of non-skid footwear, Implemented/recommended use of fall risk identification flag to all team members, Implemented/recommended assistive devices and encouraged their use, Implemented/recommended resources for alarm system (personal alarm, bed alarm, call bell, etc.)  and Implemented/recommended environmental changes (remove hazards, lower bed, improve lighting, etc.)    INTERDISPLINARY COMMUNICATION/COLLABORATION:  Physician, MSW, [place] and RN, CNA    NEW MEDICATION INITIATION DOCUMENTATION:      Reason medication is being initiated:      MD / Provider name consulted re: change in status / initiation of new medication:      New Symptom(s):      New Order(s):      Name of the person notified of the changes:      Name of person being taught:      Instructions given:      Side Effects taught:      Response to teachin [address] free  Is Patient/family satisfied with symptom level?  yes    DISCHARGE PLAN:  Return home and be followed by home hospice,after soft tissue/tumor ablation @ [facility]    19:30,report received,assumed care of pt who is Routine care,hospice dx is metastatic endometrial cancer. Pt is eating some food that her friend brought her. PCA Dilaudid continues,pt rates pain 7/10,but she falls back to sleep quickly. 22:00,very sleepy,given Miralax for constipation. Pt comfortable. 02:00,asleep.  04:00,awake,watching TV,voices comfort. 05:30,pain in RLE 6/10,medicated with Torodol. Pt drinking a pepsi and eating sub.

## 2017-07-16 NOTE — [facility] IDG MASTER NOTE
[facility]  Date: 17  Time: 2:22 AM                         [facility]    Patient Name  Jyoti Randall    Episode -     Date of IDT Meeting  17    UPDATED COMPREHENSIVE ASSESSMENT      ATTENDING Physician                          CLINICAL STATUS metastatic endometrial cancer         SYMPTOMS generalized pain         SIGNS 97.5-92-16-99%,100/80         LAB VALUES (when available)         KARNOFSKY          FAST for all dementia         Progression to DEPENDENCE WITH ADLs (include time frame) Complete care,unable to do anything for herself except feed herself. It requires 3 people to turn her,change her sheets and clean her after she has a stool. PRESSURE ULCERS left buttocks cheek         DISEASE SPECIFIC pain,immobility from tumor growth and nerve pressure         FALL RISK:low    PROBLEM:pain,immobility    GOAL:pain management    INTERVENTIONS(INDIVIDUALIZED) Pt has an airflow mattress,but I believe that a larger,\"big boy\" bed would be more appropriate. It is very difficult to reposition her. Administration of prn doses of Torodol and Dilaudid,in addition to her Dilaudid PCA pump. The possibility of having a procedure to ablate some tumor or tissue is being explored. Nursing Visit Frequency 24hr    Hospice Aide Visit Frequency 24hr         SW    COPING    GRIEF    ADVANCED DIRECTIVES        BEREAVEMENT    RESOURCES NEEDED    SW Visit Frequency         Bereavement     NO CHANGE         Volunteer    NO VOLUNTEER             SPIRITUAL ISSUES    GRIEF    COPING    [address]     Visit Frequency         Clinician Signatures    Nurse___Haley Hernandez RN___________________________    SW________________________________    Chaplain____________________________    Volunteer Coordinator__________________  ___________________    Patient: Renetta Worthy  Insurance ID: [unfilled]  N: [id number]  CCN:   [place] Claim No. :     Hospice Election Date:   Current Benefit Period:   Current Benefit Period Start Date:     Medical Director:   Hospice Attending Provider:     ___________________    Diagnoses:  Diagnoses of Elevated liver function tests, Chronic neoplasm-related pain, Bone metastases ([facility] Insomnia due to medical condition, Endometrial cancer ([facility] Advance care planning, Morbid obesity, unspecified obesity type ([facility] Vaginal pain, Generalized edema, and Anxiety were pertinent to this visit.     Current Medications:    Current Facility-Administered Medications:     LORazepam (ATIVAN) tablet 1 mg, 1 mg, Oral, TIDPC, Jurline Skiff, MD, 1 mg at 07/15/17 1822    LORazepam (ATIVAN) tablet 2 mg, 2 mg, Oral, TID PRN, Jurline Skiff, MD, 2 mg at 07/15/17 1516    ketorolac (TORADOL) injection 30 mg, 30 mg, IntraVENous, Q6H PRN, Jurline Skiff, MD, 30 mg at 07/15/17 2225    senna-docusate (PERICOLACE) 8.6-50 mg per tablet 2 Tab, 2 Tab, Oral, BID, Jurline Skiff, MD, 2 Tab at 07/15/17 1821    polyethylene glycol (MIRALAX) packet 17 g, 17 g, Oral, DAILY PRN, Jurline Skiff, MD, 17 g at 07/14/17 2153    metoprolol tartrate (LOPRESSOR) tablet 12.5 mg, 12.5 mg, Oral, BID, Jurline Skiff, MD, 12.5 mg at 07/15/17 2104    bisacodyl (DULCOLAX) tablet 5 mg, 5 mg, Oral, DAILY PRN, Jurline Skiff, MD, 5 mg at 07/15/17 0935    HYDROmorphone (PF) (DILAUDID) injection 4 mg, 4 mg, IntraVENous, Q15MIN PRN, Jurline Skiff, MD, 4 mg at 07/15/17 2225    diphenhydrAMINE (BENADRYL) capsule 25 mg, 25 mg, Oral, Q4H PRN, Jurline Skiff, MD, 25 mg at 07/15/17 1208    acetaminophen (TYLENOL) tablet 650 mg, 650 mg, Oral, Q4H PRN, Jurline Skiff, MD, 650 mg at 07/06/17 0229    Hydromorphone 500mg/500mL bag [facility], , IntraVENous, CONTINUOUS, Jurline Skiff, MD    gabapentin (NEURONTIN) capsule 300 mg, 300 mg, Oral, QHS, Cally Freire NP, 300 mg at 07/15/17 2104    methadone (DOLOPHINE) tablet 10 mg, 10 mg, Oral, Q8H, Cally Freire NP, 10 mg at 07/15/17 2104    magic mouthwash cpd (without sucralfate), 5 mL, Oral, QID PRN, Pato Freire NP    DULoxetine (CYMBALTA) capsule 60 mg, 60 mg, Oral, QHS, Cally Freire NP, 60 mg at 07/15/17 2100    sulindac (CLINORIL) tablet 200 mg, 200 mg, Oral, BID WITH MEALS, Cally Freire NP, 200 mg at 07/15/17 3569    Orders:  Orders Placed This Encounter    DIET REGULAR     Standing Status:   Standing     Number of Occurrences:   1    NURSING-MISCELLANEOUS: (see comments) 1. NO admission labs, x-rays or other diagnostic tests, unless pertinent to symptom control . 2. Discontinue ALL prior medications. CONTINUOUS     1. NO admission labs, x-rays or other diagnostic tests, unless pertinent to symptom control . 2. Discontinue ALL prior medications. Standing Status:   Standing     Number of Occurrences:   1     Order Specific Question:   Description of Order:     Answer:   (see comments)    COMFORT MEASURES ONLY     Standing Status:   Standing     Number of Occurrences:   1    VITAL SIGNS     Standing Status:   Standing     Number of Occurrences:   1    NOTIFY PROVIDER: SPECIFY NOTIFY PROVIDER: FOR PAIN, DYSPNEA, AGITATION, OTHER DISTRESS OR NOT RESPONDING TO ORDERED INTERVENTIONS CONTINUOUS Routine     Standing Status:   Standing     Number of Occurrences:   1     Order Specific Question:   Please describe the test or procedure you would like to order. Answer:   NOTIFY PROVIDER: FOR PAIN, DYSPNEA, AGITATION, OTHER DISTRESS OR NOT RESPONDING TO ORDERED INTERVENTIONS    ORAL CARE     Keep mouth moisturized with sponge sticks/toothettes and tap water. Vaseline to lips and nares as needed.      Standing Status:   Standing     Number of Occurrences:   1    NURSING-MISCELLANEOUS: PET THERAPY CONTINUOUS     Standing Status:   Standing     Number of Occurrences:   1     Order Specific Question:   Description of Order:     Answer:   PET THERAPY    BEDREST, COMPLETE     Standing Status:   Standing     Number of Occurrences:   1    TURN & POSITION     TURN & POSITION EVERY 6 HOURS - PATIENT MAY REFUSE     Standing Status:   Standing     Number of Occurrences:   1    NURSING-MISCELLANEOUS: Routine level of care change effective 1000 am on 7-5-17. Admit to routine level of care; Sn visits 2 x weekly with 5 visits PRN; SW visit 1 x monthly;  visit 1 x monthly; CNA daily x 5 days. CONTINUOUS     Standing Status:   Standing     Number of Occurrences:   1     Order Specific Question:   Description of Order:     Answer:   Routine level of care change effective 1000 am on 7-5-17. Admit to routine level of care; Sn visits 2 x weekly with 5 visits PRN; SW visit 1 x monthly;  visit 1 x monthly; CNA daily x 5 days.  DO NOT RESUSCITATE     Standing Status:   Standing     Number of Occurrences:   1    OXYGEN CANNULA Liters per minute: 2; Indications for O2 therapy: OTHER PRN Routine     Oxygen as needed. Adjust for comfort. Discontinue if not contributing to patient comfort. Standing Status:   Standing     Number of Occurrences:   [id number]     Order Specific Question:   Liters per minute:      Answer:   2     Order Specific Question:   Indications for O2 therapy     Answer:   OTHER    DISCONTD: cephALEXin (KEFLEX) capsule 500 mg     Order Specific Question:   Antibiotic Indications     Answer:   Skin and Soft Tissue Infection    DISCONTD: LORazepam (ATIVAN) tablet 1 mg    methadone (DOLOPHINE) tablet 10 mg    DISCONTD: polyethylene glycol (MIRALAX) packet 17 g    magic mouthwash cpd (without sucralfate)    DISCONTD: gabapentin (NEURONTIN) capsule 600 mg    DISCONTD: metoprolol tartrate (LOPRESSOR) tablet 50 mg    DISCONTD: docusate sodium (COLACE) capsule 100 mg    DULoxetine (CYMBALTA) capsule 60 mg    sulindac (CLINORIL) tablet 200 mg    DISCONTD: morphine (PF)  mg/30 ml    DISCONTD: LORazepam (ATIVAN) tablet 1 mg    DISCONTD: dextrose 5% and 0.9% NaCl infusion    DISCONTD: dextrose 5 % - 0.45% NaCl infusion    DISCONTD: metoprolol tartrate (LOPRESSOR) tablet 25 mg    DISCONTD: HYDROmorphone (PF) (DILAUDID) injection 1 mg    DISCONTD: HYDROmorphone (PF) 15 mg/30 ml (DILAUDID) PCA    gabapentin (NEURONTIN) capsule 300 mg    Hydromorphone 500mg/500mL bag [facility]    acetaminophen (TYLENOL) tablet 650 mg    DISCONTD: diphenhydrAMINE (BENADRYL) capsule 50 mg    DISCONTD: HYDROmorphone (PF) (DILAUDID) injection 1.5 mg    DISCONTD: HYDROmorphone (PF) (DILAUDID) injection 3 mg    DISCONTD: LORazepam (ATIVAN) tablet 0.5 mg    ketorolac (TORADOL) injection 30 mg    diphenhydrAMINE (BENADRYL) capsule 25 mg    phenazopyridine (PYRIDIUM) tablet 100 mg    ketorolac (TORADOL) injection 30 mg    HYDROmorphone (PF) (DILAUDID) injection 4 mg    DISCONTD: LORazepam (ATIVAN) tablet 0.5 mg    ketorolac (TORADOL) injection 30 mg    DISCONTD: senna-docusate (PERICOLACE) 8.6-50 mg per tablet 2 Tab    ketorolac (TORADOL) injection 30 mg    metoprolol tartrate (LOPRESSOR) tablet 12.5 mg    bisacodyl (DULCOLAX) tablet 5 mg    senna-docusate (PERICOLACE) 8.6-50 mg per tablet 2 Tab    polyethylene glycol (MIRALAX) packet 17 g    ketorolac (TORADOL) injection 30 mg    LORazepam (ATIVAN) tablet 1 mg    LORazepam (ATIVAN) tablet 2 mg    INITIAL PHYSICIAN ORDER: HOSPICE Level Of Care: General; Reason for Admission: Admit for GIP level of care for symptom management of Pain     Standing Status:   Standing     Number of Occurrences:   1     Order Specific Question:   Status     Answer:   Hospice     Order Specific Question:   Level Of Care     Answer:   General     Order Specific Question:   Reason for Admission     Answer:   Admit for GIP level of care for symptom management of Pain     Order Specific Question:   Admitting Physician     Answer:   Eve Walls     Order Specific Question:   Attending Physician     Answer: Joni Fu     Order Specific Question:   Discharge Plan:     Answer:   Home with Luana [place]: HOSPICE Level Of Care: Routine; Reason for Admission: Routine level of care change     Standing Status:   Standing     Number of Occurrences:   1     Order Specific Question:   Status     Answer:   Hospice     Order Specific Question:   Level Of Care     Answer:   Routine     Order Specific Question:   Reason for Admission     Answer:   Routine level of care change     Order Specific Question:   Admitting Physician     Answer:   Joni Fu     Order Specific Question:   Attending Physician     Answer:   Joni Fu       Allergies: Allergies   Allergen Reactions    Egg Nausea and Vomiting     Raw egg and scrambled eggs. Egg products okay.  Pcn [Penicillins] Nausea and Vomiting     \"but could take amoxil\"    Shellfish Containing Products Unknown (comments)    Tetanus And Diphtheria Toxoids, Adsorbed, Adult Other (comments)     Developed local swelling, axillary pain, and transient fever    Tomato Unknown (comments)       ___________________    Care Team Notes          POC/IDG Notes      [facility] IDG  Notes by Robert Benavides at 07/11/17 1302  Version 1 of 1    Author:  Robert Benavides Service:  Pauline Arenas Author Type:  Pastoral Care    Filed:  07/11/17 1302 Date of Service:  07/11/17 1302 Status:  Signed    :  Robert Benavides (Pastoral Care)           Patient: Torin Joe    Date: 07/11/17  Time: 1:02 PM  SPIRITUAL ISSUES:  I visited the room of this pt at the [facility] who is on Routine Status. She was in bed, semi-awake and not fully alert, not agitated, not restless, and appeared well medicated and comfortable. She was lying on her back and appeared comfortable. I confirmed with her the Rosana had continued to visit her and that I would be visiting as well, if she so desired.   Her spirits were moderate and her attitude somewhat magen as she did not wish to come back to the [facility]  Patient and Family use geovanny as a primary coping mechanism but are very stressed and struggling with this long, difficult journey. GOALS:  Continue to visit this pt and her family while she is at the [facility] and I am in the facility, to provide pastoral care and spiritual support to assist both the pt and them with coping with this difficult medical situation and decline. Coordinate w/  Malgorzata Mishra as she assumes primary care for this patient  Coordinate with Chaplain Foster if/when the pt returns to her mothers home. Visit this pt and her family, while she is @ [facility] and I am in this facility, or PRN as requested. Coordinate with Care Team on POC  . Signed by: Robert Benavides       [facility] Nurse Notes by Apolonia Nixon RN at 07/10/17 2215  Version 1 of 1    Author:  Apolonia Nixon RN Service:  NURSING Author Type:  Registered Nurse    Filed:  07/10/17 2237 Date of Service:  07/10/17 2215 Status:  Deleted by Apolonia Nixon RN at 07/10/17 2237    : Apolonia Nixon RN (Registered Nurse)           Patient: Torin Joe    Date: 07/10/17  Time: 10:15 PM    [facility] Nurse Notes                                                                                                                            Favorites                                    Catrachita Cameron        Inbox?(1)? Drafts? [2]? Sent Items      Deleted Items?(3)?         admin    Archive Retention Folders        Junk E-mail      Notes      RSS Feeds      Search Folders        [address] IDT notes- due ward Wayne Copas, Dominick Ellis Items    Tuesday, April 18, 2017 4:10 AM                                                        Halbi Srikanth. [address] [place], [address] [facility]  W: [phone] F: [phone]    email:  [email or web address] [facility] provides good help to those in need at the end of life, embracing dignity, compassion and quality care for all patients, their families and the community. Description: Description: [email or web address]         Good Help to Those in [facility] [address]                                      Dotty Feil            Actions            To:            Laura Beltran?; Susannah Barnes      Cc:            Rahel Phillips               Tuesday, September 20, 2016 5:14 PM                          This message was sent with High importance. You forwarded this message on 4/18/2017 4:10 AM.          Carissa Jamil and Sara Padilla,    Please complete the IDT notes tonight on all [facility] for tomorrows IDT meeting. Click on the Hospice navigator activity (on the left side of the screen), then click on the IDG tab, then click on Create Note (under Nurse Notes). You can then copy and paste the template below and fill it in. If you have any questions, please contact me. Thank you.     [address]    Patient Name      Episode -     Date of IDT Meeting      UPDATED COMPREHENSIVE ASSESSMENT      ATTENDING Physician                          CLINICAL STATUS         SYMPTOMS         SIGNS         LAB VALUES (when available)         KARNOFSKY          FAST for all dementia         Progression to DEPENDENCE WITH ADLs (include time frame)         PRESSURE ULCERS          DISEASE SPECIFIC         FALL RISK:    PROBLEM:    GOAL:    INTERVENTIONS(INDIVIDUALIZED)                   Nursing Visit Frequency    Hospice Aide Visit Frequency         SW    COPING    GRIEF    ADVANCED DIRECTIVES        BEREAVEMENT    RESOURCES NEEDED    SW Visit Frequency         Bereavement     NO CHANGE         Volunteer    NO VOLUNTEER             SPIRITUAL ISSUES    GRIEF    COPING         AVAILABLE [facility] RESOURCES     Visit Frequency         Clinician Signatures    Nurse______________________________    SW________________________________    Chaplain____________________________    Volunteer Coordinator__________________    Bereavement_________________________             Options             Find Someone                                                                                                  Favorites                                    Yetpaulo Lara        Inbox?(1)? Drafts? [2]? Sent Items      Deleted Items?(3)?         admin    Archive Retention Folders        Junk E-mail      Notes      RSS Feeds      Search Folders        [address] Dominick Ellis Items    Tuesday, April 18, 2017 4:10 AM                                                        Halbi Rosanneable. [address] [place], [address] [facility]  W: [phone] F: [phone]    email:  [email or web address] [facility] provides good help to those in need at the end of life, embracing dignity, compassion and quality care for all patients, their families and the community. Description: Description: [email or web address]         Good Help to Those in [facility] [address]                                      Dotty Feil            Actions            To:            Laura Beltran?; Susannahzita Barnes      Cc:            Rahel Phillips               Tuesday, September 20, 2016 5:14 PM                          This message was sent with High importance. You forwarded this message on 4/18/2017 4:10 AM.          Carissa Jamil and Sara Padilla,    Please complete the IDT notes tonight on all [facility] patients for tomorrows IDT meeting. Click on the Hospice navigator activity (on the left side of the screen), then click on the IDG tab, then click on Create Note (under Nurse Notes). You can then copy and paste the template below and fill it in. If you have any questions, please contact me. Thank you.     [address]    Patient Name      Episode -     Date of IDT Meeting      UPDATED COMPREHENSIVE ASSESSMENT      ATTENDING Physician                          CLINICAL STATUS         SYMPTOMS         SIGNS         LAB VALUES (when available)         KARNOFSKY          FAST for all dementia         Progression to DEPENDENCE WITH ADLs (include time frame)         PRESSURE ULCERS          DISEASE SPECIFIC         FALL RISK:    PROBLEM:    GOAL:    INTERVENTIONS(INDIVIDUALIZED)                   Nursing Visit Frequency    Hospice Aide Visit Frequency         SW    COPING    GRIEF    ADVANCED DIRECTIVES        BEREAVEMENT    RESOURCES NEEDED    SW Visit Frequency         Bereavement     NO CHANGE         Volunteer    NO VOLUNTEER             SPIRITUAL ISSUES    GRIEF    COPING         AVAILABLE [facility] RESOURCES     Visit Frequency         Clinician Signatures    Nurse______________________________    SW________________________________    Chaplain____________________________    Volunteer Coordinator__________________    Bereavement_________________________             Options             Find Someone                                                                                                  Favorites                                    Yetpaulo Lara        Inbox?(1)? Drafts? [2]? Sent Items      Deleted Items?(3)?         admin    Archive Retention Folders        Junk E-mail      Notes      RSS Feeds      Search Folders        [address] [place] [facility]    Patient Name  Dax Easton    Episode -     Date of IDT Meeting  17    UPDATED COMPREHENSIVE ASSESSMENT      ATTENDING Physician                          CLINICAL STATUS metastatic endometrial cancer         SYMPTOMS generalized pain         SIGNS 97.5-92-16-99%,100/80         LAB VALUES (when available)         KARNOFSKY          FAST for all dementia         Progression to DEPENDENCE WITH ADLs (include time frame) Complete care,unable to do anything for herself except feed herself. It requires 3 people to turn her,change her sheets and clean her after she has a stool. PRESSURE ULCERS left buttocks cheek         DISEASE SPECIFIC pain,immobility from tumor growth and nerve pressure         FALL RISK:low    PROBLEM:pain,immobility    GOAL:pain management    INTERVENTIONS(INDIVIDUALIZED) Pt has an airflow mattress,but I believe that a larger,\"big boy\" bed would be more appropriate. It is very difficult to reposition her. Administration of prn doses of Torodol and Dilaudid,in addition to her Dilaudid PCA pump. The possibility of having a procedure to ablate some tumor or tissue is being explored. Nursing Visit Frequency 24hr    Hospice Aide Visit Frequency 24hr         SW    COPING    GRIEF    ADVANCED DIRECTIVES        BEREAVEMENT    RESOURCES NEEDED    SW Visit Frequency         Bereavement     NO CHANGE         Volunteer    NO VOLUNTEER             SPIRITUAL ISSUES    GRIEF    COPING    [address]     Visit Frequency         Clinician Signatures    Nurse___Haley Hernandez RN___________________________    CHELO________________________________    Chaplain____________________________    Abhilash Rodriguez Coordinator__________________    Bereavement_________________________    Signed by: Latisha Das RN       [facility] IDG  Notes by Omar Dickson at 06/28/17 0813  Version 1 of 1    Author:  Omar Dickson Service:  Giuseppe Anguiano Author Type:      Filed:  06/28/17 0813 Date of Service:  06/28/17 0813 Status:  Signed    :  Omar Dickson ()           Patient: Mitchel Harvey    Date: 06/28/17  Time: 8:13 AM    [facility]  Notes  Completed referral for financial assistance from Yelitza Mccann 28.. Patient has been changed from Routine to GIP today due to increased pain. Plan remains for her to return home. Msw to follow with emotional support for patient/family as they cope with illness related changes. Signed by: Omar Dickson       [facility] IDG  Notes by Daniel Mera at 06/28/17 6545  Version 1 of 1    Author:  Daniel Mera Service:  HOSPICE Author Type:  Pastoral Care    Filed:  06/28/17 0652 Date of Service:  06/28/17 0651 Status:  Signed    :  Daniel Mera (Pastoral Care)           Patient: Mitchel Harvey    Date: 06/28/17  Time: 6:51 AM  SPIRITUAL ISSUES:  I visited the room of this pt back at the [facility] and is on now Routine Status. She was in bed, semi-awake and not fully alert, not agitated, not restless, and appeared well medicated and comfortable. She was lying on her back and appeared comfortable. I confirmed with her the Roasna had continued to visit her and that I would be visiting as well, if she so desired. Her spirits were moderate and her attitude somewhat stoic as she did not wish to come back to the [facility]  Patient and Family use geovanny as a primary coping mechanism but are very stressed and struggling with this long, difficult journey.   GOALS:  Continue to visit this pt and her family while she is at the [facility] and I am in the facility, to provide pastoral care and spiritual support to assist both the pt and them with coping with this difficult medical situation and decline. Coordinate w/ martin Rankin as she assumes primary care for this patient  Coordinate with Chaplain Foster if/when the pt returns to her mothers home. Visit this pt and her family, while she is @ [facility] and I am in this facility, or PRN as requested. Coordinate with Care Team on POC. Signed by: Micaela Colorado       [facility]  Notes by Micaela Colorado at 06/20/17 1321  Version 1 of 1    Author:  Micaela Colorado Service:  Rickreall Led Author Type:  Pastoral Care    Filed:  06/20/17 1323 Date of Service:  06/20/17 1321 Status:  Signed    :  Micaela Colorado (Pastoral Care)           Patient: Shaheed Kramer    Date: 06/20/17  Time: 1:22 PM  SPIRITUAL ISSUES:  I visited the room of this pt who is again at the [facility] and is on GIP Status. She was in bed, semi-awake and not fully alert, not agitated, not restless, and appeared well medicated and comfortable. She was lying on her back and appeared comfortable. I confirmed with her the Rosana had continued to visit her and that I would be visiting as well, if she so desired. Her spirits were moderate and her attitude somewhat stoic as she did not wish to come back to the [facility]  Patient and Family use geovanny as a primary coping mechanism but are very stressed and struggling with this long, difficult journey. GOALS:  Continue to visit this pt and her family while she is at the [facility] and I am in the facility, to provide pastoral care and spiritual support to assist both the pt and them with coping with this difficult medical situation and decline. Coordinate w/ Chaplain Rankin as she assumes primary care for this patient  Coordinate with Chaplain Foster if/when the pt returns to her mothers home. Visit this pt and her family, while she is @ [facility] and I am in this facility, or PRN as requested. Coordinate with Care Team on POC.       Signed by: iMcaela Colorado       [facility] Nurse Notes by Radha Huddleston at 06/20/17 0316  Version 1 of 1    Author:  Radha Huddleston Service:  (none) Author Type:  Registered Nurse    Filed:  06/20/17 0317 Date of Service:  06/20/17 0316 Status:  Signed    :  Radha Huddleston (Registered Nurse)           Patient: Angelique Crook       Date: 06/20/17  Time: 2:57 AM     [facility] Nurse Notes     Date: 06/20/2017  Time: Gunzing 9   Patient Name [address]  Episode -   Date of IDT Meeting 6/20/17  UPDATED COMPREHENSIVE ASSESSMENT Pt has been alert and fully oriented, able to express needs adequately. VS are within normal limits with only her HR being slightly elevated. Pts lungs are clear. Pain is rated at 4-5/10 at the moment. Pt appears to be comfortable, absence of grimacing or anything that would indicate discomfort. Pt is still utilizing her PCA with 7 attempts on last shift. Pts Dilaudid pump is currently running at 3 mg/ hour basal rate and 1 mg bolus rate, every 6 mins. ATTENDING Physician   CLINICAL STATUS   SYMPTOMS pain  SIGNS elevated HR, adult numeric pain scale ratings of 4-5/10 at times when pt is \"comfortable\", PCA pump bolus doses maximized most days   LAB VALUES (when available)   KARNOFSKY   FAST for all dementia   Progression to DEPENDENCE WITH ADLs (include time frame) Pt is completely limited and dependant on help with any ADLs. She can move her upper extremities but is unable to voluntarily move her lower extremities. If she is being moved she is in severe pain. PRESSURE ULCERS N/A  DISEASE SPECIFIC Severe pain when legs are being moved even slightly, affecting all aspects of daily activities and increasing risks for pressure ulcers and skin lacerations.   FALL RISK: low  PROBLEM: pain  GOAL: comfort, absence of pain, pt needs to reach a level at which she rates her pain lower than 3 at all times and movement does not cause her severe pain and anxiety. INTERVENTIONS(INDIVIDUALIZED) If needed, her PCA basal rate needs to be increased so that pt is pain-free most of the time and the frequency of needing a bolus dose decreases which would indicate an adequate pain regimen. Pt will be positioned for comfort according to her wishes. Nursing Visit Frequency 24hr  Hospice Aide Visit Frequency 24 hr             Signed by: Ambika Mora       [address] [place] [address]  Notes by Duyen Self at 06/13/17 4176  Version 1 of 1    Author:  Duyen Self Service:  Imelda Adams Author Type:      Filed:  06/13/17 0854 Date of Service:  06/13/17 0853 Status:  Signed    :  Duyen Self ()           Patient: Pratik Memos    Date: 06/13/17  Time: 8:53 AM    [facility]  Notes  Family meeting with mother, 2 sisters, Dr. Colton Lopez RN, Luzmaria gomez and Stevo Gar. Dr. Randy Andersen talked about possibility of patient not being able to return home but not ruled out. She also explained that patients pain was still an ongoing issue that was being addressed but  she wouldnt be able to experience total relief. Family very tearful and saddened when Dr. Randy Andersen expressed belief that patient was declining and would most likely pass at the [facility]. Emotional support provided by staff as family expressed their grief. Mother spoke  to patient following meeting and requested that other family members be made aware of dx and prognosis. She agreed. Meeting was held later in day, attended by [facility], during which family was informed of patients status. Family trying to cope with their issues of loss/grief.               Signed by: Duyen Self       [address] [place] [address] Nurse Notes by Melita Pearl RN at 06/12/17 0301  Version 2 of 2    Author:  Melita Pearl RN Service:  Imelda Adams Author Type:  Registered Nurse    Filed:  06/13/17 0356 Date of Service:  06/12/17 0301 Status:  Addendum    :  Pérez Miller Chelsi Willson, RN (Registered Nurse)      Related Notes: Original Note by Kris Herrera, RN (Registered Nurse) filed at 06/12/17 0310         Patient: Oral Rivas    Date: 06/12/17  Time: 3:01 AM    \[facility]\"" Nurse Notes    [address]  Patient Name  Jyoti bonner  Episode -   Date of IDT Meeting  6-13-17    UPDATED COMPREHENSIVE ASSESSMENT      Neuro - alert and oriented  Muscular - moves upper extremities, moves left leg slightly, unable to move r leg which is externally rotated and very edematous  Resp - lungs clear, room air  Heart WNL BP 98/60 lopressor held last pm  GI - appetite good eats mostly fast food from family and sweets, last BM 6/9, on bowel regimen   - davila to BSD inserted 5/15/17  Skin - intact    ATTENDING Physician  Dr Arthur Barnes pain     SIGNS pt states when she is in pain, always has pain if moved     LAB VALUES (when available)     KARNOFSKY 30%     FAST for all dementia N/A     Progression to DEPENDENCE WITH ADLs (include time frame) Complete Care pt can feed self     PRESSURE ULCERS None     DISEASE SPECIFIC Diabetes     FALL RISK: None  PROBLEM: 1. Pt is unable to turn well in bed could use Bariatric Bed  2. Pain management continues to be an issue for the patient. She states that her pain is never managed. Although she was started on Dilaudid per PCA pump it is a very low dose. Another side issue is that pt prefers for nurse to give IVP bolus instead of using the PCA button. She asked how often she could have that and when I told her q 15 minutes she asked for it 3 times before she was obtunded. I explained to the patient that the IVP was for when pain was out of control and she had been pushing PCA frequently. She assured me that she had. At end of shift it was noted that patient had only pushed PCA 13 times entire shift.   3. Apparently there was a family meeting about prognosis and pt not going home  And EOL here at [facility]. It was not properly communicated to nurses that the patient was not involved in the meeting and was not aware. The issue came up about the specialty bed and if she could take it home. That is when pt stated she was going home and that is why she has the dilaudid pump. GOAL: Pain managed    INTERVENTIONS(INDIVIDUALIZED)   1-Provide patient with specialty bed so that she can be properly cared for. 2-Increase pain medicine so that patient does not have to ask for repeated doses of IVP meds.    3- Involve pt in discharge planning           Nursing Visit Frequency  Hospice Aide Visit Frequency       COPING  GRIEF  ADVANCED DIRECTIVES    BEREAVEMENT  RESOURCES NEEDED   Visit Frequency     Bereavement   NO CHANGE     Volunteer  NO VOLUNTEER       SPIRITUAL ISSUES  GRIEF  COPING  [address] RESOURCES   Visit Frequency     Clinician Signatures  Nurse______________________________  ________________________________  Chaplain____________________________  Volunteer Coordinator__________________  Bereavement_________________________        Signed by: Escobar Slaughter RN       [facility] Nurse Notes by Escobar Slaughter RN at 17  Version 1 of 2    Author:  Escobar Slaughter RN Service:  Cornell Lim Author Type:  Registered Nurse    Filed:  170 Date of Service:  17 Status:  Signed    :  Escobar Slaughter RN (Registered Nurse)      Related Notes: Addendum by Escobar Slaughter RN (Registered Nurse) filed at 17 4813         Patient: Brenda Stone    Date: 17  Time: 3:01 AM    [facility] Nurse Notes        [address] [facility]  Patient Name  Jyoti bonner  Episode -   Date of IDT Meeting  17    UPDATED COMPREHENSIVE ASSESSMENT      Neuro - alert and oriented  Muscular - moves upper extremities, moves left leg slightly, unable to move r leg which is externally rotated and very edematous  Resp - lungs clear, room air  Heart WNL BP 98/60 lopressor held last pm  GI - appetite good eats mostly fast food from family and sweets, last BM , on bowel regimen   - davila to BSD inserted 5/15/17  Skin - intact    ATTENDING Physician  Dr Carlin Expose pain     SIGNS pt states when she is in pain, always has pain if moved     LAB VALUES (when available)     KARNOFSKY 30%     FAST for all dementia N/A     Progression to DEPENDENCE WITH ADLs (include time frame) Complete Care pt can feed self     PRESSURE ULCERS None     DISEASE SPECIFIC Diabetes     FALL RISK: None  PROBLEM: Pt is unable to turn well in bed could use Bariatric Bed  GOAL: Pain managed  INTERVENTIONS(INDIVIDUALIZED) Pain management           Nursing Visit Frequency  Hospice Aide Visit Frequency       COPING  GRIEF  ADVANCED DIRECTIVES    BEREAVEMENT  RESOURCES NEEDED   Visit Frequency     Bereavement   NO CHANGE     Volunteer  NO VOLUNTEER       SPIRITUAL ISSUES  GRIEF  COPING  [address] RESOURCES   Visit Frequency     Clinician Signatures  Nurse______________________________  SW________________________________  Chaplain____________________________  Volunteer Coordinator__________________  Bereavement_________________________        Signed by: Alexia Sheikh RN       [facility]G Nurse Notes by Mary Martinez RN at 17  Version 1 of 1    Author:  Mary Martinez RN Service:  NURSING Author Type:  Registered Nurse    Filed:  06/10/17 2018 Date of Service:  17 Status:  Signed    :  Mary Martinez RN (Registered Nurse)           Patient: Hamzah Ingram    Date: 17  Time: 12:37 AM    [facility]    Patient Name Hamzah Spearing  Episode -    Date of IDT Meeting  2017  Eliazar Hurtado Physician Dr. Lou Ford STATUS  Thais Perez is a 39 y.o. with a past history of metastatic endometrial cancer (mets to liver and bone) who was admitted to [facility] on 5-. By doppler and imaging she does not have DVT/PE. CT of pelvis and right leg show enlarging tumor involving right sacrum, right iliac bone, bilateral rami and proximal right femur (which appears acute). L5 and right sacral nerves are involved with tumor and pressure of tumor creates compression of right femoral and external iliac veins causing stasis. She has complete destruction of the right hip, femur and pelvis with tumor invasion and complete loss of bone. The patient/family chose comfort measures with the support of Hospice. SYMPTOMS  Pain  SIGNS/SYMPTOMS: Patient unable to bear pain of growing tumor involving right sacrum, ilium, spine, femur. Current focus is pain control, although she is also mildly confused.  and caregivers at home were having extreme difficulty in managing her pain and she required daily visits by the nurse with frequent medication adjustments and addition of medication IV route PCA and continuous in order to attempt control of her pain without success. Mobility is still an issue as patient resists movement of any kind due to pain. She was placed supine on admission to [facility] on 6/5/2017. With use of PCA pump, patient allows slight rotation left to right q4 hours throughout the night. Two slight spots of potential breakdown identified on upper abdomen following weeks in prone position with little movement. LAB VALUES (when available)    KARNOFSKY 30  Progression to DEPENDENCE WITH ADLs (include time frame) Patient is bedridden and complete care. turning due to pain. Patient feeds herself and is alert and oriented, but completely immobile. PRESSURE ULCERS: None  DISEASE SPECIFIC: Growing bone and spinal metastases resulting in pain that is difficult to manage.   Signs/symptoms UTI or dehydration. FALL RISK: low  PROBLEM: Pain 4/10 with medication, pain 10/10 with movement. Patient resists turning or movement of any kind due to pain. GOAL:  Pain management  INTERVENTIONS(INDIVIDUALIZED): Consider changing  PCA from morphine to dilaudid, may be toxic with morphine, kidney function is normal from 2 weeks ago, recent episodes of twitching, none noted currently. Pt needs to lay on the left side at least 1 x per day for 20 min as tolerated  Pt will need PT/OT evaluation to help determine a technique for transfer of position in bed, will order, also family and home staff along with [facility] staff need to learn repositioning as per PT/OT while she is in the [facility]. If pt needs IV fluids to relieve signs/symptoms of UTI/dehydration, will consider. Keflex started to prevent infection of skin irritation in groin from difficulty cleansing due to pain on any movement.     Nursing Visit Frequency 24 hr @ [address] [place] [address] [place] Visit Frequency 24 hr @ [facility]    COPING    GRIEF    ADVANCED DIRECTIVES        BEREAVEMENT    RESOURCES NEEDED     Visit Frequency    Bereavement   NO CHANGE    Volunteer    NO VOLUNTEER        SPIRITUAL ISSUES    GRIEF    COPING       AVAILABLE SPIRITUAL RESOURCES     Visit Frequency    Clinician Signatures    Nurse______________________________    SW________________________________    Chaplain____________________________    Volunteer Coordinator__________________    Bereavement_________________________           Signed by: Tora Oppenheim, RN         Electronically signed by Miriam Finnegan RN at 17 0755              [address] [place] [address]  Notes by Elysia Guardado at 17 1526  Version 1 of 1    Author:  Elysia Guardado Service:  HOSPICE Author Type:  Pastoral Care    Filed:  17 1527 Date of Service:  17 152 Status:  Signed    :  Elysia Guardado (Pastoral Care)           Patient: Briana Baptist    Date: 17  Time: 3:27 PM    SPIRITUAL ISSUES:  I visited the room of this pt who arrived back at the [facility] yesterday and is on GIP Status. She was in bed, semi-awake and not fully alert, not agitated, not restless, and appeared well medicated and comfortable. She was lying on her back and appeared comfortable. She was talking on the telephone to her mother, but put the phone down when I entered. I confirmed with her the Rosana had visited her yesterday and I confirmed that I would be visiting as well, if she so desired. She was agreeable to periodic visits. The pt thanked me for visiting and my offer of support. Her spirits were moderate and her attitude somewhat stoic as she did not wish to come back to the [facility]  I learned in earlier visits that she and her family attend [facility] 24 on the [facility] 113. GOALS:  Continue to visit this pt and her family while she is at the [facility] and I am in the facility, to provide pastoral care and spiritual support to assist both the pt and them with coping with this difficult medical situation and decline. Coordinate w/  Alyssia Moreno as she assumes primary care for this patient  Coordinate with Chaplain Foster if/when the pt returns to her mothers home. Visit this pt and her family, while she is @ [facility] and I am in this facility, or PRN as requested. Coordinate with Care Team on POC.   Signed by: Juan Marroquin

## 2017-07-17 NOTE — PROGRESS NOTES
[address] [facility] Help to Those in Need  [phone]    Patient Name: Shanelle Larry  YOB: 1980    Date of Provider Hospice Visit: 07/17/17    Level of Care:   [] General Inpatient (GIP)    [x] Routine   [] Respite    Location of Care:  [] [facility] [] [facility] [] [facility] [] [address] [x] Hospice House [facility]  [] Home [] Other:      Date of Original Hospice Admission: 5-16-17  Hospice Medical Director for Inpatient Care: Dr. Catherne Gottron Diagnosis:  Metastatic Endometrial Adenocarcinoma       HOSPICE DIAGNOSES   Active Symptoms:  1. Generalised pain, worse especially right lower extremity, lower back, especially with movements  2. Confusion; intermittent  3. Anxiety/depression; still an issue, persists  4. Insomnia; worse lately due to increased pain  5. Fatigue/weakness/lethargy: worse, BP very low at times  6. Constipation: worse  7. Itching: less, improved, stable  8. Bladder spasms/vaginal pressure; recurrent, likely due to cancer of endometrium disease advancing  9. Episodes of comfort then an exacerbation of pain which requires multiple medical pain med interventions  10. Bloody urine in davila's catheter and bag     PLAN   Continue routine level of care   1. Increase ativan 2mg  three times daily as needed for anxiety  2. Increase scheduled ativan to 1mg four times daily to help with anxiety and sleep  3. Continue  PCA dilaudid 4mg / hr continuous, PCA dose of 2mg every 6 mts. 4. Continue dilaudid 4mg IV every 15mts as needed for severe pain   5. Continue gabapentin 300mg at bedtime  6. Continue air mattress for her continued comfort  7. Continue clinoril, Cymbalta for adjuvant therapy for bone pain  8. Continue methadone at current levels 10mg every 8 hrs  9. Benadryl 25mg oral everfy 4 hrs as needed for itching  10. Continue Toradol 30mg IV every 6 hours as needed for severe pain; antiinflammatory.    11. Continue metoprolol 12.5mg twice daily as her BP is on low side and HR in 70-80's; not tachycardic. 12. Adjust meds for constipation; increase senna s to 2 tabs bid, change miralax to as needed. 13. Continue  reposition and turn to side during cleaning/ADL care and to prevent skin breakdown. 15.  and SW to support family needs  13. Disposition:  routine care status, symptoms are stable: SW Ms. Maynor Alvarez is assisting pt's mom to complete medicaid application and foundation forms have been submitted for additional resource help. 16. Plan for CT guided soft tissue ablation of tumor with assistance from Dr. Susanna Chairez Radiologist. Davy Sauer with admissions at [facility] and received approval for admitting pt there to undergo surgery through hospice. Pt will be admitted to [facility] under GIP status and will have procedure done following which hopefully she can go home and continue hospice services. Goal for this intervention is palliation of pain. Prognosis estimated based on 07/17/17 clinical assessment is:   [] Few to Many Hours  [] Hours to Days   [] Few to Many Days   [] Days to Weeks    [x] Few to Many Weeks   [] Weeks to Months   [] Few to Many Months    Communicated plan of care with: Hospice Case Manager; Hospice IDT; Care Team     GOALS OF CARE     Resuscitation Status: DNR  Durable DNR: [x] Yes [] No    Advance Care Planning 5/10/2017   Patient's Healthcare Decision Maker is: Legal Next of Andrea 69   Primary Decision Maker Name Miroslava Hanley   Primary Decision Maker Phone Number -   Primary Decision Maker Relationship to Patient Parent   Confirm Advance Directive Yes, on file   Patient Would Like to Complete Advance Directive -        HISTORY     History obtained from: chart, staff    CHIEF COMPLAINT: pain in leg/back  The patient is:   [x] Verbal  [] Nonverbal  [] Unresponsive    HPI/SUBJECTIVE:  Pt c/o increased pain in her leg and all over. Taking all scheduled meds and also toradol regulatly. Stays anxious all times and unable to rest and sleep.    Pt has been referred to IR: Dr. Donovan Blankenship to undergo procedure at [facility]: CT guided soft tissue tumor ablation in attempt to relieve pain. REVIEW OF SYSTEMS     The following systems were: [x] reviewed  [] unable to be reviewed    Positive ROS include:  Constitutional: fatigue, weakness  Ears/nose/mouth/throat:   Respiratory:  Gastrointestinal:vaginal pressure sensation  Musculoskeletal:weakness,pain in the right hip  Neurologic: numbness and burning pain  Psychiatric:anxiety, depression, insomnia  Endocrine:          FUNCTIONAL ASSESSMENT     Palliative Performance Scale (PPS): 30%     PSYCHOSOCIAL/SPIRITUAL ASSESSMENT     Principal Problem:    Endometrial cancer ([address] (7/7/2010)    Active Problems: Morbid obesity ([address] (7/7/2010)      Bone metastases ([address] (3/10/2014)      Past Medical History:   Diagnosis Date    Cancer ([address]     uterine    CVI (common variable immunodeficiency) ([address]     Diabetes ([address]     Elevated liver function tests 10/21/2010    Endometrial cancer ([address] 7/7/2010    Hypertension     Iron deficiency anemia     Menometrorrhagia 10/21/2010    Microcytic anemia 10/21/2010    Morbid obesity ([address]     Prediabetes 7/7/2010    Psychiatric disorder     depression    Radiation     completed radiation mid-march      Past Surgical History:   Procedure Laterality Date    CARDIAC SURG PROCEDURE UNLIST      hx of tachycardia    HX GYN      hysterectomy      Social History   Substance Use Topics    Smoking status: Never Smoker    Smokeless tobacco: Never Used    Alcohol use Yes     Family History   Problem Relation Age of Onset    Hypertension Mother     Hypertension Father     Stroke Maternal Grandfather     Cancer Paternal Grandmother      breast    Diabetes Paternal Grandmother       Allergies   Allergen Reactions    Egg Nausea and Vomiting     Raw egg and scrambled eggs. Egg products okay.      Pcn [Penicillins] Nausea and Vomiting     \"but could take amoxil\"    Shellfish Containing Products Unknown (comments)    Tetanus And Diphtheria Toxoids, Adsorbed, Adult Other (comments)     Developed local swelling, axillary pain, and transient fever    Tomato Unknown (comments)      Current Facility-Administered Medications   Medication Dose Route Frequency    morphine 10 mg/mL injection 15 mg  15 mg IntraVENous Q15MIN PRN    LORazepam (ATIVAN) tablet 1 mg  1 mg Oral AC&HS    LORazepam (ATIVAN) tablet 2 mg  2 mg Oral TID PRN    ketorolac (TORADOL) injection 30 mg  30 mg IntraVENous Q6H PRN    senna-docusate (PERICOLACE) 8.6-50 mg per tablet 2 Tab  2 Tab Oral BID    polyethylene glycol (MIRALAX) packet 17 g  17 g Oral DAILY PRN    metoprolol tartrate (LOPRESSOR) tablet 12.5 mg  12.5 mg Oral BID    bisacodyl (DULCOLAX) tablet 5 mg  5 mg Oral DAILY PRN    diphenhydrAMINE (BENADRYL) capsule 25 mg  25 mg Oral Q4H PRN    acetaminophen (TYLENOL) tablet 650 mg  650 mg Oral Q4H PRN    Hydromorphone 500mg/500mL bag [facility]   IntraVENous CONTINUOUS    gabapentin (NEURONTIN) capsule 300 mg  300 mg Oral QHS    methadone (DOLOPHINE) tablet 10 mg  10 mg Oral Q8H    magic mouthwash cpd (without sucralfate)  5 mL Oral QID PRN    DULoxetine (CYMBALTA) capsule 60 mg  60 mg Oral QHS    sulindac (CLINORIL) tablet 200 mg  200 mg Oral BID WITH MEALS        PHYSICAL EXAM     Wt Readings from Last 3 Encounters:   05/30/17 121.1 kg (267 lb)   05/10/17 121.4 kg (267 lb 10.2 oz)   02/16/17 136.1 kg (300 lb)       Visit Vitals    /88 (BP 1 Location: Left arm, BP Patient Position: At rest;Head of bed elevated (Comment degrees))    Pulse (!) 110    Temp 97.8 °F (36.6 °C)    Resp 20    SpO2 95%         Supplemental O2  [x] Yes  [] NO  Last bowel movement:     Currently this patient has:  [] Peripheral IV [x] PICC  [] PORT [] ICD    [x] Jama Catheter [] NG Tube   [] PEG Tube    [] Rectal Tube [] Drain  [x] Other: now with a special mattress    Constitutional: pale, laying on her back, awake but appears anxious, inability to sleep.  Increased pain  Eyes: pallor++  ENMT: moist oral mucosa  Cardiovascular: distant heart sounds, tachycardic  Respiratory:  Normal breathing, diminished air entry  Gastrointestinal:  distended and firm, non tender, BS +, pt has a davila's  Musculoskeletal:edema ++ lower extremities right leg>left leg  Skin: warm, dry, some skin irritation in the groin folds, itching   Neurologic:sleepy but can follow commands  Psychiatric: fluctuating mood, anxious affect          Total time: 35 mts        Carlie Jason MD

## 2017-07-17 NOTE — PROGRESS NOTES
1900: Shift report received from Kristi Huggins, [facility].  2013: Pt sleeping, appears very comfortable. 2130: Pt in bed, waked up upon RN entering the room. Appears drowsy but gives appropriate answers. Rates her pain at 6/10 at the moment. Lungs clear, bowel sounds hypoactive. Oxygen sat low at 76%, HR elevated at 107. Dilaudid pump running continuously at 4 mg per hour basal rate and 2 mg bolus rate. Pump was cleared from earlier shift and at this time pt had not attempted any bolus rate release. Medicated with schedule meds at this time. Pt requests her food to be heated up and a cup of ice.    0340: Pt sleeping, no signs of distress noted at this time. 0530: Pt had large BM and was given a partial bath. Small laceration, stage II on right leg posterior, about 0.8x0.8 cm, round. Calmoseptine applied to the area as well as buttocks. Pt tolerated fairly well and helped turn as much as possible. Three people needed for turning and bathing are still minimum. Pts Dilaudid PCA pump was cleared. 5 attempts, 3 given, 49.7 mg total since shift change.        NAME OF PATIENT:  Jyoti Randall    LEVEL OF CARE:  Routine    REASON FOR GIP:   n/a    *PATIENT REMAINS ELIGIBLE FOR GIP LEVEL OF CARE AS EVIDENCED BY: (MUST BE ADDRESSED OF PATIENT GIP)      REASON FOR RESPITE:  n/a    O2 SAFETY:  Concentrator positioning (6\" from furniture/drapes) and No petroleum based products on face while oxygen in use    FALL INTERVENTIONS PROVIDED:   Implemented/recommended resources for alarm system (personal alarm, bed alarm, call bell, etc.)  and Implemented/recommended environmental changes (remove hazards, lower bed, improve lighting, etc.)    INTERDISPLINARY COMMUNICATION/COLLABORATION:  Physician, MSW, [place] and RN, CNA    NEW MEDICATION INITIATION DOCUMENTATION:  no chnage in meds on this shift    COMFORTABLE DYING MEASURE:  Is Patient/family satisfied with symptom level?  yes    DISCHARGE PLAN:  Pt will likely be transferred to THE [facility] fir a surgical procedure soon.

## 2017-07-17 NOTE — HOSPICE
NAME OF PATIENT:  Jyoti Randall    LEVEL OF CARE:  Routine    REASON FOR GIP:   N/A    *PATIENT REMAINS ELIGIBLE FOR GIP LEVEL OF CARE AS EVIDENCED BY: (MUST BE ADDRESSED OF PATIENT GIP)      REASON FOR RESPITE:  N/A    O2 SAFETY:  Concentrator positioning (6\" from furniture/drapes), Tanks stored in rodríguez , No petroleum based products on face while oxygen in use and Oxygen sign on the door    FALL INTERVENTIONS PROVIDED:   Implemented/recommended use of non-skid footwear, Implemented/recommended use of fall risk identification flag to all team members, Implemented/recommended assistive devices and encouraged their use, Implemented/recommended resources for alarm system (personal alarm, bed alarm, call bell, etc.)  and Implemented/recommended environmental changes (remove hazards, lower bed, improve lighting, etc.)    INTERDISPLINARY COMMUNICATION/COLLABORATION:  Physician, MSW, [place] and RN, CNA    NEW MEDICATION INITIATION DOCUMENTATION:      Reason medication is being initiated:      MD / Provider name consulted re: change in status / initiation of new medication:      New Symptom(s):      New Order(s):      Name of the person notified of the changes:      Name of person being taught:      Instructions given:    Side Effects taught:      Response to teachin [address] free  Is Patient/family satisfied with symptom level?  yes    DISCHARGE PLAN:  Pt may tx to [facility] next week for surgical intervention then return home and be followed by home hospice    19:30,report received,assumed care of pt who is Routine care,hospice dx is metastatic endometrial cancer. Pt is asleep,but arouses to vigorous verbal stimulation. PCA Dilaudid continues for pain management. Jama is intact and pt is on an airflow mattress. 23:00,some confusion,speech slow. Eating a hamburger and chicken nuggets. Pt voices comfort. Miralax given for constipation.   23:30,Tylenol 2 tabs given for headache.  02:00,pt awake,she called out and said,\"good morning\"the patient reoriented to time. 06:00,awake,pain free,\"I'm bored\". PCA cleared,had 3 given,8 attempts,got 53.8 mg.  07:00,pt requested Torodol,\"because I want to start moving around\". Torodol given IVP.

## 2017-07-17 NOTE — PROGRESS NOTES
0700:  Verbal shift change report given to Ranjana Knight RN (oncoming nurse) by Trisha Garcia RN (offgoing nurse). Report included the following information SBAR, Kardex, Intake/Output and MAR.   0915:  Patient requesting toradol; administered prn toradol 30mg/IV.  1625:  Patient feeling anxious; requested ativan; administered prn lorazepam 2mg/PO. NAME OF PATIENT:  Jyoti Randall    LEVEL OF CARE:  Routine    REASON FOR GIP:   n/a    *PATIENT REMAINS ELIGIBLE FOR GIP LEVEL OF CARE AS EVIDENCED BY: (MUST BE ADDRESSED OF PATIENT GIP)      REASON FOR RESPITE:  n/a    O2 SAFETY:  n/a    FALL INTERVENTIONS PROVIDED:   Patient unable to ambulate    INTERDISPLINARY COMMUNICATION/COLLABORATION:  Physician, MSW, [place] and RN, CNA    NEW MEDICATION INITIATION DOCUMENTATION:  n/a    Reason medication is being initiated:  n/a    MD / Provider name consulted re: change in status / initiation of new medication:  n/a    New Symptom(s):  n/a    New Order(s):  n/a    Name of the person notified of the changes:  n/a    Name of person being taught:  n/a    Instructions given:  n/a    Side Effects taught:  n/a    Response to teaching:  n/a      COMFORTABLE DYING MEASURE:  Is Patient/family satisfied with symptom level?  yes    DISCHARGE PLAN:  Plan for tx to [facility] soon for surgical intervention,then home and be followed by home hospice.

## 2017-07-17 NOTE — PROGRESS NOTES
NAME OF PATIENT:  Jyoti Randall    LEVEL OF CARE:  ROUTINE    REASON FOR GIP:   N/A    *PATIENT REMAINS ELIGIBLE FOR GIP LEVEL OF CARE AS EVIDENCED BY: (MUST BE ADDRESSED OF PATIENT GIP)      REASON FOR RESPITE:  N/A    O2 SAFETY:  ROOM AIR WITH PRN 02 AT 2L    FALL INTERVENTIONS PROVIDED:   Implemented/recommended use of non-skid footwear, Implemented/recommended use of fall risk identification flag to all team members, Implemented/recommended assistive devices and encouraged their use, Implemented/recommended resources for alarm system (personal alarm, bed alarm, call bell, etc.) , Implemented/recommended environmental changes (remove hazards, lower bed, improve lighting, etc.) and Implemented/recommended increased supervision/assistance    INTERDISPLINARY COMMUNICATION/COLLABORATION:  Physician, MSW, [place] and RN, CNA    NEW MEDICATION INITIATION DOCUMENTATION:  Documentation completed in Clinical Note in [facility]    Reason medication is being initiated:  N/A    MD / Provider name consulted re: change in status / initiation of new medication:  N/A    New Symptom(s):  N/A    New Order(s):  N/A    Name of the person notified of the changes:  N/A    Name of person being taught:  N/A    Instructions given:  N/A    Side Effects taught:  N/A    Response to teaching:  N/A      COMFORTABLE DYING MEASURE:  Is Patient/family satisfied with symptom level?  no    DISCHARGE PLAN:  Plan is for pt to have an ablation of the right leg at [facility] by Dr. Samuel Gallegos and then remain at [facility] level of care at [facility] per Dr. Elder Beltrán      0700: Report received from Stephany Chacon RN to assume care. Patient is routine level of care with a dx of metastatic endometrial cancer and right leg pain. Patient complains of right leg pain. Dilaudid PCA at 4mg/hr and 2mg basal every 6 minutes. Patient is very large patient and difficult to move and adjust in the bed. Patient has clear lungs and audible heart tones; S1 S2. Denies any distress.  Patient last BM was 7-15-17 and was a smear. Patient given senna daily and miralax to encourage BM.     0804: Patient medicated with scheduled medications. Complained of 6/10 at this time and given toradol and dilaudid PRN. 1230: Patient remains asleep and in no distress. 1549: Patient medicated with PRN doses of morphine, toradol, and benadryl for itching. No acute distress noted. States pain 7/10    1640: Patient states pain has decreased to a 4/10 and is much more tolerable in R leg.    1728: Visitors at the bedside with patient. Appears in no acute distress. 1850: PCA pump cleared prior to oncoming shift.

## 2017-07-18 NOTE — [facility] IDG CHAPLAIN NOTES
Patient: Nadja Baptiste    Date: 07/18/17  Time: 8:05 AM  SPIRITUAL ISSUES:  I visited the room of this pt at the [facility] who is on Routine Status. She was in bed, semi-awake and not fully alert, not agitated, not restless, and appeared well medicated and comfortable. She was lying on her back and appeared comfortable. I confirmed with her the Rosana had continued to visit her and that I would be visiting as well, if she so desired. Her spirits were moderate and her attitude somewhat stoic as she did not wish to come back to the [facility]  Patient and Family use geovanny as a primary coping mechanism but are very stressed and struggling with this long, difficult journey. GOALS:  Continue to visit this pt and her family while she is at the [facility] and I am in the facility, to provide pastoral care and spiritual support to assist both the pt and them with coping with this difficult medical situation and decline. Coordinate w/ Chaplain Rankin as she assumes primary care for this patient  Coordinate with Chaplain Foster if/when the pt returns to her mothers home. Visit this pt and her family, while she is @ [facility] and I am in this facility, or PRN as requested. Coordinate with Care Team on POC.     Signed by: Kristine Oneal

## 2017-07-18 NOTE — PROGRESS NOTES
NAME OF PATIENT:  Hadley Baxter    LEVEL OF CARE:  Routine    REASON FOR GIP:   Pain, despite numerous changes in medications, Medication adjustment that must be monitored 24/7 and Stabilizing treatment that cannot take place at home    *PATIENT REMAINS ELIGIBLE FOR GIP LEVEL OF CARE AS EVIDENCED BY: (MUST BE ADDRESSED OF PATIENT GIP)      REASON FOR RESPITE:  n/a    O2 SAFETY:  n/a    FALL INTERVENTIONS PROVIDED:   Implemented/recommended use of non-skid footwear, Implemented/recommended use of fall risk identification flag to all team members, Implemented/recommended assistive devices and encouraged their use, Implemented/recommended resources for alarm system (personal alarm, bed alarm, call bell, etc.) , Implemented/recommended environmental changes (remove hazards, lower bed, improve lighting, etc.) and Implemented/recommended increased supervision/assistance    INTERDISPLINARY COMMUNICATION/COLLABORATION:  Physician, MSW, [place] and RN, CNA    NEW MEDICATION INITIATION DOCUMENTATION:  Documentation completed in Clinical Note in [facility]    Reason medication is being initiated:  n/a    MD / Provider name consulted re: change in status / initiation of new medication:  n/a    New Symptom(s):  n/a    New Order(s):  n/a    Name of the person notified of the changes:  n/a    Name of person being taught:  n/a    Instructions given:  n/a    Side Effects taught:  n/a    Response to teaching:  n/a      COMFORTABLE DYING MEASURE:  Is Patient/family satisfied with symptom level?  no    DISCHARGE PLAN:  Plan to have an ablation at [facility] where Dr. Gisele Steward will perform and pt will remain GIP at [facility]       0700: Report received from [address] [place] [address],[address], RN to assume care. 1030: Medicated with scheduled meds. 1600: PCA new bag hung at this time and pt given blanket. 1821: Patient medicated with PRN dose of toradol. Having dinner with family in the room. No distress noted. C/o right leg pain 7/10.

## 2017-07-18 NOTE — PROGRESS NOTES
This visit was part of the continuing support provided to MANSI Muñoz and her mother. Spoke with her mother via phone yesterday. She shared around her continuing care for Mr Portillo Kenyon and support to MNASI Muñoz. Her geovanny continues to carry her forward as she supports the family. The visit with Caitlin Jett went well today. She was a bit sleepy but did not want to be left alone. She wanted to stay awake in the hopes of sleeping better tonight. She is positive and hopeful that she will have less pain following the upcoming procedure. I will continue to follow. 7/20/17    Visited with Jyoti today and yesterday. She had a bit of a down day yesterday. She was dealing with issues of loneliness and some feeling of abandonment. She continues to reach out to her brother, who has not visited. Today she was still in a reflective mood but verbalized a desire to be \"positive\" She asked her mother to bring her coloring books and her ipad. Her mother brought them on yesterday. Caitlin Jett says, she is going to use them to help occupy her time. She noted that if she was in her own space, she would allow herself a good cry. Encouraged her to allow herself the freedom to cry. Informed her I would not visit over the weekend but would see her next week.            Misael Lyles., [facility], [address] [place] [address]

## 2017-07-19 NOTE — PROGRESS NOTES
0700  Report received. 0720  Pt complaining of leg pain. Rn educated pt on using her PCA button for pain relief. 0834  Pt lying in bed, awake. Pt reports pain is better. Rn adjusted the level of the bed and eased some of her discomfort. Lungs clear but diminished. No O2 at this time. + bowel sounds. Last reported Bm was 7-17. Jama is draining tea colored urine. Pt has +3 edema in her L LE. And +2 edema in her R LE. Pt has a PICC in her R Upper FA. Dressing is clear and intact. CADD pump is infusing Dilaudid 4mg every hr with 2 mg every 10 mins. Res vol is 406.9.    0949  Pt complained of itching. Pt medicated with Benadryl. 1030  Pt asleep. No signs of grimacing. 1200  Pt watching TV  No needs at this time. 1413  Pt complains of pain. Pt medicated with Morphine and Toradol   Pt requested both. She reports pain is a 7 in her right leg/hip. Leg readjusted in bed. 1445  Pt asleep. No facial grimacing at this time. 65  Pt continues to rest comfortably. No signs of distress. 1608  Dilaudid PCA rate inccrease. Pt is getting Dilaudid 4 mg/hr with 4 mg q 10mins. Rate change verified by Eda Elizabeth.    1700  Ms Pendletonry Bosworth in to visit. She is her  from [facility].    1800  No complaints at this time. NAME OF PATIENT:  Pratik Memos    LEVEL OF CARE:  Routine    O2 SAFETY:  RA    FALL INTERVENTIONS PROVIDED:   Implemented/recommended assistive devices and encouraged their use and Implemented/recommended resources for alarm system (personal alarm, bed alarm, call bell, etc.)     INTERDISPLINARY COMMUNICATION/COLLABORATION:  Physician, MSW, Leta and RN, CNA    NEW MEDICATION INITIATION DOCUMENTATION:  No new medications initiated.     COMFORTABLE DYING MEASURE:  Is Patient/family satisfied with symptom level?  yes    DISCHARGE PLAN:  Pt will remain at the [facility] until her family makes alternative living arrangements or until she has an experimental surgery to help with her nerve pain.

## 2017-07-19 NOTE — PROGRESS NOTES
[address] [facility] Help to Those in Need  [phone]    Patient Name: Brittnee Sanders  YOB: 1980    Date of Provider Hospice Visit: 07/19/17    Level of Care:   [] General Inpatient (GIP)    [x] Routine   [] Respite    Location of Care:  [] [facility] [] [facility] [] [facility] [] [facility] [x] Hospice [facility]  [] Home [] Other:      Date of Original Hospice Admission: 5-16-17  Hospice Medical Director for Inpatient Care: Dr. Kyra Arellano Diagnosis:  Metastatic Endometrial Adenocarcinoma       HOSPICE DIAGNOSES   Active Symptoms:  1. Generalised pain, worse especially right lower extremity, lower back, especially with movements  2. Confusion; intermittent  3. Anxiety/depression; still an issue, persists  4. Insomnia; worse lately due to increased pain  5. Fatigue/weakness/lethargy: worse, BP very low at times  6. Constipation: worse  7. Itching: less, improved, stable  8. Bladder spasms/vaginal pressure; recurrent, likely due to cancer of endometrium disease advancing  9. Episodes of comfort then an exacerbation of pain which requires multiple medical pain med interventions  10. Bloody urine in davila's catheter and bag     PLAN   Continue routine level of care   1. Continue ativan 2mg  three times daily as needed for anxiety  2. Continue scheduled ativan 1mg four times daily to help with anxiety and sleep  3. Change and increase PCA dilaudid settings, keep 4mg / hr continuous, increase PCA dose to 4mg every 10 mts. This will help with better pain control  4. Changed dilaudid IV to Morphine 15mg IV every 15mts as needed for severe pain: due to dilaudid national shortage   5. Continue gabapentin 300mg at bedtime  6. Continue air mattress for her continued comfort  7. Continue clinoril, Cymbalta for adjuvant therapy for bone pain  8. Continue methadone at current levels 10mg every 8 hrs  9. Benadryl 25mg oral everfy 4 hrs as needed for itching  10.  Continue Toradol 30mg IV every 6 hours as needed for severe pain; antiinflammatory. 11. Continue metoprolol 12.5mg twice daily as her BP is on low side and HR in 70-80's; not tachycardic. 12. Adjust meds for constipation; increase senna s to 2 tabs bid, change miralax to as needed. 13. Continue  reposition and turn to side during cleaning/ADL care and to prevent skin breakdown. 15.  and SW to support family needs  13. Disposition:  routine care status, symptoms are stable: SW Ms. Kami Isaacs is assisting pt's mom to complete medicaid application and foundation forms have been submitted for additional resource help. 16. Plan for CT guided soft tissue ablation of tumor with assistance from Dr. Scott Kincaid Radiologist. Lydia Langleyers with admissions at [facility] and received approval for admitting pt there to undergo surgery through hospice. Pt will be admitted to [facility] under GIP status and will have procedure done following which hopefully she can go home and continue hospice services. Goal for this intervention is palliation of pain. Prognosis estimated based on 07/19/17 clinical assessment is:   [] Few to Many Hours  [] Hours to Days   [] Few to Many Days   [] Days to Weeks    [x] Few to Many Weeks   [] Weeks to Months   [] Few to Many Months    Communicated plan of care with: Hospice Case Manager;  Hospice IDT; Care Team     GOALS OF CARE     Resuscitation Status: DNR  Durable DNR: [x] Yes [] No    Advance Care Planning 5/10/2017   Patient's Healthcare Decision Maker is: Legal Next of Andrea 69   Primary Decision Maker Name Cheo Valdez   Primary Decision Maker Phone Number -   Primary Decision Maker Relationship to Patient Parent   Confirm Advance Directive Yes, on file   Patient Would Like to Complete Advance Directive -        HISTORY     History obtained from: chart, staff    CHIEF COMPLAINT: pain in leg/back  The patient is:   [x] Verbal  [] Nonverbal  [] Unresponsive    HPI/SUBJECTIVE:  Pt still c/o increased pain in her leg and all over. Taking all scheduled meds and also toradol regulatly. Stays anxious all times. Anxiety little more since dilaudid IV was switched to morphine IV due to shortage. Pt has been referred to IR: Dr. Vonnie Montero to undergo procedure at [address]: CT guided soft tissue tumor ablation in attempt to relieve pain. REVIEW OF SYSTEMS     The following systems were: [x] reviewed  [] unable to be reviewed    Positive ROS include:  Constitutional: fatigue, weakness  Ears/nose/mouth/throat:   Respiratory:  Gastrointestinal:vaginal pressure sensation  Musculoskeletal:weakness,pain in the right hip  Neurologic: numbness and burning pain  Psychiatric:anxiety, depression, insomnia  Endocrine:          FUNCTIONAL ASSESSMENT     Palliative Performance Scale (PPS): 30%     PSYCHOSOCIAL/SPIRITUAL ASSESSMENT     Principal Problem:    Endometrial cancer ([address] (7/7/2010)    Active Problems:     Morbid obesity ([address] (7/7/2010)      Bone metastases ([address] (3/10/2014)      Past Medical History:   Diagnosis Date    Cancer ([address]     uterine    CVI (common variable immunodeficiency) ([address]     Diabetes ([address]     Elevated liver function tests 10/21/2010    Endometrial cancer ([address] 7/7/2010    Hypertension     Iron deficiency anemia     Menometrorrhagia 10/21/2010    Microcytic anemia 10/21/2010    Morbid obesity ([address]     Prediabetes 7/7/2010    Psychiatric disorder     depression    Radiation     completed radiation mid-march      Past Surgical History:   Procedure Laterality Date    CARDIAC SURG PROCEDURE UNLIST      hx of tachycardia    HX GYN      hysterectomy      Social History   Substance Use Topics    Smoking status: Never Smoker    Smokeless tobacco: Never Used    Alcohol use Yes     Family History   Problem Relation Age of Onset    Hypertension Mother     Hypertension Father     Stroke Maternal Grandfather     Cancer Paternal Grandmother      breast    Diabetes Paternal Grandmother       Allergies   Allergen Reactions    Egg Nausea and Vomiting     Raw egg and scrambled eggs. Egg products okay.      Pcn [Penicillins] Nausea and Vomiting     \"but could take amoxil\"    Shellfish Containing Products Unknown (comments)    Tetanus And Diphtheria Toxoids, Adsorbed, Adult Other (comments)     Developed local swelling, axillary pain, and transient fever    Tomato Unknown (comments)     Fresh tomatoes      Current Facility-Administered Medications   Medication Dose Route Frequency    ketorolac (TORADOL) injection 30 mg  30 mg IntraVENous Q6H PRN    ketorolac (TORADOL) 30 mg/mL (1 mL) injection        morphine 10 mg/mL injection 15 mg  15 mg IntraVENous Q15MIN PRN    LORazepam (ATIVAN) tablet 1 mg  1 mg Oral AC&HS    LORazepam (ATIVAN) tablet 2 mg  2 mg Oral TID PRN    senna-docusate (PERICOLACE) 8.6-50 mg per tablet 2 Tab  2 Tab Oral BID    polyethylene glycol (MIRALAX) packet 17 g  17 g Oral DAILY PRN    metoprolol tartrate (LOPRESSOR) tablet 12.5 mg  12.5 mg Oral BID    bisacodyl (DULCOLAX) tablet 5 mg  5 mg Oral DAILY PRN    diphenhydrAMINE (BENADRYL) capsule 25 mg  25 mg Oral Q4H PRN    acetaminophen (TYLENOL) tablet 650 mg  650 mg Oral Q4H PRN    Hydromorphone 500mg/500mL bag [facility]   IntraVENous CONTINUOUS    gabapentin (NEURONTIN) capsule 300 mg  300 mg Oral QHS    methadone (DOLOPHINE) tablet 10 mg  10 mg Oral Q8H    magic mouthwash cpd (without sucralfate)  5 mL Oral QID PRN    DULoxetine (CYMBALTA) capsule 60 mg  60 mg Oral QHS    sulindac (CLINORIL) tablet 200 mg  200 mg Oral BID WITH MEALS        PHYSICAL EXAM     Wt Readings from Last 3 Encounters:   05/30/17 121.1 kg (267 lb)   05/10/17 121.4 kg (267 lb 10.2 oz)   02/16/17 136.1 kg (300 lb)       Visit Vitals    /72 (BP 1 Location: Left arm)    Pulse 100    Temp 98.1 °F (36.7 °C)    Resp 18    SpO2 (!) 76%         Supplemental O2  [x] Yes  [] NO  Last bowel movement:     Currently this patient has:  [] Peripheral IV [x] PICC  [] PORT [] ICD    [x] Davila Catheter [] NG Tube   [] PEG Tube    [] Rectal Tube [] Drain  [x] Other: now with a special mattress    Constitutional: pale, laying on her back, awake but appears anxious, inability to sleep.  Increased pain  Eyes: pallor++  ENMT: moist oral mucosa  Cardiovascular: distant heart sounds, tachycardic  Respiratory:  Normal breathing, diminished air entry  Gastrointestinal:  distended and firm, non tender, BS +, pt has a davila's  Musculoskeletal:edema ++ lower extremities right leg>left leg  Skin: warm, dry, some skin irritation in the groin folds, itching   Neurologic:sleepy but can follow commands  Psychiatric: fluctuating mood, anxious affect          Total time: 35 mts        Damion Holman MD

## 2017-07-19 NOTE — PROGRESS NOTES
1900: Shift report received from [address]: Pt in bed, family present. Pt is calm and resting, appears drowsy and drifts off a little after asked a question. She rates her pain at 6/10. Checked pts Dilaudid PCA, it is running continuously at 4 mg/ hour with an increased bolus rate at 4 mg/hour. Pt was reminded that the bolus rate has been increased so she will get more release for breakthrough pain when she pushes it. Family is calm and was asked if they needed anything but they are content at the moment. 2300: Pt sleeping. 0200: Pt sleeping.  0500: Pt requests pain med for breakthrough pain. Medicated with Toradol and Morphine. Pt thought she might have had a BM. It was only a smear, but she was cleaned up. Lotion and powder applied as requested. 8948: Medicated with scheduled meds, pump cleared,  0600: Pt continues to complain about pain. Medicated with PRN Benadryl, Tylenol and Morphine. Positioned pt for comfort. NAME OF PATIENT:  Prema Randall    LEVEL OF CARE:  Routine    REASON FOR GIP:   n/a    *PATIENT REMAINS ELIGIBLE FOR GIP LEVEL OF CARE AS EVIDENCED BY: (MUST BE ADDRESSED OF PATIENT GIP)      REASON FOR RESPITE:  n/a    O2 SAFETY:  Concentrator positioning (6\" from furniture/drapes), No petroleum based products on face while oxygen in use and Oxygen sign on the door    FALL INTERVENTIONS PROVIDED:   Implemented/recommended resources for alarm system (personal alarm, bed alarm, call bell, etc.)  and Implemented/recommended environmental changes (remove hazards, lower bed, improve lighting, etc.)    INTERDISPLINARY COMMUNICATION/COLLABORATION:  Physician, MSW, [place] and RN, CNA    NEW MEDICATION INITIATION DOCUMENTATION:  no change on this shift    COMFORTABLE DYING MEASURE:  Is Patient/family satisfied with symptom level?  no    DISCHARGE PLAN:  Pt may be transferred to a hospital to have surgery done, but this is still being discussed.

## 2017-07-19 NOTE — HOSPICE
NAME OF PATIENT:  Jyoti Randall    LEVEL OF CARE:  Routine    REASON FOR GIP:       *PATIENT REMAINS ELIGIBLE FOR GIP LEVEL OF CARE AS EVIDENCED BY: (MUST BE ADDRESSED OF PATIENT GIP)      REASON FOR RESPITE:      O2 SAFETY:  Concentrator positioning (6\" from furniture/drapes), Tanks stored in rodríguez , No petroleum based products on face while oxygen in use and Oxygen sign on the door    FALL INTERVENTIONS PROVIDED:   Implemented/recommended use of non-skid footwear, Implemented/recommended use of fall risk identification flag to all team members, Implemented/recommended assistive devices and encouraged their use, Implemented/recommended resources for alarm system (personal alarm, bed alarm, call bell, etc.)  and Implemented/recommended environmental changes (remove hazards, lower bed, improve lighting, etc.)    INTERDISPLINARY COMMUNICATION/COLLABORATION:  Physician, MSW, [place] and RN, CNA    NEW MEDICATION INITIATION DOCUMENTATION:      Reason medication is being initiated:      MD / Provider name consulted re: change in status / initiation of new medication:      New Symptom(s):      New Order(s):      Name of the person notified of the changes:      Name of person being taught:      Instructions given:      Side Effects taught:      Response to teachin [address] free  Is Patient/family satisfied with symptom level?  yes    DISCHARGE PLAN:  DC to [id number] for surgical/palliative intervention,then home with family and be followed by home hospice. 20:00,report received,assumed care of pt who is Routine care,hospice dx is metastatic endometrial cancer. Pt was aroused by verbal stimulation. She is very sleepy. Jama is intact,PCA Dilaudid continues via AMARJIT PICC.  00:00,asleep.  04:30,awake,pain level is 4-5,reminded pt to push PCA button for pain management. 05:30,requesting pain med,medicated with Torodol and pt pushed PCA for pain management.   06:00,PCA=45mg,7 attempts,6 doses delivered.

## 2017-07-20 NOTE — PROGRESS NOTES
0700  Report received from [facility] RN.    0748  Pt lying in bed, asleep. No facial grimacing or signs of distress. 0810  Pt lying in bed, arouses to verbal stimuli. Lungs clear, No dyspnea noted. Pt is on RA. + bowel sounds. Last reported BM was 7-17. Pt had a small smear last night. Jama is draining cloudy concentrated urine. Pt continues to have +3 edema in her R leg and +2 in her L leg. Right PICC is infusing Dilaudid 4mg/hr with 4mg every 10 mins. Dressing is clear and intact. 0954  Pt complained of \"Endo\" pain and requested toradol. Pt medicated with Toradol . 1030  Pt sleeping. 1200  Pt with friends visiting. No complaints at this time. 1500  Pt asleep. No needs at this time. 18  Pt talking on the phone. No signs of distress. [address] [facility] filled up water pitcher, and a cup of ice. Pt has a hard time keeping her thoughts straight. She is having more periods of confusion. Pt's Mother is coming to visit tonight and bring dinner. 1900  REport given. NAME OF PATIENT:  Mitchel Harvey    LEVEL OF CARE:  Routine    O2 SAFETY: RA  Oxygen sign on the door    FALL INTERVENTIONS PROVIDED:   Implemented/recommended assistive devices and encouraged their use and Implemented/recommended environmental changes (remove hazards, lower bed, improve lighting, etc.)    INTERDISPLINARY COMMUNICATION/COLLABORATION:  Physician, MSW, [place] and RN, CNA    NEW MEDICATION INITIATION DOCUMENTATION:  No new medications needed. COMFORTABLE DYING MEASURE:  Is Patient/family satisfied with symptom level?  yes    DISCHARGE PLAN:  Pt will remain at the [facility] until Dr Madeline Osorio arranges for her to have surgery or when her family makes alternative living arrangements.

## 2017-07-20 NOTE — PROGRESS NOTES
1900: Shift report received from [facility]: Pt in bed, alert and oriented, visitors at the bedside, eating dinner together. Pt complains about pain at 7/10 and is being medicated for this pain with PRN Morphine 15 mg and Toradol. 0015: Pt reports pain, asks for Toradol. RN told pt that she cannot have Toradol yet,  So pt was instead medicated with PRN Tylenol and Morphine. She is eating pizza right now and was given a fresh drink. 6952: Pt asleep upon entering the room. Wakes up, drowsy. Medicated with scheduled meds. PCA Dilaudid pump cleared. Pt reported to have had a good night. No complaints at this time. 3401: Pt requests PRN Toradol and Morphine for brake through pain.     NAME OF PATIENT:  Deyanira Randall    LEVEL OF CARE:  Routine    REASON FOR GIP:   n/a    *PATIENT REMAINS ELIGIBLE FOR GIP LEVEL OF CARE AS EVIDENCED BY: (MUST BE ADDRESSED OF PATIENT GIP)      REASON FOR RESPITE:  n/a    O2 SAFETY:  Concentrator positioning (6\" from furniture/drapes), No petroleum based products on face while oxygen in use and Oxygen sign on the door    FALL INTERVENTIONS PROVIDED:   Implemented/recommended resources for alarm system (personal alarm, bed alarm, call bell, etc.)  and Implemented/recommended environmental changes (remove hazards, lower bed, improve lighting, etc.)    INTERDISPLINARY COMMUNICATION/COLLABORATION:  Physician, MSW, [place] and RN, CNA    NEW MEDICATION INITIATION DOCUMENTATION:  no new meds initiated on this shift    COMFORTABLE DYING MEASURE:  Is Patient/family satisfied with symptom level?  no    DISCHARGE PLAN:  Pt may be leaving to have surgical procedure

## 2017-07-20 NOTE — PROGRESS NOTES
[address] [facility]  follow-up Visit to [facility] pt now on routine status     When I entered the room, the pt was asleep. I softly called her name and she opened her eyes. She did not appear agitated, nor restless, and did appeared comfortable. Asked about her level of pain, she said it \"was normal.\"  I reminded her about the bolus button and encouraged her to use it when she felt it was needed. We spoke for a brief time and she drifted in and out of sleep. I suggested I read several Psalms \"while she rested\" and she nodded her head. She did not re-awaken when I left. GOALS:  Continue to visit this pt, her family, and her friends while she is at the [facility] and I am in the facility, to provide supplemental  pastoral care and spiritual support to that being provided by Rosana to assist both the pt and her family and friends in coping with this difficult medical situation and slow decline. Coordinate w/ Rosana as she assumes primary care for this patient  Coordinate with Home  if/when the pt returns to her mother home. PLAN:  Continue to provide supplemental  support to that being provided by Rosana. Coordinate with Rosana as requested. Coordinate with Care Team on POC.  VISIT FREQUENCY:  1 wk 3 starting 7/20 plus 4 prn 21 days.   Harleen Meléndez, [facility] [place], [address]  [phone]

## 2017-07-21 NOTE — PROGRESS NOTES
0700  Report received from [facility] FACILITY RN  0859  Pt lying in bed, alert with some confusion. Pt is falling asleep holding her cup of ice. No complaint of pain at this time. Lungs diminished. No cough noted.  + bowel sounds. Last reported BM was 7-17. Jama is draining concentrated urine with sediment. Pt continues to have +2 edema in her LE. Pt has a PICC in her R UA. Dressing is clear and intact. 1100  Pt rang call bell and stated she had a bowel movement. Pt turned and repositioned. Pt's bottom, and back side bathed, and dried. Pt then repositioned. Pt is very obese and requires 2-3 people to turn and reposition. .  Pt tolerated the repositioning well. No needs at this time. 1130  Pt complaining of needing to be repositioned. Rn repositioned pt by lowering the head of the bed. Pt requesting some juice. Rn provided juice with straw and held it as she sipped it. Pt then requested a cup of ice. Ice was provided. Pt fell asleep before this Rn could get back with her ice. No facial grimacing at this time. 1400  Pt asleep. 1630  Pt continues to sleep. No signs of distress. 1800  Pt resting. 1900  Report given. NAME OF PATIENT:  Mikayla Christy    LEVEL OF CARE:  Routine    O2 SAFETY:  RA    FALL INTERVENTIONS PROVIDED:   Implemented/recommended environmental changes (remove hazards, lower bed, improve lighting, etc.)    INTERDISPLINARY COMMUNICATION/COLLABORATION:  Physician, MSW, [place] and RN, CNA    NEW MEDICATION INITIATION DOCUMENTATION:  No new medications initiated. COMFORTABLE DYING MEASURE:    Is Patient/family satisfied with symptom level?  yes    DISCHARGE PLAN:  Pt will return home to her Mother's care and continue to be followed by Home Hospice.

## 2017-07-22 NOTE — PROGRESS NOTES
200 Recd report from [facility], continues with Routine level of care for Metastatic endometrial cancer. Pt has large tumor destroying r hip and pelvic causing most of the pain. R leg very edematous. Pain level 10/10 to turn or manipulate right leg. No major changes in assessment. Plan for pt to have tumor ablation next week. 2200 Pt is able to move upper extremities only. Can bend left knee. Heart and lungs WNL. Pt is using Dilaudid PCA to control pain. Sleepy when taking meds but eventually swallowed without difficulty. Warmed up food for pt and fed her then she decided she wanted to take over. 0000 Visiting with friend denies pain  0200 Resting quietly  0600 Scheduled methadone at this time.   0700 Report to oncoming shift    LEVEL OF CARE:  Routine     O2 SAFETY: RA  Oxygen sign on the door     FALL INTERVENTIONS PROVIDED:   Implemented/recommended assistive devices and encouraged their use and Implemented/recommended environmental changes (remove hazards, lower bed, improve lighting, etc.)     INTERDISPLINARY COMMUNICATION/COLLABORATION:  Physician, MSW, [place] and RN, CNA     NEW MEDICATION INITIATION DOCUMENTATION:  No new medications needed.      COMFORTABLE DYING MEASURE:  Is Patient/family satisfied with symptom level?  yes     DISCHARGE PLAN:  Pt will remain at the [facility] until Dr Elder Beltrán arranges for her to have surgery or when her family makes alternative living arrangements.

## 2017-07-22 NOTE — PROGRESS NOTES
0700 Report received from [place], [facility]. Pt is Routine level of care. Dx Endometrial Cancer. 0900 Pt c/o pain not relieved with the PCA Tordal given for c/o pain. Pt ate breakfast biscuit brought in by family. 1130 Pt appears to be sleepng. PCA used for pain management. 1500 Tordol given for c/o pain not releived with PCA Morphine. 1530 Complete bath and bed change done. Pt tolerated fairly. Torso fluid filled and firm from below breast to pelvic area. Tan serous drainage noted on sheet at upper back. Unable to assess for open area on back due to inability of pt to turn far.   .3 LTC, attempts 9, given 6, Given 71.3 mg from 7a to 7p    NAME OF PATIENT:  Jyoti Randall    LEVEL OF CARE:  Routine    REASON FOR GIP:   na    *PATIENT REMAINS ELIGIBLE FOR [facility] LEVEL OF CARE AS EVIDENCED BY: (MUST BE ADDRESSED OF PATIENT GIP)na      REASON FOR RESPITE:  na    O2 SAFETY:  Concentrator positioning (6\" from furniture/drapes), Tanks stored in [address] , No petroleum based products on face while oxygen in use and Oxygen sign on the door    FALL INTERVENTIONS PROVIDED:   Implemented/recommended use of fall risk identification flag to all team members, Implemented/recommended resources for alarm system (personal alarm, bed alarm, call bell, etc.) , Implemented/recommended environmental changes (remove hazards, lower bed, improve lighting, etc.) and Implemented/recommended increased supervision/assistance    INTERDISPLINARY COMMUNICATION/COLLABORATION:  Physician, [place], [place] and RN, CNA    NEW MEDICATION INITIATION DOCUMENTATION:  Documentation completed in Clinical Note in [facility]    Reason medication is being initiated:  na    MD / Provider name consulted re: change in status / initiation of new medication:  na    New Symptom(s):  na    New Order(s):  na  Name of the person notified of the changes:  na    Name of person being taught:  na    Instructions given:  na    Side Effects taught:  na    Response to teaching:  na      COMFORTABLE DYING MEASURE:  Is Patient/family satisfied with symptom level? Yes    DISCHARGE PLAN: Remain Routine at the [facility] until long term placement is arranged or pt returns home.

## 2017-07-23 NOTE — PROGRESS NOTES
0700 Report received from [place], [facility]. Pt is Routine level of care . Dx Endometrial Cancer. Pt resting in bed appears to be asleep. Ate waffle breakfast 50%. 0930 Medicated with Tordal for c/o pain not relieved by Dilaudid PCA. 1000 Pt appears to be asleep. 1500 Tordal 30mg IVP for pain not relieved by PCA. Firm edema noted of torso. Folds at sides deepened and firm to touch. Appears to be weeping at her back. Leaning to right side. Right breast is double the size of her left and firm to touch. 1600 Repositioned to center bed. 1800 Family at the bedside. Pt is sleeping.           NAME OF PATIENT:  Meet Feng    LEVEL OF CARE:  Routine    REASON FOR GIP:   Medication adjustment that must be monitored 24/7 and Stabilizing treatment that cannot take place at home    *PATIENT REMAINS ELIGIBLE FOR GIP LEVEL OF CARE AS EVIDENCED BY: (MUST BE ADDRESSED OF PATIENT GIP)      REASON FOR RESPITE:  na    O2 SAFETY:  Concentrator positioning (6\" from furniture/drapes), Tanks stored in rodríguez , No petroleum based products on face while oxygen in use and Oxygen sign on the door    FALL INTERVENTIONS PROVIDED:   Implemented/recommended use of fall risk identification flag to all team members, Implemented/recommended resources for alarm system (personal alarm, bed alarm, call bell, etc.) , Implemented/recommended environmental changes (remove hazards, lower bed, improve lighting, etc.) and Implemented/recommended increased supervision/assistance    INTERDISPLINARY COMMUNICATION/COLLABORATION:  Physician, MSW, [place] and RN, CNA    NEW MEDICATION INITIATION DOCUMENTATION:  Documentation completed in Clinical Note in [facility]    Reason medication is being initiated:  carmen    MD / Provider name consulted re: change in status / initiation of new medication:  na    New Symptom(s):  na    New Order(s): na    Name of the person notified of the changes:  na  Name of person being taught:  na    Instructions given: na    Side Effects taught: na    Response to teaching:  na      COMFORTABLE DYING MEASURE:  Is Patient/family satisfied with symptom level? Yes    DISCHARGE PLAN:  Remain at [facility] until end of life or long term plans for placement.

## 2017-07-23 NOTE — PROGRESS NOTES
Problem: Falls - Risk of  Goal: *Absence of falls  Outcome: Progressing Towards Goal  Pt continues to be high risk for falls due to inappropriate bed size. The overlay mattress rising above the side rails when patient is turned for bath or prn josselyn care. Problem: Pain  Goal: *Control of Pain  rn continued to encourage pt to utilize her PCA for pain relief. Pt needs to be reminded to use her PCA. She forgets that she has it. Outcome: Progressing Towards Goal  Patient PCA dose same as hourly basal rate 4mg. She pushes PCA button but will still ask for Toradol IV to control pain at same time. In 3 minutes is obtunded. Goal: *PALLIATIVE CARE: Alleviation of Pain  Outcome: Progressing Towards Goal  Pt requesting ablation to be performed on r leg.

## 2017-07-23 NOTE — PROGRESS NOTES
200 Recd report from [facility], continues with Routine level of care for Metastatic endometrial cancer. Pt has large tumor destroying r hip and pelvic causing most of the pain. R leg very edematous. Pain level 10/10 to turn or manipulate right leg. Pt refuses turning unless absolutely needed for josselyn care or bath. No major changes in assessment. Plan for pt to have possible tumor ablation next week. 2200 Pt is able to move upper extremities only. Can bend left knee. Heart and lungs WNL. Pt is using Dilaudid PCA to control pain. Sleepy when taking meds but eventually swallowed without difficulty. 0000 Resting quietly at this time. 0200 Resting quietly  0400 Woke up very confused, not sure what was going on reoriented fresh ice given, denies pain  0500 Rang out, has spilled pepsi all over bed and floor, pt wanted her take out food warmed, spill cleaned up, then patient rang again wants food thrown out, not appetizing, requesting toradol for pain has not pushed PCA.  0600 Scheduled methadone at this time.   0700 Report to oncoming shift     LEVEL OF CARE:  Routine      O2 SAFETY: RA  Oxygen sign on the door      FALL INTERVENTIONS PROVIDED:   Implemented/recommended assistive devices and encouraged their use and Implemented/recommended environmental changes (remove hazards, lower bed, improve lighting, etc.)      INTERDISPLINARY COMMUNICATION/COLLABORATION:  Physician, MSW, [place] and RN, CNA      NEW MEDICATION INITIATION DOCUMENTATION:  No new medications needed.       COMFORTABLE DYING MEASURE:  Is Patient/family satisfied with symptom level?  yes      DISCHARGE PLAN:  Pt will remain at the [facility] until Dr Agapito Williamson arranges for her to have surgery or when her family makes alternative living arrangements.

## 2017-07-24 NOTE — PROGRESS NOTES
NAME OF PATIENT:  Kwan Berry    LEVEL OF CARE:  Routine    REASON FOR GIP:   N/A    PATIENT REMAINS ELIGIBLE FOR GIP LEVEL OF CARE AS EVIDENCED BY: (MUST BE ADDRESSED OF PATIENT GIP)      REASON FOR RESPITE:  Caregiver breakdown    O2 SAFETY:  N/A on room air    FALL INTERVENTIONS PROVIDED:   Implemented/recommended use of non-skid footwear, Implemented/recommended use of fall risk identification flag to all team members, Implemented/recommended assistive devices and encouraged their use, Implemented/recommended resources for alarm system (personal alarm, bed alarm, call bell, etc.)  and Implemented/recommended increased supervision/assistance    INTERDISPLINARY COMMUNICATION/COLLABORATION:  Physician, MSW, [place] and RN, CNA    NEW MEDICATION INITIATION DOCUMENTATION:  N/A no med changes    Reason medication is being initiated:      MD / Provider name consulted re: change in status / initiation of new medication:      New Symptom(s):      New Order(s):      Name of the person notified of the changes:      Name of person being taught:      Instructions given:      Side Effects taught:      Response to teaching:        COMFORTABLE DYING MEASURE:  Is Patient/family satisfied with symptom level?   Yes    DISCHARGE PLAN:  Ongoing, maybe looking at home with medicaid aid or facility

## 2017-07-24 NOTE — PROGRESS NOTES
Bedside shift change report given to [address] (oncoming nurse) by Celeste Martinez (offgoing nurse). Report included the following information SBAR, Kardex and MAR.

## 2017-07-24 NOTE — HSPC IDG NURSE NOTES
Patient: Brenda Stone    Date: 17  Time: 2:31 AM    [facility] Nurse Notes  [facility]  Patient Name  Jyoti Randall  Episode - unknown  Date of IDT Meeting    UPDATED COMPREHENSIVE ASSESSMENT    Neuro- alert and oriented with frequent periods of confusion, moves only upper extremities, slight lateral movement of left leg, unable to turn self, speech clear  Resp- Lungs clr diminished in bases assessment difficult  Heart - tachy this shift, on lopressor, dose reduced due to hypotension /60  Feet and hand cold  GI- regular diet, eat mostly take out food family brings in, often falls asleep with mouth full of food, craves ice   - davila to BSD dark urine, encouraged pt to drink more water instead Pepsi  Skin - tiny breakdown r buttock cheek treated with barrier cream  ATTENDING Physician   Dr Tommy Childs Routine level of care     SYMPTOMS pain,.  occasional constipation, depression     SIGNS      LAB VALUES (when available)     KARNOFSKY 20%     FAST for all dementia N/A     Progression to DEPENDENCE WITH ADLs (include time frame) Non ambulatory over a yr, unable to participate in ADL, is total care     PRESSURE ULCERS none     DISEASE SPECIFIC     FALL RISK: High fall risk due to pt is in inappropriate bed for size, the overlay mattress raises pt above level of rails so that when she is turned she is not protected by any rail only staff holding her  PROBLEM:Pt often refuses turning due to pain involved  GOAL:Possible ablation to reduce pain  INTERVENTIONS(INDIVIDUALIZED) Keep comfortable, use enough staff to prevent pt from falling and staff injuries, assess pain frequent           Nursing Visit Frequency  Hospice Aide Visit Frequency     SW  COPING  GRIEF  ADVANCED DIRECTIVES    BEREAVEMENT  RESOURCES NEEDED  SW Visit Frequency     Bereavement   NO CHANGE     Volunteer  NO VOLUNTEER       SPIRITUAL ISSUES  GRIEF  COPING     AVAILABLE SPIRITUAL RESOURCES   Visit Frequency     Clinician Signatures  Nurse______________________________  SW________________________________  Chaplain____________________________  Volunteer Coordinator__________________  Bereavement_________________________        Signed by: Dene Ethan, RN

## 2017-07-24 NOTE — PROGRESS NOTES
Problem: Falls - Risk of  Goal: *Absence of falls  Outcome: Progressing Towards Goal  Patient remains in bed, using both arms, legs weak. Reposition with max assist.     Problem: Pain  Goal: *Control of Pain  rn continued to encourage pt to utilize her PCA for pain relief. Pt needs to be reminded to use her PCA. She forgets that she has it. Outcome: Progressing Towards Goal  Encourage use of PCA pump, patient appears comfortable, no signs of distress.

## 2017-07-24 NOTE — HOSPICE
3564 Received patient from [facility] In to see patient, patient is awake and oriented but appears to have some confusion over the number of people working and what day it is. She is quiet. Discussed her medications with her, encouraged use of PCA pump for pain, states pain is about a 4. Drank a pepsi with medication, offered water but she did not accept. Patient quickly fell back asleep. Ajma in place, output yellow, right upper PICC working, flushes well with blood return, last dressing change is .   0915 Patient resting. 1020 Patient resting but called out, complains of pain, encouraged PCA, added powder to skin, states that she is itchy, gave benadryl and ativan dose slightly early. Patient requested toradol, however order was discontinued. Notified Dr. Carter Chava and renewed order for toradol. 1150 Gave patient toradol, very tired, arms are very cold to the touch, patient still responds to voice. 1400 patient is sleeping  1613 gave senna and scheduled po ativan, patient appears confused, asked if I had \"attended her \" and states that her mother was there. Visitor in room with patient, re-oriented patient. Earlier patient thought something was piercing her right foot, checked all over both feet and did not find anything. Skin on legs is tight, feet and arms edematous, back and abdomen tight with edema. Patient appears to be hallucinating, skin still cool to the touch. 1805 Patient is on her right side, resting, best friend is at bedside. Patient is eating ice chips, complains of a sore throat the last few days. Did not see any redness, could be irritated with the cold ice chips, patient does not drink anything but pepsi.

## 2017-07-24 NOTE — HSPC IDG CHAPLAIN NOTES
Patient: Bernadette Gifford    Date: 07/24/17  Time: 4:51 PM  SPIRITUAL ISSUES:  I continue to visit the room of this pt at the [facility] who is on Routine Status. She is in bed, most of the time semi-awake and not fully alert, not agitated, not restless, and appeared well medicated and comfortable. She now is lying on her back and normally appears comfortable. I confirmed with her the [address] had continued to visit her and that I would be visiting as well, if she so desired. Her spirits are moderate to low at times and her attitude somewhat stoic. Patient and family use geovanny as a primary coping mechanism but are very stressed and struggling with this long, difficult journey. GOALS:  Continue to visit this pt and her family while she is at the [facility] and I am in the facility, to provide pastoral care and spiritual support to assist both the pt and them with coping with this difficult medical situation and decline. Coordinate w/ mahendra Rankin as she assumes primary care for this patient  Coordinate with Chaplain Foster if/when the pt returns to her mothers home. Visit this pt and her family, while she is @ [facility] and I am in this facility, or PRN as requested. Coordinate with Care Team on POC.   Signed by: Ema Velasco

## 2017-07-25 NOTE — PROGRESS NOTES
Bedside shift change report given to Gemma (oncoming nurse) by Maritza Ruiz (offgoing nurse). Report included the following information SBAR, Kardex and MAR.

## 2017-07-25 NOTE — PROGRESS NOTES
7131 Received patient from [facility], patient is very alert today although she thought she was home and not at hospice house. [address] patient scheduled medications, fresh ice cup, pepsi and repositioned patient's feet. Patient is still cool to the touch and extremely edematous. 1000 Patient sleeping  1145 gave patient scheduled ativan, patient drowsy and cool to the touch. Fell back asleep. [address] [facility] Nico Marrero in to see patient. 1615 Medicated patient with scheduled ativan. Patient awake and speaking with .   1735 Emptied 350 from davila

## 2017-07-25 NOTE — HOSPICE
NAME OF PATIENT:  Jyoti Randall    LEVEL OF CARE:  Routine    REASON FOR GIP:       *PATIENT REMAINS ELIGIBLE FOR GIP LEVEL OF CARE AS EVIDENCED BY: (MUST BE ADDRESSED OF PATIENT GIP)      REASON FOR RESPITE:      O2 SAFETY:  Concentrator positioning (6\" from furniture/drapes), Tanks stored in rodríguez , No petroleum based products on face while oxygen in use and Oxygen sign on the door    FALL INTERVENTIONS PROVIDED:   Implemented/recommended use of non-skid footwear, Implemented/recommended use of fall risk identification flag to all team members, Implemented/recommended assistive devices and encouraged their use, Implemented/recommended resources for alarm system (personal alarm, bed alarm, call bell, etc.)  and Implemented/recommended environmental changes (remove hazards, lower bed, improve lighting, etc.)    INTERDISPLINARY COMMUNICATION/COLLABORATION:  Physician, MSW, [place] and RN, CNA    NEW MEDICATION INITIATION DOCUMENTATION:      Reason medication is being initiated:      MD / Provider name consulted re: change in status / initiation of new medication:    New Symptom(s):      New Order(s):      Name of the person notified of the changes:      Name of person being taught:      Instructions given:      Side Effects taught:      Response to teachin [address] free  Is Patient/family satisfied with symptom level?  yes    DISCHARGE PLAN:  tx to [address] for soft tissue ablation to help with pain,then return home    20:00,report received,assumed care of pt who is Routine care,hospice dx is metastatic endometrial cancer. Visitores @ bedside,pt eating,denies pain. 21:00,pt repositioned in bed per her request.Pt asked for Torodol,she was medicated. 22:00,asleep.  05:30,has slept all night. 06:00,awakened for scheduled Methadone,pt is somewhat confused,easily reoriented.

## 2017-07-25 NOTE — PROGRESS NOTES
NAME OF PATIENT:  Jyoti Randall    LEVEL OF CARE:  Routine    REASON FOR GIP:   N/A    *PATIENT REMAINS ELIGIBLE FOR GIP LEVEL OF CARE AS EVIDENCED BY: (MUST BE ADDRESSED OF PATIENT GIP)      REASON FOR RESPITE:  Caregiver breakdown    O2 SAFETY:  Concentrator positioning (6\" from furniture/drapes), Tanks stored in rodríguez , No petroleum based products on face while oxygen in use and N/A pt on room air, oxygen concentrator in room    FALL INTERVENTIONS PROVIDED:   Implemented/recommended use of non-skid footwear, Implemented/recommended use of fall risk identification flag to all team members, Implemented/recommended assistive devices and encouraged their use, Implemented/recommended resources for alarm system (personal alarm, bed alarm, call bell, etc.)  and Implemented/recommended environmental changes (remove hazards, lower bed, improve lighting, etc.)    INTERDISPLINARY COMMUNICATION/COLLABORATION:  Physician, MSW, [place] and RN, CNA    NEW MEDICATION INITIATION DOCUMENTATION:  N/A    Reason medication is being initiated:     MD / Provider name consulted re: change in status / initiation of new medication:      New Symptom(s):      New Order(s):      Name of the person notified of the changes:      Name of person being taught:      Instructions given:      Side Effects taught:      Response to teaching:        COMFORTABLE DYING MEASURE:  Is Patient/family satisfied with symptom level?  Yes    DISCHARGE PLAN:  Ongoing, will return home with hospice when able

## 2017-07-26 NOTE — PROGRESS NOTES
NAME OF PATIENT:  Brittnee Sanders    LEVEL OF CARE:  Routine    REASON FOR GIP:   routine level of care    *PATIENT REMAINS ELIGIBLE FOR GIP LEVEL OF CARE AS EVIDENCED BY: (MUST BE ADDRESSED OF PATIENT GIP)      REASON FOR RESPITE:  n/a    O2 SAFETY:  Concentrator positioning (6\" from furniture/drapes) and Tanks stored in rodríguez     FALL INTERVENTIONS PROVIDED:   Implemented/recommended assistive devices and encouraged their use, Implemented/recommended environmental changes (remove hazards, lower bed, improve lighting, etc.) and Implemented/recommended increased supervision/assistance    INTERDISPLINARY COMMUNICATION/COLLABORATION:  Physician, MSW, [place] and RN, CNA    NEW MEDICATION INITIATION DOCUMENTATION:  Documentation completed in Clinical Note in [facility]    Reason medication is being initiated:  n/a    MD / Provider name consulted re: change in status / initiation of new medication:  n/a    New Symptom(s):  n/a    New Order(s):  n/a    Name of the person notified of the changes:  n/a    Name of person being taught:  n/a    Instructions given:  n/a    Side Effects taught:  n/a    Response to teaching:  n/a      COMFORTABLE DYING MEASURE:  Is Patient/family satisfied with symptom level?  yes    DISCHARGE PLAN:  Patient to be d/c from [facility] for surgery on Monday, July 31 at [address] Unsure of other plans at this time. 0700: Report received from HERLINDA Bowen to assume patient care. Patient is routine level of care with a hospice dx of metastatic endometrial cancer. Patient has diminished breath sounds and audible heart tones. Supplemental O2 at 2L PRN for pt comfort. Denies any complaints at this time as patient was sleeping on assessment. PICC infusing dilaudid PCA at this time and site WNL. Last BM 7-26-17 and pt bathed this morning per report. Jama draining foul smelling urine per report. Noted generalized edema +3 with BLE and truncal edema at +4. Weeping noted to posterior back.  Abdomen, breast, and back are very tight and hard to the touch. 1310: Patient rang on bell requesting nurse. Patient in room visiting with  and she was stating she was increasingly confused and wondering what the reason was. Writer informed patient that most likely it was due to her medication regimen and the dilaudid PCA and bolus attempts. Patient concerned that she was \"not confused and was on it\" last week but this week she is increasingly confused. Writer attempted to calm patient's nerves, and it worked. Patient given a cup of ice, cup of water, and a cup of pepsi. Patient encouraged to increase her water intake.

## 2017-07-26 NOTE — PROGRESS NOTES
This was a follow-up visit to continue support. Jyoti noted that she has been sleeping a lot this week. She contributes that in part to having a lot on her mind. She is concerned that about the procedure but she felt it was just normal concern, she is concerned that her dad is having surgery and that she has had some confusion. We discussed all. She know that these are normal responses to her illness and a family member having surgery. Her mother is planning to visit tonight and hopefully be able to put Jyoti's mind at peace as to her fathers surgery. We have agreed that I will follow-up with her tomorrow or Friday. As always thanks for the opportunity to  to this family.      Karma Root., [facility], [address] [place] [address]

## 2017-07-26 NOTE — HOSPICE
NAME OF PATIENT:  Jyoti Randall    LEVEL OF CARE:  Routine    REASON FOR GIP:       *PATIENT REMAINS ELIGIBLE FOR GIP LEVEL OF CARE AS EVIDENCED BY: (MUST BE ADDRESSED OF PATIENT GIP)      REASON FOR RESPITE:      O2 SAFETY:      FALL INTERVENTIONS PROVIDED:   Implemented/recommended use of non-skid footwear, Implemented/recommended use of fall risk identification flag to all team members, Implemented/recommended assistive devices and encouraged their use, Implemented/recommended resources for alarm system (personal alarm, bed alarm, call bell, etc.)  and Implemented/recommended environmental changes (remove hazards, lower bed, improve lighting, etc.)    INTERDISPLINARY COMMUNICATION/COLLABORATION:  Physician, MSW, [place] and RN, CNA    NEW MEDICATION INITIATION DOCUMENTATION:      Reason medication is being initiated:    MD / Provider name consulted re: change in status / initiation of new medication:     New Symptom(s):    New Order(s):      Name of the person notified of the changes:      Name of person being taught:      Instructions given:      Side Effects taught:      Response to teachin [address] free  Is Patient/family satisfied with symptom level?  yes    DISCHARGE PLAN:  tx Monday to [address] [place] [address] and be followed by home hospice    20:00,Miralax given for constipation. PCA Dilaudid continues via CADD pump @ 4mg/hr with 4mg prn.  06:00,incontinent of stool,bathed,linens changed,PCA pump cleared. Pt tolerated well. Pt has gross edema of trunk down to extremities,rt leg is hard and of course very painful to the touch.

## 2017-07-26 NOTE — HSPC IDG SOCIAL WORKER NOTES
Patient: Nadja Baptiste    Date: 07/26/17  Time: 1:40 PM    [facility]  Notes  [address] [facility] regarding a Medicaid PCA for patient when she returns home. According to [place] they have to do assessment in the home setting. Msw to inform them of day patient will be discharged home from [facility]. They can do assessment next day and send in a PCA the following day. Arrangements being made to send patient to [facility] on Monday 7-31-17 for pre-surgery testing. She will undergo surgery by Dr. Derrel Gitelman on Wednesday 8-2. Arrangements for admission to be completed. Plan is for her to return home following surgery. Continue to follow and coordinate arrangements as needed.               Signed by: Olvin Arambula

## 2017-07-26 NOTE — PROGRESS NOTES
[address]  follow-up Visit to [facility] pt on routine status      When I entered the this morning room, the pt was semi-awake and semi-alert.  She did not appear agitated, nor restless, and did appeared comfortable. He CM had told me that she continued to be \"very sleepy\". We spoke for a time regarding her decision to have the surgical procedure next week. She was basically responding with one word answers or short sentences. I asked if she had see José Parker recently and she said she had and smiled. They have become quite close which has been a great blessing to this patient. During my visit, she drifted in and out of sleep. I suggested I read several Psalms \"while she rested\" and she nodded her head. I read psalms and offered a prayer. GOALS:  Continue to visit this pt, her family, and her friends while she is at the [facility] and I am in the facility, to provide supplemental  pastoral care and spiritual support to that being provided by Rosana to assist both the pt and her family and friends in coping with this difficult medical situation and slow decline. Coordinate w/ Rosana as she assumes primary care for this patient  Coordinate with Home  if/when the pt returns to her mother home. PLAN:  Continue to provide supplemental  support to that being provided by Rosana. Coordinate with Rosana as requested. Coordinate with Care Team on POC.  VISIT FREQUENCY:  1 wk 3 starting 7/20 plus 4 prn 21 days.   Butch Drummond, [facility], [facility]  [phone]

## 2017-07-27 NOTE — PROGRESS NOTES
Pt examined due to reports of severe edema. It is noted that pt has severe, hardened 3rd spacing edema of the posterior trunk area beginning in the buttocks extending to the neck area.    Kenia Navarrete, NP

## 2017-07-27 NOTE — PROGRESS NOTES
0700 Report received from Marisol Wells, [facility]. Pt is Routine level of care. Dx Endometrial Ca with mets. 0800 Pt appears to be asleep.  0900 Refused breakfast, it is placed in front of her but she states she does not want to eat. 1000 Called this nurse to room to ask to remove \"the basket\" from under my back. . Told there is nothing there. Pt has so much edema in torso and both sides , areas solid and firm that it feel to her that something is under her. 80 Out of town Family in to visit with the pt. Only stayed for a short visit. 2[address] [facility] as requested for pain  [place] in to visit with the pt.  Heather Arenas, NP in to assess the pt. And the increase edema. She voices to her that her pain is controlled with the current PCA dose with Tordal prn.  1850 Pt states she has had a BM. Cleaned up of small amt soft BM. Repositioned in bed.           NAME OF PATIENT:  Jyoti Randall    LEVEL OF CARE:  Routine    REASON FOR GIP:   na    *PATIENT REMAINS ELIGIBLE FOR GIP LEVEL OF CARE AS EVIDENCED BY: (MUST BE ADDRESSED OF PATIENT GIP)      REASON FOR RESPITE:  na    O2 SAFETY:  Concentrator positioning (6\" from furniture/drapes), Tanks stored in rodríguez , No petroleum based products on face while oxygen in use and Oxygen sign on the door    FALL INTERVENTIONS PROVIDED:   Implemented/recommended use of fall risk identification flag to all team members, Implemented/recommended resources for alarm system (personal alarm, bed alarm, call bell, etc.) , Implemented/recommended environmental changes (remove hazards, lower bed, improve lighting, etc.) and Implemented/recommended increased supervision/assistance    INTERDISPLINARY COMMUNICATION/COLLABORATION:  Physician, MSW, [place]a and RN, CNA    NEW MEDICATION INITIATION DOCUMENTATION:  Documentation completed in Clinical Note in [facility]    Reason medication is being initiated:  na    MD / Provider name consulted re: change in status / initiation of new medication:  na    New Symptom(s): na    New Order(s):  na    Name of the person notified of the changes: na    Name of person being taught: na    Instructions given:  na  Side Effects taught:  na    Response to teaching:  na      COMFORTABLE DYING MEASURE:  Is Patient/family satisfied with symptom level?  yes    DISCHARGE PLAN:  [facility]

## 2017-07-27 NOTE — PROGRESS NOTES
1900 - Bedside and Verbal shift change report given to [address] [facility] (oncoming nurse) by Prashanth Leigh RN (offgoing nurse). Report included the following information SBAR, Kardex and MAR.     0700 - Bedside and Verbal shift change report given to [address] (oncoming nurse) by Harper Singh RN (offgoing nurse). Report included the following information SBAR, Kardex and MAR. NAME OF PATIENT:  Sibyl Boast Barksdale    LEVEL OF CARE:  ROUTINE    REASON FOR GIP:   N/A    *PATIENT REMAINS ELIGIBLE FOR GIP LEVEL OF CARE AS EVIDENCED BY: (MUST BE ADDRESSED OF PATIENT GIP)      REASON FOR RESPITE:  N/A    O2 SAFETY:  N/A    FALL INTERVENTIONS PROVIDED:   Implemented/recommended environmental changes (remove hazards, lower bed, improve lighting, etc.)    INTERDISPLINARY COMMUNICATION/COLLABORATION:  Physician, MSW, Leta and RN, CNA    NEW MEDICATION INITIATION DOCUMENTATION:  N/A    Reason medication is being initiated:  N/A    MD / Provider name consulted re: change in status / initiation of new medication:  N/A    New Symptom(s):  N/A    New Order(s):  N/A    Name of the person notified of the changes:  N/A    Name of person being taught:  N/A    Instructions given:  N/A    Side Effects taught:  N/A    Response to teaching:  N/A      COMFORTABLE DYING MEASURE:  Is Patient/family satisfied with symptom level?  yes    DISCHARGE PLAN:  Will return home after scheduled procedure next week.

## 2017-07-28 NOTE — PROGRESS NOTES
1900: Shift report received from [facility], [facility].  2030: Pt in bed with friend at the bedside. Pt report \"aching\" and requests PRN pain medication. She states she has utilized her PCA pump for a bolus dose about 10 mins prior but pain has not subsided. Pt medicated with PRN Toradol at this time. Pt also pushed her PCA pump for another bolus dose of Dilaudid. 2130: Pt Shows some signs of confusion, alert to self. Pt still reports pain, rated at 4/10. Medicated with scheduled meds that include pain medication. 0345: Pt sleeping with no signs of discomfort. 0500: Pt reported she needed to be cleaned up, because she had a BM. Turned pt and no BM, but pt was given josselyn care and lotion applied to bottom. No skin breakdown noted. 0600: Pt is confused, asking to be placed back in her room. Pt reoriented to place and situation. NAME OF PATIENT:  Ryanne Randall    LEVEL OF CARE:  Routine    REASON FOR GIP:   n/a    *PATIENT REMAINS ELIGIBLE FOR [facility] LEVEL OF CARE AS EVIDENCED BY: (MUST BE ADDRESSED OF PATIENT GIP)      REASON FOR RESPITE:  n/a    O2 SAFETY:  Concentrator positioning (6\" from furniture/drapes), No petroleum based products on face while oxygen in use and Oxygen sign on the door    FALL INTERVENTIONS PROVIDED:   Implemented/recommended resources for alarm system (personal alarm, bed alarm, call bell, etc.)  and Implemented/recommended environmental changes (remove hazards, lower bed, improve lighting, etc.)    INTERDISPLINARY COMMUNICATION/COLLABORATION:  Physician, MSW, Leta and RN, CNA    NEW MEDICATION INITIATION DOCUMENTATION:  no new meds initiated at this shift    COMFORTABLE DYING MEASURE:  Is Patient/family satisfied with symptom level?  yes    DISCHARGE PLAN:  The plan is for pt to undergo surgery and be transferred to [facility] on Monday. She will have to be evaluated to ensure she is appropriate for surgery.

## 2017-07-28 NOTE — PROGRESS NOTES
0700 Report received from [facility]. Pt is Routine level of care. Dx Endometrial Cancer. 0800 Pt appears to be sleeping PCA Dilaudid via CADD pump delivering basal and prn required to manage pain. 0900 Morning meds given. Pt disoriented this am and looking for people from home who have not been in this am. Has received 1 dose Dilaudid by demand since 7 am. She states her pain is 3 of 10 at this time. 1000 Took only bites of breakfast.   1030 Pt hallucinating and some parnoia. Pt states seeing people outside and inside her room, little children and babies Phone call to Akiko Heart NP. Haldol order obtained. 1225 Haldol 5mg given po for symptoms. 1300 Pt appears to be asleep. 1500 Pt awakened for scheduled medication. She is drowsy, drifted back to sleep after taking meds. 1800 Pt awakened very disoriented . Requesting to go to her room that she was in before today. Reoriented but still appears to have doubt. Thais Cedeño is at the bedside. Denies pain. Has had 3 attempts and 3 injects today. 60.4mg total infused today.             NAME OF PATIENT:  Jyoti Randall    LEVEL OF CARE:  Routine  REASON FOR GIP:   na    *PATIENT REMAINS ELIGIBLE FOR [facility] LEVEL OF CARE AS EVIDENCED BY: (MUST BE ADDRESSED OF PATIENT GIP)      REASON FOR RESPITE:  na    O2 SAFETY:  Concentrator positioning (6\" from furniture/drapes), Tanks stored in rodríguez , No petroleum based products on face while oxygen in use and Oxygen sign on the door    FALL INTERVENTIONS PROVIDED:   Implemented/recommended use of fall risk identification flag to all team members, Implemented/recommended resources for alarm system (personal alarm, bed alarm, call bell, etc.) , Implemented/recommended environmental changes (remove hazards, lower bed, improve lighting, etc.) and Implemented/recommended increased supervision/assistance    INTERDISPLINARY COMMUNICATION/COLLABORATION:  Physician, MSW, [place] and RN, CNA    NEW MEDICATION INITIATION DOCUMENTATION:  Documentation completed in Clinical Note in [facility]   Haldol 5mg SL tid    Reason medication is being initiated:  Hallucinations, parnoid  MD / Provider name consulted re: change in status / initiation of new medication:   Sarita Lr NP    New Symptom(s):  Hallucinations, parnoid    New Order(s):  Haldol 5mg SL tid  Name of the person notified of the changes: Pt    Name of person being taught:  pt    Instructions given: Pt aware that she is hallucinating, explained purpose of drug  Side Effects taught: na    Response to teaching:  Pt voices understanding      COMFORTABLE DYING MEASURE:  Is Patient/family satisfied with symptom level? Yes    DISCHARGE PLAN:  Remain at Routine level of care or end of life. Tentative plans to transfer to [facility] on Mon to be evaluated for Ablation of Right leg tumor.

## 2017-07-29 NOTE — PROGRESS NOTES
1900: Shift report received from HERLINDA valencia.  2130: Pt in bed, sleeping, awaken to voice, but very drowsy. Lungs clear, pt edematous all over. Medicated with scheduled meds. Oriented pt to situation and staff. Pt falls back asleep. 0120: Pt sleeping without signs of distress. 0530: Pt sleeping. Pump cleared and rate verified. 0730: Pt sleeping, wakes up to touch and voice, very lethargic. Oral Medication given at this time, but it is very difficult for pt to take the tablets in and drink with the straw. Pt asks for ice chips and they were brought to her. Pt continuous to be confused and drowsy. NAME OF PATIENT:  Jyoti Randall    LEVEL OF CARE:  Routine    REASON FOR GIP:   n/a    *PATIENT REMAINS ELIGIBLE FOR GIP LEVEL OF CARE AS EVIDENCED BY: (MUST BE ADDRESSED OF PATIENT GIP)      REASON FOR RESPITE:  n/a    O2 SAFETY:  n/a    FALL INTERVENTIONS PROVIDED:   Implemented/recommended resources for alarm system (personal alarm, bed alarm, call bell, etc.)     INTERDISPLINARY COMMUNICATION/COLLABORATION:  Physician, MSW, Leta and RN, CNA    NEW MEDICATION INITIATION DOCUMENTATION:  Haldol was initiated on previosu shift and continues on night shift    Reason medication is being initiated:  Pt had hallucinations     COMFORTABLE DYING MEASURE:  Is Patient/family satisfied with symptom level?  yes    DISCHARGE PLAN:  Pt is supposed to be transferred and evaluated for surgery on Monday to [facility].

## 2017-07-29 NOTE — PROGRESS NOTES
0700 Bedside and Verbal shift change report given to Mignon Choudhary RN  (oncoming nurse) by Suyapa Reyes (offgoing nurse). Report included the following information SBAR, Kardex, Intake/Output and MAR.      NAME OF PATIENT:  Kusum Randall    LEVEL OF CARE:  Routine     REASON FOR GIP:   n/a    *PATIENT REMAINS ELIGIBLE FOR GIP LEVEL OF CARE AS EVIDENCED BY: (MUST BE ADDRESSED OF PATIENT GIP)      REASON FOR RESPITE:  routine    O2 SAFETY: PRN  Concentrator positioning (6\" from furniture/drapes), Tanks stored in rodríguez , No petroleum based products on face while oxygen in use and Oxygen sign on the door    FALL INTERVENTIONS PROVIDED:   Implemented/recommended resources for alarm system (personal alarm, bed alarm, call bell, etc.)  and Implemented/recommended environmental changes (remove hazards, lower bed, improve lighting, etc.)    INTERDISPLINARY COMMUNICATION/COLLABORATION:  Physician, MSW, Leta and RN, CNA    NEW MEDICATION INITIATION DOCUMENTATION:  n/a    Reason medication is being initiated:  n/a    MD / Provider name consulted re: change in status / initiation of new medication:  n/a    New Symptom(s):  n/a    New Order(s):  n/a    Name of the person notified of the changes:  n/a    Name of person being taught:  n/a    Instructions given:  n/a    Side Effects taught:  n/a    Response to teaching:  n/a      COMFORTABLE DYING MEASURE:  Is Patient/family satisfied with symptom level?  yes    DISCHARGE PLAN:  Ongoing

## 2017-07-30 NOTE — HOSPICE
NAME OF PATIENT:  Jyoti Randall    LEVEL OF CARE:  Routine    REASON FOR GIP:       *PATIENT REMAINS ELIGIBLE FOR GIP LEVEL OF CARE AS EVIDENCED BY: (MUST BE ADDRESSED OF PATIENT GIP)      REASON FOR RESPITE:      O2 SAFETY:  Concentrator positioning (6\" from furniture/drapes), Tanks stored in rodríguez , No petroleum based products on face while oxygen in use and Oxygen sign on the door    FALL INTERVENTIONS PROVIDED:   Implemented/recommended use of non-skid footwear, Implemented/recommended use of fall risk identification flag to all team members, Implemented/recommended assistive devices and encouraged their use, Implemented/recommended resources for alarm system (personal alarm, bed alarm, call bell, etc.)  and Implemented/recommended environmental changes (remove hazards, lower bed, improve lighting, etc.)    INTERDISPLINARY COMMUNICATION/COLLABORATION:  Physician, MSW, Leta and RN, CNA    NEW MEDICATION INITIATION DOCUMENTATION:      Reason medication is being initiated:      MD / Provider name consulted re: change in status / initiation of new medication:      New Symptom(s):      New Order(s):      Name of the person notified of the changes:      Name of person being taught:      Instructions given:   Side Effects taught:      Response to teachin [address] free  Is Patient/family satisfied with symptom level?  yes    DISCHARGE PLAN:  tx to [facility] on Monday    20:00,report received,assumed care of pt who is Routine care,hospice dx is Metastatic endometrial cancer. Pt is very Romayne Dunker is delayed and sometimes difficult to understand. PCA Dilaudid continues @ 4mg/hr with 4mg prn every 10 min. Jama is intact with cloudy phil urine. Pt states comfort @ this time. Family @ bedside. 21:00,pt is slightly confused,easily reoriented. Miralax given for constipation. 22:00,asleep.  06:00,difficult to arouse to take scheduled Methadone,pt states that she's comfortable. PCA cleared,had 2 attempts,2 doses delivered.

## 2017-07-30 NOTE — PROGRESS NOTES
0700 Bedside,verbal shift change report given to Phyllis Barrera RN  (oncoming nurse) by Elina Montez RN (offgoing nurse). Report included the following information SBAR, Kardex and MAR.     0900 Patient lying in bed sleeping. Morning medications held due to patient unable to arouse for safe medication administration. See MAR.   4299 Provided patient with bed bath and linen change. Required assist X3. Took approximately 55 minutes. Patient tolerated well. Jama bag and line changed during this time. 1730 Patients mother took patients personal belongings home. Patients mother stated patients sister would bring the remainder of patient belonging home tomorrow. NAME OF PATIENT:  Jyoti Randall    LEVEL OF CARE:  routine    REASON FOR GIP:   n/a    *PATIENT REMAINS ELIGIBLE FOR GIP LEVEL OF CARE AS EVIDENCED BY: (MUST BE ADDRESSED OF PATIENT GIP)      REASON FOR RESPITE:  n/a patient routine care    O2 SAFETY:  PRN use    FALL INTERVENTIONS PROVIDED:   Implemented/recommended resources for alarm system (personal alarm, bed alarm, call bell, etc.)  and Implemented/recommended environmental changes (remove hazards, lower bed, improve lighting, etc.)    INTERDISPLINARY COMMUNICATION/COLLABORATION:  Physician, MSW, Leta and RN, CNA    NEW MEDICATION INITIATION DOCUMENTATION:  N/a    Reason medication is being initiated:  n/a    MD / Provider name consulted re: change in status / initiation of new medication:  n/a    New Symptom(s):  n/a    New Order(s):  n/a    Name of the person notified of the changes:  n/a    Name of person being taught:  n/a    Instructions given:  n/a    Side Effects taught:  n/a    Response to teaching:  n/a      COMFORTABLE DYING MEASURE:  Is Patient/family satisfied with symptom level?  yes    DISCHARGE PLAN:  Patient to be discharge to [address] [place] [address] 7/31/17 for possible procedure.

## 2017-07-31 PROBLEM — C54.1 ENDOMETRIAL CA (HCC): Status: ACTIVE | Noted: 2017-01-01

## 2017-07-31 NOTE — PROGRESS NOTES
0700 Report received from Stefan Collins, [facility]. Pt Routine level of care . Dx Endometrial  Ca. Pt is to be discharge today to [facility] for preprocedure testing for potential Ablation of right leg tumor. 0830 Refused breakfast. Pt is drowsy but arouses. Speech is slow and quiet. 0930 Pt is attempting to use phone. She is unable to use phone. Does not remember what to do to make calls. Edema of right hand, non pitting, eyes and right side of face. [address] made for pt transport to [facility] via  [facility]  at 7569. Pt made aware. MSW Matty Flores will call the pts mother Denia Fiore to give the time of transport. 1415 Phone call by Mildred Lam RN  to [place] at [facility] to give report. States she will give report to the floor nurse and have her call if additional report is needed. 1630 Pt discharged via stretcher with [facility] with DNR and personal belongings.        NAME OF PATIENT:  Jyoti Randall    LEVEL OF CARE:  Routine    REASON FOR GIP:   na    *PATIENT REMAINS ELIGIBLE FOR [facility] LEVEL OF CARE AS EVIDENCED BY: (MUST BE ADDRESSED OF PATIENT GIP)      REASON FOR RESPITE:  na    O2 SAFETY:  Concentrator positioning (6\" from furniture/drapes), Tanks stored in rodríguez , No petroleum based products on face while oxygen in use and Oxygen sign on the door    FALL INTERVENTIONS PROVIDED:   Implemented/recommended use of fall risk identification flag to all team members, Implemented/recommended resources for alarm system (personal alarm, bed alarm, call bell, etc.) , Implemented/recommended environmental changes (remove hazards, lower bed, improve lighting, etc.) and Implemented/recommended increased supervision/assistance    INTERDISPLINARY COMMUNICATION/COLLABORATION:  Physician, MSW, Leta and RN, CNA    NEW MEDICATION INITIATION DOCUMENTATION:  Documentation completed in Clinical Note in [facility]    Reason medication is being initiated:  carmen    MD / Provider name consulted re: change in status / initiation of new medication:  na    New Symptom(s):  na    New Order(s):  na    Name of the person notified of the changes:  na    Name of person being taught:  na     Instructions given:  na    Side Effects taught:  na    Response to teaching:  na      COMFORTABLE DYING MEASURE:  Is Patient/family satisfied with symptom level    Yes  DISCHARGE PLAN:  Discharge to [facility] today for preprocedure testing for Ablation right leg tumor.

## 2017-07-31 NOTE — PROGRESS NOTES
Oncology Nursing Communication Tool      7:46 PM  7/31/2017     Bedside shift change report given to Rene Suarez RN (incoming nurse) by Valentin Spring RN (outgoing nurse) on Angeles Elliott a 40 y.o. female who was admitted on 7/31/2017  5:54 PM. Report included the following information SBAR and Kardex. Significant changes during shift: none      Issues for physician to address: none            Code Status: DNR     Infections: No current active infections     Allergies: Egg; Pcn [penicillins]; Shellfish containing products; Tetanus and diphtheria toxoids, adsorbed, adult; and Tomato     Current diet: DIET REGULAR       Pain Controlled [] yes [] no   Bowel Movement [x] yes [] no   Last Bowel Movement (date)                  Vital Signs:   Patient Vitals for the past 12 hrs:   Temp Pulse Resp BP SpO2   07/31/17 1834 98.5 °F (36.9 °C) 72 22 93/67 91 %      Intake & Output:   No intake or output data in the 24 hours ending 07/31/17 1946   Laboratory Results:   No results found for this or any previous visit (from the past 12 hour(s)). Opportunity for questions and clarifications were given to the incoming nurse. Patient's bed is in low position, side rails x2, door open PRN, call bell within reach and patient not in distress.       Valentin Spring RN

## 2017-07-31 NOTE — PROGRESS NOTES
1630 Unused  PCA Dilaudid bag 500ml with attached cassette sent with pt via transport. Current PCA Dilaudid infusing right upper arm PICC at 4mg/hr Basal.  377.9 ml left to count in current bag.

## 2017-07-31 NOTE — PROGRESS NOTES
I coordinated with Rosana regarding the transfer of this patient to [facility] this afternoon at 1615 to room 1136. I left her a voice mail and emailed her as well. I then visited the pt and spoke with her about her transfer. She appeared in moderate spirits and said she hope \"her friends would visit her at [facility]. \"  I told her I had let Rosana know of her transfer. She thanked me. I also noted I would keep her and her family in her prayers and thought that procedure would go well. PLAN:  Coordinate w/ Care team on POC. Coordinate w/  Medardo Anguiano on [facility]. Respond to requests for support as received.     Radames Hung, [place] [phone]

## 2017-07-31 NOTE — IP AVS SNAPSHOT
[address] [facility] 
[phone] Patient: Pratik Lam MRN: [id number] AFT:0/88/8045 You are allergic to the following Allergen Reactions Egg Nausea and Vomiting Raw egg and scrambled eggs. Egg products okay. Pcn (Penicillins) Nausea and Vomiting \"but could take amoxil\" Shellfish Containing Products Unknown (comments) Tetanus And Diphtheria Toxoids, Adsorbed, Adult Other (comments) Developed local swelling, axillary pain, and transient fever Tomato Unknown (comments) Fresh tomatoes Recent Documentation OB Status Smoking Status Hysterectomy Never Smoker Emergency Contacts  (Rel.) Home Phone Work Phone Mobile Phone Charisse Marquis (Parent) [phone] -- -- Mavis Radha (Sister) [phone] -- -- About your hospitalization You were admitted on:  July 31, 2017 You last received care in the:  [address] You were discharged on:  August 11, 2017 Why you were hospitalized Your primary diagnosis was:  Not on File Your diagnoses also included:  Endometrial Ca (Hcc) Providers Seen During Your Hospitalizations Provider Role Specialty Primary office phone Darline Du MD Attending Provider Internal Medicine [phone] Your Primary Care Physician (PCP) Primary Care Physician Office Phone Office Fax Sharad Loaiza [phone] Follow-up Information None Current Discharge Medication List  
  
ASK your doctor about these medications Dose & Instructions Dispensing Information Comments Morning Noon Evening Bedtime  
 albuterol 90 mcg/actuation inhaler Commonly known as:  PROVENTIL HFA, VENTOLIN HFA, PROAIR HFA Your last dose was: Your next dose is:    
   
   
 Dose:  1 Puff Take 1 Puff by inhalation every six (6) hours as needed for Wheezing. Quantity:  1 Inhaler Refills:  1  
     
   
   
   
  
 albuterol-ipratropium 2.5 mg-0.5 mg/3 ml Nebu Commonly known as:  Jg Grossman Your last dose was: Your next dose is:    
   
   
 Dose:  3 mL  
3 mL by Nebulization route every four (4) hours as needed (sob). Refills:  0  
     
   
   
   
  
 aluminum-magnesium hydroxide 200-200 mg/5 mL susp 30 mL, diphenhydrAMINE 12.5 mg/5 mL elix 30 mL, lidocaine 2 % soln 30 mL oral suspension (compounded) Your last dose was: Your next dose is:    
   
   
 Dose:  5 mL Take 5 mL by mouth every six (6) hours as needed for Stomatitis. Refills:  0  
     
   
   
   
  
 clotrimazole 1 % topical cream  
Commonly known as:  Mandi Stokes Your last dose was: Your next dose is:    
   
   
 Dose:  1 Each Apply 1 Each to affected area two (2) times a day. Apply to feet affected Quantity:  35.4 g Refills:  0  
     
   
   
   
  
 docusate sodium 100 mg capsule Commonly known as:  Gwendel Laser Your last dose was: Your next dose is:    
   
   
 Dose:  100 mg Take 100 mg by mouth daily. Refills:  0  
     
   
   
   
  
 * DULoxetine 20 mg capsule Commonly known as:  CYMBALTA Your last dose was: Your next dose is:    
   
   
 Dose:  40 mg Take 40 mg by mouth daily. Indications: ANXIETY WITH DEPRESSION, NEUROPATHIC PAIN Refills:  0  
     
   
   
   
  
 * DULoxetine 30 mg capsule Commonly known as:  CYMBALTA Your last dose was: Your next dose is:    
   
   
 Dose:  30 mg Take 1 Cap by mouth daily. Quantity:  30 Cap Refills:  1  
     
   
   
   
  
 * DULoxetine 60 mg capsule Commonly known as:  CYMBALTA Your last dose was: Your next dose is:    
   
   
 Dose:  60 mg Take 60 mg by mouth daily. Refills:  0  
     
   
   
   
  
 gabapentin 300 mg capsule Commonly known as:  NEURONTIN Your last dose was: Your next dose is:    
   
   
 Dose:  300 mg Take 1 Cap by mouth two (2) times a day. Quantity:  60 Cap Refills:  0 HEPARIN FLUSH IV Your last dose was: Your next dose is:    
   
   
 Dose:  3 mL  
3 mL by Injection route daily. Heparin 100 units/ml 3 mls IV to maintain device patency. Refills:  0 LORazepam 1 mg tablet Commonly known as:  ATIVAN Your last dose was: Your next dose is:    
   
   
 Dose:  1 mg Take 1 Tab by mouth every six (6) hours as needed for Anxiety. Max Daily Amount: 4 mg. Quantity:  30 Tab Refills:  0  
     
   
   
   
  
 menthol topical gel Commonly known as:  Martha Marcia Your last dose was: Your next dose is:    
   
   
 Dose:  1 g Apply 1 g to affected area three (3) times daily as needed for Pain or Stiffness. Refills:  0  
     
   
   
   
  
 methadone 5 mg tablet Commonly known as:  DOLOPHINE Your last dose was: Your next dose is:    
   
   
 Dose:  10 mg Take 10 mg by mouth every eight (8) hours. Refills:  0  
     
   
   
   
  
 metoprolol succinate 50 mg XL tablet Commonly known as:  TOPROL-XL Your last dose was: Your next dose is:    
   
   
 Dose:  50 mg Take 50 mg by mouth daily. Refills:  0  
     
   
   
   
  
 metoprolol tartrate 25 mg tablet Commonly known as:  LOPRESSOR Your last dose was: Your next dose is:    
   
   
 Dose:  25 mg Take 1 Tab by mouth two (2) times a day. Quantity:  60 Tab Refills:  0 MIRALAX 17 gram packet Generic drug:  polyethylene glycol Your last dose was: Your next dose is:    
   
   
 Dose:  17 g Take 17 g by mouth daily as needed (constipation). Refills:  0  
     
   
   
   
  
 morphine (PF) 150 mg/30 mL Pcas Your last dose was: Your next dose is:    
   
   
 Dose:  4 mg/hr 4 mg/hr by IntraVENous route continuous. 4mg q 6min Max of 44 mg in one hour Quantity:  1 Vial  
Refills:  0  
     
   
   
   
  
 morphine 100 mg/5 mL (20 mg/mL) concentrated solution Commonly known as:  Arthur Talley Your last dose was: Your next dose is:    
   
   
 Dose:  20 mg Take 20 mg by mouth every two (2) hours as needed for Pain. Refills:  0  
     
   
   
   
  
 morphine in 0.9 % NaCl 10 mg/mL Soln Your last dose was: Your next dose is:    
   
   
 Dose:  4 mg/hr 4 mg/hr by Continuous Infusion route continuous. 10mg/ml infusing 4mg/hr with 2mg bolus dose every 10min up to 6 doses per hour Refills:  0  
     
   
   
   
  
 * nystatin powder Commonly known as:  MYCOSTATIN Your last dose was: Your next dose is:    
   
   
 Apply  to affected area four (4) times daily as needed. Yeast  
 Quantity:  1 Bottle Refills:  1  
     
   
   
   
  
 * nystatin 100,000 unit/mL suspension Commonly known as:  MYCOSTATIN Your last dose was: Your next dose is:    
   
   
 Dose:  833050 Units Take 500,000 Units by mouth every six (6) hours as needed (sore throat). Refills:  0  
     
   
   
   
  
 OMS 20 DEMAND OXYGEN DELIVERY Your last dose was: Your next dose is:    
   
   
 Dose:  2 L/min 2 L/min by Nasal route as needed (sob). Refills:  0  
     
   
   
   
  
 oxyCODONE IR 30 mg immediate release tablet Commonly known as:  OXY-IR Your last dose was: Your next dose is:    
   
   
 Dose:  60 mg Take 2 Tabs by mouth every three (3) hours (while awake). Max Daily Amount: 360 mg.  
 Quantity:  60 Tab Refills:  0 SALINE FLUSH IJ Your last dose was: Your next dose is:    
   
   
 Dose:  10 mL 10 mL by IntraVENous route daily. via PICC Refills:  0  
     
   
   
   
  
 sennosides 8.6 mg Cap Commonly known as:  Senna Your last dose was: Your next dose is:    
   
   
 Dose:  2 Tab Take 2 Tabs by mouth two (2) times a day. Quantity:  120 Cap Refills:  2  
     
   
   
   
  
 sulindac 200 mg tablet Commonly known as:  CLINORIL Your last dose was: Your next dose is:    
   
   
 Dose:  200 mg Take 200 mg by mouth two (2) times a day. Refills:  0  
     
   
   
   
  
 TYLENOL ARTHRITIS PAIN 650 mg CR tablet Generic drug:  acetaminophen Your last dose was: Your next dose is:    
   
   
 Dose:  650 mg Take 650 mg by mouth every four (4) hours as needed for Pain or Fever. Refills:  0  
     
   
   
   
  
 VITAMIN D3 2,000 unit Tab Generic drug:  cholecalciferol (vitamin D3) Your last dose was: Your next dose is: Take  by mouth daily. Refills:  0  
     
   
   
   
  
 * Notice: This list has 5 medication(s) that are the same as other medications prescribed for you. Read the directions carefully, and ask your doctor or other care provider to review them with you. Discharge Instructions None Discharge Orders None Introducing [facility]! Dear Faye Babin: Thank you for requesting a [employer] account. Our records indicate that you already have an active [employer] account. You can access your account anytime at [email or web address] Did you know that you can access your hospital and ER discharge instructions at any time in [employer]? You can also review all of your test results from your hospital stay or ER visit. Additional Information If you have questions, please visit the Frequently Asked Questions section of the [employer] website at [email or web address] Remember, [employer] is NOT to be used for urgent needs. For medical emergencies, dial 911. Now available from your iPhone and Android! General Information Please provide this summary of care documentation to your next provider. Patient Signature:  ____________________________________________________________ Date:  ____________________________________________________________  
  
Jersey Matsu Provider Signature:  ____________________________________________________________ Date:  ____________________________________________________________

## 2017-07-31 NOTE — HOSPICE
NAME OF PATIENT:  Jyoti Randall    LEVEL OF CARE:  Tere    REASON FOR GIP:       *PATIENT REMAINS ELIGIBLE FOR GIP LEVEL OF CARE AS EVIDENCED BY: (MUST BE ADDRESSED OF PATIENT GIP)      REASON FOR RESPITE:      O2 SAFETY:  Concentrator positioning (6\" from furniture/drapes), Tanks stored in rodríguez , No petroleum based products on face while oxygen in use and Oxygen sign on the door    FALL INTERVENTIONS PROVIDED:   Implemented/recommended use of non-skid footwear, Implemented/recommended use of fall risk identification flag to all team members, Implemented/recommended assistive devices and encouraged their use, Implemented/recommended resources for alarm system (personal alarm, bed alarm, call bell, etc.)  and Implemented/recommended environmental changes (remove hazards, lower bed, improve lighting, etc.)    INTERDISPLINARY COMMUNICATION/COLLABORATION:  Physician, MSW, [place] and RN, CNA    NEW MEDICATION INITIATION DOCUMENTATION:      Reason medication is being initiated:     MD / Provider name consulted re: change in status / initiation of new medication:      New Symptom(s):      New Order(s):      Name of the person notified of the changes:      Name of person being taught:      Instructions given:      Side Effects taught:      Response to teachin [address] free  Is Patient/family satisfied with symptom level?  yes    DISCHARGE PLAN:  tx tomorrow to [address] [place] [address] with family and be followed by home hospice    20:00,report received,assumed care of pt who is Routine care,hospice dx is metastatic endometrial cancer. Pt is drowsy,easily aroused by verbal stimulation. PCA Dilaudid continues via CADD pump,pt denies c/o pain @ present. Jama is intact with phil ,foul smelling urine. Edema has increased,also rt hand is edematous. 06:00,pt is having more difficulty swallowing pills,needed applesauce with pills this am.Pt is more confused this am also. PCA cleared,no prn doses.Jama drained 150cc.

## 2017-07-31 NOTE — HOSPICE
[facility] RN note:  Pt arrived to oncology unit room 1139 for continued routine level of care in hospice. Dr Ibrahim Sep in to assess pt. PCA CADD pump continues to be in place until hospital PCA available from pharmacy. Extra dilaudid infusion bag locked in pharmacy refrigerator. Pt denies any pain or discomfort once successfully transferred to hospital bed from stretcher. Linens saturated in urin, davila in place, will assess for leakage, may need to be replaced. [address] [facility]  [phone]    Routine Nursing Note   Patient Name: Keely Ray  YOB: 1980  Age: 40 y.o. Date of Visit: 07/31/17  Facility of Care: [address] [place] [address]  Patient Room: [address] [place] [address] Attending: Jordan Sauceda MD  Hospice Diagnosis: pre op testing  Endometrial ca [facility])    Level of Care: Routine    ASSESSMENT, PLAN AND INTERVENTIONS     1. Patient transferred to [address] [place] [address] from [facility] at Routine Level of Care  2. Continue current pain regimen including dilaudid PCA at 4mg / hr basal rate  3. Emotional and spiritual support to pt and family    Spiritual Interventions: hospice chaplain to provide spiritual support to pt     Psych/ Social/ Emotional Interventions: hospice social worker to anticipate needs for discharge to home post procedure wed. Care Coordination Needs: communicate with Dr Yusra [id number] for pre op needs    Care plan and New Orders discussed / approved with Zuelma Wagoner MD.    Description History and Chart Review     List number of doses of PRN medications in last 24 hours:  No PRN medications prvided  Medication 1:  Number of doses:    Medication 2:   Number of doses:    Medication 3:   Number of doses:    DISCHARGE PLANNING     1. Discharge Plan: Home with hospice post proceedure  2. Patient/Family teaching: surgical proceedure  3.  Response to patient/family teaching: pt's mother expressed understanding of procedure and intended goal of comfort    ASSESSMENT KARNOFSKY: 30%    Prognosis estimated based on 07/31/17 clinical assessment is:   [] Few to Many Hours  [] Hours to Days   [] Few to Many Days   [] Days to Weeks   [x] Few to Many Weeks   [] Weeks to Months   [] Few to Many Months    Quality Measure: Patient self-reports:  [x] Yes    [] No    Constipation: _ Yes  _ No  LAST BM: 7/30 post miralax    CLINICAL INFORMATION   Patient Vitals for the past 12 hrs:   Temp Pulse Resp BP SpO2   07/31/17 1834 98.5 °F (36.9 °C) 72 22 93/67 91 %       Currently this patient has:  [] Supplemental O2   [] IV    [x] PICC      [] PORT   [] NG Tube    [] PEG Tube   [] Ostomy     [x] Jama draining _______ urine leaking dark phil urine[] Other:     SIGNS/PHYSICAL FINDINGS     Skin (including wound):  [] Warm, dry, supple, intact and color normal for race  [x] Warm   [] Dry   [] Cool     [] Clammy       [] Diaphoretic    Turgor   [] Normal   [] Decreased  Color:   [] Pink   [] Pale   [] Cyanotic   [] Erythema   [] Jaundice   [x] Normal for Race  []  Wounds:    Neuro:  [] Lethargy  [] Restlessness / agitation  [x] Confusion / delirium  [] Hallucinations  [] Responds to maximal stimulation  [] Unresponsive  [] Seizures     Cardiac:  [] Dyspnea on Exertion  [] JVD  [] Murmur  [] Palpitations  [] Hypotension  [] Hypertension  [] Tachycardia  [] Bradycardia  [] Irregular HR  [] Pulses Decreased  [] Pulses Absent  [] Edema:     Severe edema to R leg  [] Mottling:      (Location)    Respiratory:  Breath sounds:    [x] Diminished   [] Wheeze   [] Rhonchi   [] Rales   [x] Even and unlabored  [] Labored:            [] Cough   [] Non Productive   [] Productive    [] Description:           [] Deep suctioned   [] O2 at ___ LPM  [] High flow oxygen greater than 10 LPM  [] Bi-Pap    GI  [] Abdomen (describe)   [x] Ascites  [] Nausea  [] Vomiting  [] Incontinent of bowels  [] Bowel sounds (yes/no)  [] Diarrhea  [] Constipation (see above including last bowel movement)  [] Checked for impaction  [x] Last BM 7/30    Nutrition  Diet:___reg_______  Appetite:   [] Good   [] Fair   [x] Poor   [] Tube Feeding       [] Voiding  [] Incontinent   [x] Jama    Musculoskeletal  [] Balance/[place] Unsteady   [] Weak   Strength:    [] Normal    [] Limited    [x] Decreasing   Activities:    [] Up as tolerated   [x] Bedridden    [] Specify:    SAFETY  [x] 24 hr. Caregiver   [x] Side rails ? [x] Hospital bed   [x] Reviewed Falls & Safety     ALLERGIES AND MEDICATIONS     Allergies: Allergies   Allergen Reactions    Egg Nausea and Vomiting     Raw egg and scrambled eggs. Egg products okay.      Pcn [Penicillins] Nausea and Vomiting     \"but could take amoxil\"    Shellfish Containing Products Unknown (comments)    Tetanus And Diphtheria Toxoids, Adsorbed, Adult Other (comments)     Developed local swelling, axillary pain, and transient fever    Tomato Unknown (comments)     Fresh tomatoes       Current Facility-Administered Medications   Medication Dose Route Frequency    HYDROmorphone (PF) 15 mg/30 ml (DILAUDID) PCA   IntraVENous CONTINUOUS    acetaminophen (TYLENOL) tablet 650 mg  650 mg Oral Q4H PRN    bisacodyl (DULCOLAX) tablet 5 mg  5 mg Oral DAILY PRN    diphenhydrAMINE (BENADRYL) capsule 25 mg  25 mg Oral Q4H PRN    DULoxetine (CYMBALTA) capsule 60 mg  60 mg Oral QHS    gabapentin (NEURONTIN) capsule 300 mg  300 mg Oral QHS    haloperidol (HALDOL) tablet 5 mg  5 mg Oral TID    LORazepam (ATIVAN) tablet 1 mg  1 mg Oral AC&HS    LORazepam (ATIVAN) tablet 2 mg  2 mg Oral TID PRN    methadone (DOLOPHINE) tablet 10 mg  10 mg Oral Q8H    metoprolol tartrate (LOPRESSOR) tablet 12.5 mg  12.5 mg Oral BID    morphine injection 15 mg  15 mg IntraVENous Q15MIN PRN    polyethylene glycol (MIRALAX) packet 17 g  17 g Oral DAILY PRN    senna-docusate (PERICOLACE) 8.6-50 mg per tablet 2 Tab  2 Tab Oral BID    sulindac (CLINORIL) tablet 200 mg  200 mg Oral BID WITH MEALS    magic mouthwash cpd (with sucralfate)  5 mL Oral QID PRN    sodium chloride (NS) flush 10-30 mL  10-30 mL InterCATHeter PRN    sodium chloride (NS) flush 10 mL  10 mL InterCATHeter Q24H    sodium chloride (NS) flush 10 mL  10 mL InterCATHeter PRN    sodium chloride (NS) flush 10-40 mL  10-40 mL InterCATHeter Q8H    sodium chloride (NS) flush 20 mL  20 mL InterCATHeter Q24H          Visit Time In: 5:00  Visit Time Out: 6:00

## 2017-08-01 NOTE — H&P
[address] [place] [address] [facility] in Need  [phone]    Patient Name: Brittnee Sanders  YOB: 1980    Date of Provider Hospice Visit: 07/31/17    Level of Care:   [] General Inpatient (GIP)    [x] Routine   [] Respite    Location of Care:  [] [facility] [] [facility] [x] [facility] [] [facility] [] [facility]    Date of Original Hospice Admission: 7-31-17  Hospice Medical Director for Inpatient Care: Dr. Pepito Day Diagnosis: Metastatic endometrial cancer, s/p XRT  Diagnoses RELATED to the terminal prognosis: pathologic right subtrochanteric femur fracture  Other Diagnoses: dm-2, obesity, hx tachycardia     HOSPICE SUMMARY   Do not cut and paste chart information other than imaging findings    Brittnee Sanders is a 40y.o. year old who was admitted to [facility]. The patient's principle diagnosis has resulted in hospitalization 5/8-5/16/17 for right hip pain and respiratory failure. CT-pathologic right subtrochenteric femur fracture and enlarging [id number] cm right pelvic mass with invasion to pelvis, sacrum and hip. Pain management with PCA and methadone. Pt was sent to hospice house but remains bedbound and still has pain in right groin and hip , worse with morvement. Palliative/hospice MD has discussed and reviewed case with IR and plan is for inpatient admit and palliative microwave ablation of tumor. Refer to LCD   The LCD for Hospice for the admitted diagnosis of cancer includes:    [x] Disease with distant metastases at presentation OR    [x] Progression from an earlier stage of disease to metastatic disease with either:   [] a continued decline in spite of therapy   [] patient declines further disease directed therapy    [] Certain cancers with poor prognoses (e.g. small cell lung cancer, brain cancer and pancreatic cancer) may be hospice eligible without fulfilling the other criteria in this section.       Functionally, the patient's Karnofsky and/or Palliative Performance Scale has declined over a period of months and is estimated at 30. The patient is dependent on the following ADLs:all except feeding    Objective information that support this patients limited prognosis includes:   CT scan 5/9/17  IMPRESSION  IMPRESSION:     1. Interval significant enlargement of the mass centered in the right pelvis  with destruction of the bones of the right pelvis, sacrum, and proximal right  femur, with extension to involve the left acetabulum, inferior pubic ramus and  superior pubic ramus. 2. Associated pathologic fracture of the right proximal femur. 3. Evaluation of the mass subjacent to the right hemidiaphragm is limited as it  was not as well imaged on prior examinations. 4. Interval enlargement of left pelvic lymph node.     The patient/family chose comfort measures with the support of Hospice. HOSPICE DIAGNOSES   Active Symptoms:  1. Right groin and hip pain  2. Delirium     PLAN   1. Admit to [facility] routine level of care for planned palliative CT microwave ablation  2. Will need preop labs and CXR  3. Will d/w Int Radiology about the right hip fracture and whether the overall pain level and functional status will improve since there is      still the underlying fracture. 4. Continue PCA and methadone  5. Will need to hold metoprolol if bp remains very low  6.   7.  and SW to support family needs  8. Disposition: return to home with hospice . SW already involved    Prognosis estimated based on 07/31/17 clinical assessment is:   [] Hours to Days    [] Days to Weeks    [x] Other:    Communicated plan of care with: Hospice Case Manager;  Hospice IDT; Care Team     GOALS OF CARE     Resuscitation Status: DNR  Durable DNR: [] Yes [] No    Advance Care Planning 5/10/2017   Patient's Healthcare Decision Maker is: Legal Next of Andrea 69   Primary Decision Maker Name Corky Montoya   Primary Decision Maker Phone Number -   Primary Decision Maker Relationship to Patient Parent   Confirm Advance Directive Yes, on file   Patient Would Like to Complete Advance Directive -        HISTORY     History obtained from: chart, hospice RN and patient and her mother    CHIEF COMPLAINT:  The patient is:   [x] Verbal  [] Nonverbal  [] Unresponsive    HPI/SUBJECTIVE:    Reports pain is mild when not moving, incraeased pain in right hip when moved or turned.   Decreased appetite  Some trouble swallowing pills  Increased leg edema   Patient unable to rate her pain on scale 1 to 10     REVIEW OF SYSTEMS     The following systems were: [x] reviewed  [] unable to be reviewed    Positive ROS include:  Constitutional:  Ears/nose/mouth/throat:  Respiratory:  Gastrointestinal:  Musculoskeletal: right hip and groin pain  Neurologic:  Psychiatric:  Endocrine:     Adult Non-Verbal Pain Assessment Score: 2    Face  [] 0   No particular expression or smile  [x] 1   Occasional grimace, tearing, frowning, wrinkled forehead  [] 2   Frequent grimace, tearing, frowning, wrinkled forehead    Activity (movement)  [x] 0   Lying quietly, normal position  [] 1   Seeking attention through movement or slow, cautious movement  [] 2   Restless, excessive activity and/or withdrawal reflexes    Guarding  [] 0   Lying quietly, no positioning of hands over areas of body  [x] 1   Splinting areas of the body, tense  [] 2   Rigid, stiff    Physiology (vital signs)  [x] 0   Stable vital signs  [] 1   Change in any of the following: SBP > 20mm Hg; HR > 20/minute  [] 2   Change in any of the following: SBP > 30mm Hg; HR > 25/minute    Respiratory  [x] 0   Baseline RR/SpO2, compliant with ventilator  [] 1   RR > 10 above baseline, or 5% drop SpO2, mild asynchrony with ventilator  [] 2   RR > 20 above baseline, or 10% drop SpO2, asynchrony with ventilator     FUNCTIONAL ASSESSMENT     Palliative Performance Scale (PPS):30     PSYCHOSOCIAL/SPIRITUAL ASSESSMENT     Active Problems:    Endometrial ca ([facility] (7/31/2017)      Past Medical History:   Diagnosis Date    Cancer [facility])     uterine    CVI (common variable immunodeficiency) (HCC)     Diabetes ([facility]     Elevated liver function tests 10/21/2010    Endometrial cancer ([facility] 7/7/2010    Hypertension     Iron deficiency anemia     Menometrorrhagia 10/21/2010    Microcytic anemia 10/21/2010    Morbid obesity ([facility]     Prediabetes 7/7/2010    Psychiatric disorder     depression    Radiation     completed radiation mid-march      Past Surgical History:   Procedure Laterality Date    CARDIAC SURG PROCEDURE UNLIST      hx of tachycardia    HX GYN      hysterectomy      Social History   Substance Use Topics    Smoking status: Never Smoker    Smokeless tobacco: Never Used    Alcohol use Yes     Family History   Problem Relation Age of Onset    Hypertension Mother     Hypertension Father     Stroke Maternal Grandfather     Cancer Paternal Grandmother      breast    Diabetes Paternal Grandmother       Allergies   Allergen Reactions    Egg Nausea and Vomiting     Raw egg and scrambled eggs. Egg products okay.      Pcn [Penicillins] Nausea and Vomiting     \"but could take amoxil\"    Shellfish Containing Products Unknown (comments)    Tetanus And Diphtheria Toxoids, Adsorbed, Adult Other (comments)     Developed local swelling, axillary pain, and transient fever    Tomato Unknown (comments)     Fresh tomatoes      Current Facility-Administered Medications   Medication Dose Route Frequency    HYDROmorphone (PF) 15 mg/30 ml (DILAUDID) PCA   IntraVENous CONTINUOUS    acetaminophen (TYLENOL) tablet 650 mg  650 mg Oral Q4H PRN    bisacodyl (DULCOLAX) tablet 5 mg  5 mg Oral DAILY PRN    diphenhydrAMINE (BENADRYL) capsule 25 mg  25 mg Oral Q4H PRN    DULoxetine (CYMBALTA) capsule 60 mg  60 mg Oral QHS    gabapentin (NEURONTIN) capsule 300 mg  300 mg Oral QHS    haloperidol (HALDOL) tablet 5 mg  5 mg Oral TID    LORazepam (ATIVAN) tablet 1 mg  1 mg Oral AC&HS    LORazepam (ATIVAN) tablet 2 mg  2 mg Oral TID PRN    methadone (DOLOPHINE) tablet 10 mg  10 mg Oral Q8H    metoprolol tartrate (LOPRESSOR) tablet 12.5 mg  12.5 mg Oral BID    morphine injection 15 mg  15 mg IntraVENous Q15MIN PRN    polyethylene glycol (MIRALAX) packet 17 g  17 g Oral DAILY PRN    senna-docusate (PERICOLACE) 8.6-50 mg per tablet 2 Tab  2 Tab Oral BID    sulindac (CLINORIL) tablet 200 mg  200 mg Oral BID WITH MEALS    magic mouthwash cpd (with sucralfate)  5 mL Oral QID PRN    sodium chloride (NS) flush 10-30 mL  10-30 mL InterCATHeter PRN    sodium chloride (NS) flush 10 mL  10 mL InterCATHeter Q24H    sodium chloride (NS) flush 10 mL  10 mL InterCATHeter PRN    sodium chloride (NS) flush 10-40 mL  10-40 mL InterCATHeter Q8H    sodium chloride (NS) flush 20 mL  20 mL InterCATHeter Q24H        PHYSICAL EXAM     Wt Readings from Last 3 Encounters:   05/30/17 267 lb (121.1 kg)   05/10/17 267 lb 10.2 oz (121.4 kg)   02/16/17 300 lb (136.1 kg)       Visit Vitals    BP 91/61 (BP 1 Location: Left arm, BP Patient Position: At rest)    Pulse (!) 118    Temp 98.3 °F (36.8 °C)    Resp 20    SpO2 95%       Supplemental O2  [x] Yes  [] NO  Last bowel movement:     Currently this patient has:  [] Peripheral IV [x] PICC  [] PORT [] ICD    [] Jama Catheter [] NG Tube   [] PEG Tube    [] Rectal Tube [] Drain  [] Other:     Constitutional: obese, lethargic but able to answer most questions, nad  Eyes: perrl  ENMT: clear  Cardiovascular: rrr  Respiratory: even, nonlabored  Gastrointestinal: soft obese  Musculoskeletal:2-3 plus b/l LE edema  Skin: warm, dry  Neurologic:   Psychiatric:   Other:    Pertinent Lab and or Imaging Tests:  Lab Results   Component Value Date/Time    Sodium 141 05/11/2017 03:34 AM    Potassium 4.4 05/11/2017 03:34 AM    Chloride 110 05/11/2017 03:34 AM    CO2 24 05/11/2017 03:34 AM    Anion gap 7 05/11/2017 03:34 AM    Glucose 176 05/11/2017 03:34 AM    BUN 16 05/11/2017 03:34 AM Creatinine 0.68 05/11/2017 03:34 AM    BUN/Creatinine ratio 24 05/11/2017 03:34 AM    GFR est AA >60 05/11/2017 03:34 AM    GFR est non-AA >60 05/11/2017 03:34 AM    Calcium 8.3 05/11/2017 03:34 AM     Lab Results   Component Value Date/Time    Protein, total 6.8 05/10/2017 12:27 AM    Albumin 2.9 05/10/2017 12:27 AM           Total time: 50 min  Counseling / coordination time: d/w hospice RN, patient and family at bedside  > 50% counseling / coordination?: no

## 2017-08-01 NOTE — PROGRESS NOTES
Spiritual Care Assessment/Progress Notes    Pérez Melendez [id number]  [id number]    1980  40 y.o.  female    Patient Telephone Number: [phone] (home)   Restorationist Affiliation: [facility]   Language: English   Extended Emergency Contact Information  Primary Emergency Contact: Renetta Walker Phone: [phone]  Relation: Parent  Secondary Emergency Contact: [address] Phone: [phone]  Relation: Sister   Patient Active Problem List    Diagnosis Date Noted    Endometrial ca [facility]) 07/31/2017    [facility] 05/08/2017    Advance care planning 01/27/2017    Chronic neoplasm-related pain 12/21/2015    Vaginal pain 05/19/2015    Tachycardia 11/20/2014    Bone metastases ([facility] 03/10/2014    DM (diabetes mellitus) ([facility] 04/30/2012    Conjunctivitis 08/22/2011    Insomnia 02/11/2011    Elevated liver function tests 10/21/2010    Endometrial cancer ([facility] 07/07/2010    Iron deficiency anemia 07/07/2010    Morbid obesity ([facility] 07/07/2010    Edema 07/07/2010    GERD (gastroesophageal reflux disease) 07/07/2010        Date: 8/1/2017       Level of Restorationist/Spiritual Activity:  [x]         Involved in geovanny tradition/spiritual practice    []         Not involved in geovanny tradition/spiritual practice  []         Spiritually oriented    []         Claims no spiritual orientation    []         seeking spiritual identity  []         Feels alienated from Anglican practice/tradition  []         Feels angry about Anglican practice/tradition  [x]         Spirituality/Anglican tradition is a resource for coping at this time.   []         Not able to assess due to medical condition    Services Provided Today:  [x]         crisis intervention    []         reading Scriptures  [x]         spiritual assessment    []         prayer  [x]         empathic listening/emotional support  []         rites and rituals (cite in comments)  []         life review     []         Yazdanism support  []         theological development   []         advocacy  []         ethical dialog     []         blessing  []         bereavement support    [x]         support to family  []         anticipatory grief support   []         help with AMD  []         spiritual guidance    []         meditation      Spiritual Care Needs  [x]         Emotional Support  [x]         Spiritual/Quaker Care  []         Loss/Adjustment  []         Advocacy/Referral                /Ethics  []         No needs expressed at               this time  []         Other: (note in               comments)  [address]  [x]         Follow up visits with               pt/family  []         Provide materials  []         Schedule sacraments  []         Contact Community               Clergy  []         Follow up as needed  []         Other: (note in               comments)     Comments: This visit was in response to request from [facility] and her mother as well as the care team. Pastoral care has been following this family since receiving a request to visit and provide support to her and her mother. I assured Kapil Sloan and her mother that Pastoral Care would follow her to the hospital to continue pastoral care support. Upon my arrival at the hospital today she noted she did not know what was going on with her. Provided pastoral support and a safe place for reflection as she sought to find meaning and purpose in her current state. She was concerned that her mother was not present. However, she verbalized understanding that her mother could not leave her father home alone. We agreed that I would call her mother and allow her to talk with her mother on the phone. We did this. Introduced her to [employer] and April Victoria to assure her of continuing support. I will continue to follow.        Christina Macias., [facility] [place] [address]

## 2017-08-01 NOTE — HOSPICE
This patient is known to this MSW from her stay at [facility] and transfer to [facility]. MSW into met with patient. Patient was resting, no family members present, MSW was unable to arouse patient. MSW spoke to her SWer at [place] [address] MSW, who will be coordinating disposition upon discharge from [facility].

## 2017-08-01 NOTE — HOSPICE
[facility]  Pt very lethargic today probably related to benadryl given pre transfusion of 2 PRBC's. Bp low, metoprolol held. Discussed pt with Dr Grover Hammans who communicated with Dr Jennifer Adamson nurse to review procedure for tomorrow. Unsure of procedure time. Plan post procedure is placement at [address] though pt is not yet aware of this plan per mother's request.  Mother present most of day, supported by family . Order placed for bariatric bed which may require use of a larger room. Thank you for providing such great care to this pt and family. Please contact hospice at [phone] with any questions or concerns.

## 2017-08-01 NOTE — PROGRESS NOTES
Patient admitted from home with double lumen PICC line in RUE. Per CXR on 7/31/17, PICC tip in SVC. PICC dressing last changed on 7/31/17. Dressing clean/dry/intact. Per nursing notes, both lumens flushing and have + blood return. Patient resting in bed, call bell in reach, family at bedside.       Addy Izquierdo RN / Vascular Access Nurse

## 2017-08-01 NOTE — PROGRESS NOTES
Oncology Nursing Communication Tool      8:41 AM  8/1/2017     Bedside and Verbal shift change report given to HERLINDA Jensen (incoming nurse) by Carin Ahn RN (outgoing nurse) on [facility] a 40 y.o. female who was admitted on 7/31/2017  5:54 PM. Report included the following information SBAR, Kardex, [facility], Recent Results and Med Rec Status. Significant changes during shift:     Attempted to load dilaudid in PCA but was unsuccessful due to high dose medication order;  Notified pharmacy of high dose in loading dilaudid in PCA. Pharmacy did not have the ability to carry large amount of dilaudid programmed  for PCA. Largest dose allowed for dilaudid is 15 mg/30 mL.       Notified hospice (Ari Cruz) of issue regarding high dosage. Pt instructed to continue using her personal portable PCA of dilaudid.       Nursing supervisor notified. Nurse practitioner notified and changes to be made in am.       Issues for physician to address: PCA order          Code Status: DNR     Infections: No current active infections     Allergies: Egg; Pcn [penicillins]; Shellfish containing products;  Tetanus and diphtheria toxoids, adsorbed, adult; and Tomato     Current diet: DIET REGULAR       Pain Controlled [] yes [x] no   Bowel Movement [x] yes [] no   Last Bowel Movement (date)  7/31/2017            Vital Signs:   Patient Vitals for the past 12 hrs:   Temp Pulse Resp BP SpO2   08/01/17 0633 - (!) 117 - - 94 %   08/01/17 0508 98.8 °F (37.1 °C) (!) 123 21 103/70 96 %   07/31/17 2142 98.3 °F (36.8 °C) (!) 118 20 91/61 95 %      Intake & Output:     Intake/Output Summary (Last 24 hours) at 08/01/17 0841  Last data filed at 08/01/17 0647   Gross per 24 hour   Intake              100 ml   Output              450 ml   Net             -350 ml      Laboratory Results:     Recent Results (from the past 12 hour(s))   CBC W/O DIFF    Collection Time: 08/01/17  6:36 AM   Result Value Ref Range WBC 12.7 (H) 3.6 - 11.0 K/uL    RBC 2.50 (L) 3.80 - 5.20 M/uL    HGB 5.0 (LL) 11.5 - 16.0 g/dL    HCT 18.4 (L) 35.0 - 47.0 %    MCV 73.6 (L) 80.0 - 99.0 FL    MCH 20.0 (L) 26.0 - 34.0 PG    MCHC 27.2 (L) 30.0 - 36.5 g/dL    RDW 20.4 (H) 11.5 - 14.5 %    PLATELET 349 804 - 814 K/uL   METABOLIC PANEL, COMPREHENSIVE    Collection Time: 08/01/17  6:36 AM   Result Value Ref Range    Sodium 139 136 - 145 mmol/L    Potassium 4.5 3.5 - 5.1 mmol/L    Chloride 106 97 - 108 mmol/L    CO2 26 21 - 32 mmol/L    Anion gap 7 5 - 15 mmol/L    Glucose 82 65 - 100 mg/dL    BUN 23 (H) 6 - 20 MG/DL    Creatinine 0.83 0.55 - 1.02 MG/DL    BUN/Creatinine ratio 28 (H) 12 - 20      GFR est AA >60 >60 ml/min/1.73m2    GFR est non-AA >60 >60 ml/min/1.73m2    Calcium 8.6 8.5 - 10.1 MG/DL    Bilirubin, total 6.9 (H) 0.2 - 1.0 MG/DL    ALT (SGPT) 24 12 - 78 U/L    AST (SGOT) 33 15 - 37 U/L    Alk. phosphatase 104 45 - 117 U/L    Protein, total 5.7 (L) 6.4 - 8.2 g/dL    Albumin 2.0 (L) 3.5 - 5.0 g/dL    Globulin 3.7 2.0 - 4.0 g/dL    A-G Ratio 0.5 (L) 1.1 - 2.2     NUCLEATED RBC    Collection Time: 08/01/17  6:36 AM   Result Value Ref Range    NRBC 0.5 (H) 0  WBC    ABSOLUTE NRBC 0.06 (H) 0.00 - 0.01 K/uL    WBC CORRECTED FOR NR ADJUSTED FOR NUCLEATED RBC'S                Opportunity for questions and clarifications were given to the incoming nurse. Patient's bed is in low position, side rails x2, door open PRN, call bell within reach and patient not in distress.       Eloy Holloway RN

## 2017-08-01 NOTE — HOSPICE
Case discussed with Dr Gisele Steward. To transfuse 2 units PRBC's today due to anemia. Anesthesia will see pt today to evaluate prior to planned procedure tomorrow with general anesthesia.

## 2017-08-01 NOTE — PROGRESS NOTES
Patient posted for CT bone ablation in am.  Plan is for transfusion today for Hg 5.0. OK to proceed with scheduled surgical procedure tomorrow, assuming no acute changes in clinical status or lab derangements overnight and patient remains NPO overnight.

## 2017-08-01 NOTE — PROGRESS NOTES
RN for this patient requested assistance with programming PCA. Unable to program current MD order d/t high dosage/ vs PCA pump syringe capabilities. Contacted both Pharmacy, Shanda solomon, and Hospice RN, Dale Mathur. Shanda solomon contacted his manager for solution. Pharmacy unable to produce this MD order for PCA at this time. Requested all parties concerned address this in a.m. Dale Mathur, HERLINDA contacted Hospice nurse practitioner, who also requested that we leave patient's personal pain pump until this issue can be resolved in a.m.  A back-up 500mg/500ml bag of Dilaudid was located in [employer] refrigerator. Pharmacy did not wish to accept back up at this time. Nursing Supervisors were notified. Unit manager notified.

## 2017-08-01 NOTE — PROGRESS NOTES
Oncology Nursing Communication Tool      7:46 PM  8/1/2017     Bedside shift change report given to Nanda RN (incoming nurse) by Erik Wilde RN (outgoing nurse) on Leila Shook a 40 y.o. female who was admitted on 7/31/2017  5:54 PM. Report included the following information SBAR and Kardex. Significant changes during shift: Patient hgb = 5.0. Hospice nurse on call paged Ms. Williams Frazier and aware of patient hgb result. Ms. Williams Frazier stated that she will informed Dr. Nazia Araiza of result. Issues for physician to address:   Patient hgb = 5.0. Hospice nurse on call paged Ms. Williams Frazier and aware of patient hgb result. Ms. Williams Frazier stated that she will informed Dr. Nazia Araiza of result. Code Status: DNR     Infections: No current active infections     Allergies: Egg; Pcn [penicillins]; Shellfish containing products;  Tetanus and diphtheria toxoids, adsorbed, adult; and Tomato     Current diet: DIET REGULAR       Pain Controlled [x] yes [] no   Bowel Movement [x] yes [] no   Last Bowel Movement (date)      8/1            Vital Signs:     Patient Vitals for the past 12 hrs:   Temp Pulse Resp BP SpO2   08/01/17 0633 - (!) 117 - - 94 %   08/01/17 0508 98.8 °F (37.1 °C) (!) 123 21 103/70 96 %   07/31/17 2142 98.3 °F (36.8 °C) (!) 118 20 91/61 95 %      Intake & Output:       Intake/Output Summary (Last 24 hours) at 08/01/17 0718  Last data filed at 08/01/17 0647   Gross per 24 hour   Intake              100 ml   Output              450 ml   Net             -350 ml      Laboratory Results:     Recent Results (from the past 12 hour(s))   CBC W/O DIFF    Collection Time: 08/01/17  6:36 AM   Result Value Ref Range    WBC 12.7 (H) 3.6 - 11.0 K/uL    RBC 2.50 (L) 3.80 - 5.20 M/uL    HGB 5.0 (LL) 11.5 - 16.0 g/dL    HCT 18.4 (L) 35.0 - 47.0 %    MCV 73.6 (L) 80.0 - 99.0 FL    MCH 20.0 (L) 26.0 - 34.0 PG    MCHC 27.2 (L) 30.0 - 36.5 g/dL    RDW 20.4 (H) 11.5 - 14.5 %    PLATELET 194 144 - 844 K/uL NUCLEATED RBC    Collection Time: 08/01/17  6:36 AM   Result Value Ref Range    NRBC 0.5 (H) 0  WBC    ABSOLUTE NRBC 0.06 (H) 0.00 - 0.01 K/uL    WBC CORRECTED FOR NR ADJUSTED FOR NUCLEATED RBC'S                Opportunity for questions and clarifications were given to the incoming nurse. Patient's bed is in low position, side rails x2, door open PRN, call bell within reach and patient not in distress.       Yue Strange, RN

## 2017-08-01 NOTE — PROGRESS NOTES
Spiritual Care Assessment/Progress Notes    Bernadette Gifford [id number]  [id number]    1980  40 y.o.  female    Patient Telephone Number: [phone] (home)   Yarsanism Affiliation: [facility]   Language: English   Extended Emergency Contact Information  Primary Emergency Contact: Renetta Walker Phone: [phone]  Relation: Parent  Secondary Emergency Contact: [address] Phone: [phone]  Relation: Sister   Patient Active Problem List    Diagnosis Date Noted    Endometrial ca [facility]) 07/31/2017    [facility] respiratory failure with hypoxia ([facility] 05/08/2017    Advance care planning 01/27/2017    Chronic neoplasm-related pain 12/21/2015    Vaginal pain 05/19/2015    Tachycardia 11/20/2014    Bone metastases ([facility] 03/10/2014    DM (diabetes mellitus) ([facility] 04/30/2012    Conjunctivitis 08/22/2011    Insomnia 02/11/2011    Elevated liver function tests 10/21/2010    Endometrial cancer ([facility] 07/07/2010    Iron deficiency anemia 07/07/2010    Morbid obesity ([facility] 07/07/2010    Edema 07/07/2010    GERD (gastroesophageal reflux disease) 07/07/2010        Date: 8/1/2017       Level of Yarsanism/Spiritual Activity:  [x]         Involved in geovanny tradition/spiritual practice    []         Not involved in geovanny tradition/spiritual practice  [x]         Spiritually oriented    []         Claims no spiritual orientation    []         seeking spiritual identity  []         Feels alienated from Yarsanism practice/tradition  []         Feels angry about Yarsanism practice/tradition  [x]         Spirituality/Yarsanism Beliefs are  a resource for coping at this time.   []         Not able to assess due to medical condition    Services Provided Today:  []         crisis intervention    []         reading Scriptures  [x]         spiritual assessment    []         prayer  [x]         empathic listening/emotional support  []         rites and rituals (cite in comments)  []         life review     []         Church support  []         theological development   []         advocacy  []         ethical dialog     []         blessing  []         bereavement support    []         support to family  []         anticipatory grief support   []         help with AMD  []         spiritual guidance    []         meditation      Spiritual Care Needs  [x]         Emotional Support  [x]         Spiritual/Sabianism Care  []         Loss/Adjustment  []         Advocacy/Referral                /Ethics  []         No needs expressed at               this time  []         Other: (note in               comments)  [address]  [x]         Follow up visits with  Pt/family as able and/or needed  []         Provide materials  []         Schedule sacraments  []         Contact Community               Clergy  []         Follow up as needed  []         Other: (note in               comments)     Comments: Monica Carroll introduced myself and  Zacarias Horton to Karli who is in Oncology at [facility]. Karli appeared to be in some discomfort and asked about her mom. Shari Duncan contacted her mom while Janice Schmidt and I provided calming presence and shared with Jyoti. After staff came in to reposition her she appeared to be more comfortable. Chaplains Lita Mello and Britta Chase remained to continue to provided spiritual care. Chaplains will continue to follow as able and/or needed.   Visited by: Kallie Lubin [id number] [facility] Jose Juan Ledezma ([id number])

## 2017-08-01 NOTE — PROGRESS NOTES
Referral and awareness of Jyoti from [facility] who has been following Jyoti closely and has a significant relationship established with Randa Dillard and her mother and family. Introduced along with Chaplain Sabillon to Randa Dillard. Randa Dillard was in discomfort at this time. Care and support given to Randa Dillard. Jyoti shared that she likes R & B music; began to offer and play some music for Randa Dillard. Staff came in to assess and care for Randa Dillard; pastoral care will continue to follow and provide support. Her mother is unable to be with her at this time; [facility] facilitated a call to [employer] and Randa Dillard was able to speak to her mom. We plugged in her phone and had it within her reach so her mom can call and speak to her as able. Please page as needed. [phone]. Visit by: Leah San.  Marcelina Dye MA, [facility]    Lead  Profession Development & Advancement

## 2017-08-01 NOTE — PROGRESS NOTES
1st unit os blood up ,instructed to notify the nurse of itching and or chills. Nurse at bedside x 15 mins at the start of blood tx.

## 2017-08-01 NOTE — PROGRESS NOTES
Oncology Nursing Communication Tool      7:02 PM  8/1/2017     Bedside shift change report given to HERLINDA Chen (incoming nurse) by Jaz Mccarty RN (outgoing nurse) on Lear Salts a 40 y.o. female who was admitted on 7/31/2017  5:54 PM. Report included the following information SBAR. Significant changes during shift:       Issues for physician to address:             Code Status: DNR     Infections: No current active infections     Allergies: Egg; Pcn [penicillins]; Shellfish containing products;  Tetanus and diphtheria toxoids, adsorbed, adult; and Tomato     Current diet: DIET REGULAR       Pain Controlled [x] yes [] no   Bowel Movement [] yes [x] no   Last Bowel Movement (date)             Vital Signs:   Patient Vitals for the past 12 hrs:   Temp Pulse Resp BP SpO2   08/01/17 1843 - (!) 106 18 (!) 88/57 100 %   08/01/17 1825 - (!) 109 18 92/56 100 %   08/01/17 1817 98.1 °F (36.7 °C) (!) 109 18 (!) 84/53 100 %   08/01/17 1803 98.1 °F (36.7 °C) (!) 105 18 99/62 95 %   08/01/17 1750 98.8 °F (37.1 °C) - 18 (!) 84/53 100 %   08/01/17 1735 98.8 °F (37.1 °C) (!) 112 18 (!) 84/55 100 %   08/01/17 1720 98.6 °F (37 °C) (!) 111 18 (!) 84/63 100 %   08/01/17 1705 98.1 °F (36.7 °C) - 18 - 100 %   08/01/17 1653 98.8 °F (37.1 °C) (!) 108 18 95/61 100 %   08/01/17 1450 97.6 °F (36.4 °C) (!) 113 18 94/57 100 %   08/01/17 1438 98.1 °F (36.7 °C) (!) 123 20 104/61 -   08/01/17 1423 98.6 °F (37 °C) (!) 120 20 96/59 98 %   08/01/17 1403 98.6 °F (37 °C) (!) 122 20 106/65 100 %   08/01/17 1330 - (!) 128 - 108/71 96 %   08/01/17 1329 98.8 °F (37.1 °C) - 20 - -   08/01/17 1001 - (!) 121 - 92/58 -      Intake & Output:     Intake/Output Summary (Last 24 hours) at 08/01/17 1902  Last data filed at 08/01/17 1715   Gross per 24 hour   Intake              100 ml   Output              800 ml   Net             -700 ml      Laboratory Results:     Recent Results (from the past 12 hour(s))   TYPE & CROSSMATCH Collection Time: 08/01/17 10:53 AM   Result Value Ref Range    Crossmatch Expiration 08/04/2017     ABO/Rh(D) Anderson Alu POSITIVE     Antibody screen NEG     Unit number [id number]     Blood component type RC LR AS1     Unit division 00     Status of unit ISSUED     Crossmatch result Compatible     Unit number [id number]     Blood component type RC LR AS1     Unit division 00     Status of unit ISSUED     Crossmatch result Compatible               Opportunity for questions and clarifications were given to the incoming nurse. Patient's bed is in low position, side rails x2, door open PRN, call bell within reach and patient not in distress.       Branden Schaffer RN

## 2017-08-01 NOTE — PROGRESS NOTES
Attempted to load dilaudid in PCA but was unsuccessful due to high dose medication order;  Notified pharmacy of high dose in loading dilaudid in PCA. Pharmacy did not have the ability to carry large amount of dilaudid programmed  for PCA. Largest dose allowed for dilaudid is 15 mg/30 mL. Notified hospice (Trish Eubanks) of issue regarding high dosage. Pt instructed to continue using her personal portable PCA of dilaudid. Nursing supervisor notified.  Nurse practitioner notified and changes to be made in am.

## 2017-08-01 NOTE — WOUND CARE
Wound care consult for POA  \"wounds on buttocks and thigh\". Patient is an unfortunate 39 y/o AAF with Metastatic endometrial cancer, DMT2, obesity. The patient's principle diagnosis has resulted in hospitalization 5/8-5/16/17 for right hip pain and respiratory failure. CT-pathologic right subtrochenteric femur fracture and enlarging [id number] cm right pelvic mass with invasion to pelvis, sacrum and hip. The mas, per staff nurse, is underneath the skin and there is no drainage. Patient currently getting x2 units of blood for Hgb=5.0 and a scheduled CT bone ablation tomorrow am. Patient is Hospice and procedures are palliative. Pain control has been a big issue and currently on very high doses of Methadone and IV Dilaudid with little relief. Patient has a Stage 2 Pressure Injury on sacrum and the tumor is visible under skin on buttock and thigh. There is a bariatric bed ordered by staff. Please re-consult WC nurse if tumor is exposed or draining after procedure. Sacrum needs to be kept clean and dry - avoid friction injuries by keeping patient on a flat sheet and avoid fitted sheets.   Queta Bartlett RN, CWON, zone ph# [phone]

## 2017-08-02 NOTE — HOSPICE
[facility] RN note:  T/C to pt's mother Denia Fiore to inform her that pt has not yet woken up post procedure. Pt does open eyes briefly but no verbal response. Per Dr Benton Handtonya, PCA dilaudid on hold in hopes that anaesthesia will clear system. Prepared Denia Fiore that this may be a result of disease progression to liver. Hospice and staff RN to continue to monitor mental status and pain level and will medicate appropriately for comfort. Thank you for providing such great care to this pt and family. Please contact hospice at [phone] with any questions or concerns.

## 2017-08-02 NOTE — PROGRESS NOTES
Follow up visit to check on Jyoti. She was resting soundly at this time; appeared very comfortable. Her sister was present visiting. She works in [employer] office building here. She became tearful as she shared of how difficult this is. Pastoral presence and support offered and assured sister of continued pastoral care. Pastoral care will continue to follow. [phone]. Visit by: Laura Vasquez. Kartik Arroyo.  Marcelina Dye MA, [facility]    Lead  Profession Development & Advancement

## 2017-08-02 NOTE — ROUTINE PROCESS
Dr. Blanton for pre procedure consult and consent - questions reviewed with understanding - consent signed by Ms. Randall mother because pt's history of confusion, POA, and pt lethargic.

## 2017-08-02 NOTE — HOSPICE
[facility]  [phone]    Patient Name: Kwan Berry  YOB: 1980    Date of Provider Hospice Visit: 08/02/17    Level of Care:   [x] General Inpatient (GIP)    [] Routine   [] Respite    Location of Care:  [] [facility] [] [facility] [x] [facility] [] [facility] [] [facility]    Date of Original Hospice Admission: 7-31-17  Hospice Medical Director for Inpatient Care: Dr. Sondra Ng Diagnosis: Metastatic endometrial cancer, s/p XRT  Diagnoses RELATED to the terminal prognosis: pathologic right subtrochanteric femur fracture  Other Diagnoses: dm-2, obesity, hx tachycardia     HOSPICE SUMMARY   Do not cut and paste chart information other than imaging findings    Kwan Berry is a 40y.o. year old who was admitted to [facility]. The patient's principle diagnosis has resulted in hospitalization 5/8-5/16/17 for right hip pain and respiratory failure. CT-pathologic right subtrochenteric femur fracture and enlarging [id number] cm right pelvic mass with invasion to pelvis, sacrum and hip. Pain management with PCA and methadone. Pt was sent to hospice house but remains bedbound and still has pain in right groin and hip , worse with morvement. Palliative/hospice MD has discussed and reviewed case with IR and plan is for inpatient admit and palliative microwave ablation of tumor. Refer to LCD   The LCD for Hospice for the admitted diagnosis of cancer includes:    [x] Disease with distant metastases at presentation OR    [x] Progression from an earlier stage of disease to metastatic disease with either:   [] a continued decline in spite of therapy   [] patient declines further disease directed therapy    [] Certain cancers with poor prognoses (e.g. small cell lung cancer, brain cancer and pancreatic cancer) may be hospice eligible without fulfilling the other criteria in this section.       Functionally, the patient's Karnofsky and/or Palliative Performance Scale has declined over a period of months and is estimated at 30. The patient is dependent on the following ADLs:all except feeding    Objective information that support this patients limited prognosis includes:   CT scan 5/9/17  IMPRESSION  IMPRESSION:     1. Interval significant enlargement of the mass centered in the right pelvis  with destruction of the bones of the right pelvis, sacrum, and proximal right  femur, with extension to involve the left acetabulum, inferior pubic ramus and  superior pubic ramus. 2. Associated pathologic fracture of the right proximal femur. 3. Evaluation of the mass subjacent to the right hemidiaphragm is limited as it  was not as well imaged on prior examinations. 4. Interval enlargement of left pelvic lymph node.     The patient/family chose comfort measures with the support of Hospice. HOSPICE DIAGNOSES   Active Symptoms:  1. Right groin and hip pain  2. Delirium  3. Anemia       PLAN   1. Admitted to [facility] routine level of care for planned palliative CT microwave ablation  2. Going to CT for procedure this morning--microwave ablation for debulking of tumor. D/w Dr Lady Gosselin who is aware of elevated bilirubin  3. Metoprolol help--will d/c due to low bp  4. Pt more lethargic--will lower dosing of ativan and haldol  5. Continue PCA and methadone for now. Post op can stop PCA and dilaudid 1-2 mg q30 min prn pain if doing ok . 6.  and SW to support family needs  7. Disposition: return to home with hospice . SW already involved    Prognosis estimated based on 08/02/17 clinical assessment is:   [] Hours to Days    [] Days to Weeks    [x] Other:    Communicated plan of care with: Hospice Case Manager;  Hospice IDT; Care Team     GOALS OF CARE     Resuscitation Status: DNR  Durable DNR: [] Yes [] No    Advance Care Planning 5/10/2017   Patient's Healthcare Decision Maker is: Legal Next of Andrea Velarde   Primary Decision Maker Name Gerhardt Dash Primary Decision Maker Phone Number -   Primary Decision Maker Relationship to Patient Parent   Confirm Advance Directive Yes, on file   Patient Would Like to Complete Advance Directive -        HISTORY     History obtained from: chart, hospice RN and patient and her mother    CHIEF COMPLAINT:  The patient is:   [x] Verbal  [] Nonverbal  [] Unresponsive    HPI/SUBJECTIVE:    Minimal oral intake yesterday  Slept most of the day but would arouse  2/p 2 units prbcs yesterday--hb up to 7.3 today     REVIEW OF SYSTEMS     The following systems were: [x] reviewed  [] unable to be reviewed    Positive ROS include:  Constitutional:  Ears/nose/mouth/throat:  Respiratory:  Gastrointestinal:  Musculoskeletal: right hip and groin pain  Neurologic:  Psychiatric:  Endocrine:     Adult Non-Verbal Pain Assessment Score: 2    Face  [] 0   No particular expression or smile  [x] 1   Occasional grimace, tearing, frowning, wrinkled forehead  [] 2   Frequent grimace, tearing, frowning, wrinkled forehead    Activity (movement)  [x] 0   Lying quietly, normal position  [] 1   Seeking attention through movement or slow, cautious movement  [] 2   Restless, excessive activity and/or withdrawal reflexes    Guarding  [] 0   Lying quietly, no positioning of hands over areas of body  [x] 1   Splinting areas of the body, tense  [] 2   Rigid, stiff    Physiology (vital signs)  [x] 0   Stable vital signs  [] 1   Change in any of the following: SBP > 20mm Hg; HR > 20/minute  [] 2   Change in any of the following: SBP > 30mm Hg; HR > 25/minute    Respiratory  [x] 0   Baseline RR/SpO2, compliant with ventilator  [] 1   RR > 10 above baseline, or 5% drop SpO2, mild asynchrony with ventilator  [] 2   RR > 20 above baseline, or 10% drop SpO2, asynchrony with ventilator     FUNCTIONAL ASSESSMENT     Palliative Performance Scale (PPS):30     PSYCHOSOCIAL/SPIRITUAL ASSESSMENT     Active Problems:    Endometrial ca ([address] [facility] (7/31/2017)      Past Medical History:   Diagnosis Date    Cancer ([facility]     uterine    CVI (common variable immunodeficiency) (HCC)     [facility]     Elevated liver function tests 10/21/2010    Endometrial cancer ([facility] 7/7/2010    Hypertension     Iron deficiency anemia     Menometrorrhagia 10/21/2010    Microcytic anemia 10/21/2010    Morbid obesity ([facility]     Prediabetes 7/7/2010    Psychiatric disorder     depression    Radiation     completed radiation mid-march      Past Surgical History:   Procedure Laterality Date    CARDIAC SURG PROCEDURE UNLIST      hx of tachycardia    HX GYN      hysterectomy      Social History   Substance Use Topics    Smoking status: Never Smoker    Smokeless tobacco: Never Used    Alcohol use Yes     Family History   Problem Relation Age of Onset    Hypertension Mother     Hypertension Father     Stroke Maternal Grandfather     Cancer Paternal Grandmother      breast    Diabetes Paternal Grandmother       Allergies   Allergen Reactions    Egg Nausea and Vomiting     Raw egg and scrambled eggs. Egg products okay.      Pcn [Penicillins] Nausea and Vomiting     \"but could take amoxil\"    Shellfish Containing Products Unknown (comments)    Tetanus And Diphtheria Toxoids, Adsorbed, Adult Other (comments)     Developed local swelling, axillary pain, and transient fever    Tomato Unknown (comments)     Fresh tomatoes      Current Facility-Administered Medications   Medication Dose Route Frequency    LORazepam (ATIVAN) tablet 1 mg  1 mg Oral BID    haloperidol (HALDOL) tablet 2 mg  2 mg Oral BID    0.9% sodium chloride infusion 250 mL  250 mL IntraVENous PRN    sodium chloride (NS) flush 10-30 mL  10-30 mL InterCATHeter PRN    sodium chloride (NS) flush 10 mL  10 mL InterCATHeter Q24H    sodium chloride (NS) flush 10 mL  10 mL InterCATHeter PRN    sodium chloride (NS) flush 10-40 mL  10-40 mL InterCATHeter Q8H    sodium chloride (NS) flush 20 mL  20 mL InterCATHeter PRN    heparin (porcine) pf 300 Units  300 Units InterCATHeter PRN    HYDROmorphone (DILAUDID) 500 mg/500 mL NS infusion  0-20 mg/hr IntraVENous TITRATE    acetaminophen (TYLENOL) tablet 650 mg  650 mg Oral Q4H PRN    bisacodyl (DULCOLAX) tablet 5 mg  5 mg Oral DAILY PRN    diphenhydrAMINE (BENADRYL) capsule 25 mg  25 mg Oral Q4H PRN    DULoxetine (CYMBALTA) capsule 60 mg  60 mg Oral QHS    gabapentin (NEURONTIN) capsule 300 mg  300 mg Oral QHS    haloperidol (HALDOL) tablet 5 mg  5 mg Oral TID    LORazepam (ATIVAN) tablet 2 mg  2 mg Oral TID PRN    methadone (DOLOPHINE) tablet 10 mg  10 mg Oral Q8H    morphine injection 15 mg  15 mg IntraVENous Q15MIN PRN    polyethylene glycol (MIRALAX) packet 17 g  17 g Oral DAILY PRN    senna-docusate (PERICOLACE) 8.6-50 mg per tablet 2 Tab  2 Tab Oral BID    sulindac (CLINORIL) tablet 200 mg  200 mg Oral BID WITH MEALS    magic mouthwash cpd (with sucralfate)  5 mL Oral QID PRN    sodium chloride (NS) flush 10-30 mL  10-30 mL InterCATHeter PRN    sodium chloride (NS) flush 10 mL  10 mL InterCATHeter Q24H    sodium chloride (NS) flush 10 mL  10 mL InterCATHeter PRN    sodium chloride (NS) flush 10-40 mL  10-40 mL InterCATHeter Q8H    sodium chloride (NS) flush 20 mL  20 mL InterCATHeter Q24H     Facility-Administered Medications Ordered in Other Encounters   Medication Dose Route Frequency    ceFAZolin (ANCEF) 2g in 100 ml 0.9% NS IVPB  2 g IntraVENous ONCE    ceFAZolin (ANCEF) 2g in 100 ml 0.9% NS IVPB   IntraVENous PRN    lactated Ringers infusion   IntraVENous CONTINUOUS    lidocaine (PF) (XYLOCAINE) 20 mg/mL (2 %) injection   IntraVENous PRN    propofol (DIPRIVAN) 10 mg/mL injection   IntraVENous PRN    rocuronium (ZEMURON) injection   IntraVENous PRN    PHENYLephrine (NEOSYNEPHRINE) in NS syringe   IntraVENous PRN    fentaNYL citrate (PF) injection   IntraVENous PRN    ondansetron (ZOFRAN) injection   IntraVENous PRN        PHYSICAL EXAM     Wt Readings from Last 3 Encounters:   05/30/17 267 lb (121.1 kg)   05/10/17 267 lb 10.2 oz (121.4 kg)   02/16/17 300 lb (136.1 kg)       Visit Vitals    /69    Pulse (!) 117    Temp 97.9 °F (36.6 °C)    Resp 16    SpO2 100%       Supplemental O2  [x] Yes  [] NO  Last bowel movement:     Currently this patient has:  [] Peripheral IV [x] PICC  [] PORT [] ICD    [] Jama Catheter [] NG Tube   [] PEG Tube    [] Rectal Tube [] Drain  [] Other:     Constitutional: obese, lethargic but able to answer most questions, nad  Eyes: perrl  ENMT: clear  Cardiovascular: rrr  Respiratory: even, nonlabored  Gastrointestinal: soft obese  Musculoskeletal:2-3 plus b/l LE edema  Skin: warm, dry  Neurologic:   Psychiatric:   Other:    Pertinent Lab and or Imaging Tests:  Lab Results   Component Value Date/Time    Sodium 139 08/01/2017 06:36 AM    Potassium 4.5 08/01/2017 06:36 AM    Chloride 106 08/01/2017 06:36 AM    CO2 26 08/01/2017 06:36 AM    Anion gap 7 08/01/2017 06:36 AM    Glucose 82 08/01/2017 06:36 AM    BUN 23 08/01/2017 06:36 AM    Creatinine 0.83 08/01/2017 06:36 AM    BUN/Creatinine ratio 28 08/01/2017 06:36 AM    GFR est AA >60 08/01/2017 06:36 AM    GFR est non-AA >60 08/01/2017 06:36 AM    Calcium 8.6 08/01/2017 06:36 AM     Lab Results   Component Value Date/Time    Protein, total 5.7 08/01/2017 06:36 AM    Albumin 2.0 08/01/2017 06:36 AM           This patient meets Hospice General Inpatient (GIP) Level of Care. The precipitating event that resulted in the need for GIP was: lethargy, anemia requiring transfusion, surgery today for tumor debulking with anticipated change in pain level and low blood pressuew all requiring frequent pre and post op assessmentst and med changes    The interventions tried that have been unsuccessful at controlling symptoms include: ativan was held yesterday but still very lethargic.   Metoprolol was held this morning    Supporting documentation for GIP need for pain control:  [x] Frequent evaluation by a doctor, nurse practitioner, nurse   [x] Frequent medication adjustment    [] IVs that cannot be administered at home   [] Aggressive pain management   [] Complicated technical delivery of medications              Supporting documentation for GIP need for symptom control:  []  Sudden decline necessitating intensive nursing intervention  []  Uncontrolled / intractable nausea or vomiting   []  Pathological fractures  []  Advanced open wounds requiring frequent skilled care  [] Unmanageable respiratory distress  [] New or worsening delirium   [] Delirium with behavior issues  [] Imminent death  with skilled nursing needs documented above      Total time: 30 min  Counseling / coordination time: d/w hospice RN, patient and family at bedside  > 50% counseling / coordination?: no

## 2017-08-02 NOTE — PROGRESS NOTES
Primary Nurse Tam Galarza, RN and Trae Sparks RN performed a dual skin assessment on this patient Impairment noted- see wound doc flow sheet  Monroe score is 9

## 2017-08-02 NOTE — HOSPICE
[facility] RN note:  Change in level of care to GIP post op. Arrived to room 14:00 post CT guided tumor ablation,  In no obvious distress, able to open eyes to touch but no verbal communication yet. Discussed pt with Dr Harris Sender, new orders to hold scheduled haldol due to somnolence, decrease continuous dilaudid PCA from 4mg to 2mg / hr basal rate with  Dilaudid 2mg IV available every 15 minutes as needed. Reportedly Dr Melia Eckert expressed concern for post op comfort due to surgical procedure. Goal is to adjust pain medication according to need while avoiding withdrawal from 4mg/hr previous dose. Pt will require vigilant monitoring of level of comfort and medication adjustment accordingly. [facility]  [phone]    [facility] Daily Nursing Note   Patient Name: Tulio Heard  YOB: 1980  Age: 40 y.o. Date of Visit: 08/02/17  Facility of Care: [facility]  Patient Room: [facility]     Hospice Attending: Flora Rosado MD  Hospice Diagnosis: pre op testing  Endometrial ca [facility])  RECOVERY NEEDED IN PACU    Level of Care: GIP    Current GIP Symptoms    1. pain  2. Restlessness/agitation         ASSESSMENT & PLAN   Must update Plan of Care including visit frequencies for IDT members  1. Change in level of care to GIP     Spiritual Interventions: family  very involved in support of pt and mother    Psych/ Social/ Emotional Interventions: Mother very tearful throughout surgery. Care Coordination Needs: communication between surgery team, hospice team, [facility]    Care plan and New Orders discussed / approved with Israel Beatty MD.    Description History and Chart Review   If this is initial GIP note must document RN assessment/MD communication in previous setting.  Specifically document nursing/medication needs in last 24 hours to support GIP care  Narrative History of last 24 hours that demonstrates care cannot be provided in another setting:  See above    What has been done to control the patient's symptoms in the last 24 hours? See  above    Does the patient currently require IV medications? yes  Does the patient currently require scheduled medications? No   Does the patient currently require a PCA? yes    List number of doses of PRN medications in last 24 hours:  Medication 1:  Number of doses:    Medication 2:   Number of doses:    Medication 3:   Number of doses:    Supporting documentation for GIP need for pain control:  [x] Frequent evaluation by a doctor, nurse practitioner, nurse   [x] Frequent medication adjustment    [x] IVs that cannot be administered at home   [x] Aggressive pain management   [x] Complicated technical delivery of medications              Supporting documentation for GIP need for symptom control:  []  Sudden decline necessitating intensive nursing intervention  []  Uncontrolled / intractable nausea or vomiting   []  Pathological fractures  []  Advanced open wounds requiring frequent skilled care  [] Unmanageable respiratory distress  [] New or worsening delirium   [x] Delirium with behavior issues: Is 24 hour caregiver present due to safety concerns with agitation? (yes/no)  [] Imminent death  with skilled nursing needs documented above    DISCHARGE PLANNING   Daily discharge planning required for GIP  1. Discharge Plan: in search of medicaid bed placement  2. Patient/Family teaching:   3.  Response to patient/family teaching:     ASSESSMENT    KARNOFSKY: 20%    Prognosis estimated based on 08/02/17 clinical assessment is:   [] Few to Many Hours  [] Hours to Days   [] Few to Many Days   [] Days to Weeks   [] Few to Many Weeks   [] Weeks to Months   [] Few to Many Months    Quality Measure: Patient self-reports:  [] Yes    [x] No too somnolent  ESAS:       CLINICAL INFORMATION   Patient Vitals for the past 12 hrs:   Temp Pulse Resp BP SpO2   08/02/17 1413 - (!) 114 10 94/64 100 %   08/02/17 1345 - (!) 123 12 138/76 100 % 08/02/17 1310 - (!) 120 (!) 0 124/65 100 %   08/02/17 1305 - (!) 116 (!) 0 114/60 100 %   08/02/17 1300 - (!) 116 (!) 0 118/62 100 %   08/02/17 1255 - (!) 118 (!) 0 118/67 100 %   08/02/17 1250 - (!) 120 (!) 0 118/67 100 %   08/02/17 1245 - (!) 123 (!) 0 112/66 100 %   08/02/17 1240 - (!) 120 (!) 0 - 100 %   08/02/17 1235 - (!) 120 (!) 0 143/68 100 %   08/02/17 1230 - (!) 119 (!) 0 130/68 100 %   08/02/17 1225 - (!) 121 - 130/81 98 %   08/02/17 1220 - (!) 121 - 134/71 98 %   08/02/17 1215 - (!) 120 - 122/68 97 %   08/02/17 1210 - (!) 121 - 116/76 100 %   08/02/17 1208 - - - - 96 %   08/02/17 1207 98.6 °F (37 °C) - - 142/57 -   08/02/17 0754 97.9 °F (36.6 °C) (!) 117 16 105/69 100 %       Currently this patient has:  [x] Supplemental O2   [] IV    [x] PICC      [] PORT   [] NG Tube    [] PEG Tube   [] Ostomy     [x] Jama draining _______ urine  [] Other:     SIGNS/PHYSICAL FINDINGS     Skin (including wound):  [] Warm, dry, supple, intact and color normal for race  [] Warm   [] Dry   [] Cool     [] Clammy       [] Diaphoretic    Turgor   [] Normal   [] Decreased  Color:   [] Pink   [] Pale   [] Cyanotic   [] Erythema   [] Jaundice   [x] Normal for Race  [x]  Wounds: stage 2 sacral decub    Neuro: will open eyes to touch and voice.   No verbal interaction since surgery  [x] Lethargy  [] Restlessness / agitation  [] Confusion / delirium  [] Hallucinations  [] Responds to maximal stimulation  [] Unresponsive  [] Seizures     Cardiac:  [] Dyspnea on Exertion  [] JVD  [] Murmur  [] Palpitations  [] Hypotension  [] Hypertension  [] Tachycardia  [] Bradycardia  [] Irregular HR  [] Pulses Decreased  [] Pulses Absent  [] Edema:       (Location, Grade and Pitting)  [] Mottling:      (Location)    Respiratory:  Breath sounds:    [x] Diminished   [] Wheeze   [] Rhonchi   [] Rales   [x] Even and unlabored  [] Labored:            [] Cough   [] Non Productive   [] Productive    [] Description:           [] Deep suctioned   [] O2 at _2__ LPM  [] High flow oxygen greater than 10 LPM  [] Bi-Pap    GI  [] Abdomen (describe)   [x] Ascites  [] Nausea  [] Vomiting  [] Incontinent of bowels  [] Bowel sounds (yes/no)  [] Diarrhea  [] Constipation (see above including last bowel movement)  [] Checked for impaction  [] Last BM     Nutrition  Diet:__reg________  Appetite:   [] Good   [] Fair   [] Poor   [] Tube Feeding       [] Voiding  [] Incontinent   [x] Jama    Musculoskeletal  [] Balance/[place] Unsteady   [] Weak   Strength:    [] Normal    [] Limited    [x] Decreasing   Activities:    [] Up as tolerated   [x] Bedridden    [] Specify:    SAFETY  [x] 24 hr. Caregiver   [x] Side rails ? [x] Hospital bed   [x] Reviewed Falls & Safety       ALLERGIES AND MEDICATIONS     Allergies: Allergies   Allergen Reactions    Egg Nausea and Vomiting     Raw egg and scrambled eggs. Egg products okay.      Pcn [Penicillins] Nausea and Vomiting     \"but could take amoxil\"    Shellfish Containing Products Unknown (comments)    Tetanus And Diphtheria Toxoids, Adsorbed, Adult Other (comments)     Developed local swelling, axillary pain, and transient fever    Tomato Unknown (comments)     Fresh tomatoes       Current Facility-Administered Medications   Medication Dose Route Frequency    LORazepam (ATIVAN) tablet 1 mg  1 mg Oral BID    HYDROmorphone (PF) (DILAUDID) injection 2 mg  2 mg IntraVENous Q30MIN PRN    haloperidol (HALDOL) tablet 2 mg  2 mg Oral Q4H PRN    HYDROmorphone (DILAUDID) 100 mg/100 mL NS infusion  0-20 mg/hr IntraVENous TITRATE    sodium chloride (NS) flush 10-30 mL  10-30 mL InterCATHeter PRN    sodium chloride (NS) flush 10 mL  10 mL InterCATHeter Q24H    sodium chloride (NS) flush 10 mL  10 mL InterCATHeter PRN    sodium chloride (NS) flush 10-40 mL  10-40 mL InterCATHeter Q8H    sodium chloride (NS) flush 20 mL  20 mL InterCATHeter PRN    heparin (porcine) pf 300 Units  300 Units InterCATHeter PRN    acetaminophen (TYLENOL) tablet 650 mg  650 mg Oral Q4H PRN    bisacodyl (DULCOLAX) tablet 5 mg  5 mg Oral DAILY PRN    diphenhydrAMINE (BENADRYL) capsule 25 mg  25 mg Oral Q4H PRN    DULoxetine (CYMBALTA) capsule 60 mg  60 mg Oral QHS    gabapentin (NEURONTIN) capsule 300 mg  300 mg Oral QHS    LORazepam (ATIVAN) tablet 2 mg  2 mg Oral TID PRN    methadone (DOLOPHINE) tablet 10 mg  10 mg Oral Q8H    morphine injection 15 mg  15 mg IntraVENous Q15MIN PRN    polyethylene glycol (MIRALAX) packet 17 g  17 g Oral DAILY PRN    senna-docusate (PERICOLACE) 8.6-50 mg per tablet 2 Tab  2 Tab Oral BID    sulindac (CLINORIL) tablet 200 mg  200 mg Oral BID WITH MEALS    magic mouthwash cpd (with sucralfate)  5 mL Oral QID PRN    sodium chloride (NS) flush 10-30 mL  10-30 mL InterCATHeter PRN    sodium chloride (NS) flush 10 mL  10 mL InterCATHeter Q24H    sodium chloride (NS) flush 10 mL  10 mL InterCATHeter PRN    sodium chloride (NS) flush 10-40 mL  10-40 mL InterCATHeter Q8H    sodium chloride (NS) flush 20 mL  20 mL InterCATHeter Q24H     Facility-Administered Medications Ordered in Other Encounters   Medication Dose Route Frequency    ceFAZolin (ANCEF) 2g in 100 ml 0.9% NS IVPB  2 g IntraVENous ONCE          Visit Time In: 2:30  Visit Time Out: 3:30

## 2017-08-02 NOTE — PROGRESS NOTES
Oncology Nursing Communication Tool      8:00 AM  8/2/2017     Bedside and Verbal shift change report given to Rosa Gilliland RN (incoming nurse) by Federico Lamb RN (outgoing nurse) on Hadley Drivers a 40 y.o. female who was admitted on 7/31/2017  5:54 PM. Report included the following information SBAR, Kardex, [facility], Recent Results and Med Rec Status. Significant changes during shift: none      Issues for physician to address: none            Code Status: DNR     Infections: No current active infections     Allergies: Egg; Pcn [penicillins]; Shellfish containing products; Tetanus and diphtheria toxoids, adsorbed, adult; and Tomato     Current diet: DIET REGULAR       Pain Controlled [x] yes [] no   Bowel Movement [x] yes [] no   Last Bowel Movement (date) 7/31/2017            Vital Signs:   Patient Vitals for the past 12 hrs:   Temp Pulse Resp BP SpO2   08/01/17 2320 98.1 °F (36.7 °C) (!) 127 22 115/87 99 %      Intake & Output:     Intake/Output Summary (Last 24 hours) at 08/02/17 0800  Last data filed at 08/01/17 1715   Gross per 24 hour   Intake                0 ml   Output              350 ml   Net             -350 ml      Laboratory Results:     Recent Results (from the past 12 hour(s))   HEMOGLOBIN    Collection Time: 08/02/17  5:07 AM   Result Value Ref Range    HGB 7.3 (L) 11.5 - 16.0 g/dL              Opportunity for questions and clarifications were given to the incoming nurse. Patient's bed is in low position, side rails x2, door open PRN, call bell within reach and patient not in distress.       Federico Lamb RN

## 2017-08-02 NOTE — PERIOP NOTES
Handoff Report from Operating Room to PACU    Report received from Danya Peter, [facility] and ALIYAH Simons regarding Kwan Berry. Surgeon(s):  Case Anesthesia  And Procedure(s) (LRB):  PACU/RECOVERY (N/A)  confirmed   with allergies, drains and dressings discussed. Anesthesia type, drugs, patient history, complications, estimated blood loss, vital signs, intake and output, and last pain medication, lines and temperature were reviewed.

## 2017-08-02 NOTE — WOUND CARE
Pressure Ulcer Prevention In basket Alert Received for Monroe < 14 (moderate risk).      Suggested Care Plan/Interventions for Nursing  1. Complete [place] Pressure Ulcer Risk Scale and use sub scores to identify appropriate interventions. 2. Perform Assessment: skin, changes in LOC, visual cues for pain, monitor skin under medical devices  3. Respond to Reduced Sensory Perception: changes in LOC, check visual cues for pain, float heels, suspension boots, pressure redistribution bed/mattress/chair cushion, turning and reposition approximately every 2 hours (pillows & wedges), pad between skin to skin, turn & reposition  4. Manage Moisture: absorbent under pads, internal / external urinary device, internal /  external fecal device, minimize layers, contain wound drainage, access need for specialty bed, limit adult briefs, maintain skin hydration (lotion/cream), moisture barrier, offer toileting every hour  5. Promote Activity: increase time out of bed, chair cushion, PT/OT evaluation, trapeze to reposition, pressure redistribution bed/mattress/chair  6. Address Reduced Mobility: float heels / suspension boot, HOB 30 degrees or less, pressure redistribution bed/mattress/cushion, PT / OT evaluation, turn and reposition approximately every 2 hours (pillows & wedges)  7. Promote Nutrition: document food / fluid / supplement intake, encourage/assist with meals as needed  8. Reduce Friction and Shear: transferring/repositioning devices (lift/draw sheet), lift team/ patient mobility team, feet elevated on foot rest, minimize layers, foam dressing / transparent film / skin sealants, protective barrier creams and emollients, transfer aides (board, Dustin lift, ceiling lift, stand assist), HOB 30 degrees or less, trapeze to reposition.   Wound Care Team

## 2017-08-02 NOTE — ROUTINE PROCESS
TRANSFER - OUT REPORT:    Verbal report given to Elfego pt's nurse, on Kevin Garden  is presently in PACU post CT Microwave Ablation of right hemipelvic mass with anesthesia. Routine wound care orders place and to continue prior wound care as ordered by hospice.

## 2017-08-02 NOTE — H&P
[facility]  [phone]    Patient Name: Nona Malin  YOB: 1980    Date of Provider Hospice Visit: 08/02/17    Level of Care:   [] General Inpatient (GIP)    [x] Routine   [] Respite    Location of Care:  [] [facility] [] [facility] [x] [facility] [] [facility] [] [facility]    Date of Original Hospice Admission: 7-31-17  Hospice Medical Director for Inpatient Care: Dr. Rachel Camacho Diagnosis: Metastatic endometrial cancer, s/p XRT  Diagnoses RELATED to the terminal prognosis: pathologic right subtrochanteric femur fracture  Other Diagnoses: dm-2, obesity, hx tachycardia     HOSPICE SUMMARY   Do not cut and paste chart information other than imaging findings    Nona Malin is a 40y.o. year old who was admitted to [facility]. The patient's principle diagnosis has resulted in hospitalization 5/8-5/16/17 for right hip pain and respiratory failure. CT-pathologic right subtrochenteric femur fracture and enlarging [id number] cm right pelvic mass with invasion to pelvis, sacrum and hip. Pain management with PCA and methadone. Pt was sent to hospice house but remains bedbound and still has pain in right groin and hip , worse with morvement. Palliative/hospice MD has discussed and reviewed case with IR and plan is for inpatient admit and palliative microwave ablation of tumor. Refer to LCD   The LCD for Hospice for the admitted diagnosis of cancer includes:    [x] Disease with distant metastases at presentation OR    [x] Progression from an earlier stage of disease to metastatic disease with either:   [] a continued decline in spite of therapy   [] patient declines further disease directed therapy    [] Certain cancers with poor prognoses (e.g. small cell lung cancer, brain cancer and pancreatic cancer) may be hospice eligible without fulfilling the other criteria in this section.       Functionally, the patient's Karnofsky and/or Palliative Performance Scale has declined over a period of months and is estimated at 30. The patient is dependent on the following ADLs:all except feeding    Objective information that support this patients limited prognosis includes:   CT scan 5/9/17  IMPRESSION  IMPRESSION:     1. Interval significant enlargement of the mass centered in the right pelvis  with destruction of the bones of the right pelvis, sacrum, and proximal right  femur, with extension to involve the left acetabulum, inferior pubic ramus and  superior pubic ramus. 2. Associated pathologic fracture of the right proximal femur. 3. Evaluation of the mass subjacent to the right hemidiaphragm is limited as it  was not as well imaged on prior examinations. 4. Interval enlargement of left pelvic lymph node.     The patient/family chose comfort measures with the support of Hospice. HOSPICE DIAGNOSES   Active Symptoms:  1. Right groin and hip pain  2. Delirium  3. Anemia       PLAN   1. Admitted to [address] [place] [address] routine level of care for planned palliative CT microwave ablation  2. Going to CT for procedure this morning--microwave ablation for debulking of tumor. D/w Dr Mary Muñoz who is aware of elevated bilirubin  3. Metoprolol help--will d/c due to low bp  4. Pt more lethargic--will lower dosing of ativan and haldol  5. Continue PCA and methadone  6.  and SW to support family needs  7. Disposition: return to home with hospice . SW already involved    Prognosis estimated based on 08/02/17 clinical assessment is:   [] Hours to Days    [] Days to Weeks    [x] Other:    Communicated plan of care with: Hospice Case Manager;  Hospice IDT; Care Team     GOALS OF CARE     Resuscitation Status: DNR  Durable DNR: [] Yes [] No    Advance Care Planning 5/10/2017   Patient's Healthcare Decision Maker is: Legal Next of Andrea 69   Primary Decision Maker Name Taryn Talamantes   Primary Decision Maker Phone Number -   Primary Decision Maker Relationship to Patient Parent   Confirm Advance Directive Yes, on file   Patient Would Like to Complete Advance Directive -        HISTORY     History obtained from: chart, hospice RN and patient and her mother    CHIEF COMPLAINT:  The patient is:   [x] Verbal  [] Nonverbal  [] Unresponsive    HPI/SUBJECTIVE:    Minimal oral intake yesterday  Slept most of the day but would arouse  2/p 2 units prbcs yesterday--hb up to 7.3 today     REVIEW OF SYSTEMS     The following systems were: [x] reviewed  [] unable to be reviewed    Positive ROS include:  Constitutional:  Ears/nose/mouth/throat:  Respiratory:  Gastrointestinal:  Musculoskeletal: right hip and groin pain  Neurologic:  Psychiatric:  Endocrine:     Adult Non-Verbal Pain Assessment Score: 2    Face  [] 0   No particular expression or smile  [x] 1   Occasional grimace, tearing, frowning, wrinkled forehead  [] 2   Frequent grimace, tearing, frowning, wrinkled forehead    Activity (movement)  [x] 0   Lying quietly, normal position  [] 1   Seeking attention through movement or slow, cautious movement  [] 2   Restless, excessive activity and/or withdrawal reflexes    Guarding  [] 0   Lying quietly, no positioning of hands over areas of body  [x] 1   Splinting areas of the body, tense  [] 2   Rigid, stiff    Physiology (vital signs)  [x] 0   Stable vital signs  [] 1   Change in any of the following: SBP > 20mm Hg; HR > 20/minute  [] 2   Change in any of the following: SBP > 30mm Hg; HR > 25/minute    Respiratory  [x] 0   Baseline RR/SpO2, compliant with ventilator  [] 1   RR > 10 above baseline, or 5% drop SpO2, mild asynchrony with ventilator  [] 2   RR > 20 above baseline, or 10% drop SpO2, asynchrony with ventilator     FUNCTIONAL ASSESSMENT     Palliative Performance Scale (PPS):30     PSYCHOSOCIAL/SPIRITUAL ASSESSMENT     Active Problems:    [facility] ([facility] (7/31/2017)      Past Medical History:   Diagnosis Date    Cancer ([facility]     uterine    [facility] immunodeficiency) [facility]     [facility]     Elevated liver function tests 10/21/2010    [facility] 7/7/2010    Hypertension     Iron deficiency anemia     Menometrorrhagia 10/21/2010    Microcytic anemia 10/21/2010    Morbid obesity ([facility]     Prediabetes 7/7/2010    Psychiatric disorder     depression    Radiation     completed radiation mid-march      Past Surgical History:   Procedure Laterality Date    CARDIAC SURG PROCEDURE UNLIST      hx of tachycardia    HX GYN      hysterectomy      Social History   Substance Use Topics    Smoking status: Never Smoker    Smokeless tobacco: Never Used    Alcohol use Yes     Family History   Problem Relation Age of Onset    Hypertension Mother     Hypertension Father     Stroke Maternal Grandfather     Cancer Paternal Grandmother      breast    Diabetes Paternal Grandmother       Allergies   Allergen Reactions    Egg Nausea and Vomiting     Raw egg and scrambled eggs. Egg products okay.      Pcn [Penicillins] Nausea and Vomiting     \"but could take amoxil\"    Shellfish Containing Products Unknown (comments)    Tetanus And Diphtheria Toxoids, Adsorbed, Adult Other (comments)     Developed local swelling, axillary pain, and transient fever    Tomato Unknown (comments)     Fresh tomatoes      Current Facility-Administered Medications   Medication Dose Route Frequency    LORazepam (ATIVAN) tablet 1 mg  1 mg Oral BID    haloperidol (HALDOL) tablet 2 mg  2 mg Oral BID    0.9% sodium chloride infusion 250 mL  250 mL IntraVENous PRN    sodium chloride (NS) flush 10-30 mL  10-30 mL InterCATHeter PRN    sodium chloride (NS) flush 10 mL  10 mL InterCATHeter Q24H    sodium chloride (NS) flush 10 mL  10 mL InterCATHeter PRN    sodium chloride (NS) flush 10-40 mL  10-40 mL InterCATHeter Q8H    sodium chloride (NS) flush 20 mL  20 mL InterCATHeter PRN    heparin (porcine) pf 300 Units  300 Units InterCATHeter PRN    HYDROmorphone (DILAUDID) 500 mg/500 mL NS infusion  0-20 mg/hr IntraVENous TITRATE    acetaminophen (TYLENOL) tablet 650 mg  650 mg Oral Q4H PRN    bisacodyl (DULCOLAX) tablet 5 mg  5 mg Oral DAILY PRN    diphenhydrAMINE (BENADRYL) capsule 25 mg  25 mg Oral Q4H PRN    DULoxetine (CYMBALTA) capsule 60 mg  60 mg Oral QHS    gabapentin (NEURONTIN) capsule 300 mg  300 mg Oral QHS    haloperidol (HALDOL) tablet 5 mg  5 mg Oral TID    LORazepam (ATIVAN) tablet 2 mg  2 mg Oral TID PRN    methadone (DOLOPHINE) tablet 10 mg  10 mg Oral Q8H    morphine injection 15 mg  15 mg IntraVENous Q15MIN PRN    polyethylene glycol (MIRALAX) packet 17 g  17 g Oral DAILY PRN    senna-docusate (PERICOLACE) 8.6-50 mg per tablet 2 Tab  2 Tab Oral BID    sulindac (CLINORIL) tablet 200 mg  200 mg Oral BID WITH MEALS    magic mouthwash cpd (with sucralfate)  5 mL Oral QID PRN    sodium chloride (NS) flush 10-30 mL  10-30 mL InterCATHeter PRN    sodium chloride (NS) flush 10 mL  10 mL InterCATHeter Q24H    sodium chloride (NS) flush 10 mL  10 mL InterCATHeter PRN    sodium chloride (NS) flush 10-40 mL  10-40 mL InterCATHeter Q8H    sodium chloride (NS) flush 20 mL  20 mL InterCATHeter Q24H        PHYSICAL EXAM     Wt Readings from Last 3 Encounters:   05/30/17 267 lb (121.1 kg)   05/10/17 267 lb 10.2 oz (121.4 kg)   02/16/17 300 lb (136.1 kg)       Visit Vitals    /69    Pulse (!) 117    Temp 97.9 °F (36.6 °C)    Resp 16    SpO2 100%       Supplemental O2  [x] Yes  [] NO  Last bowel movement:     Currently this patient has:  [] Peripheral IV [x] PICC  [] PORT [] ICD    [] Jama Catheter [] NG Tube   [] PEG Tube    [] Rectal Tube [] Drain  [] Other:     Constitutional: obese, lethargic but able to answer most questions, nad  Eyes: perrl  ENMT: clear  Cardiovascular: rrr  Respiratory: even, nonlabored  Gastrointestinal: soft obese  Musculoskeletal:2-3 plus b/l LE edema  Skin: warm, dry  Neurologic:   Psychiatric:   Other:    Pertinent Lab and or Imaging Tests:  Lab Results   Component Value Date/Time    Sodium 139 08/01/2017 06:36 AM    Potassium 4.5 08/01/2017 06:36 AM    Chloride 106 08/01/2017 06:36 AM    CO2 26 08/01/2017 06:36 AM    Anion gap 7 08/01/2017 06:36 AM    Glucose 82 08/01/2017 06:36 AM    BUN 23 08/01/2017 06:36 AM    Creatinine 0.83 08/01/2017 06:36 AM    BUN/Creatinine ratio 28 08/01/2017 06:36 AM    GFR est AA >60 08/01/2017 06:36 AM    GFR est non-AA >60 08/01/2017 06:36 AM    Calcium 8.6 08/01/2017 06:36 AM     Lab Results   Component Value Date/Time    Protein, total 5.7 08/01/2017 06:36 AM    Albumin 2.0 08/01/2017 06:36 AM           Total time: 20 min  Counseling / coordination time: d/w hospice RN, patient and family at bedside  > 50% counseling / coordination?: no

## 2017-08-02 NOTE — PERIOP NOTES
TRANSFER - OUT REPORT:    Verbal report given to Home Frank RN (name) on Nona Malin  being transferred to [address] [place] [address] (unit) for routine post - op       Report consisted of patients Situation, Background, Assessment and   Recommendations(SBAR). Information from the following report(s) SBAR, OR Summary, Intake/Output and MAR,  was reviewed with the receiving nurse. Opportunity for questions and clarification was provided. Patient transported with:   O2 @ 2 liters  Tech      No family in surgical waiting room, pt transported to room, volunteer notified.

## 2017-08-02 NOTE — PROGRESS NOTES
6329 Dr. Vonn Osgood continue to talk w/Ms. Randall mother and pt moved to CT w/anesthesia at bedside. Monitoring and times followed by anesthesia record. 0930 - pt prone    0945 - Dr. Vonn Osgood ordered 2gm Ancef IV    1009 - Ablation procedure started    1125 - Procedure completed - Dr. Vonn Osgood spoke w/Ms. Randall via phone. Continue following anesthesia record for extubation. 1147 - pt turned onto bed and remains intubated, paper tape barrier between foam tape at two puncture sites. 1200 - pt taken to PACU w/monitor, 2LNC O2, davila remains to gravity drain    1210 - report given to PACU w/anesthia.

## 2017-08-02 NOTE — HOSPICE
Pt seen after CT guided microwave ablation procedure  Avss, lethargic but arousable, NAD  CV-rrr Lungs-clear ext -massive right leg edema-no change  Jama-dark urine in bag    Has been off PCA since about 9 am  Last dose of dilaudid IV 2:45 pm  today  Expect increased post procedure pain per radiologist today  Will give iv dilaudid prn for now. Can restart PCA with rate if needed later today or tomorrow   Not alert enough to take any oral medicines. .  Check ammonia level   D/w hospice RN. Updated Dr. Latha Haynes  Pt's family/mother are not present.

## 2017-08-02 NOTE — ANESTHESIA PREPROCEDURE EVALUATION
Anesthetic History   No history of anesthetic complications            Review of Systems / Medical History  Patient summary reviewed, nursing notes reviewed and pertinent labs reviewed    Pulmonary  Within defined limits                 Neuro/Psych         Psychiatric history     Cardiovascular    Hypertension              Exercise tolerance: <4 METS     GI/Hepatic/Renal     GERD           Endo/Other    Diabetes    Morbid obesity, cancer and anemia     Other Findings   Comments: Endometrial CA w/ bone mets    Chronic pain         Physical Exam    Airway  Mallampati: II  TM Distance: > 6 cm  Neck ROM: normal range of motion   Mouth opening: Normal    Comments: Exam based on previous anesthetic.  Cardiovascular  Regular rate and rhythm,  S1 and S2 normal,  no murmur, click, rub, or gallop             Dental  No notable dental hx       Pulmonary  Breath sounds clear to auscultation               Abdominal  GI exam deferred       Other Findings            Anesthetic Plan    ASA: 4  Anesthesia type: general    Monitoring Plan: BIS      Induction: Intravenous  Anesthetic plan and risks discussed with: Patient

## 2017-08-03 NOTE — HOSPICE
[facility] RN note: Attempted to transition pt to po dilaudid, given 8mg po x 2 over 2 hrs without relief, unable to shift pt position in bed without severe facial grimacing. Total of 3 sc, 2 po and 1 IV for total equivalent dose of 12mg IV dilaudid in the last 12 hrs. Discussed with Dr Lennox Catalan, will reinstate continuous infusion of 1mg/hr via PCA with 2mg IV PRN availability every 15 min. Pt is more alert, speaking more intelligibly.

## 2017-08-03 NOTE — HOSPICE
[facility] RN note: Pt more alert today, minimally verbal, difficult to assess level of pain. Immediate facial grimacing with any movement of R leg. Required 2 PRN doses of IV dilaudid 2mg, transitioning to oral dilaudid in efforts to transfer pt to an alternate location ie nursing facility. Resumed po methadone now that pt is alert enough to swallow. [facility]  [phone]    GIP Daily Nursing Note   Patient Name: Nickie Flanagan  YOB: 1980  Age: 40 y.o. Date of Visit: 08/03/17  [facility]ility of Care[facility] [address] [facility]  Patient Room: [facility]     Hospice Attending: Saba Kennedy MD  Hospice Diagnosis: pre op testing  Endometrial ca [facility])  RECOVERY NEEDED IN PACU    Level of Care: GIP    Current GIP Symptoms    1. Pain  2. resp distress  3. delirium          ASSESSMENT & PLAN   1.  continue GIP  level of care for planned palliative CT microwave ablation  2. Pain level moderate per patient but states 8/10--continue methadone and prn dilaudid for now  3. More alert today--change ativan toq6 hrs prn-d/c scheduled ativan  4. Haldol prn   5. Continue routine wound care and wound care s/p procedure. 6. PICC team for cath flow  7.  and SW to support family needs  8. Disposition: nursing facility with hospice . Dania Plasencia already involved      Spiritual Interventions: Amalia bright from Dr Reuben Sesay office is very involved with pt and family, continuing to provide support through hospitalization. Psych/ Social/ Emotional Interventions: Mother and daughter periodically visiting. Care Coordination Needs:     Care plan and New Orders discussed / approved with Tere Aceves MD.    Description History and Chart Review   If this is initial GIP note must document RN assessment/MD communication in previous setting.  Specifically document nursing/medication needs in last 24 hours to support GIP care  Narrative History of last 24 hours that demonstrates care cannot be provided in another settinhr skilled nursing assessment needed to adequately evaluate levels of pain and needed medications for optimal symptom management. What has been done to control the patient's symptoms in the last 24 hours? Pt more alert today, minimally verbal, difficult to assess level of pain. Immediate facial grimacing with any movement of R leg. Required 2 PRN doses of IV dilaudid 2mg, transitioning to oral dilaudid in efforts to transfer pt to an alternate location ie nursing facility. Resumed po methadone now that pt is alert enough to swallow. Does the patient currently require IV medications? Transitioning to po medication  Does the patient currently require scheduled medications? Evaluating need. Does the patient currently require a PCA? PCA not resumed post op, determining need.     List number of doses of PRN medications in last 24 hours:  Medication 1: dilaudid 2mg IV  Number of doses: 2    Medication 2: dilaudid 8mg oral  Number of doses:    Medication 3:   Number of doses:    Supporting documentation for GIP need for pain control:  [x] Frequent evaluation by a doctor, nurse practitioner, nurse   [x] Frequent medication adjustment    [] IVs that cannot be administered at home   [x] Aggressive pain management   [x] Complicated technical delivery of medications              Supporting documentation for GIP need for symptom control:  []  Sudden decline necessitating intensive nursing intervention  []  Uncontrolled / intractable nausea or vomiting   []  Pathological fractures  []  Advanced open wounds requiring frequent skilled care  [] Unmanageable respiratory distress  [] New or worsening delirium   [x] Delirium with behavior issues: Is 24 hour caregiver present due to safety concerns with agitation? (yes/no)  [] Imminent death  with skilled nursing needs documented above    DISCHARGE PLANNING   Daily discharge planning required for GIP  1. Discharge Plan: seeking placement  2. Patient/Family teaching: end of life preparation  3. Response to patient/family teaching: expressed understanding    ASSESSMENT    KARNOFSKY: 20%    Prognosis estimated based on 08/03/17 clinical assessment is:   [] Few to Many Hours  [] Hours to Days   [] Few to Many Days   [x] Days to Weeks   [] Few to Many Weeks   [] Weeks to Months   [] Few to Many Months    Quality Measure: Patient self-reports:  [] Yes    [] No    pt verbal, difficult to understand  ESAS:   Time of Assessment: 2:00pm  Pain (1-10):7-8  Fatigue (1-10):   Shortness of breath (1-10):2  Nausea (1-10): Appetite (1-10):    Anxiety: (1-10):   Depression: (1-10):   Well-being: (1-10):   Constipation: _ Yes  _ No  LAST BM:     CLINICAL INFORMATION   Patient Vitals for the past 12 hrs:   Temp Pulse Resp BP SpO2   08/03/17 0800 98.3 °F (36.8 °C) (!) 129 18 (!) 110/39 100 %       Currently this patient has:  [x] Supplemental O2   [] IV    [x] PICC      [] PORT   [] NG Tube    [] PEG Tube   [] Ostomy     [x] Jama draining _dark bile colored______ urine  [] Other:     SIGNS/PHYSICAL FINDINGS     Skin (including wound):  [] Warm, dry, supple, intact and color normal for race  [x] Warm   [] Dry   [] Cool     [] Clammy       [] Diaphoretic    Turgor   [] Normal   [] Decreased  Color:   [] Pink   [] Pale   [] Cyanotic   [] Erythema   [] Jaundice   [x] Normal for Race  [x]  Wounds:  Second degree sacral wound emerging  Neuro: some delirium  [x] Lethargy  [] Restlessness / agitation  [] Confusion / delirium  [] Hallucinations  [x] Responds to maximal stimulation  [] Unresponsive  [] Seizures     Cardiac:  [] Dyspnea on Exertion  [] JVD  [] Murmur  [] Palpitations  [] Hypotension  [] Hypertension  [] Tachycardia  [] Bradycardia  [] Irregular HR  [] Pulses Decreased  [] Pulses Absent  [] Edema:   R sided severe edema both extremities  [] Mottling:      (Location)    Respiratory:  Breath sounds:    [x] Diminished   [] Wheeze   [] Rhonchi   [] Rales   [] Even and unlabored  [] Labored:            [] Cough   [] Non Productive   [] Productive    [] Description:           [] Deep suctioned   [x] O2 at __2_ LPM  [] High flow oxygen greater than 10 LPM  [] Bi-Pap    GI  [] Abdomen (describe)   [] Ascites  [] Nausea  [] Vomiting  [] Incontinent of bowels  [] Bowel sounds (yes/no)  [] Diarrhea  [] Constipation (see above including last bowel movement)  [] Checked for impaction  [] Last BM     Nutrition  Diet:___bites and sips_______  Appetite:   [] Good   [] Fair   [] Poor   [] Tube Feeding       [] Voiding  [] Incontinent   [x] Jama    Musculoskeletal  [] Balance/[place] Unsteady   [] Weak   Strength:    [] Normal    [] Limited    [x] Decreasing   Activities:    [] Up as tolerated   [x] Bedridden    [] Specify:    SAFETY  [x] 24 hr. Caregiver   [x] Side rails ? [x] Hospital bed   [x] Reviewed Falls & Safety       ALLERGIES AND MEDICATIONS     Allergies: Allergies   Allergen Reactions    Egg Nausea and Vomiting     Raw egg and scrambled eggs. Egg products okay.      Pcn [Penicillins] Nausea and Vomiting     \"but could take amoxil\"    Shellfish Containing Products Unknown (comments)    Tetanus And Diphtheria Toxoids, Adsorbed, Adult Other (comments)     Developed local swelling, axillary pain, and transient fever    Tomato Unknown (comments)     Fresh tomatoes       Current Facility-Administered Medications   Medication Dose Route Frequency    LORazepam (ATIVAN) tablet 1 mg  1 mg Oral Q6H PRN    HYDROmorphone (PF) (DILAUDID) injection 2 mg  2 mg IntraVENous Q15MIN PRN    HYDROmorphone (DILAUDID) 1 mg/mL oral solution 8 mg  8 mg Oral Q15MIN PRN    haloperidol (HALDOL) tablet 2 mg  2 mg Oral Q4H PRN    HYDROmorphone (DILAUDID) 250 mg/250 mL NS BAG +++ HIGH ALERT +++   IntraVENous TITRATE    sodium chloride (NS) flush 10-30 mL  10-30 mL InterCATHeter PRN    sodium chloride (NS) flush 10 mL  10 mL InterCATHeter Q24H    sodium chloride (NS) flush 10 mL  10 mL InterCATHeter PRN    sodium chloride (NS) flush 10-40 mL  10-40 mL InterCATHeter Q8H    sodium chloride (NS) flush 20 mL  20 mL InterCATHeter PRN    heparin (porcine) pf 300 Units  300 Units InterCATHeter PRN    acetaminophen (TYLENOL) tablet 650 mg  650 mg Oral Q4H PRN    bisacodyl (DULCOLAX) tablet 5 mg  5 mg Oral DAILY PRN    diphenhydrAMINE (BENADRYL) capsule 25 mg  25 mg Oral Q4H PRN    DULoxetine (CYMBALTA) capsule 60 mg  60 mg Oral QHS    gabapentin (NEURONTIN) capsule 300 mg  300 mg Oral QHS    methadone (DOLOPHINE) tablet 10 mg  10 mg Oral Q8H    polyethylene glycol (MIRALAX) packet 17 g  17 g Oral DAILY PRN    senna-docusate (PERICOLACE) 8.6-50 mg per tablet 2 Tab  2 Tab Oral BID    sulindac (CLINORIL) tablet 200 mg  200 mg Oral BID WITH MEALS    magic mouthwash cpd (with sucralfate)  5 mL Oral QID PRN    sodium chloride (NS) flush 10-30 mL  10-30 mL InterCATHeter PRN    sodium chloride (NS) flush 10 mL  10 mL InterCATHeter Q24H    sodium chloride (NS) flush 10 mL  10 mL InterCATHeter PRN    sodium chloride (NS) flush 10-40 mL  10-40 mL InterCATHeter Q8H    sodium chloride (NS) flush 20 mL  20 mL InterCATHeter Q24H          Visit Time In: 2:00  Visit Time Out: 3:00

## 2017-08-03 NOTE — PROGRESS NOTES
0800-only one port of PICC will flush. PICC team called, cath josiah ordered. Dr. Cecily Spears aware and gave ok to use flushable port for pain med administration prior to cath josiah administration.

## 2017-08-03 NOTE — PROGRESS NOTES
This was a follow-up visit with Jean-Pierre Noonan to provide pastoral support following her procedure on yesterday. At times it was difficult to follow her conversation. She was able to say that she was dreaming and some of the dreams were disturbing. Assured her that she was in the hospital and that she was safe. Her mother arrived while I was there. Jean-Pierre Noonan was reassured by our presence. Prayers were offered with both. They both continue to use their as they cope with Jyoti's illness. I had the opportunity to talk with her mother alone. Provided pastoral support as she raised questions of suffering and how to be fair to Jean-Pierre Noonan.      Tam Coffman, Brittnee Lal., [facility], [facility]

## 2017-08-03 NOTE — PROGRESS NOTES
Follow up visit with Miss Eloisa Sanchez. Staff was assisting her with some ice cream and Ice. Provided calming presence and sang In God's Presence. Provided prayer and assurance of prayers. Chaplains available as able and/or needed.   Visited by: Dahlia Lindsay [id number] [facility] [place] ([id number])

## 2017-08-03 NOTE — PROGRESS NOTES
Initiated declot interventions of cathflo at 0909  Instilling heparin was unsuccessful per primary nurse  Declot intervention was explained l. Instilled 1mg/1ml Cathflo x 2 ports both purple and red have no blood return. .   Patient tolerated procedure well. Primary nurse aware. Refer to [facility] and Docflow sheet for specifics. Raul Hancock RN / Vascular Access Team    Declot follow up. @ 1114  Aspirated instilled Cathflo 1mg/1 ml post 120 minute dwell. Noted positive blood return both ports. 5 ml of blood wasted from both ports. Flushed with 20 ml NS. RN Brittni De Guzman is aware of patent ports. .   .  Declot per Cathflo intervention was successful .    Raul Hancock RN / Vascular Access Team

## 2017-08-03 NOTE — HOSPICE
[facility]  [phone]    Patient Name: Nadja Baptiste  YOB: 1980    Date of Provider Hospice Visit: 08/03/17    Level of Care:   [x] General Inpatient (GIP)    [] Routine   [] Respite    Location of Care:  [] [facility] [] [facility] [x] [facility] [] [facility] [] [facility]    Date of Original Hospice Admission: 7-31-17  Hospice Medical Director for Inpatient Care: Dr. Monica Masters Diagnosis: Metastatic endometrial cancer, s/p XRT  Diagnoses RELATED to the terminal prognosis: pathologic right subtrochanteric femur fracture  Other Diagnoses: dm-2, obesity, hx tachycardia     HOSPICE SUMMARY   Do not cut and paste chart information other than imaging findings    Nadja Baptiste is a 40y.o. year old who was admitted to [address] [place] [address]. The patient's principle diagnosis has resulted in hospitalization 5/8-5/16/17 for right hip pain and respiratory failure. CT-pathologic right subtrochenteric femur fracture and enlarging [id number] cm right pelvic mass with invasion to pelvis, sacrum and hip. Pain management with PCA and methadone. Pt was sent to hospice house but remains bedbound and still has pain in right groin and hip , worse with morvement. Palliative/hospice MD has discussed and reviewed case with IR and plan is for inpatient admit and palliative microwave ablation of tumor. Refer to LCD   The LCD for Hospice for the admitted diagnosis of cancer includes:    [x] Disease with distant metastases at presentation OR    [x] Progression from an earlier stage of disease to metastatic disease with either:   [] a continued decline in spite of therapy   [] patient declines further disease directed therapy    [] Certain cancers with poor prognoses (e.g. small cell lung cancer, brain cancer and pancreatic cancer) may be hospice eligible without fulfilling the other criteria in this section.       Functionally, the patient's Karnofsky and/or Palliative Performance Scale has declined over a period of months and is estimated at 30. The patient is dependent on the following ADLs:all except feeding    Objective information that support this patients limited prognosis includes:   CT scan 5/9/17  IMPRESSION  IMPRESSION:     1. Interval significant enlargement of the mass centered in the right pelvis  with destruction of the bones of the right pelvis, sacrum, and proximal right  femur, with extension to involve the left acetabulum, inferior pubic ramus and  superior pubic ramus. 2. Associated pathologic fracture of the right proximal femur. 3. Evaluation of the mass subjacent to the right hemidiaphragm is limited as it  was not as well imaged on prior examinations. 4. Interval enlargement of left pelvic lymph node.     The patient/family chose comfort measures with the support of Hospice. HOSPICE DIAGNOSES   Active Symptoms:  1. Right groin and hip pain  2. Delirium  3. Anemia  4. S/p CT guided microwave ablation of tumor yesterday       PLAN   1. Admitted to [facility] routine level of care for planned palliative CT microwave ablation  2. Pain level moderate per patient but states 8/10--continue methadone and prn dilaudid for now  3. More alert today--change ativan toq6 hrs prn-d/c scheduled ativan  4. Haldol prn   5. Continue routine wound care and wound care s/p procedure. 6. PICC team for cath flow  7.  and SW to support family needs  8. Disposition: return to home with hospice . SW already involved    Prognosis estimated based on 08/03/17 clinical assessment is:   [] Hours to Days    [] Days to Weeks    [x] Other:    Communicated plan of care with: Hospice Case Manager;  Hospice IDT; Care Team     GOALS OF CARE     Resuscitation Status: DNR  Durable DNR: [] Yes [] No    Advance Care Planning 5/10/2017   Patient's Healthcare Decision Maker is: Legal Next of Andrea Velarde   Primary Decision Maker Name Amelia Hernandez   Primary Decision Maker Phone Number - Primary Decision Maker Relationship to Patient Parent   Confirm Advance Directive Yes, on file   Patient Would Like to Complete Advance Directive -        HISTORY     History obtained from: chart, hospice RN and patient and her mother    CHIEF COMPLAINT:  The patient is:   [x] Verbal  [] Nonverbal  [] Unresponsive    HPI/SUBJECTIVE:    awoke around 4 am today  Has been alert, knows she is in hospital  States pain is moderate  Had one dose of dilaudid 2mg early this morning.   PICC line port now working     REVIEW OF SYSTEMS     The following systems were: [x] reviewed  [] unable to be reviewed    Positive ROS include:  Constitutional:  Ears/nose/mouth/throat:  Respiratory:  Gastrointestinal:  Musculoskeletal: right hip and groin pain  Neurologic:  Psychiatric:  Endocrine:     Adult Non-Verbal Pain Assessment Score: 2    Face  [] 0   No particular expression or smile  [x] 1   Occasional grimace, tearing, frowning, wrinkled forehead  [] 2   Frequent grimace, tearing, frowning, wrinkled forehead    Activity (movement)  [x] 0   Lying quietly, normal position  [] 1   Seeking attention through movement or slow, cautious movement  [] 2   Restless, excessive activity and/or withdrawal reflexes    Guarding  [] 0   Lying quietly, no positioning of hands over areas of body  [x] 1   Splinting areas of the body, tense  [] 2   Rigid, stiff    Physiology (vital signs)  [x] 0   Stable vital signs  [] 1   Change in any of the following: SBP > 20mm Hg; HR > 20/minute  [] 2   Change in any of the following: SBP > 30mm Hg; HR > 25/minute    Respiratory  [x] 0   Baseline RR/SpO2, compliant with ventilator  [] 1   RR > 10 above baseline, or 5% drop SpO2, mild asynchrony with ventilator  [] 2   RR > 20 above baseline, or 10% drop SpO2, asynchrony with ventilator     FUNCTIONAL ASSESSMENT     Palliative Performance Scale (PPS):30     PSYCHOSOCIAL/SPIRITUAL ASSESSMENT     Active Problems:    Endometrial ca ([facility] [address] (7/31/2017)      Past Medical History:   Diagnosis Date    Cancer [facility])     uterine    CVI (common variable immunodeficiency) (HCC)     Diabetes [facility] [address]     Elevated liver function tests 10/21/2010    Endometrial cancer ([facility] [address] 7/7/2010    Hypertension     Iron deficiency anemia     Menometrorrhagia 10/21/2010    Microcytic anemia 10/21/2010    Morbid obesity ([facility] [address]     Prediabetes 7/7/2010    Psychiatric disorder     depression    Radiation     completed radiation mid-march      Past Surgical History:   Procedure Laterality Date    CARDIAC SURG PROCEDURE UNLIST      hx of tachycardia    HX GYN      hysterectomy      Social History   Substance Use Topics    Smoking status: Never Smoker    Smokeless tobacco: Never Used    Alcohol use Yes     Family History   Problem Relation Age of Onset    Hypertension Mother     Hypertension Father     Stroke Maternal Grandfather     Cancer Paternal Grandmother      breast    Diabetes Paternal Grandmother       Allergies   Allergen Reactions    Egg Nausea and Vomiting     Raw egg and scrambled eggs. Egg products okay.      Pcn [Penicillins] Nausea and Vomiting     \"but could take amoxil\"    Shellfish Containing Products Unknown (comments)    Tetanus And Diphtheria Toxoids, Adsorbed, Adult Other (comments)     Developed local swelling, axillary pain, and transient fever    Tomato Unknown (comments)     Fresh tomatoes      Current Facility-Administered Medications   Medication Dose Route Frequency    LORazepam (ATIVAN) tablet 1 mg  1 mg Oral Q6H PRN    haloperidol (HALDOL) tablet 2 mg  2 mg Oral Q4H PRN    HYDROmorphone (DILAUDID) 250 mg/250 mL NS BAG +++ HIGH ALERT +++   IntraVENous TITRATE    HYDROmorphone (PF) (DILAUDID) injection 2 mg  2 mg IntraVENous Q15MIN PRN    sodium chloride (NS) flush 10-30 mL  10-30 mL InterCATHeter PRN    sodium chloride (NS) flush 10 mL  10 mL InterCATHeter Q24H    sodium chloride (NS) flush 10 mL  10 mL InterCATHeter PRN    sodium chloride (NS) flush 10-40 mL  10-40 mL InterCATHeter Q8H    sodium chloride (NS) flush 20 mL  20 mL InterCATHeter PRN    heparin (porcine) pf 300 Units  300 Units InterCATHeter PRN    acetaminophen (TYLENOL) tablet 650 mg  650 mg Oral Q4H PRN    bisacodyl (DULCOLAX) tablet 5 mg  5 mg Oral DAILY PRN    diphenhydrAMINE (BENADRYL) capsule 25 mg  25 mg Oral Q4H PRN    DULoxetine (CYMBALTA) capsule 60 mg  60 mg Oral QHS    gabapentin (NEURONTIN) capsule 300 mg  300 mg Oral QHS    methadone (DOLOPHINE) tablet 10 mg  10 mg Oral Q8H    morphine injection 15 mg  15 mg IntraVENous Q15MIN PRN    polyethylene glycol (MIRALAX) packet 17 g  17 g Oral DAILY PRN    senna-docusate (PERICOLACE) 8.6-50 mg per tablet 2 Tab  2 Tab Oral BID    sulindac (CLINORIL) tablet 200 mg  200 mg Oral BID WITH MEALS    magic mouthwash cpd (with sucralfate)  5 mL Oral QID PRN    sodium chloride (NS) flush 10-30 mL  10-30 mL InterCATHeter PRN    sodium chloride (NS) flush 10 mL  10 mL InterCATHeter Q24H    sodium chloride (NS) flush 10 mL  10 mL InterCATHeter PRN    sodium chloride (NS) flush 10-40 mL  10-40 mL InterCATHeter Q8H    sodium chloride (NS) flush 20 mL  20 mL InterCATHeter Q24H        PHYSICAL EXAM     Wt Readings from Last 3 Encounters:   05/30/17 267 lb (121.1 kg)   05/10/17 267 lb 10.2 oz (121.4 kg)   02/16/17 300 lb (136.1 kg)       Visit Vitals    /75    Pulse (!) 120    Temp 98.1 °F (36.7 °C)    Resp 12    SpO2 100%       Supplemental O2  [x] Yes  [] NO  Last bowel movement:     Currently this patient has:  [] Peripheral IV [x] PICC  [] PORT [] ICD    [] Jama Catheter [] NG Tube   [] PEG Tube    [] Rectal Tube [] Drain  [] Other:     Constitutional: obese, alert today, nad  Eyes: perrl, scleral icterus  ENMT: clear  Cardiovascular: rrr  Respiratory: even, nonlabored  Gastrointestinal: soft obese  Musculoskeletal:2-3 plus b/l LE edema  Skin: warm, dry  Neurologic:   Psychiatric:   Other:    Pertinent Lab and or Imaging Tests:  Lab Results   Component Value Date/Time    Sodium 139 08/01/2017 06:36 AM    Potassium 4.5 08/01/2017 06:36 AM    Chloride 106 08/01/2017 06:36 AM    CO2 26 08/01/2017 06:36 AM    Anion gap 7 08/01/2017 06:36 AM    Glucose 82 08/01/2017 06:36 AM    BUN 23 08/01/2017 06:36 AM    Creatinine 0.83 08/01/2017 06:36 AM    BUN/Creatinine ratio 28 08/01/2017 06:36 AM    GFR est AA >60 08/01/2017 06:36 AM    GFR est non-AA >60 08/01/2017 06:36 AM    Calcium 8.6 08/01/2017 06:36 AM     Lab Results   Component Value Date/Time    Protein, total 5.7 08/01/2017 06:36 AM    Albumin 2.0 08/01/2017 06:36 AM           This patient meets [facility] General Inpatient (GIP) Level of Care. The precipitating event that resulted in the need for GIP was: lethargy, anemia requiring transfusion, surgery today for tumor debulking with anticipated change in pain level and low blood pressuew all requiring frequent pre and post op assessmentst and med changes    The interventions tried that have been unsuccessful at controlling symptoms include: ativan was held yesterday but still very lethargic.   Metoprolol was held this morning    Supporting documentation for GIP need for pain control:  [x] Frequent evaluation by a doctor, nurse practitioner, nurse   [x] Frequent medication adjustment    [] IVs that cannot be administered at home   [] Aggressive pain management   [] Complicated technical delivery of medications              Supporting documentation for GIP need for symptom control:  []  Sudden decline necessitating intensive nursing intervention  []  Uncontrolled / intractable nausea or vomiting   []  Pathological fractures  []  Advanced open wounds requiring frequent skilled care  [] Unmanageable respiratory distress  [] New or worsening delirium   [] Delirium with behavior issues  [] Imminent death  with skilled nursing needs documented above      Total time: 30 min  Counseling / coordination time: d/w hospice RN and floor RN  > 50% counseling / coordination?: no

## 2017-08-03 NOTE — ANESTHESIA POSTPROCEDURE EVALUATION
Post-Anesthesia Evaluation and Assessment    Patient: Kevin Newsome MRN: [id number]  SSN: [id number]    YOB: 1980  Age: 40 y.o. Sex: female       Cardiovascular Function/Vital Signs  Visit Vitals    /76    Pulse (!) 121    Temp 37 °C (98.6 °F)    Resp 10    SpO2 99%       Patient is status post general anesthesia for * No procedures listed *. Nausea/Vomiting: None    Postoperative hydration reviewed and adequate. Pain:      Managed    Neurological Status: At baseline    Mental Status and Level of Consciousness: Arousable    Pulmonary Status:   O2 Device: CO2 nasal cannula (08/02/17 1210)   Adequate oxygenation and airway patent    Complications related to anesthesia: None    Post-anesthesia assessment completed.  No concerns    Signed By: Gera Mcdowell MD     August 3, 2017

## 2017-08-04 NOTE — PROGRESS NOTES
Oncology Nursing Communication Tool      8:21 PM  8/3/2017     Bedside and Verbal shift change report given to Yudith Toscano RN (incoming nurse) by Mirtha Reilly (outgoing nurse) on Jessica Babin a 40 y.o. female who was admitted on 7/31/2017  5:54 PM. Report included the following information SBAR, Kardex, Procedure Summary, Intake/Output, MAR and Accordion. Significant changes during shift: Patient needs to be placed back on PCA per physician. PICC was not flushing at beginning of shift per night shift. PICC team administer cathflo and gave RN permission to use PICC afterwards. Upon reassessment PICC flushed and gave blood return from both ports. Issues for physician to address: pain            Code Status: DNR     Infections: No current active infections     Allergies: Egg; Pcn [penicillins]; Shellfish containing products; Tetanus and diphtheria toxoids, adsorbed, adult; and Tomato     Current diet: DIET REGULAR       Pain Controlled [] yes [x] no   Bowel Movement [] yes [x] no   Last Bowel Movement (date)                 Vital Signs:   No data found. Intake & Output:     Intake/Output Summary (Last 24 hours) at 08/03/17 2021  Last data filed at 08/03/17 1412   Gross per 24 hour   Intake                0 ml   Output             1000 ml   Net            -1000 ml      Laboratory Results:   No results found for this or any previous visit (from the past 12 hour(s)). Opportunity for questions and clarifications were given to the incoming nurse. Patient's bed is in low position, side rails x2, door open PRN, call bell within reach and patient not in distress. Patient resting comfortably and sleeping during report. Oncoming RN aware of need to setup PCA for continuous pain med infusion.         Mirtha Reilly

## 2017-08-04 NOTE — HOSPICE
[facility]  [phone]    GIP Daily Nursing Note   Patient Name: Tulio Heard  YOB: 1980  Age: 40 y.o. Date of Visit: 08/04/17  Facility of [address] [place] [address],[address]  Patient Room: 7 [facility] Attending: Flora Rosado MD  Hospice Diagnosis: pre op testing  Endometrial ca [facility])  RECOVERY NEEDED IN PACU    Level of Care: GIP    Current GIP Symptoms    1. Uncontrolled pain  Complains of pain with minimal movement in spite of Metadone 10 mg po q 8 hours, Hydromorphone PCA pump at 1 mg/hr, Hydromorphone 2 mg IV x 1 in previous 24 hours,         ASSESSMENT & PLAN   Must update Plan of Care including visit frequencies for IDT members  1. SN daily   5 prn 7    MSW weekly     Weekly        Spiritual Interventions: None    Psych/ Social/ Emotional Interventions: TC to mother Loan Smart. Support given as she reflects how she never even think that this could happen to Karli. [place] Peers stated they brought patient's father up for a little bit this afternoon but that her father wasn't feeling well so they were not able to stay long. Said Jyoti's father is feeling much better now. Reminded Hilaria Arteaga to call us at [phone] if she is concerned that Karli is getting too many meds, not enough meds, for emotional or spiritual support. Understanding and Appreciation for call expressed. Care Coordination Needs: Working on getting patient a bariatric bed. Patient is in uncontrolled pain as she hollers out with repositioning  and stated \"Please don't turn me again. \"      Care plan and New Orders discussed / approved with Dr. Israel Beatty, hospice medical director.     Description History and Chart Review   Narrative History of last 24 hours that demonstrates care cannot be provided in another setting:  Patient with obesity in setting of anasarca making care of patient difficult requiring 4 person turn assist.    PCA via PICC line right upper brachial vein that needed to be flushed with Heparin 8/3 to maintain patency. Aggressive management of symptoms in attempt to achieve level of comfort patient is happy with. Patient continued to complain of RUQ abdominal pain at rest which increases with soft palpation  with skin and eyes icteric with urine very dark tea color. What has been done to control the patient's symptoms in the last 24 hours? Hydromorphone PCA at 0.8 mg/hr basal, Cymbalta 60 mg q hs, Neurontin 300 mg cap q hs, Hydromorphone 8 mg po q 15 minutes prn, Hydromorphone 2 mg IV, Methadone 10 mg q 8 hours scheduled, Sulindac 200 mg bid. Does the patient currently require IV medications? YES  Does the patient currently require scheduled medications? YES  Does the patient currently require a PCA? YES    List number of doses of PRN medications in last 24 hours:  Medication 1: Hydromorphone  8 mg po  Number of doses:  2    Medication 2: Hydromorphone 1 mg IV    Number of doses:1    Supporting documentation for GIP need for pain control:  Obtaining a bariatric bed for patient in hopes of reducing pain level. [x] Frequent evaluation by a doctor, nurse practitioner, nurse   [x] Frequent medication adjustment    [x] IVs that cannot be administered at home   [x] Aggressive pain management   [x] Complicated technical delivery of medications              Supporting documentation for GIP need for symptom control:   [x]  Sudden decline necessitating intensive nursing intervention  [x]  Uncontrolled / intractable nausea or vomiting   [x]  Pathological fractures  [x] Delirium with patient lethargic, starting to answer question, eyes remaining open yet not able to complete sentence. DISCHARGE PLANNING   Daily discharge planning required for GIP  1. Discharge Plan: It is expected that patient will pass while inpatient yet [facility] is in option   2.  Patient/Family teaching: Advised mother that we will be transferring Jyoti into a bariatric bed and into room 200  Educated on the fact that patient was in a lot of pain when moved and that this will help her by giving her more room as well as to reduce the frequency of need to turn her. 3. Response to patient/family teaching: Understanding and appreciation expressed    ASSESSMENT    KARNOFSKY: 20        Quality Measure: Patient self-reports:  [] Yes    [] No  Sometimes    ESAS:   Time of Assessment: 1600    Pain (1-10):*5  Fatigue (1-10):   Shortness of breath (1-10):2  Nausea (1-10): Appetite (1-10):    Anxiety: (1-10):   Depression: (1-10):   Well-being: (1-10):   Constipation: _ Yes  _ No  LAST BM:   CLINICAL INFORMATION           Currently this patient has:  [] Supplemental O2   [] IV    [x] PICC      [] PORT   [] NG Tube    [] PEG Tube   [] Ostomy     [x] Jama draining __very dark tea colored_____ urine  [] Other:     SIGNS/PHYSICAL FINDINGS     Skin (including wound):  [] Warm, dry, supple, intact and color normal for race  [x] Warm   [x] Dry   [] Cool     [] Clammy       [] Diaphoretic    Turgor   [] Normal   [x] Decreased  Color:   [] Pink   [] Pale   [] Cyanotic   [] Erythema   [x] Jaundice   [] Normal for Race  []  Wounds:    Neuro:  [x] Lethargy  [] Restlessness / agitation  [x] Confusion / delirium  [] Hallucinations  [] Responds to maximal stimulation  [] Unresponsive  [] Seizures     Cardiac:  [] Dyspnea on Exertion  [] JVD  [] Murmur  [] Palpitations  [] Hypotension  [] Hypertension  [] Tachycardia  [] Bradycardia  [] Irregular HR  [] Pulses Decreased  [] Pulses Absent  [] Edema:       (Location, Grade and Pitting)  [] Mottling:      (Location)    Respiratory:  Breath sounds:    [x] Diminished   [] Wheeze   [] Rhonchi   [] Rales   [] Even and unlabored  [] Labored:            [] Cough   [] Non Productive   [] Productive    [] Description:           [] Deep suctioned   [x] O2 at ___ LPM  [] High flow oxygen greater than 10 LPM  [] Bi-Pap    GI  [x] Abdomen ascites and tender to the touch    [] Nausea  [] Vomiting  [] Incontinent of bowels  [] Bowel sounds (yes/no)  [] Diarrhea  [] Constipation (see above including last bowel movement)  [] Checked for impaction  [] Last BM     Nutrition  Diet:___as tolerated_______  Appetite:   [] Good   [] Fair   [x] Poor   [] Tube Feeding       [] Voiding  [] Incontinent   [x] Jama    Musculoskeletal  [] Balance/[place] Unsteady   [] Weak   Strength:    [] Normal    [] Limited    [] Decreasing   Activities:    [] Up as tolerated   [x] Bedridden    [] Specify:    SAFETY per hospital bed  [x] Side rails ? [x] Hospital bed         ALLERGIES AND MEDICATIONS     Allergies: Allergies   Allergen Reactions    Egg Nausea and Vomiting     Raw egg and scrambled eggs. Egg products okay.      Pcn [Penicillins] Nausea and Vomiting     \"but could take amoxil\"    Shellfish Containing Products Unknown (comments)    Tetanus And Diphtheria Toxoids, Adsorbed, Adult Other (comments)     Developed local swelling, axillary pain, and transient fever    Tomato Unknown (comments)     Fresh tomatoes       Current Facility-Administered Medications   Medication Dose Route Frequency    LORazepam (ATIVAN) tablet 1 mg  1 mg Oral Q6H PRN    HYDROmorphone (PF) (DILAUDID) injection 2 mg  2 mg IntraVENous Q15MIN PRN    HYDROmorphone (DILAUDID) 1 mg/mL oral solution 8 mg  8 mg Oral Q15MIN PRN    HYDROmorphone (PF) 15 mg/30 ml (DILAUDID) PCA   IntraVENous TITRATE    haloperidol (HALDOL) tablet 2 mg  2 mg Oral Q4H PRN    HYDROmorphone (DILAUDID) 250 mg/250 mL NS BAG +++ HIGH ALERT +++   IntraVENous TITRATE    sodium chloride (NS) flush 10-30 mL  10-30 mL InterCATHeter PRN    sodium chloride (NS) flush 10 mL  10 mL InterCATHeter Q24H    sodium chloride (NS) flush 10 mL  10 mL InterCATHeter PRN    sodium chloride (NS) flush 10-40 mL  10-40 mL InterCATHeter Q8H    sodium chloride (NS) flush 20 mL  20 mL InterCATHeter PRN    heparin (porcine) pf 300 Units  300 Units InterCATHeter PRN    acetaminophen (TYLENOL) tablet 650 mg  650 mg Oral Q4H PRN    bisacodyl (DULCOLAX) tablet 5 mg  5 mg Oral DAILY PRN    diphenhydrAMINE (BENADRYL) capsule 25 mg  25 mg Oral Q4H PRN    DULoxetine (CYMBALTA) capsule 60 mg  60 mg Oral QHS    gabapentin (NEURONTIN) capsule 300 mg  300 mg Oral QHS    methadone (DOLOPHINE) tablet 10 mg  10 mg Oral Q8H    polyethylene glycol (MIRALAX) packet 17 g  17 g Oral DAILY PRN    senna-docusate (PERICOLACE) 8.6-50 mg per tablet 2 Tab  2 Tab Oral BID    sulindac (CLINORIL) tablet 200 mg  200 mg Oral BID WITH MEALS    magic mouthwash cpd (with sucralfate)  5 mL Oral QID PRN    sodium chloride (NS) flush 10-30 mL  10-30 mL InterCATHeter PRN    sodium chloride (NS) flush 10 mL  10 mL InterCATHeter Q24H    sodium chloride (NS) flush 10 mL  10 mL InterCATHeter PRN    sodium chloride (NS) flush 10-40 mL  10-40 mL InterCATHeter Q8H    sodium chloride (NS) flush 20 mL  20 mL InterCATHeter Q24H          Visit Time In: 1600  Visit Time Out: 1700

## 2017-08-04 NOTE — PROGRESS NOTES
1950 - Bedside and Verbal shift change report given to augie louie (oncoming nurse) by Rand Farias (offgoing nurse). Report included the following information SBAR, Kardex, Intake/Output, MAR and Recent Results. 0700 - Bedside and Verbal shift change report given to jaziel (oncoming nurse) by Miquel Barnes (offgoing nurse). Report included the following information SBAR, Kardex, Intake/Output, MAR and Recent Results.

## 2017-08-04 NOTE — HOSPICE
[facility]  [phone]    Patient Name: Roxana Dawn  YOB: 1980    Date of Provider Hospice Visit: 08/04/17    Level of Care:   [x] General Inpatient (GIP)    [] Routine   [] Respite    Location of Care:  [] [facility] [] [facility] [x] [facility] [] [facility] [] [facility]    Date of Original Hospice Admission: 7-31-17  Hospice Medical Director for Inpatient Care: Dr. Alex Mauricio Diagnosis: Metastatic endometrial cancer, s/p XRT  Diagnoses RELATED to the terminal prognosis: pathologic right subtrochanteric femur fracture  Other Diagnoses: dm-2, obesity, hx tachycardia     HOSPICE SUMMARY   Do not cut and paste chart information other than imaging findings    Roxana Dawn is a 40y.o. year old who was admitted to [address] [place] [address]. The patient's principle diagnosis has resulted in hospitalization 5/8-5/16/17 for right hip pain and respiratory failure. CT-pathologic right subtrochenteric femur fracture and enlarging [id number] cm right pelvic mass with invasion to pelvis, sacrum and hip. Pain management with PCA and methadone. Pt was sent to hospice house but remains bedbound and still has pain in right groin and hip , worse with morvement. Palliative/hospice MD has discussed and reviewed case with IR and plan is for inpatient admit and palliative microwave ablation of tumor. Refer to LCD   The LCD for Hospice for the admitted diagnosis of cancer includes:    [x] Disease with distant metastases at presentation OR    [x] Progression from an earlier stage of disease to metastatic disease with either:   [] a continued decline in spite of therapy   [] patient declines further disease directed therapy    [] Certain cancers with poor prognoses (e.g. small cell lung cancer, brain cancer and pancreatic cancer) may be hospice eligible without fulfilling the other criteria in this section.       Functionally, the patient's Karnofsky and/or Palliative Performance Scale has declined over a period of months and is estimated at 30. The patient is dependent on the following ADLs:all except feeding    Objective information that support this patients limited prognosis includes:   CT scan 5/9/17  IMPRESSION  IMPRESSION:     1. Interval significant enlargement of the mass centered in the right pelvis  with destruction of the bones of the right pelvis, sacrum, and proximal right  femur, with extension to involve the left acetabulum, inferior pubic ramus and  superior pubic ramus. 2. Associated pathologic fracture of the right proximal femur. 3. Evaluation of the mass subjacent to the right hemidiaphragm is limited as it  was not as well imaged on prior examinations. 4. Interval enlargement of left pelvic lymph node.     The patient/family chose comfort measures with the support of Hospice. HOSPICE DIAGNOSES   Active Symptoms:  1. Right groin and hip pain  2. Delirium  3. Anemia  4. S/p CT guided microwave ablation of tumor yesterday       PLAN   1. Admitted to [address] [place] [address] routine level of care for planned palliative CT microwave ablation  2. Pain level moderate per patient but states 8/10--continue methadone and dilaudid PCA. Complex pain--liver  mass, right hip pathologic fx and pelvic mass s/p ablation/debulking this week  3. Continue dilaudid PCA 1mg/hr-instructed floor RN -pt needs iv dilaudid bolus prior to turning . 4. continue oral methadone as long as alert enough to swallow. If unable then will need to titrate dilaudid PCA. 5. RN to look into pressure relief bed to minimize need for turning  6. Haldol and ativan prn   7. Continue routine wound care and wound care s/p procedure. 8.  and SW to support family needs  9.  Disposition: return to home with hospice but pain symptoms not controlled and would be very burdensome and painful to move patient with hip fx again    Prognosis estimated based on 08/04/17 clinical assessment is:   [] Hours to Days    [] Days to Weeks    [x] Other:    Communicated plan of care with: Hospice Case Manager;  Hospice IDT; Care Team     GOALS OF CARE     Resuscitation Status: DNR  Durable DNR: [] Yes [] No    Advance Care Planning 5/10/2017   Patient's Healthcare Decision Maker is: Legal Next of Andrea 69   Primary Decision Maker Name Miroslava Hanley   Primary Decision Maker Phone Number -   Primary Decision Maker Relationship to Patient Parent   Confirm Advance Directive Yes, on file   Patient Would Like to Complete Advance Directive -        HISTORY     History obtained from: chart, hospice RN and patient and her mother    CHIEF COMPLAINT:  The patient is:   [x] Verbal  [] Nonverbal  [] Unresponsive    HPI/SUBJECTIVE:    Slightly more lethargic today  Dilaudid PCA was restarted today at lower dose 1 mg /hr ( previously 4 mg/hr)  Minimal oral intake but took  Methadone today  Family visited earlier  Pt has increased pain when turned ( right hip fx)  C/o pain in RUQ area ( liver mass)     REVIEW OF SYSTEMS     The following systems were: [x] reviewed  [] unable to be reviewed    Positive ROS include:  Constitutional:  Ears/nose/mouth/throat:  Respiratory:  Gastrointestinal:  Musculoskeletal: right hip and groin pain  Neurologic:  Psychiatric:  Endocrine:     Adult Non-Verbal Pain Assessment Score: 2    Face  [] 0   No particular expression or smile  [x] 1   Occasional grimace, tearing, frowning, wrinkled forehead  [] 2   Frequent grimace, tearing, frowning, wrinkled forehead    Activity (movement)  [x] 0   Lying quietly, normal position  [] 1   Seeking attention through movement or slow, cautious movement  [] 2   Restless, excessive activity and/or withdrawal reflexes    Guarding  [] 0   Lying quietly, no positioning of hands over areas of body  [x] 1   Splinting areas of the body, tense  [] 2   Rigid, stiff    Physiology (vital signs)  [x] 0   Stable vital signs  [] 1   Change in any of the following: SBP > 20mm Hg; HR > 20/minute  [] 2   Change in any of the following: SBP > 30mm Hg; HR > 25/minute    Respiratory  [x] 0   Baseline RR/SpO2, compliant with ventilator  [] 1   RR > 10 above baseline, or 5% drop SpO2, mild asynchrony with ventilator  [] 2   RR > 20 above baseline, or 10% drop SpO2, asynchrony with ventilator     FUNCTIONAL ASSESSMENT     Palliative Performance Scale (PPS):30     PSYCHOSOCIAL/SPIRITUAL ASSESSMENT     Active Problems:    Endometrial ca ([address] (7/31/2017)      Past Medical History:   Diagnosis Date    Cancer ([address]     uterine    CVI (common variable immunodeficiency) ([address]     Diabetes ([address]     Elevated liver function tests 10/21/2010    Endometrial cancer ([address] 7/7/2010    Hypertension     Iron deficiency anemia     Menometrorrhagia 10/21/2010    Microcytic anemia 10/21/2010    Morbid obesity ([address]     Prediabetes 7/7/2010    Psychiatric disorder     depression    Radiation     completed radiation mid-march      Past Surgical History:   Procedure Laterality Date    CARDIAC SURG PROCEDURE UNLIST      hx of tachycardia    HX GYN      hysterectomy      Social History   Substance Use Topics    Smoking status: Never Smoker    Smokeless tobacco: Never Used    Alcohol use Yes     Family History   Problem Relation Age of Onset    Hypertension Mother     Hypertension Father     Stroke Maternal Grandfather     Cancer Paternal Grandmother      breast    Diabetes Paternal Grandmother       Allergies   Allergen Reactions    Egg Nausea and Vomiting     Raw egg and scrambled eggs. Egg products okay.      Pcn [Penicillins] Nausea and Vomiting     \"but could take amoxil\"    Shellfish Containing Products Unknown (comments)    Tetanus And Diphtheria Toxoids, Adsorbed, Adult Other (comments)     Developed local swelling, axillary pain, and transient fever    Tomato Unknown (comments)     Fresh tomatoes      Current Facility-Administered Medications   Medication Dose Route Frequency    LORazepam (ATIVAN) tablet 1 mg  1 mg Oral Q6H PRN    HYDROmorphone (PF) (DILAUDID) injection 2 mg  2 mg IntraVENous Q15MIN PRN    HYDROmorphone (DILAUDID) 1 mg/mL oral solution 8 mg  8 mg Oral Q15MIN PRN    HYDROmorphone (PF) 15 mg/30 ml (DILAUDID) PCA   IntraVENous TITRATE    haloperidol (HALDOL) tablet 2 mg  2 mg Oral Q4H PRN    HYDROmorphone (DILAUDID) 250 mg/250 mL NS BAG +++ HIGH ALERT +++   IntraVENous TITRATE    sodium chloride (NS) flush 10-30 mL  10-30 mL InterCATHeter PRN    sodium chloride (NS) flush 10 mL  10 mL InterCATHeter Q24H    sodium chloride (NS) flush 10 mL  10 mL InterCATHeter PRN    sodium chloride (NS) flush 10-40 mL  10-40 mL InterCATHeter Q8H    sodium chloride (NS) flush 20 mL  20 mL InterCATHeter PRN    heparin (porcine) pf 300 Units  300 Units InterCATHeter PRN    acetaminophen (TYLENOL) tablet 650 mg  650 mg Oral Q4H PRN    bisacodyl (DULCOLAX) tablet 5 mg  5 mg Oral DAILY PRN    diphenhydrAMINE (BENADRYL) capsule 25 mg  25 mg Oral Q4H PRN    DULoxetine (CYMBALTA) capsule 60 mg  60 mg Oral QHS    gabapentin (NEURONTIN) capsule 300 mg  300 mg Oral QHS    methadone (DOLOPHINE) tablet 10 mg  10 mg Oral Q8H    polyethylene glycol (MIRALAX) packet 17 g  17 g Oral DAILY PRN    senna-docusate (PERICOLACE) 8.6-50 mg per tablet 2 Tab  2 Tab Oral BID    sulindac (CLINORIL) tablet 200 mg  200 mg Oral BID WITH MEALS    magic mouthwash cpd (with sucralfate)  5 mL Oral QID PRN    sodium chloride (NS) flush 10-30 mL  10-30 mL InterCATHeter PRN    sodium chloride (NS) flush 10 mL  10 mL InterCATHeter Q24H    sodium chloride (NS) flush 10 mL  10 mL InterCATHeter PRN    sodium chloride (NS) flush 10-40 mL  10-40 mL InterCATHeter Q8H    sodium chloride (NS) flush 20 mL  20 mL InterCATHeter Q24H        PHYSICAL EXAM     Wt Readings from Last 3 Encounters:   05/30/17 267 lb (121.1 kg)   05/10/17 267 lb 10.2 oz (121.4 kg)   02/16/17 300 lb (136.1 kg)       Visit Vitals    BP (!) 110/39 (BP 1 Location: Left arm, BP Patient Position: At rest)    Pulse (!) 129    Temp 98.3 °F (36.8 °C)    Resp 18    SpO2 100%       Supplemental O2  [x] Yes  [] NO  Last bowel movement:     Currently this patient has:  [] Peripheral IV [x] PICC  [] PORT [] ICD    [] Jama Catheter [] NG Tube   [] PEG Tube    [] Rectal Tube [] Drain  [] Other:     Constitutional: obese, lethargic but opens eyes and able to speak today, nad  Eyes: perrl, scleral icterus  ENMT: clear  Cardiovascular: rrr  Respiratory: even, nonlabored  Gastrointestinal: soft obese  Musculoskeletal:2-3 plus b/l LE edema  Skin: warm, dry  Neurologic:   Psychiatric:   Other:    Pertinent Lab and or Imaging Tests:  Lab Results   Component Value Date/Time    Sodium 139 08/01/2017 06:36 AM    Potassium 4.5 08/01/2017 06:36 AM    Chloride 106 08/01/2017 06:36 AM    CO2 26 08/01/2017 06:36 AM    Anion gap 7 08/01/2017 06:36 AM    Glucose 82 08/01/2017 06:36 AM    BUN 23 08/01/2017 06:36 AM    Creatinine 0.83 08/01/2017 06:36 AM    BUN/Creatinine ratio 28 08/01/2017 06:36 AM    GFR est AA >60 08/01/2017 06:36 AM    GFR est non-AA >60 08/01/2017 06:36 AM    Calcium 8.6 08/01/2017 06:36 AM     Lab Results   Component Value Date/Time    Protein, total 5.7 08/01/2017 06:36 AM    Albumin 2.0 08/01/2017 06:36 AM           This patient meets [facility] General Inpatient (GIP) Level of Care. The precipitating event that resulted in the need for GIP was: lethargy, anemia requiring transfusion, surgery today for tumor debulking with anticipated change in pain level and low blood pressuew all requiring frequent pre and post op assessmentst and med changes    The interventions tried that have been unsuccessful at controlling symptoms include: ativan was held yesterday but still very lethargic.   Metoprolol was held this morning    Supporting documentation for GIP need for pain control:  [x] Frequent evaluation by a doctor, nurse practitioner, nurse   [x] Frequent medication adjustment    [] IVs that cannot be administered at home   [] Aggressive pain management   [] Complicated technical delivery of medications              Supporting documentation for GIP need for symptom control:  []  Sudden decline necessitating intensive nursing intervention  []  Uncontrolled / intractable nausea or vomiting   []  Pathological fractures  []  Advanced open wounds requiring frequent skilled care  [] Unmanageable respiratory distress  [] New or worsening delirium   [] Delirium with behavior issues  [] Imminent death  with skilled nursing needs documented above      Total time: 20 min  Counseling / coordination time: d/w hospice RN and floor RN  > 50% counseling / coordination?: no

## 2017-08-05 NOTE — PROGRESS NOTES
Oncology Nursing Communication Tool      6:57 PM  8/5/2017     Bedside shift change report given to Ximena Ma RN (incoming nurse) by Jonny Iqbal RN (outgoing nurse) on Mikayla Christy a 40 y.o. female who was admitted on 7/31/2017  5:54 PM. Report included the following information SBAR. Significant changes during shift:       Issues for physician to address:             Code Status: DNR     Infections: No current active infections     Allergies: Egg; Pcn [penicillins]; Shellfish containing products; Tetanus and diphtheria toxoids, adsorbed, adult; and Tomato     Current diet: DIET REGULAR       Pain Controlled [x] yes [] no   Bowel Movement [] yes [x] no   Last Bowel Movement (date)             Vital Signs:   Patient Vitals for the past 12 hrs:   Temp Pulse Resp BP SpO2   08/05/17 0726 98.5 °F (36.9 °C) (!) 130 14 121/76 100 %      Intake & Output:     Intake/Output Summary (Last 24 hours) at 08/05/17 1857  Last data filed at 08/05/17 1750   Gross per 24 hour   Intake              250 ml   Output              950 ml   Net             -700 ml      Laboratory Results:   No results found for this or any previous visit (from the past 12 hour(s)). Opportunity for questions and clarifications were given to the incoming nurse. Patient's bed is in low position, side rails x2, door open PRN, call bell within reach and patient not in distress.       Jonny Iqbal RN

## 2017-08-05 NOTE — HOSPICE
[facility] in Need  [phone]    [facility] Daily Nursing Note   Patient Name: Ashwini De Los Santos  YOB: 1980  Age: 40 y.o. Date of Visit: 08/05/17  Facility of [address] [facility],[address]  Patient Room: 707 [facility] Attending: Jozef Douglass MD  Hospice Diagnosis: pre op testing  Endometrial ca [facility])  RECOVERY NEEDED IN PACU    Level of Care: GIP    Current GIP Symptoms    1. Pain at rest RUQ abdomen and with movement low abdomen/hips/legs   2. Deliuium/anxiety   Stated she was scared as her sisters were outside on the other side of the window and they were mad at her, asked for ice and then unable to follow command such as \"open your mouth for the ice\"   Eyes were wide open and looking at me yet not following requests and speech mostly garbled. 3  Patient is obese with anasarca as well as with a pathological fracture right hip and requiring many caregivers to give good care such as cleansing and turning. Patient is now in a Bariatric bed that was delivered last tamir yet it is broken and another is to be delivered today. ASSESSMENT & PLAN   Must update Plan of Care including visit frequencies for IDT members  1. SNV daily     MSW weekly    MSW   2. Delirium with Lorazepam 1 mg tab at 2200 and 0500 in previous 13 hours  3. Continued uncontrolled pain in spite of Hydromorphone PCA via [facility] PICC line at 1 mg/hr without bolus. Required 2 mg IV at 2200 and 0500 in past 13 hours   Dr. Jimmie Coyle increased Hydromorphone PCA to 2 mg/hr due to uncontrolled pain and patient requiring prn dosing of Hydromorphone 2 mg IV  4. Sclera icteric       Spiritual Interventions:     Psych/ Social/ Emotional Interventions: Visited with sister in patient's room as well as  friend of the family. Support given. Sister said their mother had been there a few minutes before my visit and that another sister was going to be coming up to visit.     Care Coordination Needs: Needs bed switched out and will move to [address], the hospice suite, during transfer to other bariatric bed  Reviewed POC with unit [facility]ia. Will report off to [facility] interdisciplinary team    Care plan and New Orders discussed / approved with  MD Shruti Mitchell     Description History and Chart Review   If this is initial GIP note must document RN assessment/MD communication in previous setting. Specifically document nursing/medication needs in last 24 hours to support GIP care  Narrative History of last 24 hours that demonstrates care cannot be provided in another setting:  Patient is obese with anasarca, with a pathological fracture and is in pain at rest which is exacerbated by movement. Care requires several members of a team to turn and cleanse patient. Requiring Bariatric bed in hopes of better comfort  Had been having delayed response yet today said she scared with eyes wide open    What has been done to control the patient's symptoms in the last 24 hours? Cymbalta 60 mg at hs, Gabapentin 300 mg capsule q HS, Hydromorphone PCA at 1 mg/hr VIA [facility] PICC line, Hydromorphone and Lorazepam PRN, Metadone 10 mg tab q 8 hours, Senna S 2 tabs bid. Does the patient currently require IV medications? YES  Does the patient currently require scheduled medications? YES  Does the patient currently require a PCA?  YES    List number of doses of PRN medications in last 24 hours:  Medication 1: Hydromorphone 2 mg IV   Number of doses: 3    Medication 2: Lorazepam 1 mg tab  Number of doses: 3    Supporting documentation for GIP need for pain control:  [x] Frequent evaluation by a doctor, nurse practitioner, nurse   [x] Frequent medication adjustment    [x] IVs that cannot be administered at home   [x] Aggressive pain management   [x] Complicated technical delivery of medications              Supporting documentation for GIP need for symptom control:  [x]  Sudden decline necessitating intensive nursing intervention  [x] Pathological fractures  [x] New or worsening delirium     DISCHARGE PLANNING   Daily discharge planning required for GIP  1. Discharge Plan: It is expected that patient will pass while inpatient yet [facility] or home are options  2. Patient/Family teaching:  Provided education on why patient was moved to bariatric bed explaining this is a larger bed as well as has air movement which will help Jyoti's skin stay the best that it can now. Also educated in why Dr. Aleisha Sanchez felt it best to increase Hydromorphone to 2 mg/hr via PCA    3. Response to patient/family teaching: Understanding and appreciation expressed    ASSESSMENT    KARNOFSKY: 20    Quality Measure: Patient self-reports:     [x] No  Not consistently    ESAS:   Time of Assessment: 1000  Pain (1-10):  3  Fatigue (1-10):   Shortness of breath (1-10):1  Nausea (1-10): Appetite (1-10):    Anxiety: (1-10):   Depression: (1-10):   Well-being: (1-10):   Constipation: _ Yes  _ No  LAST BM:     CLINICAL INFORMATION   Patient Vitals for the past 12 hrs:   Temp Pulse Resp BP SpO2   08/05/17 0726 98.5 °F (36.9 °C) (!) 130 14 121/76 100 %   08/04/17 2359 98.8 °F (37.1 °C) (!) 135 16 116/69 100 %       Currently this patient has:  [x] Supplemental O2   [] IV    [x] PICC      [] PORT   [] NG Tube    [] PEG Tube   [] Ostomy     [x] Jama draining _dark tea colored______ urine  [] Other:     SIGNS/PHYSICAL FINDINGS     Skin (including wound):  [] Warm, dry, supple, intact and color normal for race  [x] Warm   [x] Dry   [] Cool     [] Clammy       [] Diaphoretic    Turgor   [] Normal   [x] Decreased  Color:   [] Pink   [] Pale   [] Cyanotic   [] Erythema   [x] Jaundice   [] Normal for Race  []  Wounds:    Neuro:  [] Lethargy  [] Restlessness / agitation  [x] Confusion / delirium  [x] Hallucinations  [] Responds to maximal stimulation  [] Unresponsive  [] Seizures     Cardiac:  [x] Pulses Absent due to edema  [x] Edema:     Ascites   Generalized edema / anasarca  (Location, Grade and Pitting)      Respiratory:  Breath sounds:    [x] Diminished   [] Even and unlabored  [] Labored:            [] Cough   [] Non Productive   [] Productive    [] Description:           [] Deep suctioned   [x] O2 at ___ LPM  [] High flow oxygen greater than 10 LPM  [] Bi-Pap    GI   [x] Abdomen  Rounded with tenderness RUQ and low abdomen   [x] Ascites      Nutrition  Diet:___as tolerated_______  Appetite:   [] Good   [] Fair   [x] Poor   [] Tube Feeding       [x] Jama    Musculoskeletal    [x] Decreasing   Activities:     [x] Bedridden       SAFETY per hospital policies  [x] Side rails ? [x] Hospital bed         ALLERGIES AND MEDICATIONS     Allergies: Allergies   Allergen Reactions    Egg Nausea and Vomiting     Raw egg and scrambled eggs. Egg products okay.      Pcn [Penicillins] Nausea and Vomiting     \"but could take amoxil\"    Shellfish Containing Products Unknown (comments)    Tetanus And Diphtheria Toxoids, Adsorbed, Adult Other (comments)     Developed local swelling, axillary pain, and transient fever    Tomato Unknown (comments)     Fresh tomatoes       Current Facility-Administered Medications   Medication Dose Route Frequency    HYDROmorphone (PF) (DILAUDID) injection 1 mg  1 mg IntraVENous Q15MIN PRN    LORazepam (ATIVAN) tablet 1 mg  1 mg Oral Q6H PRN    HYDROmorphone (PF) (DILAUDID) injection 2 mg  2 mg IntraVENous Q15MIN PRN    HYDROmorphone (DILAUDID) 1 mg/mL oral solution 8 mg  8 mg Oral Q15MIN PRN    HYDROmorphone (PF) 15 mg/30 ml (DILAUDID) PCA   IntraVENous TITRATE    haloperidol (HALDOL) tablet 2 mg  2 mg Oral Q4H PRN    sodium chloride (NS) flush 10-30 mL  10-30 mL InterCATHeter PRN    sodium chloride (NS) flush 10 mL  10 mL InterCATHeter Q24H    sodium chloride (NS) flush 10 mL  10 mL InterCATHeter PRN    sodium chloride (NS) flush 10-40 mL  10-40 mL InterCATHeter Q8H    sodium chloride (NS) flush 20 mL  20 mL InterCATHeter PRN    heparin (porcine) pf 300 Units  300 Units InterCATHeter PRN    acetaminophen (TYLENOL) tablet 650 mg  650 mg Oral Q4H PRN    bisacodyl (DULCOLAX) tablet 5 mg  5 mg Oral DAILY PRN    diphenhydrAMINE (BENADRYL) capsule 25 mg  25 mg Oral Q4H PRN    DULoxetine (CYMBALTA) capsule 60 mg  60 mg Oral QHS    gabapentin (NEURONTIN) capsule 300 mg  300 mg Oral QHS    methadone (DOLOPHINE) tablet 10 mg  10 mg Oral Q8H    polyethylene glycol (MIRALAX) packet 17 g  17 g Oral DAILY PRN    senna-docusate (PERICOLACE) 8.6-50 mg per tablet 2 Tab  2 Tab Oral BID    sulindac (CLINORIL) tablet 200 mg  200 mg Oral BID WITH MEALS    magic mouthwash cpd (with sucralfate)  5 mL Oral QID PRN    sodium chloride (NS) flush 10-30 mL  10-30 mL InterCATHeter PRN    sodium chloride (NS) flush 10 mL  10 mL InterCATHeter Q24H    sodium chloride (NS) flush 10 mL  10 mL InterCATHeter PRN    sodium chloride (NS) flush 10-40 mL  10-40 mL InterCATHeter Q8H    sodium chloride (NS) flush 20 mL  20 mL InterCATHeter Q24H          Visit Time In:0930  Visit Time Out: 1020

## 2017-08-05 NOTE — HOSPICE
[address] [place] [address] Help to Those in Need  [phone]    Patient Name: Torin Joe  YOB: 1980    Date of Provider Hospice Visit: 08/05/17    Level of Care:   [x] General Inpatient (GIP)    [] Routine   [] Respite    Location of Care:  [] [facility] [] [facility] [x] [facility] [] [facility] [] [facility]    Date of Original Hospice Admission: 7-31-17  Hospice Medical Director for Inpatient Care: Dr. Maxwell Gay Diagnosis: Metastatic endometrial cancer, s/p XRT  Diagnoses RELATED to the terminal prognosis: pathologic right subtrochanteric femur fracture  Other Diagnoses: dm-2, obesity, hx tachycardia     HOSPICE SUMMARY   Do not cut and paste chart information other than imaging findings    Torin Joe is a 40y.o. year old who was admitted to [facility]. The patient's principle diagnosis has resulted in hospitalization 5/8-5/16/17 for right hip pain and respiratory failure. CT-pathologic right subtrochenteric femur fracture and enlarging [id number] cm right pelvic mass with invasion to pelvis, sacrum and hip. Pain management with PCA and methadone. Pt was sent to hospice house but remains bedbound and still has pain in right groin and hip , worse with morvement. Palliative/hospice MD has discussed and reviewed case with IR and plan is for inpatient admit and palliative microwave ablation of tumor. Refer to LCD   The LCD for Hospice for the admitted diagnosis of cancer includes:    [x] Disease with distant metastases at presentation OR    [x] Progression from an earlier stage of disease to metastatic disease with either:   [] a continued decline in spite of therapy   [] patient declines further disease directed therapy    [] Certain cancers with poor prognoses (e.g. small cell lung cancer, brain cancer and pancreatic cancer) may be hospice eligible without fulfilling the other criteria in this section.       Functionally, the patient's Karnofsky and/or Palliative Performance Scale has declined over a period of months and is estimated at 30. The patient is dependent on the following ADLs:all except feeding    Objective information that support this patients limited prognosis includes:   CT scan 5/9/17  IMPRESSION  IMPRESSION:     1. Interval significant enlargement of the mass centered in the right pelvis  with destruction of the bones of the right pelvis, sacrum, and proximal right  femur, with extension to involve the left acetabulum, inferior pubic ramus and  superior pubic ramus. 2. Associated pathologic fracture of the right proximal femur. 3. Evaluation of the mass subjacent to the right hemidiaphragm is limited as it  was not as well imaged on prior examinations. 4. Interval enlargement of left pelvic lymph node.     The patient/family chose comfort measures with the support of Hospice. HOSPICE DIAGNOSES   Active Symptoms:  1. Right groin and hip pain; still persistent  2. Delirium; clearing, improving  3. Anemia  4. Jaundice  5. Fatigue/weakness: still persists       PLAN   1. Continue [facility] level care at [facility] s/p CT guided microwave ablation of tumor 3 days ago  2. Pain level per patient 6-7/10--says she is hurting a lot; continue [id number] every 8 hours  3. Adjust dilaudid PCA: increase to 2mg/hour basal, no demand dose; pt still quite lethargic and unable to use PCA  4. Continue nurse given IV dilaudid bolus prior to turning/ADL care  5. D/c gabapentin; pt too sleepy and does not need it; not adding any benefit towards pain control  6. Pt received new bariatric bed today  7. Haldol and ativan prn   8. Continue routine wound care and wound care s/p procedure. 5.  and SW to support family needs  10.  Disposition: planned for LTC placement per family request.       Prognosis estimated based on 08/05/17 clinical assessment is:   [] Hours to Days    [] Days to Weeks    [x] Other:few weeks    Communicated plan of care with: Hospice Case Manager; Hospice IDT; Care Team     GOALS OF CARE     Resuscitation Status: DNR  Durable DNR: [] Yes [] No    Advance Care Planning 5/10/2017   Patient's Healthcare Decision Maker is: Legal Next of Andrea 69   Primary Decision Maker Name Carla Mixon   Primary Decision Maker Phone Number -   Primary Decision Maker Relationship to Patient Parent   Confirm Advance Directive Yes, on file   Patient Would Like to Complete Advance Directive -        HISTORY     History obtained from: chart, hospice RN, floor RN,  and patient    CHIEF COMPLAINT:  The patient is:   [x] Verbal  [] Nonverbal  [] Unresponsive    HPI/SUBJECTIVE:  Pt still quite lethargic but opened eyes when called by name and able to respond to simple questions; says she is in a lot of pain. Rates 6-7/10 all over, does not remember details about surgery. Poor appetite.         REVIEW OF SYSTEMS     The following systems were: [x] reviewed  [] unable to be reviewed    Positive ROS include:  Constitutional: fatigue/weakness  Ears/nose/mouth/throat:  Respiratory:  Gastrointestinal:anorexia  Musculoskeletal: right hip and groin pain  Neurologic:  Psychiatric:anxiety  Endocrine:        FUNCTIONAL ASSESSMENT     Palliative Performance Scale (PPS):30%     PSYCHOSOCIAL/SPIRITUAL ASSESSMENT     Active Problems:    Endometrial ca ([address] (7/31/2017)      Past Medical History:   Diagnosis Date    Cancer ([address]     uterine    CVI (common variable immunodeficiency) (HCC)     Diabetes ([address]     Elevated liver function tests 10/21/2010    Endometrial cancer ([address] 7/7/2010    Hypertension     Iron deficiency anemia     Menometrorrhagia 10/21/2010    Microcytic anemia 10/21/2010    Morbid obesity ([address]     Prediabetes 7/7/2010    Psychiatric disorder     depression    Radiation     completed radiation mid-march      Past Surgical History:   Procedure Laterality Date    CARDIAC SURG PROCEDURE UNLIST      hx of tachycardia    HX GYN      hysterectomy      Social History   Substance Use Topics    Smoking status: Never Smoker    Smokeless tobacco: Never Used    Alcohol use Yes     Family History   Problem Relation Age of Onset    Hypertension Mother     Hypertension Father     Stroke Maternal Grandfather     Cancer Paternal Grandmother      breast    Diabetes Paternal Grandmother       Allergies   Allergen Reactions    Egg Nausea and Vomiting     Raw egg and scrambled eggs. Egg products okay.      Pcn [Penicillins] Nausea and Vomiting     \"but could take amoxil\"    Shellfish Containing Products Unknown (comments)    Tetanus And Diphtheria Toxoids, Adsorbed, Adult Other (comments)     Developed local swelling, axillary pain, and transient fever    Tomato Unknown (comments)     Fresh tomatoes      Current Facility-Administered Medications   Medication Dose Route Frequency    LORazepam (ATIVAN) tablet 1 mg  1 mg Oral Q6H PRN    HYDROmorphone (PF) (DILAUDID) injection 2 mg  2 mg IntraVENous Q15MIN PRN    HYDROmorphone (DILAUDID) 1 mg/mL oral solution 8 mg  8 mg Oral Q15MIN PRN    HYDROmorphone (PF) 15 mg/30 ml (DILAUDID) PCA   IntraVENous TITRATE    haloperidol (HALDOL) tablet 2 mg  2 mg Oral Q4H PRN    sodium chloride (NS) flush 10-30 mL  10-30 mL InterCATHeter PRN    sodium chloride (NS) flush 10 mL  10 mL InterCATHeter Q24H    sodium chloride (NS) flush 10 mL  10 mL InterCATHeter PRN    sodium chloride (NS) flush 10-40 mL  10-40 mL InterCATHeter Q8H    sodium chloride (NS) flush 20 mL  20 mL InterCATHeter PRN    heparin (porcine) pf 300 Units  300 Units InterCATHeter PRN    acetaminophen (TYLENOL) tablet 650 mg  650 mg Oral Q4H PRN    bisacodyl (DULCOLAX) tablet 5 mg  5 mg Oral DAILY PRN    diphenhydrAMINE (BENADRYL) capsule 25 mg  25 mg Oral Q4H PRN    DULoxetine (CYMBALTA) capsule 60 mg  60 mg Oral QHS    methadone (DOLOPHINE) tablet 10 mg  10 mg Oral Q8H    polyethylene glycol (MIRALAX) packet 17 g  17 g Oral DAILY PRN    senna-docusate (PERICOLACE) 8.6-50 mg per tablet 2 Tab  2 Tab Oral BID    sulindac (CLINORIL) tablet 200 mg  200 mg Oral BID WITH MEALS    magic mouthwash cpd (with sucralfate)  5 mL Oral QID PRN    sodium chloride (NS) flush 10-30 mL  10-30 mL InterCATHeter PRN    sodium chloride (NS) flush 10 mL  10 mL InterCATHeter Q24H    sodium chloride (NS) flush 10 mL  10 mL InterCATHeter PRN    sodium chloride (NS) flush 10-40 mL  10-40 mL InterCATHeter Q8H    sodium chloride (NS) flush 20 mL  20 mL InterCATHeter Q24H        PHYSICAL EXAM     Wt Readings from Last 3 Encounters:   05/30/17 121.1 kg (267 lb)   05/10/17 121.4 kg (267 lb 10.2 oz)   02/16/17 136.1 kg (300 lb)       Visit Vitals    /76 (BP 1 Location: Left arm, BP Patient Position: At rest)    Pulse (!) 130    Temp 98.5 °F (36.9 °C)    Resp 14    SpO2 100%       Supplemental O2  [x] Yes  [] NO  Last bowel movement:     Currently this patient has:  [] Peripheral IV [x] PICC  [] PORT [] ICD    [] Jama Catheter [] NG Tube   [] PEG Tube    [] Rectal Tube [] Drain  [] Other:     Constitutional: obese, lethargic but opens eyes and able to speak, recognizes, says she is in a lot of pain; rates 6-7/10  Eyes: perrl, scleral icterus  ENMT: clear  Cardiovascular: rrr  Respiratory: even, nonlabored  Gastrointestinal: soft obese  Musculoskeletal:2-3 plus b/l LE edema  Skin: warm, dry  Neurologic:   Psychiatric:   Other:    Pertinent Lab and or Imaging Tests:  Lab Results   Component Value Date/Time    Sodium 139 08/01/2017 06:36 AM    Potassium 4.5 08/01/2017 06:36 AM    Chloride 106 08/01/2017 06:36 AM    CO2 26 08/01/2017 06:36 AM    Anion gap 7 08/01/2017 06:36 AM    Glucose 82 08/01/2017 06:36 AM    BUN 23 08/01/2017 06:36 AM    Creatinine 0.83 08/01/2017 06:36 AM    BUN/Creatinine ratio 28 08/01/2017 06:36 AM    GFR est AA >60 08/01/2017 06:36 AM    GFR est non-AA >60 08/01/2017 06:36 AM    Calcium 8.6 08/01/2017 06:36 AM     Lab Results   Component Value Date/Time Protein, total 5.7 08/01/2017 06:36 AM    Albumin 2.0 08/01/2017 06:36 AM           This patient meets Hospice General Inpatient (GIP) Level of Care.     Supporting documentation for GIP need for pain control:  [x] Frequent evaluation by a doctor, nurse practitioner, nurse   [x] Frequent medication adjustment    [] IVs that cannot be administered at home   [x] Aggressive pain management   [] Complicated technical delivery of medications              Supporting documentation for GIP need for symptom control:  []  Sudden decline necessitating intensive nursing intervention  []  Uncontrolled / intractable nausea or vomiting   []  Pathological fractures  []  Advanced open wounds requiring frequent skilled care  [] Unmanageable respiratory distress  [] New or worsening delirium   [] Delirium with behavior issues  [] Imminent death  with skilled nursing needs documented above      Total time: 35 min  Counseling / coordination time: 15 mts d/w hospice RN and floor RN  > 50% counseling / coordination?: no

## 2017-08-05 NOTE — PROGRESS NOTES
Oncology Nursing Communication Tool      8:10 PM  8/4/2017     Bedside and Verbal shift change report given to Bertie Halsted, RN (incoming nurse) by Navi Hernández RN (outgoing nurse) on Liliana Goff a 40 y.o. female who was admitted on 7/31/2017  5:54 PM. Report included the following information SBAR, Kardex, Intake/Output, Accordion and Recent Results. Significant changes during shift: Lethargic, poor PO intake, no complaints of pain      Issues for physician to address:  Bariatric bed, end-of-life care            Code Status: DNR     Infections: No current active infections     Allergies: Egg; Pcn [penicillins]; Shellfish containing products; Tetanus and diphtheria toxoids, adsorbed, adult; and Tomato     Current diet: DIET REGULAR       Pain Controlled [x] yes [] no   Bowel Movement [] yes [x] no   Last Bowel Movement (date) 7/31/17            Vital Signs:   No data found. Intake & Output:     Intake/Output Summary (Last 24 hours) at 08/04/17 2010  Last data filed at 08/04/17 0600   Gross per 24 hour   Intake              200 ml   Output              650 ml   Net             -450 ml      Laboratory Results:   No results found for this or any previous visit (from the past 12 hour(s)). Opportunity for questions and clarifications were given to the incoming nurse. Patient's bed is in low position, side rails x2, door open PRN, call bell within reach and patient not in distress.       Navi Hernández RN

## 2017-08-05 NOTE — PROGRESS NOTES
Patient on bariatric bed. An alarm on the bed continues to alarm every few minutes and staff unable to turn off or fix issue. [place] [facility] was consulted to see if they had any quick fix ideas. ...did not work. New bariatric bed ordered, Order # [id number]. Bed to be delivered by 0800 today.

## 2017-08-06 NOTE — HOSPICE
[address] Blake Casillas RN follow up prn visit note. Note of Dr. Johnathon Miguel visit and reconciliation of meds    Medication changes as noted    Lorazepam 1 mg had been given earlier with assessed better palliation of anxiety.      Radames Payne, RN  [phone]

## 2017-08-06 NOTE — PROGRESS NOTES
Bedside shift change report given to Noy Noguera Rn (oncoming nurse) by Rhea Reese RN (offgoing nurse). Report included the following information SBAR, Kardex, Intake/Output, MAR, Accordion and Recent Results. Patient Vitals for the past 12 hrs:   Temp Pulse Resp BP SpO2   08/06/17 1101 - - - - 97 %   08/06/17 0745 97.6 °F (36.4 °C) (!) 120 16 130/70 100 %     Opportunity for questions and clarification was provided.

## 2017-08-06 NOTE — HOSPICE
[facility]  [phone]    Patient Name: Liliana Goff  YOB: 1980    Date of Provider Hospice Visit: 08/06/17    Level of Care:   [x] General Inpatient (GIP)    [] Routine   [] Respite    Location of Care:  [] [facility] [] [facility] [x] [facility] [] [facility] [] [facility]    Date of Original Hospice Admission: 7-31-17  Hospice Medical Director for Inpatient Care: Dr. Olu Bella Diagnosis: Metastatic endometrial cancer, s/p XRT  Diagnoses RELATED to the terminal prognosis: pathologic right subtrochanteric femur fracture  Other Diagnoses: dm-2, obesity, hx tachycardia     HOSPICE SUMMARY   Do not cut and paste chart information other than imaging findings    Liliana Goff is a 40y.o. year old who was admitted to [address] [place] [address]. The patient's principle diagnosis has resulted in hospitalization 5/8-5/16/17 for right hip pain and respiratory failure. CT-pathologic right subtrochenteric femur fracture and enlarging [id number] cm right pelvic mass with invasion to pelvis, sacrum and hip. Pain management with PCA and methadone. Pt was sent to hospice house but remains bedbound and still has pain in right groin and hip , worse with morvement. Palliative/hospice MD has discussed and reviewed case with IR and plan is for inpatient admit and palliative microwave ablation of tumor. Refer to LCD   The LCD for Hospice for the admitted diagnosis of cancer includes:    [x] Disease with distant metastases at presentation OR    [x] Progression from an earlier stage of disease to metastatic disease with either:   [] a continued decline in spite of therapy   [] patient declines further disease directed therapy    [] Certain cancers with poor prognoses (e.g. small cell lung cancer, brain cancer and pancreatic cancer) may be hospice eligible without fulfilling the other criteria in this section.       Functionally, the patient's Karnofsky and/or Palliative Performance Scale has declined over a period of months and is estimated at 30. The patient is dependent on the following ADLs:all except feeding    Objective information that support this patients limited prognosis includes:   CT scan 5/9/17  IMPRESSION  IMPRESSION:     1. Interval significant enlargement of the mass centered in the right pelvis  with destruction of the bones of the right pelvis, sacrum, and proximal right  femur, with extension to involve the left acetabulum, inferior pubic ramus and  superior pubic ramus. 2. Associated pathologic fracture of the right proximal femur. 3. Evaluation of the mass subjacent to the right hemidiaphragm is limited as it  was not as well imaged on prior examinations. 4. Interval enlargement of left pelvic lymph node.     The patient/family chose comfort measures with the support of Hospice. HOSPICE DIAGNOSES   Active Symptoms:  1. Pain; generalised; still persistent  2. Delirium; mild, fluctuates  3. Anemia  4. Jaundice: worse  5. Fatigue/weakness: still persists  6  Inability to swallow food/pills       PLAN   1. Continue [facility] level care at [facility] s/p CT guided microwave ablation of tumor 4 days ago: did not seem to have any benefit of pain control post procedure. 2. Seems to be declining steadily with increasing jaundice (liver failure), fatigue and pain  3. Spoke to mom and  in length and reviewed current events since procedure and how it has not impacted the pain, new finding of liver involvement, disease likely widespread at this time and poor prognosis; few days to weeks. Mom understands and accepting it. We did discuss issues regarding dehydration and anemia and if she will benefit from some more blood transfusion and fluids.  We talked about dehydration not being painful and likely protective and also that in background of malnutrition and low proteins that giving fluids will not help and may do more harm with increased fluid overload body including lung congestion affecting her breathing and causing more distress. We talked about watching for excess fatigue and delirium and that may be reason to give some more blood. Mom ok with this plan  4. Discontinue [id number] every 8 hours  5. Adjust dilaudid PCA: increase to 4mg/hour basal, no demand dose; pt still quite lethargic and unable to use PCA  6. Increase nurse given IV dilaudid bolus prior to turning/ADL care: 4mg IV every 15mts prn  7. D/c all oral meds as she is unable to swallow  8. Added ativan 1mg IV every hour prn   9. Continue routine wound care and wound care s/p procedure. 8.  and SW to support family needs  6. Disposition: planned for LTC placement per family request. However pt still symptomatic and requiring close monitoring, and frequent adjustment in meds so will remain in hospital for now till more stable for managing in outpt community setting      Prognosis estimated based on 08/06/17 clinical assessment is:   [] Hours to Days    [] Days to Weeks    [x] Other:few weeks    Communicated plan of care with: Hospice Case Manager; Hospice IDT; Care Team     GOALS OF CARE     Resuscitation Status: DNR  Durable DNR: [x] Yes [] No    Advance Care Planning 5/10/2017   Patient's Healthcare Decision Maker is: Legal Next of Andrea Velarde   Primary Decision Maker Name Santa Hyde   Primary Decision Maker Phone Number -   Primary Decision Maker Relationship to Patient Parent   Confirm Advance Directive Yes, on file   Patient Would Like to Complete Advance Directive -        HISTORY     History obtained from: chart, hospice RN, floor RN,  and patient, family    CHIEF COMPLAINT:  The patient is:   [x] Verbal  [] Nonverbal  [] Unresponsive    HPI/SUBJECTIVE:  Pt still very lethargic but opened eyes and says she is in a lot of pain. Pt very weak and not taking anything by mouth much; had few sips of pepsi when mom offered, unable to comprehend and open mouth properly to swallow pills.  Was not able to take methadone or other oral pills, could not take ice chips. Sleeping mostly,. Jaundice worse; eyes very yellow; sclera. Got prn dilaudid x 3 doses  Mom and  at bedside       REVIEW OF SYSTEMS     The following systems were: [x] reviewed  [] unable to be reviewed    Positive ROS include:  Constitutional: fatigue/weakness  Ears/nose/mouth/throat:  Respiratory:  Gastrointestinal:anorexia  Musculoskeletal: pain  Neurologic:mild confusion  Psychiatric:anxiety  Endocrine:        FUNCTIONAL ASSESSMENT     Palliative Performance Scale (PPS): 20-30%     PSYCHOSOCIAL/SPIRITUAL ASSESSMENT     Active Problems:    Endometrial ca ([address] (7/31/2017)      Past Medical History:   Diagnosis Date    Cancer ([facility]     uterine    CVI (common variable immunodeficiency) (HCC)     Diabetes ([facility]     Elevated liver function tests 10/21/2010    Endometrial cancer ([facility] 7/7/2010    Hypertension     Iron deficiency anemia     Menometrorrhagia 10/21/2010    Microcytic anemia 10/21/2010    Morbid obesity ([facility]     Prediabetes 7/7/2010    Psychiatric disorder     depression    Radiation     completed radiation mid-march      Past Surgical History:   Procedure Laterality Date    CARDIAC SURG PROCEDURE UNLIST      hx of tachycardia    HX GYN      hysterectomy      Social History   Substance Use Topics    Smoking status: Never Smoker    Smokeless tobacco: Never Used    Alcohol use Yes     Family History   Problem Relation Age of Onset    Hypertension Mother     Hypertension Father     Stroke Maternal Grandfather     Cancer Paternal Grandmother      breast    Diabetes Paternal Grandmother       Allergies   Allergen Reactions    Egg Nausea and Vomiting     Raw egg and scrambled eggs. Egg products okay.      Pcn [Penicillins] Nausea and Vomiting     \"but could take amoxil\"    Shellfish Containing Products Unknown (comments)    Tetanus And Diphtheria Toxoids, Adsorbed, Adult Other (comments) Developed local swelling, axillary pain, and transient fever    Tomato Unknown (comments)     Fresh tomatoes      Current Facility-Administered Medications   Medication Dose Route Frequency    LORazepam (ATIVAN) injection 1 mg  1 mg IntraVENous Q1H PRN    HYDROmorphone (PF) 15 mg/30 ml (DILAUDID) PCA   IntraVENous CONTINUOUS    HYDROmorphone (PF) (DILAUDID) injection 4 mg  4 mg IntraVENous Q15MIN PRN    sodium chloride (NS) flush 10-30 mL  10-30 mL InterCATHeter PRN    sodium chloride (NS) flush 10 mL  10 mL InterCATHeter Q24H    sodium chloride (NS) flush 10 mL  10 mL InterCATHeter PRN    sodium chloride (NS) flush 10-40 mL  10-40 mL InterCATHeter Q8H    sodium chloride (NS) flush 20 mL  20 mL InterCATHeter PRN    heparin (porcine) pf 300 Units  300 Units InterCATHeter PRN    magic mouthwash cpd (with sucralfate)  5 mL Oral QID PRN    sodium chloride (NS) flush 10-30 mL  10-30 mL InterCATHeter PRN    sodium chloride (NS) flush 10 mL  10 mL InterCATHeter Q24H    sodium chloride (NS) flush 10 mL  10 mL InterCATHeter PRN    sodium chloride (NS) flush 10-40 mL  10-40 mL InterCATHeter Q8H    sodium chloride (NS) flush 20 mL  20 mL InterCATHeter Q24H        PHYSICAL EXAM     Wt Readings from Last 3 Encounters:   05/30/17 121.1 kg (267 lb)   05/10/17 121.4 kg (267 lb 10.2 oz)   02/16/17 136.1 kg (300 lb)       Visit Vitals    /70 (BP 1 Location: Left arm, BP Patient Position: At rest)    Pulse (!) 120    Temp 97.6 °F (36.4 °C)    Resp 16    SpO2 97%       Supplemental O2  [x] Yes  [] NO  Last bowel movement:     Currently this patient has:  [] Peripheral IV [x] PICC  [] PORT [] ICD    [] Jama Catheter [] NG Tube   [] PEG Tube    [] Rectal Tube [] Drain  [] Other:     Constitutional: obese, lethargic but opens eyes and able to speak few words, still in a lot of pain  Eyes: perrl, scleral icterus ++  ENMT: dry oral mucosa  Cardiovascular: tachycardic  Respiratory: even, nonlabored  Gastrointestinal: soft obese  Musculoskeletal:2-3 plus b/l LE edema, edema on back as well;subcutaneous  Skin: warm, dry  Neurologic: confused, lethargic, minimal response  Psychiatric: anxious  Other:davila's     Pertinent Lab and or Imaging Tests:  Lab Results   Component Value Date/Time    Sodium 139 08/01/2017 06:36 AM    Potassium 4.5 08/01/2017 06:36 AM    Chloride 106 08/01/2017 06:36 AM    CO2 26 08/01/2017 06:36 AM    Anion gap 7 08/01/2017 06:36 AM    Glucose 82 08/01/2017 06:36 AM    BUN 23 08/01/2017 06:36 AM    Creatinine 0.83 08/01/2017 06:36 AM    BUN/Creatinine ratio 28 08/01/2017 06:36 AM    GFR est AA >60 08/01/2017 06:36 AM    GFR est non-AA >60 08/01/2017 06:36 AM    Calcium 8.6 08/01/2017 06:36 AM     Lab Results   Component Value Date/Time    Protein, total 5.7 08/01/2017 06:36 AM    Albumin 2.0 08/01/2017 06:36 AM           This patient meets Hospice General Inpatient (GIP) Level of Care.     Supporting documentation for GIP need for pain control:  [x] Frequent evaluation by a doctor, nurse practitioner, nurse   [x] Frequent medication adjustment    [] IVs that cannot be administered at home   [x] Aggressive pain management   [] Complicated technical delivery of medications              Supporting documentation for GIP need for symptom control:  []  Sudden decline necessitating intensive nursing intervention  []  Uncontrolled / intractable nausea or vomiting   []  Pathological fractures  []  Advanced open wounds requiring frequent skilled care  [] Unmanageable respiratory distress  [] New or worsening delirium   [] Delirium with behavior issues  [] Imminent death  with skilled nursing needs documented above      Total time: 45 min  Counseling / coordination time: 25 mts d/w hospice RN and floor RN, discussing with mom and   > 50% counseling / coordination?: yes

## 2017-08-06 NOTE — HOSPICE
[facility]  [phone]    GIP Daily Nursing Note   Patient Name: Shanelle Larry  YOB: 1980  Age: 40 y.o. Date of Visit: 08/06/17  Facility of Care: [facility]  Patient Room: [address] [place] [address]     Hospice Attending: Holger Luther MD  Hospice Diagnosis: pre op testing  Endometrial ca [facility])  RECOVERY NEEDED IN PACU    Level of Care: GIP    Current GIP Symptoms    1. Lying with eyes closed and RR 20   Moans and eyes wide open and is assessed to be anxious with minimal stimulation. ASSESSMENT & PLAN   Must update Plan of Care including visit frequencies for IDT members  1. SNV daily    MSW  Weekly    weekly   2. Assessed anxiety and not taking pills. Lorazepam was 1 mg po q 6 hours and this changed to Lorazepam 1 mg IV q 6 hours    3. Moaning with stimulation    Dr. Jolanta Poe will address during visit today. Spiritual Interventions:     Psych/ Social/ Emotional Interventions: Mother aDnn Aaron is at the bedside. We discussed Jyoti's hallucinations and anxiety. Education provided on the effects on the nervous system that elevated Bilirubin can cause. Also advised that Dr. Kevin David Needs: Discussed patient with unit nurse Annetta An. Will send report to [facility] hospice interdisciplinary team.      Care plan and New Orders discussed / approved with Dr. Marilin Raygoza     Description History and Chart Review   If this is initial GIP note must document RN assessment/MD communication in previous setting. Specifically document nursing/medication needs in last 24 hours to support GIP care  Narrative History of last 24 hours that demonstrates care cannot be provided in another setting:  Patient is obese and anasarca is present. Patient with widespread metastatic cancer as well as a pathological right hip fracture requiring several staff members to turn for care.    Patient has a PICC line right upper brachial vein for IV medications with Continuous Hydromorphone infusion requiring daily adjustments. Dose increased 6/5 from 1 mg/hr to 2 mg/hr    Patient with confusion / anxiety yesterday into today. Was able to encourage patient to take pills yesterday, today patient does not open her mouth with request and speech is inappropriate so assessed not to be able to follow commands such as \"swallow the pill. \"     What has been done to control the patient's symptoms in the last 24 hours? Placed on different bariatric bed for comfort as the first one had been broken and then patient moved to larger room  Hydromorphone 1 mg/hr increased to 2 mg/hr 6/5. Hydromorphone 2mg q 15 minutes prn and had been given x 3 since midnight with unit nurses caring for PICC line per hospital policies. ,     Does the patient currently require IV medications? YES  Does the patient currently require scheduled medications? *YES  Does the patient currently require a PCA? YES    List number of doses of PRN medications in last 24 hours:  Medication 1:  Hydromorphone 2 mg IV   Number of doses:3    Supporting documentation for GIP need for pain control:  [x] Frequent evaluation by a doctor, nurse practitioner, nurse   [x] Frequent medication adjustment    [x] IVs that cannot be administered at home   [x] Aggressive pain management   [x] Complicated technical delivery of medications              Supporting documentation for GIP need for symptom control:  [x]  Sudden decline necessitating intensive nursing intervention  []  Uncontrolled / intractable nausea or vomiting   [x]  Pathological fractures  []  Advanced open wounds requiring frequent skilled care  [] Unmanageable respiratory distress  [x] New or worsening delirium   [] Delirium with behavior issues: Is 24 hour caregiver present due to safety concerns with agitation? (yes/no)  [] Imminent death  with skilled nursing needs documented above    DISCHARGE PLANNING   Daily discharge planning required for GIP  1.  Discharge Plan: *It is expected that patient will pass while inpatient yet [facility] would be an option  2. Patient/Family teaching: Mother is in the room   As above, education provided requiring patient's increased level of confusion and anxiety. 3. Response to patient/family teaching: Understanding and appreciation expressed      ASSESSMENT    KARNOFSKY: 20    Quality Measure: Patient self-reports:   [x] No    ESAS:   Time of Assessment:  1300  Pain (1-10):4  Fatigue (1-10):   Shortness of breath (1-10):2  Nausea (1-10): Appetite (1-10):    Anxiety: (1-10):   Depression: (1-10):   Well-being: (1-10):   Constipation: _ Yes  _ No  LAST BM:     CLINICAL INFORMATION   Patient Vitals for the past 12 hrs:   Temp Pulse Resp BP SpO2   08/06/17 1101 - - - - 97 %   08/06/17 0745 97.6 °F (36.4 °C) (!) 120 16 130/70 100 %       Currently this patient has:  [x] Supplemental O2   [] IV    [x] PICC      [] PORT   [] NG Tube    [] PEG Tube   [] Ostomy     [x] Jama draining _very dark tea colored______ urine  [] Other:     SIGNS/PHYSICAL FINDINGS     Skin (including wound):  [] Warm, dry, supple, intact and color normal for race  [] Warm   [x] Dry   [x] Cool     [] Clammy       [] Diaphoretic    Turgor   [] Normal   [x] Decreased  Color:   [] Pink   [] Pale   [] Cyanotic   [] Erythema   [x] Jaundice   [] Normal for Race  []  Wounds:    Neuro:  [] Lethargy  [] Restlessness / agitation  [] Confusion / delirium  [x] Hallucinations  [] Responds to maximal stimulation  [] Unresponsive  [] Seizures     Cardiac:  [] Dyspnea on Exertion  [] JVD  [] Murmur  [] Palpitations  [] Hypotension  [] Hypertension  [x] Tachycardia  [] Bradycardia  [] Irregular HR  [] Pulses Decreased  [] Pulses Absent  [] Edema:       (Location, Grade and Pitting)  [] Mottling:      (Location)    Respiratory:  Breath sounds: clear upper and diminished lower   [] Diminished   [] Wheeze   [] Rhonchi   [] Rales   [x] Even and unlabored  [] Labored:            [] Cough   [] Non Productive   [] Productive    [] Description:           [] Deep suctioned   [x] O2 at _3__ LPM  [] High flow oxygen greater than 10 LPM  [] Bi-Pap    GI  [x] Abdomen very rounded and patient moans and has facial grimacing during palpation of RUQ and of low abdomen. [] Ascites  [] Nausea  [] Vomiting  [] Incontinent of bowels  [] Bowel sounds (yes/no)  [] Diarrhea  [] Constipation (see above including last bowel movement)  [] Checked for impaction  [] Last BM     [x] Jama    Musculoskeletal  [] Balance/Underwood Unsteady   [] Weak   Strength:    [] Normal    [] Limited    [x] Decreasing   Activities:    [] Up as tolerated   [x] Bedridden    [] Specify:    SAFETY per hospital policies   [x] Side rails ? [x] Hospital bed         ALLERGIES AND MEDICATIONS     Allergies: Allergies   Allergen Reactions    Egg Nausea and Vomiting     Raw egg and scrambled eggs. Egg products okay.      Pcn [Penicillins] Nausea and Vomiting     \"but could take amoxil\"    Shellfish Containing Products Unknown (comments)    Tetanus And Diphtheria Toxoids, Adsorbed, Adult Other (comments)     Developed local swelling, axillary pain, and transient fever    Tomato Unknown (comments)     Fresh tomatoes       Current Facility-Administered Medications   Medication Dose Route Frequency    LORazepam (ATIVAN) injection 1 mg  1 mg IntraVENous Q6H PRN    HYDROmorphone (PF) (DILAUDID) injection 2 mg  2 mg IntraVENous Q15MIN PRN    HYDROmorphone (DILAUDID) 1 mg/mL oral solution 8 mg  8 mg Oral Q15MIN PRN    HYDROmorphone (PF) 15 mg/30 ml (DILAUDID) PCA   IntraVENous TITRATE    haloperidol (HALDOL) tablet 2 mg  2 mg Oral Q4H PRN    sodium chloride (NS) flush 10-30 mL  10-30 mL InterCATHeter PRN    sodium chloride (NS) flush 10 mL  10 mL InterCATHeter Q24H    sodium chloride (NS) flush 10 mL  10 mL InterCATHeter PRN    sodium chloride (NS) flush 10-40 mL  10-40 mL InterCATHeter Q8H    sodium chloride (NS) flush 20 mL  20 mL InterCATHeter PRN    heparin (porcine) pf 300 Units  300 Units InterCATHeter PRN    acetaminophen (TYLENOL) tablet 650 mg  650 mg Oral Q4H PRN    bisacodyl (DULCOLAX) tablet 5 mg  5 mg Oral DAILY PRN    diphenhydrAMINE (BENADRYL) capsule 25 mg  25 mg Oral Q4H PRN    DULoxetine (CYMBALTA) capsule 60 mg  60 mg Oral QHS    methadone (DOLOPHINE) tablet 10 mg  10 mg Oral Q8H    polyethylene glycol (MIRALAX) packet 17 g  17 g Oral DAILY PRN    senna-docusate (PERICOLACE) 8.6-50 mg per tablet 2 Tab  2 Tab Oral BID    sulindac (CLINORIL) tablet 200 mg  200 mg Oral BID WITH MEALS    magic mouthwash cpd (with sucralfate)  5 mL Oral QID PRN    sodium chloride (NS) flush 10-30 mL  10-30 mL InterCATHeter PRN    sodium chloride (NS) flush 10 mL  10 mL InterCATHeter Q24H    sodium chloride (NS) flush 10 mL  10 mL InterCATHeter PRN    sodium chloride (NS) flush 10-40 mL  10-40 mL InterCATHeter Q8H    sodium chloride (NS) flush 20 mL  20 mL InterCATHeter Q24H          Visit Time In: 1250  Visit Time Out: 3226

## 2017-08-06 NOTE — ROUTINE PROCESS
Oncology Nursing Communication Tool      8:59 AM  8/6/2017     Bedside shift change report given to Deepak Abrams RN (incoming nurse) by Umesh Carranza RN (outgoing nurse) on Lear Salts a 40 y.o. female who was admitted on 7/31/2017  5:54 PM. Report included the following information SBAR, Kardex, Intake/Output and MAR. Significant changes during shift: pt unable to swallow pills overnight. ptrn doses of dilaudid x3. Attempted to give po ativan crushed in 1cc water via syringe & pt still had some trouble swallowing. Issues for physician to address: pt unable to take po meds. Can po meds be d/c'd or switched to IV. Code Status: DNR     Infections: No current active infections     Allergies: Egg; Pcn [penicillins]; Shellfish containing products; Tetanus and diphtheria toxoids, adsorbed, adult; and Tomato     Current diet: DIET REGULAR       Pain Controlled [x] yes [] no   Bowel Movement [] yes [x] no   Last Bowel Movement (date)             Vital Signs:   Patient Vitals for the past 12 hrs:   Temp Pulse Resp BP SpO2   08/06/17 0745 97.6 °F (36.4 °C) (!) 120 16 130/70 100 %      Intake & Output:     Intake/Output Summary (Last 24 hours) at 08/06/17 0859  Last data filed at 08/06/17 0656   Gross per 24 hour   Intake              200 ml   Output              675 ml   Net             -475 ml      Laboratory Results:   No results found for this or any previous visit (from the past 12 hour(s)). Opportunity for questions and clarifications were given to the incoming nurse. Patient's bed is in low position, side rails x2, door open PRN, call bell within reach and patient not in distress.       Umesh Carranza RN

## 2017-08-07 NOTE — HOSPICE
[facility] Help to Those in Need  [phone]    Patient Name: Hadley Baxter  YOB: 1980    Date of Provider Hospice Visit: 08/07/17    Level of Care:   [x] General Inpatient (GIP)    [] Routine   [] Respite    Location of Care:  [] [facility] [place] [] [facility] [x] [facility] [] [facility] [] [facility]    Date of Original Hospice Admission: 7-31-17  Hospice Medical Director for Inpatient Care: Dr. Giovanni Harris Diagnosis: Metastatic endometrial cancer, s/p XRT  Diagnoses RELATED to the terminal prognosis: pathologic right subtrochanteric femur fracture  Other Diagnoses: dm-2, obesity, hx tachycardia     HOSPICE SUMMARY   Do not cut and paste chart information other than imaging findings    Hadley Baxter is a 40y.o. year old who was admitted to [facility]. The patient's principle diagnosis has resulted in hospitalization 5/8-5/16/17 for right hip pain and respiratory failure. CT-pathologic right subtrochenteric femur fracture and enlarging [id number] cm right pelvic mass with invasion to pelvis, sacrum and hip. Pain management with PCA and methadone. Pt was sent to hospice house but remains bedbound and still has pain in right groin and hip , worse with morvement. Palliative/hospice MD has discussed and reviewed case with IR and plan is for inpatient admit and palliative microwave ablation of tumor. Refer to LCD   The LCD for Hospice for the admitted diagnosis of cancer includes:    [x] Disease with distant metastases at presentation OR    [x] Progression from an earlier stage of disease to metastatic disease with either:   [] a continued decline in spite of therapy   [] patient declines further disease directed therapy    [] Certain cancers with poor prognoses (e.g. small cell lung cancer, brain cancer and pancreatic cancer) may be hospice eligible without fulfilling the other criteria in this section.       Functionally, the patient's Karnofsky and/or Palliative Performance Scale has declined over a period of months and is estimated at 30. The patient is dependent on the following ADLs:all except feeding    Objective information that support this patients limited prognosis includes:   CT scan 5/9/17  IMPRESSION  IMPRESSION:     1. Interval significant enlargement of the mass centered in the right pelvis  with destruction of the bones of the right pelvis, sacrum, and proximal right  femur, with extension to involve the left acetabulum, inferior pubic ramus and  superior pubic ramus. 2. Associated pathologic fracture of the right proximal femur. 3. Evaluation of the mass subjacent to the right hemidiaphragm is limited as it  was not as well imaged on prior examinations. 4. Interval enlargement of left pelvic lymph node.     The patient/family chose comfort measures with the support of Hospice. HOSPICE DIAGNOSES   Active Symptoms:  1. Pain; generalised; still persistent  2. Delirium; mild, fluctuates  3. Anemia  4. Jaundice: worse  5. Fatigue/weakness: still persists  6  Inability to swallow food/pills       PLAN   1. Continue [facility] level care at [facility] s/p CT guided microwave ablation of tumor 5 days ago: did not seem to have any benefit of pain control post procedure. 2. Seems to be declining steadily with increasing jaundice (liver failure), fatigue and pain  3. Continue dilaudid pca 4 mg/hr--still has pain but pt unable to quantify or give severity  4. Severe pain after pt was turned and cleaned today despite premed and administration during turning   Dilaudid 4mg q15 min prn. May need to increase PCA rate. 5. Haldol IV prn for delirium  6. D/c all oral meds as she is unable to swallow  7. Added ativan 1mg IV every hour prn -medicate x 1 now--pt unable to sleep  8. Continue routine wound care and wound care s/p procedure. 5.  and SW to support family needs  10.  Disposition: planned for LTC placement per family request. However pt still symptomatic and requiring close monitoring, and frequent adjustment in meds so will remain in hospital for now till more stable for managing in outpt community setting      Prognosis estimated based on 08/07/17 clinical assessment is:   [] Hours to Days    [] Days to Weeks    [x] Other:few weeks    Communicated plan of care with: Hospice Case Manager; Hospice IDT; Care Team     GOALS OF CARE     Resuscitation Status: DNR  Durable DNR: [x] Yes [] No    Advance Care Planning 8/6/2017   Patient's Healthcare Decision Maker is: Legal Next of Kin   Primary Decision Maker Name -   Primary Decision Maker Phone Number -   Primary Decision Maker Relationship to Patient -   Confirm Advance Directive Yes, on file   Patient Would Like to Complete Advance Directive -   Does the patient have other document types Do Not Resuscitate        HISTORY     History obtained from: chart, hospice RN, floor RN,  and patient, family    CHIEF COMPLAINT:  The patient is:   [x] Verbal  [] Nonverbal  [] Unresponsive    HPI/SUBJECTIVE:    Pt alert but confused  -disoriented \"at sister's house. C/o pain in right side area  Able to take sips of liquids today  Increased confusion earlier today-medicated with haldol and ativan  4 doses of IV dilaudid today, 1 dose of haldol and Ativan so far.        REVIEW OF SYSTEMS     The following systems were: [x] reviewed  [] unable to be reviewed    Positive ROS include:  Constitutional: fatigue/weakness  Ears/nose/mouth/throat:  Respiratory:  Gastrointestinal:anorexia  Musculoskeletal: pain  Neurologic:mild confusion  Psychiatric:anxiety  Endocrine:        FUNCTIONAL ASSESSMENT     Palliative Performance Scale (PPS): 20-30%     PSYCHOSOCIAL/SPIRITUAL ASSESSMENT     Active Problems:    Endometrial ca [facility]) (7/31/2017)      Past Medical History:   Diagnosis Date    Cancer ([facility]     uterine    CVI (common variable immunodeficiency) (HCC)     Diabetes ([facility]     Elevated liver function tests 10/21/2010    Endometrial cancer ([facility] 7/7/2010    Hypertension     Iron deficiency anemia     Menometrorrhagia 10/21/2010    Microcytic anemia 10/21/2010    Morbid obesity ([facility]     Prediabetes 7/7/2010    Psychiatric disorder     depression    Radiation     completed radiation mid-march      Past Surgical History:   Procedure Laterality Date    CARDIAC SURG PROCEDURE UNLIST      hx of tachycardia    HX GYN      hysterectomy      Social History   Substance Use Topics    Smoking status: Never Smoker    Smokeless tobacco: Never Used    Alcohol use Yes     Family History   Problem Relation Age of Onset    Hypertension Mother     Hypertension Father     Stroke Maternal Grandfather     Cancer Paternal Grandmother      breast    Diabetes Paternal Grandmother       Allergies   Allergen Reactions    Egg Nausea and Vomiting     Raw egg and scrambled eggs. Egg products okay.      Pcn [Penicillins] Nausea and Vomiting     \"but could take amoxil\"    Shellfish Containing Products Unknown (comments)    Tetanus And Diphtheria Toxoids, Adsorbed, Adult Other (comments)     Developed local swelling, axillary pain, and transient fever    Tomato Unknown (comments)     Fresh tomatoes      Current Facility-Administered Medications   Medication Dose Route Frequency    haloperidol lactate (HALDOL) injection 2 mg  2 mg IntraVENous Q4H PRN    LORazepam (ATIVAN) injection 1 mg  1 mg IntraVENous Q1H PRN    HYDROmorphone (PF) 15 mg/30 ml (DILAUDID) PCA   IntraVENous CONTINUOUS    HYDROmorphone (PF) (DILAUDID) injection 4 mg  4 mg IntraVENous Q15MIN PRN    sodium chloride (NS) flush 10-30 mL  10-30 mL InterCATHeter PRN    sodium chloride (NS) flush 10 mL  10 mL InterCATHeter Q24H    sodium chloride (NS) flush 10 mL  10 mL InterCATHeter PRN    sodium chloride (NS) flush 20 mL  20 mL InterCATHeter PRN    heparin (porcine) pf 300 Units  300 Units InterCATHeter PRN    magic mouthwash cpd (with sucralfate)  5 mL Oral QID PRN    sodium chloride (NS) flush 10-30 mL  10-30 mL InterCATHeter PRN    sodium chloride (NS) flush 10 mL  10 mL InterCATHeter Q24H    sodium chloride (NS) flush 10 mL  10 mL InterCATHeter PRN        PHYSICAL EXAM     Wt Readings from Last 3 Encounters:   05/30/17 267 lb (121.1 kg)   05/10/17 267 lb 10.2 oz (121.4 kg)   02/16/17 300 lb (136.1 kg)       Visit Vitals    /56    Pulse 88    Temp 98.4 °F (36.9 °C)    Resp 16    SpO2 100%       Supplemental O2  [x] Yes  [] NO  Last bowel movement:     Currently this patient has:  [] Peripheral IV [x] PICC  [] PORT [] ICD    [] Davila Catheter [] NG Tube   [] PEG Tube    [] Rectal Tube [] Drain  [] Other:     Constitutional: obese, lethargic but opens eyes and able to speak few words, still in a lot of pain  Eyes: perrl, scleral icterus ++  ENMT: dry oral mucosa  Cardiovascular: tachycardic  Respiratory: even, nonlabored  Gastrointestinal: soft obese  Musculoskeletal:2-3 plus b/l LE edema, edema on back as well;subcutaneous  Skin: warm, dry  Neurologic: confused, lethargic, minimal response  Psychiatric: anxious  Other:davila's     Pertinent Lab and or Imaging Tests:  Lab Results   Component Value Date/Time    Sodium 139 08/01/2017 06:36 AM    Potassium 4.5 08/01/2017 06:36 AM    Chloride 106 08/01/2017 06:36 AM    CO2 26 08/01/2017 06:36 AM    Anion gap 7 08/01/2017 06:36 AM    Glucose 82 08/01/2017 06:36 AM    BUN 23 08/01/2017 06:36 AM    Creatinine 0.83 08/01/2017 06:36 AM    BUN/Creatinine ratio 28 08/01/2017 06:36 AM    GFR est AA >60 08/01/2017 06:36 AM    GFR est non-AA >60 08/01/2017 06:36 AM    Calcium 8.6 08/01/2017 06:36 AM     Lab Results   Component Value Date/Time    Protein, total 5.7 08/01/2017 06:36 AM    Albumin 2.0 08/01/2017 06:36 AM           This patient meets Hospice General Inpatient (GIP) Level of Care.     Supporting documentation for GIP need for pain control:  [x] Frequent evaluation by a doctor, nurse practitioner, nurse   [x] Frequent medication adjustment    [] IVs that cannot be administered at home   [x] Aggressive pain management   [] Complicated technical delivery of medications              Supporting documentation for GIP need for symptom control:  []  Sudden decline necessitating intensive nursing intervention  []  Uncontrolled / intractable nausea or vomiting   []  Pathological fractures  []  Advanced open wounds requiring frequent skilled care  [] Unmanageable respiratory distress  [] New or worsening delirium   [] Delirium with behavior issues  [] Imminent death  with skilled nursing needs documented above      Total time: 25  Counseling / coordination time: 25 mts d/w hospice RN and floor RN, discussing with mom   > 50% counseling / coordination?: yes

## 2017-08-07 NOTE — PROGRESS NOTES
Oncology Nursing Communication Tool      6:58 PM  8/7/2017     Bedside and Verbal shift change report given to Caroline Valdez RN (incoming nurse) by Soledad Aguilera (outgoing nurse) on Pérez Melendez a 40 y.o. female who was admitted on 7/31/2017  5:54 PM. Report included the following information SBAR, Kardex, Procedure Summary, Intake/Output, MAR, Accordion and Recent Results. Significant changes during shift: pt is hallucinating that there are cats in her room      Issues for physician to address: same as above              Code Status: DNR     Infections: No current active infections     Allergies: Egg; Pcn [penicillins]; Shellfish containing products; Tetanus and diphtheria toxoids, adsorbed, adult; and Tomato     Current diet: DIET REGULAR       Pain Controlled [x] yes [] no   Bowel Movement [x] yes [] no   Last Bowel Movement (date) 8/7/17            Vital Signs:   Patient Vitals for the past 12 hrs:   Temp Pulse Resp BP SpO2   08/07/17 1716 98.4 °F (36.9 °C) 88 16 126/56 100 %      Intake & Output:     Intake/Output Summary (Last 24 hours) at 08/07/17 1858  Last data filed at 08/07/17 1526   Gross per 24 hour   Intake              390 ml   Output              501 ml   Net             -111 ml      Laboratory Results:   No results found for this or any previous visit (from the past 12 hour(s)). Opportunity for questions and clarifications were given to the incoming nurse. Patient's bed is in low position, side rails x2, door open PRN, call bell within reach and patient not in distress.       Soledad Aguilera

## 2017-08-07 NOTE — PROGRESS NOTES
Jyoti appeared to be resting very comfortably. Left Pastoral Care card informing her of my visit. Provided silent prayer at her bedside.    Visited by: Kellie Purvis [id number] [facility] ([id number])

## 2017-08-07 NOTE — HOSPICE
[facility] Demonstrating physical, emotional and spiritual suffering. pt with pronounced delirium, swatting at flies thinking pt is outside while looking out of the window. States, \"am I dead?, I'm scared. \"  Given PRN ativan,  Adding PRN haldol to regimen per Dr Yesi Keita. Will monitor, may need haldol scheduled which pt has required in the past.  Continues to be in pain despite increase of continuous dilaudid infusion to 4mg/hr. Requiring PRN dilaudid for any physical movement. [address] Help to Those in Need  [phone]     [facility] Daily Nursing Note   Patient Name: Ashley Branch  YOB: 1980  Age: 40 y.o.     Date of Visit: 08/07/17  Facility of Care: [facility]  Patient Room: [address] Attending: Clarissa Ojeda MD  Hospice Diagnosis: pre op testing  Endometrial ca [facility])  RECOVERY NEEDED IN PACU     Level of Care: GIP     Current [facility] Symptoms    1. . Pain  2. Delirium/anxiety   3. Spiritual distress, scared of dying.        ASSESSMENT & PLAN   Must update Plan of Care including visit frequencies for I  1. Continue GIP level of care for continued need for aggressive symptom management. 2. Pain:  Severe pain despite increased PRN continuous infusion to 4mg / hr. Requiring PRN dosing for any movement with bathing, dressing changes. 3. Severe delirium/ anxiety. Added haldol 2mg IV every 4 hrs as needed for delirium with agitation. Found agitated, swatting at flies not present. 4. Emotional and spiritual support from  and . Expressing fear of dying. Spiritual Interventions: Will contace Amalia ( following) to assist with spiritual issues.     Psych/ Social/ Emotional Interventions:  No family present at time of viist. Pt expressed concern for sister Corinne Care be so scared. \"   Care Coordination Needs: Discussed patient with unit nurse [facility].   Will send report to [facility] hospice interdisciplinary team.        Care plan and New Orders discussed / approved with Dr. Palomino Common      Description History and Chart Review   If this is initial GIP note must document RN assessment/MD communication in previous setting. Specifically document nursing/medication needs in last 24 hours to support GIP care  Narrative History of last 24 hours that demonstrates care cannot be provided in another setting:  Patient is obese and anasarca is present. Patient with widespread metastatic cancer as well as a pathological right hip fracture requiring several staff members to turn for care. Patient has a PICC line right upper brachial vein for IV medications with Continuous Hydromorphone infusion requiring daily adjustments. Dose increased 6/6 from 2 mg/hr to 4 mg/hr    Patient with confusion / anxiety yesterday into today. Was able to encourage patient to take pills yesterday, today patient does not open her mouth with request and speech is inappropriate so assessed not to be able to follow commands such as \"swallow the pill. \"      What has been done to control the patient's symptoms in the last 24 hours? Placed on different bariatric bed for comfort as the first one had been broken and then patient moved to larger room  Hydromorphone 2 mg/hr increased to 4 mg/hr 6/6. Hydromorphone 4mg q 15 minutes prn and had been given x 1 since midnight with unit nurses caring for PICC line per hospital policies. ,      Does the patient currently require IV medications? YES  Does the patient currently require scheduled medications? *YES  Does the patient currently require a PCA?  YES     List number of doses of PRN medications in last 24 hours:  Medication 1:  Hydromorphone 4 mg IV   Number of doses:1    Medication 2:  Ativan 1mg IV every 15min as needed  Number of doses: 1    Medication 3 Haldol 2mg IV every 4 hrs  Number of doses:      Supporting documentation for GIP need for pain control:  [x] Frequent evaluation by a doctor, nurse practitioner, nurse   [x] Frequent medication adjustment    [x] IVs that cannot be administered at home   [x] Aggressive pain management   [x] Complicated technical delivery of medications                                                                                                                                                                                                                                                 Supporting documentation for GIP need for symptom control:  [x]  Sudden decline necessitating intensive nursing intervention  []  Uncontrolled / intractable nausea or vomiting   [x]  Pathological fractures  []  Advanced open wounds requiring frequent skilled care  [] Unmanageable respiratory distress  [x] New or worsening delirium   [] Delirium with behavior issues: Is 24 hour caregiver present due to safety concerns with agitation? (yes/no)  [] Imminent death  with skilled nursing needs documented above     DISCHARGE PLANNING   Daily discharge planning required for GIP  1. Discharge Plan: *It is expected that patient will pass while inpatient yet [facility] would be an option  2. Patient/Family teaching: Mother is in the room   As above, education provided requiring patient's increased level of confusion and anxiety. 3. Response to patient/family teaching: Understanding and appreciation expressed        ASSESSMENT    KARNOFSKY: 20     Quality Measure:                   Patient self-reports:   [x] No     ESAS:   Time of Assessment:  1300  Pain (1-10):4  Fatigue (1-10):   Shortness of breath (1-10):2  Nausea (1-10): Appetite (1-10):    Anxiety: (1-10):   Depression: (1-10):   Well-being: (1-10):   Constipation: _ Yes  _ No  LAST BM:      CLINICAL INFORMATION   Patient Vitals for the past 12 hrs:  Visit Vitals    /70 (BP 1 Location: Left arm, BP Patient Position: At rest)    Pulse (!) 120    Temp 97.6 °F (36.4 °C)    Resp 16    SpO2 97%           Currently this patient has:  [x] Supplemental O2                   [] IV                                          [x] PICC                                     [] PORT             [] NG Tube                    [] PEG Tube                  [] Ostomy     [x] Jama draining _very dark tea colored______ urine  [] Other:      SIGNS/PHYSICAL FINDINGS      Skin (including wound):  [] Warm, dry, supple, intact and color normal for race  [] Warm   [x] Dry   [x] Cool     [] Clammy       [] Diaphoretic    Turgor                        [] Normal                        [x] Decreased  Color:                        [] Pink                        [] Pale                        [] Cyanotic                        [] Erythema                        [x] Jaundice                        [] Normal for Race  []  Wounds:     Neuro:  [] Lethargy  [] Restlessness / agitation  [] Confusion / delirium  [x] Hallucinations  [] Responds to maximal stimulation  [] Unresponsive  [] Seizures      Cardiac:  [] Dyspnea on Exertion  [] JVD  [] Murmur  [] Palpitations  [] Hypotension  [] Hypertension  [x] Tachycardia  [] Bradycardia  [] Irregular HR  [] Pulses Decreased  [] Pulses Absent  [] Edema:       (Location, Grade and Pitting)  [] Mottling:      (Location)     Respiratory:  Breath sounds: clear upper and diminished lower                        [] Diminished                        [] Wheeze                        [] Rhonchi                        [] Rales   [x] Even and unlabored  [] Labored:            [] Cough                        [] Non Productive                        [] Productive                                              [] Description:           [] Deep suctioned   [x] O2 at _3__ LPM  [] High flow oxygen greater than 10 LPM  [] Bi-Pap     GI  [x] Abdomen very rounded and patient moans and has facial grimacing during palpation of RUQ and of low abdomen.   [] Ascites  [] Nausea  [] Vomiting  [] Incontinent of bowels  [] Bowel sounds (yes/no)  [] Diarrhea  [] Constipation (see above including last bowel movement)  [] Checked for impaction  [] Last BM     [x] Jama     Musculoskeletal  [] Balance/[place] Unsteady   [] Weak   Strength:                         [] Normal                         [] Limited                         [x] Decreasing   Activities:                         [] Up as tolerated                        [x] Bedridden                         [] Specify:     SAFETY per hospital policies   [x] Side rails ? [x] Hospital bed            ALLERGIES AND MEDICATIONS      Allergies: Allergies   Allergen Reactions    Egg Nausea and Vomiting       Raw egg and scrambled eggs. Egg products okay.      Pcn [Penicillins] Nausea and Vomiting       \"but could take amoxil\"    Shellfish Containing Products Unknown (comments)    Tetanus And Diphtheria Toxoids, Adsorbed, Adult Other (comments)       Developed local swelling, axillary pain, and transient fever    Tomato Unknown (comments)       Fresh tomatoes                Current Facility-Administered Medications   Medication Dose Route Frequency    LORazepam (ATIVAN) injection 1 mg  1 mg IntraVENous Q6H PRN    HYDROmorphone (PF) (DILAUDID) injection 2 mg  2 mg IntraVENous Q15MIN PRN    HYDROmorphone (DILAUDID) 1 mg/mL oral solution 8 mg  8 mg Oral Q15MIN PRN    HYDROmorphone (PF) 15 mg/30 ml (DILAUDID) PCA    IntraVENous TITRATE    haloperidol (HALDOL) tablet 2 mg  2 mg Oral Q4H PRN    sodium chloride (NS) flush 10-30 mL  10-30 mL InterCATHeter PRN    sodium chloride (NS) flush 10 mL  10 mL InterCATHeter Q24H    sodium chloride (NS) flush 10 mL  10 mL InterCATHeter PRN    sodium chloride (NS) flush 10-40 mL  10-40 mL InterCATHeter Q8H    sodium chloride (NS) flush 20 mL  20 mL InterCATHeter PRN    heparin (porcine) pf 300 Units  300 Units InterCATHeter PRN    acetaminophen (TYLENOL) tablet 650 mg  650 mg Oral Q4H PRN    bisacodyl (DULCOLAX) tablet 5 mg  5 mg Oral DAILY PRN    diphenhydrAMINE (BENADRYL) capsule 25 mg  25 mg Oral Q4H PRN    DULoxetine (CYMBALTA) capsule 60 mg  60 mg Oral QHS    methadone (DOLOPHINE) tablet 10 mg  10 mg Oral Q8H    polyethylene glycol (MIRALAX) packet 17 g  17 g Oral DAILY PRN    senna-docusate (PERICOLACE) 8.6-50 mg per tablet 2 Tab  2 Tab Oral BID    sulindac (CLINORIL) tablet 200 mg  200 mg Oral BID WITH MEALS    magic mouthwash cpd (with sucralfate)  5 mL Oral QID PRN    sodium chloride (NS) flush 10-30 mL  10-30 mL InterCATHeter PRN    sodium chloride (NS) flush 10 mL  10 mL InterCATHeter Q24H    sodium chloride (NS) flush 10 mL  10 mL InterCATHeter PRN    sodium chloride (NS) flush 10-40 mL  10-40 mL InterCATHeter Q8H    sodium chloride (NS) flush 20 mL  20 mL InterCATHeter Q24H             Visit Time In: 10:00  Visit Time Out: 11:30

## 2017-08-07 NOTE — HOSPICE
[facility] LCSW Note: This LCSW will [facility] seems to not be an option due to patient's increased care needs. She is on a PCA pump making NH placement an issue. Family is unable to care for patient at home and it takes atleast 3-4 people to assist her in turning etc as she is a large woman and has pain issues. Patient is awake this morning, eyes open, confused and delirious most of the time but there was a brief window when she was able to let me know that she lived with her mother and father at home, that she wants to go home and she asked for \"pepsi and ice\" for lunch. Patient also noted that she could see her \"sister's house\" outside her hospital window. She was noted to become delirious and agitated by floor RN, Shaka Emanuel, later this morning. She was watching tv but then wanted it off, she did not want her shade drawn down but was seeing things outside her window. Patient noted to the RN  \" I just want to sleep\". No family in the room with patient. Her mother and sisters have visited at various times per hospice and hospital staff. Support will be provided to them when they are present. Gentle conversation with patient, introduced self as hospice SW and that I would be available to her here. Case discussed with team and it appears that patient would likely have to remain at hospital secondary to her pain issues, needing PCA pump. Will contact family to introduce self. Orders for hospice changed to reflect new needs for patient. Will continue to provide emotional support to family and provide EOL education. Patient appears to have anxiety and fears around dying, has voiced this at various times with staff. This is not unusual based on her age and situation. Phone call made to mother later this morning. She noted that she usually is at the hospital \"after 3 pm\". Will attempt to meet mother when here. 3:55 pm Patient alone in room.  No family members present. She is calmer, lethargic and sleepy but more cognizant and less confused. I told patient I had spoken to her mother and that she was planning to come in this afternoon. Patient noted \"that's good\".  She appears comfortable.

## 2017-08-07 NOTE — ROUTINE PROCESS
Oncology Nursing Communication Tool      7:01 AM  8/7/2017     Bedside shift change report given to Nahomi Kline RN (incoming nurse) by Robin Israel RN (outgoing nurse) on Nona Bowmanin a 40 y.o. female who was admitted on 7/31/2017  5:54 PM. Report included the following information SBAR, Kardex, Intake/Output and MAR. Significant changes during shift: pain better controlled with increase in PCA , still needs prn dose of pain med with any significant movement. Pt more alert at times & requested/drank a cup of water w/o difficulty. Issues for physician to address: none            Code Status: DNR     Infections: No current active infections     Allergies: Egg; Pcn [penicillins]; Shellfish containing products; Tetanus and diphtheria toxoids, adsorbed, adult; and Tomato     Current diet: DIET REGULAR       Pain Controlled [x] yes [] no   Bowel Movement [] yes [x] no   Last Bowel Movement (date)             Vital Signs:   No data found. Intake & Output:     Intake/Output Summary (Last 24 hours) at 08/07/17 0701  Last data filed at 08/06/17 2219   Gross per 24 hour   Intake              120 ml   Output              400 ml   Net             -280 ml      Laboratory Results:   No results found for this or any previous visit (from the past 12 hour(s)). Opportunity for questions and clarifications were given to the incoming nurse. Patient's bed is in low position, side rails x2, door open PRN, call bell within reach and patient not in distress.       Robin Israel RN

## 2017-08-08 NOTE — PROGRESS NOTES
This was a follow- up visit to continue support to Karli and her mother. Mrs Vijay Perdomo is engaged in anticipatory grief as she continue to support Karli and other members of her family. She noted that it is helpful for her to focus on the fact that Karli is good with God. However, she admits that some times are harder than others.  Today,

## 2017-08-08 NOTE — HOSPICE
[facility] RN note:  Pt with increased pain/agitation last 24hrs requiring 9 PRN doses of dilaudid (36mg) and 4 PRN doses of ativan 1mg IV to re establish comfort. PCA increased to 5mg/hr with newly scheduled ativan at 1mg every 6 hrs for more consistent relief per Dr Tatyana Berger. When pt is alert she is in pain and anxious.             [facility]  [facility] Help to Those in Need  [phone]      GIP Daily Nursing Note   Patient Name: Mitchel Harvey  YOB: 1980  Age: 40 y.o.      Date of Visit: 08/08/17  Facility of Care: [facility]  Patient Room: [address] Attending: Madeleine Elam MD  Hospice Diagnosis:   Endometrial ca [facility])        Level of Care: [facility]      Current [facility] Symptoms    1. . Pain  2. Delirium/anxiety   3. Spiritual distress, scared of dying.          ASSESSMENT & PLAN   Must update Plan of Care including visit frequencies for I  1. Continue [facility] level of care for continued need for aggressive symptom management. 2. Pain:  Severe pain despite increased PRN continuous infusion to 5mg / hr. Requiring PRN dosing for any movement with bathing, dressing changes. 3. Severe delirium/ anxiety. .  Newly scheduled ativan 1mg IV every 6 hrs as pt required 4 PRN doses through the night. 4. Emotional and spiritual support from  and . Expressing fear of dying.     Spiritual Interventions: Will mau Holland ( following) to assist with spiritual issues.      Psych/ Social/ Emotional Interventions:  No family present at time of viist. Pt expressed concern for sister Funmilayo Cody be so scared. \"   Care Coordination Needs: Discussed patient with unit nurse Wilbert Clark send report to [facility] hospice interdisciplinary team.  [facility]  Care plan and New Orders discussed / approved with Dr. Lauren Kan  Description History and Chart Review   If this is initial GIP note must document RN assessment/MD communication in previous setting.  Specifically document nursing/medication needs in last 24 hours to support GIP care  Narrative History of last 24 hours that demonstrates care cannot be provided in another setting:  Patient is obese and anasarca is present.  Patient with widespread metastatic cancer as well as a pathological right hip fracture requiring several staff members to turn for care.   Patient has a PICC line right upper brachial vein for IV medications with Continuous Hydromorphone infusion requiring daily adjustments. Dose increased 6/8 from 4 mg/hr to 5mg/hr    Patient with confusion / anxiety yesterday into today.  Was able to encourage patient to take pills yesterday, today patient does not open her mouth with request and speech is inappropriate so assessed not to be able to follow commands such as \"swallow the pill. \"       What has been done to control the patient's symptoms in the last 24 hours? Placed on different bariatric bed for comfort as the first one had been broken and then patient moved to larger room  Hydromorphone 4 mg/hr increased to 5 mg/hr 6/8.  Hydromorphone 4mg q 15 minutes prn and had been given x 1 since midnight with unit nurses caring for PICC line per hospital policies.  ,       Does the patient currently require IV medications?  YES  Does the patient currently require scheduled medications? *YES  Does the patient currently require a PCA?  YES      List number of doses of PRN medications in last 24 hours:  Medication 1:  Hydromorphone 4 mg IV   Number of doses:9     Medication 2:  Ativan 1mg IV every 15min as needed  Number of doses: 4     Medication 3 Haldol 2mg IV every 4 hrs  Number of doses:       Supporting documentation for GIP need for pain control:  [x] Frequent evaluation by a doctor, nurse practitioner, nurse   [x] Frequent medication adjustment    [x] IVs that cannot be administered at home   [x] Aggressive pain management   [x] Complicated technical delivery of medications                                                                                                                                                                                                                                                 Supporting documentation for GIP need for symptom control:  [x]  Sudden decline necessitating intensive nursing intervention  []  Uncontrolled / intractable nausea or vomiting   [x]  Pathological fractures  []  Advanced open wounds requiring frequent skilled care  [] Unmanageable respiratory distress  [x] New or worsening delirium   [] Delirium with behavior issues: Is 24 hour caregiver present due to safety concerns with agitation? (yes/no)  [] Imminent death  with skilled nursing needs documented above      DISCHARGE PLANNING   Daily discharge planning required for GIP  1. Discharge Plan: facility placement if pt stable off of IV opioids. [facility] once pt is stable but continues need for PCA. 2. Patient/Family teaching: Mother is in the room   As above, education provided requiring patient's increased level of confusion and anxiety.    3. Response to patient/family teaching: Understanding and appreciation expressed          ASSESSMENT    KARNOFSKY: 20      Quality Measure:                   Patient self-reports:   [x] No      ESAS:   Time of Assessment:  1300  Pain (1-10):4  Fatigue (1-10):   Shortness of breath (1-10):2  Nausea (1-10): Appetite (1-10):    Anxiety: (1-10):   Depression: (1-10):   Well-being: (1-10):   Constipation: _ Yes  _ No  LAST BM:       CLINICAL INFORMATION   Patient Vitals for the past 12 hrs:  Visit Vitals    /63 (BP 1 Location: Left arm, BP Patient Position: At rest;Supine)    Pulse (!) 126    Temp 98.1 °F (36.7 °C)    Resp 16    SpO2 100%                Currently this patient has:  [x] Supplemental Z5                   [] IV                                          [x] PICC                                     [] PORT             [] NG Tube                    [] PEG Tube                  [] Ostomy     [x] Jama draining _very dark tea colored______ urine  [] Other:       SIGNS/PHYSICAL FINDINGS       Skin (including wound):  [] Warm, dry, supple, intact and color normal for race  [] Warm   [x] Dry   [x] Cool     [] Clammy       [] Diaphoretic    Turgor                        [] Normal                        [x] Decreased  Color:                        [] Pink                        [] Pale                        [] Cyanotic                        [] Erythema                        [x] Jaundice                        [] Normal for Race  []  Wounds:      Neuro:  [x] Lethargy  [] Restlessness / agitation  [] Confusion / delirium  [x] Hallucinations  [] Responds to maximal stimulation  [] Unresponsive  [] Seizures       Cardiac:  [] Dyspnea on Exertion  [] JVD  [] Murmur  [] Palpitations  [] Hypotension  [] Hypertension  [x] Tachycardia  [] Bradycardia  [] Irregular HR  [] Pulses Decreased  [] Pulses Absent  [] Edema:     anasarca, 4+ edema on R side  [] Mottling:      (Location)      Respiratory:  Breath sounds: clear upper and diminished lower                        [x] Diminished                        [] Wheeze                        [] Rhonchi                        [] Rales   [x] Even and unlabored  [] Labored:            [] Cough                        [] Non Productive                        [] Productive                                              [] Description:           [] Deep suctioned   [x] O2 at _3__ LPM  [] High flow oxygen greater than 10 LPM  [] Bi-Pap      GI  [x] Abdomen very rounded and patient moans and has facial grimacing during palpation of RUQ and of low abdomen.   [] Ascites  [] Nausea  [] Vomiting  [] Incontinent of bowels  [] Bowel sounds (yes/no)  [] Diarrhea  [] Constipation (see above including last bowel movement)  [] Checked for impaction  [] Last BM     [x] Jama      Musculoskeletal  [] Balance/[place] Unsteady   [] Weak Strength:                         [] Normal                         [] Limited                         [x] Decreasing   Activities:                         [] Up as tolerated                        [x] Bedridden                         [] Specify:      SAFETY per hospital policies   [x] Side rails ?    [x] Hospital bed               ALLERGIES AND MEDICATIONS       Allergies:             Allergies   Allergen Reactions    Egg Nausea and Vomiting         Raw egg and scrambled eggs. Egg products okay.      Pcn [Penicillins] Nausea and Vomiting         \"but could take amoxil\"    Shellfish Containing Products Unknown (comments)    Tetanus And Diphtheria Toxoids, Adsorbed, Adult Other (comments)         Developed local swelling, axillary pain, and transient fever    Tomato Unknown (comments)         Fresh tomatoes                       Current Facility-Administered Medications   Medication Dose Route Frequency    LORazepam (ATIVAN) injection 1 mg  1 mg IntraVENous Q6H PRN    HYDROmorphone (PF) (DILAUDID) injection 2 mg  2 mg IntraVENous Q15MIN PRN    HYDROmorphone (DILAUDID) 1 mg/mL oral solution 8 mg  8 mg Oral Q15MIN PRN    HYDROmorphone (PF) 15 mg/30 ml (DILAUDID) PCA    IntraVENous TITRATE    haloperidol (HALDOL) tablet 2 mg  2 mg Oral Q4H PRN    sodium chloride (NS) flush 10-30 mL  10-30 mL InterCATHeter PRN    sodium chloride (NS) flush 10 mL  10 mL InterCATHeter Q24H    sodium chloride (NS) flush 10 mL  10 mL InterCATHeter PRN    sodium chloride (NS) flush 10-40 mL  10-40 mL InterCATHeter Q8H    sodium chloride (NS) flush 20 mL  20 mL InterCATHeter PRN    heparin (porcine) pf 300 Units  300 Units InterCATHeter PRN    acetaminophen (TYLENOL) tablet 650 mg  650 mg Oral Q4H PRN    bisacodyl (DULCOLAX) tablet 5 mg  5 mg Oral DAILY PRN    diphenhydrAMINE (BENADRYL) capsule 25 mg  25 mg Oral Q4H PRN    DULoxetine (CYMBALTA) capsule 60 mg  60 mg Oral QHS    methadone (DOLOPHINE) tablet 10 mg  10 mg Oral Q8H    polyethylene glycol (MIRALAX) packet 17 g  17 g Oral DAILY PRN    senna-docusate (PERICOLACE) 8.6-50 mg per tablet 2 Tab  2 Tab Oral BID    sulindac (CLINORIL) tablet 200 mg  200 mg Oral BID WITH MEALS    magic mouthwash cpd (with sucralfate)  5 mL Oral QID PRN    sodium chloride (NS) flush 10-30 mL  10-30 mL InterCATHeter PRN    sodium chloride (NS) flush 10 mL  10 mL InterCATHeter Q24H    sodium chloride (NS) flush 10 mL  10 mL InterCATHeter PRN    sodium chloride (NS) flush 10-40 mL  10-40 mL InterCATHeter Q8H    sodium chloride (NS) flush 20 mL  20 mL InterCATHeter Q24H                Visit Time In: 10:00  Visit Time Out: 11:30

## 2017-08-08 NOTE — HOSPICE
[facility] LCSW Note: Patient asleep during time of visit. No family members in room. She had a difficult night, per staff and needed medications adjusted to keep her comfortable. Apparently her mother visited last evening, but had to go to the ER. She was discharged back home. , [address] [place] [address] present this afternoon. Spoke to her regarding patient. Case discussed with [facility] and hospital team. Patient will remain at [facility] at this time due to need for symptom management and need for comfort. [facility] Plascencia has a good relationship with patient's mother and will remain in contact with her. This SW will be available to patient for support as needed.

## 2017-08-08 NOTE — PROGRESS NOTES
Occupational Therapy Screening:  Services are not indicated at this time. [facility] based on results obtained during the nursing admission assessment. The patients chart was reviewed and the patient is not appropriate for a skilled therapy evaluation at this time. Please consult occupational therapy if any therapy needs arise. Thank you.     Yelena Escobar OTR/L

## 2017-08-08 NOTE — HOSPICE
[facility]  [phone]    Patient Name: Ashwini De Los Santos  YOB: 1980    Date of Provider Hospice Visit: 08/08/17    Level of Care:   [x] General Inpatient (GIP)    [] Routine   [] Respite    Location of Care:  [] [facility] [] [facility] [x] [facility] [] [facility] [] [facility]    Date of Original Hospice Admission: 7-31-17  Hospice Medical Director for Inpatient Care: Dr. Alicia Hamilton Diagnosis: Metastatic endometrial cancer, s/p XRT  Diagnoses RELATED to the terminal prognosis: pathologic right subtrochanteric femur fracture  Other Diagnoses: dm-2, obesity, hx tachycardia     HOSPICE SUMMARY   Do not cut and paste chart information other than imaging findings    Ashwini De Los Santos is a 40y.o. year old who was admitted to [facility]. The patient's principle diagnosis has resulted in hospitalization 5/8-5/16/17 for right hip pain and respiratory failure. CT-pathologic right subtrochenteric femur fracture and enlarging [id number] cm right pelvic mass with invasion to pelvis, sacrum and hip. Pain management with PCA and methadone. Pt was sent to hospice house but remains bedbound and still has pain in right groin and hip , worse with morvement. Palliative/hospice MD has discussed and reviewed case with IR and plan is for inpatient admit and palliative microwave ablation of tumor. Refer to LCD   The LCD for Hospice for the admitted diagnosis of cancer includes:    [x] Disease with distant metastases at presentation OR    [x] Progression from an earlier stage of disease to metastatic disease with either:   [] a continued decline in spite of therapy   [] patient declines further disease directed therapy    [] Certain cancers with poor prognoses (e.g. small cell lung cancer, brain cancer and pancreatic cancer) may be hospice eligible without fulfilling the other criteria in this section.       Functionally, the patient's Karnofsky and/or Palliative Performance Scale has declined over a period of months and is estimated at 30. The patient is dependent on the following ADLs:all except feeding    Objective information that support this patients limited prognosis includes:   CT scan 5/9/17  IMPRESSION  IMPRESSION:     1. Interval significant enlargement of the mass centered in the right pelvis  with destruction of the bones of the right pelvis, sacrum, and proximal right  femur, with extension to involve the left acetabulum, inferior pubic ramus and  superior pubic ramus. 2. Associated pathologic fracture of the right proximal femur. 3. Evaluation of the mass subjacent to the right hemidiaphragm is limited as it  was not as well imaged on prior examinations. 4. Interval enlargement of left pelvic lymph node.     The patient/family chose comfort measures with the support of Hospice. HOSPICE DIAGNOSES   Active Symptoms:  1. Pain; generalised; still persistent  2. Delirium; mild, fluctuates  3. Anemia  4. Jaundice: worse  5. Fatigue/weakness: still persists  6  Inability to swallow food/pills       PLAN   1. Continue [facility] level care at [facility] s/p CT guided microwave ablation of tumor 5 days ago: did not seem to have any benefit of pain control post procedure. 2. Seems to be declining steadily with increasing jaundice (liver failure), fatigue and pain  3. increase dilaudid pca 5 mg/hr ( 36 mg of dilaudid IV for breakthrough pain last 24 hrs)--still has pain but pt  unable to quantify or give severity  4. Severe pain after pt was turned and cleaned today despite premed and administration during turning   Dilaudid 4mg q15 min prn. May need to increase PCA rate. 5. Haldol IV prn for delirium and hallucination  6. D/c all oral meds as she is unable to swallow  7. Added ativan 1mg IV every hour prn -medicate x 1 now--pt unable to sleep  8. Continue routine wound care and wound care s/p procedure.     5.  and SW to support family needs  10. Disposition: planned for LTC placement per family request. However pt still symptomatic and requiring close monitoring, and frequent adjustment in meds so will remain in hospital for now till more stable for managing in outpt community setting      Prognosis estimated based on 08/08/17 clinical assessment is:   [] Hours to Days    [] Days to Weeks    [x] Other:few weeks    Communicated plan of care with: Hospice Case Manager; Hospice IDT; Care Team     GOALS OF CARE     Resuscitation Status: DNR  Durable DNR: [x] Yes [] No    Advance Care Planning 8/6/2017   Patient's Healthcare Decision Maker is: Legal Next of Kin   Primary Decision Maker Name -   Primary Decision Maker Phone Number -   Primary Decision Maker Relationship to Patient -   Confirm Advance Directive Yes, on file   Patient Would Like to Complete Advance Directive -   Does the patient have other document types Do Not Resuscitate        HISTORY     History obtained from: chart, hospice RN, floor RN,  and patient, family    CHIEF COMPLAINT:  The patient is:   [x] Verbal  [] Nonverbal  [] Unresponsive    HPI/SUBJECTIVE:    Pt arousable  Able to rest last night after 4 doses of dilaudid and 2 doses of ativan last shift per RN  Hallucinations yesterday.        REVIEW OF SYSTEMS     The following systems were: [x] reviewed  [] unable to be reviewed    Positive ROS include:  Constitutional: fatigue/weakness  Ears/nose/mouth/throat:  Respiratory:  Gastrointestinal:anorexia  Musculoskeletal: pain  Neurologic:mild confusion  Psychiatric:anxiety  Endocrine:        FUNCTIONAL ASSESSMENT     Palliative Performance Scale (PPS): 20-30%     PSYCHOSOCIAL/SPIRITUAL ASSESSMENT     Active Problems:    Endometrial ca [facility]) (7/31/2017)      Past Medical History:   Diagnosis Date    Cancer ([facility]     uterine    CVI (common variable immunodeficiency) (HCC)     [facility]     Elevated liver function tests 10/21/2010    Endometrial cancer ([facility] 7/7/2010    Hypertension     Iron deficiency anemia     Menometrorrhagia 10/21/2010    Microcytic anemia 10/21/2010    Morbid obesity ([address]     Prediabetes 7/7/2010    Psychiatric disorder     depression    Radiation     completed radiation mid-march      Past Surgical History:   Procedure Laterality Date    CARDIAC SURG PROCEDURE UNLIST      hx of tachycardia    HX GYN      hysterectomy      Social History   Substance Use Topics    Smoking status: Never Smoker    Smokeless tobacco: Never Used    Alcohol use Yes     Family History   Problem Relation Age of Onset    Hypertension Mother     Hypertension Father     Stroke Maternal Grandfather     Cancer Paternal Grandmother      breast    Diabetes Paternal Grandmother       Allergies   Allergen Reactions    Egg Nausea and Vomiting     Raw egg and scrambled eggs. Egg products okay.      Pcn [Penicillins] Nausea and Vomiting     \"but could take amoxil\"    Shellfish Containing Products Unknown (comments)    Tetanus And Diphtheria Toxoids, Adsorbed, Adult Other (comments)     Developed local swelling, axillary pain, and transient fever    Tomato Unknown (comments)     Fresh tomatoes      Current Facility-Administered Medications   Medication Dose Route Frequency    haloperidol lactate (HALDOL) injection 2 mg  2 mg IntraVENous Q4H PRN    LORazepam (ATIVAN) injection 1 mg  1 mg IntraVENous Q1H PRN    HYDROmorphone (PF) 15 mg/30 ml (DILAUDID) PCA   IntraVENous CONTINUOUS    HYDROmorphone (PF) (DILAUDID) injection 4 mg  4 mg IntraVENous Q15MIN PRN    sodium chloride (NS) flush 10-30 mL  10-30 mL InterCATHeter PRN    sodium chloride (NS) flush 10 mL  10 mL InterCATHeter Q24H    sodium chloride (NS) flush 10 mL  10 mL InterCATHeter PRN    sodium chloride (NS) flush 20 mL  20 mL InterCATHeter PRN    heparin (porcine) pf 300 Units  300 Units InterCATHeter PRN    magic mouthwash cpd (with sucralfate)  5 mL Oral QID PRN    sodium chloride (NS) flush 10-30 mL 10-30 mL InterCATHeter PRN    sodium chloride (NS) flush 10 mL  10 mL InterCATHeter Q24H    sodium chloride (NS) flush 10 mL  10 mL InterCATHeter PRN        PHYSICAL EXAM     Wt Readings from Last 3 Encounters:   05/30/17 267 lb (121.1 kg)   05/10/17 267 lb 10.2 oz (121.4 kg)   02/16/17 300 lb (136.1 kg)       Visit Vitals    /63 (BP 1 Location: Left arm, BP Patient Position: At rest;Supine)    Pulse (!) 126    Temp 98.1 °F (36.7 °C)    Resp 16    SpO2 100%       Supplemental O2  [x] Yes  [] NO  Last bowel movement:     Currently this patient has:  [] Peripheral IV [x] PICC  [] PORT [] ICD    [] Davila Catheter [] NG Tube   [] PEG Tube    [] Rectal Tube [] Drain  [] Other:     Constitutional: obese, lethargic but opens eyes and able to speak few words, still in a lot of pain  Eyes: perrl, scleral icterus ++  ENMT: dry oral mucosa  Cardiovascular: tachycardic  Respiratory: even, nonlabored  Gastrointestinal: soft obese  Musculoskeletal:2-3 plus b/l LE edema, edema on back as well;subcutaneous  Skin: warm, dry  Neurologic: confused, lethargic, minimal response  Psychiatric: anxious  Other:davila's     Pertinent Lab and or Imaging Tests:  Lab Results   Component Value Date/Time    Sodium 139 08/01/2017 06:36 AM    Potassium 4.5 08/01/2017 06:36 AM    Chloride 106 08/01/2017 06:36 AM    CO2 26 08/01/2017 06:36 AM    Anion gap 7 08/01/2017 06:36 AM    Glucose 82 08/01/2017 06:36 AM    BUN 23 08/01/2017 06:36 AM    Creatinine 0.83 08/01/2017 06:36 AM    BUN/Creatinine ratio 28 08/01/2017 06:36 AM    GFR est AA >60 08/01/2017 06:36 AM    GFR est non-AA >60 08/01/2017 06:36 AM    Calcium 8.6 08/01/2017 06:36 AM     Lab Results   Component Value Date/Time    Protein, total 5.7 08/01/2017 06:36 AM    Albumin 2.0 08/01/2017 06:36 AM           This patient meets Hospice General Inpatient (GIP) Level of Care.     Supporting documentation for GIP need for pain control:  [x] Frequent evaluation by a doctor, nurse practitioner, nurse   [x] Frequent medication adjustment    [] IVs that cannot be administered at home   [x] Aggressive pain management   [] Complicated technical delivery of medications              Supporting documentation for GIP need for symptom control:  []  Sudden decline necessitating intensive nursing intervention  []  Uncontrolled / intractable nausea or vomiting   []  Pathological fractures  []  Advanced open wounds requiring frequent skilled care  [] Unmanageable respiratory distress  [] New or worsening delirium   [] Delirium with behavior issues  [] Imminent death  with skilled nursing needs documented above      Total time: 25 min  Counseling / coordination time: 25 mts d/w hospice RN and floor RN  > 50% counseling / coordination?: yes

## 2017-08-08 NOTE — PROGRESS NOTES
Oncology Nursing Communication Tool      7:42 PM  8/8/2017     Bedside and Verbal shift change report given to Samira Hare RN (incoming nurse) by Renae Wilson (outgoing nurse) on [facility] a 40 y.o. female who was admitted on 7/31/2017  5:54 PM. Report included the following information SBAR, Kardex, Procedure Summary, Intake/Output, MAR, Accordion and Recent Results. Significant changes during shift: none to note, pt rested comfortably throughout day, 1 dose prn Dilaudid      Issues for physician to address: same as above          Code Status: DNR     Infections: No current active infections     Allergies: Egg; Pcn [penicillins]; Shellfish containing products; Tetanus and diphtheria toxoids, adsorbed, adult; and Tomato     Current diet: DIET REGULAR       Pain Controlled [x] yes [] no   Bowel Movement [] yes [x] no   Last Bowel Movement (date) 8/7/17            Vital Signs:   No data found. Intake & Output:     Intake/Output Summary (Last 24 hours) at 08/08/17 1942  Last data filed at 08/08/17 1630   Gross per 24 hour   Intake                0 ml   Output              550 ml   Net             -550 ml      Laboratory Results:   No results found for this or any previous visit (from the past 12 hour(s)). Opportunity for questions and clarifications were given to the incoming nurse. Patient's bed is in low position, side rails x2, door open PRN, call bell within reach and patient not in distress.       Renae Wilson

## 2017-08-08 NOTE — PROGRESS NOTES
Oncology Nursing Communication Tool      7:35 AM  8/8/2017     Bedside shift change report given to Dora Harkins RN (incoming nurse) by Moises Richmond RN (outgoing nurse) on [place] Voodoo a 40 y.o. female who was admitted on 7/31/2017  5:54 PM. Report included the following information SBAR, Kardex, MAR and Accordion. Significant changes during shift: 5 PRN doses of dilaudid, 2 PRN doses of ativan      Issues for physician to address: See above            Code Status: DNR     Infections: No current active infections     Allergies: Egg; Pcn [penicillins]; Shellfish containing products; Tetanus and diphtheria toxoids, adsorbed, adult; and Tomato     Current diet: DIET REGULAR       Pain Controlled [x] yes [] no   Bowel Movement [] yes [x] no   Last Bowel Movement (date)                 Vital Signs:   Patient Vitals for the past 12 hrs:   Temp Pulse Resp BP SpO2   08/08/17 0719 98.1 °F (36.7 °C) (!) 126 16 106/63 100 %      Intake & Output:     Intake/Output Summary (Last 24 hours) at 08/08/17 0735  Last data filed at 08/08/17 0554   Gross per 24 hour   Intake              270 ml   Output              400 ml   Net             -130 ml      Laboratory Results:   No results found for this or any previous visit (from the past 12 hour(s)). Opportunity for questions and clarifications were given to the incoming nurse. Patient's bed is in low position, side rails x2, door open PRN, call bell within reach and patient not in distress.       Moises Richmond RN

## 2017-08-08 NOTE — PROGRESS NOTES
Brief Nutrition Note    Chart reviewed. Nutrition referral triggered due to pressure ulcer. Pt under hospice care, no nutrition intervention at this time except for comfort eating.     Urbano Valles RDN  Pager: [phone]

## 2017-08-09 NOTE — HOSPICE
[facility] LCSW Note: [place] from Katelyn Haynes that the family will be using [facility] for services for patient. Will visit patient later in the day. She was noted to be unresponsive this morning, unable to be roused.

## 2017-08-09 NOTE — HOSPICE
[facility]  [phone]    Routine Nursing Note   Patient Name: Kevin Newsome  YOB: 1980  Age: 40 y.o. Date of Visit: 08/09/17  Facility of Care: [id number] [facility]  Patient Room: [address] [place] [address]     Hospice Attending: Mehran Rowan MD  Hospice Diagnosis: endometrial CA    Level of Care: Routine    ASSESSMENT, PLAN AND INTERVENTIONS     1. Patient changed to Routine Level of Care  2. Pt stable on current symptom regimen  3. Transfer back to [facility] as soon as able. Spiritual Interventions: shlomo ( from medical group) frequently present for pt and mother Vicki Pedraza    Psych/ Social/ Emotional Interventions: Minimal interaction, will open eyes occasionally. Mother able to visit in the evening, caring for  and pt's sister    Care Coordination Needs: [facility], hospital staff and hospice teams    Care plan and New Orders discussed / approved with Agapito Williamson MD.    Description History and Chart Review     List number of doses of PRN medications in last 24 hours:  Medication 1: dilaudid 4mg IV  Number of doses: 1    Medication 2: ativan 1mg IV  Number of doses: 0    Medication 3:   Number of doses:    DISCHARGE PLANNING     1. Discharge Plan: Return to [facility]  2. Patient/Family teaching: No family present  3.  Response to patient/family teaching:     ASSESSMENT    KARNOFSKY: 20%    Prognosis estimated based on 08/09/17 clinical assessment is:   [] Few to Many Hours  [] Hours to Days   [] Few to Many Days   [x] Days to Weeks   [] Few to Many Weeks   [] Weeks to Months   [] Few to Many Months    Quality Measure: Patient self-reports:  [] Yes    [x] No        CLINICAL INFORMATION   Patient Vitals for the past 12 hrs:   Temp Pulse Resp BP SpO2   08/09/17 1427 98.1 °F (36.7 °C) (!) 113 16 108/60 100 %       Currently this patient has:  [x] Supplemental O2   [] IV    [x] PICC      [] PORT   [] NG Tube    [] PEG Tube   [] Ostomy     [x] Jama draining ___dark team colored ____ urine  [] Other:     SIGNS/PHYSICAL FINDINGS     Skin (including wound): Anasarca  [x] Warm, dry, supple, intact and color normal for race  [] Warm   [] Dry   [] Cool     [] Clammy       [] Diaphoretic    Turgor   [] Normal   [x] Decreased  Color:   [] Pink   [] Pale   [] Cyanotic   [] Erythema   [] Jaundice   [x] Normal for Race  [x]  Wounds:sacral wound  Neuro: Will open eyes at times, not others  [x] Lethargy  [] Restlessness / agitation  [] Confusion / delirium  [] Hallucinations  [] Responds to maximal stimulation  [] Unresponsive  [] Seizures     Cardiac:  [] Dyspnea on Exertion  [] JVD  [] Murmur  [] Palpitations  [] Hypotension  [] Hypertension  [x] Tachycardia  [] Bradycardia  [] Irregular HR  [] Pulses Decreased  [] Pulses Absent  [] Edema:      anasarca  [] Mottling:        Respiratory:  Breath sounds:    [x] Diminished   [] Wheeze   [] Rhonchi   [] Rales   [] Even and unlabored  [] Labored:            [] Cough   [] Non Productive   [] Productive    [] Description:           [] Deep suctioned   [x] O2 at ___2  LPM  [] High flow oxygen greater than 10 LPM  [] Bi-Pap    GI  [] Abdomen (describe)   [] Ascites  [] Nausea  [] Vomiting  [] Incontinent of bowels  [] Bowel sounds (yes/no)  [] Diarrhea  [] Constipation (see above including last bowel movement)  [] Checked for impaction  [x] Last BM  0 BM x several days. No po intake    Nutrition  Diet:_NPO_________  Appetite:   [] Good   [] Fair   [] Poor   [] Tube Feeding       [] Voiding  [] Incontinent   [x] Jama    Musculoskeletal  [] Balance/[place]idan Unsteady   [] Weak   Strength:    [] Normal    [] Limited    [] Decreasing   Activities:    [] Up as tolerated   [x] Bedridden    [] Specify:    SAFETY  [x] 24 hr. Caregiver   [x] Side rails ? [x] Hospital bed   [x] Reviewed Falls & Safety     ALLERGIES AND MEDICATIONS     Allergies: Allergies   Allergen Reactions    Egg Nausea and Vomiting     Raw egg and scrambled eggs. Egg products okay.      Pcn [Penicillins] Nausea and Vomiting     \"but could take amoxil\"    Shellfish Containing Products Unknown (comments)    Tetanus And Diphtheria Toxoids, Adsorbed, Adult Other (comments)     Developed local swelling, axillary pain, and transient fever    Tomato Unknown (comments)     Fresh tomatoes       Current Facility-Administered Medications   Medication Dose Route Frequency    LORazepam (ATIVAN) injection 1 mg  1 mg IntraVENous Q6H    bisacodyl (DULCOLAX) suppository 10 mg  10 mg Rectal DAILY PRN    haloperidol lactate (HALDOL) injection 2 mg  2 mg IntraVENous Q4H PRN    LORazepam (ATIVAN) injection 1 mg  1 mg IntraVENous Q1H PRN    HYDROmorphone (PF) 15 mg/30 ml (DILAUDID) PCA   IntraVENous CONTINUOUS    HYDROmorphone (PF) (DILAUDID) injection 4 mg  4 mg IntraVENous Q15MIN PRN    sodium chloride (NS) flush 10-30 mL  10-30 mL InterCATHeter PRN    sodium chloride (NS) flush 10 mL  10 mL InterCATHeter Q24H    sodium chloride (NS) flush 10 mL  10 mL InterCATHeter PRN    sodium chloride (NS) flush 20 mL  20 mL InterCATHeter PRN    heparin (porcine) pf 300 Units  300 Units InterCATHeter PRN    magic mouthwash cpd (with sucralfate)  5 mL Oral QID PRN    sodium chloride (NS) flush 10-30 mL  10-30 mL InterCATHeter PRN    sodium chloride (NS) flush 10 mL  10 mL InterCATHeter Q24H    sodium chloride (NS) flush 10 mL  10 mL InterCATHeter PRN          Visit Time In: 11:00  Visit Time Out: 12:00

## 2017-08-09 NOTE — PROGRESS NOTES
This was a follow-up visit to continue pastoral care support. Alvah Gaucher, her sister was visiting when I arrived. She was very supportive and interactive. Eloisa Sanchez was more alert than I have seen her the last several days. She was not not totally clear.      Praveena Fournier., [address], [address] [place] [address]

## 2017-08-09 NOTE — PROGRESS NOTES
Oncology Nursing Communication Tool      7:06 PM  8/9/2017     Bedside and Verbal shift change report given to Fidel RN (incoming nurse) by Delmer Jones RN (outgoing nurse) on Liliana Goff a 40 y.o. female who was admitted on 7/31/2017  5:54 PM. Report included the following information SBAR, Kardex, Intake/Output, MAR and Recent Results. Significant changes during shift: none      Issues for physician to address: none            Code Status: DNR     Infections: No current active infections     Allergies: Egg; Pcn [penicillins]; Shellfish containing products; Tetanus and diphtheria toxoids, adsorbed, adult; and Tomato     Current diet: DIET REGULAR       Pain Controlled [x] yes [] no   Bowel Movement [] yes [x] no   Last Bowel Movement (date)               Vital Signs:   Patient Vitals for the past 12 hrs:   Temp Pulse Resp BP SpO2   08/09/17 1427 98.1 °F (36.7 °C) (!) 113 16 108/60 100 %      Intake & Output:     Intake/Output Summary (Last 24 hours) at 08/09/17 1906  Last data filed at 08/09/17 1243   Gross per 24 hour   Intake                0 ml   Output              200 ml   Net             -200 ml      Laboratory Results:   No results found for this or any previous visit (from the past 12 hour(s)). Opportunity for questions and clarifications were given to the incoming nurse. Patient's bed is in low position, side rails x2, door open PRN, call bell within reach and patient not in distress.       Delmer Jones RN

## 2017-08-09 NOTE — HOSPICE
[facility]  [phone]    Patient Name: Oral Rivas  YOB: 1980    Date of Provider Hospice Visit: 08/09/17    Level of Care:   [] General Inpatient (GIP)    [x] Routine   [] Respite    Location of Care:  [] [facility] [] [facility] [x] [address] [] [facility] [] [facility]ospice House [facility]    Date of Original Hospice Admission: 7-31-17  Hospice Medical Director for Inpatient Care: Dr. Chidi Zuniga Diagnosis: Metastatic endometrial cancer, s/p XRT  Diagnoses RELATED to the terminal prognosis: pathologic right subtrochanteric femur fracture  Other Diagnoses: dm-2, obesity, hx tachycardia     HOSPICE SUMMARY   Do not cut and paste chart information other than imaging findings    Oral Rivas is a 40y.o. year old who was admitted to [facility]. The patient's principle diagnosis has resulted in hospitalization 5/8-5/16/17 for right hip pain and respiratory failure. CT-pathologic right subtrochenteric femur fracture and enlarging [id number] cm right pelvic mass with invasion to pelvis, sacrum and hip. Pain management with PCA and methadone. Pt was sent to hospice house but remains bedbound and still has pain in right groin and hip , worse with morvement. Palliative/hospice MD has discussed and reviewed case with IR and plan is for inpatient admit and palliative microwave ablation of tumor. Refer to LCD   The LCD for Hospice for the admitted diagnosis of cancer includes:    [x] Disease with distant metastases at presentation OR    [x] Progression from an earlier stage of disease to metastatic disease with either:   [] a continued decline in spite of therapy   [] patient declines further disease directed therapy    [] Certain cancers with poor prognoses (e.g. small cell lung cancer, brain cancer and pancreatic cancer) may be hospice eligible without fulfilling the other criteria in this section.       Functionally, the patient's Karnofsky and/or Palliative Performance Scale has declined over a period of months and is estimated at 30. The patient is dependent on the following ADLs:all except feeding    Objective information that support this patients limited prognosis includes:   CT scan 5/9/17  IMPRESSION  IMPRESSION:     1. Interval significant enlargement of the mass centered in the right pelvis  with destruction of the bones of the right pelvis, sacrum, and proximal right  femur, with extension to involve the left acetabulum, inferior pubic ramus and  superior pubic ramus. 2. Associated pathologic fracture of the right proximal femur. 3. Evaluation of the mass subjacent to the right hemidiaphragm is limited as it  was not as well imaged on prior examinations. 4. Interval enlargement of left pelvic lymph node.     The patient/family chose comfort measures with the support of Hospice. HOSPICE DIAGNOSES   Active Symptoms:  1. Pain; generalised  2. Delirium; mild, fluctuates  3. Anemia  4. Jaundice: worse  5. Fatigue/weakness: still persists  6  Inability to swallow food/pills       PLAN   1. Change to routine level care. Pt seems to have stabilized and can be managed with scheduled and prn medications. 2. Seems to be declining steadily with increasing jaundice (liver failure), fatigue and pain  3. Continue dilaudid pca 5 mg/hr and dilaudid 4mg q15 min prn.  4. Haldol IV prn for delirium and hallucination  5. Ativan 1mg IV every hour prn and every 6 hours scheduled. 6. Continue routine wound care and wound care s/p procedure. 7.  and SW to support family needs  8. Disposition: routine care stay at [facility] once arrangements done; pt needs atleast 2-3 people assist, needs a bariatric bed. Prognosis estimated based on 08/09/17 clinical assessment is:   [] Hours to Days    [] Days to Weeks    [x] Other:few weeks    Communicated plan of care with: Hospice Case Manager;  Hospice IDT; Care Team     GOALS OF CARE     Resuscitation Status: DNR  Durable DNR: [x] Yes [] No    Advance Care Planning 8/6/2017   Patient's Healthcare Decision Maker is: Legal Next of Kin   Primary Decision Maker Name -   Primary Decision Maker Phone Number -   Primary Decision Maker Relationship to Patient -   Confirm Advance Directive Yes, on file   Patient Would Like to Complete Advance Directive -   Does the patient have other document types Do Not Resuscitate        HISTORY     History obtained from: chart, hospice RN, floor RN    CHIEF COMPLAINT:  The patient is:   [] Verbal  [] Nonverbal  [x] Unresponsive    HPI/SUBJECTIVE:    Pt lethargic and unresponsive, moans intermittently especially when moved  Got 2 prn dilaudid and 1 prn ativan overnight     REVIEW OF SYSTEMS     The following systems were: [] reviewed  [x] unable to be reviewed           FUNCTIONAL ASSESSMENT     Palliative Performance Scale (PPS): 20-30%     PSYCHOSOCIAL/SPIRITUAL ASSESSMENT     Active Problems:    Endometrial ca ([facility] [address] (7/31/2017)      Past Medical History:   Diagnosis Date    Cancer ([facility] [address]     uterine    CVI (common variable immunodeficiency) ([facility]     Diabetes ([facility] [facility]     Elevated liver function tests 10/21/2010    Endometrial cancer ([address] 7/7/2010    Hypertension     Iron deficiency anemia     Menometrorrhagia 10/21/2010    Microcytic anemia 10/21/2010    Morbid obesity ([address]     Prediabetes 7/7/2010    Psychiatric disorder     depression    Radiation     completed radiation mid-march      Past Surgical History:   Procedure Laterality Date    CARDIAC SURG PROCEDURE UNLIST      hx of tachycardia    HX GYN      hysterectomy      Social History   Substance Use Topics    Smoking status: Never Smoker    Smokeless tobacco: Never Used    Alcohol use Yes     Family History   Problem Relation Age of Onset    Hypertension Mother     Hypertension Father     Stroke Maternal Grandfather     Cancer Paternal Grandmother      breast    Diabetes Paternal Grandmother       Allergies Allergen Reactions    Egg Nausea and Vomiting     Raw egg and scrambled eggs. Egg products okay.      Pcn [Penicillins] Nausea and Vomiting     \"but could take amoxil\"    Shellfish Containing Products Unknown (comments)    Tetanus And Diphtheria Toxoids, Adsorbed, Adult Other (comments)     Developed local swelling, axillary pain, and transient fever    Tomato Unknown (comments)     Fresh tomatoes      Current Facility-Administered Medications   Medication Dose Route Frequency    LORazepam (ATIVAN) injection 1 mg  1 mg IntraVENous Q6H    bisacodyl (DULCOLAX) suppository 10 mg  10 mg Rectal DAILY PRN    haloperidol lactate (HALDOL) injection 2 mg  2 mg IntraVENous Q4H PRN    LORazepam (ATIVAN) injection 1 mg  1 mg IntraVENous Q1H PRN    HYDROmorphone (PF) 15 mg/30 ml (DILAUDID) PCA   IntraVENous CONTINUOUS    HYDROmorphone (PF) (DILAUDID) injection 4 mg  4 mg IntraVENous Q15MIN PRN    sodium chloride (NS) flush 10-30 mL  10-30 mL InterCATHeter PRN    sodium chloride (NS) flush 10 mL  10 mL InterCATHeter Q24H    sodium chloride (NS) flush 10 mL  10 mL InterCATHeter PRN    sodium chloride (NS) flush 20 mL  20 mL InterCATHeter PRN    heparin (porcine) pf 300 Units  300 Units InterCATHeter PRN    magic mouthwash cpd (with sucralfate)  5 mL Oral QID PRN    sodium chloride (NS) flush 10-30 mL  10-30 mL InterCATHeter PRN    sodium chloride (NS) flush 10 mL  10 mL InterCATHeter Q24H    sodium chloride (NS) flush 10 mL  10 mL InterCATHeter PRN        PHYSICAL EXAM     Wt Readings from Last 3 Encounters:   05/30/17 121.1 kg (267 lb)   05/10/17 121.4 kg (267 lb 10.2 oz)   02/16/17 136.1 kg (300 lb)       Visit Vitals    /60    Pulse (!) 113    Temp 98.1 °F (36.7 °C)    Resp 16    SpO2 100%       Supplemental O2  [x] Yes  [] NO  Last bowel movement:     Currently this patient has:  [] Peripheral IV [x] PICC  [] PORT [] ICD    [x] Jama Catheter [] NG Tube   [] PEG Tube    [] Rectal Tube [] Drain  [] Other:     Constitutional: obese, lethargic, unresponsive, moans when moved  Eyes: perrl, scleral icterus ++  ENMT: dry oral mucosa  Cardiovascular: tachycardic  Respiratory: even, nonlabored  Gastrointestinal: soft obese  Musculoskeletal:2-3 plus b/l LE edema  Skin: warm, dry  Neurologic: lethargic, unresponsive  Psychiatric:  Other:davila's     Pertinent Lab and or Imaging Tests:  Lab Results   Component Value Date/Time    Sodium 139 08/01/2017 06:36 AM    Potassium 4.5 08/01/2017 06:36 AM    Chloride 106 08/01/2017 06:36 AM    CO2 26 08/01/2017 06:36 AM    Anion gap 7 08/01/2017 06:36 AM    Glucose 82 08/01/2017 06:36 AM    BUN 23 08/01/2017 06:36 AM    Creatinine 0.83 08/01/2017 06:36 AM    BUN/Creatinine ratio 28 08/01/2017 06:36 AM    GFR est AA >60 08/01/2017 06:36 AM    GFR est non-AA >60 08/01/2017 06:36 AM    Calcium 8.6 08/01/2017 06:36 AM     Lab Results   Component Value Date/Time    Protein, total 5.7 08/01/2017 06:36 AM    Albumin 2.0 08/01/2017 06:36 AM             Total time: 35 min  Counseling / coordination time: 20 mts d/w hospice RN and floor RN  > 50% counseling / coordination?: yes

## 2017-08-10 NOTE — HOSPICE
[facility] Hospice [facility] Note: Mother Erasmo Anderson present today. Patient is fast asleep, unable to be roused. She appears comfortable. Introduced self to Erasmo Anderson and offered support, empathy for her role as caregiver to her daughter, her  (who has a brain injury from an aneurysm) and patient's 12 yo niece, who has lived with them on and off. Informed Erasmo Anderson about bereavement services and support available now and down the road. Erasmo Anderson is stoic, coping as well as she can with her daughter's impending loss and is realistic. Explained to mother that the plan is for patient to be transferred to the [facility] tomorrow for routine LOC as patient is comfortable on current medication regimen. Mother agrees to this plan. Will coordinate and plan for transportation tomorrow. Will reassess patient tomorrow morning to note her condition and comfort.

## 2017-08-10 NOTE — PROGRESS NOTES
[facility]  [phone]    Patient Name: Angeles Elliott  YOB: 1980    Date of Provider Hospice Visit: 08/10/17    Level of Care:   [] General Inpatient (GIP)    [x] Routine   [] Respite    Location of Care:  [] [facility] [] [facility] [x] [facility] [] [facility] [] Hospice House [facility]    Date of Original Hospice Admission: 7-31-17  Hospice Medical Director for Inpatient Care: Dr. Gatito Wong Diagnosis: Metastatic endometrial cancer, s/p XRT  Diagnoses RELATED to the terminal prognosis: pathologic right subtrochanteric femur fracture  Other Diagnoses: dm-2, obesity, hx tachycardia     HOSPICE SUMMARY   Do not cut and paste chart information other than imaging findings    Angeles Elliott is a 40y.o. year old who was admitted to [facility]. The patient's principle diagnosis has resulted in hospitalization 5/8-5/16/17 for right hip pain and respiratory failure. CT-pathologic right subtrochenteric femur fracture and enlarging [id number] cm right pelvic mass with invasion to pelvis, sacrum and hip. Pain management with PCA and methadone. Pt was sent to hospice house but remains bedbound and still has pain in right groin and hip , worse with morvement. Palliative/hospice MD has discussed and reviewed case with IR and plan is for inpatient admit and palliative microwave ablation of tumor. Refer to LCD   The LCD for Hospice for the admitted diagnosis of cancer includes:    [x] Disease with distant metastases at presentation OR    [x] Progression from an earlier stage of disease to metastatic disease with either:   [] a continued decline in spite of therapy   [] patient declines further disease directed therapy    [] Certain cancers with poor prognoses (e.g. small cell lung cancer, brain cancer and pancreatic cancer) may be hospice eligible without fulfilling the other criteria in this section.       Functionally, the patient's Karnofsky and/or Palliative Performance Scale has declined over a period of months and is estimated at 30. The patient is dependent on the following ADLs:all except feeding    Objective information that support this patients limited prognosis includes:   CT scan 5/9/17  IMPRESSION  IMPRESSION:     1. Interval significant enlargement of the mass centered in the right pelvis  with destruction of the bones of the right pelvis, sacrum, and proximal right  femur, with extension to involve the left acetabulum, inferior pubic ramus and  superior pubic ramus. 2. Associated pathologic fracture of the right proximal femur. 3. Evaluation of the mass subjacent to the right hemidiaphragm is limited as it  was not as well imaged on prior examinations. 4. Interval enlargement of left pelvic lymph node.     The patient/family chose comfort measures with the support of Hospice. HOSPICE DIAGNOSES   Active Symptoms:  1. Pain; generalised  2. Delirium; mild, fluctuates  3. Anemia  4. Jaundice: worse  5. Fatigue/weakness: still persists  6  Inability to swallow food/pills  7. Hematuria          PLAN   1. Continue  routine level care. Pt seems to have stabilized and can be managed with scheduled and prn medications. 2. Seems to be declining steadily with increasing jaundice (liver failure), fatigue and pain  3. Continue dilaudid pca 5 mg/hr and dilaudid 4mg q15 min prn.  4. Haldol IV prn for delirium and hallucination  5. Ativan 1mg IV every hour prn and every 6 hours scheduled. 6. Continue routine wound care and wound care s/p procedure.     7.  and SW to support family needs  8. Disposition: routine care stay at [facility] once arrangements done; pt needs atleast 2-3 people assist, needs a bariatric bed.      Prognosis estimated based on 08/09/17 clinical assessment is:   [] Hours to Days    [] Days to Weeks    [x] Other:few weeks     Communicated plan of care with: Hospice Case Manager;  Hospice IDT; Care Team        GOALS OF CARE Resuscitation Status: DNR  Durable DNR: [] Yes [] No    Advance Care Planning 8/6/2017   Patient's Healthcare Decision Maker is: Legal Next of Kin   Primary Decision Maker Name -   Primary Decision Maker Phone Number -   Primary Decision Maker Relationship to Patient -   Confirm Advance Directive Yes, on file   Patient Would Like to Complete Advance Directive -   Does the patient have other document types Do Not Resuscitate        HISTORY     History obtained from: chart, hospice RN and patient and her mother    CHIEF COMPLAINT:  The patient is:   [x] Verbal  [] Nonverbal  [] Unresponsive    HPI/SUBJECTIVE:    Reports pain is mild when not moving, incraeased pain in right hip when moved or turned.   Decreased appetite  Some trouble swallowing pills  Increased leg edema   Patient unable to rate her pain on scale 1 to 10     REVIEW OF SYSTEMS     The following systems were: [x] reviewed  [] unable to be reviewed    Positive ROS include:  Constitutional:  Ears/nose/mouth/throat:  Respiratory:  Gastrointestinal:  Musculoskeletal: right hip and groin pain  Neurologic:  Psychiatric:  Endocrine:     Adult Non-Verbal Pain Assessment Score: 2/10    Face  [] 0   No particular expression or smile  [x] 1   Occasional grimace, tearing, frowning, wrinkled forehead  [] 2   Frequent grimace, tearing, frowning, wrinkled forehead    Activity (movement)  [x] 0   Lying quietly, normal position  [] 1   Seeking attention through movement or slow, cautious movement  [] 2   Restless, excessive activity and/or withdrawal reflexes    Guarding  [] 0   Lying quietly, no positioning of hands over areas of body  [x] 1   Splinting areas of the body, tense  [] 2   Rigid, stiff    Physiology (vital signs)  [x] 0   Stable vital signs  [] 1   Change in any of the following: SBP > 20mm Hg; HR > 20/minute  [] 2   Change in any of the following: SBP > 30mm Hg; HR > 25/minute    Respiratory  [x] 0   Baseline RR/SpO2, compliant with ventilator  [] 1 RR > 10 above baseline, or 5% drop SpO2, mild asynchrony with ventilator  [] 2   RR > 20 above baseline, or 10% drop SpO2, asynchrony with ventilator     FUNCTIONAL ASSESSMENT     Palliative Performance Scale (PPS): 30%     PSYCHOSOCIAL/SPIRITUAL ASSESSMENT     Active Problems:    Endometrial ca ([facility] [address] (7/31/2017)      Past Medical History:   Diagnosis Date    Cancer ([facility] [address]     uterine    [facility]     [facility]     Elevated liver function tests 10/21/2010    Endometrial cancer ([facility] [address] 7/7/2010    Hypertension     Iron deficiency anemia     Menometrorrhagia 10/21/2010    Microcytic anemia 10/21/2010    [facility]     Prediabetes 7/7/2010    Psychiatric disorder     depression    Radiation     completed radiation mid-march      Past Surgical History:   Procedure Laterality Date    CARDIAC SURG PROCEDURE UNLIST      hx of tachycardia    HX GYN      hysterectomy      Social History   Substance Use Topics    Smoking status: Never Smoker    Smokeless tobacco: Never Used    Alcohol use Yes     Family History   Problem Relation Age of Onset    Hypertension Mother     Hypertension Father     Stroke Maternal Grandfather     Cancer Paternal Grandmother      breast    Diabetes Paternal Grandmother       Allergies   Allergen Reactions    Egg Nausea and Vomiting     Raw egg and scrambled eggs. Egg products okay.      Pcn [Penicillins] Nausea and Vomiting     \"but could take amoxil\"    Shellfish Containing Products Unknown (comments)    Tetanus And Diphtheria Toxoids, Adsorbed, Adult Other (comments)     Developed local swelling, axillary pain, and transient fever    Tomato Unknown (comments)     Fresh tomatoes      Current Facility-Administered Medications   Medication Dose Route Frequency    LORazepam (ATIVAN) injection 1 mg  1 mg IntraVENous Q6H    bisacodyl (DULCOLAX) suppository 10 mg  10 mg Rectal DAILY PRN    haloperidol lactate (HALDOL) injection 2 mg  2 mg IntraVENous Q4H PRN    LORazepam (ATIVAN) injection 1 mg  1 mg IntraVENous Q1H PRN    HYDROmorphone (PF) 15 mg/30 ml (DILAUDID) PCA   IntraVENous CONTINUOUS    HYDROmorphone (PF) (DILAUDID) injection 4 mg  4 mg IntraVENous Q15MIN PRN    sodium chloride (NS) flush 10-30 mL  10-30 mL InterCATHeter PRN    sodium chloride (NS) flush 10 mL  10 mL InterCATHeter Q24H    sodium chloride (NS) flush 10 mL  10 mL InterCATHeter PRN    sodium chloride (NS) flush 20 mL  20 mL InterCATHeter PRN    heparin (porcine) pf 300 Units  300 Units InterCATHeter PRN    magic mouthwash cpd (with sucralfate)  5 mL Oral QID PRN    sodium chloride (NS) flush 10-30 mL  10-30 mL InterCATHeter PRN    sodium chloride (NS) flush 10 mL  10 mL InterCATHeter Q24H    sodium chloride (NS) flush 10 mL  10 mL InterCATHeter PRN        PHYSICAL EXAM     Wt Readings from Last 3 Encounters:   05/30/17 121.1 kg (267 lb)   05/10/17 121.4 kg (267 lb 10.2 oz)   02/16/17 136.1 kg (300 lb)       Visit Vitals    /65 (BP 1 Location: Right arm, BP Patient Position: At rest)    Pulse (!) 119    Temp 97.3 °F (36.3 °C)    Resp 16    SpO2 100%       Supplemental O2  [x] Yes  [] NO  Last bowel movement:     Currently this patient has:  [] Peripheral IV [x] PICC  [] PORT [] ICD    [x] Jama Catheter [] NG Tube   [] PEG Tube    [] Rectal Tube [] Drain  [] Other:     Constitutional: obese, lethargic but able to answer most questions, nad  Eyes: perrl  ENMT: clear  Cardiovascular: rrr  Respiratory: even, nonlabored  Gastrointestinal: soft obese  Musculoskeletal:2-3 plus b/l LE edema  Skin: warm, dry  Neurologic: SHE IS NOT FOLLOWING COMMNDS   Psychiatric: calm   Other:    Pertinent Lab and or Imaging Tests:  Lab Results   Component Value Date/Time    Sodium 139 08/01/2017 06:36 AM    Potassium 4.5 08/01/2017 06:36 AM    Chloride 106 08/01/2017 06:36 AM    CO2 26 08/01/2017 06:36 AM    Anion gap 7 08/01/2017 06:36 AM    Glucose 82 08/01/2017 06:36 AM    BUN 23 08/01/2017 06:36 AM    Creatinine 0.83 08/01/2017 06:36 AM    BUN/Creatinine ratio 28 08/01/2017 06:36 AM    GFR est AA >60 08/01/2017 06:36 AM    GFR est non-AA >60 08/01/2017 06:36 AM    Calcium 8.6 08/01/2017 06:36 AM     Lab Results   Component Value Date/Time    Protein, total 5.7 08/01/2017 06:36 AM    Albumin 2.0 08/01/2017 06:36 AM           Total time: Nvea Tapia NP

## 2017-08-10 NOTE — PROGRESS NOTES
Follow up visit to check in on patient. Jyoti was sleeping at this time. Appeared very comfortable. Silent prayer said for patient at bedside. Pastoral care will continue to follow. [id number]. Visit by: Stephanie Reynolds. Yumiko Rivera.  Marcelina Dye MA, [facility]    Lead  [employer]

## 2017-08-10 NOTE — PROGRESS NOTES
Oncology Nursing Communication Tool      6:57 PM  8/10/2017     Bedside and Verbal shift change report given to HERLINDA Desai (incoming nurse) by Delmer Jones RN (outgoing nurse) on Liliana Goff a 40 y.o. female who was admitted on 7/31/2017  5:54 PM. Report included the following information SBAR, Kardex, Intake/Output, Accordion and Recent Results. Significant changes during shift: none      Issues for physician to address: none            Code Status: DNR     Infections: No current active infections     Allergies: Egg; Pcn [penicillins]; Shellfish containing products; Tetanus and diphtheria toxoids, adsorbed, adult; and Tomato     Current diet: DIET REGULAR       Pain Controlled [x] yes [] no   Bowel Movement [] yes [x] no   Last Bowel Movement (date)             Vital Signs:   No data found. Intake & Output:     Intake/Output Summary (Last 24 hours) at 08/10/17 1857  Last data filed at 08/10/17 1507   Gross per 24 hour   Intake                0 ml   Output              750 ml   Net             -750 ml      Laboratory Results:   No results found for this or any previous visit (from the past 12 hour(s)). Opportunity for questions and clarifications were given to the incoming nurse. Patient's bed is in low position, side rails x2, door open PRN, call bell within reach and patient not in distress.       Delmer Jones RN

## 2017-08-11 NOTE — HOSPICE
LATE ENTRY    Patient was prepared for transfer to [facility]. DDNR is in place    Report called to Davina Barajas at [facility]    2:45 pm  Hydromorphone 1 mg/1ml delivered from [address] and attached to patient for transfer      [facility] RNP advised of current condition and order received to transfer.     Isadora Walker 30

## 2017-08-11 NOTE — PROGRESS NOTES
NAME OF PATIENT:  Tulio Heard    LEVEL OF CARE:  Routine    O2 SAFETY:  Concentrator positioning (6\" from furniture/drapes), Tanks stored in rodríguez , No petroleum based products on face while oxygen in use and Oxygen sign on the door    FALL INTERVENTIONS PROVIDED:   Implemented/recommended resources for alarm system (personal alarm, bed alarm, call bell, etc.)  and Implemented/recommended environmental changes (remove hazards, lower bed, improve lighting, etc.)    INTERDISPLINARY COMMUNICATION/COLLABORATION:  Physician, MSW, Leta and RN, CNA    NEW MEDICATION INITIATION DOCUMENTATION:  Documentation completed in Clinical Note in [facility]    Reason medication is being initiated:  New admission to [facility]:  Is Patient/family satisfied with symptom level?  yes    DISCHARGE PLAN:  Patient is declining rapidly. Most likely will  at [facility]. 1545: Patient arrived via [facility] from [facility] for routine level of care. Patient is actively declining with a diagnosis of metastatic endometrial cancer. Patient had an ablation of the tumor in her right leg that was overall unsuccessful and as a result patient is in pain. Patient rates pain 6/10 and dilaudid pca continues to infuse. Patient is confused but does have periods of lucidity. Patient knows her name and can answer some questions appropriately. Lungs are diminished throughout with audible heart tones. Skin is dry. Jama draining bloody urine. PICC line R upper arm dressing C/D/I. Last BM was 17. PCA supplies came with patient and will be placed in the fridge. Dressing on the sacrum from a possible wound that we ar unable to assess due to increased amount of pain when turning. 1610: Patient requested a cup of ice at this time. Patient not able to tolerate it so it is not on the bedside table. Denies any discomfort at this time. Remains confused but will occasionally answer questions appropriately.  Will continue to closely monitor patient progress.

## 2017-08-11 NOTE — H&P
[address] [facility] Help to Those in Need  [phone]    Patient Name: Leila Shook  YOB: 1980    Date of Provider Hospice Visit: 08/11/17    Level of Care:   [] General Inpatient (GIP)    [x] Routine   [] Respite    Location of Care:  [] [facility] [place] [] [facility] [x] [address] [facility] [] [facility] [] [facility]    Date of Original Hospice Admission: 7-31-17  Hospice Medical Director for Inpatient Care: Dr. Ysabel Julio Diagnosis: Metastatic endometrial cancer, s/p XRT  Diagnoses RELATED to the terminal prognosis: pathologic right subtrochanteric femur fracture  Other Diagnoses: dm-2, obesity, hx tachycardia     HOSPICE SUMMARY   Do not cut and paste chart information other than imaging findings    Leila Shook is a 40y.o. year old who was admitted to [facility]. The patient's principle diagnosis has resulted in hospitalization 5/8-5/16/17 for right hip pain and respiratory failure. CT-pathologic right subtrochenteric femur fracture and enlarging [id number] cm right pelvic mass with invasion to pelvis, sacrum and hip. Pain management with PCA and methadone. Pt was sent to hospice house but remains bedbound and still has pain in right groin and hip , worse with morvement. Palliative/hospice MD has discussed and reviewed case with IR and plan is for inpatient admit and palliative microwave ablation of tumor. The LCD for Hospice for the admitted diagnosis of cancer includes:    [x] Disease with distant metastases at presentation OR    [x] Progression from an earlier stage of disease to metastatic disease with either:   [] a continued decline in spite of therapy   [x] patient declines further disease directed therapy    [] Certain cancers with poor prognoses (e.g. small cell lung cancer, brain cancer and pancreatic cancer) may be hospice eligible without fulfilling the other criteria in this section.       Functionally, the patient's Karnofsky and/or Palliative Performance Scale has declined over a period of months and is estimated at 30%. The patient is dependent on the following ADLs:all except feeding    Objective information that support this patients limited prognosis includes:   CT scan 5/9/17  IMPRESSION  IMPRESSION:     1. Interval significant enlargement of the mass centered in the right pelvis  with destruction of the bones of the right pelvis, sacrum, and proximal right  femur, with extension to involve the left acetabulum, inferior pubic ramus and  superior pubic ramus. 2. Associated pathologic fracture of the right proximal femur. 3. Evaluation of the mass subjacent to the right hemidiaphragm is limited as it  was not as well imaged on prior examinations. 4. Interval enlargement of left pelvic lymph node.     The patient/family chose comfort measures with the support of Hospice. HOSPICE DIAGNOSES   Active Symptoms:  1. Pain; generalised  2. Delirium; mild, fluctuates  3. Anemia  4. Jaundice: worse  5. Fatigue/weakness: still persists  6  Inability to swallow food/pills  7. Hematuria          PLAN   1. Continue  routine level care at the community hospice house, for transfer. Pt is stable and can be managed with scheduled and prn medications. 2. Seems to be declining steadily with increasing jaundice (liver failure), fatigue and pain, hematuria  3. Continue dilaudid pca 5 mg/hr and dilaudid 4mg q15 min prn. New order written and faxed to HCP  4. Haldol IV prn for delirium and hallucination  5. Ativan 1mg IV every hour prn and every 6 hours scheduled. 6. Continue routine wound care and wound care s/p procedure.     7.  and SW to support family needs  8.  Disposition: routine care stay at [facility] once arrangements done; pt needs atleast 2-3 people assist, needs a bariatric bed.      Prognosis estimated based on 08/09/17 clinical assessment is:   [] Hours to Days    [] Days to Weeks    [x] Other:few weeks     Communicated plan of care with: Hospice ; Hospice IDT; Care Team        GOALS OF CARE     Resuscitation Status: DNR  Durable DNR: [x] Yes [] No    Advance Care Planning 8/6/2017   Patient's Healthcare Decision Maker is: Legal Next of Kin   Primary Decision Maker Name -   Primary Decision Maker Phone Number -   Primary Decision Maker Relationship to Patient -   Confirm Advance Directive Yes, on file   Patient Would Like to Complete Advance Directive -   Does the patient have other document types Do Not Resuscitate        HISTORY     History obtained from: chart, hospice RN and patient and her mother    CHIEF COMPLAINT:  The patient is:   [x] Verbal  [] Nonverbal  [] Unresponsive    HPI/SUBJECTIVE:    Reports pain is mild when not moving, increased pain in right hip when moved or turned.   Decreased appetite  Some trouble swallowing pills  Increased leg edema   Patient unable to rate her pain on scale 1 to 10     REVIEW OF SYSTEMS     The following systems were: [] reviewed  [x] unable to be reviewed    Positive ROS include:  Constitutional:  Ears/nose/mouth/throat:  Respiratory:  Gastrointestinal:  Musculoskeletal: right hip and groin pain  Neurologic:  Psychiatric:  Endocrine:     Adult Non-Verbal Pain Assessment Score: 2/10    Face  [] 0   No particular expression or smile  [x] 1   Occasional grimace, tearing, frowning, wrinkled forehead  [] 2   Frequent grimace, tearing, frowning, wrinkled forehead    Activity (movement)  [x] 0   Lying quietly, normal position  [] 1   Seeking attention through movement or slow, cautious movement  [] 2   Restless, excessive activity and/or withdrawal reflexes    Guarding  [] 0   Lying quietly, no positioning of hands over areas of body  [x] 1   Splinting areas of the body, tense  [] 2   Rigid, stiff    Physiology (vital signs)  [x] 0   Stable vital signs  [] 1   Change in any of the following: SBP > 20mm Hg; HR > 20/minute  [] 2   Change in any of the following: SBP > 30mm Hg; HR > 25/minute    Respiratory  [x] 0   Baseline RR/SpO2, compliant with ventilator  [] 1   RR > 10 above baseline, or 5% drop SpO2, mild asynchrony with ventilator  [] 2   RR > 20 above baseline, or 10% drop SpO2, asynchrony with ventilator     FUNCTIONAL ASSESSMENT     Palliative Performance Scale (PPS): 30%     PSYCHOSOCIAL/SPIRITUAL ASSESSMENT     Active Problems:    Endometrial ca ([facility] (7/31/2017)      Past Medical History:   Diagnosis Date    Cancer ([facility]     uterine    [facility]     [facility]     Elevated liver function tests 10/21/2010    Endometrial cancer [facility] 7/7/2010    Hypertension     Iron deficiency anemia     Menometrorrhagia 10/21/2010    Microcytic anemia 10/21/2010    Morbid [facility]     Prediabetes 7/7/2010    Psychiatric disorder     depression    Radiation     completed radiation mid-march      Past Surgical History:   Procedure Laterality Date    CARDIAC SURG PROCEDURE UNLIST      hx of tachycardia    HX GYN      hysterectomy      Social History   Substance Use Topics    Smoking status: Never Smoker    Smokeless tobacco: Never Used    Alcohol use Yes     Family History   Problem Relation Age of Onset    Hypertension Mother     Hypertension Father     Stroke Maternal Grandfather     Cancer Paternal Grandmother      breast    Diabetes Paternal Grandmother       Allergies   Allergen Reactions    Egg Nausea and Vomiting     Raw egg and scrambled eggs. Egg products okay.      Pcn [Penicillins] Nausea and Vomiting     \"but could take amoxil\"    Shellfish Containing Products Unknown (comments)    Tetanus And Diphtheria Toxoids, Adsorbed, Adult Other (comments)     Developed local swelling, axillary pain, and transient fever    Tomato Unknown (comments)     Fresh tomatoes      Current Facility-Administered Medications   Medication Dose Route Frequency    LORazepam (ATIVAN) injection 1 mg  1 mg IntraVENous Q6H    bisacodyl (DULCOLAX) suppository 10 mg  10 mg Rectal DAILY PRN    haloperidol lactate (HALDOL) injection 2 mg  2 mg IntraVENous Q4H PRN    LORazepam (ATIVAN) injection 1 mg  1 mg IntraVENous Q1H PRN    HYDROmorphone (PF) 15 mg/30 ml (DILAUDID) PCA   IntraVENous CONTINUOUS    HYDROmorphone (PF) (DILAUDID) injection 4 mg  4 mg IntraVENous Q15MIN PRN    sodium chloride (NS) flush 10-30 mL  10-30 mL InterCATHeter PRN    sodium chloride (NS) flush 10 mL  10 mL InterCATHeter Q24H    sodium chloride (NS) flush 10 mL  10 mL InterCATHeter PRN    sodium chloride (NS) flush 20 mL  20 mL InterCATHeter PRN    heparin (porcine) pf 300 Units  300 Units InterCATHeter PRN    magic mouthwash cpd (with sucralfate)  5 mL Oral QID PRN    sodium chloride (NS) flush 10-30 mL  10-30 mL InterCATHeter PRN    sodium chloride (NS) flush 10 mL  10 mL InterCATHeter Q24H    sodium chloride (NS) flush 10 mL  10 mL InterCATHeter PRN        PHYSICAL EXAM     Wt Readings from Last 3 Encounters:   05/30/17 121.1 kg (267 lb)   05/10/17 121.4 kg (267 lb 10.2 oz)   02/16/17 136.1 kg (300 lb)       Visit Vitals    /66 (BP 1 Location: Left arm, BP Patient Position: At rest)    Pulse (!) 123    Temp 98.8 °F (37.1 °C)    Resp 18    SpO2 100%       Supplemental O2  [x] Yes  [] NO  Last bowel movement:     Currently this patient has:  [] Peripheral IV [x] PICC  [] PORT [] ICD    [x] Jama Catheter [] NG Tube   [] PEG Tube    [] Rectal Tube [] Drain  [] Other:     Constitutional:   lethargic ,nad  Eyes: perrl  ENMT: clear  Cardiovascular: rrr  Respiratory: even, nonlabored  Gastrointestinal: soft obese  Musculoskeletal:2-3 plus b/l LE edema  Skin: warm, dry  Neurologic: she is not following commands  Psychiatric: calm   Other:    Pertinent Lab and or Imaging Tests:  Lab Results   Component Value Date/Time    Sodium 139 08/01/2017 06:36 AM    Potassium 4.5 08/01/2017 06:36 AM    Chloride 106 08/01/2017 06:36 AM    CO2 26 08/01/2017 06:36 AM    Anion gap 7 08/01/2017 06:36 AM    Glucose 82 08/01/2017 06:36 AM    BUN 23 08/01/2017 06:36 AM    Creatinine 0.83 08/01/2017 06:36 AM    BUN/Creatinine ratio 28 08/01/2017 06:36 AM    GFR est AA >60 08/01/2017 06:36 AM    GFR est non-AA >60 08/01/2017 06:36 AM    Calcium 8.6 08/01/2017 06:36 AM     Lab Results   Component Value Date/Time    Protein, total 5.7 08/01/2017 06:36 AM    Albumin 2.0 08/01/2017 06:36 AM           Total time: Neva Del Castillo NP

## 2017-08-11 NOTE — PROGRESS NOTES
Patient being discharged to [facility] at [facility] today via [facility]. Patient hooked up to new personal Dilaudid pump prior to discharge and received PRN Ativan and PRN Dilaudid x1. Report was called to facility by Hospice nurse.

## 2017-08-11 NOTE — HOSPICE
[facility] Note: Care coordination to have patient transferred back to [facility] this afternoon. Ambulance arrangements made via [facility] ([facility] contacted and will coordinate with them),  at 3 pm. Tiburcio Cabrera, patient's mother, to inform her of time of transport. Contacted Jorie Oppenheim, MSW to let her know about patient's return. Hospice RN Juhi Shepherd is coordinating medications for patient and met with patient today.

## 2017-08-12 NOTE — PROGRESS NOTES
[address] [place] [address] Help to Those in Need  [phone]    Patient Name: Liliana Goff  YOB: 1980    Date of Provider Hospice Visit: 08/12/17    Level of Care:   [] General Inpatient (GIP)    [x] Routine   [] Respite    Location of Care:  [] [facility] [] [facility] [x] [facility] [] [facility] [] [facility]    Date of Original Hospice Admission: 7-31-17  Hospice Medical Director for Inpatient Care: Dr. Olu Bella Diagnosis: Metastatic endometrial cancer, s/p XRT  Diagnoses RELATED to the terminal prognosis: pathologic right subtrochanteric femur fracture  Other Diagnoses: dm-2, obesity, hx tachycardia     HOSPICE SUMMARY   Do not cut and paste chart information other than imaging findings    Liliana Goff is a 40y.o. year old who was admitted to [facility]. The patient's principle diagnosis has resulted in hospitalization 5/8-5/16/17 for right hip pain and respiratory failure. CT-pathologic right subtrochenteric femur fracture and enlarging [id number] cm right pelvic mass with invasion to pelvis, sacrum and hip. Pain management with PCA and methadone. Pt was sent to hospice house but remains bedbound and still has pain in right groin and hip , worse with morvement. Palliative/hospice MD has discussed and reviewed case with IR and plan is for inpatient admit and palliative microwave ablation of tumor. The LCD for Hospice for the admitted diagnosis of cancer includes:    [x] Disease with distant metastases at presentation OR    [x] Progression from an earlier stage of disease to metastatic disease with either:   [] a continued decline in spite of therapy   [x] patient declines further disease directed therapy    [] Certain cancers with poor prognoses (e.g. small cell lung cancer, brain cancer and pancreatic cancer) may be hospice eligible without fulfilling the other criteria in this section.       Functionally, the patient's Karnofsky and/or Palliative Performance Scale has declined over a period of months and is estimated at 30%. The patient is dependent on the following ADLs:all except feeding    Objective information that support this patients limited prognosis includes:   CT scan 5/9/17  IMPRESSION  IMPRESSION:     1. Interval significant enlargement of the mass centered in the right pelvis  with destruction of the bones of the right pelvis, sacrum, and proximal right  femur, with extension to involve the left acetabulum, inferior pubic ramus and  superior pubic ramus. 2. Associated pathologic fracture of the right proximal femur. 3. Evaluation of the mass subjacent to the right hemidiaphragm is limited as it  was not as well imaged on prior examinations. 4. Interval enlargement of left pelvic lymph node.     The patient/family chose comfort measures with the support of Hospice. HOSPICE DIAGNOSES   Active Symptoms:  1. Pain; generalised  2. Delirium; mild, fluctuates  3. Anemia  4. Jaundice: worse  5. Fatigue/weakness: still persists  6  Inability to swallow food/pills  7. Hematuria          PLAN   1. Continue  routine level care at the [facility] [place]. 2.  declining steadily. 3. Continue dilaudid pca 5 mg/hr   4. Haldol IV prn for delirium and hallucination  5. Ativan 1mg IV every hour prn  6. Dilaudid 4mg IV q 15 min prn.  7. Continue routine wound care and wound care s/p procedure.     7.  and SW to support family needs  8. Disposition: routine care stay at [facility]      Prognosis estimated based on 08/12/17 clinical assessment is:   [] Hours to Days    [x] Days to Weeks    [] Other:few weeks     Communicated plan of care with: Hospice Case Manager;  Hospice IDT; Care Team        GOALS OF CARE     Resuscitation Status: DNR  Durable DNR: [x] Yes [] No    Advance Care Planning 8/6/2017   Patient's Healthcare Decision Maker is: Legal Next of Kin   Primary Decision Maker Name -   Primary Decision Maker Phone Number -   Primary Decision Maker Relationship to Patient -   Confirm Advance Directive Yes, on file   Patient Would Like to Complete Advance Directive -   Does the patient have other document types Do Not Resuscitate        HISTORY     History obtained from: chart, hospice RN ,mother    CHIEF COMPLAINT:  The patient is:   [] Verbal  [x] Nonverbal  [] Unresponsive    HPI/SUBJECTIVE:    Increased leg edema   Patient unable to rate her pain on scale 1 to 10     REVIEW OF SYSTEMS     The following systems were: [] reviewed  [x] unable to be reviewed    Positive ROS include:  Constitutional:  Ears/nose/mouth/throat:  Respiratory:  Gastrointestinal:  Musculoskeletal: right hip and groin pain  Neurologic:  Psychiatric:  Endocrine:     Adult Non-Verbal Pain Assessment Score: 1/10    Face  [] 0   No particular expression or smile  [x] 1   Occasional grimace, tearing, frowning, wrinkled forehead  [] 2   Frequent grimace, tearing, frowning, wrinkled forehead    Activity (movement)  [x] 0   Lying quietly, normal position  [] 1   Seeking attention through movement or slow, cautious movement  [] 2   Restless, excessive activity and/or withdrawal reflexes    Guarding  [x] 0   Lying quietly, no positioning of hands over areas of body  [] 1   Splinting areas of the body, tense  [] 2   Rigid, stiff    Physiology (vital signs)  [x] 0   Stable vital signs  [] 1   Change in any of the following: SBP > 20mm Hg; HR > 20/minute  [] 2   Change in any of the following: SBP > 30mm Hg; HR > 25/minute    Respiratory  [x] 0   Baseline RR/SpO2, compliant with ventilator  [] 1   RR > 10 above baseline, or 5% drop SpO2, mild asynchrony with ventilator  [] 2   RR > 20 above baseline, or 10% drop SpO2, asynchrony with ventilator     FUNCTIONAL ASSESSMENT     Palliative Performance Scale (PPS): 10%     PSYCHOSOCIAL/SPIRITUAL ASSESSMENT     Active Problems:    * No active hospital problems.  *    Past Medical History:   Diagnosis Date    Cancer [facility])     uterine    [facility]     Diabetes ([facility]     Elevated liver function tests 10/21/2010    Endometrial cancer ([facility] 7/7/2010    Hypertension     Iron deficiency anemia     Menometrorrhagia 10/21/2010    Microcytic anemia 10/21/2010    [facility]     Prediabetes 7/7/2010    Psychiatric disorder     depression    Radiation     completed radiation mid-march      Past Surgical History:   Procedure Laterality Date    CARDIAC SURG PROCEDURE UNLIST      hx of tachycardia    HX GYN      hysterectomy      Social History   Substance Use Topics    Smoking status: Never Smoker    Smokeless tobacco: Never Used    Alcohol use Yes     Family History   Problem Relation Age of Onset    Hypertension Mother     Hypertension Father     Stroke Maternal Grandfather     Cancer Paternal Grandmother      breast    Diabetes Paternal Grandmother       Allergies   Allergen Reactions    Egg Nausea and Vomiting     Raw egg and scrambled eggs. Egg products okay.      Pcn [Penicillins] Nausea and Vomiting     \"but could take amoxil\"    Shellfish Containing Products Unknown (comments)    Tetanus And Diphtheria Toxoids, Adsorbed, Adult Other (comments)     Developed local swelling, axillary pain, and transient fever    Tomato Unknown (comments)     Fresh tomatoes      Current Facility-Administered Medications   Medication Dose Route Frequency    metroNIDAZOLE (FLAGYL) tablet 1,000 mg  1,000 mg Topical PRN    HYDROmorphone (PF) (DILAUDID) injection 4 mg  4 mg IntraVENous Q15MIN PRN    sterile water (preservative free) injection        acetaminophen (TYLENOL) tablet 650 mg  650 mg Oral Q4H PRN    Or    acetaminophen (TYLENOL) solution 650 mg  650 mg Oral Q4H PRN    Or    acetaminophen (TYLENOL) suppository 650 mg  650 mg Rectal Q4H PRN    bisacodyl (DULCOLAX) suppository 10 mg  10 mg Rectal DAILY PRN    haloperidol lactate (HALDOL) injection 2 mg  2 mg IntraVENous Q4H PRN    LORazepam (ATIVAN) injection 1 mg  1 mg IntraVENous Q1H PRN    HYDROmorphone (PF) 15 mg/30 ml (DILAUDID) PCA   IntraVENous CONTINUOUS    sodium chloride (NS) flush 5-10 mL  5-10 mL IntraVENous PRN        PHYSICAL EXAM     Wt Readings from Last 3 Encounters:   05/30/17 121.1 kg (267 lb)   05/10/17 121.4 kg (267 lb 10.2 oz)   02/16/17 136.1 kg (300 lb)       Visit Vitals    /89    Pulse (!) 150    Temp 98.3 °F (36.8 °C)    Resp 24    SpO2 (!) 76%       Supplemental O2  [x] Yes  [] NO  Last bowel movement:     Currently this patient has:  [] Peripheral IV [x] PICC  [] PORT [] ICD    [x] Jama Catheter [] NG Tube   [] PEG Tube    [] Rectal Tube [] Drain  [] Other:     Constitutional:   Lethargic   Eyes:    ENMT: clear, dry  Cardiovascular: tachy  Respiratory: even, nonlabored  Gastrointestinal: soft  Musculoskeletal: -3 plus b/l LE edema  Skin: warm, dry, jaundiced  Neurologic:  not following commands  Psychiatric: unable to assess due to pt factors  Other:    Pertinent Lab and or Imaging Tests:  Lab Results   Component Value Date/Time    Sodium 139 08/01/2017 06:36 AM    Potassium 4.5 08/01/2017 06:36 AM    Chloride 106 08/01/2017 06:36 AM    CO2 26 08/01/2017 06:36 AM    Anion gap 7 08/01/2017 06:36 AM    Glucose 82 08/01/2017 06:36 AM    BUN 23 08/01/2017 06:36 AM    Creatinine 0.83 08/01/2017 06:36 AM    BUN/Creatinine ratio 28 08/01/2017 06:36 AM    GFR est AA >60 08/01/2017 06:36 AM    GFR est non-AA >60 08/01/2017 06:36 AM    Calcium 8.6 08/01/2017 06:36 AM     Lab Results   Component Value Date/Time    Protein, total 5.7 08/01/2017 06:36 AM    Albumin 2.0 08/01/2017 06:36 AM           Total time: [address], NP

## 2017-08-12 NOTE — PROGRESS NOTES
1900 Recd report from [facility], pt admitted from [facility] as routine level of care, patient does have uncontrolled pain that is being managed, since surgery pt has not been able to swallow and was taken off methadone, Dilaudid gtt was only increased from 4 to 5 mg per hr  2000 Pt was transferred to bariatric bed with 6 staff to assist after premedication with Dilaudid 4mg and atian 1mg  Pt still complained of excruciating pain during transfer. 2100 Sister asked for oxygen for pt, saturations were 76% Oxygen applied at 2lnc  2125 Pt asked for pain meds 10/10 foot pain, gave Dilaudid 4mg and ativan 1mg  2230 Pt asked for 2nd dose of medication given 4mg Dilaudid  0000 Rounding denied pain tolerates liquids  0200 Rounding pt is sleeping soundly  0500 Pt has slept well without complaints past few hrs.  0545 Pt premedicated for wound care with Dilaudid 4mg and ativan 1mg, ineffective, pt was in excruciating pain during entire procedure. Pt has scattered skin tears over bilateral cheeks, very superficial, one skin tear lateral to left labia bleeding, all treated with xeroform gauze and abd pads, barrier cream to abd and josselyn folds, gross edema skin tight could not visualize rectum, liquid stool cleaned, davila bag changed due to sludge and odor  0600 Pain control ineffective during procedure. 0700 Report to oncoming shift.     NAME OF PATIENT:  Jyoti Randall     LEVEL OF CARE:  Routine     O2 SAFETY:  Concentrator positioning (6\" from furniture/drapes), Tanks stored in rodríguez , No petroleum based products on face while oxygen in use and Oxygen sign on the door     FALL INTERVENTIONS PROVIDED:   Implemented/recommended resources for alarm system (personal alarm, bed alarm, call bell, etc.)  and Implemented/recommended environmental changes (remove hazards, lower bed, improve lighting, etc.)     INTERDISPLINARY COMMUNICATION/COLLABORATION:  Physician, MSW, [place] and RN, CNA     NEW MEDICATION INITIATION DOCUMENTATION:  Documentation completed in Clinical Note in [facility]  Reason medication is being initiated:  New admission to [address] [place] [address] Avenue:  Is Patient/family satisfied with symptom level?  yes     DISCHARGE PLAN:  Patient is declining rapidly. Most likely will  at [facility].

## 2017-08-12 NOTE — PROGRESS NOTES
0700 Report received from [place], [facility]. Pt is Routine level of care. Dx Endometrial Ca. 0800 Pt is resting in bed with eyes closed. Appears to be asleep. 1000 Pt remains sleep. o signs of discomfort. 1245 Pt awakened. Speech is garbled, disoreiented. Asking about another place. Medicated with Dilaudid 4mg IVP prior to personal care. 1300 Bed saturated under trunk and buttocks with yellow fluid. Does not smell of urine, many areas noted weeping upper body and right upper arm. Pt is jaundiced. Skin and sclera yellow. Skin care given and bed linen changed. 1500 Pt continues to sleep. No signs of pain or discomfort. 1630 Pt restless in bed. States she is in pain. Lorazepam and Dilaudid prn doses given for discomfort. Her mother is at the bedside. 200 Mother at the bedside.  Pt appears to be sleeping        NAME OF PATIENT:  Dorice Mcburney Barksdale    LEVEL OF CARE:  Routine    REASON FOR GIP:   na    *PATIENT REMAINS ELIGIBLE FOR [facility] LEVEL OF CARE AS EVIDENCED BY: (MUST BE ADDRESSED OF PATIENT GIP)  na      REASON FOR RESPITE:  na    O2 SAFETY:  Concentrator positioning (6\" from furniture/drapes), Tanks stored in rodríguez , No petroleum based products on face while oxygen in use and Oxygen sign on the door    FALL INTERVENTIONS PROVIDED:   Implemented/recommended use of fall risk identification flag to all team members, Implemented/recommended resources for alarm system (personal alarm, bed alarm, call bell, etc.) , Implemented/recommended environmental changes (remove hazards, lower bed, improve lighting, etc.) and Implemented/recommended increased supervision/assistance    INTERDISPLINARY COMMUNICATION/COLLABORATION:  Physician, MSW, [place] and RN, CNA    NEW MEDICATION INITIATION DOCUMENTATION:  Documentation completed in Clinical Note in [facility]    Reason medication is being initiated:  na    MD / Provider name consulted re: change in status / initiation of new medication:  na  New Symptom(s):  na    New Order(s):  na    Name of the person notified of the changes:  na    Name of person being taught:  na    Instructions given:  na    Side Effects taught:  na    Response to teaching: na      COMFORTABLE DYING MEASURE:  Is Patient/family satisfied with symptom level?   Yes    DISCHARGE PLAN:  Remain Routine at [facility] until end of life

## 2017-08-13 NOTE — PROGRESS NOTES
0700:  Verbal shift change report given to Sola Montero RN (oncoming nurse) by Fabio Sneed RN (offgoing nurse). Report included the following information SBAR, Kardex, Intake/Output and MAR.   1610:  Administered prn lorazepam 1mg/IV and gave bolus of dilaudid 4mg/IV using CADD pump for preparing patient from cleaning/wound care. 1625:  Administered prn bolus of dilaudid 4mg/IV using CADD pump at beginning of turning/cleaning/wound care of patient. 1640:  Completed turning/cleaning/wound care, with assistance from Karthik Romeo 2070, CNA. Administered prn bolus of dilaudid 4mg/IV at completion. 1700:  Rounded on patient; mother is present and patient is awake in bed; patient tolerated boluses well. 1745:  Patient called complaining of pain in hips; administered bolus of dilaudid 4mg using CADD pump; repositioned patient with assistance from Jeffrey Villareal, [address]      NAME OF PATIENT:  Jyoti Randall    LEVEL OF CARE:  Routine    REASON FOR GIP:   n/a    *PATIENT REMAINS ELIGIBLE FOR GIP LEVEL OF CARE AS EVIDENCED BY: (MUST BE ADDRESSED OF PATIENT GIP)      REASON FOR RESPITE:  n/a    O2 SAFETY:  Concentrator positioning (6\" from furniture/drapes), Tanks stored in rodríguez , No petroleum based products on face while oxygen in use and Oxygen sign on the door    FALL INTERVENTIONS PROVIDED:   Implemented/recommended resources for alarm system (personal alarm, bed alarm, call bell, etc.) , Implemented/recommended environmental changes (remove hazards, lower bed, improve lighting, etc.) and Implemented/recommended increased supervision/assistance    INTERDISPLINARY COMMUNICATION/COLLABORATION:  Physician, MSW, [place] and RN, CNA    NEW MEDICATION INITIATION DOCUMENTATION:  n/a    Reason medication is being initiated:  n/a    MD / Provider name consulted re: change in status / initiation of new medication:  n/a    New Symptom(s):  n/a    New Order(s):  n/a    Name of the person notified of the changes: n/a    Name of person being taught:  n/a    Instructions given:  n/a    Side Effects taught:  n/a    Response to teaching:  n/a      COMFORTABLE DYING MEASURE:  Is Patient/family satisfied with symptom level?  yes    DISCHARGE PLAN:  Patient is declining. Most likely will stay at [facility] for EOL care.

## 2017-08-13 NOTE — PROGRESS NOTES
1900 Recd report from [facility], no major changes in pt today, continues with Routine level of care, Hospice dx metastatic endometrial cancer   Lethargy falls asleep when trying to answer questions, /80, , gross edema throughout, difficulty swallowing occasionally will drink liquids, NPO since surgery unable to remember to swallow, family educated, shallow breathing, bowel sounds active, last BM   2200 Resting quietly, no signs or symptoms of pain  0100 Resting quietly. 0230 Pt refusing to turn, lotion applied to dry skin, C/o pain when moving legs given clinician bolus of 4mg  0300 Pain reassessed pt is asleep.  0400 Resting quietly at this time  0515 Premedicated for bath with 4mg clinician bolus. 0530 Medicated during bath for pain 4mg clinician bolus  0530 Bath completed, barrier cream to wounds  0545 Pt requested another bolus of pain med after bath, c/o foot pain 7/10 given 4mg bolus  0700 Report to oncoming shift    NAME OF PATIENT:  Jyoti Randall      LEVEL OF CARE:  Routine      O2 SAFETY:  Concentrator positioning (6\" from furniture/drapes), Tanks stored in rodríguez , No petroleum based products on face while oxygen in use and Oxygen sign on the door      FALL INTERVENTIONS PROVIDED:   Implemented/recommended resources for alarm system (personal alarm, bed alarm, call bell, etc.)  and Implemented/recommended environmental changes (remove hazards, lower bed, improve lighting, etc.)      INTERDISPLINARY COMMUNICATION/COLLABORATION:  Physician, MSW, [place]a and RN, CNA      NEW MEDICATION INITIATION DOCUMENTATION:  Documentation completed in Clinical Note in [address]  Reason medication is being initiated:  New admission to [address]:  Is Patient/family satisfied with symptom level?  yes      DISCHARGE PLAN:  Patient is declining rapidly. Most likely will  at [facility].

## 2017-08-14 NOTE — PROGRESS NOTES
0700: Shift report received from Maria L Giang, [address]: Pt in bed, eyes open, responds to voice, speech slurred, pt a little lethargic. States pain is at 8/10 and requests PRN med. Pt appears flustered and is hard to understand. Medicated with PRN Dilaudid 4 mg and Ativan PRN. Lungs are clear and faint bowel sounds audible. Pt requests her leg to be repositioned. 1000: Pt drank half of the ginger ale she requested. 1315: Pt in bed, very confused, hallucinating. She calls her sister and claims she was behind her. Pt was informed that there was no one else in room at this time. Pt does not appear to be in pain at the moment, but she is agitated when RN is int he room, keeps pulling away covers and throwing them away, acting scared when oxygen cannula was adjusted and continues to have confusion, speech is difficult to understand. Medicated with Haldol for hallucinations and agitation. 1430: Pt calmer, no signs of agitation at the moment. Changed chux because they were wet, from what looked like urin. Pt tolerated this fairly well.       NAME OF PATIENT:  Jyoti Randall    LEVEL OF CARE:  Routine    REASON FOR GIP:   n/a    *PATIENT REMAINS ELIGIBLE FOR GIP LEVEL OF CARE AS EVIDENCED BY: (MUST BE ADDRESSED OF PATIENT GIP)      REASON FOR RESPITE:  n/a    O2 SAFETY:  Concentrator positioning (6\" from furniture/drapes), No petroleum based products on face while oxygen in use and Oxygen sign on the door    FALL INTERVENTIONS PROVIDED:   Implemented/recommended assistive devices and encouraged their use, Implemented/recommended resources for alarm system (personal alarm, bed alarm, call bell, etc.)  and Implemented/recommended environmental changes (remove hazards, lower bed, improve lighting, etc.)    INTERDISPLINARY COMMUNICATION/COLLABORATION:  Physician, MSW, [place] and RN, CNA    NEW MEDICATION INITIATION DOCUMENTATION:  no new meds initiated at this time    COMFORTABLE DYING MEASURE:  Is Patient/family satisfied with symptom level?  yes    DISCHARGE PLAN:  Pt will remain at the [facility] until a placement has been determined.

## 2017-08-14 NOTE — HOSPICE
NAME OF PATIENT:  Jyoti Randall    LEVEL OF CARE:  Routine    REASON FOR GIP:     *PATIENT REMAINS ELIGIBLE FOR GIP LEVEL OF CARE AS EVIDENCED BY: (MUST BE ADDRESSED OF PATIENT GIP)      REASON FOR RESPITE:      O2 SAFETY:  Concentrator positioning (6\" from furniture/drapes), Tanks stored in rodríguez , No petroleum based products on face while oxygen in use and Oxygen sign on the door    FALL INTERVENTIONS PROVIDED:   Implemented/recommended use of non-skid footwear, Implemented/recommended use of fall risk identification flag to all team members, Implemented/recommended assistive devices and encouraged their use, Implemented/recommended resources for alarm system (personal alarm, bed alarm, call bell, etc.)  and Implemented/recommended environmental changes (remove hazards, lower bed, improve lighting, etc.)    INTERDISPLINARY COMMUNICATION/COLLABORATION:  Physician, MSW, [place] and RN, CNA    NEW MEDICATION INITIATION DOCUMENTATION:      Reason medication is being initiated:      MD / Provider name consulted re: change in status / initiation of new medication:      New Symptom(s):      New Order(s):      Name of the person notified of the changes:      Name of person being taught:      Instructions given:      Side Effects taught:      Response to teachin [address] free  Is Patient/family satisfied with symptom level?  yes    DISCHARGE PLAN:  Pt will likely remain @ [facility] until passing    20:00,re[port received,assumed care of pt who is Routine care. Pt is drowsy,opens eyes to voice. Pt is confused,but she knows it's 2017. She can tell me where she is. PCA Dilaudid continues via CADD pump ,AMARJIT PICC @ 5mg/hr. Davila is leaking. 21:00,Dilaudid 4mg IVP bolus given prior to replacing her davila,tolerated well,bed linens changed. 00:45,difficulty sleeping,medicated with Ativan 1mg IVP.  01:30,asleep now.   06:00,chux is wet again with drainage from skin tears on sacrum and thighs,chux changed. Pt was given a 4mg Dilaudid Clinician bolus prior to turning.

## 2017-08-15 NOTE — PROGRESS NOTES
0700  Report received from [facility].    0800  Pt lying in bed, awake. Pt recognized this Rn. Speech is slurred but appropriate at times. Pt is having periods of confusion. Sclera is slightly jaundiced. Lungs diminished. No cough noted. O2 at 2lpm.  + bowel sounds   Abd is slightly firm   Pt is obese. Last reported BM was 8-12. Jama is draining tea colored urine. Pt has edema in her bilateral lower est and her right upper arm. Legs are very tight. Pt has pitting edema +1 in her feet. Pt stated she did not want to have another baby. She stated she just had 2 and doesn't want any more. Rn continued to reorient pt. Pt has a PICC in her Right upper arm. Dressing is clear and intact. Cadd pump is infusing Dilaudid   REs vol 490.8,  5mg/hr 16 mg given. Expiration 8-25. Pump cleared. Pt stated she was thirsty. Pt tolerated one apple juice and one cranberry juice. 1000  Pt having anxiety and restlessness. Pt states she wants to get out of the bed and go help her Mother cook. Pt pulling the cases off of the pillows. Pt medicated with Lorazepam.    1030  Pt resting. 1200    1350  Pt restless, anxious. CNA's getting ready to give a bath. Pt medicated with Dilaudid and Lorazepam,.     1400  Pt was turned and repositioned. Pt had a BM. Pt cleaned up and repositioned. Pt tolerated the procedure well.    1600  Pt sleeping. No facial grimacing at this time. 36  Pt's Mother at the bedside. Rn visited with her and offered support and education. Mother was very appreciative of the support and update. 1900  Pt continues to rest.  No facial grimacing or moaning at this. Report given.             NAME OF PATIENT:  Teresa Mcmillan    LEVEL OF CARE:  Routine    O2 SAFETY:  O2 at 2lpm  Oxygen sign on the door    FALL INTERVENTIONS PROVIDED:   Implemented/recommended environmental changes (remove hazards, lower bed, improve lighting, etc.)    INTERDISPLINARY COMMUNICATION/COLLABORATION:  Physician, MSW, Leta and RN, CNA    NEW MEDICATION INITIATION DOCUMENTATION:  No new medications initiated. COMFORTABLE DYING MEASURE:  Is Patient/family satisfied with symptom level?  yes    DISCHARGE PLAN:  Pt will remain at the [facility] until she passes away.

## 2017-08-15 NOTE — PROGRESS NOTES
1900 Report received from [place], [facility]. Pt is Routine level of care. Dx Endometrial Ca  2000 Pt resting quietly in bed, appears to be asleep. 2100 Pts sister is at the bedside. Pt opens her eyes to verbal stimuli. 18 Pts sister has left. Pt appears to be sleeping  0200 Pt awake when entered room. Talked but rambling speech, Staes she is thirsty  Requested Pepsi. Drank 8 oz  0320 Pt awake restless in bed. Has thrown all covers to the floor. States she was up cooking. Lorazepam and Dilaudid PRN doses given to manage symptoms. 0400 Pt resting with eyes closed.  Appears to be asleep.  0500 Appears to be sleeping      NAME OF PATIENT:  Jyoti Randall    LEVEL OF CARE: Routine    REASON FOR GIP:   na    *PATIENT REMAINS ELIGIBLE FOR [facility] LEVEL OF CARE AS EVIDENCED BY: (MUST BE ADDRESSED OF PATIENT GIP)  na    REASON FOR RESPITE:  na    O2 SAFETY:  Concentrator positioning (6\" from furniture/drapes), Tanks stored in rodríguez , No petroleum based products on face while oxygen in use and Oxygen sign on the door    FALL INTERVENTIONS PROVIDED:   Implemented/recommended use of fall risk identification flag to all team members, Implemented/recommended resources for alarm system (personal alarm, bed alarm, call bell, etc.) , Implemented/recommended environmental changes (remove hazards, lower bed, improve lighting, etc.) and Implemented/recommended increased supervision/assistance    INTERDISPLINARY COMMUNICATION/COLLABORATION:  Physician, MSW, [place] and RN, CNA    NEW MEDICATION INITIATION DOCUMENTATION:  Documentation completed in Clinical Note in [facility] Care    Reason medication is being initiated:  na    MD / Provider name consulted re: change in status / initiation of new medication:  na    New Symptom(s):  na    New Order(s):  na    Name of the person notified of the changes:  na    Name of person being taught:  na    Instructions given:  na    Side Effects taught:  na    Response to teaching: na      COMFORTABLE DYING MEASURE:  Is Patient/family satisfied with symptom level?   Yes    DISCHARGE PLAN:  Remain at [facility] until end of life

## 2017-08-15 NOTE — PROGRESS NOTES
Follow up visit on August 13, for Patient and family as they continue to navigate Jyoti's illness. Jyoti was sleeping and slept most of the time I was present. However, when I called her name she acknowledged my presence and that of a friend who was in the room.      Vania Valdes., [address] [facility], [address] [place] [address]

## 2017-08-16 NOTE — PROGRESS NOTES
0700  Report received from [facility] RN.  0720  Pt lying in bed, moaning. Pt asking for pain medications. Pt reports she is having pin in her legs. Pt states pain is 10/10.    0742  Pt medicated with Dilaudid and Lorazepam.  Pt is alert to self with confusion. Pt does not know where she is. Lungs diminished. No cough noted. Unable to obtain sats. O2 placed on pt. No bowel sounds present. Abd is soft. In upper quadrants. Tight in lower ext. Pt is obese. Jama is draining orange urine. Last reported BM was yest.  Pt has edema in her bilateral lower ext. Pt has +3 pitting edema in her bilateral LE. Pt has a PICC line in her right upper arm. CADD pump is infusing Dilaudid at 5mg/hr. Pt continues to call out for her Mother. Pt is talking about raw meat. Rn continued to reorient pt. Sclera continues to be jaundiced. 5034  Pt continues to have pain in her legs. Pt medicated with Dilaudid  0830  Pr continues to moan and complain of pain in her back. Pt states it hurst real bad. Pt is pulling the pillow case of of her pillows and throwing them in the floor. Pt medicated with Dilaudid and Lorazepam.    0850  Phone call to Ms Keven Dempsey to advise of pt's decline. Phone call to Jeffery Rain (Mother) to advise of decline. 0935  LAURA Canas notified. [phone]  Dr Carter Chava notified. 1000  Pt's Mother at the bedside, along with Ms Keven Dempsey. Rn educated on the changes since yest.  Both very appreciative of the education. 200 Pt continues to be very lethargic. No facial grimacing at this time. Family continues to be at the bedside. Rn continued to educate on the signs and symptoms of the dying process. 1400  Pt arousing. Pt reached out and hugged Rn. Pt was able to hug her sisters. Pt asked for some pepsi and ice chips. Pt tolerated several sips of pepsi and ate ice chips. Pt asked who were the people standing in the room. Rn explained that only her relatives were in the room.   Rn asked it Igor was in the room. Pt stated no but she had talked to him   rn asked what he said. Kameron Younger stated that He asked her a question. Family in the room and very appreciative of the conversation. 1600  Pt restless and reaching in the air. Pt medicated with Dilaudid and Lorazepam.    1700  Pt restless and trying to get out of bed. Pt medicated with Dilaudid , lorazepam and Haldol. Pt stated she felt like she needed to have a BM. Pt turned and repositioned. 1800  Pt sleeping. No signs of distress. 1900  Report given to [facility] RN. NAME OF PATIENT:  Fu Masha    LEVEL OF CARE:  Routine    O2 SAFETY: 3lpm  Oxygen sign on the door    FALL INTERVENTIONS PROVIDED:   Implemented/recommended resources for alarm system (personal alarm, bed alarm, call bell, etc.)  and Implemented/recommended environmental changes (remove hazards, lower bed, improve lighting, etc.)    INTERDISPLINARY COMMUNICATION/COLLABORATION:  Physician, MSW, [place] and RN, CNA    NEW MEDICATION INITIATION DOCUMENTATION:  No new medications initiated. COMFORTABLE DYING MEASURE:  Is Patient/family satisfied with symptom level?  yes    DISCHARGE PLAN:  Pt is actively dying and will remain at the [facility] until she passes away.

## 2017-08-16 NOTE — PROGRESS NOTES
Verbal shift change report given to Yesika Pantoja by Baron Jericho RN. Report included the following information SBAR, Kardex, Intake/Output and MAR.      1958  Patient confused and in breakthrough pain. CADD pump cleared for total given of 58.6 mg and rate verified with Nabeel Burns RN. Medicated with PRN IV dilaudid. And scheduled lorazepam.  Family at the bedside. Patient refuses turning at this time; will reattempt turning once medications have had a chance to work.    Sumanth Villarreal  NAME OF PATIENT: Thais Perez      LEVEL OF CARE: Routine      REASON FOR RESPITE:   Patient not in Respite care at this time.         REASON FOR GIP:  Patient not in GIP care at this time.       O2 SAFETY:    Concentrator positioning (6\" from furniture/drapes), No petroleum based products on face while oxygen in use and Oxygen sign on the door        FALL INTERVENTIONS PROVIDED:   Implemented/recommended use of non-skid footwear, Implemented/recommended use of fall risk identification flag to all team members, Implemented/recommended assistive devices and encouraged their use, Implemented/recommended resources for alarm system (personal alarm, bed alarm, call bell, etc.) , Implemented/recommended environmental changes (remove hazards, lower bed, improve lighting, etc.) and Implemented/recommended increased supervision/assistance      INTERDISPLINARY COMMUNICATION/COLLABORATION: Physician, MSW, [place] and RN, CNA      NEW MEDICATION INITIATION DOCUMENTATION: No new medications nor interventions begun on this night shift.       Reason medication is being initiated: N/A      MD / Provider name consulted re: Change in status/initiation of new medication: N/A      New Symptom(s): N/A      New Order(s): N/A      Name of the person notified of the changes: N/A      Name of the person being taught: N/A      Instructions given: N/A      Side Effects taught: N/A      Response to teaching: N/A      COMFORTABLE DYING MEASURE: Continue to monitor for pain, dyspnea, agitation, and restlessness and intervene accordingly.       Is Patient/Family satisfied with symptom level? Yes      DISCHARGE PLAN: Patient condition declining and family unable to manage her care at home. Plan is to discharge patient postmortem.

## 2017-08-16 NOTE — PROGRESS NOTES
I was called this morning by Jyoti's mother and informed that she had received a call from the nurse at the hospice house that there had been a change in Jyoti's condition. I was present and provided support as Jyoti's mother called other family members notifying them that there was a change in Jyoti's condition. Provided support as family gathered and visited with [facility]. Each member shared private time with her. Prayers were offered with her mother and uncle before others arrived. Jyoti had moments of interaction doing the day. At one point doing the day she said to me that her brother was okay. She had been concerned abut him. I affirmed her feelings. Her family; her mother, siblings, and extended family are at bedside. Thanks as always for the opportunity to  to this family.      Emre Mittal., [facility], [address] [place] [address]

## 2017-08-17 NOTE — [facility] IDG CHAPLAIN NOTES
Patient: Shanelle Larry    Date: 08/16/17  Time: 8:31 PM  8/17/17  INTERVENTION:  I continue to visit when requested the room of this pt at the [facility] who is on Routine Status since her return from [facility]. She is in bed, semi-awake and not fully alert, not agitated, not restless, and appearing well medicated. She has suffered a physical decline in the last 24 hours and family has gathered expecting her passing. Patient and family use geovanny as a primary coping mechanism but are very stressed and struggling with this long, difficult journey. GOALS:  I confirmed with her the Rosana had continued to visit her and that I would be visiting as requested or PRN in her absence, if she so by the family desired. Continue to visit this pt and her family when requested while she is at the [facility] and I am in the facility, to provide supplemental pastoral care and spiritual supports to that being provided by Rosana assist both the pt and her family with coping with this difficult medical situation and decline. Coordinate w/ Rosana as she has primary Pastoral Service responsibly care for this patient  Visit this pt and her family, while she is @ [facility] and I am in this facility, or PRN as requested. Coordinate with Care Team on POC.   Signed by: Macrina Kellogg

## 2017-08-17 NOTE — PROGRESS NOTES
0700  Report received from Aniya Ross RN.   0730  Pt lying in bed, moving her arm and raising them up in the air. Pt is confused but said Hi. Dozing during assessment. O2 at 3lpm.   Lungs diminished but clear.  + bowel sounds. Last reported BM was yest.  Jama is draining orange urine with sediment. Pt has pitting edema in her bilateral lower ext. Thighs are very tight. Abd with ascites. Pt has a Picc in her R upper arm with Dilaudid infusing at 5mg/hr   Res vol is 254.2   177.90mg given. Pump cleared. 0802  Pt medicated with Dilaudid, Ativan and Haldol. 0830  Pt sleeping. 0930  Pt grutting and moaning   Pt medicated with Dilaudid and Ativan. 0950  Pt resting. 1000  Pt restless and moaning. Pt medicated with Dilaudid. Caitlin Jett continues to be very lethargic. Sclera very jaundiced. Pt reaching in the air. 1200  Pt medicated with scheduled meds. Pt repositioned. Pt's Mother at the bedside. Rn continued to educate on the signs and symptoms of the dying process. 1325  Pt moaning and asking for pain meds. Pt medicated with Dilaudid, Haldol and Lorazepam. Pt pulled up in bed.     1403  Dilaudid PCA rate change. 1602  Pt medicated with Dialudid and Lorazepam.    Pt's Mother reports Caitlin Jett is moaning and requesting pain med. Pt stated Pain is real bad. 1713  Pt agitated and groaning. Pt medicated with Lorazepam and Dilaudid. Rn lowered the head of the bed. Caitlin Jett stated that it made her feel better. 1800  Pt resting. No complaints. 1900   Report given.               NAME OF PATIENT:  Pérez Melendez    LEVEL OF CARE:  Routine    O2 SAFETY:  Oxygen sign on the door    FALL INTERVENTIONS PROVIDED:   Implemented/recommended use of fall risk identification flag to all team members and Implemented/recommended environmental changes (remove hazards, lower bed, improve lighting, etc.)    INTERDISPLINARY COMMUNICATION/COLLABORATION:  Physician, MSW, [place] and RN, CNA    NEW MEDICATION INITIATION DOCUMENTATION:  No new medications initiated

## 2017-08-17 NOTE — HOSPICE
[facility]  [phone]    Patient Name: Megan Alfredo  YOB: 1980    Date of Provider Hospice Visit: 08/17/17    Level of Care:   [x] General Inpatient (GIP)    [] Routine   [] Respite    Location of Care:  [] [facility] [] [facility] [] [facility] [] [facility] [x] [facility]    Date of Original Hospice Admission: 7-31-17  Hospice Medical Director for Inpatient Care: Dr. Charo Galvin Diagnosis: Metastatic endometrial cancer, s/p CT guided microwave tumor ablation (palliative procedure)  Diagnoses RELATED to the terminal prognosis: pathologic right subtrochanteric femur fracture  Other Diagnoses: dm-2, obesity, hx tachycardia     HOSPICE DIAGNOSES   Active Symptoms:  1. Pain; generalised: worse: poor response to CT guided tumor ablation  2. Delirium; worse   3. Shortness of breath  4. Airway secretions  5. Fatigue/weakness  6  Inability to swallow food/pills  7. Jaundice/anemia       PLAN   1. Change to GIP level care as pt is significantly symptomatic and requiring frequent prn medications to control pain, restlessness and agitation. 2. Increase dilaudid pca 8 mg/hr basal  3. Continue nurse given dilaudid 4mg every 15 min prn.pain  4. Haldol 2mg IV every 4 hours prn for delirium and hallucination  5. Change Ativan 2mg IV every 15mts prn and 1mg every 3 hours scheduled. 6. Continue routine wound care and wound care s/p procedure. 7. Add scopolamine patch 1.5mg every 72 hours and robinul as needed for secretions    8.  and SW to support family needs  9. Disposition: routine care once symptoms more stable. Prognosis estimated based on 08/17/17 clinical assessment is:   [x] Hours to Days    [] Days to Weeks    [] Other:few weeks    Communicated plan of care with: Hospice Case Manager;  Hospice IDT; Care Team     GOALS OF CARE     Resuscitation Status: DNR  Durable DNR: [x] Yes [] No    Advance Care Planning 8/6/2017   Patient's Healthcare Decision Maker is: Legal Next of Kin   Primary Decision Maker Name -   Primary Decision Maker Phone Number -   Primary Decision Maker Relationship to Patient -   Confirm Advance Directive Yes, on file   Patient Would Like to Complete Advance Directive -   Does the patient have other document types Do Not Resuscitate        HISTORY     History obtained from: chart, hospice staff, family, pt    CHIEF COMPLAINT:  The patient is:   [x] Verbal but minimal responsiveness  [] Nonverbal  [] Unresponsive    HPI/SUBJECTIVE:    Pt lethargic and poorly responsive, moans off and on spontaneously and when moved, declining steadily. Increased airway secretions, confusion and restlessness increased. Family all gathered in room since yesterday; mom, brother, sisters, Hailey Guarneri, nieces: all grieving heavy and aware that she is close to the end.    Since yesterday:   Gotten 6 doses of prn haldol  16 doses of prn dilaudid  3 doses of prn ativan       REVIEW OF SYSTEMS     The following systems were: [] reviewed  [x] unable to be reviewed  Adult Non-Verbal Pain Assessment    Face  [] 0   No particular expression or smile  [] 1   Occasional grimace, tearing, frowning, wrinkled forehead  [x] 2   Frequent grimace, tearing, frowning, wrinkled forehead    Activity (movement)  [] 0   Lying quietly, normal position  [x] 1   Seeking attention through movement or slow, cautious movement  [] 2   Restless, excessive activity and/or withdrawal reflexes    Guarding  [x] 0   Lying quietly, no positioning of hands over areas of body  [] 1   Splinting areas of the body, tense  [] 2   Rigid, stiff    Physiology (vital signs)  [] 0   Stable vital signs  [x] 1   Change in any of the following: SBP > 20mm Hg; HR > 20/minute  [] 2   Change in any of the following: SBP > 30mm Hg; HR > 25/minute    Respiratory  [] 0   Baseline RR/SpO2, compliant with ventilator  [x] 1   RR > 10 above baseline, or 5% drop SpO2, mild asynchrony with ventilator  [] 2   RR > 20 above baseline, or 10% drop SpO2, asynchrony with ventilator    Total Non-Verbal Pain Score: 5         FUNCTIONAL ASSESSMENT     Palliative Performance Scale (PPS): 20%     PSYCHOSOCIAL/SPIRITUAL ASSESSMENT     Active Problems:    * No active hospital problems. *    Past Medical History:   Diagnosis Date    Cancer [facility])     uterine    CVI (common variable immunodeficiency) (HCC)     Diabetes ([facility]     Elevated liver function tests 10/21/2010    Endometrial cancer ([facility] 7/7/2010    Hypertension     Iron deficiency anemia     Menometrorrhagia 10/21/2010    Microcytic anemia 10/21/2010    Morbid obesity ([facility]     Prediabetes 7/7/2010    Psychiatric disorder     depression    Radiation     completed radiation mid-march      Past Surgical History:   Procedure Laterality Date    CARDIAC SURG PROCEDURE UNLIST      hx of tachycardia    HX GYN      hysterectomy      Social History   Substance Use Topics    Smoking status: Never Smoker    Smokeless tobacco: Never Used    Alcohol use Yes     Family History   Problem Relation Age of Onset    Hypertension Mother     Hypertension Father     Stroke Maternal Grandfather     Cancer Paternal Grandmother      breast    Diabetes Paternal Grandmother       Allergies   Allergen Reactions    Egg Nausea and Vomiting     Raw egg and scrambled eggs. Egg products okay.      Pcn [Penicillins] Nausea and Vomiting     \"but could take amoxil\"    Shellfish Containing Products Unknown (comments)    Tetanus And Diphtheria Toxoids, Adsorbed, Adult Other (comments)     Developed local swelling, axillary pain, and transient fever    Tomato Unknown (comments)     Fresh tomatoes      Current Facility-Administered Medications   Medication Dose Route Frequency    LORazepam (ATIVAN) injection 2 mg  2 mg IntraVENous Q15MIN PRN    scopolamine (TRANSDERM-SCOP) 1.5 mg  1.5 mg TransDERmal Q72H    glycopyrrolate (ROBINUL) injection 0.2 mg  0.2 mg IntraVENous Q4H PRN    LORazepam (ATIVAN) injection 1 mg  1 mg IntraVENous Q3H    ondansetron (ZOFRAN) injection 4 mg  4 mg IntraVENous Q6H PRN    metroNIDAZOLE (FLAGYL) tablet 1,000 mg  1,000 mg Topical PRN    HYDROmorphone (PF) (DILAUDID) injection 4 mg  4 mg IntraVENous Q15MIN PRN    acetaminophen (TYLENOL) tablet 650 mg  650 mg Oral Q4H PRN    Or    acetaminophen (TYLENOL) solution 650 mg  650 mg Oral Q4H PRN    Or    acetaminophen (TYLENOL) suppository 650 mg  650 mg Rectal Q4H PRN    bisacodyl (DULCOLAX) suppository 10 mg  10 mg Rectal DAILY PRN    haloperidol lactate (HALDOL) injection 2 mg  2 mg IntraVENous Q4H PRN    HYDROmorphone (PF) 15 mg/30 ml (DILAUDID) PCA   IntraVENous CONTINUOUS    sodium chloride (NS) flush 5-10 mL  5-10 mL IntraVENous PRN        PHYSICAL EXAM     Wt Readings from Last 3 Encounters:   05/30/17 121.1 kg (267 lb)   05/10/17 121.4 kg (267 lb 10.2 oz)   02/16/17 136.1 kg (300 lb)       Visit Vitals    BP 95/50 (BP 1 Location: Left arm, BP Patient Position: At rest)    Pulse (!) 120    Temp 99.2 °F (37.3 °C)    Resp 14    SpO2 100%       Supplemental O2  [x] Yes  [] NO  Last bowel movement:     Currently this patient has:  [] Peripheral IV [x] PICC  [] PORT [] ICD    [x] Davila Catheter [] NG Tube   [] PEG Tube    [] Rectal Tube [] Drain  [] Other:     Constitutional: obese, lethargic, intermittently responsive, moans off and on regularly and appears short of breath, in pain and when lucid says she is hurting, increased airway secretions.  Very deeply jaundiced and pale, frail, weak  Eyes: scleral icterus ++  ENMT: secretions airways  Cardiovascular: tachycardic  Respiratory: labored breathing  Gastrointestinal: protuberant, BS hypoactive  Musculoskeletal: 2-3 plus b/l LE edema  Skin: warm, dry  Neurologic: lethargic, poorly responsive, confused, not fully oriented due to stupor, intermittently agitated  Other:davila's     Pertinent Lab and or Imaging Tests:  Lab Results   Component Value Date/Time    Sodium 139 08/01/2017 06:36 AM    Potassium 4.5 08/01/2017 06:36 AM    Chloride 106 08/01/2017 06:36 AM    CO2 26 08/01/2017 06:36 AM    Anion gap 7 08/01/2017 06:36 AM    Glucose 82 08/01/2017 06:36 AM    BUN 23 08/01/2017 06:36 AM    Creatinine 0.83 08/01/2017 06:36 AM    BUN/Creatinine ratio 28 08/01/2017 06:36 AM    GFR est AA >60 08/01/2017 06:36 AM    GFR est non-AA >60 08/01/2017 06:36 AM    Calcium 8.6 08/01/2017 06:36 AM     Lab Results   Component Value Date/Time    Protein, total 5.7 08/01/2017 06:36 AM    Albumin 2.0 08/01/2017 06:36 AM             Total time: 70 min  Counseling / coordination time: 39 mts discussion with hospice staff, family who are all grieving at this time  > 50% counseling / coordination?: yes

## 2017-08-17 NOTE — HSPC IDG MASTER NOTE
Hospice Interdisciplinary Group Collaborative  Date: 17  Time: 5:23 AM    ___________________      [facility]  Patient Name - [facility]  Episode - unknown  Date of IDT Meeting  2017    UPDATED COMPREHENSIVE ASSESSMENT      ATTENDING Physician:  Dr. Giovanni Daniel                  CLINICAL STATUS Routine level of care for metastatic endometrial cancer     SYMPTOMS   Pain, disorientation, confusion at all times, nonsensical speech.     SIGNS    Neuro: confused, lethargic, moves only upper extremities, slight lateral movement of left leg, unable to turn self, speech garbled, words nonsensical.   HEENT:  Sclera of eyes jaundiced  Resp:  Lungs clear, diminished in bases, assessment difficult   Heart:  Tachycardic this shift, BP 95/50. Feet and hands cold  GI:  Not eating anything, drank a lot of liquids 2017. :   Jama changed , draining dark tea colored urine with less sediment  Skin:  Breakdown r buttock cheek treated with vaseline gauze       LAB VALUES (when available)  N/A     KARNOFSKY 10%     FAST for all dementia N/A     Progression to DEPENDENCE WITH ADLs (include time frame)    Non-ambulatory over a yr, unable to participate in ADL, is total care     PRESSURE ULCERS none      DISEASE SPECIFIC  Tumor growth destroying bone in pelvis and right leg, tumor growth resulting in sore with drainage on buttock. FALL RISK:   Bariatric bed provided, reducing fall risk. PROBLEM:   Increased confusion, disorientation, not recogizing people, hallucinating/dreaming about heaven/Igor. Pain increasingly difficult to manage.       GOAL:  Maintain patient in shallow sleep until she passes    INTERVENTIONS(INDIVIDUALIZED)   Turn after medicating to minimize patient discomfort.        Nursing Visit Frequency  Hospice Aide Visit Frequency     SW  COPING  GRIEF  ADVANCED DIRECTIVES    BEREAVEMENT  RESOURCES NEEDED  SW Visit Frequency     Bereavement   NO CHANGE     Volunteer  NO VOLUNTEER       SPIRITUAL ISSUES  GRIEF  COPING     AVAILABLE SPIRITUAL RESOURCES   Visit Frequency     Clinician Signatures  Nurse______________________________  SW________________________________  Chaplain____________________________  Volunteer Coordinator__________________  Bereavement_________________________           Signed by:  Jerome Diego RN

## 2017-08-17 NOTE — PROGRESS NOTES
Verbal shift change report given to Albaro Moe by Giovany Sapp RN. Report included the following information SBAR, Kardex, Intake/Output and MAR.       NAME OF PATIENT: Jyoti Randall      LEVEL OF CARE: Routine      REASON FOR RESPITE:   Patient not in Respite care at this time.         REASON FOR GIP:  Patient not in GIP care at this time.       O2 SAFETY:    Concentrator positioning (6\" from furniture/drapes), No petroleum based products on face while oxygen in use and Oxygen sign on the door        FALL INTERVENTIONS PROVIDED:   Implemented/recommended use of non-skid footwear, Implemented/recommended use of fall risk identification flag to all team members, Implemented/recommended assistive devices and encouraged their use, Implemented/recommended resources for alarm system (personal alarm, bed alarm, call bell, etc.) , Implemented/recommended environmental changes (remove hazards, lower bed, improve lighting, etc.) and Implemented/recommended increased supervision/assistance      INTERDISPLINARY COMMUNICATION/COLLABORATION: Physician, MSW, [place] and RN, CNA      NEW MEDICATION INITIATION DOCUMENTATION: No new medications nor interventions begun on this night shift.       Reason medication is being initiated: N/A      MD / Provider name consulted re: Change in status/initiation of new medication: N/A      New Symptom(s): N/A      New Order(s): N/A      Name of the person notified of the changes: N/A      Name of the person being taught: N/A      Instructions given: N/A      Side Effects taught: N/A      Response to teaching: N/A      COMFORTABLE DYING MEASURE: Continue to monitor for pain, dyspnea, agitation, and restlessness and intervene accordingly.       Is Patient/Family satisfied with symptom level? Yes      DISCHARGE PLAN: Patient condition declining and family unable to manage her care at home.   Plan is to discharge patient postmortem.

## 2017-08-18 NOTE — HOSPICE
[facility]  [phone]    Patient Name: Caroline Jones  YOB: 1980    Date of Provider Hospice Visit: 08/18/17    Level of Care:   [x] General Inpatient (GIP)    [] Routine   [] Respite    Location of Care:  [] [facility] [] [facility] [] [facility] [] [facility] [x] [facility]    Date of Original Hospice Admission: 7-31-17  Hospice Medical Director for Inpatient Care: Dr. Maddie Angulo Diagnosis: Metastatic endometrial cancer, s/p CT guided microwave tumor ablation (palliative procedure)  Diagnoses RELATED to the terminal prognosis: pathologic right subtrochanteric femur fracture  Other Diagnoses: dm-2, obesity, hx tachycardia     HOSPICE DIAGNOSES   Active Symptoms:  1. Pain; generalised:still present   2. Delirium; worse; unresponsive now   3. Shortness of breath; persists  4. Airway secretions; persist  5. Fatigue/weakness; worse  6  Inability to swallow food/pills  7. Jaundice/anemia       PLAN   1. Continue GIP level care as pt is still  symptomatic and requiring frequent prn medications to control pain, restlessness and agitation. 2. Continue dilaudid pca 8 mg/hr basal  3. Continue nurse given dilaudid 4mg every 15 min prn.pain  4. Haldol 2mg IV every 4 hours prn for delirium and hallucination  5. Continue Ativan 2mg IV every 15mts prn and 1mg every 3 hours scheduled. 6. Continue routine wound care and wound care s/p procedure. 7. Scopolamine patch 1.5mg every 72 hours and robinul as needed for secretions    8.  and SW to support family needs  9. Disposition: routine care once symptoms more stable. Prognosis estimated based on 08/18/17 clinical assessment is:   [x] Hours to Days    [] Days to Weeks    [] Other:few weeks    Communicated plan of care with: Hospice Case Manager;  Hospice IDT; Care Team     GOALS OF CARE     Resuscitation Status: DNR  Durable DNR: [x] Yes [] No    Advance Care Planning 8/6/2017   Patient's Healthcare Decision Maker is: Legal Next of Kin   Primary Decision Maker Name -   Primary Decision Maker Phone Number -   Primary Decision Maker Relationship to Patient -   Confirm Advance Directive Yes, on file   Patient Would Like to Complete Advance Directive -   Does the patient have other document types Do Not Resuscitate        HISTORY     History obtained from: chart, hospice staff    CHIEF COMPLAINT:  The patient is:   [] Verbal   [x] Nonverbal  [x] Unresponsive    HPI/SUBJECTIVE:    Pt lethargic and unresponsive, moans still with slightest movement and also spontaneously, still short of breath.    Since yesterday:   Gotten 3 doses of prn haldol  6 doses of prn dilaudid  1 doses of prn robinul  1 dose prn ativan       REVIEW OF SYSTEMS     The following systems were: [] reviewed  [x] unable to be reviewed  Adult Non-Verbal Pain Assessment    Face  [] 0   No particular expression or smile  [x] 1   Occasional grimace, tearing, frowning, wrinkled forehead  [] 2   Frequent grimace, tearing, frowning, wrinkled forehead    Activity (movement)  [] 0   Lying quietly, normal position  [x] 1   Seeking attention through movement or slow, cautious movement  [] 2   Restless, excessive activity and/or withdrawal reflexes    Guarding  [x] 0   Lying quietly, no positioning of hands over areas of body  [] 1   Splinting areas of the body, tense  [] 2   Rigid, stiff    Physiology (vital signs)  [] 0   Stable vital signs  [x] 1   Change in any of the following: SBP > 20mm Hg; HR > 20/minute  [] 2   Change in any of the following: SBP > 30mm Hg; HR > 25/minute    Respiratory  [] 0   Baseline RR/SpO2, compliant with ventilator  [x] 1   RR > 10 above baseline, or 5% drop SpO2, mild asynchrony with ventilator  [] 2   RR > 20 above baseline, or 10% drop SpO2, asynchrony with ventilator    Total Non-Verbal Pain Score: 4         FUNCTIONAL ASSESSMENT     Palliative Performance Scale (PPS): 20%     PSYCHOSOCIAL/SPIRITUAL ASSESSMENT     Active Problems:    * No active hospital problems. *    Past Medical History:   Diagnosis Date    Cancer [facility])     uterine    CVI (common variable immunodeficiency) (HCC)     Diabetes ([facility] [facility]     Elevated liver function tests 10/21/2010    Endometrial cancer ([facility] [address] 7/7/2010    Hypertension     Iron deficiency anemia     Menometrorrhagia 10/21/2010    Microcytic anemia 10/21/2010    Morbid obesity ([facility] [address]     Prediabetes 7/7/2010    Psychiatric disorder     depression    Radiation     completed radiation mid-march      Past Surgical History:   Procedure Laterality Date    CARDIAC SURG PROCEDURE UNLIST      hx of tachycardia    HX GYN      hysterectomy      Social History   Substance Use Topics    Smoking status: Never Smoker    Smokeless tobacco: Never Used    Alcohol use Yes     Family History   Problem Relation Age of Onset    Hypertension Mother     Hypertension Father     Stroke Maternal Grandfather     Cancer Paternal Grandmother      breast    Diabetes Paternal Grandmother       Allergies   Allergen Reactions    Egg Nausea and Vomiting     Raw egg and scrambled eggs. Egg products okay.      Pcn [Penicillins] Nausea and Vomiting     \"but could take amoxil\"    Shellfish Containing Products Unknown (comments)    Tetanus And Diphtheria Toxoids, Adsorbed, Adult Other (comments)     Developed local swelling, axillary pain, and transient fever    Tomato Unknown (comments)     Fresh tomatoes      Current Facility-Administered Medications   Medication Dose Route Frequency    LORazepam (ATIVAN) injection 2 mg  2 mg IntraVENous Q15MIN PRN    scopolamine (TRANSDERM-SCOP) 1.5 mg  1.5 mg TransDERmal Q72H    glycopyrrolate (ROBINUL) injection 0.2 mg  0.2 mg IntraVENous Q4H PRN    LORazepam (ATIVAN) injection 1 mg  1 mg IntraVENous Q3H    ondansetron (ZOFRAN) injection 4 mg  4 mg IntraVENous Q6H PRN    metroNIDAZOLE (FLAGYL) tablet 1,000 mg  1,000 mg Topical PRN    HYDROmorphone (PF) (DILAUDID) injection 4 mg  4 mg IntraVENous Q15MIN PRN    acetaminophen (TYLENOL) tablet 650 mg  650 mg Oral Q4H PRN    Or    acetaminophen (TYLENOL) solution 650 mg  650 mg Oral Q4H PRN    Or    acetaminophen (TYLENOL) suppository 650 mg  650 mg Rectal Q4H PRN    bisacodyl (DULCOLAX) suppository 10 mg  10 mg Rectal DAILY PRN    haloperidol lactate (HALDOL) injection 2 mg  2 mg IntraVENous Q4H PRN    HYDROmorphone (PF) 15 mg/30 ml (DILAUDID) PCA   IntraVENous CONTINUOUS    sodium chloride (NS) flush 5-10 mL  5-10 mL IntraVENous PRN        PHYSICAL EXAM     Wt Readings from Last 3 Encounters:   05/30/17 121.1 kg (267 lb)   05/10/17 121.4 kg (267 lb 10.2 oz)   02/16/17 136.1 kg (300 lb)       Visit Vitals    BP 95/50 (BP 1 Location: Left arm, BP Patient Position: At rest)    Pulse (!) 120    Temp 99.2 °F (37.3 °C)    Resp 14    SpO2 100%       Supplemental O2  [x] Yes  [] NO  Last bowel movement:     Currently this patient has:  [] Peripheral IV [x] PICC  [] PORT [] ICD    [x] Davila Catheter [] NG Tube   [] PEG Tube    [] Rectal Tube [] Drain  [] Other:     Constitutional: obese, lethargic, unresponsive, moans off and on, short of breath  Eyes: scleral icterus ++  ENMT: secretions airways  Cardiovascular: tachycardic  Respiratory: labored breathing  Gastrointestinal: protuberant, BS hypoactive  Musculoskeletal: 2-3 plus b/l LE edema  Skin: warm, dry  Neurologic: lethargic, unresponsive  Other:davila's     Pertinent Lab and or Imaging Tests:  Lab Results   Component Value Date/Time    Sodium 139 08/01/2017 06:36 AM    Potassium 4.5 08/01/2017 06:36 AM    Chloride 106 08/01/2017 06:36 AM    CO2 26 08/01/2017 06:36 AM    Anion gap 7 08/01/2017 06:36 AM    Glucose 82 08/01/2017 06:36 AM    BUN 23 08/01/2017 06:36 AM    Creatinine 0.83 08/01/2017 06:36 AM    BUN/Creatinine ratio 28 08/01/2017 06:36 AM    GFR est AA >60 08/01/2017 06:36 AM    GFR est non-AA >60 08/01/2017 06:36 AM    Calcium 8.6 08/01/2017 06:36 AM     Lab Results Component Value Date/Time    Protein, total 5.7 08/01/2017 06:36 AM    Albumin 2.0 08/01/2017 06:36 AM             Total time: 35 min  Counseling / coordination time: 10mts discussing with hospice staff  > 50% counseling / coordination?:

## 2017-08-18 NOTE — PROGRESS NOTES
2200 Pt has eyes closed, opens when name called, grunting with each breath. Unable to communicate if in pain, Hands are raised and trembling.  Medicated with PRN dose Dilaudid and and scheduled Lorazepam

## 2017-08-18 NOTE — PROGRESS NOTES
Verbal shift change report given to Christy Lane by Caesar Powell RN. Report included the following information SBAR, Kardex, Intake/Output and MAR.       NAME OF PATIENT: Jyoti Randall      LEVEL OF CARE: Routine      REASON FOR RESPITE:   Patient not in Respite care at this time.         REASON FOR GIP:  Patient not in GIP care at this time.        O2 SAFETY:    Concentrator positioning (6\" from furniture/drapes), No petroleum based products on face while oxygen in use and Oxygen sign on the door      FALL INTERVENTIONS PROVIDED:   Implemented/recommended use of non-skid footwear, Implemented/recommended use of fall risk identification flag to all team members, Implemented/recommended assistive devices and encouraged their use, Implemented/recommended resources for alarm system (personal alarm, bed alarm, call bell, etc.) , Implemented/recommended environmental changes (remove hazards, lower bed, improve lighting, etc.) and Implemented/recommended increased supervision/assistance      INTERDISPLINARY COMMUNICATION/COLLABORATION: Physician, MSW, [place] and RN, CNA      NEW MEDICATION INITIATION DOCUMENTATION: No new medications nor interventions begun on this night shift.       Reason medication is being initiated: N/A      MD / Provider name consulted re: Change in status/initiation of new medication: N/A      New Symptom(s): N/A      New Order(s): N/A      Name of the person notified of the changes: N/A      Name of the person being taught: N/A      Instructions given: N/A      Side Effects taught: N/A      Response to teaching: N/A      COMFORTABLE DYING MEASURE: Continue to monitor for pain, dyspnea, agitation, and restlessness and intervene accordingly.       Is Patient/Family satisfied with symptom level? Yes      DISCHARGE PLAN: Patient condition declining and family unable to manage her care at home. Bety Grief is to discharge patient postmortem.

## 2017-08-18 NOTE — PROGRESS NOTES
5 Family states pt moaning with breathing and moving legs. Eyes closed at this time. Does not respond when name called. PRN dose of Lorazepam and Dilaudid given for discomfort.

## 2017-08-18 NOTE — PROGRESS NOTES
NAME OF PATIENT:  Jyoti Randall    LEVEL OF CARE:  [facility]    REASON FOR GIP:   Medication adjustment that must be monitored 24/7 and Stabilizing treatment that cannot take place at home    *PATIENT REMAINS ELIGIBLE FOR [facility] LEVEL OF CARE AS EVIDENCED BY: (MUST BE ADDRESSED OF PATIENT GIP)      REASON FOR RESPITE:  N/A    O2 SAFETY:  Concentrator positioning (6\" from furniture/drapes), Tanks stored in [place] , No petroleum based products on face while oxygen in use and Oxygen sign on the door    FALL INTERVENTIONS PROVIDED:   Implemented/recommended resources for alarm system (personal alarm, bed alarm, call bell, etc.)  and Implemented/recommended environmental changes (remove hazards, lower bed, improve lighting, etc.)    INTERDISPLINARY COMMUNICATION/COLLABORATION:  Physician, ADAM, [place] and RN, CNA    NEW MEDICATION INITIATION DOCUMENTATION:  Documentation completed in Clinical Note in [facility]    Reason medication is being initiated:  NO MED CHANGES TODAY      COMFORTABLE DYING MEASURE:  Is Patient/family satisfied with symptom level?  yes    DISCHARGE PLAN:  POSTMORTEM      0700: Care assumed of patient. Report rec'd from [address]. Patient is [facility] level of care and is unresponsive and actively dying. Patient has a dx of metastatic endometrial cancer with dilaudid pca infusing in right arm PICC. Patient is GIP level of care for symptoms such as uncontrolled pain, restlessness, and symptom management that cannot be controlled at home. Patient lungs are diminished with audible heart tones. Bowel sounds are active and audible with abdo ascited noted. Skin is very taut and shiny all over. Edematous over entire body. 2715: Patient medicated with ativan at this time via R PICC.    1111: Patient medicated with ativan at this time via R PICC. Condition remains unchanged. 1200: Patient repositioned in bed and remains unresponsive. Slight moan during moving but remains unchanged.     1320: Patient medicated with ativan for continued comfort. Remains GIP level of care. Will continue to monitor patient progress. [phone]: Calling [facility] [employer] to order additional bag of dilaudid for patient as she only has one left. Patient's status remains unchanged. New bag ordered from [facility] [employer] for delivery today.

## 2017-08-19 NOTE — PROGRESS NOTES
Verbal shift change report given to Benita Barahona (oncoming nurse) by Alexandra Fraire (offgoing nurse). Report included the following information SBAR and Kardex. NAME OF PATIENT:  Valdo Ewing    LEVEL OF CARE:  [facility]    REASON FOR GIP:   Pain, despite numerous changes in medications, Unmanageable respiratory distress, Nausea and vomiting, despite changes to medications, Medication adjustment that must be monitored 24/7 and Stabilizing treatment that cannot take place at home    *PATIENT REMAINS ELIGIBLE FOR GIP LEVEL OF CARE AS EVIDENCED BY: (MUST BE ADDRESSED OF PATIENT GIP). Patient continues to need PRN doses of Dlaludid and Ativan along with her PCA Dilaudid via CADD pump in order to control her symptoms of pain, delirium, restlessness, agitation, shortness of breath, and secretions. She is totally bedbound, is unable to turn, and unable to eat due to swallowing difficulties. REASON FOR RESPITE:  n/a    O2 SAFETY:  Concentrator positioning (6\" from furniture/drapes), No petroleum based products on face while oxygen in use and Oxygen sign on the door    FALL INTERVENTIONS PROVIDED:   Implemented/recommended environmental changes (remove hazards, lower bed, improve lighting, etc.) and Implemented/recommended increased supervision/assistance    INTERDISPLINARY COMMUNICATION/COLLABORATION:  Physician, MSW, [place] and RN, CNA    NEW MEDICATION INITIATION DOCUMENTATION:  No new medications at this time. Reason medication is being initiated:  n/a    MD / Provider name consulted re: change in status / initiation of new medication:  n/a    New Symptom(s):  No new symptoms at this time. New Order(s):  No new orders at this time.     Name of the person notified of the changes:  n/a    Name of person being taught:  n/a    Instructions given:  n/a    Side Effects taught:  n/a    Response to teaching:  n/a    COMFORTABLE DYING MEASURE:  Is Patient/family satisfied with symptom level?  yes    DISCHARGE PLAN: Patient is declining and it is likely that she will not be discharged. Night shift summary:   1900 - Report received. 1955 - PRN Dilaudid 4 mg IV given for grimacing and request for pain medication. PRN Ativan 2 mg IV given for restlessness/ unable to relax. NPO. Right upper arm double lumen PICC line for medication administration; dressing dated 8/14/2017; no redness, swelling, or leaking. 5 - Patient's cousin and friend are at bedside. Patient is mostly unresponsive with periods of being minimally responsive with confusion. Jama is patent and draining phil urine with sediment. Continues to receive Dilaudid IV continuously at 8 ml per hour via CADD pump. 2200 - 94% on 3 liters of nasal oxygen. 2340 - PRN Dilaudid 4 mg IV given for grimacing and request for pain medication. 0030 - Bath and bed linen change done by Nelsy Monday and Saad Cage. 0055 - PRN Dilaudid 4 mg IV given for grimacing and request for pain medication. 0158 -  PRN Ativan 2 mg IV given for restlessness/ unable to relax. 0300 - Asleep. Addis. PCA Dilaudid:  Total dose tonight = 62.1 mg.

## 2017-08-19 NOTE — PROGRESS NOTES
NAME OF PATIENT:  Oral Rivas    LEVEL OF CARE:  Routine    O2 SAFETY:  Concentrator positioning (6\" from furniture/drapes), Tanks stored in [place] , No petroleum based products on face while oxygen in use and Oxygen sign on the door    FALL INTERVENTIONS PROVIDED:   Implemented/recommended resources for alarm system (personal alarm, bed alarm, call bell, etc.) , Implemented/recommended environmental changes (remove hazards, lower bed, improve lighting, etc.) and Implemented/recommended increased supervision/assistance    INTERDISPLINARY COMMUNICATION/COLLABORATION:  Physician, MSW, [place] and RN, CNA    NEW MEDICATION INITIATION DOCUMENTATION:  Documentation completed in Clinical Note in [facility]    Reason medication is being initiated:  No medication changes made today. MD / Provider name consulted re: change in status / initiation of new medication:  n/a    New Symptom(s):  n/a    New Order(s):  n/a    Name of the person notified of the changes:  n/a    Name of person being taught:  n/a    Instructions given:  n/a    Side Effects taught:  n/a    Response to teaching:  n/a      COMFORTABLE DYING MEASURE:  Is Patient/family satisfied with symptom level?  yes    DISCHARGE PLAN:  Family aware that patient is actively transitioning and is most likely going to  at the [facility]. Patient is actively transitioning.    0700: Report received from Ila Wolf, [address] [place] [address] to assume patient care. Patient remains GIP level of care, but does not currently have any symptoms that need to be managed at this time. Will inquire about switching the patient back to routine level of care with the doctor this morning during rounds. Patient has a diagnosis of metastatic endometrial cancer and is actively transitioning. Patient minimally responsive and does open her eyes at times. Breath sounds are diminished with audible heart tones. Patient skin taut and shiny with noted ascites in the abdomen and generalized edema +3/+4. Vulvovaginal edema noted and swelling around davila site. Davila draining orange/brown colored urine with sediment. Unable to verbalize pain. Stage two on sacrum open to air and stage 1 inner thigh open to air. Bowel sounds hypoactive x4 quadrants. R upper arm PICC infusing dilaudid PCA 8mg/hr continuously. Family rotates in and out to be with the patient most hours of the day. 0911: Patient medicated with ativan via PICC line. Tolerated well. Remains minimally responsive to stimulation. No family at the bedside. 1130: No change in patient status. Continues to remains symptom free at this time. No PRN doses of pain or anxiety medication needed during this shift. Patient is minimally responsive at this time. 1300: Patient switched from [facility] level of care to routine level of care due to symptoms being managed. Patient remains minimally responsive but very comfortable. Patient's sister is at the bedside. Will continue to monitor. 1620: Patient medicated with scheduled ativan via R PICC line. Tolerated well. Remains minimally responsive. 1732: Patient awake and saying a few words. Mom called on the bell stating that she felt like patient was in some distress. On assessment, patient appeared to be slightly restless and grabbing in the air. Will medicate with haldol, ativan, and dilaudid PRN via PICC. Mother and other family in room educated on signs and symptoms patient is experiencing and that it is all transient. Some members in the room were tearful but consolable. Will continue to closely monitor patient progress. 1845: No change in patient status. Patient remains minimally responsive. Family in the room. Awaiting shift change.

## 2017-08-20 NOTE — PROGRESS NOTES
1900: Shift report received from [facility] [address]: Pt in bed, no response to voice or physical stimuli. Resting very peacefully with no signs of discomfort or pain. Lungs clear, faint bowel sounds. Pt medicated with scheduled med, family member at the bedside, doing 'ok\", calm ane very cooperative. No questions or concerns at this time. 2330: Medicated with scheduled meds, pt continuous to be unresponsive, appears to be very comfortable, breathing regular, no signs or symptoms of distress. CADD pump running continuously with Dilaudid 8 mg per hour. 0435: Pt sleeping, awakens when being turned for a bath, moans and grimaces occasionally during bathing. Pt given bed bath and wounds treated with Calmoseptine. PRN Dilaudid given for breakthrough pain caused by moving pt in the bed.  0545: Pt drowsy, waving arms in the arm, attempting to grab something. Pt reoriented, but does not appear to realize what's going on as she is very drowsy and lethargic. Pt does not appear to be in pain. Pump cleared.      NAME OF PATIENT:  Talon [place]    LEVEL OF CARE:  Routine    REASON FOR GIP:   n/a    *PATIENT REMAINS ELIGIBLE FOR GIP LEVEL OF CARE AS EVIDENCED BY: (MUST BE ADDRESSED OF PATIENT GIP)      REASON FOR RESPITE:  n/a    O2 SAFETY:  Concentrator positioning (6\" from furniture/drapes), No petroleum based products on face while oxygen in use and Oxygen sign on the door    FALL INTERVENTIONS PROVIDED:   Implemented/recommended resources for alarm system (personal alarm, bed alarm, call bell, etc.)  and Implemented/recommended environmental changes (remove hazards, lower bed, improve lighting, etc.)    INTERDISPLINARY COMMUNICATION/COLLABORATION:  Physician, MSW, [place] and RN, CNA    NEW MEDICATION INITIATION DOCUMENTATION:  no new meds initiated on this shift    Reason medication is being initiated:      MD / Provider name consulted re: change in status / initiation of new medication:      New Symptom(s):      New Order(s):      Name of the person notified of the changes:      Name of person being taught:      Instructions given:      Side Effects taught:      Response to teaching:        COMFORTABLE DYING MEASURE:  Is Patient/family satisfied with symptom level?  yes    DISCHARGE PLAN:  Pts condition not appropriate to return home, pt may be placed in facility but likely will  at the [facility]

## 2017-08-20 NOTE — PROGRESS NOTES
NAME OF PATIENT:  Hadley Drivers    LEVEL OF CARE:  Routine    O2 SAFETY:  Concentrator positioning (6\" from furniture/drapes), Tanks stored in rodríguez , No petroleum based products on face while oxygen in use and Oxygen sign on the door    FALL INTERVENTIONS PROVIDED:   Implemented/recommended resources for alarm system (personal alarm, bed alarm, call bell, etc.) , Implemented/recommended environmental changes (remove hazards, lower bed, improve lighting, etc.) and Implemented/recommended increased supervision/assistance    INTERDISPLINARY COMMUNICATION/COLLABORATION:  Physician, MSW, Leta and RN, CNA    NEW MEDICATION INITIATION DOCUMENTATION:  Documentation completed in Clinical Note in [address] [place] [address]    Reason medication is being initiated:  No new medication changes    COMFORTABLE DYING MEASURE:  Is Patient/family satisfied with symptom level?  yes    DISCHARGE PLAN:  EOL care for patient. Family aware of patient changing and is actively declining.      0700: Care assumed of patient after report from [facility]. Patient in room and remains minimally responsive. Nurse reports patient had some discharge from her josselyn area that was malodorous. Patient given a bath overnight and pad changed for comfort. Given additional PRN doses of ativan for restlessness and agitation. She remains routine level of care and her biggest symptom of pain is well controlled with her dilaudid PCA at 8mg/hr. Family in and out of the room visiting with the patient. Lungs remain diminished with audible heart tones. +Bowel sounds that are hypoactive in nature. 1500: Old scop patch removed and replaced behind L ear. Patient remains minimally responsive at this time. 1844: Pump cleared. 102.3 used during this shift and 142.40ml left. New bag needs to be ordered from HCP before patient runs out. We can use the dilaudid PCA in the pyxis if patient runs out overnight. Awaiting shift change.

## 2017-08-21 NOTE — PROGRESS NOTES
500 [facility] from [facility]. Client resting in bed. Denies c/o pain.   Medicated with scheduled lorazepam.  Chaplain Jeff Scruggs remains with client  8126 medicated with PRN Dilaudid for incident pain-Bed [facility]

## 2017-08-21 NOTE — PROGRESS NOTES
0700  Report received from [facility] FACILITY RN  0730  Pt resting comfortably. 0809  Pt lying in bed,  No facial grimacing or moaning at this time. Lungs diminished but clear. Bowel sounds very faint and distant. Abd is firm. Jama is draining orange concentrated urine. Last reported BM was 8-15. Pt has edema +3 in her bilateral LE.  R thigh is very tender to touch. Pt has a PICC in her R UA. Infusing Dilaudid at 8mg/hr. Exp date 9-1.    0834  Pt mediated with Dialudid and Ativan PRN.    0900  Pt resting. No facial grimacing or moaning with touch. 1040  Pt resting. 1216  New PCA bag of Dilaudid hung by TB RN.  1673  Pt medicated with dilaudid and Lorazepam for moaning and grimacing. TB RN medicated pt. Pt requested something for pain. Voice is a whisper. Pt very lethargic. 1400  Pt asleep. No facial grimacing at this time. 1510  Ms Jennifer Barrera from [facility] in to visit. 1600  Report given to TB RN.        NAME OF PATIENT:  Jyoti Randall    LEVEL OF CARE:  Routine    O2 SAFETY:  02 3lpm    Oxygen sign on the door    FALL INTERVENTIONS PROVIDED:   Implemented/recommended environmental changes (remove hazards, lower bed, improve lighting, etc.)    INTERDISPLINARY COMMUNICATION/COLLABORATION:  Physician, MSW, Leta and RN, CNA    NEW MEDICATION INITIATION DOCUMENTATION:  No new medications initiated. COMFORTABLE DYING MEASURE:  Is Patient/family satisfied with symptom level?  yes    DISCHARGE PLAN:  Pt will remain at the [facility] until she passes away.

## 2017-08-21 NOTE — PROGRESS NOTES
1900 Report received from [facility]. Pt is Routine level of care. Dx Endometrial Cancer. 1 Family states that pt is moaning at times. She opens her eyes to name call, is not verbally responsive. Grimaces with pain with movement of right arm. Dilaudid 4mg given IVP with scheduled Lorazepam for pain  2000 Pt resting with eyes closed. No signs of pain or discomfort. .  2115 Mother and family remain at the bedside. States pt is moaning at times. Her eyes open to verbal stimuli. Dilaudid 4mg and scheduled Lorazepam given for pain. 2200 Resting in bed with eyes closed. No signs of discomfort. 0100 Appears to be resting without discomfort. 0500 Pulling off covers and gown, moaning. PRN Dilaudid gien for pain  0555 Resting with eyes closed. No signs of discomfort.       NAME OF PATIENT:  Jyoti Randall    LEVEL OF CARE:  Routine    REASON FOR GIP:   na    *PATIENT REMAINS ELIGIBLE FOR GIP LEVEL OF CARE AS EVIDENCED BY: (MUST BE ADDRESSED OF PATIENT GIP)      REASON FOR RESPITE:  na    O2 SAFETY:  Concentrator positioning (6\" from furniture/drapes), Tanks stored in rodríguez , No petroleum based products on face while oxygen in use and Oxygen sign on the door    FALL INTERVENTIONS PROVIDED:   Implemented/recommended use of fall risk identification flag to all team members, Implemented/recommended resources for alarm system (personal alarm, bed alarm, call bell, etc.) , Implemented/recommended environmental changes (remove hazards, lower bed, improve lighting, etc.) and Implemented/recommended increased supervision/assistance    INTERDISPLINARY COMMUNICATION/COLLABORATION:  Physician, MSW, Leta and RN, CNA    NEW MEDICATION INITIATION DOCUMENTATION:  Documentation completed in Clinical Note in [facility] Care    Reason medication is being initiated:  na    MD / Provider name consulted re: change in status / initiation of new medication:  na    New Symptom(s):  na    New Order(s):  na    Name of the person notified of the changes:  na    Name of person being taught:  na    Instructions given:  na    Side Effects taught: na    Response to teaching:  na      COMFORTABLE DYING MEASURE:  Is Patient/family satisfied with symptom level? Yes    DISCHARGE PLAN: Remain at [facility] until end of life.

## 2017-08-22 NOTE — PROGRESS NOTES
0700 Report received from [facility] to [facility]   0800 client started to moan with assessment. PRN Dilaudid given as per [facility]  R hand swollen on pillow  Generalized edema, but decreased from last week. Weak voice with some appropriate responces. Decrease breathe sounds at bases. Hypoactive Bowel sounds. Protruding area on  L lateral base of foot approx 1/2 \" by 2.5 inches but intact. [address] in room-bible readings. Client resting eyes closed respirations even unlabored  1245  Medicated with scheduled lorazepam  Client resting quietly respirations even unlabored  1450 client resting arousable and denies pain  Ice cold wet sponge to mouth apparently enjoyed  1530  C/o pain  PRN Dilaudid given per [facility] and scheduled Lorazepam  1555 Resting quietly respirations even unlabored  1700 PCA cleared with 99.8 mg given and [address]  7119 Medicated with PRN Dilaudid and Lorazepam per MARs as c/o pain  Also requested water and cold wet sponge given which she enjoyed. NAME OF PATIENT:  Nickie Flanagan    LEVEL OF CARE:  Routine    O2 SAFETY:  Concentrator positioning (6\" from furniture/drapes), No petroleum based products on face while oxygen in use and Oxygen sign on the door    FALL INTERVENTIONS PROVIDED:   Implemented/recommended increased supervision/assistance    INTERDISPLINARY COMMUNICATION/COLLABORATION:  Physician, ADAM, Leta and RN, CNA    NEW MEDICATION INITIATION DOCUMENTATION:  No new medications initiated    Reason medication is being initiated:  N/A    MD / Provider name consulted re: change in status / initiation of new medication:  N/A    New Symptom(s):  N/A    New Order(s):  N/A    Name of the person notified of the changes:  N/A    Name of person being taught:  N/A    Instructions given:  N/A    Side Effects taught:  N/A    Response to teaching:  N/A      COMFORTABLE DYING MEASURE:  Is Patient/family satisfied with symptom level?  Yes    DISCHARGE PLAN:  Remain here until death

## 2017-08-23 NOTE — PROGRESS NOTES
0700 Report from [facility] 128 client appears distressed with fingers moving in air  C/o pain  Medicated with PRN per MARs  1012  Medicated with PRN pain med pre bed and bath  1059 s/p bed and bath medicated with Dilaudid and Lorazepam per MARs per client request.  Also requested hug and given  NAME OF PATIENT:  Jyoti Randall    LEVEL OF CARE:  Routine  O2 SAFETY:  Concentrator positioning (6\" from furniture/drapes), No petroleum based products on face while oxygen in use and Oxygen sign on the door    FALL INTERVENTIONS PROVIDED:   Implemented/recommended environmental changes (remove hazards, lower bed, improve lighting, etc.) and Implemented/recommended increased supervision/assistance    INTERDISPLINARY COMMUNICATION/COLLABORATION:  Physician, MSW, Leta and RN, CNA    NEW MEDICATION INITIATION DOCUMENTATION:  No new meds at this time    Reason medication is being initiated:  N/A    MD / Provider name consulted re: change in status / initiation of new medication:  N/A    New Symptom(s):  N/A   New Order(s): N/A    Name of the person notified of the changes:  N/A    Name of person being taught:  N/A  Family when present  Instructions given:  N/A    Side Effects taught:  N/A    Response to teaching:  N/A    COMFORTABLE DYING MEASURE:  Yes    DISCHARGE PLAN:  SW to plan  Likely will  at hospice house

## 2017-08-23 NOTE — PROGRESS NOTES
William Carrasquillo Family called out to state pt appears to be in pain and nods head yes to pain  Dilaudid  PRN dose and Lorazepam scheduled dose given for pain and discomfort. 2025 Pts mother and other family at the bedside. She states pt c/o pain. Pts eyes are open and she moans . PRN Dilaudid and Lorazepam doses given for pain  2100 Pt appears to be sleeping. Family remain at the bedside  2230 Scheduled Lorazepam given to manage symptoms Sister at the bedside. Pt resting quietly. 0300 Resting quietly. No signs of pain  0400 Awake with eyes open partially . Moving arms, trying to speak. Shakes head yes to pain  Dilaudid prn dose given. Lorazepam scheduled dose given. Pca pump cleared  339.9 ml LTC, 91.2mg  administered. 0430 Small amt urine in tubing but no measurable output. 0630 Appears to be asleep with no s/s of pain. NAME OF PATIENT:  Jyoti Randall    LEVEL OF CARE: Routine    REASON FOR GIP:   na    *PATIENT REMAINS ELIGIBLE FOR [facility] LEVEL OF CARE AS EVIDENCED BY: (MUST BE ADDRESSED OF PATIENT GIP)  na    REASON FOR RESPITE:  na    O2 SAFETY:  Concentrator positioning (6\" from furniture/drapes), Tanks stored in rodríguez , No petroleum based products on face while oxygen in use and Oxygen sign on the door    FALL INTERVENTIONS PROVIDED: bed alarm, bed lowered to the floor  INTERDISPLINARY COMMUNICATION/COLLABORATION:  Physician, MSW, [place] and RN, CNA    NEW MEDICATION INITIATION DOCUMENTATION:  Documentation completed in Clinical Note in [facility] Care    Reason medication is being initiated:  na    MD / Provider name consulted re: change in status / initiation of new medication:  na  New Symptom(s):  na    New Order(s):  na    Name of the person notified of the changes:  na    Name of person being taught:  na    Instructions given:  na    Side Effects taught:  na    Response to teaching:  na      COMFORTABLE DYING MEASURE:  Is Patient/family satisfied with symptom level?   Yes    DISCHARGE PLAN: Remain Routine at the [facility] until end of life.  Family is unable to care for pt at home

## 2017-08-23 NOTE — DISCHARGE SUMMARY
Hospice Discharge Summary    [address] [place] [address]  Good Help to Those in Need        Date of Admission: 7/31/2017  Date of Discharge: 8/11/2017    Pratik Lam is a 40y.o. year old who was admitted to [address] [place] [address] at [address] with a Hospice diagnosis of pre op testing; Endometrial ca (HCC);RECOVERY NEEDED IN PACU. The patient's care was focused on comfort . Pt underwent CT guided radiofrequency ablation of large endometrial tumor mass by interventional radiology on 8-2-17. Pt tolerated the procedure well but still had moderate to severe pain in right groin and leg area related to tumor and hip fracture. dilaudid PCA was restarted and titrated up to 5 mg /hr. Medicated with iv haldol and ativan prn. Unable to take oral medicines due somnolence/lethargy. Bilirubin was severely elevated and with scleral icterus and pt's mother was informed likely due to liver met with progression of disease. Pt was kept npo except for sips. Family were unable to care for pt at their home.  Pt was transferred back to [facility] with ongoing  hospice care  Add in Hospice Summary through Plan from most recent Progress Note

## 2017-08-24 NOTE — HSPC IDG NURSE NOTES
Hospice Interdisciplinary Group Collaborative  Date: 17  Time: 01:30    ___________________      [address]  Patient Name - Cammy Branch  Episode - unknown  Date of IDT Meeting  17    UPDATED COMPREHENSIVE ASSESSMENT      ATTENDING Physician:  Dr. Ramonia Dance                  CLINICAL STATUS Routine level of care for metastatic endometrial cancer     SYMPTOMS   Pain, disorientation, confusion at all times, nonsensical speech.     SIGNS    Neuro: confused, lethargic, moves only upper extremities, slight lateral movement of left leg, unable to turn self, speech garbled, words nonsensical.   HEENT:  Sclera of eyes jaundiced  Resp:  Lungs clear, diminished in bases, assessment difficult   Heart:  Tachycardic this shift, BP- 78/48  Feet and hands cool to touch  GI:Poor appetite, taking bites and sips  :   Jama changed , draining dark tea colored urine with less sediment  Skin:  Breakdown r buttock cheek treated with vaseline gauze       LAB VALUES (when available)  N/A     KARNOFSKY 10%     FAST for all dementia N/A     Progression to DEPENDENCE WITH ADLs (include time frame)    Non-ambulatory over a yr, unable to participate in ADL, is total care     PRESSURE ULCERS none      DISEASE SPECIFIC  Tumor growth destroying bone in pelvis and right leg, tumor growth resulting in sore with drainage on buttock. FALL RISK:   Bariatric bed provided, reducing fall risk. PROBLEM:   Increased confusion, disorientation, not recogizing people, hallucinating/dreaming about heaven/Igor. Pain increasingly difficult to manage.       GOAL:  Maintain patient in shallow sleep until she passes    INTERVENTIONS(INDIVIDUALIZED)   Turn after medicating to minimize patient discomfort.        Nursing Visit Frequency  Hospice Aide Visit Frequency     SW  COPING  GRIEF  ADVANCED DIRECTIVES    BEREAVEMENT  RESOURCES NEEDED  SW Visit Frequency     Bereavement   NO CHANGE     Volunteer  NO VOLUNTEER       SPIRITUAL ISSUES  GRIEF  COPING     AVAILABLE SPIRITUAL RESOURCES   Visit Frequency     Clinician Signatures  Nurse__Jennifer Rock RN____________________________  SW________________________________  Chaplain____________________________  Volunteer Coordinator__________________  Bereavement_________________________           Signed by: Cora Lang RN

## 2017-08-24 NOTE — HSPC IDG SOCIAL WORKER NOTES
Patient: Hadley Baxter    Date: 08/24/17  Time: 8:34 AM    [facility]  Notes  Patient transitioning - family bedside. Msw provided emotional support for family as they cope with patients ongoing decline. Family deeply saddened but coping appropriately at this time.           Signed by: Delonte Gonzalez

## 2017-08-24 NOTE — HSPC IDG CHAPLAIN NOTES
Patient: Jessica Babin    Date: 08/24/17  Time: 7:00 AM  GOALS:  I confirmed with her that Rosana had continued to visit her and that I would be visiting as requested or PRN in her absence, if she so by the family desired. Continue to visit this pt and her family when requested while she is at the [facility] and I am in the facility, to provide supplemental pastoral care and spiritual supports to that being provided by Rosana assist both the pt and her family with coping with this difficult medical situation and decline. PLAN:  Coordinate w/ Rosana as she has primary Pastoral Service responsibly care for this patient  Visit this pt and her family, while she is @ [facility] and I am in this facility, or PRN as requested. Coordinate with Care Team on POC.   Signed by: Momo Whitmore

## 2017-08-24 NOTE — PROGRESS NOTES
Bedside and Verbal shift change report given to Chrystal Hernández 694 (oncoming nurse) by Trey Morales RN (offgoing nurse). Report included the following information SBAR, Kardex, Intake/Output and MAR. NAME OF PATIENT:  Jyoti Randall    LEVEL OF CARE:  ROUTINE    REASON FOR GIP:   NA    *PATIENT REMAINS ELIGIBLE FOR GIP LEVEL OF CARE AS EVIDENCED BY: (MUST BE ADDRESSED OF PATIENT GIP)      REASON FOR RESPITE:  NA    O2 SAFETY:  Concentrator positioning (6\" from furniture/drapes), Tanks stored in rodríguez , No petroleum based products on face while oxygen in use and Oxygen sign on the door    FALL INTERVENTIONS PROVIDED:   Implemented/recommended environmental changes (remove hazards, lower bed, improve lighting, etc.) and Implemented/recommended increased supervision/assistance    INTERDISPLINARY COMMUNICATION/COLLABORATION:  Physician, MSW, [place] and RN, CNA    NEW MEDICATION INITIATION DOCUMENTATION:  NA    Reason medication is being initiated:  CHRYSTAL    MD / Provider name consulted re: change in status / initiation of new medication:  NA    New Symptom(s):  NA    New Order(s):  NA    Name of the person notified of the changes:  NA    Name of person being taught:  NA    Instructions given: NA    Side Effects taught:  CHRYSTAL    Response to teaching:  NA      COMFORTABLE DYING MEASURE:  Is Patient/family satisfied with symptom level?  yes    DISCHARGE PLAN:  Remain at [facility] until end of life     20:00 Patient eyes open, moaning c/o pain,medicated with prn Dilaudid and Lorazepam for pain and discomfort. Family at bedside. 22:15 Medicated with suyapa Lorazepam, PCA pump infusing Dilaudid 8mg/hr resting quietly, appears comfortable. 01:00 Patient resting quietly, medicated with suyapa Lorazepam, no signs of pain or anxiety noted  04:00 Medicated with suyapa Lorazepam, no signs of pain or discomfort noted, resting quietly, appears comfortable. 05:30 PCA pump cleared, Res Vol 228.0ml  98mg administered.   06:30 Patient appears to be asleep,no signs of pain or discomfort noted.

## 2017-08-24 NOTE — PROGRESS NOTES
NAME OF PATIENT:  Jericho Randall    LEVEL OF CARE:  [facility]    REASON FOR GIP:   Pain, despite numerous changes in medications, Medication adjustment that must be monitored 24/7 and Stabilizing treatment that cannot take place at home    *PATIENT REMAINS ELIGIBLE FOR [facility] LEVEL OF CARE AS EVIDENCED BY: (MUST BE ADDRESSED OF PATIENT GIP) For pain control, agitation, and restlessness. O2 SAFETY:  Concentrator positioning (6\" from furniture/drapes), Tanks stored in rodríguez , No petroleum based products on face while oxygen in use and Oxygen sign on the door    FALL INTERVENTIONS PROVIDED:   Implemented/recommended use of non-skid footwear, Implemented/recommended use of fall risk identification flag to all team members, Implemented/recommended assistive devices and encouraged their use, Implemented/recommended resources for alarm system (personal alarm, bed alarm, call bell, etc.) , Implemented/recommended environmental changes (remove hazards, lower bed, improve lighting, etc.) and Implemented/recommended increased supervision/assistance    INTERDISPLINARY COMMUNICATION/COLLABORATION:  Physician, MSW, [place] and RN, CNA    NEW MEDICATION INITIATION DOCUMENTATION:  Documentation completed in Clinical Note in [facility]    Reason medication is being initiated:  Change in patient condition    MD / Provider name consulted re: change in status / initiation of new medication:  Fuad    New Symptom(s):  pain    New Order(s):  Added a patient bolus to 4mg/hr for nurse to press    Name of the person notified of the changes:  nurse    Name of person being taught:  Rui PATE    Instructions given:  N/A    Side Effects taught:  N/A    Response to teachin [address]:  Is Patient/family satisfied with symptom level?  yes    DISCHARGE PLAN:  Patient actively dying at [facility]. Family aware. 1100: Care assumed of patient after report from [place], [facility].  Patient is now [facility] level of care with metastatic endometrial cancer. Patient is increasingly more unresponsive and less able to make needs known. Her GIP symptoms are uncontrolled pain and agitation/restlessness and it cannot be controlled. Dilaudid PCA increased to 8mg/hr and RN bolus to be given per MD orders. Patient is confused and lethargic as well. 2L NC for comfort. Last BM was 8-16-17. Noted jaundice to sclera. Speech is garbled at times. Total care and bedrest. Sacral wound stage 2 open to air and wound to interior thighs stage 2 as well. 1251: Patient medicated with scheduled ativan. Patient being visited by esme. Will continue to closely monitor. 1325: Patient continues to rest in a position of comfort. Did not need to press PCA at this time. 1541: No change in patient status. Continues to rest with eyes closed; snoring heard. RRR. 1733: No change in status. Patient resting quietly. Remains minimally responsive. Awaiting shift change.

## 2017-08-25 NOTE — PROGRESS NOTES
0700 Recd report from [facility], no major changes in condition, lethargic, responds to voice, non mobile, pt remains GIP level of care for pain management, pain is managed with Dilaudid PCA. NPO  0900 Lethargic resting quietly, unable to assess pulse ox, BP 80/40, hands and feet cold, shallow respirations,  Hypoactive bowel sounds very difficult to assess, last BM 8/24  1045 Scheduled ativan at this time pt refusing to turn. Remains on left side, Mouth care done, purulent drainage from mouth on pillow  1100 MD rounds verified pt is Routine level of care, no order written for GIP level of care as earlier reported  MD states pain is well managed  1300 Pericare done linen change, pt refuses to turn at this time. 1400 Family at bedside  1430 battery changed on CADD PUMP.  1500 Envelope delivered to MSW for [facility]  1644 Scheduled ativan given  1800 Dilaudid 4mg bolus for josselyn care and turning, wounds are draining, pads changed, pt turned back and forth but prefers left side  1830 Noted slight tremor of right arm possible seizure activity for 30 seconds, will continue to monitor. Pt was able to speak shortly thereafter.   1900 Report to oncoming shift      LEVEL OF CARE:  ROUTINE     REASON FOR GIP:   NA     *PATIENT REMAINS ELIGIBLE FOR GIP LEVEL OF CARE AS EVIDENCED BY: (MUST BE ADDRESSED OF PATIENT GIP)        REASON FOR RESPITE:  NA     O2 SAFETY:  Concentrator positioning (6\" from furniture/drapes), Tanks stored in rodríguez , No petroleum based products on face while oxygen in use and Oxygen sign on the door     FALL INTERVENTIONS PROVIDED:   Implemented/recommended environmental changes (remove hazards, lower bed, improve lighting, etc.) and Implemented/recommended increased supervision/assistance     INTERDISPLINARY COMMUNICATION/COLLABORATION:  Physician, MSW, Leta and RN, CNA     NEW MEDICATION INITIATION DOCUMENTATION:  NA     Reason medication is being initiated:  NA     MD / Provider name consulted re: change in status / initiation of new medication:  NA     New Symptom(s):  NA     New Order(s):  NA     Name of the person notified of the changes:  NA     Name of person being taught:  NA     Instructions given: NA     Side Effects taught:  NA     Response to teaching:  NA        COMFORTABLE DYING MEASURE:  Is Patient/family satisfied with symptom level?  yes     DISCHARGE PLAN:  Remain at [facility] until end of life

## 2017-08-25 NOTE — PROGRESS NOTES
Verbal shift change report given to Yudith Purcell RN by Abelardo Burch RN. Report included the following information SBAR, Kardex, Intake/Output and MAR. NAME OF PATIENT:  Isabella Randall    LEVEL OF CARE:  GIP    REASON FOR GIP:   Pain, despite numerous changes in medications, Medication adjustment that must be monitored 24/7 and Stabilizing treatment that cannot take place at home    PATIENT REMAINS ELIGIBLE FOR [facility] LEVEL OF CARE AS EVIDENCED BY:   Continue GIP level care as pt is still  symptomatic and requiring frequent prn medications to control pain, restlessness and agitation. REASON FOR RESPITE:   Patient not in Respite care at this time. O2 SAFETY:  Concentrator positioning (6\" from furniture/drapes), No petroleum based products on face while oxygen in use and Oxygen sign on the door    FALL INTERVENTIONS PROVIDED:   Implemented/recommended use of fall risk identification flag to all team members and Implemented/recommended environmental changes (remove hazards, lower bed, improve lighting, etc.)    INTERDISPLINARY COMMUNICATION/COLLABORATION:  Physician, MSW, [place] and RN, CNA    NEW MEDICATION INITIATION DOCUMENTATION:  Patient PCA bolus of Dilaudid changed to be given by nurse as needed in amounts of 4 mg q15 minutes. Boluses to be delivered via CADD pump. Reason medication is being initiated:  Facilitate ease of administration and maintenance of patient comfort    MD / Provider name consulted re: change in status / initiation of new medication:  Dr. Migue Rosado Symptom(s):  No new symptom, just worsening pain. New Order(s):  No real change in order, just in delivery method.       Name of the person notified of the changes:  Patient    Name of person being taught:  Patient    Instructions given:   None    Side Effects taught:  None    Response to teaching:  N/A    COMFORTABLE DYING MEASURE:  Continue to monitor for pain, dyspnea, agitation, and restlessness and intervene accordingly. Is Patient/family satisfied with symptom level?  yes     DISCHARGE PLAN:  Patient condition declining constantly. Patient most likely to be discharged postmortem.

## 2017-08-25 NOTE — PROGRESS NOTES
Verbal shift change report given to Medardo Schrader RN by Aliya Burrell RN. Report included the following information SBAR, Kardex, Intake/Output and MAR. 2035  Bolus dose of 4 mg dilaudid given for moaning and grimacing. PRN ativan 2 mg given as well. 2317  Bolus dose of 4 mg dilaudid given for moaning and grimacing. 0139  Bolus dose of 4 mg dilaudid given for moaning and grimacing.    0345  Bolus dose of 4 mg dilaudid given prior to wound care/bath/bed change, as well as PRN ativan 2 mg.  0345  PRN Robinul given for increased secretions. 0400  Additional PRN dose of Dilaudid given during wound care/bath/bed change. Flagyl 1,000 mg crushed and sprinkled into wound beds during wound care. 0420   Bolus dose of 4 mg dilaudid given for moaning and grimacing.      NAME OF PATIENT:  Jyoti Randall     LEVEL OF CARE:  GIP     REASON FOR GIP:   Pain, despite numerous changes in medications, Medication adjustment that must be monitored 24/7 and Stabilizing treatment that cannot take place at home     PATIENT REMAINS ELIGIBLE FOR GIP LEVEL OF CARE AS EVIDENCED BY:   Continue GIP level care as pt is still  symptomatic and requiring frequent prn medications to control pain, restlessness and agitation.      REASON FOR RESPITE:   Patient not in Respite care at this time.       O2 SAFETY:  Concentrator positioning (6\" from furniture/drapes), No petroleum based products on face while oxygen in use and Oxygen sign on the door     FALL INTERVENTIONS PROVIDED:   Implemented/recommended use of fall risk identification flag to all team members and Implemented/recommended environmental changes (remove hazards, lower bed, improve lighting, etc.)     INTERDISPLINARY COMMUNICATION/COLLABORATION:  Physician, MSW, Leta and RN, CNA     NEW MEDICATION INITIATION DOCUMENTATION:  Patient PCA bolus of Dilaudid changed to be given by nurse as needed in amounts of 4 mg q15 minutes.   Boluses to be delivered via CADD pump.     Reason medication is being initiated:  Facilitate ease of administration and maintenance of patient comfort     MD / Provider name consulted re: change in status / initiation of new medication:  Dr. Naun Goel Symptom(s):  No new symptom, just worsening pain.     New Order(s):  No real change in order, just in delivery method.       Name of the person notified of the changes:  Patient     Name of person being taught:  Patient     Instructions given:   None     Side Effects taught:  None     Response to teaching:  N/A     COMFORTABLE DYING MEASURE:  Continue to monitor for pain, dyspnea, agitation, and restlessness and intervene accordingly.       Is Patient/family satisfied with symptom level?  yes      DISCHARGE PLAN:  Patient condition declining constantly.   Patient most likely to be discharged postmortem.

## 2017-08-25 NOTE — PROGRESS NOTES
Problem: Falls - Risk of  Goal: *Absence of Falls  Document Javid Fall Risk and appropriate interventions in the flowsheet.    Outcome: Progressing Towards Goal  Fall Risk Interventions:        Mentation Interventions: Adequate sleep, hydration, pain control     Medication Interventions: Bed/chair exit alarm     Elimination Interventions: Call light in reach

## 2017-08-26 NOTE — PROGRESS NOTES
Verbal shift change report given to Carlos Moore (oncoming nurse) by Maureen Bell (offgoing nurse). Report included the following information SBAR and Kardex. NAME OF PATIENT:  Pratik Lam    LEVEL OF CARE:  Routine. REASON FOR GIP:   n/a    *PATIENT REMAINS ELIGIBLE FOR GIP LEVEL OF CARE AS EVIDENCED BY: (MUST BE ADDRESSED OF PATIENT GIP)      REASON FOR RESPITE:  n/a    O2 SAFETY:  Concentrator positioning (6\" from furniture/drapes), No petroleum based products on face while oxygen in use and Oxygen sign on the door    FALL INTERVENTIONS PROVIDED:   Implemented/recommended resources for alarm system (personal alarm, bed alarm, call bell, etc.) , Implemented/recommended environmental changes (remove hazards, lower bed, improve lighting, etc.) and Implemented/recommended increased supervision/assistance    INTERDISPLINARY COMMUNICATION/COLLABORATION:  Physician, MSW, Leta and RN, CNA    NEW MEDICATION INITIATION DOCUMENTATION:  No new medication    Reason medication is being initiated:  n/a    MD / Provider name consulted re: change in status / initiation of new medication:  n/a    New Symptom(s):  No new symptoms at this time. New Order(s):  n/a    Name of the person notified of the changes:  n/a    Name of person being taught:  n/a    Instructions given:  n/a    Side Effects taught:  n/a    Response to teaching:  n/a      COMFORTABLE DYING MEASURE:  Is Patient/family satisfied with symptom level?  yes    DISCHARGE PLAN:  Patient is declining and may not leave [facility] in the near future. Night shift summary:  1900 - Received report. Receiving Dilaudid at 8 mg per hour via CADD pump. 1940 - PRN Dilaudid 4 mg bolus given for increasing moaning, possibly due to pain. 2000 - Resting in bed. Visitors at bedside. Patient says a word or two occasionally. Moans. Lethargic. Breathing is even, unlabored, shallow. Nasal oxygen at 2 liters. Hands and feet are cool to touch.  All extremities are edematous. Remains NPO. Right double lumen PICC line for medication administration. PRN Dilaudid 4 mg bolus given for increasing moaning. 2103 - PRN Ativan 2 mg IV given for restlessness and inability to relax and sleep. PRN Dilaudid 4 mg bolus given for increasing moaning. 2150 - PRN Dilaudid 4 mg bolus given for increasing moaning. Patient was also asking for something for pain. 2349 - PRN Dilaudid 4 mg bolus given for increasing moaning. 0024 - PRN Dilaudid 4 mg bolus given for increasing moaning. 0120 - PRN Dilaudid 4 mg bolus given for increasing moaning. 0135 - PRN Dilaudid 4 mg bolus given for increasing moaning.  0327 - PRN Dilaudid 4 mg bolus given for increasing moaning. Deloris-care done and patient's upper right thigh wounds dressed with transparent dressings. 0345 - PRN Dilaudid 4 mg bolus given for increasing moaning and verbalizing \"hurts\". 0420 - PRN Dilaudid 4 mg bolus given for increasing moaning. 0535 - PRN Dilaudid 4 mg bolus given for increasing moaning. 0557 - PRN Dilaudid 4 mg bolus given for increasing moaning and verbalizing \"hurts\". 9443 - Dilaudid PCA bag changed.

## 2017-08-26 NOTE — PROGRESS NOTES
0700:  Verbal shift change report given to Lauryn Pearce RN (oncoming nurse) by Mary Alexis RN (offgoing nurse). Report included the following information SBAR, Kardex, Intake/Output and MAR.   1700: Bathed patient with the assistance of Jyoti Liu RN, Salazar Benz CNA, and olook. Patient tolerated fair. Premedicated with 4mg/bolus @ 1647; 2nd 4mg/bolus during @ 1706, and 3rd 4mg/bolus post @ 1725. Wound care was also performed. 1815: Rounded; patient moaning; administered prn dilaudid 4mg/bolus. NAME OF PATIENT:  Jyoti Randall    LEVEL OF CARE:  Routine    REASON FOR GIP:   n/a    *PATIENT REMAINS ELIGIBLE FOR GIP LEVEL OF CARE AS EVIDENCED BY: (MUST BE ADDRESSED OF PATIENT GIP)      REASON FOR RESPITE:  n/a    O2 SAFETY:  Concentrator positioning (6\" from furniture/drapes), Tanks stored in rodríguez , No petroleum based products on face while oxygen in use and Oxygen sign on the door    FALL INTERVENTIONS PROVIDED:   Implemented/recommended resources for alarm system (personal alarm, bed alarm, call bell, etc.) , Implemented/recommended environmental changes (remove hazards, lower bed, improve lighting, etc.) and Implemented/recommended increased supervision/assistance    INTERDISPLINARY COMMUNICATION/COLLABORATION:  Physician, MSW, Leta and RN, CNA    NEW MEDICATION INITIATION DOCUMENTATION:  n/a    Reason medication is being initiated:  n/a    MD / Provider name consulted re: change in status / initiation of new medication:  n/a    New Symptom(s):  n/a    New Order(s):  n/a    Name of the person notified of the changes:  n/a    Name of person being taught:  n/a    Instructions given:  n/a    Side Effects taught:  n/a    Response to teaching:  n/a      COMFORTABLE DYING MEASURE:  Is Patient/family satisfied with symptom level?  yes    DISCHARGE PLAN:  Patient is declining; will received EOL care here at [facility].

## 2017-08-27 NOTE — PROGRESS NOTES
0700:  Verbal shift change report given to Holly Sams RN (oncoming nurse) by Ryan Valerio RN (offgoing nurse). Report included the following information SBAR, Kardex, Intake/Output and MAR.   1200:  Rounded on patient; moaning, administered prn dilaudid 4mg/IV. Completed VS; was unable to obtain BP; patient only responds to pain when you touch her; LS coarse; administered prn robinul 0.2mg/IV. Patient is declining; will continue to monitor. 1220:  Administered prn dilaudid 4mg/bolus(CADD) for moaning. 1345: Mother rang call bell and stated that patient is moaning. Came to room and administered prn dilaudid 4mg/bolus(CADD). Updated mother on patient condition (no BP, LS coarse); pt is transitioning. Also administered prn lorazepam 2mg/IV for movement of arm.    1405:  Administered prn dilaudid 4mg/bolus (CADD) for moaning and lorazepam prn 2mg/IV for agitation. 1420:  Administered prn dilaudid 4mg/bolus (CADD) for moaning. Family present. 1445:  Administered prn dilaudid 4mg/bolus (CADD) for moaning. Family present. 1500:  Administered prn lorazepam prn 1mg/IV for agitation. Family present. 1530:  Administered prn dilaudid 4mg/bolus (CADD) for moaning. Family present. 1545:  Administered prn dilaudid 4mg/bolus (CADD) for moaning and robinul prn 0.2mg/IV for secretions. Family present. 1712:  Administered prn dilaudid 4mg/bolus (CADD) for moaning and atropine drop for secretions. Family present. 1755:  Administered prn dilaudid 4mg/bolus (CADD) for moaning. Family present. 1800:  Administered prn phenobarbital 30mg/IV for seizures. 1815:  Administered prn dilaudid 4mg/bolus (CADD) for moaning and robinul prn 0.2mg/IV for secretions. Family present.     NAME OF PATIENT:  Margarita Randall    LEVEL OF CARE:  Routine    REASON FOR GIP:   n/a    *PATIENT REMAINS ELIGIBLE FOR GIP LEVEL OF CARE AS EVIDENCED BY: (MUST BE ADDRESSED OF PATIENT GIP)      REASON FOR RESPITE:  n/a    O2 SAFETY:  Concentrator positioning (6\" from furniture/drapes), Tanks stored in rodríguez , No petroleum based products on face while oxygen in use and Oxygen sign on the door    FALL INTERVENTIONS PROVIDED:   Implemented/recommended resources for alarm system (personal alarm, bed alarm, call bell, etc.) , Implemented/recommended environmental changes (remove hazards, lower bed, improve lighting, etc.) and Implemented/recommended increased supervision/assistance    INTERDISPLINARY COMMUNICATION/COLLABORATION:  Physician, MSW, Leta and RN, CNA    NEW MEDICATION INITIATION DOCUMENTATION:  n/a    Reason medication is being initiated:  n/a    MD / Provider name consulted re: change in status / initiation of new medication:  n/a    New Symptom(s):  n/a    New Order(s):  n/a    Name of the person notified of the changes:  n/a    Name of person being taught:  n/a    Instructions given:  n/a    Side Effects taught:  n/a    Response to teaching:  n/a      COMFORTABLE DYING MEASURE:  Is Patient/family satisfied with symptom level?  yes    DISCHARGE PLAN:  Patient is declining quickly; EOL care here at [facility].

## 2017-08-27 NOTE — PROGRESS NOTES
Verbal shift change report given to Tera Jo (oncoming nurse) by Hunter Perez (offgoing nurse). Report included the following information SBAR and Kardex. NAME OF PATIENT:  Jyoti Randall    LEVEL OF CARE:  Routine    REASON FOR GIP:   n/a    *PATIENT REMAINS ELIGIBLE FOR GIP LEVEL OF CARE AS EVIDENCED BY: (MUST BE ADDRESSED OF PATIENT GIP) - not GIP. REASON FOR RESPITE:  Caregiver breakdown and patient's mother is not able to care fo her and her needs. O2 SAFETY:  Concentrator positioning (6\" from furniture/drapes), No petroleum based products on face while oxygen in use and Oxygen sign on the door    FALL INTERVENTIONS PROVIDED:   Implemented/recommended use of fall risk identification flag to all team members, Implemented/recommended resources for alarm system (personal alarm, bed alarm, call bell, etc.) , Implemented/recommended environmental changes (remove hazards, lower bed, improve lighting, etc.) and Implemented/recommended increased supervision/assistance    INTERDISPLINARY COMMUNICATION/COLLABORATION:  Physician, MSW, [place] and RN, CNA    NEW MEDICATION INITIATION DOCUMENTATION:  No new medication at this time. Reason medication is being initiated:  n/a    MD / Provider name consulted re: change in status / initiation of new medication:  No new medication at this time. New Symptom(s):  No new symptoms at this time. New Order(s):  No new orders. Name of the person notified of the changes:  n/a    Name of person being taught:  n/a    Instructions given:  n/a    Side Effects taught:  n/a    Response to teaching:  n/a      COMFORTABLE DYING MEASURE:  Is Patient/family satisfied with symptom level? Yes, patient is a DNR. DISCHARGE PLAN:  Patient is declining and there are no discharge plans at this time. Night shift summary:  1900 - Report received. 2000 - Patient's mother sitting at bedside. Patient is non-verbal and minimally responsive. 2 liters of nasal oxygen continuously but unable to get a pulse-ox reading. Edematous extremities/ 3+ left hand, legs and feet; 2+ arms, right hand, and generalized. NPO. Right double lumen PICC line for medications. 0015 - PRN Dilaudid 4 mg bolus given prior to turning, josselyn care, and wound care. Josselyn care done and pads with yellow drainage removed from beneath the patient/ replaced with clean pads. Large amounts of drainage. Patient moaned loudly when being turned. Seizure activity noted that lasted about 2 minutes. Increasing jaundice and icteric sclera. 0020 - PRN Ativan 2 mg IV given for possible seizure activity. 0030 - PRN Dilaudid 4 mg bolus given for moaning following repositioning. 0035 - PRN Ativan 2 mg IV given for possible seizure activity/ facial twitching. 0110 - No seizure activity at this time. Unresponsive.  0300 - No seizure activity. Breathing is even and unlabored. 0600 - No seizure activity. Remains unresponsive.

## 2017-08-28 ENCOUNTER — HOME CARE VISIT (OUTPATIENT)
Dept: HOSPICE | Facility: HOSPICE | Age: 37
End: 2017-08-28
Payer: MEDICARE

## 2017-08-28 NOTE — PROGRESS NOTES
1900 - Bedside and Verbal shift change report given to 61 Jasbir Casillas (oncoming nurse) by Liat Root (offgoing nurse). Report included the following information SBAR, Kardex and MAR.     2115 - Pt passed away at 2115 with her mother and sister at bedside. Family grieved appropriately and chose [facility] for burial services. NAME OF PATIENT:  Jyoti Randall    LEVEL OF CARE:  ROUTINE    REASON FOR GIP:   N/A    *PATIENT REMAINS ELIGIBLE FOR GIP LEVEL OF CARE AS EVIDENCED BY: (MUST BE ADDRESSED OF PATIENT GIP)      REASON FOR RESPITE:  N/A    O2 SAFETY:  Concentrator positioning (6\" from furniture/drapes), No petroleum based products on face while oxygen in use and Oxygen sign on the door    FALL INTERVENTIONS PROVIDED:   Implemented/recommended environmental changes (remove hazards, lower bed, improve lighting, etc.)    INTERDISPLINARY COMMUNICATION/COLLABORATION:  Physician, MSW, [place] and RN, CNA    NEW MEDICATION INITIATION DOCUMENTATION:  N/A    Reason medication is being initiated:  N/A    MD / Provider name consulted re: change in status / initiation of new medication:  N/A    New Symptom(s):  N/A    New Order(s):  N/A    Name of the person notified of the changes:  N/A    Name of person being taught:  N/A    Instructions given:  N/A    Side Effects taught:  N/A    Response to teaching:  N/A      COMFORTABLE DYING MEASURE:  Is Patient/family satisfied with symptom level?  yes    DISCHARGE PLAN:  Pt passed away at 2115 on 8/27/17 with her family at bedside.

## 2017-08-30 NOTE — DISCHARGE SUMMARY
Discharge Summary    [facility]  Good Help to Those in Need  [phone]      Date of Admission: 8/11/2017  Date of Discharge: 8/27/2017    Megan Alfredo is a 40y.o. year old who was admitted to [facility] at [facility] with a Hospice diagnosis of Endometrial Cancer that had diffusely metastasized and pt was doing poorly at home, unable to be managed for intractable symptoms of pain in back, pelvic area where she had sustained pathological fracture due to tumor invasion. Pt admitted with [facility] level care at [facility]. Pt was also subjected to palliative CT guided tumor ablation at [facility] with idea that it will help alleviate pain. Pt did not respond to the procedure and did not have pain relief. She was again managed on intense pain medications including dilaudid PCA and methadone. She was admitted for [facility] level care. Principle Hospice Diagnosis: Metastatic endometrial cancer, s/p CT guided microwave tumor ablation (palliative procedure)  Diagnoses RELATED to the terminal prognosis: pathologic right subtrochanteric femur fracture  Other Diagnoses: dm-2, obesity, hx tachycardia      HOSPICE DIAGNOSES   Active Symptoms:  1. Pain; generalised: worse: poor response to CT guided tumor ablation  2. Delirium; worse   3. Shortness of breath  4. Airway secretions  5. Fatigue/weakness  6  Inability to swallow food/pills  7. Jaundice/anemia         PLAN   1. GIP level care as pt is significantly symptomatic and requiring frequent prn medications to control pain, restlessness and agitation. 2. Increase dilaudid pca 8 mg/hr basal  3. Continue nurse given dilaudid 4mg every 15 min prn.pain  4. Haldol 2mg IV every 4 hours prn for delirium and hallucination  5. Change Ativan 2mg IV every 15mts prn and 1mg every 3 hours scheduled. 6. Continue routine wound care and wound care s/p procedure. 7. Add scopolamine patch 1.5mg every 72 hours and robinul as needed for secretions     8.   and SW to support family needs        The patient's care was focused on comfort and the patient passed away on 8/27/2017.

## 2017-08-31 ENCOUNTER — HOME CARE VISIT (OUTPATIENT)
Dept: HOSPICE | Facility: HOSPICE | Age: 37
End: 2017-08-31
Payer: MEDICARE

## 2017-09-01 ENCOUNTER — HOME CARE VISIT (OUTPATIENT)
Dept: HOSPICE | Facility: HOSPICE | Age: 37
End: 2017-09-01
Payer: MEDICARE

## 2017-09-12 ENCOUNTER — DOCUMENTATION ONLY (OUTPATIENT)
Dept: PALLATIVE CARE | Age: 37
End: 2017-09-12

## 2018-03-25 NOTE — PROGRESS NOTES
Bedside and Verbal shift change report given to Pretty Barry RN (oncoming nurse) by Navi Scruggs RN (offgoing nurse). Report included the following information SBAR, Kardex, ED Summary, Intake/Output, MAR and Recent Results. Verbal/written post procedure instructions were given to patient/caregiver./Elevate the injured extremity as instructed./Keep the cast/splint/dressing clean and dry./Instructed patient/caregiver to follow-up with primary care physician./Instructed patient/caregiver regarding signs and symptoms of infection.

## 2018-05-31 NOTE — PROGRESS NOTES
1900 Recd report from [facility], pt remains routine level of care, hospice diagnosis endometrial cancer metastatic  2200 Scheduled med and vitals at this time. Pt was sleeping soundly, BP high enough for metoprolol tonight 120/60  , unable to obtain pulse ox, denies any trouble breathing  2300 Repositioned josselyn care done, back cleansed, barrier cream to small breakdown on r buttock, linen changed, pt pre medicated with two PCA doses of dilaudid total 8mg still was very painful to turn  0230 Resting quietly  0430 Resting quietly  0700 Report to oncoming shift.     LEVEL OF CARE:  Routine      O2 SAFETY: RA  Oxygen sign on the door      FALL INTERVENTIONS PROVIDED:   Implemented/recommended assistive devices and encouraged their use and Implemented/recommended environmental changes (remove hazards, lower bed, improve lighting, etc.)      INTERDISPLINARY COMMUNICATION/COLLABORATION:  Physician, MSW, [place] and RN, CNA      NEW MEDICATION INITIATION DOCUMENTATION:  No new medications needed.       COMFORTABLE DYING MEASURE:  Is Patient/family satisfied with symptom level?  yes      DISCHARGE PLAN:  Pt will remain at the [facility] until Dr Bakari Aldrich arranges for her to have surgery or when her family makes alternative living arrangements.      Chief complaint:   Chief Complaint   Patient presents with   • Medicare Wellness Visit     Sub Visit   • Medical Problem Re-evaluation     6 month follow up       Vitals:  Visit Vitals  /72 (BP Location: [facility] [place], Patient Position: Sitting, Cuff Size: Regular)   Pulse 68   Ht 5' 4\" (1.626 m)   Wt 80 kg   SpO2 96%   BMI 30.28 kg/m²       HISTORY OF PRESENT ILLNESS     Medical Problem Re-evaluation   Associated symptoms include arthralgias (achy here and there). Pertinent negatives include no chills, coughing, fever, nausea, numbness or vomiting.       Other significant problems:  Patient Active Problem List    Diagnosis Date Noted   • Medicare annual wellness visit, initial 05/27/2014     Priority: Low   • Swelling of limb 05/27/2014     Priority: Low   • Encounter for long-term (current) use of other medications 09/13/2012     Priority: Low   • Long term (current) use of anticoagulants 09/13/2012     Priority: Low   • Atrial fibrillation (CMS/HCC) 08/14/2012     Priority: Low   • Hyperlipidemia      Priority: Low       PAST MEDICAL, FAMILY AND SOCIAL HISTORY     Medications:  Current Outpatient Prescriptions   Medication   • warfarin (COUMADIN) 5 MG tablet   • atorvastatin (LIPITOR) 40 MG tablet   • Oxyquinolone Sulfate (TRIMO-FLOOD) 0.025 % Gel   • Calcium Carbonate (CALCIUM 600 PO)   • Multiple Vitamins-Minerals (MULTIVITAMIN & MINERAL PO)   • Dispense (CHECK, UNKNOWN CONCENTRATION)     No current facility-administered medications for this visit.        Allergies:  ALLERGIES:   Allergen Reactions   • Sulfa Antibiotics RASH       Past Medical  History/Surgeries:  Past Medical History:   Diagnosis Date   • Benign neoplasm of skin of other and unspecified parts of face    • Cerebral artery occlusion with cerebral infarction (CMS/HCC)    • CVA (cerebral infarction)    • Hyperlipidemia    • Sebaceous cyst     Right ear       Past Surgical History:   Procedure Laterality Date   • Colonoscopy  01/01/2000   • Orif hip  fracture  12/27/2009    LEFT   • Tubal ligation  1975       Family History:  Family History   Problem Relation Age of Onset   • Stroke Mother    • Diabetes Brother    • Stroke Brother         Possibly?  Patient not sure.   • Emphysema Father    • Stroke Maternal Aunt    • Stroke Maternal Grandmother        Social History:  Social History   Substance Use Topics   • Smoking status: Former Smoker     Packs/day: 0.50     Years: 50.00     Types: Cigarettes     Quit date: 11/29/2007   • Smokeless tobacco: Never Used   • Alcohol use No       REVIEW OF SYSTEMS   HPI: doing well. The labs are excellent. IADL's and ADLs ar fine. Still does the taxes. NO falls. dentures and annual eye checks.        Diet works at being healthy . Does the stationary bike daily.      Review of Systems   Constitutional: Negative for activity change, chills and fever.   HENT: Positive for dental problem (edent compensated). Negative for hearing loss.    Eyes: Positive for visual disturbance (glasses and annual eye checks).   Respiratory: Negative for cough, choking and wheezing.    Gastrointestinal: Negative for constipation, diarrhea, nausea and vomiting.   Endocrine: Negative.    Genitourinary: Negative for dysuria.   Musculoskeletal: Positive for arthralgias (achy here and there).   Skin: Negative.    Allergic/Immunologic: Negative.    Neurological: Negative.  Negative for dizziness and numbness.   Hematological: Negative.    Psychiatric/Behavioral: Negative.        PHYSICAL EXAM     Physical Exam   Constitutional: She is oriented to person, place, and time. She appears well-developed and well-nourished. No distress.   HENT:   Right Ear: External ear normal.   Left Ear: External ear normal.   Mouth/Throat: Oropharynx is clear and moist.   Eyes: Conjunctivae and EOM are normal. Pupils are equal, round, and reactive to light.   Neck: Normal range of motion. No thyromegaly present.   Cardiovascular: Normal rate, regular rhythm, normal heart sounds  and intact distal pulses.    No murmur heard.  Pulmonary/Chest: Effort normal and breath sounds normal. No respiratory distress. She has no wheezes. She exhibits no tenderness.   Abdominal: Soft. She exhibits no distension and no mass. There is no tenderness.   Musculoskeletal: Normal range of motion. She exhibits no edema or tenderness.   Lymphadenopathy:     She has no cervical adenopathy.   Neurological: She is alert and oriented to person, place, and time.   Skin: Skin is warm and dry.   Psychiatric: She has a normal mood and affect. Her behavior is normal. Judgment and thought content normal.       ASSESSMENT/PLAN       MEDICARE WELLNESS VISIT NOTE      HISTORY OF PRESENT ILLNESS:   Elba Blair presents for her Subsequent Annual Medicare Wellness Visit.   She has no current complaints or concerns.      Patient Care Team:  DAMIEN Dukes MD as PCP - General (Family Practice)  Austen Mchugh MD (Inactive) (Ophthalmology)        Patient Active Problem List    Diagnosis Date Noted   • Medicare annual wellness visit, initial 05/27/2014     Priority: Low   • Swelling of limb 05/27/2014     Priority: Low   • Encounter for long-term (current) use of other medications 09/13/2012     Priority: Low   • Long term (current) use of anticoagulants 09/13/2012     Priority: Low   • Atrial fibrillation (CMS/HCC) 08/14/2012     Priority: Low   • Hyperlipidemia      Priority: Low         Past Medical History:   Diagnosis Date   • Benign neoplasm of skin of other and unspecified parts of face    • Cerebral artery occlusion with cerebral infarction (CMS/HCC)    • CVA (cerebral infarction)    • Hyperlipidemia    • Sebaceous cyst     Right ear         Past Surgical History:   Procedure Laterality Date   • Colonoscopy  01/01/2000   • Orif hip fracture  12/27/2009    LEFT   • Tubal ligation  1975         Social History   Substance Use Topics   • Smoking status: Former Smoker     Packs/day: 0.50     Years: 50.00      Types: Cigarettes     Quit date: 11/29/2007   • Smokeless tobacco: Never Used   • Alcohol use No     Drug use:    Drug Use:    No              Family History   Problem Relation Age of Onset   • Stroke Mother    • Diabetes Brother    • Stroke Brother         Possibly?  Patient not sure.   • Emphysema Father    • Stroke Maternal Aunt    • Stroke Maternal Grandmother        Current Outpatient Prescriptions   Medication Sig Dispense Refill   • warfarin (COUMADIN) 5 MG tablet TAKE 1 TABLET (5MG) DAILY  FOR 6 DAYS PER WEEK AND 1  AND 1/2 TABLETS (7.5MG) 1  DAY PER WEEK OR AS DIRECTED 98 tablet 4   • atorvastatin (LIPITOR) 40 MG tablet TAKE 1 TABLET BY MOUTH  NIGHTLY. 90 tablet 0   • Oxyquinolone Sulfate (TRIMO-FLOOD) 0.025 % Gel Place 1 Applicatorful vaginally twice a week. 1 Tube 4   • Calcium Carbonate (CALCIUM 600 PO) Take  by mouth.     • Multiple Vitamins-Minerals (MULTIVITAMIN & MINERAL PO) Take  by mouth.     • Dispense (CHECK, UNKNOWN CONCENTRATION) Take 100 mg by mouth daily. CINNAMON       No current facility-administered medications for this visit.     ,ph reviewed. Gets dental and eye care.  current. Will check on the thereNow    Medicare Health Risk Assessment in electronic health record reviewed. The following items were identified and addressed:    No risks were identified  Vision and hearing screens documented:  done  Advance Directive: Patient doesn't have an Advance Directives document, and is not interested in additional information  Cognitive Assessment: no evidence of cognitive dysfunction by direct observation      Needed Screening/Treatment:   none  Needed follow up:  None    See orders.   See Patient Instructions section.   No Follow-up on file.  Elba was seen today for medicare wellness visit and medical problem re-evaluation.    Diagnoses and all orders for this visit:    Medicare annual wellness visit, subsequent    Hyperlipidemia, unspecified hyperlipidemia type  -     LIPID PANEL  WITHOUT REFLEX; Future    Medication management  -     COMPREHENSIVE METABOLIC PANEL; Future    Other orders  -     atorvastatin (LIPITOR) 40 MG tablet; Take 1 tablet by mouth nightly.

## 2019-12-03 NOTE — IP AVS SNAPSHOT
Current Discharge Medication List  
  
START taking these medications Dose & Instructions Dispensing Information Comments Morning Noon Evening Bedtime  
 metoprolol tartrate 25 mg tablet Commonly known as:  LOPRESSOR Your last dose was: Your next dose is:    
   
   
 Dose:  25 mg Take 1 Tab by mouth two (2) times a day. Quantity:  60 Tab Refills:  0  
     
   
   
   
  
 morphine (PF) 150 mg/30 mL Pcas Your last dose was: Your next dose is:    
   
   
 Dose:  4 mg/hr 4 mg/hr by IntraVENous route continuous. 4mg q 6min Max of 44 mg in one hour Quantity:  1 Vial  
Refills:  0 CONTINUE these medications which have CHANGED Dose & Instructions Dispensing Information Comments Morning Noon Evening Bedtime * DULoxetine 20 mg capsule Commonly known as:  CYMBALTA What changed:  Another medication with the same name was added. Make sure you understand how and when to take each. Your last dose was: Your next dose is:    
   
   
 Dose:  40 mg Take 40 mg by mouth daily. Indications: ANXIETY WITH DEPRESSION, NEUROPATHIC PAIN Refills:  0  
     
   
   
   
  
 * DULoxetine 30 mg capsule Commonly known as:  CYMBALTA What changed: You were already taking a medication with the same name, and this prescription was added. Make sure you understand how and when to take each. Your last dose was: Your next dose is:    
   
   
 Dose:  30 mg Take 1 Cap by mouth daily. Quantity:  30 Cap Refills:  1  
     
   
   
   
  
 gabapentin 300 mg capsule Commonly known as:  NEURONTIN What changed:   
- how much to take 
- how to take this - when to take this 
- additional instructions Your last dose was: Your next dose is:    
   
   
 Dose:  300 mg Take 1 Cap by mouth two (2) times a day. Quantity:  60 Cap Refills:  0 LORazepam 1 mg tablet Commonly known as:  ATIVAN What changed:  when to take this Your last dose was: Your next dose is:    
   
   
 Dose:  1 mg Take 1 Tab by mouth every six (6) hours as needed for Anxiety. Max Daily Amount: 4 mg. Quantity:  30 Tab Refills:  0  
     
   
   
   
  
 oxyCODONE IR 30 mg immediate release tablet Commonly known as:  OXY-IR What changed:   
- medication strength 
- how much to take - when to take this 
- reasons to take this Your last dose was: Your next dose is:    
   
   
 Dose:  60 mg Take 2 Tabs by mouth every three (3) hours (while awake). Max Daily Amount: 360 mg.  
 Quantity:  60 Tab Refills:  0  
     
   
   
   
  
 * Notice: This list has 2 medication(s) that are the same as other medications prescribed for you. Read the directions carefully, and ask your doctor or other care provider to review them with you. CONTINUE these medications which have NOT CHANGED Dose & Instructions Dispensing Information Comments Morning Noon Evening Bedtime  
 albuterol 90 mcg/actuation inhaler Commonly known as:  PROVENTIL HFA, VENTOLIN HFA, PROAIR HFA Your last dose was: Your next dose is:    
   
   
 Dose:  1 Puff Take 1 Puff by inhalation every six (6) hours as needed for Wheezing. Quantity:  1 Inhaler Refills:  1  
     
   
   
   
  
 clotrimazole 1 % topical cream  
Commonly known as:  Rosalva Stephens Your last dose was: Your next dose is:    
   
   
 Dose:  1 Each Apply 1 Each to affected area two (2) times a day. Apply to feet affected Quantity:  35.4 g Refills:  0  
     
   
   
   
  
 docusate sodium 100 mg capsule Commonly known as:  Bob Lucero Your last dose was: Your next dose is:    
   
   
 Dose:  100 mg Take 100 mg by mouth two (2) times a day. Refills:  0 MIRALAX 17 gram packet Generic drug:  polyethylene glycol Your last dose was: Your next dose is:    
   
   
 Dose:  17 g Take 17 g by mouth daily. Refills:  0  
     
   
   
   
  
 nystatin powder Commonly known as:  MYCOSTATIN Your last dose was: Your next dose is:    
   
   
 Apply  to affected area four (4) times daily as needed. Yeast  
 Quantity:  1 Bottle Refills:  1  
     
   
   
   
  
 sennosides 8.6 mg Cap Commonly known as:  Senna Your last dose was: Your next dose is:    
   
   
 Dose:  2 Tab Take 2 Tabs by mouth two (2) times a day. Quantity:  120 Cap Refills:  2 TYLENOL ARTHRITIS PAIN 650 mg CR tablet Generic drug:  acetaminophen Your last dose was: Your next dose is:    
   
   
 Dose:  650 mg Take 650 mg by mouth every six (6) hours as needed for Pain. Refills:  0  
     
   
   
   
  
 VITAMIN D3 2,000 unit Tab Generic drug:  cholecalciferol (vitamin D3) Your last dose was: Your next dose is: Take  by mouth daily. Refills:  0 STOP taking these medications ADVIL 200 mg tablet Generic drug:  ibuprofen  
   
  
 fentaNYL 100 mcg/hr PATCH Commonly known as:  DURAGESIC  
   
  
 lisinopril 10 mg tablet Commonly known as:  PRINIVIL, ZESTRIL  
   
  
 metFORMIN 500 mg tablet Commonly known as:  GLUCOPHAGE  
   
  
 metoprolol succinate 25 mg XL tablet Commonly known as:  TOPROL-XL Where to Get Your Medications Information on where to get these meds will be given to you by the nurse or doctor. ! Ask your nurse or doctor about these medications DULoxetine 30 mg capsule  
 gabapentin 300 mg capsule LORazepam 1 mg tablet  
 metoprolol tartrate 25 mg tablet  
 morphine (PF) 150 mg/30 mL Pcas  
 oxyCODONE IR 30 mg immediate release tablet no

## 2021-08-10 NOTE — TELEPHONE ENCOUNTER
Bernadette is calling to speak to clinical team member. She would like to have an order for OT. You can leave message on her phone. If approved she will fax order for signature. no

## 2023-04-05 NOTE — PROGRESS NOTES
1123am : Tyler Hutchinson RN did the bedside interview with the patient in POCU.   Admission Medication Reconciliation:    Information obtained from: patient    Significant PMH/Disease States:   Past Medical History:   Diagnosis Date    Cancer [facility])     uterine    [facility]I (common variable immunodeficiency) [facility] [address]     [facility]     Elevated liver function tests 10/21/2010    Endometrial cancer ([facility] 2010    Hypertension     Iron deficiency anemia     Menometrorrhagia 10/21/2010    Microcytic anemia 10/21/2010    Morbid obesity ([facility]     Prediabetes 2010    Psychiatric disorder     depression    Radiation     completed radiation mid-march       Chief Complaint for this Admission:  leg pain    Allergies:  Pcn [penicillins]; Shellfish containing products; and Tetanus and diphtheria toxoids, adsorbed, adult    Prior to Admission Medications:   Prior to Admission Medications   Prescriptions Last Dose Informant Patient Reported? Taking? DULoxetine (CYMBALTA) 20 mg capsule 2017 at Unknown time  Yes Yes   Sig: Take 40 mg by mouth daily. Indications: ANXIETY WITH DEPRESSION, NEUROPATHIC PAIN   LORazepam (ATIVAN) 1 mg tablet   No Yes   Sig: Take 1 Tab by mouth every four (4) hours as needed for Anxiety. Max Daily Amount: 6 mg.   acetaminophen (TYLENOL ARTHRITIS PAIN) 650 mg CR tablet   Yes Yes   Sig: Take 650 mg by mouth every six (6) hours as needed for Pain. albuterol (PROVENTIL HFA, VENTOLIN HFA, PROAIR HFA) 90 mcg/actuation inhaler   No Yes   Sig: Take 1 Puff by inhalation every six (6) hours as needed for Wheezing. cholecalciferol, vitamin D3, (VITAMIN D3) 2,000 unit tab   Yes Yes   Sig: Take  by mouth daily. clotrimazole (LOTRIMIN) 1 % topical cream   No Yes   Sig: Apply 1 Each to affected area two (2) times a day. Apply to feet affected   docusate sodium (COLACE) 100 mg capsule   Yes Yes   Sig: Take 100 mg by mouth two (2) times a day.    fentaNYL (DURAGESIC) 100 mcg/hr PATCH 2017 at Unknown time  No Yes   Si Patches by TransDERmal route every seventy-two (72) hours for 30 days. Max Daily Amount: 2 Patches.   gabapentin (NEURONTIN) 300 mg capsule   No Yes   Sig: TAKE 2 CAPSULES BY MOUTH TWICE A DAY   ibuprofen (ADVIL) 200 mg tablet   Yes Yes   Sig: Take 200 mg by mouth every six (6) hours as needed for Pain. lisinopril (PRINIVIL, ZESTRIL) 10 mg tablet   No Yes   Sig: TAKE 1 TABLET BY MOUTH DAILY   metFORMIN (GLUCOPHAGE) 500 mg tablet   No Yes   Sig: TAKE 1 TAB BY MOUTH TWO (2) TIMES DAILY (WITH MEALS). metoprolol succinate (TOPROL-XL) 25 mg XL tablet   No Yes   Sig: TAKE 1 TABLET BY MOUTH DAILY   nystatin (MYCOSTATIN) powder   No Yes   Sig: Apply  to affected area four (4) times daily as needed. Yeast   oxyCODONE IR (ROXICODONE) 10 mg tab immediate release tablet   Yes Yes   Sig: Take 30 mg by mouth every four (4) hours as needed for Pain. Indications: Pain   polyethylene glycol (MIRALAX) 17 gram packet   Yes Yes   Sig: Take 17 g by mouth daily. sennosides (SENNA) 8.6 mg cap   No Yes   Sig: Take 2 Tabs by mouth two (2) times a day. Facility-Administered Medications: None         Comments/Recommendations: Medication review done with patient and her medication list.  Added duloxetine and oxycodone. Verified gabapentin and fentanyl doses.   Fentanyl patches applied today PTA

## 2023-06-09 NOTE — PROGRESS NOTES
Patient: Ed Dickinson Date: 2023   : 1931 Attending: Shonda Kasper MD   89 year old female        Subjective: Patient doing ok, pain is tolerable. She is off oxygen, but went to 88% when walked. Na is fine.    Reviewed: Allergies and Medical History    Vital Last Value 24 Hour Range   Temperature 97.7 °F (36.5 °C) (23 045) Temp  Min: 97.5 °F (36.4 °C)  Max: 98.1 °F (36.7 °C)   Pulse 69 (23) Pulse  Min: 69  Max: 79   Respiratory 18 (23) Resp  Min: 16  Max: 18   Non-Invasive  Blood Pressure (!) 153/83 (23 08) BP  Min: 110/62  Max: 153/83   Arterial   Blood Pressure   No data recorded   Pulse Oximetry 94 % (23) SpO2  Min: 92 %  Max: 97 %     Vital Today Admitted   Weight 40.5 kg (89 lb 4.6 oz) (23 0500) Weight: 43.3 kg (95 lb 7.4 oz) (23 1549)   Height N/A Height: 5' 1\" (154.9 cm) (23 2300)   BMI N/A BMI (Calculated): 17.2 (23 2300)     Weight over the past 48 Hours:  Patient Vitals for the past 48 hrs:   Weight   23 0500 40.5 kg (89 lb 4.6 oz)        Hemodynamics:      Last Value 24 Hour Range   CVP   No data recorded   PAS/PAD   No data recorded   PCWP   No data recorded   CO   No data recorded   CI   No data recorded   SV02   No data recorded     Physical Exam:  General - NAD  HEENT - NC/AT  CV - RRR  Lungs - CTA B  Abdomen - soft, NT, BS active  Ext - no c/c  Neuro - no focal deficits    Intake/Output:    Last Stool Occurrence:  (none) (23 1800)    No intake/output data recorded.    I/O last 3 completed shifts:  In: -   Out: 400 [Urine:400]      Intake/Output Summary (Last 24 hours) at 2023 09  Last data filed at 2023  Gross per 24 hour   Intake --   Output 400 ml   Net -400 ml       Medications/Infusions:  Scheduled:   Current Facility-Administered Medications   Medication Dose Route Frequency Provider Last Rate Last Admin   • levothyroxine (SYNTHROID, LEVOTHROID) tablet 75 mcg  75 mcg Oral  Chart opened in error Once per day on Sun Tue Thu Sat Nate Goldman MD   75 mcg at 06/08/23 0600   • levothyroxine (SYNTHROID, LEVOTHROID) tablet 50 mcg  50 mcg Oral Once per day on Mon Wed Fri Nate Goldman MD   50 mcg at 06/09/23 0551   • furosemide (LASIX) tablet 40 mg  40 mg Oral Daily Jaspal Multani MD   40 mg at 06/09/23 0833   • diphenhydrAMINE (BENADRYL) injection 50 mg  50 mg Intravenous Once Nate Goldman MD       • Potassium Standard Replacement Protocol (Levels 3.5 and lower)   Does not apply See Admin Instructions Nate Goldman MD       • Potassium Replacement (Levels 3.6 - 4)   Does not apply See Admin Instructions Nate Goldman MD       • Magnesium Standard Replacement Protocol   Does not apply See Admin Instructions Nate Goldman MD       • Phosphorus Standard Replacement Protocol   Does not apply See Admin Instructions Nate Goldman MD       • sodium chloride (PF) 0.9 % injection 2 mL  2 mL Intracatheter 2 times per day Jimmie Aviles, DO   2 mL at 06/09/23 0833   • apixaBAN (ELIQUIS) tablet 2.5 mg  2.5 mg Oral BID Jimmie Aviles, DO   2.5 mg at 06/09/23 0833   • carvedilol (COREG) tablet 12.5 mg  12.5 mg Oral BID WC Jimmie Aviles, DO   12.5 mg at 06/09/23 0833   • dilTIAZem (TIAZAC,CARDIZEM CD) 24 hr capsule 180 mg  180 mg Oral Daily Jimmie Aviles, DO   180 mg at 06/09/23 0833   • mirtazapine (REMERON) tablet 15 mg  15 mg Oral Nightly Stefan, Sufyan, DO   15 mg at 06/08/23 2003       Continuous Infusions:   Current Facility-Administered Medications   Medication Dose Route Frequency Provider Last Rate Last Admin         Laboratory Results:    Lab Results   Component Value Date    SODIUM 139 06/09/2023    POTASSIUM 4.4 06/09/2023    CHLORIDE 101 06/09/2023    CO2 33 (H) 06/09/2023    ANIONGAP 9 06/09/2023    BUN 23 (H) 06/09/2023    CREATININE 0.48 (L) 06/09/2023    CALCIUM 8.3 (L) 06/09/2023    URIC 4.9 06/02/2023    GLUCOSE 91 06/09/2023     06/07/2023    WBC  9.8 06/09/2023    HCT 35.5 (L) 06/09/2023     Lab Results   Component Value Date    HGB 11.6 (L) 06/09/2023     06/09/2023    INR 1.2 06/01/2023    PTT 31 (H) 06/01/2023    MG 1.8 06/05/2023    PHOS 3.2 06/03/2023    ALKPT 109 06/02/2023    BILIRUBIN 0.3 06/02/2023    AST 24 06/02/2023    GPT 20 06/02/2023    ALBUMIN 2.7 (L) 06/02/2023    LACTA 1.3 06/05/2023    OSMO 263 (L) 06/02/2023       Urine Panel  Lab Results   Component Value Date    UOSM 1,117 06/03/2023    TC 14 06/03/2023    UKET Negative 06/03/2023    USPG >1.030 (H) 06/03/2023    UPROT Trace (A) 06/03/2023    UWBC Small (A) 06/03/2023    URBC Negative 06/03/2023    UBILI Negative 06/03/2023    UPH 5.5 06/03/2023       Assessment/Plan:  1. Hyponatremia - Na is better, stable at 139. Continue lasix dosing along with FR. Monitor.    2. HTN - BP is ok, continue current meds.    3. ADHF - Continue lasix. Cardiology also following.    4. PNA - Suspected on admission. Abx currently on hold due to possible reaction. ID following and monitoring off abx, and she is doing fine; she is off oxygen, but desats when walking; may need home O2.    Thank you for the consult and for allowing me to participate in the care of Ms. Key. I will follow with you. Please call with questions.

## 2023-11-14 NOTE — ROUTINE PROCESS
2000. Bedside and Verbal shift change report given to [address] (oncoming nurse) by Modesto Acuna (offgoing nurse). Report included the following information SBAR, Kardex, ED Summary, Procedure Summary, Intake/Output, MAR, Accordion, Recent Results, Med Rec Status, Cardiac Rhythm nsr and Alarm Parameters . 2130. TRANSFER - OUT REPORT:    Verbal report given to [employer] Anesthetist(name) on [address]  being transferred to CT, radiology (unit) for ordered procedure       Report consisted of patients Situation, Background, Assessment and   Recommendations(SBAR). Information from the following report(s) SBAR, Kardex, Procedure Summary and Cardiac Rhythm sinus tachy was reviewed with the receiving nurse. Lines:   Quad Lumen 05/09/17 Right Internal jugular (Active)   Central Line Being Utilized Yes 5/9/2017 11:10 PM   Criteria for Appropriate Use Limited/no vessel suitable for conventional peripheral access 5/9/2017 11:10 PM   Site Assessment Drainage (comment) 5/9/2017 11:10 PM   Infiltration Assessment 0 5/9/2017 11:10 PM   Affected Extremity/Extremities Pulses palpable 5/9/2017 11:10 PM   Date of Last Dressing Change 05/09/17 5/9/2017 11:10 PM   Dressing Status Loose 5/9/2017 11:10 PM   Dressing Type Disk with Chlorhexadine gluconate (CHG); Transparent 5/9/2017 11:10 PM   Proximal Hub Color/Line Status White; Infusing 5/9/2017 11:10 PM   Positive Blood Return (Medial Site) Yes 5/9/2017 11:10 PM   Medial 1 Hub Color/Line Status Gray;Capped;Flushed 5/9/2017 11:10 PM   Positive Blood Return (Lateral Site) Yes 5/9/2017 11:10 PM   Medial 2 Hub Color/Line Status Blue;Capped;Flushed 5/9/2017 11:10 PM   Positive Blood Return (Site #3) Yes 5/9/2017 11:10 PM   Distal Hub Color/Line Status Brown;Capped;Flushed 5/9/2017 11:10 PM   Positive Blood Return (Site #4) Yes 5/9/2017 11:10 PM       Peripheral IV 05/08/17 Left Antecubital (Active)   Site Assessment Clean, dry, & intact 5/9/2017  8:00 PM   Phlebitis Assessment 0 5/9/2017  8:00 PM   Infiltration Assessment 0 5/9/2017  8:00 PM   Dressing Status Clean, dry, & intact 5/9/2017  8:00 PM   Dressing Type Transparent;Tape 5/9/2017  8:00 PM   Hub Color/Line Status Pink; Infusing 5/9/2017  8:00 PM   Action Taken Open ports on tubing capped 5/9/2017  8:00 PM   Alcohol Cap Used Yes 5/9/2017  8:00 PM       Peripheral IV 05/09/17 Left Hand (Active)        Opportunity for questions and clarification was provided. Patient transported with:   Monitor  O2 @ 2 liters  Registered Nurse      [id number]. TRANSFER - IN REPORT:    Verbal report received from Javier Colvin RN(name) on Mayme Lucy  being received from PACU (unit) for routine post - op      Report consisted of patients Situation, Background, Assessment and   Recommendations(SBAR). Information from the following report(s) SBAR, Procedure Summary, Intake/Output, MAR, Recent Results and Cardiac Rhythm sinus tachy was reviewed with the receiving nurse. Opportunity for questions and clarification was provided. Assessment completed upon patients arrival to unit and care assumed. 0800. Bedside and Verbal shift change report given to Kasie Tom (oncoming nurse) by Whitney Chahal (offgoing nurse). Report included the following information SBAR, Kardex, ED Summary, OR Summary, Intake/Output, MAR, Accordion, Recent Results, Med Rec Status and Cardiac Rhythm sinus tach. 36.4

## 2024-01-02 NOTE — PROGRESS NOTES
Bernice Grey  [id number]  01/02/24    The [facility] physicians located in the [facility], share daily and weekend call coverage.  The following is a list of these providers:    Dr. Mikel Cervantes     In general, one of the above mentioned physicians covers the whole group for daily and weekend call rotation. In rare circumstances, call coverage may be provided by an [facility] physician outside of this group.    For this reason, please understand that we cannot guarantee which physician will be present at the time of your delivery. You could be delivered by any one of the physicians (male or female) on call.    While in the hospital, your care from other physicians (anesthesia, neonatology, etc.) will be provided by the physician (male or female) that is on call.    I have read and fully understand the above information and agree to the same.      ______________________________________  Bernice Grey    ______________________________________        0700  Report received from [address]  Pt lying in bed, awake,  Pt reports she is feeling pretty good this am.  Pt states she has alittle pain but nothing that needs additional medication. Rn encouraged pt to utilize the PCA if she was uncomfortable. Lungs clear but diminished.  + bowel sounds. Last reported Bm was 7-3   Jama is draining cloudy phil urine. Pt continues to have +2-3 edema in her Bilateral Lower ext. R > L. .  0900 Pt turned repositioned. Pt bathed and lotioned. Pt had a very large bowel movement. Pt tolerated the procedure well. 1000 Order received from [facility] NP to change pt from GIP level of care to Routine effective today. 1015  Pt complained of pain. 6.5/10.    1023  Pt medicated with Dilaudid. Pt's  from [facility] in visiting. 1110  Pt complained of pain in her lower back and abdomen. Pt rated pain at 7/10.  1115  Pt medicated with Dilaudid IV.    1200  Pt resting. 1240  Pt complained of pain in her lower back and abdomen. Pt continues to rate her pain at a 7/10   Pt had a conversation with a MD who is wanting to do a procedure on her to help with her constant pain. Pt very anxious. 1251  Pt medicated with torodaol, Dilaudid and Lorazepam.    1330  Pt asleep. 1500  Pt continues to rest comfortably. No complaints at this time. 1730  Pt asleep. 1845  Pt tolerated her PO meds without difficulty. No complaints at this time. [address] [facility] given. NAME OF PATIENT:  Renetta Worthy    LEVEL OF CARE:  Routine      O2 SAFETY:  Pt is using PRN  Oxygen sign on the door    FALL INTERVENTIONS PROVIDED:   Implemented/recommended use of fall risk identification flag to all team members and Implemented/recommended assistive devices and encouraged their use    INTERDISPLINARY COMMUNICATION/COLLABORATION:  Physician, MSW, Leta and RN, CNA    NEW MEDICATION INITIATION DOCUMENTATION:  No new medications initiated.       COMFORTABLE DYING MEASURE:  Is Patient/family satisfied with symptom level?  yes    DISCHARGE PLAN:  Pt will remain at the [facility] until she passes away or until her family makes alternative living arrangements.

## 2024-10-18 NOTE — PROGRESS NOTES
[address] [facility] Help to Those in Need  [phone]    Patient Name: Roxana Dawn  YOB: 1980    Date of Provider Hospice Visit: 06/23/17    Level of Care:   [] General Inpatient (GIP)    [x] Routine   [] Respite    Location of Care:  [] [facility] [] [facility] [] [facility] [] [facility] [x] Hospice [facility]  [] Home [] Other:      Date of Original Hospice Admission: 5-16-17  Hospice Medical Director for Inpatient Care: Dr. Kwadwo Armendariz Diagnosis:  Metastatic Endometrial Adenocarcinoma       HOSPICE DIAGNOSES   Active Symptoms:  1. Generalised pain, especially right lower extremity, lower back, especially with movements, rates 4/10   2. Delirium; Fluctuates, mild confusion at times, improved for most part, none recent  3. Shortness of breath with low O2 sats: improves with use of O2  4. Anxiety/depression; still an issue  5. Insomnia; less, spending more time sleeping   6. Fatigue/weakness/lethargy: worse, BP very low at times  7. Constipation improved, refusing miralax  8. Itching: less, improved  9. Bladder spasms; less today     PLAN   Change to routine level of care as medication requirements and as needed break through doses have diminished    1. Continue ativan 1mg  three times daily as needed for anxiety, has had 1 dosing in the past 24 hrs  2. Continue low dose ativan 0.5mg twice daily  3. Continue PCA dilaudid 3mg / hr continuous, PCA dose of 1mg every 6 mts. 4. Continue nurse given dilaudid 3mg IV every 15mts as needed for severe pain, had 3 additional doses in past 24 hrs  5. Continue gabapentin 300mg at bedtime  6. Continue air mattress for her continued comfort  7. Continue clinoril, Cymbalta for adjuvant therapy for bone pain  8. Continue methadone at current levels 10mg every 8 hrs  9. Benadryl 25mg oral everfy 4 hrs as needed for itching: took 1 prn benadryl yesterday  10.  Continue  reposition and turn to side during cleaning/ADL care and to prevent skin breakdown. 6.  and SW to support family needs: family in need of ongoing psychosocial, emotional and spiritual support. Laurence Nicholas has been very supportive and regularly visiting pt/family and helping them. 12. Disposition:  routine care status when symptoms are stable: SW is having conversations with family now regarding resources for support at home versus routine care status here at [facility]. Mom has completed application for Foundation support. Prognosis estimated based on 06/23/17 clinical assessment is:   [] Few to Many Hours  [] Hours to Days   [x] Few to Many Days   [] Days to Weeks    [] Few to Many Weeks   [] Weeks to Months   [] Few to Many Months    Communicated plan of care with: Hospice Case Manager;  Hospice IDT; Care Team     GOALS OF CARE     Resuscitation Status: DNR  Durable DNR: [x] Yes [] No    Advance Care Planning 5/10/2017   Patient's Healthcare Decision Maker is: Legal Next of Andrea 69   Primary Decision Maker Name Emy Bailey   Primary Decision Maker Phone Number -   Primary Decision Maker Relationship to Patient Parent   Confirm Advance Directive Yes, on file   Patient Would Like to Complete Advance Directive -        HISTORY     History obtained from: chart, staff    CHIEF COMPLAINT: pain in bladder area/vaginal area  The patient is:   [x] Verbal  [] Nonverbal  [] Unresponsive    HPI/SUBJECTIVE:  Pt is more asleep today, nurses report that she continues to have serious pain when moved, there is some consideration for a discussion of a spinal block to alleviate hip and back pain, there is also discussion for terminal/palliative sedation        REVIEW OF SYSTEMS     The following systems were: [x] reviewed  [] unable to be reviewed    Positive ROS include:  Constitutional: fatigue, weakness  Ears/nose/mouth/throat:   Respiratory:  Gastrointestinal:  Musculoskeletal:weakness,pain in the right hip  Neurologic: numbness and burning pain  Psychiatric:anxiety, depression  Endocrine:          FUNCTIONAL ASSESSMENT     Palliative Performance Scale (PPS): 30%     PSYCHOSOCIAL/SPIRITUAL ASSESSMENT     Active Problems:    * No active hospital problems. *    Past Medical History:   Diagnosis Date    Cancer [facility])     uterine    CVI (common variable immunodeficiency) (HCC)     Diabetes [facility]     Elevated liver function tests 10/21/2010    Endometrial cancer ([facility] 7/7/2010    Hypertension     Iron deficiency anemia     Menometrorrhagia 10/21/2010    Microcytic anemia 10/21/2010    Morbid obesity ([facility]     Prediabetes 7/7/2010    Psychiatric disorder     depression    Radiation     completed radiation mid-march      Past Surgical History:   Procedure Laterality Date    CARDIAC SURG PROCEDURE UNLIST      hx of tachycardia    HX GYN      hysterectomy      Social History   Substance Use Topics    Smoking status: Never Smoker    Smokeless tobacco: Never Used    Alcohol use Yes     Family History   Problem Relation Age of Onset    Hypertension Mother     Hypertension Father     Stroke Maternal Grandfather     Cancer Paternal Grandmother      breast    Diabetes Paternal Grandmother       Allergies   Allergen Reactions    Egg Nausea and Vomiting     Raw egg and scrambled eggs. Egg products okay.      Pcn [Penicillins] Nausea and Vomiting     \"but could take amoxil\"    Shellfish Containing Products Unknown (comments)    Tetanus And Diphtheria Toxoids, Adsorbed, Adult Other (comments)     Developed local swelling, axillary pain, and transient fever    Tomato Unknown (comments)      Current Facility-Administered Medications   Medication Dose Route Frequency    diphenhydrAMINE (BENADRYL) capsule 25 mg  25 mg Oral Q4H PRN    HYDROmorphone (PF) (DILAUDID) injection 3 mg  3 mg IntraVENous Q15MIN PRN    LORazepam (ATIVAN) tablet 0.5 mg  0.5 mg Oral BID    acetaminophen (TYLENOL) tablet 650 mg  650 mg Oral Q4H PRN    Hydromorphone 500mg/500mL bag [facility] [employer] [facility]   IntraVENous CONTINUOUS    gabapentin (NEURONTIN) capsule 300 mg  300 mg Oral QHS    LORazepam (ATIVAN) tablet 1 mg  1 mg Oral TID PRN    metoprolol tartrate (LOPRESSOR) tablet 25 mg  25 mg Oral BID    methadone (DOLOPHINE) tablet 10 mg  10 mg Oral Q8H    polyethylene glycol (MIRALAX) packet 17 g  17 g Oral DAILY    magic mouthwash cpd (without sucralfate)  5 mL Oral QID PRN    docusate sodium (COLACE) capsule 100 mg  100 mg Oral DAILY    DULoxetine (CYMBALTA) capsule 60 mg  60 mg Oral QHS    sulindac (CLINORIL) tablet 200 mg  200 mg Oral BID WITH MEALS        PHYSICAL EXAM     Wt Readings from Last 3 Encounters:   05/30/17 121.1 kg (267 lb)   05/10/17 121.4 kg (267 lb 10.2 oz)   02/16/17 136.1 kg (300 lb)       Visit Vitals    /74 (BP 1 Location: Left arm)    Pulse 82    Temp 98.3 °F (36.8 °C)    Resp 16    SpO2 (!) 86%         Supplemental O2  [x] Yes  [] NO  Last bowel movement:     Currently this patient has:  [] Peripheral IV [x] PICC  [] PORT [] ICD    [x] Davila Catheter [] NG Tube   [] PEG Tube    [] Rectal Tube [] Drain  [x] Other: now with a special mattress    Constitutional: pale, laying on her back, awake and alert, continues with pain in vaginal area at times  Eyes: pallor++  ENMT: moist oral mucosa  Cardiovascular: distant heart sounds, tachycardic  Respiratory:  Normal breathing, diminished air entry  Gastrointestinal:  distended and firm, non tender, BS +, pt has a davila's  Musculoskeletal:edema ++ lower extremities right leg>left leg  Skin: warm, dry, some skin irritation in the groin folds, itching   Neurologic:sleepy but can follow commands  Psychiatric: fluctuating mood, calm affect         Pertinent Lab and or Imaging Tests: Total time: 35 mts  Counseling / coordination time:   > 50% counseling / coordination?:         Clifton Patel MD      Pt seen & case discussed with Ms. Delmy Vocadys. Agree with current care plan as outlined in her note.    Recommend following changes:   Pt is fairly stable and responding well to PCA dilaudid; affect much better, feels pain relief is adequate. Using PCA for most part except when she is sleeping and is not pushing the button and wakes up in pain and has to ask for nurse given dilaudid IV. Will switch to routine care and follow along for comfort and adjust medications/care plan accordingly. Statement Selected

## 2025-07-11 NOTE — PROGRESS NOTES
Heather Orlando 139 report from [facility], pt remains GIP status due to pain management, also stated that there was family meeting two days ago and discharge plan is for EOL at the [facility] [address] Patient asked for pain med IV dilaudid during report, given by [facility] nurse states pain was 7/10  2000 4 family members visiting  2010 Family asked when pt was to receive new bed. I stated we have the bed and waiting on enough staff to move pt onto this bed. Pt asked can she take this bed home. I reminded pt that the discharge plan was for pt to remain at the [facility] for EOL care. I stated that Dr Cristhian Wheeler thought it was safer for her to be here with frequent med changes and difficulty turning. Pt stated she had not heard anything about this and she thought that when she got the Dilaudid started this was allowing her to go home. I told pt to talk to Dr Cristhian Wheeler tomorrow and express wish to go home. 2030 Family has brought in fast food. 2108 Routine meds given Gabapentin, Methadone, Cymbalta HELD lopressor due to low BP  2240 Patient asked for IVP dilaudid pain 7/10 and asked how often she could have this, encouraged pt to use her PCA button which is every 10 minutes, states she has been doing this  26 Family member has brought in more fast food. 2305 Patient asked for IVP dilaudid pain 7/10  2358 Patient asked for IVP dilaudid pain 7/10 however patient has slurred speech and fell asleep while I was ansering her questions. Patient has refused turning tonight, had visitors until she went to sleep and was up eating. Family adjusted her legs. 0100 Pain reassessment asleep.  0400 Pt has been resting quietly  0700 Report to oncoming shift.     NAME OF PATIENT: Jyoti Randall     LEVEL OF CARE: GIP     REASON FOR GIP:   Pain, despite numerous changes in medications, Medication adjustment that must be monitored 24/7 and Stabilizing treatment that cannot take place at home     *PATIENT REMAINS ELIGIBLE FOR GIP LEVEL OF CARE AS EVIDENCED BY: (MUST BE ADDRESSED OF PATIENT GIP) Frequent assessment and med required to manage pain.        REASON FOR RESPITE:  na     O2 SAFETY:  Concentrator positioning (6\" from furniture/drapes), Tanks stored in rodríguez , No petroleum based products on face while oxygen in use and Oxygen sign on the door     FALL INTERVENTIONS PROVIDED:   Implemented/recommended use of fall risk identification flag to all team members, Implemented/recommended resources for alarm system (personal alarm, bed alarm, call bell, etc.) , Implemented/recommended environmental changes (remove hazards, lower bed, improve lighting, etc.) and Implemented/recommended increased supervision/assistance     INTERDISPLINARY COMMUNICATION/COLLABORATION:  Physician, MSW, [place] and RN, CNA     NEW MEDICATION INITIATION DOCUMENTATION:  Documentation completed in Clinical Note in [facility] Care     Reason medication is being initiated: na     MD / Provider name consulted re: change in status / initiation of new medication: na     New Symptom(s): na     New Order(s): na     Name of the person notified of the changes: na     Name of person being taught: na     Instructions given: na  Side Effects taught: na     Response to teaching: na        COMFORTABLE DYING MEASURE:  Is Patient/family satisfied with symptom level?  Yes     DISCHARGE PLAN: Remain at [facility] for EOL care.    You can access the FollowMyHealth Patient Portal offered by [facility] by registering at the following website: [email or web address] By joining [employer]’s FollowMyHealth portal, you will also be able to view your health information using other applications (apps) compatible with our system.